# Patient Record
Sex: MALE | Race: WHITE | NOT HISPANIC OR LATINO | URBAN - METROPOLITAN AREA
[De-identification: names, ages, dates, MRNs, and addresses within clinical notes are randomized per-mention and may not be internally consistent; named-entity substitution may affect disease eponyms.]

---

## 2017-12-14 ENCOUNTER — ALLSCRIPTS OFFICE VISIT (OUTPATIENT)
Dept: OTHER | Facility: OTHER | Age: 77
End: 2017-12-14

## 2017-12-15 NOTE — PROGRESS NOTES
Assessment  1  Elevated prostate specific antigen (PSA) (790 93) (R97 20)   2  Special screening examination for neoplasm of prostate (V76 44) (Z12 5)    Discussion/Summary  Discussion Summary:   58-year-old male managed by Dr Sorin Zacarias  history of elevated PSAReviewed with the patient his PSA trend since 2013  His PSA has been stable and due to this and his age we can discontinue screening at this time  His digital rectal examination was not concerning today  He will follow up on an as-needed basis  Chief Complaint  Chief Complaint Free Text Note Form: pt here for elevated PSA; Prostate cancer screen      History of Present Illness  HPI: Sheng Kennedy is a 58-year-old male here for follow-up evaluation of a history of an elevated PSA  he presents today with no lower urinary tract complaints, a good stream quality, and the sensation he empties his bladder to completion  His PSA is stable at 4 2  Last year his PSA was 3 94 dating back to 2013 his PSA was 4 2  Review of Systems  Complete-Male Urology:  Constitutional: No fever or chills, feels well, no tiredness, no recent weight gain or weight loss  Respiratory: No complaints of shortness of breath, no wheezing, no cough, no SOB on exertion, no orthopnea or PND  Cardiovascular: No complaints of slow heart rate, no fast heart rate, no chest pain, no palpitations, no leg claudication, no lower extremity  Gastrointestinal: No complaints of abdominal pain, no constipation, no nausea or vomiting, no diarrhea or bloody stools  Genitourinary: Empty sensation-- and-- stream quality good, but-- no dysuria,-- no urinary hesitancy,-- no hematuria,-- no incontinence,-- no nocturia-- and-- no feelings of urinary urgency  Musculoskeletal: No complaints of arthralgia, no myalgias, no joint swelling or stiffness, no limb pain or swelling  Integumentary: No complaints of skin rash or skin lesions, no itching, no skin wound, no dry skin    Hematologic/Lymphatic: No complaints of swollen glands, no swollen glands in the neck, does not bleed easily, no easy bruising  Neurological: No compliants of headache, no confusion, no convulsions, no numbness or tingling, no dizziness or fainting, no limb weakness, no difficulty walking  ROS Reviewed:   ROS reviewed  Active Problems  1  Elevated prostate specific antigen (PSA) (790 93) (R97 20)   2  Hypertension (401 9) (I10)   3  Special screening examination for neoplasm of prostate (V76 44) (Z12 5)    Past Medical History  1  History of Anxiety disorder (300 00) (F41 9)   2  History of angina pectoris (V12 59) (Z86 79)   3  History of backache (V13 59) (Z87 39)  Active Problems And Past Medical History Reviewed: The active problems and past medical history were reviewed and updated today  Surgical History  1  History of Cath Placement Of Stent 2  Surgical History Reviewed: The surgical history was reviewed and updated today  Family History  Mother    1  Family history of Heart Disease (V17 49)  Father    2  Family history of Hypertension (V17 49)  Brother    3  Family history of Heart Disease (V17 49)   4  Family history of Reported Family History Of Kidney Disease  Family History    5  Family history of Kidney Cancer (V16 51)   6  Family history of Lung Cancer (V16 1)  Family History Reviewed: The family history was reviewed and updated today  Social History   · Being A Social Drinker   · Former smoker (U61 16) (Y18 255)  Social History Reviewed: The social history was reviewed and updated today  The social history was reviewed and is unchanged  Current Meds   1  Aspirin 325 MG Oral Tablet Recorded   2  Bystolic 10 MG Oral Tablet; Therapy: 52Dzy4652 to (Evaluate:11Dau9767) Recorded   3  Crestor 10 MG Oral Tablet; Therapy: 72Bai8254 to (Last Steva Ear)  Requested for: 67Ioa7633 Ordered   4  Fenofibrate 145 MG Oral Tablet; Therapy: 44YVI7417 to (Evaluate:47Pyk7903) Recorded   5   LORazepam 1 MG Oral Tablet; Therapy: 51FQD9891 to (Last Rx:56Vxn7851)  Requested for: 32Uhy4573 Ordered   6  Omeprazole 20 MG Oral Capsule Delayed Release; Therapy: 87KVR5674 to (Last Rx:29Pyp1395)  Requested for: 93VFF1467 Ordered  Medication List Reviewed: The medication list was reviewed and updated today  Allergies  1  Penicillins  2  No Known Environmental Allergies   3  No Known Food Allergies    Vitals  Vital Signs    Recorded: 37Zrl0601 11:24AM   Heart Rate 63   Systolic 000   Diastolic 66   Height 5 ft 10 in   Weight 154 lb    BMI Calculated 22 1   BSA Calculated 1 87     Physical Exam   Constitutional  General appearance: No acute distress, well appearing and well nourished  Eyes  Conjunctiva and lids: No erythema, swelling or discharge  Ears, Nose, Mouth, and Throat  Hearing: Normal    Pulmonary  Respiratory effort: No increased work of breathing or signs of respiratory distress  Abdomen  Abdomen: Non-tender, no masses  Genitourinary Smooth, nontender, 40 g prostate with no nodules    Musculoskeletal  Gait and station: Normal    Psychiatric  Mood and affect: Normal        Signatures   Electronically signed by : Sebastien Solomon, ; Dec 14 2017 11:39AM EST                       (Author)    Electronically signed by : SADE Maldonado ; Dec 14 2017  2:22PM EST

## 2018-01-22 VITALS
BODY MASS INDEX: 22.05 KG/M2 | SYSTOLIC BLOOD PRESSURE: 142 MMHG | WEIGHT: 154 LBS | DIASTOLIC BLOOD PRESSURE: 66 MMHG | HEART RATE: 63 BPM | HEIGHT: 70 IN

## 2023-12-31 ENCOUNTER — HOSPITAL ENCOUNTER (INPATIENT)
Facility: HOSPITAL | Age: 83
LOS: 3 days | Discharge: HOME | DRG: 282 | End: 2024-01-03
Attending: EMERGENCY MEDICINE | Admitting: HOSPITALIST
Payer: COMMERCIAL

## 2023-12-31 DIAGNOSIS — I25.119 CORONARY ARTERY DISEASE INVOLVING NATIVE CORONARY ARTERY OF NATIVE HEART WITH ANGINA PECTORIS (HCC): ICD-10-CM

## 2023-12-31 DIAGNOSIS — I10 PRIMARY HYPERTENSION: ICD-10-CM

## 2023-12-31 DIAGNOSIS — R79.89 ELEVATED TROPONIN: Primary | ICD-10-CM

## 2023-12-31 DIAGNOSIS — D53.9 MACROCYTIC ANEMIA: ICD-10-CM

## 2023-12-31 PROBLEM — R07.9 CHEST PAIN: Status: ACTIVE | Noted: 2023-12-31

## 2023-12-31 PROBLEM — N18.30 STAGE 3 CHRONIC KIDNEY DISEASE (HCC): Status: ACTIVE | Noted: 2023-12-31

## 2023-12-31 PROBLEM — D72.819 LEUKOPENIA: Status: ACTIVE | Noted: 2023-12-31

## 2023-12-31 LAB
2HR DELTA HS TROPONIN: 875 NG/L
4HR DELTA HS TROPONIN: 2592 NG/L
ABO GROUP BLD: NORMAL
ABO GROUP BLD: NORMAL
ALBUMIN SERPL BCP-MCNC: 4.2 G/DL (ref 3.5–5)
ALP SERPL-CCNC: 32 U/L (ref 34–104)
ALT SERPL W P-5'-P-CCNC: 28 U/L (ref 7–52)
ANION GAP SERPL CALCULATED.3IONS-SCNC: 7 MMOL/L
APTT PPP: 26 SECONDS (ref 23–37)
AST SERPL W P-5'-P-CCNC: 30 U/L (ref 13–39)
ATRIAL RATE: 63 BPM
ATRIAL RATE: 75 BPM
BASOPHILS # BLD AUTO: 0.03 THOUSANDS/ÂΜL (ref 0–0.1)
BASOPHILS NFR BLD AUTO: 1 % (ref 0–1)
BILIRUB SERPL-MCNC: 0.5 MG/DL (ref 0.2–1)
BLD GP AB SCN SERPL QL: NEGATIVE
BNP SERPL-MCNC: 514 PG/ML (ref 0–100)
BUN SERPL-MCNC: 29 MG/DL (ref 5–25)
CALCIUM SERPL-MCNC: 9.5 MG/DL (ref 8.4–10.2)
CARDIAC TROPONIN I PNL SERPL HS: 1023 NG/L
CARDIAC TROPONIN I PNL SERPL HS: 1898 NG/L
CARDIAC TROPONIN I PNL SERPL HS: 3615 NG/L
CHLORIDE SERPL-SCNC: 106 MMOL/L (ref 96–108)
CO2 SERPL-SCNC: 28 MMOL/L (ref 21–32)
CREAT SERPL-MCNC: 1.24 MG/DL (ref 0.6–1.3)
EOSINOPHIL # BLD AUTO: 0.13 THOUSAND/ÂΜL (ref 0–0.61)
EOSINOPHIL NFR BLD AUTO: 4 % (ref 0–6)
ERYTHROCYTE [DISTWIDTH] IN BLOOD BY AUTOMATED COUNT: 13.4 % (ref 11.6–15.1)
FOLATE SERPL-MCNC: 12.8 NG/ML
GFR SERPL CREATININE-BSD FRML MDRD: 52 ML/MIN/1.73SQ M
GLUCOSE SERPL-MCNC: 153 MG/DL (ref 65–140)
HCT VFR BLD AUTO: 19.6 % (ref 36.5–49.3)
HGB BLD-MCNC: 6.9 G/DL (ref 12–17)
IMM GRANULOCYTES # BLD AUTO: 0.01 THOUSAND/UL (ref 0–0.2)
IMM GRANULOCYTES NFR BLD AUTO: 0 % (ref 0–2)
INR PPP: 1.01 (ref 0.84–1.19)
LYMPHOCYTES # BLD AUTO: 1.32 THOUSANDS/ÂΜL (ref 0.6–4.47)
LYMPHOCYTES NFR BLD AUTO: 38 % (ref 14–44)
MCH RBC QN AUTO: 37.9 PG (ref 26.8–34.3)
MCHC RBC AUTO-ENTMCNC: 35.2 G/DL (ref 31.4–37.4)
MCV RBC AUTO: 108 FL (ref 82–98)
MONOCYTES # BLD AUTO: 0.42 THOUSAND/ÂΜL (ref 0.17–1.22)
MONOCYTES NFR BLD AUTO: 12 % (ref 4–12)
NEUTROPHILS # BLD AUTO: 1.57 THOUSANDS/ÂΜL (ref 1.85–7.62)
NEUTS SEG NFR BLD AUTO: 45 % (ref 43–75)
NRBC BLD AUTO-RTO: 0 /100 WBCS
P AXIS: 72 DEGREES
P AXIS: 75 DEGREES
PLATELET # BLD AUTO: 270 THOUSANDS/UL (ref 149–390)
PMV BLD AUTO: 10.8 FL (ref 8.9–12.7)
POTASSIUM SERPL-SCNC: 4 MMOL/L (ref 3.5–5.3)
PR INTERVAL: 144 MS
PR INTERVAL: 148 MS
PROT SERPL-MCNC: 6.7 G/DL (ref 6.4–8.4)
PROTHROMBIN TIME: 13.9 SECONDS (ref 11.6–14.5)
QRS AXIS: 57 DEGREES
QRS AXIS: 64 DEGREES
QRSD INTERVAL: 90 MS
QRSD INTERVAL: 94 MS
QT INTERVAL: 400 MS
QT INTERVAL: 422 MS
QTC INTERVAL: 431 MS
QTC INTERVAL: 446 MS
RBC # BLD AUTO: 1.82 MILLION/UL (ref 3.88–5.62)
RH BLD: NEGATIVE
RH BLD: NEGATIVE
SODIUM SERPL-SCNC: 141 MMOL/L (ref 135–147)
SPECIMEN EXPIRATION DATE: NORMAL
T WAVE AXIS: -57 DEGREES
T WAVE AXIS: -72 DEGREES
VENTRICULAR RATE: 63 BPM
VENTRICULAR RATE: 75 BPM
WBC # BLD AUTO: 3.48 THOUSAND/UL (ref 4.31–10.16)

## 2023-12-31 PROCEDURE — 86901 BLOOD TYPING SEROLOGIC RH(D): CPT

## 2023-12-31 PROCEDURE — 85730 THROMBOPLASTIN TIME PARTIAL: CPT

## 2023-12-31 PROCEDURE — 84484 ASSAY OF TROPONIN QUANT: CPT

## 2023-12-31 PROCEDURE — 83880 ASSAY OF NATRIURETIC PEPTIDE: CPT

## 2023-12-31 PROCEDURE — 36415 COLL VENOUS BLD VENIPUNCTURE: CPT

## 2023-12-31 PROCEDURE — 80053 COMPREHEN METABOLIC PANEL: CPT

## 2023-12-31 PROCEDURE — 86900 BLOOD TYPING SEROLOGIC ABO: CPT

## 2023-12-31 PROCEDURE — 93005 ELECTROCARDIOGRAM TRACING: CPT

## 2023-12-31 PROCEDURE — 36430 TRANSFUSION BLD/BLD COMPNT: CPT

## 2023-12-31 PROCEDURE — C9113 INJ PANTOPRAZOLE SODIUM, VIA: HCPCS | Performed by: EMERGENCY MEDICINE

## 2023-12-31 PROCEDURE — 99223 1ST HOSP IP/OBS HIGH 75: CPT | Performed by: HOSPITALIST

## 2023-12-31 PROCEDURE — 99285 EMERGENCY DEPT VISIT HI MDM: CPT

## 2023-12-31 PROCEDURE — 82746 ASSAY OF FOLIC ACID SERUM: CPT | Performed by: PHYSICIAN ASSISTANT

## 2023-12-31 PROCEDURE — 86920 COMPATIBILITY TEST SPIN: CPT

## 2023-12-31 PROCEDURE — 86850 RBC ANTIBODY SCREEN: CPT

## 2023-12-31 PROCEDURE — 99291 CRITICAL CARE FIRST HOUR: CPT | Performed by: EMERGENCY MEDICINE

## 2023-12-31 PROCEDURE — 85025 COMPLETE CBC W/AUTO DIFF WBC: CPT

## 2023-12-31 PROCEDURE — P9016 RBC LEUKOCYTES REDUCED: HCPCS

## 2023-12-31 PROCEDURE — 85610 PROTHROMBIN TIME: CPT

## 2023-12-31 PROCEDURE — 93010 ELECTROCARDIOGRAM REPORT: CPT | Performed by: INTERNAL MEDICINE

## 2023-12-31 PROCEDURE — 96374 THER/PROPH/DIAG INJ IV PUSH: CPT

## 2023-12-31 RX ORDER — HEPARIN SODIUM 1000 [USP'U]/ML
1950 INJECTION, SOLUTION INTRAVENOUS; SUBCUTANEOUS EVERY 6 HOURS PRN
Status: DISCONTINUED | OUTPATIENT
Start: 2023-12-31 | End: 2023-12-31

## 2023-12-31 RX ORDER — NEBIVOLOL 10 MG/1
20 TABLET ORAL DAILY
Status: DISCONTINUED | OUTPATIENT
Start: 2024-01-01 | End: 2024-01-03 | Stop reason: HOSPADM

## 2023-12-31 RX ORDER — HYDROCHLOROTHIAZIDE 25 MG/1
25 TABLET ORAL DAILY
Status: DISCONTINUED | OUTPATIENT
Start: 2024-01-01 | End: 2024-01-03 | Stop reason: HOSPADM

## 2023-12-31 RX ORDER — LORAZEPAM 1 MG/1
1 TABLET ORAL 3 TIMES DAILY PRN
Status: DISCONTINUED | OUTPATIENT
Start: 2023-12-31 | End: 2024-01-03 | Stop reason: HOSPADM

## 2023-12-31 RX ORDER — ATORVASTATIN CALCIUM 20 MG/1
20 TABLET, FILM COATED ORAL
Status: DISCONTINUED | OUTPATIENT
Start: 2023-12-31 | End: 2024-01-03 | Stop reason: HOSPADM

## 2023-12-31 RX ORDER — HEPARIN SODIUM 1000 [USP'U]/ML
3900 INJECTION, SOLUTION INTRAVENOUS; SUBCUTANEOUS ONCE
Status: DISCONTINUED | OUTPATIENT
Start: 2023-12-31 | End: 2023-12-31

## 2023-12-31 RX ORDER — HEPARIN SODIUM 1000 [USP'U]/ML
3900 INJECTION, SOLUTION INTRAVENOUS; SUBCUTANEOUS EVERY 6 HOURS PRN
Status: DISCONTINUED | OUTPATIENT
Start: 2023-12-31 | End: 2023-12-31

## 2023-12-31 RX ORDER — ONDANSETRON 2 MG/ML
4 INJECTION INTRAMUSCULAR; INTRAVENOUS EVERY 6 HOURS PRN
Status: DISCONTINUED | OUTPATIENT
Start: 2023-12-31 | End: 2024-01-03 | Stop reason: HOSPADM

## 2023-12-31 RX ORDER — PANTOPRAZOLE SODIUM 40 MG/10ML
40 INJECTION, POWDER, LYOPHILIZED, FOR SOLUTION INTRAVENOUS ONCE
Status: COMPLETED | OUTPATIENT
Start: 2023-12-31 | End: 2023-12-31

## 2023-12-31 RX ORDER — ACETAMINOPHEN 325 MG/1
650 TABLET ORAL EVERY 6 HOURS PRN
Status: DISCONTINUED | OUTPATIENT
Start: 2023-12-31 | End: 2024-01-03 | Stop reason: HOSPADM

## 2023-12-31 RX ORDER — HEPARIN SODIUM 10000 [USP'U]/100ML
3-20 INJECTION, SOLUTION INTRAVENOUS
Status: DISCONTINUED | OUTPATIENT
Start: 2023-12-31 | End: 2023-12-31

## 2023-12-31 RX ORDER — HEPARIN SODIUM 5000 [USP'U]/ML
5000 INJECTION, SOLUTION INTRAVENOUS; SUBCUTANEOUS EVERY 8 HOURS SCHEDULED
Status: DISCONTINUED | OUTPATIENT
Start: 2023-12-31 | End: 2024-01-01

## 2023-12-31 RX ADMIN — ATORVASTATIN CALCIUM 20 MG: 20 TABLET, FILM COATED ORAL at 18:23

## 2023-12-31 RX ADMIN — TICAGRELOR 180 MG: 90 TABLET ORAL at 13:11

## 2023-12-31 RX ADMIN — CYANOCOBALAMIN TAB 500 MCG 1000 MCG: 500 TAB at 18:22

## 2023-12-31 RX ADMIN — LORAZEPAM 1 MG: 1 TABLET ORAL at 23:16

## 2023-12-31 RX ADMIN — PANTOPRAZOLE SODIUM 40 MG: 40 INJECTION, POWDER, FOR SOLUTION INTRAVENOUS at 14:18

## 2023-12-31 RX ADMIN — HEPARIN SODIUM 5000 UNITS: 5000 INJECTION INTRAVENOUS; SUBCUTANEOUS at 18:23

## 2023-12-31 NOTE — ASSESSMENT & PLAN NOTE
Hgb of 6.9 noted on admission with MCV of 108  Patient already being treated with PO B12 outpatient   SPEP was negative, but further clarification of haptoglobin was recommended   Low-normal B12 was noted   Iron levels appropriate  Guaiac negative in ED  T. Bili normal, doubt hemolysis   Check Folate   Check Haptoglobin   Transfusing 2 U in ED   Repeat hemoglobin   Consider hematology evaluation   Continue B12 supplementation

## 2023-12-31 NOTE — ED ATTENDING ATTESTATION
12/31/2023  I, Paula Golden MD, saw and evaluated the patient. I have discussed the patient with the resident/non-physician practitioner and agree with the resident's/non-physician practitioner's findings, Plan of Care, and MDM as documented in the resident's/non-physician practitioner's note, except where noted. All available labs and Radiology studies were reviewed.  I was present for key portions of any procedure(s) performed by the resident/non-physician practitioner and I was immediately available to provide assistance.       At this point I agree with the current assessment done in the Emergency Department.  I have conducted an independent evaluation of this patient a history and physical is as follows:    Patient is an 83-year-old male presents to the emergency department via EMS with abnormal EKG.  EMS contacted the department for medical command which I provided.  They shared that patient has been having jaw pain since yesterday and chest discomfort since this morning.  EKG revealing ST elevation in aVR and marked ST depression in multiple leads especially laterally.    Patient relays that he has had intermittent bilateral jaw discomfort over the last week patient-for fleeting and mild episodes without identified provocation.  Yesterday this was present for the majority of the day at low level intensity without known provocation.  This morning upon waking jaw pain was much more intense.  He additionally noticed discomfort in the upper portion of the chest with radiation down both arms-right more than left.  No associated dyspnea, lightheadedness, nausea, vomiting or abdominal discomfort.  No leg swelling or cramping.  History of hypertension and hyperlipidemia-reportedly well-controlled with medications.  History of CAD with 2 stents having been placed by Dr. Allen approximately 20 years ago.  Patient follows with him regularly.  (Chart not initially linked to [facility] records).   Patient relays that a number of days ago he had a general sensation of feeling unwell.  He currently feels fatigued.  No recent fevers or cough.    On exam he appears mildly uncomfortable.  He notes that pain intensity had been a 7 out of 10 though has now lessened to a 2 out of 10.  He did receive aspirin 324 mg prior to arrival.  He is not diaphoretic.  Heart sounds regular and lungs clear to auscultation bilaterally.  Abdomen soft and nontender.  Lower extremities nontender and nonedematous with strong PT pulses.    EKG reveals deep ST depression inferiorly and laterally & aVR elevation.  This is similar to prehospital EKG.  Concerning for ACS/ischemia-potentially evolving MI.  Dr. Loyola reaching out to Dr. Garcia.    ED Course      ED Course as of 12/31/23 1632   Sun Dec 31, 2023   1255 I spoke with Dr. Garcia-interventional cardiology.  Patient is currently experiencing jaw discomfort and bilateral shoulder pain although not chest pain.  As EKG is concerning for ischemia we are treating him aggressively medically.  Will load with heparin and follow with infusion, administer Brilinta and monitor closely with every 15 minute EKGs.    1346 Upon identification of the low hemoglobin spoke with patient and family at bedside.  Patient was newly diagnosed with anemia last month.  Wife showed me blood work that he had recently.  Hemoglobin was 10.9 on July 28.  It was 10.2 on 11/18.  This was followed up with iron, transferrin, TIBC, ESR and SPEP testing.  Results not consistent with iron deficiency anemia or myeloma.  B12 supplement was initiated and plan for repeat hemoglobin (based on level of 10.2) in a few months.  Patient denies awareness of having had any bleeding.  Stools have not been dark or loose.  He is Hemoccult negative here at the bedside.  Heparin has been discontinued patient was consented for 2 units of packed red blood cells.  Interventional cardiologist Dr. Garcia updated on findings.  He will be  admitted for his symptomatic anemia with resultant cardiac ischemia.  At this time he is in no acute distress and has no jaw discomfort, chest discomfort, shoulder or arm pain.  He denies having had any dyspnea.  Supplemental O2 was placed at 2 L.   1357 3rd EKG performed.  Unchanged.  Dr. Loyola will be discussing w/ Crit Care.  Anticipate SD admission.   1411 EKG#4 unchanged.  Will cancel Q15 min EKGs         Pt. Remained feeling well. Dr. Garcia updated on pt. Condition.  Continue w/ medical management.PPI initiated.  Dr. Loyola discussed w/ Crit Care & subsequently SLIM for admission.     HEART score:    History 2=Highly suspicious   ECG 2=Significant ST-depression   Age 2= > 65 years   Risk Factors 2= > 3 risk factors, or history of atherosclerotic disease   Troponin 2=Greater than or equal to 36 ng/L   Total 10        Critical Care Time  CriticalCare Time    Date/Time: 12/31/2023 4:44 PM    Performed by: Paula Golden MD  Authorized by: Paula Golden MD    Critical care provider statement:     Critical care time (minutes):  45    Critical care was necessary to treat or prevent imminent or life-threatening deterioration of the following conditions:  Circulatory failure    Critical care was time spent personally by me on the following activities:  Obtaining history from patient or surrogate, examination of patient, review of old charts, ordering and performing treatments and interventions, ordering and review of laboratory studies, ordering and review of radiographic studies, re-evaluation of patient's condition, evaluation of patient's response to treatment, development of treatment plan with patient or surrogate and discussions with consultants

## 2023-12-31 NOTE — ASSESSMENT & PLAN NOTE
Patient has history of 2 stents about 20 years ago   Continue ASA, Bystolic, statin   Plan as above

## 2023-12-31 NOTE — ASSESSMENT & PLAN NOTE
Lab Results   Component Value Date    EGFR 52 12/31/2023    CREATININE 1.24 12/31/2023     Creatinine appears to be at baseline   Monitor   Avoid hypotension/nephrotoxins

## 2023-12-31 NOTE — ASSESSMENT & PLAN NOTE
POA, patient with upper chest pain with radiation to bilateral arms and jaw. Was found to have troponin of 1023 on admission increasing to 1898 on 2 hours check   EKG with TWI/depression   Case was discussed with interventionalist - likely angina/trop elevation due to anemia of 6.9 noted   Admit patient to med/surg under inpatient status   Telemetry   Check 4 hour troponin   Consider cardiology consultation   Continue home cardiac regimen - ASA, Bystolic, Statin   Treatment of anemia as below

## 2023-12-31 NOTE — QUICK NOTE
Received call regarding Mr. Gael Woodruff, an 83 year old M and former smoker with a PMH significant for CAD s/p PCI, Carotid Stenosis, HTN, and HLD.  He presented to [facility] with ED with stuttering CP over the last week that persisted this morning with radiation into his back, RUE, and jaw.  EKG on arrival with Diffuse STD and REYNA in aVR consistent with global ischemia.  Spoke with ED Team regarding initial med mgmt with close observation and possible LHC today. However, on follow up discussion, patient found to be anemic with Hgb of 6.9. Called back to hold on the heparin gtt pending further bleed assessment.  Baseline Hgb was 13.4 in 01/2023 with decline to 10.9 07/2023 and recommendation for outpatient follow up.  At this time, recommend transfusion to goal of 8.0 g/dL for now.  Will assess patient further for DAPT/UHF recommendations as well.  No plans for urgent LHC at this time but will follow with TTE and ACS med mgmt as able.

## 2023-12-31 NOTE — H&P
[facility]  H&P  Name: Gael Ferraro 83 y.o. male I MRN: [id number]  Unit/Bed#: ED-20 I Date of Admission: 12/31/2023   Date of Service: 12/31/2023 I Hospital Day: 0      Assessment/Plan   * Chest pain  Assessment & Plan  POA, patient with upper chest pain with radiation to bilateral arms and jaw. Was found to have troponin of 1023 on admission increasing to 1898 on 2 hours check   EKG with TWI/depression   Case was discussed with interventionalist - likely angina/trop elevation due to anemia of 6.9 noted   Admit patient to med/surg under inpatient status   Telemetry   Check 4 hour troponin   Consider cardiology consultation   Continue home cardiac regimen - ASA, Bystolic, Statin   Treatment of anemia as below     Macrocytic anemia  Assessment & Plan  Hgb of 6.9 noted on admission with MCV of 108  Patient already being treated with PO B12 outpatient   SPEP was negative, but further clarification of haptoglobin was recommended   Low-normal B12 was noted   Iron levels appropriate  Guaiac negative in ED  T. Bili normal, doubt hemolysis   Check Folate   Check Haptoglobin   Transfusing 2 U in ED   Repeat hemoglobin   Consider hematology evaluation   Continue B12 supplementation     Coronary artery disease involving native coronary artery of native heart with angina pectoris (HCC)  Assessment & Plan  Patient has history of 2 stents about 20 years ago   Continue ASA, Bystolic, statin   Plan as above     Stage 3 chronic kidney disease (HCC)  Assessment & Plan  Lab Results   Component Value Date    EGFR 52 12/31/2023    CREATININE 1.24 12/31/2023     Creatinine appears to be at baseline   Monitor   Avoid hypotension/nephrotoxins     Leukopenia  Assessment & Plan  Noted at 3.48, seems to be slightly decreasing over the last few years.   Monitor for now   Consider hematology evaluation     Primary hypertension  Assessment & Plan  BP acceptable   Continue HCTZ         VTE Pharmacologic Prophylaxis:  VTE Score: 4 Moderate Risk (Score 3-4) - Pharmacological DVT Prophylaxis Ordered: heparin.  Code Status: Full Code   Discussion with family: Updated  (multiple family) at bedside.    Anticipated Length of Stay: Patient will be admitted on an inpatient basis with an anticipated length of stay of greater than 2 midnights secondary to as per above assessment and plan .    Total Time Spent on Date of Encounter in care of patient: 75 mins. This time was spent on one or more of the following: performing physical exam; counseling and coordination of care; obtaining or reviewing history; documenting in the medical record; reviewing/ordering tests, medications or procedures; communicating with other healthcare professionals and discussing with patient's family/caregivers.    Chief Complaint: Chest Pain     History of Present Illness:  Gael Ferraro is a 83 y.o. male with a PMH of CAD status post stenting 20 year ago, HTN, CKD 3 who presents with chest pain. Patient states that he first experienced chest pain a few days ago with minimal exertion. Reported upper chest pain with bilateral arm radiation and jaw pain. He denied SOB, dizziness, nausea, or vomiting. He took aspirin and laid down and his chest pain resolved. He states that he woke up today and immediately started with chest pain similar to a few days ago that was more intense in nature. EMS was called. Presently he reports he is feeling better.     Patient was noted to be anemic at recent well visit with his PCP and work up was ordered. He was started on Vitamin B12 supplementation orally. Patient does report fatigue, but denies dizziness, SCOTT/SOB, syncope, or blood in his urine or stools. He reports having screening colonoscopies as directed years ago, but has not had one recently due to no indication. He has not required blood transfusion prior to today. Denies recent viral illness. Reports that he eats regular and follows a healthy diet. Denies  family history of blood disorders.     Review of Systems:  Review of Systems   Constitutional:  Positive for fatigue. Negative for appetite change, chills, diaphoresis and fever.   HENT:  Negative for congestion, rhinorrhea and sore throat.    Eyes:  Negative for visual disturbance.   Respiratory:  Negative for cough, chest tightness, shortness of breath and wheezing.    Cardiovascular:  Positive for chest pain. Negative for palpitations and leg swelling.   Gastrointestinal:  Negative for abdominal pain, anal bleeding, blood in stool, constipation, diarrhea, nausea and vomiting.   Genitourinary:  Negative for dysuria and hematuria.   Musculoskeletal:  Negative for arthralgias and myalgias.   Neurological:  Negative for dizziness, syncope, weakness, light-headedness, numbness and headaches.   All other systems reviewed and are negative.      Past Medical and Surgical History:   No past medical history on file.    No past surgical history on file.    Meds/Allergies:  Prior to Admission medications    Not on File     I have reviewed home medications with a medical source (PCP, Pharmacy, other).    Allergies: Not on File    Social History:  Marital Status: /Civil Union   Occupation: Noncontributory   Patient Pre-hospital Living Situation: Home  Patient Pre-hospital Level of Mobility: walks  Patient Pre-hospital Diet Restrictions: None  Substance Use History:   Social History     Substance and Sexual Activity   Alcohol Use Not on file     Social History     Tobacco Use   Smoking Status Not on file   Smokeless Tobacco Not on file     Social History     Substance and Sexual Activity   Drug Use Not on file       Family History:  No family history on file.    Physical Exam:     Vitals:   Blood Pressure: 133/63 (12/31/23 1543)  Pulse: 66 (12/31/23 1543)  Temperature: 98.7 °F (37.1 °C) (12/31/23 1543)  Temp Source: Oral (12/31/23 1543)  Respirations: 16 (12/31/23 1543)  Weight - Scale: 69.2 kg (152 lb 8.9 oz) (12/31/23  1227)  SpO2: 96 % (12/31/23 1543)    Physical Exam  Constitutional:       General: He is not in acute distress.     Appearance: Normal appearance. He is normal weight. He is not ill-appearing or diaphoretic.      Interventions: Nasal cannula in place.   HENT:      Head: Normocephalic and atraumatic.      Mouth/Throat:      Mouth: Mucous membranes are moist.   Eyes:      General: No scleral icterus.     Pupils: Pupils are equal, round, and reactive to light.   Cardiovascular:      Rate and Rhythm: Normal rate and regular rhythm.      Pulses: Normal pulses.      Heart sounds: Normal heart sounds, S1 normal and S2 normal. No murmur heard.     No systolic murmur is present.      No diastolic murmur is present.      No gallop. No S3 or S4 sounds.   Pulmonary:      Effort: Pulmonary effort is normal. No accessory muscle usage or respiratory distress.      Breath sounds: Normal breath sounds. No stridor. No decreased breath sounds, wheezing, rhonchi or rales.   Chest:      Chest wall: No tenderness.   Abdominal:      General: Bowel sounds are normal. There is no distension.      Palpations: Abdomen is soft.      Tenderness: There is no abdominal tenderness. There is no guarding.   Musculoskeletal:      Right lower leg: No edema.      Left lower leg: No edema.   Skin:     General: Skin is warm and dry.      Coloration: Skin is not jaundiced.   Neurological:      General: No focal deficit present.      Mental Status: He is alert. Mental status is at baseline.      Motor: No tremor or seizure activity.   Psychiatric:         Behavior: Behavior is cooperative.          Additional Data:     Lab Results:  Results from last 7 days   Lab Units 12/31/23  1249   WBC Thousand/uL 3.48*   HEMOGLOBIN g/dL 6.9*   HEMATOCRIT % 19.6*   PLATELETS Thousands/uL 270   NEUTROS PCT % 45   LYMPHS PCT % 38   MONOS PCT % 12   EOS PCT % 4     Results from last 7 days   Lab Units 12/31/23  1249   SODIUM mmol/L 141   POTASSIUM mmol/L 4.0   CHLORIDE  mmol/L 106   CO2 mmol/L 28   BUN mg/dL 29*   CREATININE mg/dL 1.24   ANION GAP mmol/L 7   CALCIUM mg/dL 9.5   ALBUMIN g/dL 4.2   TOTAL BILIRUBIN mg/dL 0.50   ALK PHOS U/L 32*   ALT U/L 28   AST U/L 30   GLUCOSE RANDOM mg/dL 153*     Results from last 7 days   Lab Units 12/31/23  1315   INR  1.01                   Lines/Drains:  Invasive Devices       Peripheral Intravenous Line  Duration             Peripheral IV 12/31/23 Distal;Left;Upper;Ventral (anterior) Arm <1 day    Peripheral IV 12/31/23 Distal;Right;Upper;Ventral (anterior) Arm <1 day                        Imaging: No pertinent imaging reviewed.  No orders to display       EKG and Other Studies Reviewed on Admission:   EKG: NSR. HR 67 BPM.    ** Please Note: This note has been constructed using a voice recognition system. **

## 2023-12-31 NOTE — ED PROVIDER NOTES
History  Chief Complaint   Patient presents with    Chest Pain     Pt presents to the ED with jaw pain a couple times this week. Midsternal chest pain with radiation to the right arm onset 0900 this morning. Non smoker, family hx of heart attacks, more on mothers side. Pt reports hx of 2 stents. Placed 20 yrs ago. Arrived via EMS, received 324mg asa prehospital. 18g left AC.      Patient is an 83-year-old male with history of dyslipidemia, hypertension, prior CAD status post BRYAN x 20 years ago, former smoking history, strong family history of MI presenting to emergency department for evaluation of jaw and bilateral upper extremity pain.  Patient reports that he has had nonspecific jaw pain for least 1 week.  He notes intermittent worsening of his jaw pain without specific pattern for pain. Patient states He additionally has had some intermittent chest pain over the last week.  Patient states that her last couple days he has had an increase in his jaw pain that has been bothersome for him. Beginning today patient woke up with jaw pain radiating into bilateral upper extremities. Ultimately patient became concerned and called EMS who found patient to have ST depressions on field EKG and brought patient to ED for further evaluation. Upon arrival to the ED patient in NAD. He endorses mild bilateral jaw pain however doesn't endorse any chest pain, back pain, or shortness of breath.        None       No past medical history on file.    No past surgical history on file.    No family history on file.  I have reviewed and agree with the history as documented.    No existing history information found.  No existing history information found.        Review of Systems   Constitutional:  Negative for chills and fever.   HENT:  Negative for ear pain and sore throat.         Jaw pain   Eyes:  Negative for pain and visual disturbance.   Respiratory:  Negative for cough and shortness of breath.    Cardiovascular:  Negative for chest  pain and palpitations.   Gastrointestinal:  Negative for abdominal pain and vomiting.   Genitourinary:  Negative for dysuria and hematuria.   Musculoskeletal:  Negative for arthralgias and back pain.   Skin:  Negative for color change and rash.   Neurological:  Negative for seizures and syncope.   All other systems reviewed and are negative.      Physical Exam  ED Triage Vitals   Temperature Pulse Respirations Blood Pressure SpO2   12/31/23 1228 12/31/23 1223 12/31/23 1223 12/31/23 1223 12/31/23 1223   98 °F (36.7 °C) 98 20 151/68 99 %      Temp Source Heart Rate Source Patient Position - Orthostatic VS BP Location FiO2 (%)   12/31/23 1228 -- -- -- --   Oral          Pain Score       --                    Orthostatic Vital Signs  Vitals:    12/31/23 1400 12/31/23 1415 12/31/23 1521 12/31/23 1543   BP: 125/60 125/62 124/60 133/63   Pulse: 64 63 66 66       Physical Exam  Vitals and nursing note reviewed.   Constitutional:       General: He is not in acute distress.     Appearance: Normal appearance. He is not ill-appearing, toxic-appearing or diaphoretic.   HENT:      Head: Normocephalic and atraumatic.   Eyes:      General: No scleral icterus.        Right eye: No discharge.         Left eye: No discharge.      Extraocular Movements: Extraocular movements intact.      Conjunctiva/sclera: Conjunctivae normal.   Cardiovascular:      Rate and Rhythm: Normal rate.      Pulses: Normal pulses.      Heart sounds: Normal heart sounds. No murmur heard.     No friction rub. No gallop.      Comments: Equal bilateral radial pulses  Pulmonary:      Effort: Pulmonary effort is normal. No respiratory distress.      Breath sounds: Normal breath sounds. No stridor. No wheezing, rhonchi or rales.   Abdominal:      General: Abdomen is flat. Bowel sounds are normal. There is no distension.      Palpations: Abdomen is soft.      Tenderness: There is no abdominal tenderness. There is no guarding or rebound.   Musculoskeletal:          General: No swelling. Normal range of motion.      Cervical back: Normal range of motion. No rigidity.      Right lower leg: No edema.      Left lower leg: No edema.   Skin:     General: Skin is warm and dry.      Capillary Refill: Capillary refill takes less than 2 seconds.      Coloration: Skin is not jaundiced.      Findings: No bruising or lesion.   Neurological:      General: No focal deficit present.      Mental Status: He is alert and oriented to person, place, and time. Mental status is at baseline.   Psychiatric:         Mood and Affect: Mood normal.         Behavior: Behavior normal.         Thought Content: Thought content normal.         Judgment: Judgment normal.         ED Medications  Medications   cyanocobalamin (VITAMIN B-12) tablet 1,000 mcg (has no administration in time range)   ticagrelor (BRILINTA) tablet 180 mg (180 mg Oral Given 12/31/23 1311)   pantoprazole (PROTONIX) injection 40 mg (40 mg Intravenous Given 12/31/23 1418)       Diagnostic Studies  Results Reviewed       Procedure Component Value Units Date/Time    HS Troponin I 2hr [[id number]]  (Abnormal) Collected: 12/31/23 1448    Lab Status: Final result Specimen: Blood from Line, Venous Updated: 12/31/23 1521     hs TnI 2hr 1,898 ng/L      Delta 2hr hsTnI 875 ng/L     HS Troponin I 4hr [[id number]]     Lab Status: No result Specimen: Blood     B-Type Natriuretic Peptide(BNP) [[id number]]  (Abnormal) Collected: 12/31/23 1249    Lab Status: Final result Specimen: Blood from Arm, Right Updated: 12/31/23 1346      pg/mL     APTT six (6) hours after Heparin bolus/drip initiation or dosing change [[id number]]  (Normal) Collected: 12/31/23 1315    Lab Status: Final result Specimen: Blood from Arm, Right Updated: 12/31/23 1336     PTT 26 seconds     Protime-INR [[id number]]  (Normal) Collected: 12/31/23 1315    Lab Status: Final result Specimen: Blood from Arm, Right Updated: 12/31/23 1336     Protime 13.9 seconds      INR 1.01    HS  Troponin 0hr (reflex protocol) [[id number]]  (Abnormal) Collected: 12/31/23 1249    Lab Status: Final result Specimen: Blood from Arm, Right Updated: 12/31/23 1317     hs TnI 0hr 1,023 ng/L     Comprehensive metabolic panel [[id number]]  (Abnormal) Collected: 12/31/23 1249    Lab Status: Final result Specimen: Blood from Arm, Right Updated: 12/31/23 1314     Sodium 141 mmol/L      Potassium 4.0 mmol/L      Chloride 106 mmol/L      CO2 28 mmol/L      ANION GAP 7 mmol/L      BUN 29 mg/dL      Creatinine 1.24 mg/dL      Glucose 153 mg/dL      Calcium 9.5 mg/dL      AST 30 U/L      ALT 28 U/L      Alkaline Phosphatase 32 U/L      Total Protein 6.7 g/dL      Albumin 4.2 g/dL      Total Bilirubin 0.50 mg/dL      eGFR 52 ml/min/1.73sq m     Narrative:      National Kid[employer] Foundation guidelines for Chronic Kidney Disease (CKD):     Stage 1 with normal or high GFR (GFR > 90 mL/min/1.73 square meters)    Stage 2 Mild CKD (GFR = 60-89 mL/min/1.73 square meters)    Stage 3A Moderate CKD (GFR = 45-59 mL/min/1.73 square meters)    Stage 3B Moderate CKD (GFR = 30-44 mL/min/1.73 square meters)    Stage 4 Severe CKD (GFR = 15-29 mL/min/1.73 square meters)    Stage 5 End Stage CKD (GFR <15 mL/min/1.73 square meters)  Note: GFR calculation is accurate only with a steady state creatinine    CBC and differential [[id number]]  (Abnormal) Collected: 12/31/23 1249    Lab Status: Final result Specimen: Blood from Arm, Right Updated: 12/31/23 1313     WBC 3.48 Thousand/uL      RBC 1.82 Million/uL      Hemoglobin 6.9 g/dL      Hematocrit 19.6 %       fL      MCH 37.9 pg      MCHC 35.2 g/dL      RDW 13.4 %      MPV 10.8 fL      Platelets 270 Thousands/uL      nRBC 0 /100 WBCs      Neutrophils Relative 45 %      Immat GRANS % 0 %      Lymphocytes Relative 38 %      Monocytes Relative 12 %      Eosinophils Relative 4 %      Basophils Relative 1 %      Neutrophils Absolute 1.57 Thousands/µL      Immature Grans Absolute 0.01  Thousand/uL      Lymphocytes Absolute 1.32 Thousands/µL      Monocytes Absolute 0.42 Thousand/µL      Eosinophils Absolute 0.13 Thousand/µL      Basophils Absolute 0.03 Thousands/µL     Narrative:      This is an appended report.  These results have been appended to a previously verified report.                   No orders to display         Procedures  ECG 12 Lead Documentation Only    Date/Time: 12/31/2023 4:02 PM    Performed by: Steve Loyoal DO  Authorized by: Steve Loyola DO    Indications / Diagnosis:  Jaw pain  ECG reviewed by me, the ED Provider: yes    Patient location:  ED  Previous ECG:     Previous ECG:  Compared to current    Similarity:  No change  Interpretation:     Interpretation: normal    Rate:     ECG rate:  75    ECG rate assessment: normal    Rhythm:     Rhythm: sinus rhythm    Ectopy:     Ectopy: none    QRS:     QRS axis:  Normal  Conduction:     Conduction: normal    ST segments:     ST segments:  Abnormal    Elevation:  AVR    Depression:  V4, V5, V6, V3, II, III and aVF  T waves:     T waves: normal          ED Course  ED Course as of 12/31/23 1609   Sun Dec 31, 2023   1328 hs TnI 0hr(!): 1,023   1328 Hemoglobin(!!): 6.9                               JOCELIN Risk Score      Flowsheet Row Most Recent Value   Age >= 65 1 Filed at: 12/31/2023 1547   Known CAD (stenosis >= 50%) 1 Filed at: 12/31/2023 1547   Recent (<=24 hrs) Service Angina 1 Filed at: 12/31/2023 1547   ST Deviation >= 0.5 mm 1 Filed at: 12/31/2023 1547   3+ CAD Risk Factors (FHx, HTN, HLP, DM, Smoker) 0 Filed at: 12/31/2023 1547   Aspirin Use Past 7 Days 1 Filed at: 12/31/2023 1547   Elevated Cardiac Markers 1 Filed at: 12/31/2023 1547   JOCELIN Risk Score (Calculated) 6 Filed at: 12/31/2023 1547                Medical Decision Making  Patient is an 83-year-old male presenting to the emergency department for evaluation of jaw pain.  Upon arrival patient was hemodynamically stable with evidence of ST  depression in the lateral and inferior leads with isolated elevation in aVR.  At that time patient was not endorsing any anginal equivalent including dizziness, shortness of breath, nausea.  Secondary to diffuse ST depressions I reached out to interventional cardiology who felt it was appropriate to manage this patient medically while we gather more data.  Patient laboratory evaluation was remarkable for marked anemia.  At this point unsure why he is anemic.  He has received a workup through his primary care provider and was placed on iron.  Low suspicion for GI loss, patient was guaiac negative.  Denies hematemesis.  Ultimately believe that patient likely has global ischemia secondary to anemia.  Consent was obtained, patient will receive 2 units to transfuse to a goal of 8.  Patient to be admitted to internal medicine for further evaluation.    Amount and/or Complexity of Data Reviewed  Labs: ordered. Decision-making details documented in ED Course.    Risk  Prescription drug management.  Decision regarding hospitalization.          Disposition  Final diagnoses:   Elevated troponin     Time reflects when diagnosis was documented in both MDM as applicable and the Disposition within this note       Time User Action Codes Description Comment    12/31/2023  3:09 PM Steve Loyola Add [R79.89] Elevated troponin           ED Disposition       ED Disposition   Admit    Condition   Stable    Date/Time   Sun Dec 31, 2023 7226    Comment   Case was discussed with edyta and the patient's admission status was agreed to be Admission Status: inpatient status to the service of Dr. Devries .               Follow-up Information    None         Patient's Medications    No medications on file     No discharge procedures on file.    PDMP Review       None             ED Provider  Attending physically available and evaluated Gael Ferraro. I managed the patient along with the ED Attending.    Electronically Signed by            Steve Loyola DO  12/31/23 1606

## 2023-12-31 NOTE — ASSESSMENT & PLAN NOTE
Noted at 3.48, seems to be slightly decreasing over the last few years.   Monitor for now   Consider hematology evaluation

## 2024-01-01 ENCOUNTER — APPOINTMENT (INPATIENT)
Dept: NON INVASIVE DIAGNOSTICS | Facility: HOSPITAL | Age: 84
DRG: 282 | End: 2024-01-01
Payer: COMMERCIAL

## 2024-01-01 LAB
ABO GROUP BLD BPU: NORMAL
ABO GROUP BLD BPU: NORMAL
ALBUMIN SERPL BCP-MCNC: 3.8 G/DL (ref 3.5–5)
ALP SERPL-CCNC: 34 U/L (ref 34–104)
ALT SERPL W P-5'-P-CCNC: 28 U/L (ref 7–52)
ANION GAP SERPL CALCULATED.3IONS-SCNC: 7 MMOL/L
AORTIC ROOT: 3.1 CM
APICAL FOUR CHAMBER EJECTION FRACTION: 61 %
APTT PPP: 105 SECONDS (ref 23–37)
APTT PPP: 28 SECONDS (ref 23–37)
APTT PPP: 82 SECONDS (ref 23–37)
AST SERPL W P-5'-P-CCNC: 56 U/L (ref 13–39)
ATRIAL RATE: 65 BPM
ATRIAL RATE: 67 BPM
ATRIAL RATE: 81 BPM
BILIRUB SERPL-MCNC: 1.45 MG/DL (ref 0.2–1)
BPU ID: NORMAL
BPU ID: NORMAL
BUN SERPL-MCNC: 27 MG/DL (ref 5–25)
CALCIUM SERPL-MCNC: 9.2 MG/DL (ref 8.4–10.2)
CHLORIDE SERPL-SCNC: 109 MMOL/L (ref 96–108)
CO2 SERPL-SCNC: 26 MMOL/L (ref 21–32)
CREAT SERPL-MCNC: 1.13 MG/DL (ref 0.6–1.3)
CROSSMATCH: NORMAL
CROSSMATCH: NORMAL
E WAVE DECELERATION TIME: 171 MS
E/A RATIO: 1.52
ERYTHROCYTE [DISTWIDTH] IN BLOOD BY AUTOMATED COUNT: 16.8 % (ref 11.6–15.1)
FRACTIONAL SHORTENING: 36 (ref 28–44)
GFR SERPL CREATININE-BSD FRML MDRD: 58 ML/MIN/1.73SQ M
GLUCOSE SERPL-MCNC: 101 MG/DL (ref 65–140)
HCT VFR BLD AUTO: 25.6 % (ref 36.5–49.3)
HCT VFR BLD AUTO: 25.7 % (ref 36.5–49.3)
HCT VFR BLD AUTO: 27.4 % (ref 36.5–49.3)
HGB BLD-MCNC: 8.9 G/DL (ref 12–17)
HGB BLD-MCNC: 8.9 G/DL (ref 12–17)
HGB BLD-MCNC: 9.5 G/DL (ref 12–17)
INR PPP: 1.04 (ref 0.84–1.19)
INTERVENTRICULAR SEPTUM IN DIASTOLE (PARASTERNAL SHORT AXIS VIEW): 1.3 CM
INTERVENTRICULAR SEPTUM: 1.3 CM (ref 0.6–1.1)
LAAS-AP2: 26.3 CM2
LAAS-AP4: 23.8 CM2
LEFT ATRIUM SIZE: 4.2 CM
LEFT ATRIUM VOLUME (MOD BIPLANE): 85 ML
LEFT ATRIUM VOLUME INDEX (MOD BIPLANE): 45.7 ML/M2
LEFT INTERNAL DIMENSION IN SYSTOLE: 3 CM (ref 2.1–4)
LEFT VENTRICULAR INTERNAL DIMENSION IN DIASTOLE: 4.7 CM (ref 3.5–6)
LEFT VENTRICULAR POSTERIOR WALL IN END DIASTOLE: 1.1 CM
LEFT VENTRICULAR STROKE VOLUME: 67 ML
LVSV (TEICH): 67 ML
MCH RBC QN AUTO: 34.6 PG (ref 26.8–34.3)
MCHC RBC AUTO-ENTMCNC: 34.8 G/DL (ref 31.4–37.4)
MCV RBC AUTO: 100 FL (ref 82–98)
MV E'TISSUE VEL-SEP: 7 CM/S
MV PEAK A VEL: 0.58 M/S
MV PEAK E VEL: 88 CM/S
MV STENOSIS PRESSURE HALF TIME: 50 MS
MV VALVE AREA P 1/2 METHOD: 4.4
P AXIS: 64 DEGREES
P AXIS: 69 DEGREES
P AXIS: 70 DEGREES
PLATELET # BLD AUTO: 245 THOUSANDS/UL (ref 149–390)
PMV BLD AUTO: 10.8 FL (ref 8.9–12.7)
POTASSIUM SERPL-SCNC: 3.6 MMOL/L (ref 3.5–5.3)
PR INTERVAL: 148 MS
PR INTERVAL: 152 MS
PR INTERVAL: 154 MS
PROT SERPL-MCNC: 5.9 G/DL (ref 6.4–8.4)
PROTHROMBIN TIME: 14.2 SECONDS (ref 11.6–14.5)
QRS AXIS: 53 DEGREES
QRS AXIS: 59 DEGREES
QRS AXIS: 62 DEGREES
QRSD INTERVAL: 86 MS
QRSD INTERVAL: 92 MS
QRSD INTERVAL: 92 MS
QT INTERVAL: 386 MS
QT INTERVAL: 420 MS
QT INTERVAL: 424 MS
QTC INTERVAL: 440 MS
QTC INTERVAL: 443 MS
QTC INTERVAL: 448 MS
RA PRESSURE ESTIMATED: 8 MMHG
RBC # BLD AUTO: 2.57 MILLION/UL (ref 3.88–5.62)
RIGHT ATRIUM AREA SYSTOLE A4C: 16.9 CM2
RIGHT VENTRICLE ID DIMENSION: 3.4 CM
RV PSP: 58 MMHG
SL CV LEFT ATRIUM LENGTH A2C: 5.8 CM
SL CV LV EF: 60
SL CV PED ECHO LEFT VENTRICLE DIASTOLIC VOLUME (MOD BIPLANE) 2D: 102 ML
SL CV PED ECHO LEFT VENTRICLE SYSTOLIC VOLUME (MOD BIPLANE) 2D: 35 ML
SODIUM SERPL-SCNC: 142 MMOL/L (ref 135–147)
T WAVE AXIS: -82 DEGREES
T WAVE AXIS: -83 DEGREES
T WAVE AXIS: 253 DEGREES
TR MAX PG: 50 MMHG
TR PEAK VELOCITY: 3.5 M/S
TRICUSPID ANNULAR PLANE SYSTOLIC EXCURSION: 2.2 CM
TRICUSPID VALVE PEAK REGURGITATION VELOCITY: 3.54 M/S
UNIT DISPENSE STATUS: NORMAL
UNIT DISPENSE STATUS: NORMAL
UNIT PRODUCT CODE: NORMAL
UNIT PRODUCT CODE: NORMAL
UNIT PRODUCT VOLUME: 350 ML
UNIT PRODUCT VOLUME: 350 ML
UNIT RH: NORMAL
UNIT RH: NORMAL
VENTRICULAR RATE: 65 BPM
VENTRICULAR RATE: 67 BPM
VENTRICULAR RATE: 81 BPM
WBC # BLD AUTO: 6.77 THOUSAND/UL (ref 4.31–10.16)

## 2024-01-01 PROCEDURE — 85014 HEMATOCRIT: CPT | Performed by: NURSE PRACTITIONER

## 2024-01-01 PROCEDURE — 85730 THROMBOPLASTIN TIME PARTIAL: CPT | Performed by: NURSE PRACTITIONER

## 2024-01-01 PROCEDURE — 93306 TTE W/DOPPLER COMPLETE: CPT | Performed by: INTERNAL MEDICINE

## 2024-01-01 PROCEDURE — 99232 SBSQ HOSP IP/OBS MODERATE 35: CPT | Performed by: FAMILY MEDICINE

## 2024-01-01 PROCEDURE — 85018 HEMOGLOBIN: CPT | Performed by: NURSE PRACTITIONER

## 2024-01-01 PROCEDURE — 85730 THROMBOPLASTIN TIME PARTIAL: CPT | Performed by: FAMILY MEDICINE

## 2024-01-01 PROCEDURE — 83010 ASSAY OF HAPTOGLOBIN QUANT: CPT | Performed by: NURSE PRACTITIONER

## 2024-01-01 PROCEDURE — 99222 1ST HOSP IP/OBS MODERATE 55: CPT | Performed by: INTERNAL MEDICINE

## 2024-01-01 PROCEDURE — 85610 PROTHROMBIN TIME: CPT | Performed by: NURSE PRACTITIONER

## 2024-01-01 PROCEDURE — 93306 TTE W/DOPPLER COMPLETE: CPT

## 2024-01-01 PROCEDURE — 80053 COMPREHEN METABOLIC PANEL: CPT | Performed by: NURSE PRACTITIONER

## 2024-01-01 PROCEDURE — 85027 COMPLETE CBC AUTOMATED: CPT | Performed by: NURSE PRACTITIONER

## 2024-01-01 RX ORDER — SODIUM CHLORIDE 9 MG/ML
100 INJECTION, SOLUTION INTRAVENOUS CONTINUOUS
Status: DISCONTINUED | OUTPATIENT
Start: 2024-01-02 | End: 2024-01-02

## 2024-01-01 RX ORDER — HEPARIN SODIUM 1000 [USP'U]/ML
1950 INJECTION, SOLUTION INTRAVENOUS; SUBCUTANEOUS EVERY 6 HOURS PRN
Status: DISCONTINUED | OUTPATIENT
Start: 2024-01-01 | End: 2024-01-02

## 2024-01-01 RX ORDER — ASPIRIN 81 MG/1
324 TABLET, CHEWABLE ORAL ONCE
Status: COMPLETED | OUTPATIENT
Start: 2024-01-02 | End: 2024-01-02

## 2024-01-01 RX ORDER — ASPIRIN 81 MG/1
324 TABLET, CHEWABLE ORAL ONCE
Status: DISCONTINUED | OUTPATIENT
Start: 2024-01-01 | End: 2024-01-01

## 2024-01-01 RX ORDER — SODIUM CHLORIDE 9 MG/ML
100 INJECTION, SOLUTION INTRAVENOUS CONTINUOUS
Status: DISCONTINUED | OUTPATIENT
Start: 2024-01-01 | End: 2024-01-01

## 2024-01-01 RX ORDER — HEPARIN SODIUM 10000 [USP'U]/100ML
3-20 INJECTION, SOLUTION INTRAVENOUS
Status: DISCONTINUED | OUTPATIENT
Start: 2024-01-01 | End: 2024-01-02

## 2024-01-01 RX ORDER — HEPARIN SODIUM 1000 [USP'U]/ML
3900 INJECTION, SOLUTION INTRAVENOUS; SUBCUTANEOUS EVERY 6 HOURS PRN
Status: DISCONTINUED | OUTPATIENT
Start: 2024-01-01 | End: 2024-01-02

## 2024-01-01 RX ORDER — HEPARIN SODIUM 1000 [USP'U]/ML
3900 INJECTION, SOLUTION INTRAVENOUS; SUBCUTANEOUS ONCE
Status: COMPLETED | OUTPATIENT
Start: 2024-01-01 | End: 2024-01-01

## 2024-01-01 RX ADMIN — ASPIRIN 81 MG: 81 TABLET, COATED ORAL at 08:22

## 2024-01-01 RX ADMIN — LORAZEPAM 1 MG: 1 TABLET ORAL at 23:41

## 2024-01-01 RX ADMIN — HYDROCHLOROTHIAZIDE 25 MG: 25 TABLET ORAL at 08:22

## 2024-01-01 RX ADMIN — TICAGRELOR 90 MG: 90 TABLET ORAL at 17:22

## 2024-01-01 RX ADMIN — LORAZEPAM 1 MG: 1 TABLET ORAL at 16:08

## 2024-01-01 RX ADMIN — LORAZEPAM 1 MG: 1 TABLET ORAL at 08:25

## 2024-01-01 RX ADMIN — HEPARIN SODIUM 5000 UNITS: 5000 INJECTION INTRAVENOUS; SUBCUTANEOUS at 01:21

## 2024-01-01 RX ADMIN — HEPARIN SODIUM 12 UNITS/KG/HR: 10000 INJECTION, SOLUTION INTRAVENOUS at 03:33

## 2024-01-01 RX ADMIN — NEBIVOLOL 20 MG: 10 TABLET ORAL at 08:22

## 2024-01-01 RX ADMIN — HEPARIN SODIUM 3900 UNITS: 1000 INJECTION INTRAVENOUS; SUBCUTANEOUS at 03:36

## 2024-01-01 RX ADMIN — TICAGRELOR 90 MG: 90 TABLET ORAL at 05:58

## 2024-01-01 RX ADMIN — CYANOCOBALAMIN TAB 500 MCG 1000 MCG: 500 TAB at 08:22

## 2024-01-01 RX ADMIN — ATORVASTATIN CALCIUM 20 MG: 20 TABLET, FILM COATED ORAL at 17:21

## 2024-01-01 NOTE — ASSESSMENT & PLAN NOTE
Lab Results   Component Value Date    EGFR 58 01/01/2024    EGFR 52 12/31/2023    CREATININE 1.13 01/01/2024    CREATININE 1.24 12/31/2023     Creatinine appears to be at baseline   Monitor   Avoid hypotension/nephrotoxins

## 2024-01-01 NOTE — QUICK NOTE
Expected improvement in hgb s/p 2 unit PRBC.  Remains HD stable without evidence of active bleeding.  Has not had any further CP.  Per discussion with Dr. Garcia, continue with heparin acs, brilenta.  Formal cardiology consult placed.

## 2024-01-01 NOTE — PROGRESS NOTES
[facility]  Progress Note  Name: Gael Ferraro I  MRN: [id number]  Unit/Bed#: S -01 I Date of Admission: 12/31/2023   Date of Service: 1/1/2024 I Hospital Day: 1    Assessment/Plan   * Chest pain  Assessment & Plan  POA, patient with upper chest pain with radiation to bilateral arms and jaw. Was found to have troponin of 1023 on admission increasing to 1898 on 2 hours check   EKG with TWI/depression   Cardiology following  Continue telemetry  Planning for left heart catheterization 1/2/2024.  N.p.o. midnight  Aspirin, Bystolic and statin      Leukopenia  Assessment & Plan  Noted at 3.48, seems to be slightly decreasing over the last few years.   Monitor for now   Consider hematology evaluation     Primary hypertension  Assessment & Plan  BP acceptable   Continue HCTZ    Stage 3 chronic kidney disease (HCC)  Assessment & Plan  Lab Results   Component Value Date    EGFR 58 01/01/2024    EGFR 52 12/31/2023    CREATININE 1.13 01/01/2024    CREATININE 1.24 12/31/2023     Creatinine appears to be at baseline   Monitor   Avoid hypotension/nephrotoxins     Coronary artery disease involving native coronary artery of native heart with angina pectoris (HCC)  Assessment & Plan  Patient has history of 2 stents about 20 years ago   Continue ASA, Bystolic, statin   Plan as above     Macrocytic anemia  Assessment & Plan  Hgb of 6.9 noted on admission with MCV of 108 improved following 2 units PRBC currently 8.9  Patient already being treated with PO B12 outpatient   SPEP was negative, but further clarification of haptoglobin was recommended   Low-normal B12 was noted   Iron levels appropriate  Guaiac negative in ED  Follow-up labs           VTE Pharmacologic Prophylaxis: VTE Score: 4 Moderate Risk (Score 3-4) - Pharmacological DVT Prophylaxis Ordered: heparin.    Mobility:   JH-HLM Achieved: 2: Bed activities/Dependent transfer  HLM Goal NOT achieved. Continue with multidisciplinary rounding and  encourage appropriate mobility to improve upon HLM goals.    Patient Centered Rounds: I performed bedside rounds with nursing staff today.   Discussions with Specialists or Other Care Team Provider: Cardiology    Education and Discussions with Family / Patient: Updated  (son) at bedside.    Total Time Spent on Date of Encounter in care of patient: 30 mins. This time was spent on one or more of the following: performing physical exam; counseling and coordination of care; obtaining or reviewing history; documenting in the medical record; reviewing/ordering tests, medications or procedures; communicating with other healthcare professionals and discussing with patient's family/caregivers.    Current Length of Stay: 1 day(s)  Current Patient Status: Inpatient   Certification Statement: The patient will continue to require additional inpatient hospital stay due to scheduled for cardiac catheterization  Discharge Plan: Anticipate discharge in 48-72 hrs to discharge location to be determined pending rehab evaluations.    Code Status: Level 1 - Full Code    Subjective:   Patient seen and examined at bedside.  States chest pain has resolved.  Does continue to endorse fatigue.  Denies any blood in the stool or shortness of breath    Objective:     Vitals:   Temp (24hrs), Av.4 °F (36.9 °C), Min:98 °F (36.7 °C), Max:99.2 °F (37.3 °C)    Temp:  [98 °F (36.7 °C)-99.2 °F (37.3 °C)] 99.2 °F (37.3 °C)  HR:  [63-75] 72  Resp:  [15-20] 18  BP: (108-134)/(43-66) 128/51  SpO2:  [92 %-99 %] 97 %  Body mass index is 21.81 kg/m².     Input and Output Summary (last 24 hours):     Intake/Output Summary (Last 24 hours) at 2024 1232  Last data filed at 2023 2112  Gross per 24 hour   Intake 825 ml   Output --   Net 825 ml       Physical Exam:   Physical Exam  Vitals and nursing note reviewed.   Constitutional:       Appearance: Normal appearance. He is well-developed.   HENT:      Head: Normocephalic and atraumatic.    Eyes:      Extraocular Movements: Extraocular movements intact.      Pupils: Pupils are equal, round, and reactive to light.   Cardiovascular:      Rate and Rhythm: Normal rate and regular rhythm.   Pulmonary:      Effort: Pulmonary effort is normal.      Breath sounds: Normal breath sounds.   Abdominal:      General: Bowel sounds are normal.      Palpations: Abdomen is soft.   Musculoskeletal:      Cervical back: Normal range of motion.   Skin:     General: Skin is warm and dry.   Neurological:      General: No focal deficit present.      Mental Status: He is alert and oriented to person, place, and time.   Psychiatric:         Mood and Affect: Mood normal.         Speech: Speech normal.         Behavior: Behavior normal.          Additional Data:     Labs:  Results from last 7 days   Lab Units 01/01/24  0252 01/01/24  0043 12/31/23  1249   WBC Thousand/uL 6.77  --  3.48*   HEMOGLOBIN g/dL 8.9*   < > 6.9*   HEMATOCRIT % 25.6*   < > 19.6*   PLATELETS Thousands/uL 245  --  270   NEUTROS PCT %  --   --  45   LYMPHS PCT %  --   --  38   MONOS PCT %  --   --  12   EOS PCT %  --   --  4    < > = values in this interval not displayed.     Results from last 7 days   Lab Units 01/01/24  0252   SODIUM mmol/L 142   POTASSIUM mmol/L 3.6   CHLORIDE mmol/L 109*   CO2 mmol/L 26   BUN mg/dL 27*   CREATININE mg/dL 1.13   ANION GAP mmol/L 7   CALCIUM mg/dL 9.2   ALBUMIN g/dL 3.8   TOTAL BILIRUBIN mg/dL 1.45*   ALK PHOS U/L 34   ALT U/L 28   AST U/L 56*   GLUCOSE RANDOM mg/dL 101     Results from last 7 days   Lab Units 01/01/24  0252   INR  1.04                   Lines/Drains:  Invasive Devices       Peripheral Intravenous Line  Duration             Peripheral IV 12/31/23 Distal;Left;Upper;Ventral (anterior) Arm 1 day    Peripheral IV 12/31/23 Distal;Right;Upper;Ventral (anterior) Arm 1 day                      Telemetry:  Telemetry Orders (From admission, onward)               24 Hour Telemetry Monitoring  Continuous x 24 Hours  (Telem)        Expiring   Question:  Reason for 24 Hour Telemetry  Answer:  PCI/EP study (including pacer and ICD implementation), Cardiac surgery, MI, abnormal cardiac cath, and chest pain- rule out MI                     Telemetry Reviewed: Normal Sinus Rhythm  Indication for Continued Telemetry Use: Acute MI/Unstable Angina/Rule out ACS             Imaging: No pertinent imaging reviewed.    Recent Cultures (last 7 days):         Last 24 Hours Medication List:   Current Facility-Administered Medications   Medication Dose Route Frequency Provider Last Rate    acetaminophen  650 mg Oral Q6H PRN Jose Carlos Schaefer PA-C      aspirin  81 mg Oral Daily PERI Reyes-SANG      atorvastatin  20 mg Oral Daily With Dinner Jose Carlos Schaefer PA-C      cyanocobalamin  1,000 mcg Oral Daily Jose Carlos Schaefer PA-C      heparin (porcine)  3-20 Units/kg/hr (Order-Specific) Intravenous Titrated BRITTANY Reilly 12 Units/kg/hr (01/01/24 0333)    heparin (porcine)  1,950 Units Intravenous Q6H PRN BRITTANY Reilly      heparin (porcine)  3,900 Units Intravenous Q6H PRN BRITTANY Reilly      hydrochlorothiazide  25 mg Oral Daily Jose Carlos Schaefer PA-C      LORazepam  1 mg Oral TID PRN Jose Carlos Schaefer PA-C      nebivolol  20 mg Oral Daily PERI Reyse-SANG      ondansetron  4 mg Intravenous Q6H PRN PERI Reyes-SANG      ticagrelor  90 mg Oral Q12H CRESENCIO BRITTANY Reilly          Today, Patient Was Seen By: Kamran Roy MD    **Please Note: This note may have been constructed using a voice recognition system.**

## 2024-01-01 NOTE — UTILIZATION REVIEW
Initial Clinical Review    Admission: Date/Time/Statement:   Admission Orders (From admission, onward)       Ordered        12/31/23 1510  INPATIENT ADMISSION  Once                          Orders Placed This Encounter   Procedures    INPATIENT ADMISSION     Standing Status:   Standing     Number of Occurrences:   1     Order Specific Question:   Level of Care     Answer:   Med Surg [16]     Order Specific Question:   Estimated length of stay     Answer:   More than 2 Midnights     Order Specific Question:   Certification     Answer:   I certify that inpatient services are medically necessary for this patient for a duration of greater than two midnights. See H&P and MD Progress Notes for additional information about the patient's course of treatment.     ED Arrival Information       Expected   -    Arrival   12/31/2023 12:22    Acuity   Immediate              Means of arrival   Ambulance    Escorted by   [facility]    Service   Hospitalist    Admission type   Emergency              Arrival complaint   -             Chief Complaint   Patient presents with    Chest Pain     Pt presents to the ED with jaw pain a couple times this week. Midsternal chest pain with radiation to the right arm onset 0900 this morning. Non smoker, family hx of heart attacks, more on mothers side. Pt reports hx of 2 stents. Placed 20 yrs ago. Arrived via EMS, received 324mg asa prehospital. 18g left AC.        Initial Presentation: 83 y.o. male with a PMH of CAD status post stenting 20 year ago, HTN, CKD 3 who presents with chest pain. Patient states that he first experienced chest pain a few days ago with minimal exertion. Reported upper chest pain with bilateral arm radiation and jaw pain. He denied SOB, dizziness, nausea, or vomiting. He took aspirin and laid down and his chest pain resolved. He states that he woke up today and immediately started with chest pain similar to a few days ago that was more intense in nature. EMS was called.  Plan: Inpatient admission for evaluation and treatment of chest pain, macrocytic anemia, CAD, stage 3 CKD, leukopenia, HTN: telemetry, trend troponin, continue home ASA, Bystolic, statin, check folate and haptoglobin, transfuse 2 units PRBC, repeat Hgb, continue vitamin B12 and HCTZ.     Date: 1/1   Day 2:     Cardiology consult: s/p aspirin/Brilinta load + heparin gtt in ED, telemetry, Echo, plan for HC tomorrow, NPO at midnight, H&H in am, continue ASA, atorvastatin, nebivolol, fenofibrate.    Internal medicine: EKG with TWI/depression, continue telemetry, plan for left heart cath on 1/2, NPO at midnight, continue ASA, Bystolic and statin, HCTZ.     Date: 1/2    Day 3: Has surpassed a 2nd midnight with active treatments and services, which include left heart cath.      ED Triage Vitals   Temperature Pulse Respirations Blood Pressure SpO2   12/31/23 1228 12/31/23 1223 12/31/23 1223 12/31/23 1223 12/31/23 1223   98 °F (36.7 °C) 98 20 151/68 99 %      Temp Source Heart Rate Source Patient Position - Orthostatic VS BP Location FiO2 (%)   12/31/23 1228 12/31/23 1645 12/31/23 1818 12/31/23 1818 --   Oral Monitor Lying Left arm       Pain Score       12/31/23 1729       No Pain          Wt Readings from Last 1 Encounters:   01/01/24 68.9 kg (152 lb)     Additional Vital Signs:     Date/Time Temp Pulse Resp BP MAP (mmHg) SpO2 Calculated FIO2 (%) - Nasal Cannula Nasal Cannula O2 Flow Rate (L/min) O2 Device   01/01/24 0945 -- 72 -- 128/51 -- -- -- -- --   01/01/24 07:07:10 99.2 °F (37.3 °C) 72 -- 128/51 77 97 % -- -- --   01/01/24 07:05:29 99.2 °F (37.3 °C) 75 18 120/43 Abnormal  69 94 % -- -- None (Room air)   01/01/24 02:46:20 98.2 °F (36.8 °C) 72 -- 117/50 72 92 % -- -- --   12/31/23 21:13:12 98.1 °F (36.7 °C) 75 -- 121/53 76 97 % -- -- --   12/31/23 2112 98.1 °F (36.7 °C) 73 18 121/53 -- -- -- -- --   12/31/23 2100 -- -- -- -- -- -- -- -- Nasal cannula   12/31/23 2000 -- -- -- -- -- -- 28 2 L/min Nasal cannula    12/31/23 1818 98.1 °F (36.7 °C) 65 18 125/53 77 99 % 28 2 L/min Nasal cannula   12/31/23 1802 98 °F (36.7 °C) 65 18 128/57 -- -- -- -- --   12/31/23 17:39:33 98.2 °F (36.8 °C) 64 -- 108/66 80 99 % -- -- --   12/31/23 1700 98.4 °F (36.9 °C) 64 20 134/61 88 97 % 28 2 L/min --   12/31/23 1645 -- 65 16 125/62 89 95 % -- -- --   12/31/23 15:43:18 98.7 °F (37.1 °C) 66 16 133/63 -- 96 % 28 2 L/min Nasal cannula   12/31/23 15:21:11 98.5 °F (36.9 °C) 66 15 124/60 -- 95 % 28 2 L/min Nasal cannula   12/31/23 1415 -- 63 16 125/62 -- 96 % -- -- Nasal cannula   12/31/23 1400 -- 64 18 125/60 87 96 % 28 2 L/min Nasal cannula   12/31/23 1228 98 °F (36.7 °C) -- -- -- -- -- -- -- --   12/31/23 12:27:56 -- 72 18 165/70 -- 98 % -- -- --     Pertinent Labs/Diagnostic Test Results:     1/1 Echo:  Interpretation Summary       Left Ventricle: Left ventricular cavity size is normal. Wall thickness is normal. The left ventricular ejection fraction is 60%. Systolic function is normal. Wall motion is normal. Diastolic function is normal.    Left Atrium: The atrium is dilated (42-48 mL/m2).    Mitral Valve: There is mild regurgitation.    Tricuspid Valve: There is mild regurgitation. The right ventricular systolic pressure is moderately to severely elevated. The estimated right ventricular systolic pressure is 58.00 mmHg.    Pulmonic Valve: There is mild regurgitation.    12/31 EKG:  Normal sinus rhythm  Left ventricular hypertrophy with repolarization abnormality  Abnormal ECG  No previous ECGs available      Results from last 7 days   Lab Units 01/01/24  0252 01/01/24  0043 12/31/23  1249   WBC Thousand/uL 6.77  --  3.48*   HEMOGLOBIN g/dL 8.9* 8.9* 6.9*   HEMATOCRIT % 25.6* 25.7* 19.6*   PLATELETS Thousands/uL 245  --  270   NEUTROS ABS Thousands/µL  --   --  1.57*         Results from last 7 days   Lab Units 01/01/24  0252 12/31/23  1249   SODIUM mmol/L 142 141   POTASSIUM mmol/L 3.6 4.0   CHLORIDE mmol/L 109* 106   CO2 mmol/L 26 28    ANION GAP mmol/L 7 7   BUN mg/dL 27* 29*   CREATININE mg/dL 1.13 1.24   EGFR ml/min/1.73sq m 58 52   CALCIUM mg/dL 9.2 9.5     Results from last 7 days   Lab Units 01/01/24  0252 12/31/23  1249   AST U/L 56* 30   ALT U/L 28 28   ALK PHOS U/L 34 32*   TOTAL PROTEIN g/dL 5.9* 6.7   ALBUMIN g/dL 3.8 4.2   TOTAL BILIRUBIN mg/dL 1.45* 0.50         Results from last 7 days   Lab Units 01/01/24  0252 12/31/23  1249   GLUCOSE RANDOM mg/dL 101 153*           Results from last 7 days   Lab Units 12/31/23  1711 12/31/23  1448 12/31/23  1249   HS TNI 0HR ng/L  --   --  1,023*   HS TNI 2HR ng/L  --  1,898*  --    HSTNI D2 ng/L  --  875*  --    HS TNI 4HR ng/L 3,615*  --   --    HSTNI D4 ng/L 2,592*  --   --          Results from last 7 days   Lab Units 01/01/24  0933 01/01/24  0252 12/31/23  1315   PROTIME seconds  --  14.2 13.9   INR   --  1.04 1.01   PTT seconds 105* 28 26           Results from last 7 days   Lab Units 12/31/23  1249   BNP pg/mL 514*             Results from last 7 days   Lab Units 01/01/24  0547   UNIT PRODUCT CODE  M6703G90  [id number]   UNIT NUMBER  [id number]  [id number]   UNITABO  A  A   UNITRH  NEG  NEG   CROSSMATCH  Compatible  Compatible   UNIT DISPENSE STATUS  Presumed Trans  Presumed Trans   UNIT PRODUCT VOL mL 350  350         ED Treatment:   Medication Administration from 12/31/2023 1211 to 12/31/2023 1729         Date/Time Order Dose Route Action     12/31/2023 1311 EST ticagrelor (BRILINTA) tablet 180 mg 180 mg Oral Given     12/31/2023 1418 EST pantoprazole (PROTONIX) injection 40 mg 40 mg Intravenous Given          No past medical history on file.  Present on Admission:   Chest pain   Macrocytic anemia   Coronary artery disease involving native coronary artery of native heart with angina pectoris (HCC)   Stage 3 chronic kidney disease (HCC)   Primary hypertension   Leukopenia      Admitting Diagnosis: Chest pain [R07.9]  Elevated troponin [R79.89]  Age/Sex: 83 y.o.  male  Admission Orders:  Scheduled Medications:  [START ON 1/2/2024] aspirin, 324 mg, Oral, Once  atorvastatin, 20 mg, Oral, Daily With Dinner  cyanocobalamin, 1,000 mcg, Oral, Daily  hydrochlorothiazide, 25 mg, Oral, Daily  nebivolol, 20 mg, Oral, Daily  ticagrelor, 90 mg, Oral, Q12H CRESENCIO      Continuous IV Infusions:  heparin (porcine), 3-20 Units/kg/hr (Order-Specific), Intravenous, Titrated  [START ON 1/2/2024] sodium chloride, 100 mL/hr, Intravenous, Continuous      PRN Meds:  acetaminophen, 650 mg, Oral, Q6H PRN  heparin (porcine), 1,950 Units, Intravenous, Q6H PRN  heparin (porcine), 3,900 Units, Intravenous, Q6H PRN  LORazepam, 1 mg, Oral, TID PRN  ondansetron, 4 mg, Intravenous, Q6H PRN        IP CONSULT TO CARDIOLOGY    Network Utilization Review Department  ATTENTION: Please call with any questions or concerns to [phone] and carefully listen to the prompts so that you are directed to the right person. All voicemails are confidential.   For Discharge needs, contact Care Management DC Support Team at [phone] opt. [phone]  Send all requests for admission clinical reviews, approved or denied determinations and any other requests to dedicated fax number below belonging to the campus where the patient is receiving treatment. List of dedicated fax numbers for the Facilities:  FACILITY NAME UR FAX NUMBER   ADMISSION DENIALS (Administrative/Medical Necessity) [phone]   DISCHARGE SUPPORT TEAM (NETWORK) [phone]   PARENT CHILD HEALTH (Maternity/NICU/Pediatrics) [phone]   [facility] [phone]   [facility] [phone]   [facility] [phone]   [facility] [phone]   [facility] [phone]   [facility] [phone]   [facility] [phone]   [facility] [phone]   [facility]  [facility] [phone]   [facility] [phone]   [facility] [phone]

## 2024-01-01 NOTE — CONSULTS
Consultation - Cardiology Team 1  Gael Ferraro 83 y.o. male MRN: [id number]  Unit/Bed#: S -01 Encounter: [id number]  01/01/24  11:23 AM    Assessment/ Plan:  1. Elevated troponin  - presenting with complaints of chest pain with radiation to arms and jaw on exertion, relieved with rest - concern for NSTEMI type I  - possibly secondary to #2 +/- worsening CAD  - s/p aspirin/Brilinta load + heparin gtt in ED  - HS troponin 1023 > 1898 > 3615  - presenting ECG - NSR, LVH, HR 75, diffuse ST depressions, ST elevations in aVR  - telemetry review - NSR, HR 70s, no significant events noted  - echo complete with read pending - no prior studies to compare  - last ischemic evaluation with nuclear stress test (2021) - unable to see report  - case discussed with interventionalist/attending - plan for [facility] tomorrow for ischemic evaluation if hemoglobin remains stable; NPO at MN  - continue aspirin 81 mg daily, Brilinta 90 mg twice daily, atorvastatin 20 mg daily, nebivolol 20 mg daily  - continue to monitor on telemetry    2. Anemia  - hemoglobin on arrival 6.9 - baseline around 13  - repeat at 8.9 s/p 2 units PRBC  - no evidence of active bleeding  - a.m H/H    3. CAD s/p PCI  - [facility]  - plan for [facility] as above  - continue aspirin, atorvastatin, nebivolol    4. Hypertension  - last documented /51; stable  - maintained on nebivolol, chlorthalidone 25 mg daily outpatient  - currently on home nebivolol + HCTZ 25 mg daily    5.  Hyperlipidemia  - no recent lipid panel - check now  - continue home fenofibrate 145 mg daily, rosuvastatin 10 mg daily    6. Carotid artery stenosis  - continue aspirin, statin therapy    History of Present Illness   Physician Requesting Consult: Kamran Roy, *    Reason for Consult / Principal Problem: Elevated troponin    HPI: Gael Ferraro is a 83 y.o. male with past medical history of carotid artery stenosis, hyperlipidemia,  hypertension, and CAD s/p PCI who presents to the ED yesterday with complaints of chest pain upon minimal exertion.  Upon arrival to the ED, patient was noted to have a low hemoglobin level at 6.9.  He was transfused with 2 units of PRBCs with a repeat hemoglobin of 8.9.  Troponin elevation was noted with an initial value of 1023.  Patient was loaded with aspirin and Brilinta and started on a heparin GTT per ACS protocol. Presenting ECG demonstrated a normal sinus rhythm with diffuse ST depressions and ST elevations in aVR. Case was discussed with interventionalist last evening who agreed that EKG was consistent with global ischemia.  There was no plan for urgent LHC last evening. Given elevated troponin, symptoms, and concerning ECG, cardiology was consulted for further recommendations/management.    Upon examination, patient reports symptoms of chest pain with radiation to his arms and jaw that began yesterday morning.  Reports getting up and out of bed when the symptoms started.  He is experienced these symptoms at least 4 times in the last week or so.  With each episode, his symptoms have been relieved with rest.  He denies any other associated symptoms of lightheadedness, dizziness, nausea, vomiting, diaphoresis, palpitations, or episodes of syncope.  In regard to his low hemoglobin, patient denies any active source of bleeding.  Patient lives with wife and is independent at baseline.  He does use a cane to ambulate.  He denies any alcohol, drug, or tobacco abuse.  Currently, the patient is asymptomatic.  Patient follows with Dr. Curry outpatient.     Inpatient consult to Cardiology  Consult performed by: BRITTANY Morris  Consult ordered by: BRITTANY Reilly    EKG: SR, LVH, HR 75, diffuse ST depressions, ST elevations in aVR    Review of Systems   Constitutional:  Negative for chills, diaphoresis, fatigue and fever.   HENT: Negative.     Eyes: Negative.    Respiratory:  Negative for chest tightness  "and shortness of breath.    Cardiovascular:  Positive for chest pain. Negative for palpitations and leg swelling.   Gastrointestinal: Negative.    Endocrine: Negative.    Genitourinary: Negative.    Musculoskeletal: Negative.    Skin: Negative.    Neurological:  Negative for dizziness, syncope, weakness and light-headedness.   Hematological: Negative.    Psychiatric/Behavioral: Negative.       Historical Information   No past medical history on file.  No past surgical history on file.  Social History     Substance and Sexual Activity   Alcohol Use Not on file     Social History     Substance and Sexual Activity   Drug Use Not on file     Social History     Tobacco Use   Smoking Status Not on file   Smokeless Tobacco Not on file     Family History: No family history on file.    Meds/Allergies   all current active meds have been reviewed  Not on File    Objective   Vitals: Blood pressure 128/51, pulse 72, temperature 99.2 °F (37.3 °C), resp. rate 18, height 5' 10\" (1.778 m), weight 68.9 kg (152 lb), SpO2 97%., Body mass index is 21.81 kg/m².,   Orthostatic Blood Pressures      Flowsheet Row Most Recent Value   Blood Pressure 128/51 filed at 2024 0945   Patient Position - Orthostatic VS Lying filed at 2024 0707          Systolic (24hrs), Av , Min:108 , Max:165     Diastolic (24hrs), Av, Min:43, Max:70      Intake/Output Summary (Last 24 hours) at 2024 1123  Last data filed at 2023 2112  Gross per 24 hour   Intake 825 ml   Output --   Net 825 ml     Invasive Devices       Peripheral Intravenous Line  Duration             Peripheral IV 23 Distal;Left;Upper;Ventral (anterior) Arm <1 day    Peripheral IV 23 Distal;Right;Upper;Ventral (anterior) Arm <1 day                    Physical Exam  Constitutional:       General: He is not in acute distress.     Appearance: He is normal weight.   HENT:      Head: Normocephalic.      Nose: Nose normal.      Mouth/Throat:      Mouth: Mucous " membranes are moist.      Pharynx: Oropharynx is clear.   Eyes:      Pupils: Pupils are equal, round, and reactive to light.   Cardiovascular:      Rate and Rhythm: Normal rate and regular rhythm.      Pulses: Normal pulses.      Heart sounds: No murmur heard.  Pulmonary:      Effort: Pulmonary effort is normal. No respiratory distress.      Breath sounds: No wheezing or rales.      Comments: On RA  Abdominal:      General: Bowel sounds are normal. There is no distension.      Palpations: Abdomen is soft.   Musculoskeletal:         General: Normal range of motion.      Cervical back: Normal range of motion.      Right lower leg: No edema.      Left lower leg: No edema.   Skin:     General: Skin is warm and dry.      Capillary Refill: Capillary refill takes less than 2 seconds.   Neurological:      General: No focal deficit present.      Mental Status: He is alert and oriented to person, place, and time.   Psychiatric:         Mood and Affect: Mood normal.         Behavior: Behavior normal.     Lab Results:   Troponins:     CBC with diff:   Results from last 7 days   Lab Units 01/01/24  0252 01/01/24  0043 12/31/23  1249   WBC Thousand/uL 6.77  --  3.48*   HEMOGLOBIN g/dL 8.9* 8.9* 6.9*   HEMATOCRIT % 25.6* 25.7* 19.6*   MCV fL 100*  --  108*   PLATELETS Thousands/uL 245  --  270   RBC Million/uL 2.57*  --  1.82*   MCH pg 34.6*  --  37.9*   MCHC g/dL 34.8  --  35.2   RDW % 16.8*  --  13.4   MPV fL 10.8  --  10.8   NRBC AUTO /100 WBCs  --   --  0     CMP:   Results from last 7 days   Lab Units 01/01/24  0252 12/31/23  1249   POTASSIUM mmol/L 3.6 4.0   CHLORIDE mmol/L 109* 106   CO2 mmol/L 26 28   BUN mg/dL 27* 29*   CREATININE mg/dL 1.13 1.24   CALCIUM mg/dL 9.2 9.5   AST U/L 56* 30   ALT U/L 28 28   ALK PHOS U/L 34 32*   EGFR ml/min/1.73sq m 58 52

## 2024-01-01 NOTE — ASSESSMENT & PLAN NOTE
Hgb of 6.9 noted on admission with MCV of 108 improved following 2 units PRBC currently 8.9  Patient already being treated with PO B12 outpatient   SPEP was negative, but further clarification of haptoglobin was recommended   Low-normal B12 was noted   Iron levels appropriate  Guaiac negative in ED  Follow-up labs

## 2024-01-01 NOTE — PLAN OF CARE
Problem: PAIN - ADULT  Goal: Verbalizes/displays adequate comfort level or baseline comfort level  Description: Interventions:  - Encourage patient to monitor pain and request assistance  - Assess pain using appropriate pain scale  - Administer analgesics based on type and severity of pain and evaluate response  - Implement non-pharmacological measures as appropriate and evaluate response  - Consider cultural and social influences on pain and pain management  - Notify physician/advanced practitioner if interventions unsuccessful or patient reports new pain  Outcome: Progressing     Problem: INFECTION - ADULT  Goal: Absence or prevention of progression during hospitalization  Description: INTERVENTIONS:  - Assess and monitor for signs and symptoms of infection  - Monitor lab/diagnostic results  - Monitor all insertion sites, i.e. indwelling lines, tubes, and drains  - Monitor endotracheal if appropriate and nasal secretions for changes in amount and color  - [place] appropriate cooling/warming therapies per order  - Administer medications as ordered  - Instruct and encourage patient and family to use good hand hygiene technique  - Identify and instruct in appropriate isolation precautions for identified infection/condition  Outcome: Progressing  Goal: Absence of fever/infection during neutropenic period  Description: INTERVENTIONS:  - Monitor WBC    Outcome: Progressing     Problem: SAFETY ADULT  Goal: Patient will remain free of falls  Description: INTERVENTIONS:  - Educate patient/family on patient safety including physical limitations  - Instruct patient to call for assistance with activity   - Consult OT/PT to assist with strengthening/mobility   - Keep Call bell within reach  - Keep bed low and locked with side rails adjusted as appropriate  - Keep care items and personal belongings within reach  - Initiate and maintain comfort rounds  - Make Fall Risk Sign visible to staff  - Offer Toileting every  Hours,  in advance of need  - Initiate/Maintain alarm  - Obtain necessary fall risk management equipment:   - Apply yellow socks and bracelet for high fall risk patients  - Consider moving patient to room near nurses station  Outcome: Progressing  Goal: Maintain or return to baseline ADL function  Description: INTERVENTIONS:  -  Assess patient's ability to carry out ADLs; assess patient's baseline for ADL function and identify physical deficits which impact ability to perform ADLs (bathing, care of mouth/teeth, toileting, grooming, dressing, etc.)  - Assess/evaluate cause of self-care deficits   - Assess range of motion  - Assess patient's mobility; develop plan if impaired  - Assess patient's need for assistive devices and provide as appropriate  - Encourage maximum independence but intervene and supervise when necessary  - Involve family in performance of ADLs  - Assess for home care needs following discharge   - Consider OT consult to assist with ADL evaluation and planning for discharge  - Provide patient education as appropriate  Outcome: Progressing  Goal: Maintains/Returns to pre admission functional level  Description: INTERVENTIONS:  - Perform AM-PAC 6 Click Basic Mobility/ Daily Activity assessment daily.  - Set and communicate daily mobility goal to care team and patient/family/caregiver.   - Collaborate with rehabilitation services on mobility goals if consulted  - Perform Range of Motion  times a day.  - Reposition patient every  hours.  - Dangle patient  times a day  - Stand patient  times a day  - Ambulate patient  times a day  - Out of bed to chair  times a day   - Out of bed for meals  times a day  - Out of bed for toileting  - Record patient progress and toleration of activity level   Outcome: Progressing     Problem: DISCHARGE PLANNING  Goal: Discharge to home or other facility with appropriate resources  Description: INTERVENTIONS:  - Identify barriers to discharge w/patient and caregiver  - Arrange for  needed discharge resources and transportation as appropriate  - Identify discharge learning needs (meds, wound care, etc.)  - Arrange for interpretive services to assist at discharge as needed  - Refer to Case Management Department for coordinating discharge planning if the patient needs post-hospital services based on physician/advanced practitioner order or complex needs related to functional status, cognitive ability, or social support system  Outcome: Progressing     Problem: Knowledge Deficit  Goal: Patient/family/caregiver demonstrates understanding of disease process, treatment plan, medications, and discharge instructions  Description: Complete learning assessment and assess knowledge base.  Interventions:  - Provide teaching at level of understanding  - Provide teaching via preferred learning methods  Outcome: Progressing

## 2024-01-01 NOTE — ASSESSMENT & PLAN NOTE
POA, patient with upper chest pain with radiation to bilateral arms and jaw. Was found to have troponin of 1023 on admission increasing to 1898 on 2 hours check   EKG with TWI/depression   Cardiology following  Continue telemetry  Planning for left heart catheterization 1/2/2024.  N.p.o. midnight  Aspirin, Bystolic and statin

## 2024-01-02 LAB
ANION GAP SERPL CALCULATED.3IONS-SCNC: 11 MMOL/L
APTT PPP: 66 SECONDS (ref 23–37)
BUN SERPL-MCNC: 23 MG/DL (ref 5–25)
CALCIUM SERPL-MCNC: 9.4 MG/DL (ref 8.4–10.2)
CHLORIDE SERPL-SCNC: 107 MMOL/L (ref 96–108)
CHOLEST SERPL-MCNC: 131 MG/DL
CO2 SERPL-SCNC: 23 MMOL/L (ref 21–32)
CREAT SERPL-MCNC: 1.18 MG/DL (ref 0.6–1.3)
ERYTHROCYTE [DISTWIDTH] IN BLOOD BY AUTOMATED COUNT: 16 % (ref 11.6–15.1)
GFR SERPL CREATININE-BSD FRML MDRD: 55 ML/MIN/1.73SQ M
GLUCOSE SERPL-MCNC: 108 MG/DL (ref 65–140)
HAPTOGLOB SERPL-MCNC: 100 MG/DL (ref 38–329)
HCT VFR BLD AUTO: 25.1 % (ref 36.5–49.3)
HDLC SERPL-MCNC: 42 MG/DL
HGB BLD-MCNC: 8.8 G/DL (ref 12–17)
LDLC SERPL CALC-MCNC: 56 MG/DL (ref 0–100)
MCH RBC QN AUTO: 35.1 PG (ref 26.8–34.3)
MCHC RBC AUTO-ENTMCNC: 35.1 G/DL (ref 31.4–37.4)
MCV RBC AUTO: 100 FL (ref 82–98)
PLATELET # BLD AUTO: 236 THOUSANDS/UL (ref 149–390)
PMV BLD AUTO: 10.5 FL (ref 8.9–12.7)
POTASSIUM SERPL-SCNC: 3.5 MMOL/L (ref 3.5–5.3)
RBC # BLD AUTO: 2.51 MILLION/UL (ref 3.88–5.62)
SODIUM SERPL-SCNC: 141 MMOL/L (ref 135–147)
TRIGL SERPL-MCNC: 166 MG/DL
WBC # BLD AUTO: 8.04 THOUSAND/UL (ref 4.31–10.16)

## 2024-01-02 PROCEDURE — C1887 CATHETER, GUIDING: HCPCS | Performed by: INTERNAL MEDICINE

## 2024-01-02 PROCEDURE — 99232 SBSQ HOSP IP/OBS MODERATE 35: CPT | Performed by: INTERNAL MEDICINE

## 2024-01-02 PROCEDURE — B2111ZZ FLUOROSCOPY OF MULTIPLE CORONARY ARTERIES USING LOW OSMOLAR CONTRAST: ICD-10-PCS | Performed by: INTERNAL MEDICINE

## 2024-01-02 PROCEDURE — C1769 GUIDE WIRE: HCPCS | Performed by: INTERNAL MEDICINE

## 2024-01-02 PROCEDURE — C1894 INTRO/SHEATH, NON-LASER: HCPCS | Performed by: INTERNAL MEDICINE

## 2024-01-02 PROCEDURE — 4A023N8 MEASUREMENT OF CARDIAC SAMPLING AND PRESSURE, BILATERAL, PERCUTANEOUS APPROACH: ICD-10-PCS | Performed by: INTERNAL MEDICINE

## 2024-01-02 PROCEDURE — 99153 MOD SED SAME PHYS/QHP EA: CPT | Performed by: INTERNAL MEDICINE

## 2024-01-02 PROCEDURE — 93460 R&L HRT ART/VENTRICLE ANGIO: CPT | Performed by: INTERNAL MEDICINE

## 2024-01-02 PROCEDURE — 85027 COMPLETE CBC AUTOMATED: CPT | Performed by: FAMILY MEDICINE

## 2024-01-02 PROCEDURE — 80048 BASIC METABOLIC PNL TOTAL CA: CPT | Performed by: FAMILY MEDICINE

## 2024-01-02 PROCEDURE — 99152 MOD SED SAME PHYS/QHP 5/>YRS: CPT | Performed by: INTERNAL MEDICINE

## 2024-01-02 PROCEDURE — 99239 HOSP IP/OBS DSCHRG MGMT >30: CPT | Performed by: INTERNAL MEDICINE

## 2024-01-02 PROCEDURE — C1751 CATH, INF, PER/CENT/MIDLINE: HCPCS | Performed by: INTERNAL MEDICINE

## 2024-01-02 PROCEDURE — 80061 LIPID PANEL: CPT | Performed by: NURSE PRACTITIONER

## 2024-01-02 PROCEDURE — 85730 THROMBOPLASTIN TIME PARTIAL: CPT | Performed by: HOSPITALIST

## 2024-01-02 PROCEDURE — 30233N1 TRANSFUSION OF NONAUTOLOGOUS RED BLOOD CELLS INTO PERIPHERAL VEIN, PERCUTANEOUS APPROACH: ICD-10-PCS | Performed by: INTERNAL MEDICINE

## 2024-01-02 RX ORDER — HYDROXYZINE HYDROCHLORIDE 25 MG/1
25 TABLET, FILM COATED ORAL ONCE
Status: COMPLETED | OUTPATIENT
Start: 2024-01-02 | End: 2024-01-02

## 2024-01-02 RX ORDER — NITROGLYCERIN 20 MG/100ML
INJECTION INTRAVENOUS CODE/TRAUMA/SEDATION MEDICATION
Status: DISCONTINUED | OUTPATIENT
Start: 2024-01-02 | End: 2024-01-02 | Stop reason: HOSPADM

## 2024-01-02 RX ORDER — NEBIVOLOL 20 MG/1
20 TABLET ORAL DAILY
Qty: 30 TABLET | Refills: 0 | Status: ON HOLD | OUTPATIENT
Start: 2024-01-03

## 2024-01-02 RX ORDER — FENTANYL CITRATE 50 UG/ML
INJECTION, SOLUTION INTRAMUSCULAR; INTRAVENOUS CODE/TRAUMA/SEDATION MEDICATION
Status: DISCONTINUED | OUTPATIENT
Start: 2024-01-02 | End: 2024-01-02 | Stop reason: HOSPADM

## 2024-01-02 RX ORDER — POTASSIUM CHLORIDE 750 MG/1
10 CAPSULE, EXTENDED RELEASE ORAL DAILY
Qty: 30 CAPSULE | Refills: 0 | Status: ON HOLD | OUTPATIENT
Start: 2024-01-02

## 2024-01-02 RX ORDER — ACETAMINOPHEN 325 MG/1
650 TABLET ORAL EVERY 6 HOURS PRN
Status: ON HOLD
Start: 2024-01-02

## 2024-01-02 RX ORDER — LIDOCAINE HYDROCHLORIDE 10 MG/ML
INJECTION, SOLUTION EPIDURAL; INFILTRATION; INTRACAUDAL; PERINEURAL CODE/TRAUMA/SEDATION MEDICATION
Status: DISCONTINUED | OUTPATIENT
Start: 2024-01-02 | End: 2024-01-02 | Stop reason: HOSPADM

## 2024-01-02 RX ORDER — HYDROCHLOROTHIAZIDE 25 MG/1
25 TABLET ORAL DAILY
Qty: 30 TABLET | Refills: 0 | Status: ON HOLD | OUTPATIENT
Start: 2024-01-03

## 2024-01-02 RX ORDER — SODIUM CHLORIDE 9 MG/ML
100 INJECTION, SOLUTION INTRAVENOUS CONTINUOUS
Status: DISCONTINUED | OUTPATIENT
Start: 2024-01-02 | End: 2024-01-02

## 2024-01-02 RX ORDER — ATORVASTATIN CALCIUM 20 MG/1
20 TABLET, FILM COATED ORAL
Qty: 30 TABLET | Refills: 0 | Status: SHIPPED | OUTPATIENT
Start: 2024-01-02 | End: 2024-01-15 | Stop reason: ALTCHOICE

## 2024-01-02 RX ORDER — MIDAZOLAM HYDROCHLORIDE 2 MG/2ML
INJECTION, SOLUTION INTRAMUSCULAR; INTRAVENOUS CODE/TRAUMA/SEDATION MEDICATION
Status: DISCONTINUED | OUTPATIENT
Start: 2024-01-02 | End: 2024-01-02 | Stop reason: HOSPADM

## 2024-01-02 RX ORDER — VERAPAMIL HYDROCHLORIDE 2.5 MG/ML
INJECTION, SOLUTION INTRAVENOUS CODE/TRAUMA/SEDATION MEDICATION
Status: DISCONTINUED | OUTPATIENT
Start: 2024-01-02 | End: 2024-01-02 | Stop reason: HOSPADM

## 2024-01-02 RX ORDER — HEPARIN SODIUM 1000 [USP'U]/ML
INJECTION, SOLUTION INTRAVENOUS; SUBCUTANEOUS CODE/TRAUMA/SEDATION MEDICATION
Status: DISCONTINUED | OUTPATIENT
Start: 2024-01-02 | End: 2024-01-02 | Stop reason: HOSPADM

## 2024-01-02 RX ADMIN — TICAGRELOR 90 MG: 90 TABLET ORAL at 20:25

## 2024-01-02 RX ADMIN — ATORVASTATIN CALCIUM 20 MG: 20 TABLET, FILM COATED ORAL at 16:30

## 2024-01-02 RX ADMIN — SODIUM CHLORIDE 100 ML/HR: 0.9 INJECTION, SOLUTION INTRAVENOUS at 05:24

## 2024-01-02 RX ADMIN — SODIUM CHLORIDE 100 ML/HR: 0.9 INJECTION, SOLUTION INTRAVENOUS at 12:35

## 2024-01-02 RX ADMIN — ASPIRIN 81 MG CHEWABLE TABLET 324 MG: 81 TABLET CHEWABLE at 05:25

## 2024-01-02 RX ADMIN — HYDROXYZINE HYDROCHLORIDE 25 MG: 25 TABLET, FILM COATED ORAL at 04:03

## 2024-01-02 RX ADMIN — LORAZEPAM 1 MG: 1 TABLET ORAL at 18:10

## 2024-01-02 RX ADMIN — ACETAMINOPHEN 650 MG: 325 TABLET, FILM COATED ORAL at 22:33

## 2024-01-02 RX ADMIN — TICAGRELOR 90 MG: 90 TABLET ORAL at 05:25

## 2024-01-02 NOTE — ASSESSMENT & PLAN NOTE
Lab Results   Component Value Date    EGFR 55 01/02/2024    EGFR 58 01/01/2024    EGFR 52 12/31/2023    CREATININE 1.18 01/02/2024    CREATININE 1.13 01/01/2024    CREATININE 1.24 12/31/2023     Creatinine appears to be at baseline   Monitor   Avoid hypotension/nephrotoxins

## 2024-01-02 NOTE — ASSESSMENT & PLAN NOTE
Hgb of 6.9 noted on admission with MCV of 108 improved following 2 units PRBC currently 8.9  Patient already being treated with PO B12 outpatient   SPEP was negative, but further clarification of haptoglobin was recommended   Low-normal B12 was noted   Iron levels appropriate  Guaiac negative in ED  Monitor CBC.

## 2024-01-02 NOTE — ASSESSMENT & PLAN NOTE
Patient had T wave inversions and depressions as well as elevated troponin.  Type II MI.  Patient had a left heart catheter which showed coronary artery disease with distal disease and occlusion.  However was recommended to be medically managed with DAPT.  Suspicion for the trigger of elevated troponin is secondary to anemia and already coronary artery disease.  Demand ischemia.  Monitor the patient on telemetry.  Follow-up cardiology recommendations.  Continue DAPT. Cont. Statin.      Never smoker

## 2024-01-02 NOTE — PROGRESS NOTES
[facility]  Progress Note  Name: Gael Ferraro I  MRN: [id number]  Unit/Bed#: S -01 I Date of Admission: 12/31/2023   Date of Service: 1/2/2024 I Hospital Day: 2    Assessment/Plan   * Chest pain  Assessment & Plan  Patient had T wave inversions and depressions as well as elevated troponin.  Type II MI.  Patient had a left heart catheter which showed coronary artery disease with distal disease and occlusion.  However was recommended to be medically managed with DAPT.  Suspicion for the trigger of elevated troponin is secondary to anemia and already coronary artery disease.  Demand ischemia.  Monitor the patient on telemetry.  Follow-up cardiology recommendations.  Continue DAPT. Cont. Statin.       Coronary artery disease involving native coronary artery of native heart with angina pectoris (HCC)  Assessment & Plan  Patient has history of 2 stents about 20 years ago   Continue ASA, Bystolic, statin     LHC: with severe chronic MVD -- Ostial to Prox LM with a calcified 50% lesion / LAD is a small vessel and moderately diffusely diseased / LCX has a proximal bifurcation stent into the OM1 with mild ISR and a distal stent with no ISR as well as moderate progression of disease between the distal aspects of the LCX stents / RCA has a proximal  with Kugel collaterals to the RPAV and collaterals from the left the RPDA and RPAV    LVEDP is normal without gradient on LV-AO pullback    RHC: with no evidence of PHTN    Macrocytic anemia  Assessment & Plan  Hgb of 6.9 noted on admission with MCV of 108 improved following 2 units PRBC currently 8.9  Patient already being treated with PO B12 outpatient   SPEP was negative, but further clarification of haptoglobin was recommended   Low-normal B12 was noted   Iron levels appropriate  Guaiac negative in ED  Monitor CBC.     Primary hypertension  Assessment & Plan  BP acceptable   Continue HCTZ  K+ running low, can benefit from Kcl supplement.      Stage 3 chronic kidney disease (HCC)  Assessment & Plan  Lab Results   Component Value Date    EGFR 55 01/02/2024    EGFR 58 01/01/2024    EGFR 52 12/31/2023    CREATININE 1.18 01/02/2024    CREATININE 1.13 01/01/2024    CREATININE 1.24 12/31/2023     Creatinine appears to be at baseline   Monitor   Avoid hypotension/nephrotoxins     Leukopenia  Assessment & Plan  Noted at 3.48, seems to be slightly decreasing over the last few years.   Monitor for now, leukopenia can be a sign of microcytic anemia/vitamin B12 deficiency also.               Pharmacologic VTE Prophylaxis: No 2/2 Anemia.   Mechanical VTE Prophylaxis in Place: No   Patient Centered Rounds: I have performed bedside rounds with the Nursing staff today.   Current Length of Stay: 2 day(s)  Current Patient Status: Inpatient   Code Status: Level 1 - Full Code  Time Spent for Care:  35 minutes.  More than 50% of total time spent on counseling and coordination of care as described above.  Discussions with Specialists or Other Care Team Provider: Yes CArdiology  Education and Discussions with Family / Patient: Tried to contact multiple family members however voice message left for one of them and couple was not able to leave a voice message as well.  Discharge Plan: 24 to 48 hrs  Case Discussed with  regarding updating plan of care and disposition planning.   Certification Statement: The patient will continue to require additional inpatient hospital stay due to CAD, Elevated troponin    R SL IP PT AM-PAC BASIC MOBILITY RAW SCORE     Subjective:   I have seen and Examined the patient at the bedside. No CP or Sob. No fevers or chills, No nausea or vomiting. Overnight events reviewed with the RN. No Other complains.  Patient complains generalized fatigue and weakness and not able to ambulate well without being tired.    Review of System:   Denies any CP or SOB  Denies any Cough or Cold  Denies any Fevers or chills.   Denies any focal tingling  "numbness or weakness in any extremities.   Denies any abdominal pain, Nausea or vomiting.     Objective:   Temp (24hrs), Av.7 °F (37.1 °C), Min:98.6 °F (37 °C), Max:98.7 °F (37.1 °C)    Temp:  [98.6 °F (37 °C)-98.7 °F (37.1 °C)] 98.7 °F (37.1 °C)  HR:  [61-76] 76  BP: (110-163)/(37-74) 163/74  SpO2:  [94 %-98 %] 98 %  Body mass index is 21.81 kg/m².     Input and Output Summary (last 24 hours):   No intake or output data in the 24 hours ending 24 1608  I/O          0701   0700  0701   07 0701   0700    Blood 825      Total Intake(mL/kg) 825 (11.9)      Net +825             Unmeasured Urine Occurrence 1 x              Physical Exam:   General : Alert, Awake and oriented x 2-3, NAD.   Neck : Supple.   Eyes:  SHAUN, EOMI.   CVS : S1, S2, RRR.   R/S : Clear to auscultate anteriorly.   Abd: Soft, NT, ND. Bs+ve  Extremity: Trace pedal edema noted.   Skin: No acute Rash noted.   CNS: No acute FND.     Additional Data:     Labs, Culture & Imaging Data Reviewed:    Results from last 7 days   Lab Units 24  0333   WBC Thousand/uL 8.04   HEMOGLOBIN g/dL 8.8*   HEMATOCRIT % 25.1*   PLATELETS Thousands/uL 236     Results from last 7 days   Lab Units 24  0333 24  0252   POTASSIUM mmol/L 3.5 3.6   CHLORIDE mmol/L 107 109*   CO2 mmol/L 23 26   BUN mg/dL 23 27*   CREATININE mg/dL 1.18 1.13   CALCIUM mg/dL 9.4 9.2   ALK PHOS U/L  --  34   ALT U/L  --  28   AST U/L  --  56*     Results from last 7 days   Lab Units 24  0252   INR  1.04     No results found for: \"HGBA1C\"   No orders to display       Cultures:   Blood Culture: No results found for: \"BLOODCX\"  Urine Culture: No results found for: \"URINECX\"  Sputum Culture: No components found for: \"SPUTUMCX\"  Wound Culture: No results found for: \"WOUNDCULT\"    Last 24 Hours Medication List:   Current Facility-Administered Medications   Medication Dose Route Frequency Provider Last Rate    acetaminophen  650 mg Oral Q6H PRN " BRITTANY Munoz      [START ON 1/3/2024] aspirin  81 mg Oral Daily BRITTANY Munoz      atorvastatin  20 mg Oral Daily With Dinner BRITTANY Munoz      cyanocobalamin  1,000 mcg Oral Daily BRITTANY Munoz      hydrochlorothiazide  25 mg Oral Daily BRITTANY Munoz      LORazepam  1 mg Oral TID PRN BRITTANY Munoz      nebivolol  20 mg Oral Daily BRITTANY Munoz      ondansetron  4 mg Intravenous Q6H PRN BRITTANY Munoz      ticagrelor  90 mg Oral Q12H [facility] BRITTANY Munoz           Patient is at moderate risk for morbidity and mortality due to above mentioned illness and comorbidities.

## 2024-01-02 NOTE — CASE MANAGEMENT
Case Management Progress Note    Patient name Gael Ferraro  Location S /S -01 MRN [id number]  : 1940 Date 2024       LOS (days): 2  Geometric Mean LOS (GMLOS) (days):   Days to GMLOS:        OBJECTIVE:        Current admission status: Inpatient  Preferred Pharmacy:   [facility]/pharm[facility] #[id number] - [place], [place] - [address] [id number] [facility] [address]  Phone: [phone] Fax: [phone]    Primary Care Provider: No primary care provider on file.    Primary Insurance: [employer]  REP  Secondary Insurance:     PROGRESS NOTE:    TT received from MD requesting price check for Brilinta. Call made to [facility] and spoke with pharmacist who relayed cost comes to $21 for the Brilinta. Same relayed to MD via TT.

## 2024-01-02 NOTE — DISCHARGE SUMMARY
Discharge Summary - [facility] Internal Medicine    Patient Information: Gael Ferraro 83 y.o. male MRN: [id number]  Unit/Bed#: S -01 Encounter: [id number]    Discharging Physician / Practitioner: Alize Sylvester MD  PCP: No primary care provider on file.  Admission Date: 12/31/2023  Discharge Date: 01/02/24    Reason for Admission: Chest Pain (Pt presents to the ED with jaw pain a couple times this week. Midsternal chest pain with radiation to the right arm onset 0900 this morning. Non smoker, family hx of heart attacks, more on mothers side. Pt reports hx of 2 stents. Placed 20 yrs ago. Arrived via EMS, received 324mg asa prehospital. 18g left AC. )      Discharge Diagnoses:     Primary Discharge Diagnosis:   Type II MI. - Elevated Troponin.     Principal Problem:    Chest pain  Active Problems:    Macrocytic anemia    Coronary artery disease involving native coronary artery of native heart with angina pectoris (HCC)    Stage 3 chronic kidney disease (HCC)    Primary hypertension    Leukopenia  Resolved Problems:    * No resolved hospital problems. *      Consultations During Hospital Stay:  IP CONSULT TO CARDIOLOGY    Procedures Performed:   Left Heart Cath: with severe chronic MVD -- Ostial to Prox LM with a calcified 50% lesion / LAD is a small vessel and moderately diffusely diseased / LCX has a proximal bifurcation stent into the OM1 with mild ISR and a distal stent with no ISR as well as moderate progression of disease between the distal aspects of the LCX stents / RCA has a proximal  with Kugel collaterals to the RPAV and collaterals from the left the RPDA and RPAV    LVEDP is normal without gradient on LV-AO pullback    RHC: with no evidence of PHTN    Significant Findings:   Refer to hospital course and above listed diagnosis related plan for details    Imaging while in hospital:  Incidental Findings:     Test Results Pending at Discharge (will require follow up):   As per After Visit  "Summary     Outpatient Tests Requested:  Complications:  Refer to hospital course and above listed diagnosis related plan, if any    Hospital Course:     Gael Ferraro is a 83 y.o. male patient with PMHx of history of coronary artery disease and acute on chronic anemia who originally presented to the hospital on 12/31/2023 due to chief complaints of jaw and left arm pain with EKG changes and elevated troponin.  Patient had elevated troponin and suspected to be demand ischemia secondary to low hemoglobin and coronary artery disease.  Patient underwent a cardiac catheterization and was noted to have coronary artery disease but no needing any stents.  Recommended medical management.  Patient initially and only was on aspirin and statin.  Has been now recommended to be on aspirin, Brilinta and statin.  He has acute on chronic anemia and etiology seems to be possibly vitamin B12 deficiency.  Patient has microcytic anemia.  He has been started on vitamin B12 supplements.  FOBT in the ED was negative.  Patient needs to follow-up with PCP regarding monitoring his hemoglobin.  And patient did receive 2 units of PRBC transfusion in the hospital on admission.  Patient will need to follow-up with PCP/hematology as an outpatient.      Patient was cleared by general cardiology to follow-up with them as an outpatient with aspirin, Brilinta and statin.    Please see above list of diagnoses and related plan for additional information.       Condition at Discharge: fair     Discharge Day Visit / Exam:     Subjective:  I have seen and examined the patient at bedside. Overnight events reviewed with the RN.     Vitals: Blood Pressure: 163/74 (01/02/24 1500)  Pulse: 76 (01/02/24 1500)  Temperature: 98.7 °F (37.1 °C) (01/02/24 0819)  Temp Source: Oral (01/01/24 1549)  Respirations: 17 (01/01/24 1549)  Height: 5' 10\" (177.8 cm) (01/02/24 0023)  Weight - Scale: 68.9 kg (152 lb) (01/02/24 0023)  SpO2: 98 % (01/02/24 1245)  Exam: " "  Physical Exam  General Exam: Alert and Oriented x 3, NAD  Eyes: SHAUN  Neck: Supple.   CVS: S1, S2 Clinch, RRR.   R/S: Clear to auscultate anteriorly.   Abd: Soft, NT, ND, BS+ve  Extremities: No edema noted.   Skin: No acute Rash noted.   CNS: No acute FND. Moves all 4 extremities.   Psych: Co-operative, Not agitated.     Discharge instructions/Information to patient and family:(Discharge Medications and Follow up):   See after visit summary for information provided to patient and family.      Provisions for Follow-Up Care:  See after visit summary for information related to follow-up care and any pertinent home health orders.      Disposition: Home    Planned Readmission:  No     Discharge Statement:  I spent 35 minutes discharging the patient. This time was spent on the day of discharge. I had direct contact with the patient on the day of discharge. Greater than 50% of the total time was spent examining patient, answering all patient questions, arranging and discussing plan of care with patient as well as directly providing post-discharge instructions.  Additional time then spent on discharge activities.    Discharge Medications:  See after visit summary for reconciled discharge medications provided to patient and family.      ** Please Note: \"This note has been constructed using a voice recognition system.Therefore there may be syntax, spelling, and/or grammatical errors. Please call if you have any questions. \"**          "

## 2024-01-02 NOTE — ASSESSMENT & PLAN NOTE
Patient has history of 2 stents about 20 years ago   Continue ASA, Bystolic, statin     LHC: with severe chronic MVD -- Ostial to Prox LM with a calcified 50% lesion / LAD is a small vessel and moderately diffusely diseased / LCX has a proximal bifurcation stent into the OM1 with mild ISR and a distal stent with no ISR as well as moderate progression of disease between the distal aspects of the LCX stents / RCA has a proximal  with Kugel collaterals to the RPAV and collaterals from the left the RPDA and RPAV    LVEDP is normal without gradient on LV-AO pullback    RHC: with no evidence of PHTN

## 2024-01-02 NOTE — ASSESSMENT & PLAN NOTE
Noted at 3.48, seems to be slightly decreasing over the last few years.   Monitor for now, leukopenia can be a sign of microcytic anemia/vitamin B12 deficiency also.

## 2024-01-02 NOTE — PROGRESS NOTES
General Cardiology   Progress Note -  Team One   Gael Ferraro 83 y.o. male MRN: [id number]  Unit/Bed#: S -01 Encounter: [id number]    Assessment    1. Type 2 MI in setting of acute anemia with underlying MVCAD  Presented with c/o chest pain with radiation to arms and jaw on exertion, relieved with rest  HS troponin 1023 > 1898 > 3615  S/p aspirin/Brilinta load + heparin gtt in ED  ECG - NSR, LVH, HR 75, diffuse ST depressions, ST elevations in aVR  TTE: LVEF 60% with normal wall motion, normal diastolic function, mild MR, and mild TR  LHC/RHC 1/2/24: Severe, chronic MVCAD-ostial LM disease, small LAD with moderate diffuse disease, LCx has 2 patent stents with moderate progression of disease distal to each stent,  of RCA with collaterals.  LVEDP normal.  No evidence of pulmonary hypertension on right heart cath. No intervention performed  Recommendation from interventional cardiologist-address anemia, continue GDMT for CAD with dual antiplatelet therapy as able, and cardiac rehab.  Potential PCI if symptoms are refractory to medical management.     2. Acute anemia  Hemoglobin 6.9 on admission with baseline around 13  Up to 8.9 s/p 2 units PRBCs and remains stable today at 8.8  No complaints of active bleeding     3. CAD s/p remote PCI  [facility]  Continued on aspirin, atorvastatin, nebivolol  Follows with Dr. Allen      4. Hypertension- stable, avg /50  On nebivolol and chlorthalidone 25 mg daily outpatient  Currently on home nebivolol + HCTZ 25 mg daily     5.  Hyperlipidemia- no recent lipid panel. On rosuvastatin 10 mg daily at home- substituted with atorvastatin 20 mg daily     6. Carotid artery stenosis- on ASA and statin    Plan/discussion  Asymptomatic since arriving in the ED. Even prior to admission, symptoms had only been with exertion.  Reviewed results of catheterization and echocardiogram with patient  Continue single antiplatelet therapy  "with ASA 81 mg daily for now and follow H&H closely  Continue beta blocker and statin  Check updated lipid panel  Continue to monitor on telemetry x 24 hours  Encouraged patient to ambulate in the halls once his TR band is off to assess for further symptoms  Work up of presenting anemia per primary team    Subjective  Review of Systems   Constitutional: Negative for chills, malaise/fatigue and weight gain.   Cardiovascular:  Negative for chest pain, dyspnea on exertion, leg swelling, orthopnea, palpitations and syncope.   Respiratory:  Negative for cough, shortness of breath, sleep disturbances due to breathing and sputum production.    Endocrine: Negative.    Hematologic/Lymphatic: Negative.    Gastrointestinal:  Negative for bloating and nausea.   Genitourinary: Negative.    Neurological:  Negative for dizziness, light-headedness and weakness.   Psychiatric/Behavioral:  Negative for altered mental status.    All other systems reviewed and are negative.      Objective:   Vitals: Blood pressure (!) 119/42, pulse 65, temperature 98.7 °F (37.1 °C), resp. rate 17, height 5' 10\" (1.778 m), weight 68.9 kg (152 lb), SpO2 94%., Body mass index is 21.81 kg/m².,     Systolic (24hrs), Av , Min:112 , Max:143     Diastolic (24hrs), Av, Min:37, Max:61      No intake or output data in the 24 hours ending 24 1103  Wt Readings from Last 3 Encounters:   24 68.9 kg (152 lb)   17 69.9 kg (154 lb)   10/13/16 71.2 kg (157 lb)     Telemetry Review: NSR    Physical Exam  Vitals reviewed.   Constitutional:       General: He is not in acute distress.  Neck:      Vascular: No hepatojugular reflux or JVD.   Cardiovascular:      Rate and Rhythm: Normal rate and regular rhythm.      Pulses: Normal pulses.      Heart sounds: Normal heart sounds. No murmur heard.     No friction rub. No gallop.   Pulmonary:      Effort: Pulmonary effort is normal. No respiratory distress.      Breath sounds: No rales.      Comments: " Room air  Abdominal:      General: Bowel sounds are normal. There is no distension.      Palpations: Abdomen is soft.      Tenderness: There is no abdominal tenderness.   Musculoskeletal:         General: No tenderness. Normal range of motion.      Cervical back: Neck supple.      Right lower leg: No edema.      Left lower leg: No edema.   Skin:     General: Skin is warm and dry.      Capillary Refill: Capillary refill takes less than 2 seconds.      Findings: No erythema.      Comments: Right radial cath site- TR band in place. 2+ radial pulse. No active bleeding or hematoma.   Neurological:      Mental Status: He is alert and oriented to person, place, and time.   Psychiatric:         Mood and Affect: Mood normal.         LABORATORY RESULTS      CBC with diff:   Results from last 7 days   Lab Units 01/02/24  0333 01/01/24  1748 01/01/24  0252 01/01/24  0043 12/31/23  1249   WBC Thousand/uL 8.04  --  6.77  --  3.48*   HEMOGLOBIN g/dL 8.8* 9.5* 8.9* 8.9* 6.9*   HEMATOCRIT % 25.1* 27.4* 25.6* 25.7* 19.6*   MCV fL 100*  --  100*  --  108*   PLATELETS Thousands/uL 236  --  245  --  270   RBC Million/uL 2.51*  --  2.57*  --  1.82*   MCH pg 35.1*  --  34.6*  --  37.9*   MCHC g/dL 35.1  --  34.8  --  35.2   RDW % 16.0*  --  16.8*  --  13.4   MPV fL 10.5  --  10.8  --  10.8   NRBC AUTO /100 WBCs  --   --   --   --  0     CMP:  Results from last 7 days   Lab Units 01/02/24  0333 01/01/24  0252 12/31/23  1249   POTASSIUM mmol/L 3.5 3.6 4.0   CHLORIDE mmol/L 107 109* 106   CO2 mmol/L 23 26 28   BUN mg/dL 23 27* 29*   CREATININE mg/dL 1.18 1.13 1.24   CALCIUM mg/dL 9.4 9.2 9.5   AST U/L  --  56* 30   ALT U/L  --  28 28   ALK PHOS U/L  --  34 32*   EGFR ml/min/1.73sq m 55 58 52     BMP:  Results from last 7 days   Lab Units 01/02/24  0333 01/01/24  0252 12/31/23  1249   POTASSIUM mmol/L 3.5 3.6 4.0   CHLORIDE mmol/L 107 109* 106   CO2 mmol/L 23 26 28   BUN mg/dL 23 27* 29*   CREATININE mg/dL 1.18 1.13 1.24   CALCIUM mg/dL 9.4  9.2 9.5     Lab Results   Component Value Date    CREATININE 1.18 01/02/2024    CREATININE 1.13 01/01/2024    CREATININE 1.24 12/31/2023         Results from last 7 days   Lab Units 01/01/24  0252 12/31/23  1315   INR  1.04 1.01     Meds/Allergies   all current active meds have been reviewed and current meds:   Current Facility-Administered Medications   Medication Dose Route Frequency    [Transfer Hold] acetaminophen (TYLENOL) tablet 650 mg  650 mg Oral Q6H PRN    [Transfer Hold] atorvastatin (LIPITOR) tablet 20 mg  20 mg Oral Daily With Dinner    [Transfer Hold] cyanocobalamin (VITAMIN B-12) tablet 1,000 mcg  1,000 mcg Oral Daily    heparin (porcine) 25,000 units in 0.45% NaCl 250 mL infusion (premix)  3-20 Units/kg/hr (Order-Specific) Intravenous Titrated    [Transfer Hold] heparin (porcine) injection 1,950 Units  1,950 Units Intravenous Q6H PRN    [Transfer Hold] heparin (porcine) injection 3,900 Units  3,900 Units Intravenous Q6H PRN    [Transfer Hold] hydrochlorothiazide (HYDRODIURIL) tablet 25 mg  25 mg Oral Daily    [Transfer Hold] LORazepam (ATIVAN) tablet 1 mg  1 mg Oral TID PRN    [Transfer Hold] nebivolol (BYSTOLIC) tablet 20 mg  20 mg Oral Daily    [Transfer Hold] ondansetron (ZOFRAN) injection 4 mg  4 mg Intravenous Q6H PRN    sodium chloride 0.9 % infusion  100 mL/hr Intravenous Continuous    [Transfer Hold] ticagrelor (BRILINTA) tablet 90 mg  90 mg Oral Q12H CRESENCIO     No medications prior to admission.     heparin (porcine), 3-20 Units/kg/hr (Order-Specific), Last Rate: 10 Units/kg/hr (01/02/24 0408)  sodium chloride, 100 mL/hr, Last Rate: Stopped (01/02/24 0929)    Counseling / Coordination of Care  Total floor / unit time spent today 20 minutes.  Greater than 50% of total time was spent with the patient and / or family counseling and / or coordination of care.      ** Please Note: Dragon 360 Dictation voice to text software may have been used in the creation of this document. **

## 2024-01-03 VITALS
BODY MASS INDEX: 21.76 KG/M2 | OXYGEN SATURATION: 93 % | HEART RATE: 77 BPM | SYSTOLIC BLOOD PRESSURE: 127 MMHG | WEIGHT: 152 LBS | HEIGHT: 70 IN | RESPIRATION RATE: 18 BRPM | DIASTOLIC BLOOD PRESSURE: 51 MMHG | TEMPERATURE: 99.1 F

## 2024-01-03 LAB
ANION GAP SERPL CALCULATED.3IONS-SCNC: 7 MMOL/L
BUN SERPL-MCNC: 22 MG/DL (ref 5–25)
CALCIUM SERPL-MCNC: 8.9 MG/DL (ref 8.4–10.2)
CHLORIDE SERPL-SCNC: 107 MMOL/L (ref 96–108)
CO2 SERPL-SCNC: 26 MMOL/L (ref 21–32)
CREAT SERPL-MCNC: 1.16 MG/DL (ref 0.6–1.3)
GFR SERPL CREATININE-BSD FRML MDRD: 57 ML/MIN/1.73SQ M
GLUCOSE SERPL-MCNC: 106 MG/DL (ref 65–140)
MAGNESIUM SERPL-MCNC: 2.1 MG/DL (ref 1.9–2.7)
PHOSPHATE SERPL-MCNC: 3.1 MG/DL (ref 2.3–4.1)
POTASSIUM SERPL-SCNC: 3.9 MMOL/L (ref 3.5–5.3)
SODIUM SERPL-SCNC: 140 MMOL/L (ref 135–147)

## 2024-01-03 PROCEDURE — 80048 BASIC METABOLIC PNL TOTAL CA: CPT | Performed by: NURSE PRACTITIONER

## 2024-01-03 PROCEDURE — 83735 ASSAY OF MAGNESIUM: CPT | Performed by: INTERNAL MEDICINE

## 2024-01-03 PROCEDURE — 84100 ASSAY OF PHOSPHORUS: CPT | Performed by: INTERNAL MEDICINE

## 2024-01-03 PROCEDURE — 99232 SBSQ HOSP IP/OBS MODERATE 35: CPT | Performed by: INTERNAL MEDICINE

## 2024-01-03 RX ADMIN — LORAZEPAM 1 MG: 1 TABLET ORAL at 09:10

## 2024-01-03 RX ADMIN — NEBIVOLOL 20 MG: 10 TABLET ORAL at 09:10

## 2024-01-03 RX ADMIN — ASPIRIN 81 MG: 81 TABLET, COATED ORAL at 09:10

## 2024-01-03 RX ADMIN — LORAZEPAM 1 MG: 1 TABLET ORAL at 00:07

## 2024-01-03 RX ADMIN — TICAGRELOR 90 MG: 90 TABLET ORAL at 09:09

## 2024-01-03 RX ADMIN — CYANOCOBALAMIN TAB 500 MCG 1000 MCG: 500 TAB at 09:09

## 2024-01-03 RX ADMIN — HYDROCHLOROTHIAZIDE 25 MG: 25 TABLET ORAL at 09:10

## 2024-01-03 NOTE — PLAN OF CARE
Problem: PAIN - ADULT  Goal: Verbalizes/displays adequate comfort level or baseline comfort level  Description: Interventions:  - Encourage patient to monitor pain and request assistance  - Assess pain using appropriate pain scale  - Administer analgesics based on type and severity of pain and evaluate response  - Implement non-pharmacological measures as appropriate and evaluate response  - Consider cultural and social influences on pain and pain management  - Notify physician/advanced practitioner if interventions unsuccessful or patient reports new pain  Outcome: Progressing     Problem: INFECTION - ADULT  Goal: Absence or prevention of progression during hospitalization  Description: INTERVENTIONS:  - Assess and monitor for signs and symptoms of infection  - Monitor lab/diagnostic results  - Monitor all insertion sites, i.e. indwelling lines, tubes, and drains  - Monitor endotracheal if appropriate and nasal secretions for changes in amount and color  - [place] appropriate cooling/warming therapies per order  - Administer medications as ordered  - Instruct and encourage patient and family to use good hand hygiene technique  - Identify and instruct in appropriate isolation precautions for identified infection/condition  Outcome: Progressing  Goal: Absence of fever/infection during neutropenic period  Description: INTERVENTIONS:  - Monitor WBC    Outcome: Progressing     Problem: SAFETY ADULT  Goal: Patient will remain free of falls  Description: INTERVENTIONS:  - Educate patient/family on patient safety including physical limitations  - Instruct patient to call for assistance with activity   - Consult OT/PT to assist with strengthening/mobility   - Keep Call bell within reach  - Keep bed low and locked with side rails adjusted as appropriate  - Keep care items and personal belongings within reach  - Initiate and maintain comfort rounds  - Apply yellow socks and bracelet for high fall risk patients  - Consider  moving patient to room near nurses station  Outcome: Progressing  Goal: Maintain or return to baseline ADL function  Description: INTERVENTIONS:  -  Assess patient's ability to carry out ADLs; assess patient's baseline for ADL function and identify physical deficits which impact ability to perform ADLs (bathing, care of mouth/teeth, toileting, grooming, dressing, etc.)  - Assess/evaluate cause of self-care deficits   - Assess range of motion  - Assess patient's mobility; develop plan if impaired  - Assess patient's need for assistive devices and provide as appropriate  - Encourage maximum independence but intervene and supervise when necessary  - Involve family in performance of ADLs  - Assess for home care needs following discharge   - Consider OT consult to assist with ADL evaluation and planning for discharge  - Provide patient education as appropriate  Outcome: Progressing  Goal: Maintains/Returns to pre admission functional level  Description: INTERVENTIONS:  - Perform AM-PAC 6 Click Basic Mobility/ Daily Activity assessment daily.  - Set and communicate daily mobility goal to care team and patient/family/caregiver.   - Collaborate with rehabilitation services on mobility goals if consulted  - Perform Range of Motion   - Out of bed for toileting  - Record patient progress and toleration of activity level   Outcome: Progressing     Problem: DISCHARGE PLANNING  Goal: Discharge to home or other facility with appropriate resources  Description: INTERVENTIONS:  - Identify barriers to discharge w/patient and caregiver  - Arrange for needed discharge resources and transportation as appropriate  - Identify discharge learning needs (meds, wound care, etc.)  - Arrange for interpretive services to assist at discharge as needed  - Refer to Case Management Department for coordinating discharge planning if the patient needs post-hospital services based on physician/advanced practitioner order or complex needs related to  functional status, cognitive ability, or social support system  Outcome: Progressing     Problem: Knowledge Deficit  Goal: Patient/family/caregiver demonstrates understanding of disease process, treatment plan, medications, and discharge instructions  Description: Complete learning assessment and assess knowledge base.  Interventions:  - Provide teaching at level of understanding  - Provide teaching via preferred learning methods  Outcome: Progressing

## 2024-01-03 NOTE — PROGRESS NOTES
General Cardiology   Progress Note -  [facility]   Gael Ferraro 83 y.o. male MRN: [id number]  Unit/Bed#: S -01 Encounter: [id number]    Assessment     1. Type 2 MI in setting of acute anemia with underlying MVCAD  Presented with c/o chest pain with radiation to arms and jaw on exertion, relieved with rest  HS troponin 1023 > 1898 > 3615  S/p aspirin/Brilinta load + heparin gtt in ED  ECG - NSR, LVH, HR 75, diffuse ST depressions, ST elevations in aVR  TTE: LVEF 60% with normal wall motion, normal diastolic function, mild MR, and mild TR  LHC/RHC 1/2/24: Severe, chronic MVCAD-ostial LM disease, small LAD with moderate diffuse disease, LCx has 2 patent stents with moderate progression of disease distal to each stent,  of RCA with collaterals.  LVEDP normal.  No evidence of pulmonary hypertension on right heart cath. No intervention performed  Recommendation from interventional cardiologist-address anemia, continue GDMT for CAD with dual antiplatelet therapy as able, and cardiac rehab.  Potential PCI if symptoms are refractory to medical management.     2. Macrocytic anemia  Hemoglobin 6.9 on admission with baseline around 13  Up to 8.9 s/p 2 units PRBCs and remained stable yesterday at 8.8  No complaints of active bleeding     3. CAD s/p remote PCI  Remote history per [facility] documentation - 20 years ago  Continued on aspirin, atorvastatin, nebivolol  Follows with Dr. Allen      4. Hypertension- stable, avg /52  On nebivolol and chlorthalidone 25 mg daily outpatient  Currently on home nebivolol + HCTZ 25 mg daily     5.  Hyperlipidemia- , , HDL 42, LDL 56. On rosuvastatin 10 mg daily at home- substituted with atorvastatin 20 mg daily     6. Carotid artery stenosis- on ASA and statin     Plan/discussion  Asymptomatic walking around his room today, anxious to go home.  Continue dual antiplatelet therapy with ASA 81 mg daily and ticagrelor 90 mg BID  Continue beta blocker and statin  Close  "outpatient follow up of his anemia with PCP  Cardiac rehab referral placed for d/c  Transitioning cardiac care to [facility]. Has follow up on 24.    Subjective  Review of Systems   Constitutional: Negative for chills, malaise/fatigue and weight gain.   Cardiovascular:  Negative for chest pain, dyspnea on exertion, leg swelling, orthopnea, palpitations and syncope.   Respiratory:  Negative for cough, shortness of breath, sleep disturbances due to breathing and sputum production.    Endocrine: Negative.    Hematologic/Lymphatic: Negative.    Gastrointestinal:  Negative for bloating and nausea.   Genitourinary: Negative.    Neurological:  Negative for dizziness, light-headedness and weakness.   Psychiatric/Behavioral:  Negative for altered mental status.    All other systems reviewed and are negative.      Objective:   Vitals: Blood pressure 127/51, pulse 77, temperature 99.1 °F (37.3 °C), resp. rate 18, height 5' 10\" (1.778 m), weight 68.9 kg (152 lb), SpO2 93%., Body mass index is 21.81 kg/m².,     Systolic (24hrs), Av , Min:99 , Max:163     Diastolic (24hrs), Av, Min:38, Max:74      No intake or output data in the 24 hours ending 24 1154  Wt Readings from Last 3 Encounters:   24 68.9 kg (152 lb)   17 69.9 kg (154 lb)   10/13/16 71.2 kg (157 lb)     Telemetry Review: n/a    Physical Exam  Constitutional:       General: He is not in acute distress.  Neck:      Vascular: No hepatojugular reflux or JVD.   Cardiovascular:      Rate and Rhythm: Normal rate and regular rhythm.      Heart sounds: Normal heart sounds. No murmur heard.     No friction rub. No gallop.   Pulmonary:      Effort: Pulmonary effort is normal. No respiratory distress.      Breath sounds: No rales.   Abdominal:      General: Bowel sounds are normal. There is no distension.      Palpations: Abdomen is soft.      Tenderness: There is no abdominal tenderness.   Musculoskeletal:         General: No tenderness. Normal range " of motion.      Cervical back: Neck supple.   Skin:     General: Skin is warm and dry.      Capillary Refill: Capillary refill takes less than 2 seconds.      Findings: No erythema.      Comments: Right radial cath site healing well. No active bleeding. Dressing dry and intact.   Neurological:      Mental Status: He is alert and oriented to person, place, and time.   Psychiatric:         Mood and Affect: Mood normal.         LABORATORY RESULTS      CBC with diff:   Results from last 7 days   Lab Units 01/02/24  0333 01/01/24  1748 01/01/24 0252 01/01/24  0043 12/31/23  1249   WBC Thousand/uL 8.04  --  6.77  --  3.48*   HEMOGLOBIN g/dL 8.8* 9.5* 8.9* 8.9* 6.9*   HEMATOCRIT % 25.1* 27.4* 25.6* 25.7* 19.6*   MCV fL 100*  --  100*  --  108*   PLATELETS Thousands/uL 236  --  245  --  270   RBC Million/uL 2.51*  --  2.57*  --  1.82*   MCH pg 35.1*  --  34.6*  --  37.9*   MCHC g/dL 35.1  --  34.8  --  35.2   RDW % 16.0*  --  16.8*  --  13.4   MPV fL 10.5  --  10.8  --  10.8   NRBC AUTO /100 WBCs  --   --   --   --  0     CMP:  Results from last 7 days   Lab Units 01/03/24  0657 01/02/24 0333 01/01/24  0252 12/31/23  1249   POTASSIUM mmol/L 3.9 3.5 3.6 4.0   CHLORIDE mmol/L 107 107 109* 106   CO2 mmol/L 26 23 26 28   BUN mg/dL 22 23 27* 29*   CREATININE mg/dL 1.16 1.18 1.13 1.24   CALCIUM mg/dL 8.9 9.4 9.2 9.5   AST U/L  --   --  56* 30   ALT U/L  --   --  28 28   ALK PHOS U/L  --   --  34 32*   EGFR ml/min/1.73sq m 57 55 58 52     BMP:  Results from last 7 days   Lab Units 01/03/24  0657 01/02/24  0333 01/01/24  0252 12/31/23  1249   POTASSIUM mmol/L 3.9 3.5 3.6 4.0   CHLORIDE mmol/L 107 107 109* 106   CO2 mmol/L 26 23 26 28   BUN mg/dL 22 23 27* 29*   CREATININE mg/dL 1.16 1.18 1.13 1.24   CALCIUM mg/dL 8.9 9.4 9.2 9.5     Lab Results   Component Value Date    CREATININE 1.16 01/03/2024    CREATININE 1.18 01/02/2024    CREATININE 1.13 01/01/2024      Results from last 7 days   Lab Units 01/03/24  0657   MAGNESIUM mg/dL  "2.1     Results from last 7 days   Lab Units 01/01/24  0252 12/31/23  1315   INR  1.04 1.01       Lipid Profile:   No results found for: \"CHOL\"  Lab Results   Component Value Date    HDL 42 01/02/2024     Lab Results   Component Value Date    LDLCALC 56 01/02/2024     Lab Results   Component Value Date    TRIG 166 (H) 01/02/2024       Meds/Allergies   all current active meds have been reviewed and current meds:   Current Facility-Administered Medications   Medication Dose Route Frequency    acetaminophen (TYLENOL) tablet 650 mg  650 mg Oral Q6H PRN    aspirin (ECOTRIN LOW STRENGTH) EC tablet 81 mg  81 mg Oral Daily    atorvastatin (LIPITOR) tablet 20 mg  20 mg Oral Daily With Dinner    cyanocobalamin (VITAMIN B-12) tablet 1,000 mcg  1,000 mcg Oral Daily    hydrochlorothiazide (HYDRODIURIL) tablet 25 mg  25 mg Oral Daily    LORazepam (ATIVAN) tablet 1 mg  1 mg Oral TID PRN    nebivolol (BYSTOLIC) tablet 20 mg  20 mg Oral Daily    ondansetron (ZOFRAN) injection 4 mg  4 mg Intravenous Q6H PRN    ticagrelor (BRILINTA) tablet 90 mg  90 mg Oral Q12H CRESENCIO     No medications prior to admission.     Counseling / Coordination of Care  Total floor / unit time spent today 20 minutes.  Greater than 50% of total time was spent with the patient and / or family counseling and / or coordination of care.      ** Please Note: Dragon 360 Dictation voice to text software may have been used in the creation of this document. **    "

## 2024-01-03 NOTE — PLAN OF CARE
Problem: PAIN - ADULT  Goal: Verbalizes/displays adequate comfort level or baseline comfort level  Description: Interventions:  - Encourage patient to monitor pain and request assistance  - Assess pain using appropriate pain scale  - Administer analgesics based on type and severity of pain and evaluate response  - Implement non-pharmacological measures as appropriate and evaluate response  - Consider cultural and social influences on pain and pain management  - Notify physician/advanced practitioner if interventions unsuccessful or patient reports new pain  Outcome: Completed     Problem: INFECTION - ADULT  Goal: Absence or prevention of progression during hospitalization  Description: INTERVENTIONS:  - Assess and monitor for signs and symptoms of infection  - Monitor lab/diagnostic results  - Monitor all insertion sites, i.e. indwelling lines, tubes, and drains  - Monitor endotracheal if appropriate and nasal secretions for changes in amount and color  - [place] appropriate cooling/warming therapies per order  - Administer medications as ordered  - Instruct and encourage patient and family to use good hand hygiene technique  - Identify and instruct in appropriate isolation precautions for identified infection/condition  Outcome: Completed  Goal: Absence of fever/infection during neutropenic period  Description: INTERVENTIONS:  - Monitor WBC    Outcome: Completed     Problem: SAFETY ADULT  Goal: Patient will remain free of falls  Description: INTERVENTIONS:  - Educate patient/family on patient safety including physical limitations  - Instruct patient to call for assistance with activity   - Consult OT/PT to assist with strengthening/mobility   - Keep Call bell within reach  - Keep bed low and locked with side rails adjusted as appropriate  - Keep care items and personal belongings within reach  - Initiate and maintain comfort rounds  - Make Fall Risk Sign visible to staff  - Offer Toileting every  Hours, in  advance of need  - Initiate/Maintain alarm  - Obtain necessary fall risk management equipment:   - Apply yellow socks and bracelet for high fall risk patients  - Consider moving patient to room near nurses station  Outcome: Completed  Goal: Maintain or return to baseline ADL function  Description: INTERVENTIONS:  -  Assess patient's ability to carry out ADLs; assess patient's baseline for ADL function and identify physical deficits which impact ability to perform ADLs (bathing, care of mouth/teeth, toileting, grooming, dressing, etc.)  - Assess/evaluate cause of self-care deficits   - Assess range of motion  - Assess patient's mobility; develop plan if impaired  - Assess patient's need for assistive devices and provide as appropriate  - Encourage maximum independence but intervene and supervise when necessary  - Involve family in performance of ADLs  - Assess for home care needs following discharge   - Consider OT consult to assist with ADL evaluation and planning for discharge  - Provide patient education as appropriate  Outcome: Completed  Goal: Maintains/Returns to pre admission functional level  Description: INTERVENTIONS:  - Perform AM-PAC 6 Click Basic Mobility/ Daily Activity assessment daily.  - Set and communicate daily mobility goal to care team and patient/family/caregiver.   - Collaborate with rehabilitation services on mobility goals if consulted  - Perform Range of Motion  times a day.  - Reposition patient every  hours.  - Dangle patient  times a day  - Stand patient  times a day  - Ambulate patient  times a day  - Out of bed to chair  times a day   - Out of bed for meals times a day  - Out of bed for toileting  - Record patient progress and toleration of activity level   Outcome: Completed     Problem: DISCHARGE PLANNING  Goal: Discharge to home or other facility with appropriate resources  Description: INTERVENTIONS:  - Identify barriers to discharge w/patient and caregiver  - Arrange for needed  discharge resources and transportation as appropriate  - Identify discharge learning needs (meds, wound care, etc.)  - Arrange for interpretive services to assist at discharge as needed  - Refer to Case Management Department for coordinating discharge planning if the patient needs post-hospital services based on physician/advanced practitioner order or complex needs related to functional status, cognitive ability, or social support system  Outcome: Completed     Problem: Knowledge Deficit  Goal: Patient/family/caregiver demonstrates understanding of disease process, treatment plan, medications, and discharge instructions  Description: Complete learning assessment and assess knowledge base.  Interventions:  - Provide teaching at level of understanding  - Provide teaching via preferred learning methods  Outcome: Completed

## 2024-01-05 NOTE — UTILIZATION REVIEW
NOTIFICATION OF ADMISSION DISCHARGE   This is a Notification of Discharge from [facility]. Please be advised that this patient has been discharge from our facility. Below you will find the admission and discharge date and time including the patient’s disposition.   UTILIZATION REVIEW CONTACT:  Alli Drake  Utilization   Network Utilization Review Department  Phone: [phone] x carefully listen to the prompts. All voicemails are confidential.  Email: [email or web address]     ADMISSION INFORMATION  PRESENTATION DATE: 12/31/2023 12:24 PM  OBERVATION ADMISSION DATE:   INPATIENT ADMISSION DATE: 12/31/23  3:10 PM   DISCHARGE DATE: 1/3/2024  1:30 PM   DISPOSITION:Home/Self Care    Network Utilization Review Department  ATTENTION: Please call with any questions or concerns to [phone] and carefully listen to the prompts so that you are directed to the right person. All voicemails are confidential.   For Discharge needs, contact Care Management DC Support Team at [phone] opt. 2  Send all requests for admission clinical reviews, approved or denied determinations and any other requests to dedicated fax number below belonging to the campus where the patient is receiving treatment. List of dedicated fax numbers for the Facilities:  FACILITY NAME UR FAX NUMBER   ADMISSION DENIALS (Administrative/Medical Necessity) [phone]   DISCHARGE SUPPORT TEAM ([facility]) [phone]   PARENT CHILD HEALTH (Maternity/NICU/Pediatrics) [phone]   [facility] [phone]   [facility] [phone]   [facility] [phone]   [facility] [phone]   [facility] [phone]   [facility] [phone]   [facility] [phone]   [facility] [phone]    [facility] [phone]   [facility] [phone]   [facility] [phone]

## 2024-01-15 ENCOUNTER — OFFICE VISIT (OUTPATIENT)
Dept: CARDIOLOGY CLINIC | Facility: CLINIC | Age: 84
End: 2024-01-15
Payer: COMMERCIAL

## 2024-01-15 ENCOUNTER — LAB (OUTPATIENT)
Dept: LAB | Facility: CLINIC | Age: 84
End: 2024-01-15
Payer: COMMERCIAL

## 2024-01-15 VITALS
WEIGHT: 140.2 LBS | DIASTOLIC BLOOD PRESSURE: 60 MMHG | OXYGEN SATURATION: 95 % | HEART RATE: 70 BPM | HEIGHT: 70 IN | BODY MASS INDEX: 20.07 KG/M2 | SYSTOLIC BLOOD PRESSURE: 128 MMHG

## 2024-01-15 DIAGNOSIS — D53.9 MACROCYTIC ANEMIA: ICD-10-CM

## 2024-01-15 DIAGNOSIS — I21.A1 TYPE 2 MI (MYOCARDIAL INFARCTION) (HCC): ICD-10-CM

## 2024-01-15 DIAGNOSIS — R79.89 ELEVATED TROPONIN: ICD-10-CM

## 2024-01-15 DIAGNOSIS — N18.30 STAGE 3 CHRONIC KIDNEY DISEASE, UNSPECIFIED WHETHER STAGE 3A OR 3B CKD (HCC): ICD-10-CM

## 2024-01-15 DIAGNOSIS — E78.2 MIXED HYPERLIPIDEMIA: ICD-10-CM

## 2024-01-15 DIAGNOSIS — I25.119 CORONARY ARTERY DISEASE INVOLVING NATIVE CORONARY ARTERY OF NATIVE HEART WITH ANGINA PECTORIS (HCC): ICD-10-CM

## 2024-01-15 DIAGNOSIS — Z09 HOSPITAL DISCHARGE FOLLOW-UP: Primary | ICD-10-CM

## 2024-01-15 DIAGNOSIS — I10 PRIMARY HYPERTENSION: ICD-10-CM

## 2024-01-15 LAB
ANION GAP SERPL CALCULATED.3IONS-SCNC: 9 MMOL/L
BUN SERPL-MCNC: 25 MG/DL (ref 5–25)
CALCIUM SERPL-MCNC: 9.7 MG/DL (ref 8.4–10.2)
CHLORIDE SERPL-SCNC: 105 MMOL/L (ref 96–108)
CO2 SERPL-SCNC: 25 MMOL/L (ref 21–32)
CREAT SERPL-MCNC: 1.06 MG/DL (ref 0.6–1.3)
ERYTHROCYTE [DISTWIDTH] IN BLOOD BY AUTOMATED COUNT: 13.7 % (ref 11.6–15.1)
GFR SERPL CREATININE-BSD FRML MDRD: 63 ML/MIN/1.73SQ M
GLUCOSE SERPL-MCNC: 108 MG/DL (ref 65–140)
HCT VFR BLD AUTO: 23 % (ref 36.5–49.3)
HGB BLD-MCNC: 7.9 G/DL (ref 12–17)
MCH RBC QN AUTO: 34.2 PG (ref 26.8–34.3)
MCHC RBC AUTO-ENTMCNC: 34.3 G/DL (ref 31.4–37.4)
MCV RBC AUTO: 100 FL (ref 82–98)
PLATELET # BLD AUTO: 460 THOUSANDS/UL (ref 149–390)
PMV BLD AUTO: 10.6 FL (ref 8.9–12.7)
POTASSIUM SERPL-SCNC: 4.2 MMOL/L (ref 3.5–5.3)
RBC # BLD AUTO: 2.31 MILLION/UL (ref 3.88–5.62)
SODIUM SERPL-SCNC: 139 MMOL/L (ref 135–147)
WBC # BLD AUTO: 5.15 THOUSAND/UL (ref 4.31–10.16)

## 2024-01-15 PROCEDURE — 80048 BASIC METABOLIC PNL TOTAL CA: CPT

## 2024-01-15 PROCEDURE — 36415 COLL VENOUS BLD VENIPUNCTURE: CPT

## 2024-01-15 PROCEDURE — 85027 COMPLETE CBC AUTOMATED: CPT

## 2024-01-15 PROCEDURE — 99214 OFFICE O/P EST MOD 30 MIN: CPT | Performed by: NURSE PRACTITIONER

## 2024-01-15 RX ORDER — ROSUVASTATIN CALCIUM 10 MG/1
10 TABLET, COATED ORAL DAILY
Start: 2024-01-15

## 2024-01-15 RX ORDER — LORAZEPAM 1 MG/1
1 TABLET ORAL 3 TIMES DAILY PRN
COMMUNITY

## 2024-01-15 NOTE — PATIENT INSTRUCTIONS
Mediterranean Diet   AMBULATORY CARE:   A Mediterranean diet  is a meal plan that includes foods that are commonly eaten in countries that border the [place]. This meal plan may provide several health benefits. These include losing or maintaining weight, and decreasing blood pressure, blood sugar, and cholesterol levels. It may also help protect against certain health conditions such as heart disease, cancer, type 2 diabetes, and Alzheimer disease. Work with a dietitian to develop a meal plan that is right for you.  Foods to include in the Mediterranean diet:   Include fruits and vegetables in each meal.  Eat a variety of fresh fruits and vegetables.    Choose whole grains every day.  These foods include whole-grain breads, pastas, and cereals. It also includes brown rice, quinoa, and millet.    Use unsaturated fats instead of saturated fats.  Cook with olive or canola oil. Limit saturated fats, such as butter, margarine, and shortening. Saturated fat is an unhealthy fat that can increase your cholesterol levels.    Choose plant foods, poultry, and fish as your main sources of protein.      Eat plant-based foods that provide protein,  such as lentils, beans, chickpeas, nuts, and seeds. Choose mostly plant-based foods in place of meat on most days of the week.    Eat protein foods high in omega-3 fats.  Fish high in omega-3 fats include salmon, trout, and tuna. Include these types of fish 1 or 2 times each week. Limit fish high in mercury, such as shark, swordfish, tilefish, and sonali mackerel. Omega-3 fats are also found in walnuts and flaxseed.         Choose poultry (chicken or turkey)  without skin instead of red meat. Red meat is high in saturated fat. Limit eggs and high-fat meats, such as cross, sausage, and hot dogs.    Choose low-fat dairy foods  such as nonfat or 1% milk, or low-fat almond, cashew, or soy milk. Other examples include low-fat cheese, yogurt, and cottage cheese.    Limit sweets.   Limit your intake of high-sugar foods, such as soda, desserts, and candy.    Talk to your healthcare provider about alcohol.  Studies have shown that moderate intake of wine may reduce the risk of heart disease. A moderate amount of wine is 1 serving for women and men 65 years and older each day. Two servings is recommended for men 21 to 64 years of age each day. A serving of wine is 5 ounces.    Other things you need to know if you follow the Mediterranean diet:   Include foods high in iron and vitamin C.  Plant-based foods that are high in iron include spinach, beans, tofu, and artichoke. Eat a serving of vitamin C with any iron-rich food to help your body absorb more iron. Examples include oranges, strawberries, cantaloupe, broccoli, and yellow peppers.         Get regular physical activity.  The Mediterranean diet will have the most benefit if you get regular physical activity. Get 30 minutes of physical activity at least 5 days a week. Choose physical activities that increase your heart rate. Examples include walking, hiking, swimming, and riding a bike. Ask your healthcare provider about the best exercise plan for you.       © Copyright [employer] 2023 Information is for End User's use only and may not be sold, redistributed or otherwise used for commercial purposes.  The above information is an  only. It is not intended as medical advice for individual conditions or treatments. Talk to your doctor, nurse or pharmacist before following any medical regimen to see if it is safe and effective for you.

## 2024-01-15 NOTE — PROGRESS NOTES
Cardiology  Hopsital Follow Up   Office Visit Note  Gael Ferraro   83 y.o.   male   MRN: [id number]  [facility] [place]  [address]  [address]  [place] [address] [phone]    PCP: No primary care provider on file.  Cardiologist: will be Dr Velasco  Prior: Dr. Allen            Summary of recommendations  Heart healthy diet. Educational information provided  Nonfasting CBC, BMP  Cardiac rehab has been prescribed and recommended  Follow up will be scheduled with Dr Velasco      Assessment/plan  CAD: type 2 MI in the setting of marked anemia with underlying MV CAD; hx of remote PCI 20 yrs ago adm 12/31-1/3/24  [facility] 1/2/24:Severe, chronic MVCAD-ostial LM disease, small LAD with moderate diffuse disease, LCx has 2 patent stents with moderate progression of disease distal to each stent,  of RCA with collaterals.  LVEDP normal.     interventional cardiology recommendations:  cont ongoing anemia workup with goal Hgb > 9-10 (given the severity of his MVD)  Cont CAD GDMT optimization on statin/bb and if able to tolerate, lifelong DAPT vs P2Y1 Monotherapy  Would only consider High-Risk LM PCI if symptoms were refractory to above  On aspirin 81 mg daily, ticagrelor 90 mg q12h, a high intensity statin, beta-blocker  Echo: Preserved LVEF, no wall motion abnormalities  Cardiac rehab has been prescribed and recommended  Adherence to dual antiplatelet therapy reinforced  Macrocytic anemia with b 12 def.    Baseline hemoglobin around 13.  Presented with hemoglobin of 6.9 status post 2 units PRBC, maintaining a hemoglobin around 8.8  REC: F/U PCP/ hematology.   Hypertension, essential.  /60 -on nebivolol 20 mg daily, HCTZ 25 mg daily(prior to admission was on chlorthalidone 25 mg daily)  Hyperlipidemia, on atorvastatin 20 mg daily.  Prior to admission was on rosuvastatin 10 mg daily.  He was on atorvastatin in the hospital as substitute, given formulary restriction    1/2/2024: LDL 56, non-HDL 89   Latest Reference Range & Units 01/02/24 03:33   Cholesterol See Comment mg/dL 131   Triglycerides See Comment mg/dL 166 (H)   HDL >=40 mg/dL 42   LDL Calculated 0 - 100 mg/dL 56   Former tobacco use, in remission  Carotid stenosis, in aspirin, statin  CKD3.  Baseline creatinine up to 1.2  Cardiac testing  TTE  1/1/24 EF 60%.   No RWMA.  Diastolic function normal.  LAE.  Mild MR.  Mild TR.  RVSP moderately to severely elevated.  Estimated RVSP 58 mmHg.  Mild pulmonic valve regurgitation.  [facility] 1/2/24 severe chronic MVD -- Ostial to Prox LM with a calcified 50% lesion / LAD is a small vessel and moderately diffusely diseased / LCX has a proximal bifurcation stent into the OM1 with mild ISR and a distal stent with no ISR as well as moderate progression of disease between the distal aspects of the LCX stents / RCA has a proximal  with Kugel collaterals to the RPAV and collaterals from the left the RPDA and RPAV    LVEDP is normal without gradient on LV-AO pullback    RHC: with no evidence of PHTN            HPI  Gael Ferraro is an 84 yo male with known CAD, S/P PCI, HTN, HL, CKD, carotid stenosis who is a former tobacco use, who remains in remission    He lives at home with his wife.  He is independent.  He uses a cane to ambulate.  He follows with Dr. Allen.       Adm 12/31-1/3/24  CC: Stuttering chest pain x 1 week with radiation to his back right upper arm, and jaw  ECG: Diffuse ST depression, ST elevation in aVR-reviewed by interventional cardiology, consistent with global ischemia  On arrival found to be profoundly anemic with a hemoglobin of 6.9- (was 13.4 1/2023 ,  10.9  7/2023)  FOB negative per ED  Ongoing discussion between emergency medicine and interventional cardiology--> no plan for urgent [facility]  Recommended evaluation of anemia  HS troponin initial 1023- peak  3615  Transfused PRBC x 2;Hemoglobin improved to 9  Echo:  He did undergo left heart catheterization.  No  culprit lesion noted.  Chronic, multivessel CAD discovered  Interventional cardiology recommend keeping hemoglobin greater than 9, continuing aspirin, ticagrelor, aspirin, Brilinta ongoing outpatient evaluation  Also underwent right heart catheterization: no evidence of pulmonary hypertension which was suggested by echo  Recommend medical management, only pursuing high risk intervention if symptoms are refractory  Cardiac rehab recommended    1/15/24  Hospital follow-up.  He is accompanied by his wife, and his son  Review of systems: He denies chest pain.  He feels symptoms are greatly improved but is not quite back to himself yet.  He walks slowly, utilizing a cane  Reviewed his hospital course and his cardiac catheterization report  He would like to go back to rosuvastatin 10 mg daily which she was on prior to his hospital course and was tolerating without problem.  We will stop atorvastatin.  I refilled his medications, upon request  He is amenable to nonfasting labs today.  Will check a CBC, BMP  I discussed the benefit of cardiac rehab.  He and his family believe that would be beneficial.  Again the patient is agreeable.  I also discussed that we will be beneficial given that he walks with a cane.  He will return to see his cardiologist in short interval  He is contemplating switching from his prior cardiologist to  [facility]  .      Assessment  Diagnoses and all orders for this visit:    Hospital discharge follow-up    Coronary artery disease involving native coronary artery of native heart with angina pectoris (HCC)  -     Ambulatory referral to Cardiology  -     Basic metabolic panel; Future    Type 2 MI (myocardial infarction) (HCC)    Macrocytic anemia  -     CBC; Future    Primary hypertension    Stage 3 chronic kidney disease, unspecified whether stage 3a or 3b CKD (HCC)    Other orders  -     LORazepam (ATIVAN) 1 mg tablet; Take 1 mg by mouth 3 (three) times a day as needed for anxiety          History  reviewed. No pertinent past medical history.    Review of Systems   Constitutional: Negative for chills.   Cardiovascular:  Negative for chest pain, claudication, cyanosis, dyspnea on exertion, irregular heartbeat, leg swelling, near-syncope, orthopnea, palpitations, paroxysmal nocturnal dyspnea and syncope.   Respiratory:  Negative for cough and shortness of breath.    Gastrointestinal:  Negative for heartburn and nausea.   Neurological:  Negative for dizziness, focal weakness, headaches, light-headedness and weakness.   All other systems reviewed and are negative.      Allergies   Allergen Reactions    Penicillins Hives     .    Current Outpatient Medications:     acetaminophen (TYLENOL) 325 mg tablet, Take 2 tablets (650 mg total) by mouth every 6 (six) hours as needed for mild pain, headaches or fever, Disp: , Rfl:     aspirin (ECOTRIN LOW STRENGTH) 81 mg EC tablet, Take 1 tablet (81 mg total) by mouth daily Do not start before January 3, 2024., Disp: 30 tablet, Rfl: 0    atorvastatin (LIPITOR) 20 mg tablet, Take 1 tablet (20 mg total) by mouth daily with dinner, Disp: 30 tablet, Rfl: 0    cyanocobalamin (VITAMIN B-12) 1000 MCG tablet, Take 1 tablet (1,000 mcg total) by mouth daily, Disp: 30 tablet, Rfl: 0    hydrochlorothiazide (HYDRODIURIL) 25 mg tablet, Take 1 tablet (25 mg total) by mouth daily, Disp: 30 tablet, Rfl: 0    LORazepam (ATIVAN) 1 mg tablet, Take 1 mg by mouth 3 (three) times a day as needed for anxiety, Disp: , Rfl:     nebivolol (BYSTOLIC) 20 MG tablet, Take 1 tablet (20 mg total) by mouth daily, Disp: 30 tablet, Rfl: 0    potassium chloride (MICRO-K) 10 MEQ CR capsule, Take 1 capsule (10 mEq total) by mouth daily, Disp: 30 capsule, Rfl: 0    ticagrelor (BRILINTA) 90 MG, Take 1 tablet (90 mg total) by mouth every 12 (twelve) hours, Disp: 60 tablet, Rfl: 0        Social History     Socioeconomic History    Marital status: /Civil Union     Spouse name: Not on file    Number of children:  "Not on file    Years of education: Not on file    Highest education level: Not on file   Occupational History    Not on file   Tobacco Use    Smoking status: Not on file    Smokeless tobacco: Not on file   Substance and Sexual Activity    Alcohol use: Not on file    Drug use: Not on file    Sexual activity: Not on file   Other Topics Concern    Not on file   Social History Narrative    Not on file     Social Determinants of Health     Financial Resource Strain: Not on file   Food Insecurity: Not on file   Transportation Needs: Not on file   Physical Activity: Not on file   Stress: Not on file   Social Connections: Not on file   Intimate Partner Violence: Not on file   Housing Stability: Not on file       History reviewed. No pertinent family history.    Physical Exam  Vitals and nursing note reviewed.   Constitutional:       General: He is not in acute distress.  HENT:      Head: Normocephalic and atraumatic.   Eyes:      Extraocular Movements: Extraocular movements intact.      Conjunctiva/sclera: Conjunctivae normal.   Cardiovascular:      Rate and Rhythm: Normal rate and regular rhythm.      Pulses: Intact distal pulses.      Heart sounds: Normal heart sounds.   Pulmonary:      Effort: Pulmonary effort is normal.      Breath sounds: Normal breath sounds.   Abdominal:      General: Bowel sounds are normal.      Palpations: Abdomen is soft.   Musculoskeletal:         General: Normal range of motion.      Cervical back: Normal range of motion and neck supple.   Skin:     General: Skin is warm and dry.   Neurological:      Mental Status: He is alert and oriented to person, place, and time.   Psychiatric:         Mood and Affect: Mood normal.       Vitals: Height 5' 10\" (1.778 m), weight 63.6 kg (140 lb 3.2 oz).   Wt Readings from Last 3 Encounters:   01/15/24 63.6 kg (140 lb 3.2 oz)   01/02/24 68.9 kg (152 lb)   12/14/17 69.9 kg (154 lb)         Labs & Results:  Lab Results   Component Value Date    WBC 8.04 " "01/02/2024    HGB 8.8 (L) 01/02/2024    HCT 25.1 (L) 01/02/2024     (H) 01/02/2024     01/02/2024     BNP   Date Value Ref Range Status   12/31/2023 514 (H) 0 - 100 pg/mL Final     No components found for: \"CHEM\"  No results found for: \"CKTOTAL\", \"TROPONINI\", \"TROPONINT\", \"CKMBINDEX\"  No results found for this or any previous visit.    No results found for this or any previous visit.      This note was completed in part utilizing [employer] direct voice recognition software.   Grammatical errors, random word insertion, spelling mistakes, and incomplete sentences may be an occasional consequence of the system secondary to software limitations, ambient noise and hardware issues. At the time of dictation, efforts were made to edit, clarify and /or correct errors.  Please read the chart carefully and recognize, using context, where substitutions have occurred.  If you have any questions or concerns about the context, text or information contained within the body of this dictation, please contact myself, the provider, for further clarification      "

## 2024-01-15 NOTE — LETTER
January 15, 2024     Alize Sylvester MD  [address] [place] [id number]    Patient: Gael Ferraro   YOB: 1940   Date of Visit: 1/15/2024       Dear Dr. Sylvester:    Thank you for referring Gael Ferraro to me for evaluation. Below are my notes for this consultation.    If you have questions, please do not hesitate to call me. I look forward to following your patient along with you.         Sincerely,        BRITTANY Mcwilliams        CC: MD Nyasia Mccloud CRNP  1/15/2024  4:43 PM  Sign when Signing Visit  Cardiology  Hopsital Follow Up   Office Visit Note  Gael Ferraro   83 y.o.   male   MRN: [id number]  [facility] [place]  [address]  [address]  [facility] [id number] [phone]    PCP: No primary care provider on file.  Cardiologist: will be Dr Velasco  Prior: Dr. Allen            Summary of recommendations  Heart healthy diet. Educational information provided  Nonfasting CBC, BMP  Cardiac rehab has been prescribed and recommended  Follow up will be scheduled with Dr Velasco      Assessment/plan  CAD: type 2 MI in the setting of marked anemia with underlying MV CAD; hx of remote PCI 20 yrs ago adm 12/31-1/3/24  [facility] 1/2/24:Severe, chronic MVCAD-ostial LM disease, small LAD with moderate diffuse disease, LCx has 2 patent stents with moderate progression of disease distal to each stent,  of RCA with collaterals.  LVEDP normal.     interventional cardiology recommendations:  cont ongoing anemia workup with goal Hgb > 9-10 (given the severity of his MVD)  Cont CAD GDMT optimization on statin/bb and if able to tolerate, lifelong DAPT vs P2Y1 Monotherapy  Would only consider High-Risk LM PCI if symptoms were refractory to above  On aspirin 81 mg daily, ticagrelor 90 mg q12h, a high intensity statin, beta-blocker  Echo: Preserved LVEF, no wall motion abnormalities  Cardiac rehab has been prescribed and recommended  Adherence  to dual antiplatelet therapy reinforced  Macrocytic anemia with b 12 def.    Baseline hemoglobin around 13.  Presented with hemoglobin of 6.9 status post 2 units PRBC, maintaining a hemoglobin around 8.8  REC: F/U PCP/ hematology.   Hypertension, essential.  /60 -on nebivolol 20 mg daily, HCTZ 25 mg daily(prior to admission was on chlorthalidone 25 mg daily)  Hyperlipidemia, on atorvastatin 20 mg daily.  Prior to admission was on rosuvastatin 10 mg daily.  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  Baseline creatinine up to 1.2  Cardiac testing  TTE  1/1/24 EF 60%.   No RWMA.  Diastolic function normal.  LAE.  Mild MR.  Mild TR.  RVSP moderately to severely elevated.  Estimated RVSP 58 mmHg.  Mild pulmonic valve regurgitation.  [facility] 1/2/24 severe chronic MVD -- Ostial to Prox LM with a calcified 50% lesion / LAD is a small vessel and moderately diffusely diseased / LCX has a proximal bifurcation stent into the OM1 with mild ISR and a distal stent with no ISR as well as moderate progression of disease between the distal aspects of the LCX stents / RCA has a proximal  with Kugel collaterals to the RPAV and collaterals from the left the RPDA and RPAV  •  LVEDP is normal without gradient on LV-AO pullback  •  RHC: with no evidence of PHTN            HPI  Gael Ferraro is an 84 yo male with known CAD, S/P PCI, HTN, HL, CKD, carotid stenosis who is a former tobacco use, who remains in remission    He lives at home with his wife.  He is independent.  He uses a cane to ambulate.  He follows with Dr. Allen.       Adm 12/31-1/3/24  CC: Stuttering chest pain x 1 week with radiation to  his back right upper arm, and jaw  ECG: Diffuse ST depression, ST elevation in aVR-reviewed by interventional cardiology, consistent with global ischemia  On arrival found to be profoundly anemic with a hemoglobin of 6.9- (was 13.4 1/2023 ,  10.9  7/2023)  FOB negative per ED  Ongoing discussion between emergency medicine and interventional cardiology--> no plan for urgent C  Recommended evaluation of anemia  HS troponin initial 1023- peak  3615  Transfused PRBC x 2;Hemoglobin improved to 9  Echo:  He did undergo left heart catheterization.  No culprit lesion noted.  Chronic, multivessel CAD discovered  Interventional cardiology recommend keeping hemoglobin greater than 9, continuing aspirin, ticagrelor, aspirin, Brilinta ongoing outpatient evaluation  Also underwent right heart catheterization: no evidence of pulmonary hypertension which was suggested by echo  Recommend medical management, only pursuing high risk intervention if symptoms are refractory  Cardiac rehab recommended    1/15/24  Hospital follow-up.  He is accompanied by his wife, and his son  Review of systems: He denies chest pain.  He feels symptoms are greatly improved but is not quite back to himself yet.  He walks slowly, utilizing a cane  Reviewed his hospital course and his cardiac catheterization report  He would like to go back to rosuvastatin 10 mg daily which she was on prior to his hospital course and was tolerating without problem.  We will stop atorvastatin.  I refilled his medications, upon request  He is amenable to nonfasting labs today.  Will check a CBC, BMP  I discussed the benefit of cardiac rehab.  He and his family believe that would be beneficial.  Again the patient is agreeable.  I also discussed that we will be beneficial given that he walks with a cane.  He will return to see his cardiologist in short interval  He is contemplating switching from his prior cardiologist to  [facility]  .      Assessment  Diagnoses and all orders for  this visit:    Hospital discharge follow-up    Coronary artery disease involving native coronary artery of native heart with angina pectoris (HCC)  -     Ambulatory referral to Cardiology  -     Basic metabolic panel; Future    Type 2 MI (myocardial infarction) (HCC)    Macrocytic anemia  -     CBC; Future    Primary hypertension    Stage 3 chronic kidney disease, unspecified whether stage 3a or 3b CKD (HCC)    Other orders  -     LORazepam (ATIVAN) 1 mg tablet; Take 1 mg by mouth 3 (three) times a day as needed for anxiety          History reviewed. No pertinent past medical history.    Review of Systems   Constitutional: Negative for chills.   Cardiovascular:  Negative for chest pain, claudication, cyanosis, dyspnea on exertion, irregular heartbeat, leg swelling, near-syncope, orthopnea, palpitations, paroxysmal nocturnal dyspnea and syncope.   Respiratory:  Negative for cough and shortness of breath.    Gastrointestinal:  Negative for heartburn and nausea.   Neurological:  Negative for dizziness, focal weakness, headaches, light-headedness and weakness.   All other systems reviewed and are negative.      Allergies   Allergen Reactions   • Penicillins Hives     .    Current Outpatient Medications:   •  acetaminophen (TYLENOL) 325 mg tablet, Take 2 tablets (650 mg total) by mouth every 6 (six) hours as needed for mild pain, headaches or fever, Disp: , Rfl:   •  aspirin (ECOTRIN LOW STRENGTH) 81 mg EC tablet, Take 1 tablet (81 mg total) by mouth daily Do not start before January 3, 2024., Disp: 30 tablet, Rfl: 0  •  atorvastatin (LIPITOR) 20 mg tablet, Take 1 tablet (20 mg total) by mouth daily with dinner, Disp: 30 tablet, Rfl: 0  •  cyanocobalamin (VITAMIN B-12) 1000 MCG tablet, Take 1 tablet (1,000 mcg total) by mouth daily, Disp: 30 tablet, Rfl: 0  •  hydrochlorothiazide (HYDRODIURIL) 25 mg tablet, Take 1 tablet (25 mg total) by mouth daily, Disp: 30 tablet, Rfl: 0  •  LORazepam (ATIVAN) 1 mg tablet, Take 1 mg  by mouth 3 (three) times a day as needed for anxiety, Disp: , Rfl:   •  nebivolol (BYSTOLIC) 20 MG tablet, Take 1 tablet (20 mg total) by mouth daily, Disp: 30 tablet, Rfl: 0  •  potassium chloride (MICRO-K) 10 MEQ CR capsule, Take 1 capsule (10 mEq total) by mouth daily, Disp: 30 capsule, Rfl: 0  •  ticagrelor (BRILINTA) 90 MG, Take 1 tablet (90 mg total) by mouth every 12 (twelve) hours, Disp: 60 tablet, Rfl: 0        Social History     Socioeconomic History   • Marital status: /Civil Union     Spouse name: Not on file   • Number of children: Not on file   • Years of education: Not on file   • Highest education level: Not on file   Occupational History   • Not on file   Tobacco Use   • Smoking status: Not on file   • Smokeless tobacco: Not on file   Substance and Sexual Activity   • Alcohol use: Not on file   • Drug use: Not on file   • Sexual activity: Not on file   Other Topics Concern   • Not on file   Social History Narrative   • Not on file     Social Determinants of Health     Financial Resource Strain: Not on file   Food Insecurity: Not on file   Transportation Needs: Not on file   Physical Activity: Not on file   Stress: Not on file   Social Connections: Not on file   Intimate Partner Violence: Not on file   Housing Stability: Not on file       History reviewed. No pertinent family history.    Physical Exam  Vitals and nursing note reviewed.   Constitutional:       General: He is not in acute distress.  HENT:      Head: Normocephalic and atraumatic.   Eyes:      Extraocular Movements: Extraocular movements intact.      Conjunctiva/sclera: Conjunctivae normal.   Cardiovascular:      Rate and Rhythm: Normal rate and regular rhythm.      Pulses: Intact distal pulses.      Heart sounds: Normal heart sounds.   Pulmonary:      Effort: Pulmonary effort is normal.      Breath sounds: Normal breath sounds.   Abdominal:      General: Bowel sounds are normal.      Palpations: Abdomen is soft.  "  Musculoskeletal:         General: Normal range of motion.      Cervical back: Normal range of motion and neck supple.   Skin:     General: Skin is warm and dry.   Neurological:      Mental Status: He is alert and oriented to person, place, and time.   Psychiatric:         Mood and Affect: Mood normal.       Vitals: Height 5' 10\" (1.778 m), weight 63.6 kg (140 lb 3.2 oz).   Wt Readings from Last 3 Encounters:   01/15/24 63.6 kg (140 lb 3.2 oz)   01/02/24 68.9 kg (152 lb)   12/14/17 69.9 kg (154 lb)         Labs & Results:  Lab Results   Component Value Date    WBC 8.04 01/02/2024    HGB 8.8 (L) 01/02/2024    HCT 25.1 (L) 01/02/2024     (H) 01/02/2024     01/02/2024     BNP   Date Value Ref Range Status   12/31/2023 514 (H) 0 - 100 pg/mL Final     No components found for: \"CHEM\"  No results found for: \"CKTOTAL\", \"TROPONINI\", \"TROPONINT\", \"CKMBINDEX\"  No results found for this or any previous visit.    No results found for this or any previous visit.      This note was completed in part utilizing [employer] direct voice recognition software.   Grammatical errors, random word insertion, spelling mistakes, and incomplete sentences may be an occasional consequence of the system secondary to software limitations, ambient noise and hardware issues. At the time of dictation, efforts were made to edit, clarify and /or correct errors.  Please read the chart carefully and recognize, using context, where substitutions have occurred.  If you have any questions or concerns about the context, text or information contained within the body of this dictation, please contact myself, the provider, for further clarification      "

## 2024-01-21 ENCOUNTER — HOSPITAL ENCOUNTER (INPATIENT)
Facility: HOSPITAL | Age: 84
LOS: 3 days | Discharge: HOME | DRG: 811 | End: 2024-01-24
Attending: STUDENT IN AN ORGANIZED HEALTH CARE EDUCATION/TRAINING PROGRAM | Admitting: INTERNAL MEDICINE
Payer: COMMERCIAL

## 2024-01-21 ENCOUNTER — APPOINTMENT (EMERGENCY)
Dept: RADIOLOGY | Facility: HOSPITAL | Age: 84
DRG: 811 | End: 2024-01-21
Payer: COMMERCIAL

## 2024-01-21 DIAGNOSIS — I10 PRIMARY HYPERTENSION: ICD-10-CM

## 2024-01-21 DIAGNOSIS — D53.9 MACROCYTIC ANEMIA: ICD-10-CM

## 2024-01-21 DIAGNOSIS — D64.9 SEVERE ANEMIA: Primary | ICD-10-CM

## 2024-01-21 DIAGNOSIS — I24.9 ACS (ACUTE CORONARY SYNDROME) (HCC): ICD-10-CM

## 2024-01-21 DIAGNOSIS — R07.9 CHEST PAIN: ICD-10-CM

## 2024-01-21 DIAGNOSIS — I25.119 CORONARY ARTERY DISEASE INVOLVING NATIVE CORONARY ARTERY OF NATIVE HEART WITH ANGINA PECTORIS (HCC): Chronic | ICD-10-CM

## 2024-01-21 DIAGNOSIS — R13.19 ESOPHAGEAL DYSPHAGIA: ICD-10-CM

## 2024-01-21 LAB
2HR DELTA HS TROPONIN: 1744 NG/L
4HR DELTA HS TROPONIN: 5721 NG/L
ABO GROUP BLD: NORMAL
ALBUMIN SERPL BCP-MCNC: 4.1 G/DL (ref 3.5–5)
ALP SERPL-CCNC: 80 U/L (ref 34–104)
ALT SERPL W P-5'-P-CCNC: 61 U/L (ref 7–52)
ANION GAP SERPL CALCULATED.3IONS-SCNC: 9 MMOL/L
APTT PPP: 23 SECONDS (ref 23–37)
AST SERPL W P-5'-P-CCNC: 31 U/L (ref 13–39)
BASE EXCESS BLDA CALC-SCNC: 1 MMOL/L (ref -2–3)
BASOPHILS # BLD AUTO: 0.01 THOUSANDS/ÂΜL (ref 0–0.1)
BASOPHILS NFR BLD AUTO: 0 % (ref 0–1)
BILIRUB SERPL-MCNC: 0.54 MG/DL (ref 0.2–1)
BLD GP AB SCN SERPL QL: NEGATIVE
BUN SERPL-MCNC: 25 MG/DL (ref 5–25)
CA-I BLD-SCNC: 1.12 MMOL/L (ref 1.12–1.32)
CALCIUM SERPL-MCNC: 9.4 MG/DL (ref 8.4–10.2)
CARDIAC TROPONIN I PNL SERPL HS: 2161 NG/L
CARDIAC TROPONIN I PNL SERPL HS: 417 NG/L
CARDIAC TROPONIN I PNL SERPL HS: 6138 NG/L
CHLORIDE SERPL-SCNC: 102 MMOL/L (ref 96–108)
CHOLEST SERPL-MCNC: 141 MG/DL
CO2 SERPL-SCNC: 26 MMOL/L (ref 21–32)
CREAT SERPL-MCNC: 1.08 MG/DL (ref 0.6–1.3)
DAT POLY-SP REAG RBC QL: NEGATIVE
EOSINOPHIL # BLD AUTO: 0.13 THOUSAND/ÂΜL (ref 0–0.61)
EOSINOPHIL NFR BLD AUTO: 2 % (ref 0–6)
ERYTHROCYTE [DISTWIDTH] IN BLOOD BY AUTOMATED COUNT: 13.4 % (ref 11.6–15.1)
EST. AVERAGE GLUCOSE BLD GHB EST-MCNC: 169 MG/DL
FERRITIN SERPL-MCNC: 1145 NG/ML (ref 24–336)
GFR SERPL CREATININE-BSD FRML MDRD: 62 ML/MIN/1.73SQ M
GLUCOSE SERPL-MCNC: 136 MG/DL (ref 65–140)
GLUCOSE SERPL-MCNC: 139 MG/DL (ref 65–140)
GLUCOSE SERPL-MCNC: 142 MG/DL (ref 65–140)
HBA1C MFR BLD: 7.5 %
HCO3 BLDA-SCNC: 23.6 MMOL/L (ref 24–30)
HCT VFR BLD AUTO: 19.2 % (ref 36.5–49.3)
HCT VFR BLD CALC: 19 % (ref 36.5–49.3)
HDLC SERPL-MCNC: 34 MG/DL
HGB BLD-MCNC: 6.4 G/DL (ref 12–17)
HGB BLD-MCNC: 8.8 G/DL (ref 12–17)
HGB BLDA-MCNC: 6.5 G/DL (ref 12–17)
IMM GRANULOCYTES # BLD AUTO: 0.02 THOUSAND/UL (ref 0–0.2)
IMM GRANULOCYTES NFR BLD AUTO: 0 % (ref 0–2)
INR PPP: 0.97 (ref 0.84–1.19)
IRON SERPL-MCNC: 284 UG/DL (ref 50–212)
LDH SERPL-CCNC: 211 U/L (ref 140–271)
LDLC SERPL CALC-MCNC: 63 MG/DL (ref 0–100)
LYMPHOCYTES # BLD AUTO: 1.05 THOUSANDS/ÂΜL (ref 0.6–4.47)
LYMPHOCYTES NFR BLD AUTO: 18 % (ref 14–44)
MAGNESIUM SERPL-MCNC: 1.9 MG/DL (ref 1.9–2.7)
MCH RBC QN AUTO: 33.3 PG (ref 26.8–34.3)
MCHC RBC AUTO-ENTMCNC: 33.3 G/DL (ref 31.4–37.4)
MCV RBC AUTO: 100 FL (ref 82–98)
MONOCYTES # BLD AUTO: 0.42 THOUSAND/ÂΜL (ref 0.17–1.22)
MONOCYTES NFR BLD AUTO: 7 % (ref 4–12)
NEUTROPHILS # BLD AUTO: 4.25 THOUSANDS/ÂΜL (ref 1.85–7.62)
NEUTS SEG NFR BLD AUTO: 73 % (ref 43–75)
NONHDLC SERPL-MCNC: 107 MG/DL
NRBC BLD AUTO-RTO: 0 /100 WBCS
PCO2 BLD: 25 MMOL/L (ref 21–32)
PCO2 BLD: 28.8 MM HG (ref 42–50)
PH BLD: 7.52 [PH] (ref 7.3–7.4)
PLATELET # BLD AUTO: 331 THOUSANDS/UL (ref 149–390)
PMV BLD AUTO: 10.8 FL (ref 8.9–12.7)
PO2 BLD: 21 MM HG (ref 35–45)
POTASSIUM BLD-SCNC: 3.9 MMOL/L (ref 3.5–5.3)
POTASSIUM SERPL-SCNC: 3.8 MMOL/L (ref 3.5–5.3)
PROT SERPL-MCNC: 6.8 G/DL (ref 6.4–8.4)
PROTHROMBIN TIME: 13.5 SECONDS (ref 11.6–14.5)
RBC # BLD AUTO: 1.92 MILLION/UL (ref 3.88–5.62)
RETICS # AUTO: ABNORMAL 10*3/UL (ref 14356–105094)
RETICS # CALC: 0.5 % (ref 0.37–1.87)
RH BLD: NEGATIVE
SAO2 % BLD FROM PO2: 44 % (ref 60–85)
SODIUM BLD-SCNC: 138 MMOL/L (ref 136–145)
SODIUM SERPL-SCNC: 137 MMOL/L (ref 135–147)
SPECIMEN EXPIRATION DATE: NORMAL
SPECIMEN SOURCE: ABNORMAL
TIBC SERPL-MCNC: <339 UG/DL (ref 250–450)
TRIGL SERPL-MCNC: 219 MG/DL
TSH SERPL DL<=0.05 MIU/L-ACNC: 1.3 UIU/ML (ref 0.45–4.5)
UIBC SERPL-MCNC: <55 UG/DL (ref 155–355)
VIT B12 SERPL-MCNC: 422 PG/ML (ref 180–914)
WBC # BLD AUTO: 5.88 THOUSAND/UL (ref 4.31–10.16)

## 2024-01-21 PROCEDURE — 84132 ASSAY OF SERUM POTASSIUM: CPT

## 2024-01-21 PROCEDURE — 83540 ASSAY OF IRON: CPT | Performed by: PHYSICIAN ASSISTANT

## 2024-01-21 PROCEDURE — 93005 ELECTROCARDIOGRAM TRACING: CPT

## 2024-01-21 PROCEDURE — 30233N1 TRANSFUSION OF NONAUTOLOGOUS RED BLOOD CELLS INTO PERIPHERAL VEIN, PERCUTANEOUS APPROACH: ICD-10-PCS | Performed by: STUDENT IN AN ORGANIZED HEALTH CARE EDUCATION/TRAINING PROGRAM

## 2024-01-21 PROCEDURE — 82728 ASSAY OF FERRITIN: CPT | Performed by: PHYSICIAN ASSISTANT

## 2024-01-21 PROCEDURE — 85045 AUTOMATED RETICULOCYTE COUNT: CPT | Performed by: PHYSICIAN ASSISTANT

## 2024-01-21 PROCEDURE — 71045 X-RAY EXAM CHEST 1 VIEW: CPT

## 2024-01-21 PROCEDURE — 86901 BLOOD TYPING SEROLOGIC RH(D): CPT

## 2024-01-21 PROCEDURE — 99222 1ST HOSP IP/OBS MODERATE 55: CPT | Performed by: INTERNAL MEDICINE

## 2024-01-21 PROCEDURE — NC001 PR NO CHARGE: Performed by: RADIOLOGY

## 2024-01-21 PROCEDURE — 83036 HEMOGLOBIN GLYCOSYLATED A1C: CPT | Performed by: NURSE PRACTITIONER

## 2024-01-21 PROCEDURE — 80053 COMPREHEN METABOLIC PANEL: CPT

## 2024-01-21 PROCEDURE — 96360 HYDRATION IV INFUSION INIT: CPT

## 2024-01-21 PROCEDURE — P9016 RBC LEUKOCYTES REDUCED: HCPCS

## 2024-01-21 PROCEDURE — 99223 1ST HOSP IP/OBS HIGH 75: CPT | Performed by: INTERNAL MEDICINE

## 2024-01-21 PROCEDURE — 82330 ASSAY OF CALCIUM: CPT

## 2024-01-21 PROCEDURE — 1124F ACP DISCUSS-NO DSCNMKR DOCD: CPT | Performed by: STUDENT IN AN ORGANIZED HEALTH CARE EDUCATION/TRAINING PROGRAM

## 2024-01-21 PROCEDURE — 84443 ASSAY THYROID STIM HORMONE: CPT | Performed by: PHYSICIAN ASSISTANT

## 2024-01-21 PROCEDURE — 85025 COMPLETE CBC W/AUTO DIFF WBC: CPT

## 2024-01-21 PROCEDURE — 85018 HEMOGLOBIN: CPT | Performed by: INTERNAL MEDICINE

## 2024-01-21 PROCEDURE — 83615 LACTATE (LD) (LDH) ENZYME: CPT | Performed by: PHYSICIAN ASSISTANT

## 2024-01-21 PROCEDURE — NC001 PR NO CHARGE: Performed by: INTERNAL MEDICINE

## 2024-01-21 PROCEDURE — 82803 BLOOD GASES ANY COMBINATION: CPT

## 2024-01-21 PROCEDURE — 99291 CRITICAL CARE FIRST HOUR: CPT

## 2024-01-21 PROCEDURE — 83550 IRON BINDING TEST: CPT | Performed by: PHYSICIAN ASSISTANT

## 2024-01-21 PROCEDURE — 82948 REAGENT STRIP/BLOOD GLUCOSE: CPT

## 2024-01-21 PROCEDURE — 86880 COOMBS TEST DIRECT: CPT | Performed by: PHYSICIAN ASSISTANT

## 2024-01-21 PROCEDURE — 84484 ASSAY OF TROPONIN QUANT: CPT

## 2024-01-21 PROCEDURE — 85014 HEMATOCRIT: CPT

## 2024-01-21 PROCEDURE — 36430 TRANSFUSION BLD/BLD COMPNT: CPT

## 2024-01-21 PROCEDURE — 83735 ASSAY OF MAGNESIUM: CPT

## 2024-01-21 PROCEDURE — 80061 LIPID PANEL: CPT

## 2024-01-21 PROCEDURE — 82607 VITAMIN B-12: CPT | Performed by: PHYSICIAN ASSISTANT

## 2024-01-21 PROCEDURE — 85730 THROMBOPLASTIN TIME PARTIAL: CPT

## 2024-01-21 PROCEDURE — 82947 ASSAY GLUCOSE BLOOD QUANT: CPT

## 2024-01-21 PROCEDURE — 83010 ASSAY OF HAPTOGLOBIN QUANT: CPT | Performed by: PHYSICIAN ASSISTANT

## 2024-01-21 PROCEDURE — 86923 COMPATIBILITY TEST ELECTRIC: CPT

## 2024-01-21 PROCEDURE — 85610 PROTHROMBIN TIME: CPT

## 2024-01-21 PROCEDURE — 84295 ASSAY OF SERUM SODIUM: CPT

## 2024-01-21 PROCEDURE — 86850 RBC ANTIBODY SCREEN: CPT

## 2024-01-21 PROCEDURE — 99291 CRITICAL CARE FIRST HOUR: CPT | Performed by: STUDENT IN AN ORGANIZED HEALTH CARE EDUCATION/TRAINING PROGRAM

## 2024-01-21 PROCEDURE — 86900 BLOOD TYPING SEROLOGIC ABO: CPT

## 2024-01-21 PROCEDURE — 36415 COLL VENOUS BLD VENIPUNCTURE: CPT

## 2024-01-21 RX ORDER — SODIUM CHLORIDE 9 MG/ML
3 INJECTION INTRAVENOUS
Status: DISCONTINUED | OUTPATIENT
Start: 2024-01-21 | End: 2024-01-24 | Stop reason: HOSPADM

## 2024-01-21 RX ORDER — LORAZEPAM 1 MG/1
1 TABLET ORAL 2 TIMES DAILY PRN
Status: DISCONTINUED | OUTPATIENT
Start: 2024-01-21 | End: 2024-01-24 | Stop reason: HOSPADM

## 2024-01-21 RX ORDER — PRAVASTATIN SODIUM 80 MG/1
80 TABLET ORAL
Status: DISCONTINUED | OUTPATIENT
Start: 2024-01-21 | End: 2024-01-24 | Stop reason: HOSPADM

## 2024-01-21 RX ORDER — AMLODIPINE BESYLATE 2.5 MG/1
2.5 TABLET ORAL DAILY
Status: DISCONTINUED | OUTPATIENT
Start: 2024-01-21 | End: 2024-01-24 | Stop reason: HOSPADM

## 2024-01-21 RX ORDER — ASPIRIN 81 MG/1
TABLET, CHEWABLE ORAL CODE/TRAUMA/SEDATION MEDICATION
Status: DISCONTINUED | OUTPATIENT
Start: 2024-01-21 | End: 2024-01-21

## 2024-01-21 RX ORDER — NITROGLYCERIN 0.4 MG/1
0.4 TABLET SUBLINGUAL
Status: DISCONTINUED | OUTPATIENT
Start: 2024-01-21 | End: 2024-01-24 | Stop reason: HOSPADM

## 2024-01-21 RX ORDER — SODIUM CHLORIDE 9 MG/ML
75 INJECTION, SOLUTION INTRAVENOUS CONTINUOUS
Status: DISCONTINUED | OUTPATIENT
Start: 2024-01-21 | End: 2024-01-21

## 2024-01-21 RX ORDER — RANOLAZINE 500 MG/1
500 TABLET, EXTENDED RELEASE ORAL EVERY 12 HOURS SCHEDULED
Status: DISCONTINUED | OUTPATIENT
Start: 2024-01-21 | End: 2024-01-24 | Stop reason: HOSPADM

## 2024-01-21 RX ORDER — POTASSIUM CHLORIDE 750 MG/1
10 TABLET, EXTENDED RELEASE ORAL DAILY
Status: DISCONTINUED | OUTPATIENT
Start: 2024-01-21 | End: 2024-01-24 | Stop reason: HOSPADM

## 2024-01-21 RX ORDER — NEBIVOLOL 10 MG/1
20 TABLET ORAL DAILY
Status: DISCONTINUED | OUTPATIENT
Start: 2024-01-21 | End: 2024-01-24 | Stop reason: HOSPADM

## 2024-01-21 RX ORDER — HYDROCHLOROTHIAZIDE 25 MG/1
25 TABLET ORAL DAILY
Status: DISCONTINUED | OUTPATIENT
Start: 2024-01-21 | End: 2024-01-24 | Stop reason: HOSPADM

## 2024-01-21 RX ORDER — MELATONIN
2000 DAILY
Status: DISCONTINUED | OUTPATIENT
Start: 2024-01-21 | End: 2024-01-24 | Stop reason: HOSPADM

## 2024-01-21 RX ORDER — ACETAMINOPHEN 325 MG/1
650 TABLET ORAL EVERY 6 HOURS PRN
Status: DISCONTINUED | OUTPATIENT
Start: 2024-01-21 | End: 2024-01-24 | Stop reason: HOSPADM

## 2024-01-21 RX ORDER — ASPIRIN 81 MG/1
162 TABLET, CHEWABLE ORAL ONCE
Status: DISCONTINUED | OUTPATIENT
Start: 2024-01-21 | End: 2024-01-24 | Stop reason: HOSPADM

## 2024-01-21 RX ORDER — ATORVASTATIN CALCIUM 20 MG/1
1 TABLET, FILM COATED ORAL DAILY
COMMUNITY
Start: 2024-01-02 | End: 2024-01-24

## 2024-01-21 RX ADMIN — TICAGRELOR 90 MG: 90 TABLET ORAL at 21:08

## 2024-01-21 RX ADMIN — NEBIVOLOL 20 MG: 10 TABLET ORAL at 09:20

## 2024-01-21 RX ADMIN — RANOLAZINE 500 MG: 500 TABLET, FILM COATED, EXTENDED RELEASE ORAL at 21:12

## 2024-01-21 RX ADMIN — TICAGRELOR 90 MG: 90 TABLET ORAL at 09:19

## 2024-01-21 RX ADMIN — Medication 2000 UNITS: at 09:19

## 2024-01-21 RX ADMIN — CYANOCOBALAMIN TAB 500 MCG 1000 MCG: 500 TAB at 09:19

## 2024-01-21 RX ADMIN — SODIUM CHLORIDE 75 ML/HR: 0.9 INJECTION, SOLUTION INTRAVENOUS at 02:47

## 2024-01-21 RX ADMIN — PRAVASTATIN SODIUM 80 MG: 80 TABLET ORAL at 18:20

## 2024-01-21 RX ADMIN — RANOLAZINE 500 MG: 500 TABLET, FILM COATED, EXTENDED RELEASE ORAL at 13:06

## 2024-01-21 RX ADMIN — POTASSIUM CHLORIDE 10 MEQ: 750 TABLET, EXTENDED RELEASE ORAL at 09:19

## 2024-01-21 RX ADMIN — AMLODIPINE BESYLATE 2.5 MG: 2.5 TABLET ORAL at 13:07

## 2024-01-21 RX ADMIN — ASPIRIN 81 MG 162 MG: 81 TABLET ORAL at 01:58

## 2024-01-21 RX ADMIN — LORAZEPAM 1 MG: 1 TABLET ORAL at 21:12

## 2024-01-21 RX ADMIN — HYDROCHLOROTHIAZIDE 25 MG: 25 TABLET ORAL at 09:19

## 2024-01-21 RX ADMIN — LORAZEPAM 1 MG: 1 TABLET ORAL at 15:43

## 2024-01-21 NOTE — H&P
Pending sale to [facility]  H&P  Name: Gael Ferraro 83 y.o. male I MRN: [id number]  Unit/Bed#: ED-27 I Date of Admission: 1/21/2024   Date of Service: 1/21/2024 I Hospital Day: 0      Assessment/Plan   * Macrocytic anemia  Assessment & Plan  Presented with CP.  Known multivessel CAD.  Hgb 6.4 on arrival.    Receiving 2 units PRBC  Following with [facility]  B12 292. On PO vitamin B12  SPEP no monoclonal protein  Plans for bone marrow biopsy as outpatient 2/15.  Was told to stop DAPT 2/1.  Transfuse to keep hgb >7    Chest pain with elevated troponin  Assessment & Plan  Presented with CP with radiation to arm and jaw.  Pain improved with oxygen,  mg total.  Likely etiology: acute anemia with underlying multivessel CAD  Initial Troponin: 417  Trend x3 or to peak.  Initial EKG: diffuse ST depression, T wave inversion  Monitor on telemetry.  Pain Control: nitro PRN  Recent lipid panel   LDL 56, non HDL 89  Consult cardiology    Coronary artery disease involving native coronary artery of native heart with angina pectoris (HCC)  Assessment & Plan  Hx remote PCI  Admitted 12/31-1/3  TTE: LVEF 60%, normal wall motion, normal diastolic function, mild MR and mild TR  [facility] 1/2/24  Severe, chronic multivessel CAD.  Ostial LM disease, small LAD with moderate diffuse disease, LCx has 2 patent stents with moderate progression of disease to each stent,  of RCA with collaterals. LVEDP normal.  Continue ASA, statin, bystolic    Primary hypertension  Assessment & Plan  Stable on home regimen  HCTZ 25 mg qd  Bystolic 20 mg qd    Stage 3 chronic kidney disease (HCC)  Assessment & Plan  Creatinine stable and at baseline  Monitor BMP  Avoid hypotension, nephrotoxins           VTE Pharmacologic Prophylaxis: VTE Score: 4 Moderate Risk (Score 3-4) - Pharmacological DVT Prophylaxis Contraindicated. Sequential Compression Devices Ordered.  Code Status: Prior full  Discussion with family: Updated  (wife) at  bedside.    Anticipated Length of Stay: Patient will be admitted on an inpatient basis with an anticipated length of stay of greater than 2 midnights secondary to anemia requiring tranfusion, cp with elevated troponin .    Total Time Spent on Date of Encounter in care of patient:  mins. This time was spent on one or more of the following: performing physical exam; counseling and coordination of care; obtaining or reviewing history; documenting in the medical record; reviewing/ordering tests, medications or procedures; communicating with other healthcare professionals and discussing with patient's family/caregivers.    Chief Complaint: chest pain    History of Present Illness:  Gael Ferraro is a 83 y.o. male with a PMH of multivessel CAD, PCI, HTN, CKD who presents with chest pain that occurred while patient was watching television.  Pain was located in the upper chest with radiation to bilateral jaw and right arm.  Pain improved with aspirin and application of oxygen with EMS.  Denies any nausea, diaphoresis, shortness of breath.  Patient is pain free currently.  EKG with diffuse ST depressions and T wave inversion. Case was reviewed by ED with cards/interventional cards, no STEMI alert.  No heparin drip per cards.  Patient with known anemia.  Hemoglobin 6.4 on arrival.  No evidence of bleeding.      Undergoing outpatient workup with [facility] hematology.  Has bone marrow biopsy set for 2/15 as patient must be on DAPT for at least 30 days prior to stopping.  Reports he was told to stop DAPT 2/1 for biopsy.      Admitted 12/31-1/3 with similar presentation.  Type 2 MI in setting of anemia with underlying multivessel disease.  Cardiac cath showed multi vessel disease.  Managed medically with aspirin, brilenta, plavix, beta blocker.  Received PRBCs.     Going forward, patient may need more frequent lab draws and PRBC with the infusion center until bone marrow biopsy can be completed.      Review of Systems:  Review of  Systems   Constitutional:  Positive for fatigue.   Cardiovascular:  Positive for chest pain (now resolved).   All other systems reviewed and are negative.      Past Medical and Surgical History:   No past medical history on file.    Past Surgical History:   Procedure Laterality Date    CARDIAC CATHETERIZATION Left 1/2/2024    Procedure: Cardiac Left Heart Cath;  Surgeon: Ana Garcia DO;  Location: [facility];  Service: Cardiology    CARDIAC CATHETERIZATION N/A 1/2/2024    Procedure: Cardiac Coronary Angiogram;  Surgeon: Ana Garcia DO;  Location: [facility];  Service: Cardiology    CARDIAC CATHETERIZATION N/A 1/2/2024    Procedure: Cardiac RHC;  Surgeon: Ana Garcia DO;  Location: [facility]AC [facility];  Service: Cardiology       Meds/Allergies:  Prior to Admission medications    Medication Sig Start Date End Date Taking? Authorizing Provider   atorvastatin (LIPITOR) 20 mg tablet Take 1 tablet by mouth daily 1/2/24  Yes Historical Provider, MD   acetaminophen (TYLENOL) 325 mg tablet Take 2 tablets (650 mg total) by mouth every 6 (six) hours as needed for mild pain, headaches or fever 1/2/24   Alize Sylvester MD   aspirin (ECOTRIN LOW STRENGTH) 81 mg EC tablet Take 1 tablet (81 mg total) by mouth daily 1/15/24   BRITTANY Mcwilliams   Cholecalciferol 50 MCG (2000 UT) CAPS Take 1 capsule by mouth in the morning    Historical Provider, MD   cyanocobalamin (VITAMIN B-12) 1000 MCG tablet Take 1 tablet (1,000 mcg total) by mouth daily 1/3/24   Alize Slyvester MD   hydrochlorothiazide (HYDRODIURIL) 25 mg tablet Take 1 tablet (25 mg total) by mouth daily 1/3/24   Alize Sylvester MD   LORazepam (ATIVAN) 1 mg tablet Take 1 mg by mouth 3 (three) times a day as needed for anxiety    Historical Provider, MD   nebivolol (BYSTOLIC) 20 MG tablet Take 1 tablet (20 mg total) by mouth daily 1/3/24   Alize Sylvester MD   potassium chloride (MICRO-K) 10 MEQ CR capsule Take 1  "capsule (10 mEq total) by mouth daily 1/2/24   Alize Sylvester MD   rosuvastatin (CRESTOR) 10 MG tablet Take 1 tablet (10 mg total) by mouth daily 1/15/24   BRITTANY Mcwilliams   ticagrelor (BRILINTA) 90 MG Take 1 tablet (90 mg total) by mouth every 12 (twelve) hours 1/15/24 1/9/25  BRITTANY Mcwilliams     I have reviewed home medications with a medical source (PCP, Pharmacy, other).    Allergies:   Allergies   Allergen Reactions    Hydrocodone-Acetaminophen GI Intolerance    Niacin GI Intolerance    Penicillins Hives       Social History:  Marital Status: /Civil Union   Occupation:   Patient Pre-hospital Living Situation: Home  Patient Pre-hospital Level of Mobility: walks  Patient Pre-hospital Diet Restrictions:   Substance Use History:   Social History     Substance and Sexual Activity   Alcohol Use Not on file     Social History     Tobacco Use   Smoking Status Not on file   Smokeless Tobacco Not on file     Social History     Substance and Sexual Activity   Drug Use Not on file       Family History:  No family history on file.    Physical Exam:     Vitals:   Blood Pressure: 124/58 (01/21/24 0358)  Pulse: 74 (01/21/24 0358)  Temperature: 97.6 °F (36.4 °C) (01/21/24 0358)  Temp Source: Oral (01/21/24 0358)  Respirations: 16 (01/21/24 0358)  Height: 5' 10\" (177.8 cm) (01/21/24 0200)  Weight - Scale: 68 kg (149 lb 14.6 oz) (01/21/24 0155)  SpO2: 96 % (01/21/24 0358)    Physical Exam  Constitutional:       General: He is not in acute distress.     Appearance: Normal appearance. He is not toxic-appearing.   HENT:      Head: Normocephalic and atraumatic.      Right Ear: External ear normal.      Left Ear: External ear normal.      Nose: Nose normal.      Mouth/Throat:      Mouth: Mucous membranes are dry.      Pharynx: Oropharynx is clear.   Eyes:      General: No scleral icterus.     Extraocular Movements: Extraocular movements intact.      Conjunctiva/sclera: Conjunctivae normal.   Cardiovascular:      " Rate and Rhythm: Normal rate and regular rhythm.      Pulses: Normal pulses.      Heart sounds: Normal heart sounds.   Pulmonary:      Effort: Pulmonary effort is normal.      Breath sounds: Normal breath sounds.   Abdominal:      General: Bowel sounds are normal.      Palpations: Abdomen is soft.   Musculoskeletal:         General: Normal range of motion.      Cervical back: Normal range of motion.      Right lower leg: No edema.      Left lower leg: No edema.   Skin:     General: Skin is warm and dry.      Capillary Refill: Capillary refill takes less than 2 seconds.   Neurological:      General: No focal deficit present.      Mental Status: He is alert and oriented to person, place, and time.   Psychiatric:         Mood and Affect: Mood normal.         Behavior: Behavior normal.          Additional Data:     Lab Results:  Results from last 7 days   Lab Units 01/21/24  0200 01/21/24  0150   WBC Thousand/uL  --  5.88   HEMOGLOBIN g/dL  --  6.4*   I STAT HEMOGLOBIN g/dl 6.5*  --    HEMATOCRIT %  --  19.2*   HEMATOCRIT, ISTAT % 19*  --    PLATELETS Thousands/uL  --  331   NEUTROS PCT %  --  73   LYMPHS PCT %  --  18   MONOS PCT %  --  7   EOS PCT %  --  2     Results from last 7 days   Lab Units 01/21/24  0200 01/21/24  0150   SODIUM mmol/L  --  137   POTASSIUM mmol/L  --  3.8   CHLORIDE mmol/L  --  102   CO2 mmol/L  --  26   CO2, I-STAT mmol/L 25  --    BUN mg/dL  --  25   CREATININE mg/dL  --  1.08   ANION GAP mmol/L  --  9   CALCIUM mg/dL  --  9.4   ALBUMIN g/dL  --  4.1   TOTAL BILIRUBIN mg/dL  --  0.54   ALK PHOS U/L  --  80   ALT U/L  --  61*   AST U/L  --  31   GLUCOSE RANDOM mg/dL  --  139     Results from last 7 days   Lab Units 01/21/24  0150   INR  0.97     Results from last 7 days   Lab Units 01/21/24  0154   POC GLUCOSE mg/dl 136               Lines/Drains:  Invasive Devices       Peripheral Intravenous Line  Duration             Peripheral IV 01/21/24 Left Antecubital <1 day    Peripheral IV 01/21/24  Left;Ventral (anterior) Forearm <1 day                        Imaging: Reviewed radiology reports from this admission including: chest xray  XR chest 1 view portable   ED Interpretation by Sid Gagnon DO (01/21 0254)   No acute cardiopulmonary disease          EKG and Other Studies Reviewed on Admission:   EKG: NSR. HR 84.  Diffuse st depressions.    ** Please Note: This note has been constructed using a voice recognition system. **

## 2024-01-21 NOTE — ASSESSMENT & PLAN NOTE
Presented with CP with radiation to arm and jaw.  Pain improved with oxygen,  mg total.  Likely etiology: acute anemia with underlying multivessel CAD  Initial Troponin: 417  Trend x3 or to peak.  Initial EKG: diffuse ST depression, T wave inversion  Monitor on telemetry.  Pain Control: nitro PRN  Recent lipid panel   LDL 56, non HDL 89  Consult cardiology

## 2024-01-21 NOTE — ASSESSMENT & PLAN NOTE
Presented with CP.  Known multivessel CAD.  Hgb 6.4 on arrival.    Receiving 2 units PRBC  Following with [facility]  B12 292. On PO vitamin B12  SPEP no monoclonal protein  Plans for bone marrow biopsy as outpatient 2/15.  Was told to stop DAPT 2/1.  Transfuse to keep hgb >7

## 2024-01-21 NOTE — PROGRESS NOTES
IC follow up.  Noted recurrent drop in hemoglobin and subsequent unstable angina with ST depression. Hgb dropped to 6.4 (second time in less than a month-last was 6.9 early January 2024).  Pt needs to have anemia sorted out before any consideration of revascularization.  This seems to be driving failure of med mgmt decision of CAD.  I would have heme/onc see patient and recommend definitive mgmt. He should not wait until his planned outpt biopsy 2/15 if that is what is needed as he has demonstrated that cannot wait that long.  If his anemia is not treatable then patient will need to consider more palliative management such as weekly CBC, transfusion etc. But clearly not candidate for any revascularization until there is clarity into sorting out anemia diagnosis and management.

## 2024-01-21 NOTE — PROGRESS NOTES
Received  TT and ultimate call via PAC in this patient recently hospitalized with anemia LMCA, and  RCA earlier in month. Review hospitalization notes due to high 6.9 he was medically managed. Returns this am with same ECG and presentation.  Would suggest ultimate transfer to B due to LMCA disease as would need cardiac surgery eval for CABG candidacy.  In meantime, IV Nitro, aspirin and if trip elevation UFH

## 2024-01-21 NOTE — ED ATTENDING ATTESTATION
1/21/2024  I, Chris Carreon DO, saw and evaluated the patient. I have discussed the patient with the resident/non-physician practitioner and agree with the resident's/non-physician practitioner's findings, Plan of Care, and MDM as documented in the resident's/non-physician practitioner's note, except where noted. All available labs and Radiology studies were reviewed.  I was present for key portions of any procedure(s) performed by the resident/non-physician practitioner and I was immediately available to provide assistance.       At this point I agree with the current assessment done in the Emergency Department.  I have conducted an independent evaluation of this patient a history and physical is as follows:    83-year-old male presents to the ED for evaluation of chest pain.  Symptoms started 9:30 PM.  Radiates to arm and jaw.  EMS was called and patient was brought to the ED for evaluation.  Patient had taken 2 baby aspirin's prior to EMS arrival.  EMS provided supplemental oxygen which resolved symptoms.  Patient states that he does get similar symptoms with severe anemia, has required transfusions in the past.  Prehospital EKG as well as initial EKG on arrival reveals ST segment elevation in aVR with diffuse ST segment depressions and T wave inversions concerning for left main coronary artery disease, interpreted by me as STEMI equivalent.  EKG was sent to interventional cardiology and resident discussed with them via PAC.  After cardiology review of EKGs, no STEMI no activation of the Cath Lab.  Patient was given remaining 2 baby aspirin.  Serial EKGs were obtained and morphology found to be stable.  On review of the patient's medical record, EKG does appear to be similar to priors.  Labs show no leukocytosis, severe anemia with hemoglobin of 6.4, no acute electrolyte abnormalities, normal renal function, no acute LFT abnormalities, INR 0.97, initial troponin is elevated at 417.  Informed consent was obtained  for PRBC transfusion.  Resident discussed with general cardiology given concerning EKG morphology.  Plan will be for hospitalization.  Admitting team consulted.  Accepted onto their service for further care and management.  Stable at the time of disposition    ED Course         Critical Care Time  CriticalCare Time    Date/Time: 1/21/2024 3:34 AM    Performed by: Chris Carreon DO  Authorized by: Chris Carreon DO    Critical care provider statement:     Critical care time (minutes):  36    Critical care time was exclusive of:  Separately billable procedures and treating other patients and teaching time    Critical care was necessary to treat or prevent imminent or life-threatening deterioration of the following conditions:  Cardiac failure (possible STEMI, significant anemia requiring PRBC transfusion)    Critical care was time spent personally by me on the following activities:  Obtaining history from patient or surrogate, ordering and performing treatments and interventions, development of treatment plan with patient or surrogate, ordering and review of laboratory studies, discussions with consultants, ordering and review of radiographic studies, re-evaluation of patient's condition, evaluation of patient's response to treatment, review of old charts and examination of patient

## 2024-01-21 NOTE — ASSESSMENT & PLAN NOTE
Hx remote PCI  Admitted 12/31-1/3  TTE: LVEF 60%, normal wall motion, normal diastolic function, mild MR and mild TR  [facility] 1/2/24  Severe, chronic multivessel CAD.  Ostial LM disease, small LAD with moderate diffuse disease, LCx has 2 patent stents with moderate progression of disease to each stent,  of RCA with collaterals. LVEDP normal.  Continue ASA, statin, bystolic

## 2024-01-21 NOTE — CONSULTS
Consultation - Cardiology Team One  Gael Ferraro 83 y.o. male MRN: [id number]  Unit/Bed#: ED-27 Encounter: [id number]    Inpatient consult to Cardiology  Consult performed by: BRITTANY Munoz  Consult ordered by: Chris Carreon DO        Physician Requesting Consult: Cheyenne Prince MD  Reason for Consult / Principal Problem: elevated troponin    Assessment     1. Chest pain with elevated troponin- type 2 MI in setting of acute anemia with underlying MVCAD  Presented with c/o chest pain with radiation to arms and jaw with minimal exertion, relieved ASA and application of supplemental O2 by EMS  HS troponin 417-->2161-->6138  Already on ASA and Brilinta; not started on IV heparin secondary to anemia  ECG - NSR with diffuse ST depressions, ST elevation in aVR  TTE 1/1: LVEF 60% with normal wall motion, normal diastolic function, mild MR, and mild TR  LHC/RHC 1/2/24: Severe, chronic MVCAD-50% ostial LM disease, small LAD with moderate diffuse disease, LCx has 2 patent stents with moderate progression of disease distal to each stent,  of RCA with collaterals.  LVEDP normal.  No evidence of pulmonary hypertension on right heart cath. No intervention performed  Previous recommendation from interventional cardiologist-address anemia, continue GDMT for CAD with dual antiplatelet therapy as able, and cardiac rehab.  Potential PCI if symptoms are refractory to medical management.    Currently chest pain free and has been since arriving in the ED     2. Macrocytic anemia  Hemoglobin 6.4 on admission, s/p 2 units PRBCs  Recently saw hematology at [facility]- note reviewed- will need to hold ASA and Brilinta for 1 week prior to bone marrow biopsy. When their office spoke to cardiologist, they had recommended postponing holding of antiplatelets for 30 days after his recent MI so bone marrow biopsy now scheduled for 2/15.  Heme/onc consulted inpatient     3. MVCAD s/p remote PCI  Remote PCI history per [facility] documentation - 20  years ago  Continued on aspirin, brilinta, atorvastatin, nebivolol  See recent cath results as noted above     4. Hypertension- stable, avg /63  On nebivolol and HCTZ      5.  Hyperlipidemia- , , HDL 42, LDL 56. On rosuvastatin 10 mg daily at home- substituted with pravastatin 80 mg daily     6. Carotid artery stenosis- on ASA and statin     Plan/discussion  Troponin elevation represents type II MI in the setting of significant anemia with known underlying multivessel coronary artery disease including left main disease.  He is asymptomatic in the ED after application of O2 and additional aspirin given by EMS.  He has also been given 2 units of packed red blood cells to treat his anemia.  He is hemodynamically stable with improved ECG changes since he arrived in the ED.  Continue aspirin and Brilinta for now until plan is established with hematology.  Continue statin and nebivolol.    Start Ranexa 500 mg BID and amlodipine 2.5 mg daily  Hematology consulted- per outpatient notes he will need a bone marrow biopsy and aspirin and Brilinta need to be held for 1 week prior to this.  Cardiology had previously recommended that they wait 30 days after his recent MI to hold any antiplatelets. Will discuss holding these medications sooner with attending to help expedite anemia workup.    History of Present Illness   HPI: Gael Ferraro is a 83 y.o. year old male with MVCAD with remote PCI, HTN, HLD, macrocytic anemia, carotid artery stenosis who previously followed with cardiologist Dr. Allen but, following a recent admission, has switched his care to [facility] and saw the CRNP on 1/15/24. He was doing well at that time and was referred to cardiac rehab.     He presented to the ED on 1/21/24 with c/o chest pain radiating to his jaw and right arm similar to recent admission at the end of December. Again anemic with a hemoglobin of 6.4. ECG showed diffuse ST depressions and on call interventional  cardiologist contacted. ST changes improved on subsequent ECGs. Hs troponin up to 6138 thus far. He received 2 units PRBCs. Cardiology consulted for further management of his elevated troponin and chest pain.    Resting in bed at time of my exam. Feels anxious but denies any chest pain or SOB. States he has been symptom free since they put oxygen on him in the ambulance. He notes a few other episodes of chest pain at home over the past few weeks but all were self-limited and brief. This episode began when he got up from watching TV to brush his teeth. The symptoms did not go away so he took additional aspirin and called 911.    EKG reviewed personally: serial ECGs reviewed-NSR with diffuse ST depressions with ST elevation in aVR on initial ECG.  These changes have improved on subsequent ECGs.    Telemetry reviewed personally: NSR    Review of Systems   Constitutional: Positive for decreased appetite and malaise/fatigue. Negative for chills, diaphoresis and fever.   Cardiovascular:  Positive for chest pain. Negative for dyspnea on exertion, leg swelling, orthopnea, palpitations and syncope.   Respiratory:  Negative for cough, shortness of breath, sleep disturbances due to breathing and sputum production.    Gastrointestinal:  Negative for bloating, nausea and vomiting.   Neurological:  Negative for dizziness, light-headedness and weakness.   Psychiatric/Behavioral:  Negative for altered mental status.    All other systems reviewed and are negative.    Historical Information   No past medical history on file.  Past Surgical History:   Procedure Laterality Date    CARDIAC CATHETERIZATION Left 1/2/2024    Procedure: Cardiac Left Heart Cath;  Surgeon: Ana Garcia DO;  [facility];  Service: Cardiology    CARDIAC CATHETERIZATION N/A 1/2/2024    Procedure: Cardiac Coronary Angiogram;  Surgeon: Ana Garcia DO;  [facility];  Service: Cardiology    CARDIAC CATHETERIZATION N/A  "2024    Procedure: Cardiac RHC;  Surgeon: Ana Garcia DO;  [facility];  Service: Cardiology     Social History     Substance and Sexual Activity   Alcohol Use Not on file     Social History     Substance and Sexual Activity   Drug Use Not on file     Social History     Tobacco Use   Smoking Status Not on file   Smokeless Tobacco Not on file     Family History: No family history on file.    Meds/Allergies   all current active meds have been reviewed and current meds:   Current Facility-Administered Medications   Medication Dose Route Frequency    acetaminophen (TYLENOL) tablet 650 mg  650 mg Oral Q6H PRN    [START ON 2024] aspirin (ECOTRIN LOW STRENGTH) EC tablet 81 mg  81 mg Oral Daily    aspirin chewable tablet 162 mg  162 mg Oral Once    cholecalciferol (VITAMIN D3) tablet 2,000 Units  2,000 Units Oral Daily    cyanocobalamin (VITAMIN B-12) tablet 1,000 mcg  1,000 mcg Oral Daily    hydrochlorothiazide (HYDRODIURIL) tablet 25 mg  25 mg Oral Daily    nebivolol (BYSTOLIC) tablet 20 mg  20 mg Oral Daily    nitroglycerin (NITROSTAT) SL tablet 0.4 mg  0.4 mg Sublingual Q5 Min PRN    potassium chloride (K-DUR,KLOR-CON) CR tablet 10 mEq  10 mEq Oral Daily    pravastatin (PRAVACHOL) tablet 80 mg  80 mg Oral Daily With Dinner    sodium chloride (PF) 0.9 % injection 3 mL  3 mL Intravenous Q1H PRN    ticagrelor (BRILINTA) tablet 90 mg  90 mg Oral Q12H CRESENCIO          Allergies   Allergen Reactions    Hydrocodone-Acetaminophen GI Intolerance    Niacin GI Intolerance    Penicillins Hives       Objective   Vitals: Blood pressure 143/67, pulse 83, temperature 98.3 °F (36.8 °C), temperature source Oral, resp. rate 14, height 5' 10\" (1.778 m), weight 68 kg (149 lb 14.6 oz), SpO2 93%., Body mass index is 21.51 kg/m².,     Systolic (24hrs), Av , Min:119 , Max:148     Diastolic (24hrs), Av, Min:55, Max:70        Intake/Output Summary (Last 24 hours) at 2024 0752  Last data filed at " 1/21/2024 0710  Gross per 24 hour   Intake 1730 ml   Output --   Net 1730 ml     Wt Readings from Last 3 Encounters:   01/21/24 68 kg (149 lb 14.6 oz)   01/15/24 63.6 kg (140 lb 3.2 oz)   01/02/24 68.9 kg (152 lb)     Invasive Devices       Peripheral Intravenous Line  Duration             Peripheral IV 01/21/24 Left Antecubital <1 day    Peripheral IV 01/21/24 Left;Ventral (anterior) Forearm <1 day                    Physical Exam  Vitals reviewed.   Constitutional:       General: He is not in acute distress.     Comments: Somewhat frail-appearing elderly gentleman resting comfortably in bed on room air   Neck:      Vascular: No hepatojugular reflux or JVD.   Cardiovascular:      Rate and Rhythm: Normal rate and regular rhythm.      Heart sounds: Normal heart sounds. No murmur heard.     No friction rub. No gallop.   Pulmonary:      Effort: Pulmonary effort is normal. No respiratory distress.      Breath sounds: No rales.      Comments: Room air  Abdominal:      General: Bowel sounds are normal. There is no distension.      Palpations: Abdomen is soft.      Tenderness: There is no abdominal tenderness.   Musculoskeletal:         General: No tenderness. Normal range of motion.      Cervical back: Neck supple.      Right lower leg: No edema.      Left lower leg: No edema.   Skin:     General: Skin is warm and dry.      Findings: No erythema.   Neurological:      Mental Status: He is alert and oriented to person, place, and time.   Psychiatric:         Mood and Affect: Mood normal.         LABORATORY RESULTS:      CBC with diff:   Results from last 7 days   Lab Units 01/21/24  0200 01/21/24  0150 01/15/24  1655   WBC Thousand/uL  --  5.88 5.15   HEMOGLOBIN g/dL  --  6.4* 7.9*   I STAT HEMOGLOBIN g/dl 6.5*  --   --    HEMATOCRIT %  --  19.2* 23.0*   HEMATOCRIT, ISTAT % 19*  --   --    MCV fL  --  100* 100*   PLATELETS Thousands/uL  --  331 460*   RBC Million/uL  --  1.92* 2.31*   MCH pg  --  33.3 34.2   MCHC g/dL  --   "33.3 34.3   RDW %  --  13.4 13.7   MPV fL  --  10.8 10.6   NRBC AUTO /100 WBCs  --  0  --        CMP:  Results from last 7 days   Lab Units 01/21/24  0200 01/21/24  0150 01/15/24  1655   POTASSIUM mmol/L  --  3.8 4.2   CHLORIDE mmol/L  --  102 105   CO2 mmol/L  --  26 25   CO2, I-STAT mmol/L 25  --   --    BUN mg/dL  --  25 25   CREATININE mg/dL  --  1.08 1.06   GLUCOSE, ISTAT mg/dl 142*  --   --    CALCIUM mg/dL  --  9.4 9.7   AST U/L  --  31  --    ALT U/L  --  61*  --    ALK PHOS U/L  --  80  --    EGFR ml/min/1.73sq m  --  62 63       BMP:  Results from last 7 days   Lab Units 01/21/24  0200 01/21/24  0150 01/15/24  1655   POTASSIUM mmol/L  --  3.8 4.2   CHLORIDE mmol/L  --  102 105   CO2 mmol/L  --  26 25   CO2, I-STAT mmol/L 25  --   --    BUN mg/dL  --  25 25   CREATININE mg/dL  --  1.08 1.06   GLUCOSE, ISTAT mg/dl 142*  --   --    CALCIUM mg/dL  --  9.4 9.7       Lab Results   Component Value Date    CREATININE 1.08 01/21/2024    CREATININE 1.06 01/15/2024    CREATININE 1.16 01/03/2024       No results found for: \"NTBNP\"       Results from last 7 days   Lab Units 01/21/24  0150   MAGNESIUM mg/dL 1.9                     Results from last 7 days   Lab Units 01/21/24  0150   INR  0.97     Lipid Profile:   No results found for: \"CHOL\"  Lab Results   Component Value Date    HDL 34 (L) 01/21/2024    HDL 42 01/02/2024     Lab Results   Component Value Date    LDLCALC 63 01/21/2024    LDLCALC 56 01/02/2024     Lab Results   Component Value Date    TRIG 219 (H) 01/21/2024    TRIG 166 (H) 01/02/2024       Cardiac testing:   No results found for this or any previous visit.    No results found for this or any previous visit.    No valid procedures specified.  No results found for this or any previous visit.      Imaging: I have personally reviewed pertinent reports.   and I have personally reviewed pertinent films in PACS  Cardiac catheterization    Result Date: 1/2/2024  Narrative:   [facility]: with severe chronic MVD -- " Ostial to Prox LM with a calcified 50% lesion / LAD is a small vessel and moderately diffusely diseased / LCX has a proximal bifurcation stent into the OM1 with mild ISR and a distal stent with no ISR as well as moderate progression of disease between the distal aspects of the LCX stents / RCA has a proximal  with Kugel collaterals to the RPAV and collaterals from the left the RPDA and RPAV   LVEDP is normal without gradient on LV-AO pullback   RHC: with no evidence of PHTN     Echo complete w/ contrast if indicated    Result Date: 1/1/2024  Narrative:   Left Ventricle: Left ventricular cavity size is normal. Wall thickness is normal. The left ventricular ejection fraction is 60%. Systolic function is normal. Wall motion is normal. Diastolic function is normal.   Left Atrium: The atrium is dilated (42-48 mL/m2).   Mitral Valve: There is mild regurgitation.   Tricuspid Valve: There is mild regurgitation. The right ventricular systolic pressure is moderately to severely elevated. The estimated right ventricular systolic pressure is 58.00 mmHg.   Pulmonic Valve: There is mild regurgitation.     Thank you for allowing us to participate in this patient's care. This pt will follow up with          once discharged.     Counseling / Coordination of Care  Total floor / unit time spent today 45 minutes.  Greater than 50% of total time was spent with the patient and / or family counseling and / or coordination of care.  A description of the counseling / coordination of care: Review of history, current assessment, development of a plan.    Code Status: Level 1 - Full Code    ** Please Note: Dragon 360 Dictation voice to text software may have been used in the creation of this document. **

## 2024-01-21 NOTE — CONSULTS
e-Consult (IPC)  - Interventional Radiology  Gael Ferraro 83 y.o. male MRN: [id number]  Unit/Bed#: S -01 Encounter: [id number]    Interventional Radiology has been consulted to evaluate Gael Ferraro    We were consulted by Dr. Phan concerning this patient with macrocytic anemia.    Inpatient Consult to IR  Consult performed by: Red Wade MD  Consult ordered by: Radha Duran PA-C        01/21/24    Assessment/Recommendation:   Macrocytic anemia in need of a bone marrow biopsy.  IR to perform as IP.    11-20 minutes, >50% of the total time devoted to medical consultative verbal/EMR discussion between providers. Written report will be generated in the EMR.     Thank you for allowing Interventional Radiology to participate in the care of Gael Ferraro. Please don't hesitate to call or TigerText us with any questions.     Red Wade MD

## 2024-01-21 NOTE — CONSULTS
Medical Oncology/Hematology Consult Note  Gael Ferraro, male, 83 y.o., 1940,  ED-27/ED-27, [id number]     Assessment and Plan  1. Macrocytic anemia    - work-up not yet complete; needs retic, TSH, haptoglobin, Meche, and LDH. Orders placed    - will repeat B12; has been on oral for about 2 months; if not improving, start 1000 mcg SQ   - will repeat iron studies due to continued drops in hemoglobin, on ASA and Brilinta; MCV can be elevated in acute blood loss anemia due to increase in retic with this process; though iron panel will be also effected from recent blood transfusions.    - patient notes weight loss and intermittent difficulty swallowing for  approximately 3 months; worsening GERD/indigestion symptoms reported as well.  Previous records of a swallow study at [facility] in 2021 shows a hiatal hernia and Schatzki ring. Could have intermittent blood loss in the setting of hiatal hernia. Consult to GI.    - bone marrow biopsy is planned at [facility] as an outpatient but not until 2/15/24 due to the need to hold Brilinta and aspirin; to my knowledge IR here does not always require holding this for bone marrow biopsy. Will consult IR to see if he can have completed here due to need for cardiac intervention.     Hematology will continue to follow. Patient to be seen again while inpatient.     Please tigertext on call with any concerns.     Reason for consultation: anemia     History of present illness:   83-year-old male presented to the emergency department via EMS secondary to have sudden onset severe chest pain radiating to right arm while watching television.  He has been having episodes of this every few weeks.    Patient was most recently admitted here 12/31 - 1/3/2024 due to type II MI in the setting of underlying MV CAD and worsening anemia.    Patient is following with [facility] hematology group regarding his workup for macrocytic anemia. Flowsheets of CBC-Ds attached below.      11/28/2023  Iron studies: Iron was 254, saturation 58%, TIBC 437.  Ferritin is not documented.    Vitamin B12 292; placed on oral B12.    SPEP negative. Sed rate 1.     12/31/23   Folate 12.8    Review of Systems:   Review of Systems   Constitutional:  Positive for appetite change and fatigue.   HENT:  Positive for trouble swallowing.    Respiratory:  Positive for shortness of breath (on exertion). Negative for cough.    Cardiovascular:  Negative for chest pain and leg swelling.   Gastrointestinal:  Negative for abdominal pain, constipation, diarrhea, nausea and vomiting.   Genitourinary:  Negative for difficulty urinating, dysuria and hematuria.   Musculoskeletal:  Negative for arthralgias and myalgias.   Skin: Negative.    Neurological:  Positive for weakness. Negative for dizziness, light-headedness and headaches.   Hematological: Negative.    Psychiatric/Behavioral: Negative.       No past medical history on file.    Past Surgical History:   Procedure Laterality Date    CARDIAC CATHETERIZATION Left 1/2/2024    Procedure: Cardiac Left Heart Cath;  Surgeon: Ana Garcia DO;  [facility];  Service: Cardiology    CARDIAC CATHETERIZATION N/A 1/2/2024    Procedure: Cardiac Coronary Angiogram;  Surgeon: Ana Garcia DO;  [facility];  Service: Cardiology    CARDIAC CATHETERIZATION N/A 1/2/2024    Procedure: Cardiac RHC;  Surgeon: Ana Garcia DO;  [facility];  Service: Cardiology       No family history on file.    Social History     Socioeconomic History    Marital status: /Civil Union     Spouse name: Not on file    Number of children: Not on file    Years of education: Not on file    Highest education level: Not on file   Occupational History    Not on file   Tobacco Use    Smoking status: Not on file    Smokeless tobacco: Not on file   Substance and Sexual Activity    Alcohol use: Not on file    Drug use: Not on file    Sexual  activity: Not on file   Other Topics Concern    Not on file   Social History Narrative    Not on file     Social Determinants of Health     Financial Resource Strain: Not on file   Food Insecurity: Not on file   Transportation Needs: Not on file   Physical Activity: Not on file   Stress: Not on file   Social Connections: Not on file   Intimate Partner Violence: Not on file   Housing Stability: Not on file         Current Facility-Administered Medications:     acetaminophen (TYLENOL) tablet 650 mg, 650 mg, Oral, Q6H PRN, BRITTANY Reilly    [START ON 1/22/2024] aspirin (ECOTRIN LOW STRENGTH) EC tablet 81 mg, 81 mg, Oral, Daily, BRITTANY Reilly    aspirin chewable tablet 162 mg, 162 mg, Oral, Once, Sid Gagnon DO    cholecalciferol (VITAMIN D3) tablet 2,000 Units, 2,000 Units, Oral, Daily, BRITTANY Reilly, 2,000 Units at 01/21/24 0919    cyanocobalamin (VITAMIN B-12) tablet 1,000 mcg, 1,000 mcg, Oral, Daily, BRITTANY Reilly, 1,000 mcg at 01/21/24 0919    hydrochlorothiazide (HYDRODIURIL) tablet 25 mg, 25 mg, Oral, Daily, BRITTANY Reilly, 25 mg at 01/21/24 0919    nebivolol (BYSTOLIC) tablet 20 mg, 20 mg, Oral, Daily, BRITTANY Reilly, 20 mg at 01/21/24 0920    nitroglycerin (NITROSTAT) SL tablet 0.4 mg, 0.4 mg, Sublingual, Q5 Min PRN, BRITTANY Reilly    potassium chloride (K-DUR,KLOR-CON) CR tablet 10 mEq, 10 mEq, Oral, Daily, BRITTANY Reilly, 10 mEq at 01/21/24 0919    pravastatin (PRAVACHOL) tablet 80 mg, 80 mg, Oral, Daily With Dinner, BRITTANY Reilly    Insert peripheral IV, , , Once **AND** sodium chloride (PF) 0.9 % injection 3 mL, 3 mL, Intravenous, Q1H PRN, Sid Gagnon DO    ticagrelor (BRILINTA) tablet 90 mg, 90 mg, Oral, Q12H CRESENCIO, BRITTANY Reilly, 90 mg at 01/21/24 0919    Current Outpatient Medications:     acetaminophen (TYLENOL) 325 mg tablet, Take 2 tablets (650 mg total) by mouth every 6 (six) hours as needed for mild pain, headaches or fever, Disp: , Rfl:     aspirin  "(ECOTRIN LOW STRENGTH) 81 mg EC tablet, Take 1 tablet (81 mg total) by mouth daily, Disp: , Rfl:     atorvastatin (LIPITOR) 20 mg tablet, Take 1 tablet by mouth daily (Patient not taking: Reported on 1/21/2024), Disp: , Rfl:     Cholecalciferol 50 MCG (2000 UT) CAPS, Take 1 capsule by mouth in the morning, Disp: , Rfl:     cyanocobalamin (VITAMIN B-12) 1000 MCG tablet, Take 1 tablet (1,000 mcg total) by mouth daily, Disp: 30 tablet, Rfl: 0    hydrochlorothiazide (HYDRODIURIL) 25 mg tablet, Take 1 tablet (25 mg total) by mouth daily, Disp: 30 tablet, Rfl: 0    LORazepam (ATIVAN) 1 mg tablet, Take 1 mg by mouth 3 (three) times a day as needed for anxiety, Disp: , Rfl:     nebivolol (BYSTOLIC) 20 MG tablet, Take 1 tablet (20 mg total) by mouth daily, Disp: 30 tablet, Rfl: 0    potassium chloride (MICRO-K) 10 MEQ CR capsule, Take 1 capsule (10 mEq total) by mouth daily, Disp: 30 capsule, Rfl: 0    rosuvastatin (CRESTOR) 10 MG tablet, Take 1 tablet (10 mg total) by mouth daily, Disp: , Rfl:     ticagrelor (BRILINTA) 90 MG, Take 1 tablet (90 mg total) by mouth every 12 (twelve) hours, Disp: 180 tablet, Rfl: 3    (Not in a hospital admission)      Allergies   Allergen Reactions    Hydrocodone-Acetaminophen GI Intolerance    Niacin GI Intolerance    Penicillins Hives     Physical Exam:  /73   Pulse 70   Temp 98.6 °F (37 °C) (Oral)   Resp 18   Ht 5' 10\" (1.778 m)   Wt 68 kg (149 lb 14.6 oz)   SpO2 96%   BMI 21.51 kg/m²     Physical Exam  Constitutional:       General: He is not in acute distress.     Appearance: He is well-developed. He is not ill-appearing.   HENT:      Head: Normocephalic and atraumatic.   Eyes:      General: No scleral icterus.     Conjunctiva/sclera: Conjunctivae normal.   Cardiovascular:      Rate and Rhythm: Normal rate and regular rhythm.      Heart sounds: Normal heart sounds. No murmur heard.  Pulmonary:      Effort: Pulmonary effort is normal. No respiratory distress.      Breath " sounds: Normal breath sounds.   Abdominal:      General: Bowel sounds are normal.      Palpations: Abdomen is soft.      Tenderness: There is no abdominal tenderness. There is no guarding or rebound.   Musculoskeletal:         General: No tenderness. Normal range of motion.      Cervical back: Normal range of motion and neck supple.      Right lower leg: No edema.      Left lower leg: No edema.   Lymphadenopathy:      Cervical: No cervical adenopathy.   Skin:     General: Skin is warm and dry.   Neurological:      Mental Status: He is alert and oriented to person, place, and time.      Cranial Nerves: No cranial nerve deficit.   Psychiatric:         Mood and Affect: Mood normal.         Behavior: Behavior normal.         Labs:  =        Labs and pertinent reports reviewed.      This note has been generated by voice recognition software system.  Therefore, there may be spelling, grammar, and or syntax errors. Please contact if questions arise.    I have spent a total time of 60 minutes on 01/21/24 in caring for this patient including Diagnostic results, Instructions for management, Patient and family education, Impressions, Documenting in the medical record, Reviewing / ordering tests, medicine, procedures  , and Obtaining or reviewing history  .     Patient was seen with daughter Gem and grandson Sam.

## 2024-01-21 NOTE — ED PROVIDER NOTES
History  Chief Complaint   Patient presents with    Chest Pain     Arrives via EMS from home for report of chest tightness starting while watching TV between [id number] 1/20/23. Hx cardiac cath (last 3wks prior and found to have no blockages). Patient reports ongoing issue with anemia - states chest pain radiating to jaw and arm usually accompany a drop of Hgb. STEMI activated upon arrival, but canceled after consult with Cardiology.     STEMI Alert     83-year-old male with significant cardiac history including MI, with recent repeat catheterization, presents via EMS after having sudden onset severe chest pain which radiated into his right arm while watching TV.  Patient states this pain occurs every few weeks, but this is worse than it usually been.  He also notes a history of severe anemia, for which she is being worked up by hematology with a bone marrow biopsy in the near future.  He states he sometimes gets the symptoms when his hemoglobin drops.  He also endorsed increasing fatigue.  Denies any other symptoms at this time.  Patient took 2 baby aspirin at home, and EMS applied nonrebreather oxygen, for which patient endorses resolved his symptoms completely upon arrival to the ED.        Prior to Admission Medications   Prescriptions Last Dose Informant Patient Reported? Taking?   Cholecalciferol 50 MCG (2000 UT) CAPS   Yes No   Sig: Take 1 capsule by mouth in the morning   LORazepam (ATIVAN) 1 mg tablet  Self Yes No   Sig: Take 1 mg by mouth 3 (three) times a day as needed for anxiety   acetaminophen (TYLENOL) 325 mg tablet  Self No No   Sig: Take 2 tablets (650 mg total) by mouth every 6 (six) hours as needed for mild pain, headaches or fever   aspirin (ECOTRIN LOW STRENGTH) 81 mg EC tablet   No No   Sig: Take 1 tablet (81 mg total) by mouth daily   atorvastatin (LIPITOR) 20 mg tablet   Yes Yes   Sig: Take 1 tablet by mouth daily   cyanocobalamin (VITAMIN B-12) 1000 MCG tablet  Self No No   Sig: Take 1 tablet  (1,000 mcg total) by mouth daily   hydrochlorothiazide (HYDRODIURIL) 25 mg tablet  Self No No   Sig: Take 1 tablet (25 mg total) by mouth daily   nebivolol (BYSTOLIC) 20 MG tablet  Self No No   Sig: Take 1 tablet (20 mg total) by mouth daily   potassium chloride (MICRO-K) 10 MEQ CR capsule  Self No No   Sig: Take 1 capsule (10 mEq total) by mouth daily   rosuvastatin (CRESTOR) 10 MG tablet   No No   Sig: Take 1 tablet (10 mg total) by mouth daily   ticagrelor (BRILINTA) 90 MG   No No   Sig: Take 1 tablet (90 mg total) by mouth every 12 (twelve) hours      Facility-Administered Medications: None       No past medical history on file.    Past Surgical History:   Procedure Laterality Date    CARDIAC CATHETERIZATION Left 1/2/2024    Procedure: Cardiac Left Heart Cath;  Surgeon: Ana Garcia DO;  Location: [facility];  Service: Cardiology    CARDIAC CATHETERIZATION N/A 1/2/2024    Procedure: Cardiac Coronary Angiogram;  Surgeon: Ana Garcia DO;  [facility]: [facility];  Service: Cardiology    CARDIAC CATHETERIZATION N/A 1/2/2024    Procedure: Cardiac RHC;  Surgeon: Ana Garcia DO;  [facility]: [facility];  Service: Cardiology       No family history on file.  I have reviewed and agree with the history as documented.    E-Cigarette/Vaping     E-Cigarette/Vaping Substances           Review of Systems   Constitutional:  Positive for fatigue. Negative for chills and fever.   HENT:  Negative for hearing loss and rhinorrhea.    Eyes:  Negative for visual disturbance.   Respiratory:  Negative for cough and shortness of breath.    Cardiovascular:  Positive for chest pain (Radiating to jaw and right arm).   Gastrointestinal:  Negative for abdominal pain, constipation, diarrhea, nausea and vomiting.   Genitourinary:  Negative for dysuria and hematuria.   Musculoskeletal:  Negative for back pain.   Skin:  Negative for color change and rash.   Neurological:  Positive for light-headedness.  Negative for weakness and numbness.   Psychiatric/Behavioral:  Negative for agitation.    All other systems reviewed and are negative.      Physical Exam  ED Triage Vitals   Temperature Pulse Respirations Blood Pressure SpO2   01/21/24 0155 01/21/24 0155 01/21/24 0155 01/21/24 0155 01/21/24 0155   97.9 °F (36.6 °C) 82 20 135/69 99 %      Temp Source Heart Rate Source Patient Position - Orthostatic VS BP Location FiO2 (%)   01/21/24 0155 01/21/24 0155 01/21/24 0155 01/21/24 0200 --   Oral Monitor Lying Right arm       Pain Score       01/21/24 0155       No Pain             Orthostatic Vital Signs  Vitals:    01/21/24 0215 01/21/24 0230 01/21/24 0245 01/21/24 0300   BP: 145/64 126/57 122/61 122/55   Pulse: 77 75 75 72   Patient Position - Orthostatic VS: Sitting Sitting Sitting        Physical Exam  Vitals and nursing note reviewed.   Constitutional:       General: He is not in acute distress.     Appearance: He is well-developed. He is ill-appearing.   HENT:      Head: Normocephalic and atraumatic.      Right Ear: External ear normal.      Left Ear: External ear normal.      Nose: Nose normal.      Mouth/Throat:      Mouth: Mucous membranes are moist.   Eyes:      Extraocular Movements: Extraocular movements intact.   Cardiovascular:      Rate and Rhythm: Normal rate and regular rhythm.      Pulses: Normal pulses.           Radial pulses are 2+ on the right side and 2+ on the left side.      Heart sounds: Normal heart sounds. No murmur heard.  Pulmonary:      Effort: Pulmonary effort is normal. No respiratory distress.      Breath sounds: Normal breath sounds.   Chest:      Chest wall: No mass or tenderness.   Abdominal:      Palpations: Abdomen is soft.      Tenderness: There is no abdominal tenderness.   Musculoskeletal:         General: Normal range of motion.      Cervical back: Neck supple.   Skin:     General: Skin is warm and dry.      Coloration: Skin is pale.   Neurological:      General: No focal deficit  present.      Mental Status: He is alert.   Psychiatric:         Mood and Affect: Mood normal.         ED Medications  Medications   aspirin chewable tablet 162 mg (162 mg Oral Not Given 1/21/24 0200)   sodium chloride (PF) 0.9 % injection 3 mL (has no administration in time range)   sodium chloride 0.9 % infusion (75 mL/hr Intravenous New Bag 1/21/24 0247)       Diagnostic Studies  Results Reviewed       Procedure Component Value Units Date/Time    CBC and differential [[id number]]  (Abnormal) Collected: 01/21/24 0150    Lab Status: Final result Specimen: Blood from Arm, Left Updated: 01/21/24 0304     WBC 5.88 Thousand/uL      RBC 1.92 Million/uL      Hemoglobin 6.4 g/dL      Hematocrit 19.2 %       fL      MCH 33.3 pg      MCHC 33.3 g/dL      RDW 13.4 %      MPV 10.8 fL      Platelets 331 Thousands/uL      nRBC 0 /100 WBCs      Neutrophils Relative 73 %      Immat GRANS % 0 %      Lymphocytes Relative 18 %      Monocytes Relative 7 %      Eosinophils Relative 2 %      Basophils Relative 0 %      Neutrophils Absolute 4.25 Thousands/µL      Immature Grans Absolute 0.02 Thousand/uL      Lymphocytes Absolute 1.05 Thousands/µL      Monocytes Absolute 0.42 Thousand/µL      Eosinophils Absolute 0.13 Thousand/µL      Basophils Absolute 0.01 Thousands/µL     Narrative:      This is an appended report.  These results have been appended to a previously verified report.    HS Troponin 0hr (reflex protocol) [[id number]]  (Abnormal) Collected: 01/21/24 0150    Lab Status: Final result Specimen: Blood from Arm, Left Updated: 01/21/24 0301     hs TnI 0hr 417 ng/L     HS Troponin I 2hr [[id number]]     Lab Status: No result Specimen: Blood     Lipid panel [[id number]]  (Abnormal) Collected: 01/21/24 0150    Lab Status: Final result Specimen: Blood from Arm, Left Updated: 01/21/24 0252     Cholesterol 141 mg/dL      Triglycerides 219 mg/dL      HDL, Direct 34 mg/dL      LDL Calculated 63 mg/dL      Non-HDL-Chol (CHOL-HDL) 107  mg/dl     Magnesium [[id number]]  (Normal) Collected: 01/21/24 0150    Lab Status: Final result Specimen: Blood from Arm, Left Updated: 01/21/24 0252     Magnesium 1.9 mg/dL     Comprehensive metabolic panel [[id number]]  (Abnormal) Collected: 01/21/24 0150    Lab Status: Final result Specimen: Blood from Arm, Left Updated: 01/21/24 0252     Sodium 137 mmol/L      Potassium 3.8 mmol/L      Chloride 102 mmol/L      CO2 26 mmol/L      ANION GAP 9 mmol/L      BUN 25 mg/dL      Creatinine 1.08 mg/dL      Glucose 139 mg/dL      Calcium 9.4 mg/dL      AST 31 U/L      ALT 61 U/L      Alkaline Phosphatase 80 U/L      Total Protein 6.8 g/dL      Albumin 4.1 g/dL      Total Bilirubin 0.54 mg/dL      eGFR 62 ml/min/1.73sq m     Narrative:      National Kid[employer] guidelines for Chronic Kidney Disease (CKD):     Stage 1 with normal or high GFR (GFR > 90 mL/min/1.73 square meters)    Stage 2 Mild CKD (GFR = 60-89 mL/min/1.73 square meters)    Stage 3A Moderate CKD (GFR = 45-59 mL/min/1.73 square meters)    Stage 3B Moderate CKD (GFR = 30-44 mL/min/1.73 square meters)    Stage 4 Severe CKD (GFR = 15-29 mL/min/1.73 square meters)    Stage 5 End Stage CKD (GFR <15 mL/min/1.73 square meters)  Note: GFR calculation is accurate only with a steady state creatinine    Protime-INR [[id number]]  (Normal) Collected: 01/21/24 0150    Lab Status: Final result Specimen: Blood from Arm, Left Updated: 01/21/24 0248     Protime 13.5 seconds      INR 0.97    APTT [[id number]]  (Normal) Collected: 01/21/24 0150    Lab Status: Final result Specimen: Blood from Arm, Left Updated: 01/21/24 0248     PTT 23 seconds     POCT Blood Gas (CG8+) [[id number]]  (Abnormal) Collected: 01/21/24 0200    Lab Status: Final result Specimen: Venous Updated: 01/21/24 0203     ph, Royal ISTAT 7.522     pCO2, Royal i-STAT 28.8 mm HG      pO2, Royal i-STAT 21.0 mm HG      BE, i-STAT 1 mmol/L      HCO3, Royal i-STAT 23.6 mmol/L      CO2, i-STAT 25 mmol/L      O2 Sat,  i-STAT 44 %      SODIUM, I-STAT 138 mmol/l      Potassium, i-STAT 3.9 mmol/L      Calcium, Ionized i-STAT 1.12 mmol/L      Hct, i-STAT 19 %      Hgb, i-STAT 6.5 g/dl      Glucose, i-STAT 142 mg/dl      Specimen Type VENOUS    Fingerstick Glucose (POCT) [[id number]]  (Normal) Collected: 01/21/24 0154    Lab Status: Final result Updated: 01/21/24 0154     POC Glucose 136 mg/dl                    XR chest 1 view portable   ED Interpretation by Sid Gagnon DO (01/21 0254)   No acute cardiopulmonary disease            Procedures  ECG 12 Lead Documentation Only    Date/Time: 1/21/2024 1:51 AM    Performed by: Sid Gagnon DO  Authorized by: Sid Gagnon DO    Indications / Diagnosis:  CP  Patient location:  ED  Previous ECG:     Previous ECG:  Compared to current    Similarity:  No change  Interpretation:     Interpretation: abnormal    Rate:     ECG rate:  84    ECG rate assessment: normal    Rhythm:     Rhythm: sinus rhythm    Ectopy:     Ectopy: none    QRS:     QRS axis:  Normal    QRS intervals:  Normal  Conduction:     Conduction: normal    ST segments:     ST segments:  Abnormal    Elevation:  AVR    Depression:  II, III, aVF, V3, V4 and V5  T waves:     T waves: normal    ECG 12 Lead Documentation Only    Date/Time: 1/21/2024 2:31 AM    Performed by: Sid Gagnon DO  Authorized by: Sid Gagnon DO    Indications / Diagnosis:  CP  Patient location:  ED  Previous ECG:     Previous ECG:  Compared to current    Similarity:  No change  Rate:     ECG rate:  75    ECG rate assessment: normal    Rhythm:     Rhythm: sinus rhythm    ST segments:     ST segments:  Abnormal    Depression:  II, III, aVF and V5  Comments:      No change from previous EKG performed at 1:51 AM        ED Course  ED Course as of 01/21/24 0332   Sun Jan 21, 2024   0150 Pre-hospital STEMI alert was called   0232 Discussed the patient with interventional cardiology on the phone, Dr. Khalil,  he stated there were no ST elevations and to treat this as we do with other ACS.  Patient was given 162 aspirin to finish off the full dose since he took 2 81 mg tablets at home.   0234 Due to patient's history of severe anemia with possibly needing blood transfusions, heparin will not be started at this time.  Once lab work returns, will discuss patient with cardiologist for further management.   0252 Comprehensive metabolic panel(!)  Wnl, no signs of endorgan damage   0252 Hemoglobin, Istat(!!): 6.5  Will consent for blood and transfuse if CBC coincides   0302 Hemoglobin(!!): 6.4  Patient consented for blood transfusion, will order now.   0302 hs TnI 0hr(!): 417                             SBIRT 22yo+      Flowsheet Row Most Recent Value   Initial Alcohol Screen: US AUDIT-C     1. How often do you have a drink containing alcohol? 0 Filed at: 01/21/2024 0205   2. How many drinks containing alcohol do you have on a typical day you are drinking?  0 Filed at: 01/21/2024 0205   3a. Male UNDER 65: How often do you have five or more drinks on one occasion? 0 Filed at: 01/21/2024 0205   3b. FEMALE Any Age, or MALE 65+: How often do you have 4 or more drinks on one occassion? 0 Filed at: 01/21/2024 0205   Audit-C Score 0 Filed at: 01/21/2024 0205   RO[place]: How many times in the past year have you...    Used an illegal drug or used a prescription medication for non-medical reasons? Never Filed at: 01/21/2024 0205                  Medical Decision Making  83-year-old male presented via EMS due to sudden onset crushing chest pain that radiated into his jaw and right arm.  Patient has extensive cardiac history with recent catheterization to assess previous stents.  Patient has also been dealing with severe anemia over the last few months and is being worked up by hematology for presenting for severe anemia.  On arrival to the ED, patient endorsed complete resolution of symptoms after oxygen was applied by EMS.  He took 2 baby  aspirin's at home, he was given the other 2 to complete the full dose here in the ED.  STEMI alert was called prehospital, and after discussion with interventional cardiology they recommended treating this as regular ACS.  Serial EKGs showed mild improvement in depressions and elevation in aVR.  Patient's EKG did look similar to December 2023.  Due to patient's severe anemia, found to be 6.4 hemoglobin here, patient will not be given heparin per regular cardiologist's recommendation.  Patient was started on 2 units packed red blood cells here in the ER.  Due to patient's need for cardiac monitoring and infusions with further specialist evaluation as to reason for anemia, patient will be admitted [facility] with telemetry.  Patient admitted in stable condition.    Amount and/or Complexity of Data Reviewed  Labs: ordered. Decision-making details documented in ED Course.  Radiology: ordered and independent interpretation performed.    Risk  OTC drugs.  Prescription drug management.  Decision regarding hospitalization.          Disposition  Final diagnoses:   Chest pain   Severe anemia   ACS (acute coronary syndrome) (HCC)     Time reflects when diagnosis was documented in both MDM as applicable and the Disposition within this note       Time User Action Codes Description Comment    1/21/2024  2:18 AM Sid Gagnon Add [R07.9] Chest pain     1/21/2024  3:28 AM Sid Gagnon Add [D64.9] Severe anemia     1/21/2024  3:28 AM Sid Gagnon Add [I24.9] ACS (acute coronary syndrome) (HCC)     1/21/2024  3:28 AM Sid Gagnon Modify [R07.9] Chest pain     1/21/2024  3:28 AM Sid Gagnon Modify [D64.9] Severe anemia           ED Disposition       ED Disposition   Admit    Condition   Stable    Date/Time   Sun Jan 21, 2024 9799    Comment   Case was discussed with LETICIA and the patient's admission status was agreed to be Admission Status: inpatient status to the service of Dr. Moncada .                Follow-up Information    None         Patient's Medications   Discharge Prescriptions    No medications on file     No discharge procedures on file.    PDMP Review       None             ED Provider  Attending physically available and evaluated Gael Ronan. I managed the patient along with the ED Attending.    Electronically Signed by           Sid Gagnon DO  01/21/24 0355

## 2024-01-21 NOTE — ED NOTES
Pt provided breakfast. Pt sitting up in bed and eating without issue. Daughter at bedside to assist pt if necessary.      Krystyna Georges RN  01/21/24 4183

## 2024-01-21 NOTE — PLAN OF CARE
Problem: Potential for Falls  Goal: Patient will remain free of falls  Description: INTERVENTIONS:  - Educate patient/family on patient safety including physical limitations  - Instruct patient to call for assistance with activity   - Consult OT/PT to assist with strengthening/mobility   - Keep Call bell within reach  - Keep bed low and locked with side rails adjusted as appropriate  - Keep care items and personal belongings within reach  - Initiate and maintain comfort rounds  - Make Fall Risk Sign visible to staff  - Offer Toileting every 2 Hours, in advance of need  - Apply yellow socks and bracelet for high fall risk patients  - Consider moving patient to room near nurses station  Outcome: Progressing     Problem: INFECTION - ADULT  Goal: Absence or prevention of progression during hospitalization  Description: INTERVENTIONS:  - Assess and monitor for signs and symptoms of infection  - Monitor lab/diagnostic results  - Monitor all insertion sites, i.e. indwelling lines, tubes, and drains  - Monitor endotracheal if appropriate and nasal secretions for changes in amount and color  - [place] appropriate cooling/warming therapies per order  - Administer medications as ordered  - Instruct and encourage patient and family to use good hand hygiene technique  - Identify and instruct in appropriate isolation precautions for identified infection/condition  Outcome: Progressing     Problem: DISCHARGE PLANNING  Goal: Discharge to home or other facility with appropriate resources  Description: INTERVENTIONS:  - Identify barriers to discharge w/patient and caregiver  - Arrange for needed discharge resources and transportation as appropriate  - Identify discharge learning needs (meds, wound care, etc.)  - Arrange for interpretive services to assist at discharge as needed  - Refer to Case Management Department for coordinating discharge planning if the patient needs post-hospital services based on physician/advanced  practitioner order or complex needs related to functional status, cognitive ability, or social support system  Outcome: Progressing     Problem: Knowledge Deficit  Goal: Patient/family/caregiver demonstrates understanding of disease process, treatment plan, medications, and discharge instructions  Description: Complete learning assessment and assess knowledge base.  Interventions:  - Provide teaching at level of understanding  - Provide teaching via preferred learning methods  Outcome: Progressing

## 2024-01-21 NOTE — ED NOTES
Patient's family provided with a menu for lunch selections for the patient     Jayla Heath RN  01/21/24 4375

## 2024-01-22 LAB
ABO GROUP BLD BPU: NORMAL
ABO GROUP BLD BPU: NORMAL
ALBUMIN SERPL BCP-MCNC: 3.8 G/DL (ref 3.5–5)
ALP SERPL-CCNC: 73 U/L (ref 34–104)
ALT SERPL W P-5'-P-CCNC: 47 U/L (ref 7–52)
ANION GAP SERPL CALCULATED.3IONS-SCNC: 5 MMOL/L
AST SERPL W P-5'-P-CCNC: 41 U/L (ref 13–39)
ATRIAL RATE: 72 BPM
ATRIAL RATE: 72 BPM
ATRIAL RATE: 73 BPM
ATRIAL RATE: 75 BPM
ATRIAL RATE: 84 BPM
BASOPHILS # BLD AUTO: 0.02 THOUSANDS/ÂΜL (ref 0–0.1)
BASOPHILS NFR BLD AUTO: 0 % (ref 0–1)
BILIRUB SERPL-MCNC: 1.11 MG/DL (ref 0.2–1)
BPU ID: NORMAL
BPU ID: NORMAL
BUN SERPL-MCNC: 22 MG/DL (ref 5–25)
CALCIUM SERPL-MCNC: 9.3 MG/DL (ref 8.4–10.2)
CHLORIDE SERPL-SCNC: 103 MMOL/L (ref 96–108)
CO2 SERPL-SCNC: 28 MMOL/L (ref 21–32)
CREAT SERPL-MCNC: 1.1 MG/DL (ref 0.6–1.3)
CROSSMATCH: NORMAL
CROSSMATCH: NORMAL
EOSINOPHIL # BLD AUTO: 0.14 THOUSAND/ÂΜL (ref 0–0.61)
EOSINOPHIL NFR BLD AUTO: 3 % (ref 0–6)
ERYTHROCYTE [DISTWIDTH] IN BLOOD BY AUTOMATED COUNT: 14.9 % (ref 11.6–15.1)
GFR SERPL CREATININE-BSD FRML MDRD: 60 ML/MIN/1.73SQ M
GLUCOSE SERPL-MCNC: 110 MG/DL (ref 65–140)
HAPTOGLOB SERPL-MCNC: 220 MG/DL (ref 38–329)
HCT VFR BLD AUTO: 24.3 % (ref 36.5–49.3)
HGB BLD-MCNC: 8.5 G/DL (ref 12–17)
IMM GRANULOCYTES # BLD AUTO: 0.02 THOUSAND/UL (ref 0–0.2)
IMM GRANULOCYTES NFR BLD AUTO: 0 % (ref 0–2)
LYMPHOCYTES # BLD AUTO: 1.1 THOUSANDS/ÂΜL (ref 0.6–4.47)
LYMPHOCYTES NFR BLD AUTO: 20 % (ref 14–44)
MCH RBC QN AUTO: 32.7 PG (ref 26.8–34.3)
MCHC RBC AUTO-ENTMCNC: 35 G/DL (ref 31.4–37.4)
MCV RBC AUTO: 94 FL (ref 82–98)
MONOCYTES # BLD AUTO: 0.72 THOUSAND/ÂΜL (ref 0.17–1.22)
MONOCYTES NFR BLD AUTO: 13 % (ref 4–12)
NEUTROPHILS # BLD AUTO: 3.5 THOUSANDS/ÂΜL (ref 1.85–7.62)
NEUTS SEG NFR BLD AUTO: 64 % (ref 43–75)
NRBC BLD AUTO-RTO: 0 /100 WBCS
P AXIS: 68 DEGREES
P AXIS: 69 DEGREES
P AXIS: 70 DEGREES
P AXIS: 74 DEGREES
P AXIS: 74 DEGREES
PLATELET # BLD AUTO: 242 THOUSANDS/UL (ref 149–390)
PMV BLD AUTO: 10.7 FL (ref 8.9–12.7)
POTASSIUM SERPL-SCNC: 4.1 MMOL/L (ref 3.5–5.3)
PR INTERVAL: 144 MS
PR INTERVAL: 144 MS
PR INTERVAL: 148 MS
PR INTERVAL: 150 MS
PR INTERVAL: 156 MS
PROT SERPL-MCNC: 6.2 G/DL (ref 6.4–8.4)
QRS AXIS: 49 DEGREES
QRS AXIS: 52 DEGREES
QRS AXIS: 52 DEGREES
QRS AXIS: 57 DEGREES
QRS AXIS: 60 DEGREES
QRSD INTERVAL: 88 MS
QRSD INTERVAL: 88 MS
QRSD INTERVAL: 90 MS
QRSD INTERVAL: 90 MS
QRSD INTERVAL: 94 MS
QT INTERVAL: 378 MS
QT INTERVAL: 394 MS
QT INTERVAL: 400 MS
QT INTERVAL: 408 MS
QT INTERVAL: 410 MS
QTC INTERVAL: 431 MS
QTC INTERVAL: 446 MS
QTC INTERVAL: 446 MS
QTC INTERVAL: 448 MS
QTC INTERVAL: 449 MS
RBC # BLD AUTO: 2.6 MILLION/UL (ref 3.88–5.62)
SODIUM SERPL-SCNC: 136 MMOL/L (ref 135–147)
T WAVE AXIS: -26 DEGREES
T WAVE AXIS: -5 DEGREES
T WAVE AXIS: -51 DEGREES
T WAVE AXIS: -72 DEGREES
T WAVE AXIS: -74 DEGREES
UNIT DISPENSE STATUS: NORMAL
UNIT DISPENSE STATUS: NORMAL
UNIT PRODUCT CODE: NORMAL
UNIT PRODUCT CODE: NORMAL
UNIT PRODUCT VOLUME: 350 ML
UNIT PRODUCT VOLUME: 350 ML
UNIT RH: NORMAL
UNIT RH: NORMAL
VENTRICULAR RATE: 72 BPM
VENTRICULAR RATE: 72 BPM
VENTRICULAR RATE: 73 BPM
VENTRICULAR RATE: 75 BPM
VENTRICULAR RATE: 84 BPM
WBC # BLD AUTO: 5.5 THOUSAND/UL (ref 4.31–10.16)

## 2024-01-22 PROCEDURE — NC001 PR NO CHARGE: Performed by: RADIOLOGY

## 2024-01-22 PROCEDURE — 93010 ELECTROCARDIOGRAM REPORT: CPT | Performed by: INTERNAL MEDICINE

## 2024-01-22 PROCEDURE — 99232 SBSQ HOSP IP/OBS MODERATE 35: CPT | Performed by: INTERNAL MEDICINE

## 2024-01-22 PROCEDURE — 85025 COMPLETE CBC W/AUTO DIFF WBC: CPT | Performed by: INTERNAL MEDICINE

## 2024-01-22 PROCEDURE — 99222 1ST HOSP IP/OBS MODERATE 55: CPT | Performed by: STUDENT IN AN ORGANIZED HEALTH CARE EDUCATION/TRAINING PROGRAM

## 2024-01-22 PROCEDURE — NC001 PR NO CHARGE: Performed by: INTERNAL MEDICINE

## 2024-01-22 PROCEDURE — 80053 COMPREHEN METABOLIC PANEL: CPT | Performed by: INTERNAL MEDICINE

## 2024-01-22 RX ORDER — ENOXAPARIN SODIUM 100 MG/ML
30 INJECTION SUBCUTANEOUS
Status: DISCONTINUED | OUTPATIENT
Start: 2024-01-22 | End: 2024-01-22

## 2024-01-22 RX ORDER — ENOXAPARIN SODIUM 100 MG/ML
30 INJECTION SUBCUTANEOUS
Status: COMPLETED | OUTPATIENT
Start: 2024-01-23 | End: 2024-01-23

## 2024-01-22 RX ADMIN — Medication 2000 UNITS: at 09:20

## 2024-01-22 RX ADMIN — CYANOCOBALAMIN TAB 500 MCG 1000 MCG: 500 TAB at 09:20

## 2024-01-22 RX ADMIN — ENOXAPARIN SODIUM 30 MG: 40 INJECTION SUBCUTANEOUS at 16:19

## 2024-01-22 RX ADMIN — LORAZEPAM 1 MG: 1 TABLET ORAL at 09:26

## 2024-01-22 RX ADMIN — AMLODIPINE BESYLATE 2.5 MG: 2.5 TABLET ORAL at 09:20

## 2024-01-22 RX ADMIN — POTASSIUM CHLORIDE 10 MEQ: 750 TABLET, EXTENDED RELEASE ORAL at 09:20

## 2024-01-22 RX ADMIN — LORAZEPAM 1 MG: 1 TABLET ORAL at 21:09

## 2024-01-22 RX ADMIN — NEBIVOLOL 20 MG: 10 TABLET ORAL at 09:21

## 2024-01-22 RX ADMIN — TICAGRELOR 90 MG: 90 TABLET ORAL at 09:20

## 2024-01-22 RX ADMIN — RANOLAZINE 500 MG: 500 TABLET, FILM COATED, EXTENDED RELEASE ORAL at 09:20

## 2024-01-22 RX ADMIN — HYDROCHLOROTHIAZIDE 25 MG: 25 TABLET ORAL at 09:20

## 2024-01-22 RX ADMIN — TICAGRELOR 90 MG: 90 TABLET ORAL at 21:08

## 2024-01-22 RX ADMIN — RANOLAZINE 500 MG: 500 TABLET, FILM COATED, EXTENDED RELEASE ORAL at 21:08

## 2024-01-22 RX ADMIN — PRAVASTATIN SODIUM 80 MG: 80 TABLET ORAL at 16:19

## 2024-01-22 RX ADMIN — ASPIRIN 81 MG: 81 TABLET, COATED ORAL at 09:20

## 2024-01-22 NOTE — ASSESSMENT & PLAN NOTE
Presented with CP.  Known multivessel CAD.  Hgb 6.4 on arrival.    Receiving 2 units PRBC  Following with [facility] hemonc  Most recent H&H is more than 8   Will await CBC today   [place] texted hematology to ensure they will see patient today - family and patient have many questions with regard to cause of anemia and what the bone marrow may how which I will deferred to hematology

## 2024-01-22 NOTE — PROGRESS NOTES
Spoke with IR. They informed me they will be unable to do the   Bone marrow biopsy On patient.  They will reschedule him for this  Wednesday.  Dr Prince made aware.

## 2024-01-22 NOTE — ASSESSMENT & PLAN NOTE
Hx remote PCI  Admitted 12/31-1/3  TTE: LVEF 60%, normal wall motion, normal diastolic function, mild MR and mild TR  [facility] 1/2/24  Severe, chronic multivessel CAD.  Ostial LM disease, small LAD with moderate diffuse disease, LCx has 2 patent stents with moderate progression of disease to each stent,  of RCA with collaterals. LVEDP normal.  Continue ASA, statin, bystolic and brilinta   Discussed with cardiology okay to ambulate around [place], however not to exertion.

## 2024-01-22 NOTE — PROGRESS NOTES
The patient was seen and examined, his wife and his brother-in-law in the room, he has recurrent mild macrocytic anemia, workup was normal SPEP, folic acid, vitamin B12 was mildly low however no improvement with vitamin B12 supplements    Normal LDH    He looks pale, no evidence of hepatosplenomegaly or lymphadenopathy, no lower extremity edema    He was admitted again with angina secondary to hemoglobin of 6.9 he received 1 unit of packed RBC with improvement    1.  Anemia with intermittent neutropenia, the patient needs a bone marrow biopsy to rule out bone marrow disorder such as MDS, lymphoproliferative disorder or infiltrative disease in the bone marrow  2.  I agree on transfusion to keep hemoglobin above 7.5    IR scheduled for bone marrow biopsy    The family expressed understanding most likely he needs erythropoietin or Reblozyl as an outpatient

## 2024-01-22 NOTE — ASSESSMENT & PLAN NOTE
Presented with CP with radiation to arm and jaw.  Pain improved with oxygen,  mg total.  Likely etiology: acute anemia with underlying multivessel CAD  Trop peaked at 6 K, but no repeat as per cardiology   No intervention as per cardiology   Thankfully no chest pain since admission

## 2024-01-22 NOTE — UTILIZATION REVIEW
Initial Clinical Review    Admission: Date/Time/Statement:   Admission Orders (From admission, onward)       Ordered        01/21/24 0329  INPATIENT ADMISSION  Once                          Orders Placed This Encounter   Procedures    INPATIENT ADMISSION     Standing Status:   Standing     Number of Occurrences:   1     Order Specific Question:   Level of Care     Answer:   Med Surg [16]     Order Specific Question:   Estimated length of stay     Answer:   More than 2 Midnights     Order Specific Question:   Certification     Answer:   I certify that inpatient services are medically necessary for this patient for a duration of greater than two midnights. See H&P and MD Progress Notes for additional information about the patient's course of treatment.     ED Arrival Information       Expected   -    Arrival   1/21/2024 01:47    Acuity   Immediate              Means of arrival   Ambulance    Escorted by   [facility]    Service   Hospitalist    Admission type   Emergency              Arrival complaint   EMS             Chief Complaint   Patient presents with    Chest Pain     Arrives via EMS from home for report of chest tightness starting while watching TV between [id number] 1/20/23. Hx cardiac cath (last 3wks prior and found to have no blockages). Patient reports ongoing issue with anemia - states chest pain radiating to jaw and arm usually accompany a drop of Hgb. STEMI activated upon arrival, but canceled after consult with Cardiology.     STEMI Alert       Initial Presentation: 83 y.o. male with a PMH of multivessel CAD, PCI, HTN, CKD who presents with chest pain that occurred while patient was watching television. Pain was located in the upper chest with radiation to bilateral jaw and right arm. Pain improved with aspirin and application of oxygen with EMS. Denies any nausea, diaphoresis, shortness of breath. Patient is pain free currently. Patient with known anemia. Hemoglobin 6.4 on arrival. Undergoing  outpatient workup with [facility] hematology.  Has bone marrow biopsy set for 2/15 as patient must be on DAPT for at least 30 days prior to stopping. Reports he was told to stop DAPT 2/1 for biopsy.  Plan: Inpatient admission for evaluation and treatment of macrocytic anemia, chest pain with elevated troponin, CAD, HTN, stage 3 CKD: 2 units PRBC, transfuse fir Hgb < 7, trend troponin to peak, telemetry, Cardiology consult, continue ASA, statin, bystolic, HCTZ, monitor BMP.     Cardiology consult: Type 2 MI- Already on ASA and Brilinta; not started on IV heparin secondary to anemia. Continued on aspirin, brilinta, atorvastatin, nebivolol, HCTZ, statin.     Oncology consult: work-up not yet complete; needs retic, TSH, haptoglobin, Meche, and LDH, will repeat B12; has been on oral for about 2 months; if not improving, start 1000 mcg SQ, will repeat iron studies due to continued drops in hemoglobin. Bone marrow biopsy is planned at [facility] as an outpatient but not until 2/15/24 due to the need to hold Brilinta and aspirin; to my knowledge IR here does not always require holding this for bone marrow biopsy. Will consult IR to see if he can have completed here due to need for cardiac intervention.     Date: 1/22   Day 2:     Internal medicine: continue home HCTZ, Bystolic, ASA, statin, brilinta, monitor BMP, enal functions, repeat CBC today, plan for bone marrow biopsy with IR on Wed.     Date: 1/23    Day 3: Has surpassed a 2nd midnight with active treatments and services, which include bone marrow biopsy.      ED Triage Vitals   Temperature Pulse Respirations Blood Pressure SpO2   01/21/24 0155 01/21/24 0155 01/21/24 0155 01/21/24 0155 01/21/24 0155   97.9 °F (36.6 °C) 82 20 135/69 99 %      Temp Source Heart Rate Source Patient Position - Orthostatic VS BP Location FiO2 (%)   01/21/24 0155 01/21/24 0155 01/21/24 0155 01/21/24 0200 --   Oral Monitor Lying Right arm       Pain Score       01/21/24 0155       No Pain          Wt  Readings from Last 1 Encounters:   01/21/24 68 kg (149 lb 14.6 oz)     Additional Vital Signs:     Date/Time Temp Pulse Resp BP MAP (mmHg) SpO2 O2 Device   01/22/24 07:10:15 97.6 °F (36.4 °C) 66 16 124/56 79 96 % None (Room air)   01/22/24 07:09:53 97.6 °F (36.4 °C) -- -- 124/56 79 -- --   01/21/24 22:01:20 98 °F (36.7 °C) 69 18 124/54 77 95 % None (Room air)   01/21/24 2020 -- -- -- -- -- -- None (Room air)   01/21/24 15:18:49 98.8 °F (37.1 °C) 80 19 125/55 78 96 % None (Room air)   01/21/24 12:58:09 97.9 °F (36.6 °C) 68 -- 139/58 85 96 % --   01/21/24 1115 -- 70 -- 140/73 99 -- --   01/21/24 1100 -- 71 -- 144/79 102 -- --   01/21/24 1051 98.6 °F (37 °C) 68 18 145/61 94 96 % None (Room air)   01/21/24 1045 -- 69 -- 148/66 95 -- --   01/21/24 1030 -- 72 -- 129/65 92 -- --   01/21/24 1015 -- 73 -- 132/63 90 -- --   01/21/24 1000 -- 72 -- 134/58 89 -- --   01/21/24 0945 -- 71 -- 149/71 102 -- --   01/21/24 0930 -- 73 16 161/66 95 96 % None (Room air)   01/21/24 0915 -- 82 -- 144/67 96 -- --   01/21/24 0900 -- 70 -- 131/61 88 -- --   01/21/24 0845 -- 77 -- 140/79 103 -- --   01/21/24 0830 -- 70 -- 132/61 87 -- --   01/21/24 0815 -- 73 -- 130/62 89 -- --   01/21/24 0800 -- 70 16 129/62 89 94 % None (Room air)   01/21/24 0745 -- 80 -- 148/66 95 -- --   01/21/24 0730 -- 73 -- 134/62 89 -- --   01/21/24 0715 -- 81 -- 149/72 103 -- --   01/21/24 0700 -- 71 -- 146/65 93 -- --   01/21/24 0645 -- 83 14 143/67 97 93 % None (Room air)   01/21/24 0632 98.3 °F (36.8 °C) 72 16 148/70 -- 96 % None (Room air)   01/21/24 0630 -- 72 16 148/70 101 95 % --   01/21/24 0615 -- 72 -- 126/60 87 -- --   01/21/24 0600 -- 73 -- 121/68 88 -- --   01/21/24 0547 98.1 °F (36.7 °C) 69 16 119/61 -- 94 % None (Room air)   01/21/24 0545 -- 70 -- 119/61 83 -- --   01/21/24 0530 -- 73 -- 122/57 84 -- --   01/21/24 0528 98.5 °F (36.9 °C) 72 16 122/65 -- -- --   01/21/24 0515 -- 70 -- 122/65 87 -- --   01/21/24 0504 97.8 °F (36.6 °C) 71 16 123/70 -- 96 %  None (Room air)   01/21/24 0500 -- 70 -- 123/70 91 -- --   01/21/24 0445 -- 88 18 123/58 83 94 % None (Room air)   01/21/24 0430 -- 76 16 142/65 93 95 % None (Room air)   01/21/24 0400 -- 73 20 123/61 85 95 % None (Room air)   01/21/24 0358 97.6 °F (36.4 °C) 74 16 124/58 -- 96 % None (Room air)   01/21/24 0341 98.2 °F (36.8 °C) 74 16 122/60 -- -- --   01/21/24 0330 -- 70 16 122/60 86 95 % None (Room air)   01/21/24 0300 -- 72 20 122/55 79 95 % None (Room air)   01/21/24 0245 -- 75 20 122/61 84 95 % None (Room air)   01/21/24 0230 -- 75 20 126/57 82 96 % None (Room air)   01/21/24 0215 -- 77 20 145/64 97 96 % None (Room air)   01/21/24 0213 -- -- -- -- -- -- None (Room air)   01/21/24 0210 -- 81 -- -- -- 96 % --   01/21/24 0205 -- 84 -- -- -- 97 % --   01/21/24 0200 -- 91 20 134/63 90 98 % None (Room air)   01/21/24 01:55:16 97.9 °F (36.6 °C) 82 20 135/69 -- 100 % --     Pertinent Labs/Diagnostic Test Results:   XR chest 1 view portable   ED Interpretation by Sid Gagnon DO (01/21 3154)   No acute cardiopulmonary disease      Final Result by Radha Holman MD (01/21 9736)      No acute cardiopulmonary disease.               Workstation performed: [id number]         IR biopsy bone marrow    (Results Pending)     1/21 EKG:  Normal sinus rhythm  Marked ST abnormality, possible inferior subendocardial injury  Marked ST abnormality, possible anterolateral subendocardial injury  Abnormal ECG  When compared with ECG of 31-DEC-2023 16:05,  ST less depressed in Anterior leads  T wave inversion no longer evident in Anterior leads      Results from last 7 days   Lab Units 01/22/24  1216 01/21/24  1114 01/21/24  0200 01/21/24  0150 01/15/24  1655   WBC Thousand/uL 5.50  --   --  5.88 5.15   HEMOGLOBIN g/dL 8.5* 8.8*  --  6.4* 7.9*   I STAT HEMOGLOBIN g/dl  --   --  6.5*  --   --    HEMATOCRIT % 24.3*  --   --  19.2* 23.0*   HEMATOCRIT, ISTAT %  --   --  19*  --   --    PLATELETS Thousands/uL 242  --   --  331  460*   NEUTROS ABS Thousands/µL 3.50  --   --  4.25  --      Results from last 7 days   Lab Units 01/21/24  1114   RETIC CT ABS  13,600*   RETIC CT PCT % 0.50     Results from last 7 days   Lab Units 01/22/24  1216 01/21/24  0200 01/21/24  0150 01/15/24  1655   SODIUM mmol/L 136  --  137 139   POTASSIUM mmol/L 4.1  --  3.8 4.2   CHLORIDE mmol/L 103  --  102 105   CO2 mmol/L 28  --  26 25   CO2, I-STAT mmol/L  --  25  --   --    ANION GAP mmol/L 5  --  9 9   BUN mg/dL 22  --  25 25   CREATININE mg/dL 1.10  --  1.08 1.06   EGFR ml/min/1.73sq m 60  --  62 63   CALCIUM mg/dL 9.3  --  9.4 9.7   CALCIUM, IONIZED, ISTAT mmol/L  --  1.12  --   --    MAGNESIUM mg/dL  --   --  1.9  --      Results from last 7 days   Lab Units 01/22/24  1216 01/21/24  0150   AST U/L 41* 31   ALT U/L 47 61*   ALK PHOS U/L 73 80   TOTAL PROTEIN g/dL 6.2* 6.8   ALBUMIN g/dL 3.8 4.1   TOTAL BILIRUBIN mg/dL 1.11* 0.54     Results from last 7 days   Lab Units 01/21/24  0154   POC GLUCOSE mg/dl 136     Results from last 7 days   Lab Units 01/22/24  1216 01/21/24  0150 01/15/24  1655   GLUCOSE RANDOM mg/dL 110 139 108         Results from last 7 days   Lab Units 01/21/24  0150   HEMOGLOBIN A1C % 7.5*   EAG mg/dl 169       Results from last 7 days   Lab Units 01/21/24  0200   PH, RAMAN I-STAT  7.522*   PCO2, RAMAN ISTAT mm HG 28.8*   PO2, RAMAN ISTAT mm HG 21.0*   HCO3, RAMAN ISTAT mmol/L 23.6*   I STAT BASE EXC mmol/L 1   I STAT O2 SAT % 44*         Results from last 7 days   Lab Units 01/21/24  0643 01/21/24  0351 01/21/24  0150   HS TNI 0HR ng/L  --   --  417*   HS TNI 2HR ng/L  --  2,161*  --    HSTNI D2 ng/L  --  1,744*  --    HS TNI 4HR ng/L 6,138*  --   --    HSTNI D4 ng/L 5,721*  --   --          Results from last 7 days   Lab Units 01/21/24  0150   PROTIME seconds 13.5   INR  0.97   PTT seconds 23     Results from last 7 days   Lab Units 01/21/24  0150   TSH 3RD GENERATON uIU/mL 1.299           Results from last 7 days   Lab Units 01/21/24  0150    FERRITIN ng/mL 1,145*   IRON ug/dL 284*   TIBC ug/dL <339         Results from last 7 days   Lab Units 01/22/24  0547   UNIT PRODUCT CODE  [id number]  [id number]   UNIT NUMBER  [id number]  [id number]   UNITABO  A  A   UNITRH  NEG  NEG   CROSSMATCH  Compatible  Compatible   UNIT DISPENSE STATUS  Presumed Trans  Presumed Trans   UNIT PRODUCT VOL ml 350  350         ED Treatment:   Medication Administration from 01/21/2024 0147 to 01/21/2024 1242         Date/Time Order Dose Route Action     01/21/2024 0158 EST aspirin chewable tablet 162 mg Oral Given     01/21/2024 0247 EST sodium chloride 0.9 % infusion 75 mL/hr Intravenous New Bag     01/21/2024 0919 EST cholecalciferol (VITAMIN D3) tablet 2,000 Units 2,000 Units Oral Given     01/21/2024 0919 EST hydrochlorothiazide (HYDRODIURIL) tablet 25 mg 25 mg Oral Given     01/21/2024 0919 EST cyanocobalamin (VITAMIN B-12) tablet 1,000 mcg 1,000 mcg Oral Given     01/21/2024 0920 EST nebivolol (BYSTOLIC) tablet 20 mg 20 mg Oral Given     01/21/2024 0919 EST potassium chloride (K-DUR,KLOR-CON) CR tablet 10 mEq 10 mEq Oral Given     01/21/2024 0919 EST ticagrelor (BRILINTA) tablet 90 mg 90 mg Oral Given          No past medical history on file.  Present on Admission:   Chest pain with elevated troponin   Coronary artery disease involving native coronary artery of native heart with angina pectoris (HCC)   Macrocytic anemia   Primary hypertension   Stage 3 chronic kidney disease (HCC)      Admitting Diagnosis: Chest pain [R07.9]  ACS (acute coronary syndrome) ([facility]) [I24.9]  Severe anemia [D64.9]  Age/Sex: 83 y.o. male  Admission Orders:  Scheduled Medications:  amLODIPine, 2.5 mg, Oral, Daily  aspirin, 81 mg, Oral, Daily  aspirin, 162 mg, Oral, Once  cholecalciferol, 2,000 Units, Oral, Daily  cyanocobalamin, 1,000 mcg, Oral, Daily  enoxaparin, 30 mg, Subcutaneous, Q24H CRESENCIO  hydrochlorothiazide, 25 mg, Oral, Daily  nebivolol, 20 mg, Oral, Daily  potassium chloride,  10 mEq, Oral, Daily  pravastatin, 80 mg, Oral, Daily With Dinner  ranolazine, 500 mg, Oral, Q12H CRESENCIO  ticagrelor, 90 mg, Oral, Q12H CRESENCIO      Continuous IV Infusions:     PRN Meds:  acetaminophen, 650 mg, Oral, Q6H PRN  LORazepam, 1 mg, Oral, BID PRN  nitroglycerin, 0.4 mg, Sublingual, Q5 Min PRN  sodium chloride (PF), 3 mL, Intravenous, Q1H PRN        IP CONSULT TO CARDIOLOGY  IP CONSULT TO HEMATOLOGY  IP CONSULT TO GASTROENTEROLOGY  INPATIENT CONSULT TO IR    Network Utilization Review Department  ATTENTION: Please call with any questions or concerns to [phone] and carefully listen to the prompts so that you are directed to the right person. All voicemails are confidential.   For Discharge needs, contact Care Management DC Support Team at [phone] opt. [phone]  Send all requests for admission clinical reviews, approved or denied determinations and any other requests to dedicated fax number below belonging to the campus where the patient is receiving treatment. List of dedicated fax numbers for the Facilities:  FACILITY NAME UR FAX NUMBER   ADMISSION DENIALS (Administrative/Medical Necessity) [phone]   DISCHARGE SUPPORT TEAM (NETWORK) [phone]   PARENT CHILD HEALTH (Maternity/NICU/Pediatrics) [phone]   [facility] [phone]   [facility] [phone]   [facility] [phone]   [facility] [phone]   [facility] [phone]   [facility] [phone]   [facility] [phone]   [facility] [phone]   [facility] [phone]   [facility] [phone]   [facility] [phone]

## 2024-01-22 NOTE — PLAN OF CARE
Problem: Potential for Falls  Goal: Patient will remain free of falls  Description: INTERVENTIONS:  - Educate patient/family on patient safety including physical limitations  - Instruct patient to call for assistance with activity   - Consult OT/PT to assist with strengthening/mobility   - Keep Call bell within reach  - Keep bed low and locked with side rails adjusted as appropriate  - Keep care items and personal belongings within reach  - Initiate and maintain comfort rounds  - Make Fall Risk Sign visible to staff  - Offer Toileting every  Hours, in advance of need  - Initiate/Maintain alarm  - Obtain necessary fall risk management equipment:  - Apply yellow socks and bracelet for high fall risk patients  - Consider moving patient to room near nurses station  Outcome: Progressing     Problem: INFECTION - ADULT  Goal: Absence or prevention of progression during hospitalization  Description: INTERVENTIONS:  - Assess and monitor for signs and symptoms of infection  - Monitor lab/diagnostic results  - Monitor all insertion sites, i.e. indwelling lines, tubes, and drains  - Monitor endotracheal if appropriate and nasal secretions for changes in amount and color  - [place] appropriate cooling/warming therapies per order  - Administer medications as ordered  - Instruct and encourage patient and family to use good hand hygiene technique  - Identify and instruct in appropriate isolation precautions for identified infection/condition  Outcome: Progressing     Problem: DISCHARGE PLANNING  Goal: Discharge to home or other facility with appropriate resources  Description: INTERVENTIONS:  - Identify barriers to discharge w/patient and caregiver  - Arrange for needed discharge resources and transportation as appropriate  - Identify discharge learning needs (meds, wound care, etc.)  - Arrange for interpretive services to assist at discharge as needed  - Refer to Case Management Department for coordinating discharge planning  if the patient needs post-hospital services based on physician/advanced practitioner order or complex needs related to functional status, cognitive ability, or social support system  Outcome: Progressing     Problem: Knowledge Deficit  Goal: Patient/family/caregiver demonstrates understanding of disease process, treatment plan, medications, and discharge instructions  Description: Complete learning assessment and assess knowledge base.  Interventions:  - Provide teaching at level of understanding  - Provide teaching via preferred learning methods  Outcome: Progressing

## 2024-01-22 NOTE — PLAN OF CARE
Problem: Potential for Falls  Goal: Patient will remain free of falls  Description: INTERVENTIONS:  - Educate patient/family on patient safety including physical limitations  - Instruct patient to call for assistance with activity   - Consult OT/PT to assist with strengthening/mobility   - Keep Call bell within reach  - Keep bed low and locked with side rails adjusted as appropriate  - Keep care items and personal belongings within reach  - Initiate and maintain comfort rounds  - Make Fall Risk Sign visible to staff  - Apply yellow socks and bracelet for high fall risk patients  - Consider moving patient to room near nurses station  Outcome: Progressing     Problem: INFECTION - ADULT  Goal: Absence or prevention of progression during hospitalization  Description: INTERVENTIONS:  - Assess and monitor for signs and symptoms of infection  - Monitor lab/diagnostic results  - Monitor all insertion sites, i.e. indwelling lines, tubes, and drains  - Monitor endotracheal if appropriate and nasal secretions for changes in amount and color  - [place] appropriate cooling/warming therapies per order  - Administer medications as ordered  - Instruct and encourage patient and family to use good hand hygiene technique  - Identify and instruct in appropriate isolation precautions for identified infection/condition  Outcome: Progressing     Problem: DISCHARGE PLANNING  Goal: Discharge to home or other facility with appropriate resources  Description: INTERVENTIONS:  - Identify barriers to discharge w/patient and caregiver  - Arrange for needed discharge resources and transportation as appropriate  - Identify discharge learning needs (meds, wound care, etc.)  - Arrange for interpretive services to assist at discharge as needed  - Refer to Case Management Department for coordinating discharge planning if the patient needs post-hospital services based on physician/advanced practitioner order or complex needs related to functional  status, cognitive ability, or social support system  Outcome: Progressing     Problem: Knowledge Deficit  Goal: Patient/family/caregiver demonstrates understanding of disease process, treatment plan, medications, and discharge instructions  Description: Complete learning assessment and assess knowledge base.  Interventions:  - Provide teaching at level of understanding  - Provide teaching via preferred learning methods  Outcome: Progressing     Problem: CARDIOVASCULAR - ADULT  Goal: Maintains optimal cardiac output and hemodynamic stability  Description: INTERVENTIONS:  - Monitor I/O, vital signs and rhythm  - Monitor for S/S and trends of decreased cardiac output  - Administer and titrate ordered vasoactive medications to optimize hemodynamic stability  - Assess quality of pulses, skin color and temperature  - Assess for signs of decreased coronary artery perfusion  - Instruct patient to report change in severity of symptoms  Outcome: Progressing  Goal: Absence of cardiac dysrhythmias or at baseline rhythm  Description: INTERVENTIONS:  - Continuous cardiac monitoring, vital signs, obtain 12 lead EKG if ordered  - Administer antiarrhythmic and heart rate control medications as ordered  - Monitor electrolytes and administer replacement therapy as ordered  Outcome: Progressing

## 2024-01-22 NOTE — ASSESSMENT & PLAN NOTE
Creatinine stable and at baseline  Monitor BMP  Avoid hypotension, nephrotoxins  Await renal function from today    no

## 2024-01-22 NOTE — PROGRESS NOTES
[facility]  Progress Note  Name: Gael Ferraro I  MRN: [id number]  Unit/Bed#: S -01 I Date of Admission: 1/21/2024   Date of Service: 1/22/2024 I Hospital Day: 1    Assessment/Plan   Primary hypertension  Assessment & Plan  Stable on home regimen  HCTZ 25 mg qd  Bystolic 20 mg qd  /56    Stage 3 chronic kidney disease (HCC)  Assessment & Plan  Creatinine stable and at baseline  Monitor BMP  Avoid hypotension, nephrotoxins  Await renal function from today     Coronary artery disease involving native coronary artery of native heart with angina pectoris (HCC)  Assessment & Plan  Hx remote PCI  Admitted 12/31-1/3  TTE: LVEF 60%, normal wall motion, normal diastolic function, mild MR and mild TR  [facility] 1/2/24  Severe, chronic multivessel CAD.  Ostial LM disease, small LAD with moderate diffuse disease, LCx has 2 patent stents with moderate progression of disease to each stent,  of RCA with collaterals. LVEDP normal.  Continue ASA, statin, bystolic and brilinta   Discussed with cardiology okay to ambulate around garces, however not to exertion.     Chest pain with elevated troponin  Assessment & Plan  Presented with CP with radiation to arm and jaw.  Pain improved with oxygen,  mg total.  Likely etiology: acute anemia with underlying multivessel CAD  Trop peaked at 6 K, but no repeat as per cardiology   No intervention as per cardiology   Thankfully no chest pain since admission    * Macrocytic anemia  Assessment & Plan  Presented with CP.  Known multivessel CAD.  Hgb 6.4 on arrival.    Receiving 2 units PRBC  Following with [facility] hemonc  Most recent H&H is more than 8   Will await CBC today   Marion texted hematology to ensure they will see patient today - family and patient have many questions with regard to cause of anemia and what the bone marrow may how which I will deferred to hematology               VTE Pharmacologic Prophylaxis: VTE Score: 4 will discuss with  "hematology if chem prop indicated .      Patient Centered Rounds: I performed bedside rounds with nursing staff today.   Discussions with Specialists or Other Care Team Provider: discussed with cardiology regarding exercise and also tiger texted hematology to ensure they see patient today and also to ask regarding DVT prop.     Education and Discussions with Family / Patient: Updated  (wife, daughter, and daughter in law) at bedside.    Total Time Spent on Date of Encounter in care of patient: 30 mins. This time was spent on one or more of the following: performing physical exam; counseling and coordination of care; obtaining or reviewing history; documenting in the medical record; reviewing/ordering tests, medications or procedures; communicating with other healthcare professionals and discussing with patient's family/caregivers.    Current Length of Stay: 1 day(s)  Current Patient Status: Inpatient   Certification Statement: The patient will continue to require additional inpatient hospital stay due to bone marrow biopsy tomorrow.   Discharge Plan: Anticipate discharge in 48-72 hrs to home.    Code Status: Level 1 - Full Code    Subjective:   Patient seen and examined   Feeling \"the same\"  \"I have no pain\"    Objective:     Vitals:   Temp (24hrs), Av °F (36.7 °C), Min:97.6 °F (36.4 °C), Max:98.8 °F (37.1 °C)    Temp:  [97.6 °F (36.4 °C)-98.8 °F (37.1 °C)] 97.6 °F (36.4 °C)  HR:  [66-80] 66  Resp:  [16-19] 16  BP: (124-139)/(54-58) 124/56  SpO2:  [95 %-96 %] 96 %  Body mass index is 21.51 kg/m².     Input and Output Summary (last 24 hours):   No intake or output data in the 24 hours ending 24 1207    Physical Exam:   Physical Exam  Constitutional:       General: He is not in acute distress.     Appearance: He is ill-appearing. He is not toxic-appearing or diaphoretic.   HENT:      Head: Normocephalic.      Mouth/Throat:      Mouth: Mucous membranes are moist.   Eyes:      General: No scleral " icterus.        Right eye: No discharge.         Left eye: No discharge.      Pupils: Pupils are equal, round, and reactive to light.   Cardiovascular:      Rate and Rhythm: Normal rate.      Heart sounds: No murmur heard.     No friction rub. No gallop.   Pulmonary:      Effort: Pulmonary effort is normal. No respiratory distress.      Breath sounds: No stridor. No wheezing, rhonchi or rales.   Chest:      Chest wall: No tenderness.   Abdominal:      General: There is no distension.      Palpations: There is no mass.      Tenderness: There is no abdominal tenderness. There is no right CVA tenderness, left CVA tenderness, guarding or rebound.      Hernia: No hernia is present.   Musculoskeletal:         General: No swelling, tenderness, deformity or signs of injury.      Right lower leg: No edema.      Left lower leg: No edema.   Skin:     Capillary Refill: Capillary refill takes less than 2 seconds.      Coloration: Skin is pale. Skin is not jaundiced.      Findings: No bruising or lesion.   Neurological:      General: No focal deficit present.      Mental Status: He is alert.      Cranial Nerves: No cranial nerve deficit.      Sensory: No sensory deficit.      Motor: No weakness.      Coordination: Coordination normal.      Gait: Gait normal.      Deep Tendon Reflexes: Reflexes normal.   Psychiatric:         Mood and Affect: Mood normal.          Additional Data:     Labs:  Results from last 7 days   Lab Units 01/21/24  1114 01/21/24  0200 01/21/24  0150   WBC Thousand/uL  --   --  5.88   HEMOGLOBIN g/dL 8.8*  --  6.4*   I STAT HEMOGLOBIN g/dl  --  6.5*  --    HEMATOCRIT %  --   --  19.2*   HEMATOCRIT, ISTAT %  --  19*  --    PLATELETS Thousands/uL  --   --  331   NEUTROS PCT %  --   --  73   LYMPHS PCT %  --   --  18   MONOS PCT %  --   --  7   EOS PCT %  --   --  2     Results from last 7 days   Lab Units 01/21/24  0200 01/21/24  0150   SODIUM mmol/L  --  137   POTASSIUM mmol/L  --  3.8   CHLORIDE mmol/L  --   102   CO2 mmol/L  --  26   CO2, I-STAT mmol/L 25  --    BUN mg/dL  --  25   CREATININE mg/dL  --  1.08   ANION GAP mmol/L  --  9   CALCIUM mg/dL  --  9.4   ALBUMIN g/dL  --  4.1   TOTAL BILIRUBIN mg/dL  --  0.54   ALK PHOS U/L  --  80   ALT U/L  --  61*   AST U/L  --  31   GLUCOSE RANDOM mg/dL  --  139     Results from last 7 days   Lab Units 24  0150   INR  0.97     Results from last 7 days   Lab Units 24  0154   POC GLUCOSE mg/dl 136     Results from last 7 days   Lab Units 24  0150   HEMOGLOBIN A1C % 7.5*           Lines/Drains:  Invasive Devices       Peripheral Intravenous Line  Duration             Peripheral IV 24 Left Antecubital 1 day    Peripheral IV 24 Left;Ventral (anterior) Forearm 1 day                      Telemetry:  Telemetry Orders (From admission, onward)               24 Hour Telemetry Monitoring  Continuous x 24 Hours (Telem)           Question:  Reason for 24 Hour Telemetry  Answer:  PCI/EP study (including pacer and ICD implementation), Cardiac surgery, MI, abnormal cardiac cath, and chest pain- rule out MI                     Telemetry Reviewed: Normal Sinus Rhythm  Indication for Continued Telemetry Use: Acute MI/Unstable Angina/Rule out ACS             Imaging: No pertinent imaging reviewed.    Recent Cultures (last 7 days):         Last 24 Hours Medication List:   Current Facility-Administered Medications   Medication Dose Route Frequency Provider Last Rate    acetaminophen  650 mg Oral Q6H PRN BRITTANY Reilly      amLODIPine  2.5 mg Oral Daily BRITTANY Munoz      aspirin  81 mg Oral Daily BRITTANY Reilly      aspirin  162 mg Oral Once Sid Gagnon DO      cholecalciferol  2,000 Units Oral Daily BRITTANY Reilly      cyanocobalamin  1,000 mcg Oral Daily BRITTANY Reilly      hydrochlorothiazide  25 mg Oral Daily BRITTANY Reilly      LORazepam  1 mg Oral BID PRN Cheyenne Prince MD      nebivolol  20 mg Oral Daily BRITTANY Reilly       nitroglycerin  0.4 mg Sublingual Q5 Min PRN BRITTANY Reilly      potassium chloride  10 mEq Oral Daily BRITTANY Reilly      pravastatin  80 mg Oral Daily With Dinner BRITTANY Reilly      ranolazine  500 mg Oral Q12H CRESENCIO BRITTANY Munoz      sodium chloride (PF)  3 mL Intravenous Q1H PRN Sid Gagnon, DO      ticagrelor  90 mg Oral Q12H CRESENCIO BRITTANY Reilly          Today, Patient Was Seen By: Cheyenne Prince MD    **Please Note: This note may have been constructed using a voice recognition system.**

## 2024-01-22 NOTE — PROGRESS NOTES
"Progress Note - Cardiology Team 1  Gael Ferraro 83 y.o. male MRN: [id number]  Unit/Bed#: S -01 Encounter: [id number]        Principal Problem:    Macrocytic anemia  Active Problems:    Chest pain with elevated troponin    Coronary artery disease involving native coronary artery of native heart with angina pectoris (HCC)    Stage 3 chronic kidney disease (HCC)    Primary hypertension      Assessment/Plan    Chest pain with elevated troponin  Presented with chest pain/right arm pain and jaw pain  Felt to be a type II MI in the setting of multivessel CAD and severe anemia  0-hour troponin 417  2-hour troponin 2161  4-hour troponin 6138  No further CP after transfusion    Multivessel CAD  [facility] 1/2/2024.  Recommended optimization of goal-directed medical therapy as tolerates.  Lifelong DAPT versus P2Y12 monotherapy.  Would only consider high risk left main PCI if symptoms refractory to maximized medical therapy. Also advised Hgb >9-10 and noted ongoing anemia w/u.  Currently on DAPT -aspirin 81 mg daily/Brilinta 90 mg daily, BB-Bystolic 20 mg daily, statin -pravastatin 80 mg daily, Ranexa 500 mg twice daily-new, amlodipine 2.5 mg daily (new)  See #1    IC on call note yesterday-anemia workup prior to consideration for revascularization but not currently candidate.     Macrocytic anemia  Presenting H&H-6.4/19.2  Hgb 8.8 yesterday post transfusion  Bone marrow biopsy Wed 1/24  Early Jan 2024- Hgb 6.9- had 2 UPRBC's    Hypertension-normotensive to slightly hypertensive  On amlodipine 2.5 mg daily/HCTZ 25 mg daily/ BB-Bystolic 20 mg daily    Hyperlipidemia-pravastatin 80 mg daily    CKD stage III-creatinine 1.0.  Stable      Subjective/Objective   Chief Complaint/subjective  No events overnight  No further cp,arm or jaw pain  Family at bedside . A lot of questions        Vitals: /56 (BP Location: Right arm)   Pulse 66   Temp 97.6 °F (36.4 °C) (Oral)   Resp 16   Ht 5' 10\" (1.778 m)   Wt 68 kg (149 lb 14.6 " "oz)   SpO2 96%   BMI 21.51 kg/m²     Vitals:    01/21/24 0155   Weight: 68 kg (149 lb 14.6 oz)     Orthostatic Blood Pressures      Flowsheet Row Most Recent Value   Blood Pressure 124/56 filed at 01/22/2024 0710   Patient Position - Orthostatic VS Lying filed at 01/22/2024 0710            No intake or output data in the 24 hours ending 01/22/24 0943    Invasive Devices       Peripheral Intravenous Line  Duration             Peripheral IV 01/21/24 Left Antecubital 1 day    Peripheral IV 01/21/24 Left;Ventral (anterior) Forearm 1 day                    Current Facility-Administered Medications   Medication Dose Route Frequency    acetaminophen (TYLENOL) tablet 650 mg  650 mg Oral Q6H PRN    amLODIPine (NORVASC) tablet 2.5 mg  2.5 mg Oral Daily    aspirin (ECOTRIN LOW STRENGTH) EC tablet 81 mg  81 mg Oral Daily    aspirin chewable tablet 162 mg  162 mg Oral Once    cholecalciferol (VITAMIN D3) tablet 2,000 Units  2,000 Units Oral Daily    cyanocobalamin (VITAMIN B-12) tablet 1,000 mcg  1,000 mcg Oral Daily    hydrochlorothiazide (HYDRODIURIL) tablet 25 mg  25 mg Oral Daily    LORazepam (ATIVAN) tablet 1 mg  1 mg Oral BID PRN    nebivolol (BYSTOLIC) tablet 20 mg  20 mg Oral Daily    nitroglycerin (NITROSTAT) SL tablet 0.4 mg  0.4 mg Sublingual Q5 Min PRN    potassium chloride (K-DUR,KLOR-CON) CR tablet 10 mEq  10 mEq Oral Daily    pravastatin (PRAVACHOL) tablet 80 mg  80 mg Oral Daily With Dinner    ranolazine (RANEXA) 12 hr tablet 500 mg  500 mg Oral Q12H CRESENCIO    sodium chloride (PF) 0.9 % injection 3 mL  3 mL Intravenous Q1H PRN    ticagrelor (BRILINTA) tablet 90 mg  90 mg Oral Q12H CRESENCIO         Physical Exam: /56 (BP Location: Right arm)   Pulse 66   Temp 97.6 °F (36.4 °C) (Oral)   Resp 16   Ht 5' 10\" (1.778 m)   Wt 68 kg (149 lb 14.6 oz)   SpO2 96%   BMI 21.51 kg/m²     General Appearance:    Alert, cooperative, no distress, appears stated age   Head:    Normocephalic, no scleral icterus   Eyes:    " PERRL   Nose:   Nares normal, septum midline, no drainage    Throat:   Lips, mucosa, and tongue normal   Neck:   Supple, symmetrical, trachea midline,       no carotid    bruit or JVD   Back:     Symmetric, no CVA tenderness   Lungs:     Clear to auscultation bilaterally, respirations unlabored   Chest Wall:    No tenderness or deformity    Heart:    Regular rate and rhythm, S1 and S2 normal, no murmur, rub   or gallop   Abdomen:     Soft, non-tender, bowel sounds active all four quadrants,     no masses, no organomegaly   Extremities:   Extremities normal, atraumatic, no cyanosis or edema   Pulses:   2+ and symmetric all extremities   Skin:   Skin color, texture, turgor normal, no rashes or lesions   Neurologic:   Alert and oriented to person place and time, no focal deficits                 Lab Results:   Recent Results (from the past 72 hour(s))   CBC and differential    Collection Time: 01/21/24  1:50 AM   Result Value Ref Range    WBC 5.88 4.31 - 10.16 Thousand/uL    RBC 1.92 (L) 3.88 - 5.62 Million/uL    Hemoglobin 6.4 (LL) 12.0 - 17.0 g/dL    Hematocrit 19.2 (L) 36.5 - 49.3 %     (H) 82 - 98 fL    MCH 33.3 26.8 - 34.3 pg    MCHC 33.3 31.4 - 37.4 g/dL    RDW 13.4 11.6 - 15.1 %    MPV 10.8 8.9 - 12.7 fL    Platelets 331 149 - 390 Thousands/uL    nRBC 0 /100 WBCs    Neutrophils Relative 73 43 - 75 %    Immat GRANS % 0 0 - 2 %    Lymphocytes Relative 18 14 - 44 %    Monocytes Relative 7 4 - 12 %    Eosinophils Relative 2 0 - 6 %    Basophils Relative 0 0 - 1 %    Neutrophils Absolute 4.25 1.85 - 7.62 Thousands/µL    Immature Grans Absolute 0.02 0.00 - 0.20 Thousand/uL    Lymphocytes Absolute 1.05 0.60 - 4.47 Thousands/µL    Monocytes Absolute 0.42 0.17 - 1.22 Thousand/µL    Eosinophils Absolute 0.13 0.00 - 0.61 Thousand/µL    Basophils Absolute 0.01 0.00 - 0.10 Thousands/µL   Lipid panel    Collection Time: 01/21/24  1:50 AM   Result Value Ref Range    Cholesterol 141 See Comment mg/dL    Triglycerides 219  "(H) See Comment mg/dL    HDL, Direct 34 (L) >=40 mg/dL    LDL Calculated 63 0 - 100 mg/dL    Non-HDL-Chol (CHOL-HDL) 107 mg/dl   Magnesium    Collection Time: 01/21/24  1:50 AM   Result Value Ref Range    Magnesium 1.9 1.9 - 2.7 mg/dL   Protime-INR    Collection Time: 01/21/24  1:50 AM   Result Value Ref Range    Protime 13.5 11.6 - 14.5 seconds    INR 0.97 0.84 - 1.19   APTT    Collection Time: 01/21/24  1:50 AM   Result Value Ref Range    PTT 23 23 - 37 seconds   HS Troponin 0hr (reflex protocol)    Collection Time: 01/21/24  1:50 AM   Result Value Ref Range    hs TnI 0hr 417 (H) \"Refer to ACS Flowchart\"- see link ng/L   Comprehensive metabolic panel    Collection Time: 01/21/24  1:50 AM   Result Value Ref Range    Sodium 137 135 - 147 mmol/L    Potassium 3.8 3.5 - 5.3 mmol/L    Chloride 102 96 - 108 mmol/L    CO2 26 21 - 32 mmol/L    ANION GAP 9 mmol/L    BUN 25 5 - 25 mg/dL    Creatinine 1.08 0.60 - 1.30 mg/dL    Glucose 139 65 - 140 mg/dL    Calcium 9.4 8.4 - 10.2 mg/dL    AST 31 13 - 39 U/L    ALT 61 (H) 7 - 52 U/L    Alkaline Phosphatase 80 34 - 104 U/L    Total Protein 6.8 6.4 - 8.4 g/dL    Albumin 4.1 3.5 - 5.0 g/dL    Total Bilirubin 0.54 0.20 - 1.00 mg/dL    eGFR 62 ml/min/1.73sq m   Type and screen    Collection Time: 01/21/24  1:50 AM   Result Value Ref Range    ABO Grouping A     Rh Factor Negative     Antibody Screen Negative     Specimen Expiration Date 20240124    Hemoglobin A1C    Collection Time: 01/21/24  1:50 AM   Result Value Ref Range    Hemoglobin A1C 7.5 (H) Normal 4.0-5.6%; PreDiabetic 5.7-6.4%; Diabetic >=6.5%; Glycemic control for adults with diabetes <7.0% %     mg/dl   Vitamin B12    Collection Time: 01/21/24  1:50 AM   Result Value Ref Range    Vitamin B-12 422 180 - 914 pg/mL   LD,Blood    Collection Time: 01/21/24  1:50 AM   Result Value Ref Range     140 - 271 U/L   TSH, 3rd generation with Free T4 reflex    Collection Time: 01/21/24  1:50 AM   Result Value Ref Range    " TSH 3RD GENERATON 1.299 0.450 - 4.500 uIU/mL   TIBC Panel (incl. Iron, TIBC, % Iron Saturation)    Collection Time: 01/21/24  1:50 AM   Result Value Ref Range    Iron Saturation      TIBC <339 250 - 450 ug/dL    Iron 284 (H) 50 - 212 ug/dL    UIBC <55 (L) 155 - 355 ug/dL   Ferritin    Collection Time: 01/21/24  1:50 AM   Result Value Ref Range    Ferritin 1,145 (H) 24 - 336 ng/mL   ECG 12 lead    Collection Time: 01/21/24  1:51 AM   Result Value Ref Range    Ventricular Rate 84 BPM    Atrial Rate 84 BPM    GA Interval 144 ms    QRSD Interval 88 ms    QT Interval 378 ms    QTC Interval 446 ms    P Axis 70 degrees    QRS Axis 52 degrees    T Wave Axis -72 degrees   Fingerstick Glucose (POCT)    Collection Time: 01/21/24  1:54 AM   Result Value Ref Range    POC Glucose 136 65 - 140 mg/dl   POCT Blood Gas (CG8+)    Collection Time: 01/21/24  2:00 AM   Result Value Ref Range    ph, Royal ISTAT 7.522 (HH) 7.300 - 7.400    pCO2, Royal i-STAT 28.8 (LL) 42.0 - 50.0 mm HG    pO2, Royal i-STAT 21.0 (L) 35.0 - 45.0 mm HG    BE, i-STAT 1 -2 - 3 mmol/L    HCO3, Royal i-STAT 23.6 (L) 24.0 - 30.0 mmol/L    CO2, i-STAT 25 21 - 32 mmol/L    O2 Sat, i-STAT 44 (L) 60 - 85 %    SODIUM, I-STAT 138 136 - 145 mmol/l    Potassium, i-STAT 3.9 3.5 - 5.3 mmol/L    Calcium, Ionized i-STAT 1.12 1.12 - 1.32 mmol/L    Hct, i-STAT 19 (L) 36.5 - 49.3 %    Hgb, i-STAT 6.5 (LL) 12.0 - 17.0 g/dl    Glucose, i-STAT 142 (H) 65 - 140 mg/dl    Specimen Type VENOUS    ECG 12 lead    Collection Time: 01/21/24  2:21 AM   Result Value Ref Range    Ventricular Rate 75 BPM    Atrial Rate 75 BPM    GA Interval 144 ms    QRSD Interval 90 ms    QT Interval 400 ms    QTC Interval 446 ms    P Axis 69 degrees    QRS Axis 49 degrees    T Wave Axis -51 degrees   ECG 12 lead    Collection Time: 01/21/24  3:03 AM   Result Value Ref Range    Ventricular Rate 73 BPM    Atrial Rate 73 BPM    GA Interval 148 ms    QRSD Interval 90 ms    QT Interval 408 ms    QTC Interval 449 ms    P  "Axis 74 degrees    QRS Axis 60 degrees    T Wave Axis -74 degrees   HS Troponin I 2hr    Collection Time: 01/21/24  3:51 AM   Result Value Ref Range    hs TnI 2hr 2,161 (H) \"Refer to ACS Flowchart\"- see link ng/L    Delta 2hr hsTnI 1,744 (H) <20 ng/L   ECG 12 lead    Collection Time: 01/21/24  5:03 AM   Result Value Ref Range    Ventricular Rate 72 BPM    Atrial Rate 72 BPM    MS Interval 150 ms    QRSD Interval 94 ms    QT Interval 410 ms    QTC Interval 448 ms    P Mobile 74 degrees    QRS Axis 57 degrees    T Wave Axis -26 degrees   HS Troponin I 4hr    Collection Time: 01/21/24  6:43 AM   Result Value Ref Range    hs TnI 4hr 6,138 (H) \"Refer to ACS Flowchart\"- see link ng/L    Delta 4hr hsTnI 5,721 (H) <20 ng/L   ECG 12 lead    Collection Time: 01/21/24  6:44 AM   Result Value Ref Range    Ventricular Rate 72 BPM    Atrial Rate 72 BPM    MS Interval 156 ms    QRSD Interval 88 ms    QT Interval 394 ms    QTC Interval 431 ms    P Axis 68 degrees    QRS Axis 52 degrees    T Wave Axis -5 degrees   Hemoglobin    Collection Time: 01/21/24 11:14 AM   Result Value Ref Range    Hemoglobin 8.8 (L) 12.0 - 17.0 g/dL   Retic Count    Collection Time: 01/21/24 11:14 AM   Result Value Ref Range    Retic Ct Abs 13,600 (L) 14,356 - 105,094    Retic Ct Pct 0.50 0.37 - 1.87 %   Direct antiglobulin test    Collection Time: 01/21/24  3:44 PM   Result Value Ref Range    DIRECT FORREST Negative    Prepare Leukoreduced RBC: 2 Units    Collection Time: 01/22/24  5:47 AM   Result Value Ref Range    Unit Product Code Y7935S98     Unit Number [id number]     Unit ABO A     Unit RH NEG     Crossmatch Compatible     Unit Dispense Status Presumed Trans     Unit Product Volume 350 ml    Unit Product Code L3402C71     Unit Number [id number]     Unit ABO A     Unit RH NEG     Crossmatch Compatible     Unit Dispense Status Presumed Trans     Unit Product Volume 350 ml     Imaging: I have personally reviewed pertinent reports.    Tele- " nsr    Counseling / Coordination of Care  Total time spent today 30 minutes. Greater than 50% of total time was spent with the patient and / or family counseling and / or coordination of care.

## 2024-01-22 NOTE — QUICK NOTE
Otoe back from Dr. Vuong, okay to start lovenox 30 sq since bone marrow bx is not until Wednesday. Need to hold prior to bx as per IR.

## 2024-01-23 LAB
ALBUMIN SERPL BCP-MCNC: 3.7 G/DL (ref 3.5–5)
ALP SERPL-CCNC: 78 U/L (ref 34–104)
ALT SERPL W P-5'-P-CCNC: 42 U/L (ref 7–52)
ANION GAP SERPL CALCULATED.3IONS-SCNC: 8 MMOL/L
AST SERPL W P-5'-P-CCNC: 31 U/L (ref 13–39)
BASOPHILS # BLD AUTO: 0.03 THOUSANDS/ÂΜL (ref 0–0.1)
BASOPHILS NFR BLD AUTO: 1 % (ref 0–1)
BILIRUB SERPL-MCNC: 0.99 MG/DL (ref 0.2–1)
BUN SERPL-MCNC: 22 MG/DL (ref 5–25)
CALCIUM SERPL-MCNC: 9.2 MG/DL (ref 8.4–10.2)
CHLORIDE SERPL-SCNC: 104 MMOL/L (ref 96–108)
CO2 SERPL-SCNC: 26 MMOL/L (ref 21–32)
CREAT SERPL-MCNC: 1.08 MG/DL (ref 0.6–1.3)
EOSINOPHIL # BLD AUTO: 0.31 THOUSAND/ÂΜL (ref 0–0.61)
EOSINOPHIL NFR BLD AUTO: 7 % (ref 0–6)
ERYTHROCYTE [DISTWIDTH] IN BLOOD BY AUTOMATED COUNT: 14.2 % (ref 11.6–15.1)
GFR SERPL CREATININE-BSD FRML MDRD: 62 ML/MIN/1.73SQ M
GLUCOSE SERPL-MCNC: 103 MG/DL (ref 65–140)
HCT VFR BLD AUTO: 23.6 % (ref 36.5–49.3)
HGB BLD-MCNC: 8.2 G/DL (ref 12–17)
IMM GRANULOCYTES # BLD AUTO: 0.01 THOUSAND/UL (ref 0–0.2)
IMM GRANULOCYTES NFR BLD AUTO: 0 % (ref 0–2)
LYMPHOCYTES # BLD AUTO: 1.29 THOUSANDS/ÂΜL (ref 0.6–4.47)
LYMPHOCYTES NFR BLD AUTO: 28 % (ref 14–44)
MCH RBC QN AUTO: 32.2 PG (ref 26.8–34.3)
MCHC RBC AUTO-ENTMCNC: 34.7 G/DL (ref 31.4–37.4)
MCV RBC AUTO: 93 FL (ref 82–98)
MONOCYTES # BLD AUTO: 0.61 THOUSAND/ÂΜL (ref 0.17–1.22)
MONOCYTES NFR BLD AUTO: 13 % (ref 4–12)
NEUTROPHILS # BLD AUTO: 2.38 THOUSANDS/ÂΜL (ref 1.85–7.62)
NEUTS SEG NFR BLD AUTO: 51 % (ref 43–75)
NRBC BLD AUTO-RTO: 0 /100 WBCS
PLATELET # BLD AUTO: 222 THOUSANDS/UL (ref 149–390)
PMV BLD AUTO: 10.6 FL (ref 8.9–12.7)
POTASSIUM SERPL-SCNC: 3.5 MMOL/L (ref 3.5–5.3)
PROT SERPL-MCNC: 6.1 G/DL (ref 6.4–8.4)
RBC # BLD AUTO: 2.55 MILLION/UL (ref 3.88–5.62)
SODIUM SERPL-SCNC: 138 MMOL/L (ref 135–147)
WBC # BLD AUTO: 4.63 THOUSAND/UL (ref 4.31–10.16)

## 2024-01-23 PROCEDURE — 80053 COMPREHEN METABOLIC PANEL: CPT | Performed by: INTERNAL MEDICINE

## 2024-01-23 PROCEDURE — 99232 SBSQ HOSP IP/OBS MODERATE 35: CPT | Performed by: INTERNAL MEDICINE

## 2024-01-23 PROCEDURE — 85025 COMPLETE CBC W/AUTO DIFF WBC: CPT | Performed by: INTERNAL MEDICINE

## 2024-01-23 RX ADMIN — ENOXAPARIN SODIUM 30 MG: 30 INJECTION SUBCUTANEOUS at 08:53

## 2024-01-23 RX ADMIN — LORAZEPAM 1 MG: 1 TABLET ORAL at 21:30

## 2024-01-23 RX ADMIN — LORAZEPAM 1 MG: 1 TABLET ORAL at 09:01

## 2024-01-23 RX ADMIN — HYDROCHLOROTHIAZIDE 25 MG: 25 TABLET ORAL at 08:52

## 2024-01-23 RX ADMIN — AMLODIPINE BESYLATE 2.5 MG: 2.5 TABLET ORAL at 08:52

## 2024-01-23 RX ADMIN — RANOLAZINE 500 MG: 500 TABLET, FILM COATED, EXTENDED RELEASE ORAL at 21:25

## 2024-01-23 RX ADMIN — PRAVASTATIN SODIUM 80 MG: 80 TABLET ORAL at 16:12

## 2024-01-23 RX ADMIN — RANOLAZINE 500 MG: 500 TABLET, FILM COATED, EXTENDED RELEASE ORAL at 08:52

## 2024-01-23 RX ADMIN — ASPIRIN 81 MG: 81 TABLET, COATED ORAL at 08:52

## 2024-01-23 RX ADMIN — POTASSIUM CHLORIDE 10 MEQ: 750 TABLET, EXTENDED RELEASE ORAL at 08:52

## 2024-01-23 RX ADMIN — TICAGRELOR 90 MG: 90 TABLET ORAL at 21:25

## 2024-01-23 RX ADMIN — NEBIVOLOL 20 MG: 10 TABLET ORAL at 08:52

## 2024-01-23 RX ADMIN — Medication 2000 UNITS: at 08:52

## 2024-01-23 RX ADMIN — CYANOCOBALAMIN TAB 500 MCG 1000 MCG: 500 TAB at 08:52

## 2024-01-23 RX ADMIN — TICAGRELOR 90 MG: 90 TABLET ORAL at 08:52

## 2024-01-23 NOTE — PROGRESS NOTES
Progress Note - Gael Ferraro 83 y.o. male MRN: [id number]    Unit/Bed#: S -01 Encounter: [id number]    Assessment and Plan:     Chest pain   Extensive cardiac history including Multivessel CAD s/p PCI, HTN, CKD, HLD, Macrocytic anemia, Esophageal dysphagia, Hiatal hernia, Schatzki's ring, GERD and MARK   Reports currently experiencing no symtpoms. Chest pain completely resolved after pRBCs transfusion.   Likely Type II MI 2/2 MVD-CAD and anemia, do not suspect a GI source of chest pain. However, he has not had any scoping recently.   Cardiology following - Requires clearance from cardiology for invasive GI procedures if indicated       Concern for possible blood loss anemia in the setting of DAPT   Home regimen includes ASA and Brilinta for MVCAD  HB on presentation was 6.4, improved to 8.8 after x2 pRBCs   Denies black or tarry stools. Denies noticing streaks of blood when wiping. Also, denies hematuria and changes in urine color.   Cardiology following - Continued on DAPT  Hematology following - Plan for Bone marrow Bx via IR on Wednesday      Dysphagia   History of Dyphagia since 2021   Fluoroscopic study from 2021 - Small to moderate hiatal hernia and findings consistent with Schatzki's ring. Tertiary contractions. Small to moderate volume of gastroesophageal reflux elicited. Mild delay in passage from the mid thoracic esophagus in distal esophagus of a barium tablet  Dysphagia is infrequent with solid food only. Says it waxes and wanes. Says when food gets stuck he coughs and comes out, and usually does not have a repeat episode during the same meal. Denies odynophagia, abdominal pain, N/V, reflux, and sour taste in the mouth.   Continues to deny dysphagia while inpatient  Follows a full regular diet at home. Can continue with regular diet - recommended he take small bites and chew food well  Dry oral mucosa noted - recommended to keep hydrated   Plan for Barium esophagram tomorrow   Possible EGD with  "possible dilation, pending his clinical course - will need clearance from cardiology       -----------------------------------------------------------------------------------------------------------    Subjective:  Gael reports feeling well. Wife at bedside. Denies reoccurrence of dysphagia during this hospital stay and choking. Reports he ate breakfast without issues. Also denies chest pain, N/V, and last BM was last night.     Objective:     Vitals: Blood pressure (!) 122/48, pulse 66, temperature 98.3 °F (36.8 °C), resp. rate 16, height 5' 10\" (1.778 m), weight 68 kg (149 lb 14.6 oz), SpO2 96%.,Body mass index is 21.51 kg/m².      Intake/Output Summary (Last 24 hours) at 1/23/2024 0919  Last data filed at 1/22/2024 1510  Gross per 24 hour   Intake 480 ml   Output 350 ml   Net 130 ml       Physical Exam:     General Appearance:   Alert, cooperative, no distress   HEENT:   Dry oral mucosa. Normocephalic, atraumatic, anicteric.     Neck:  Supple, symmetrical, trachea midline   Lungs:   Clear to auscultation bilaterally; no rales, rhonchi or wheezing; respirations unlabored    Heart::   Regular rate and rhythm; no murmur, rub, or gallop.   Abdomen:   Soft, non-tender, non-distended; normal bowel sounds; no masses, no organomegaly    Extremities:  No cyanosis, clubbing or edema    Pulses:  2+ and symmetric all extremities    Skin:  No jaundice, rashes, or lesions    Lymph nodes:  No palpable cervical lymphadenopathy        Invasive Devices       Peripheral Intravenous Line  Duration             Peripheral IV 01/21/24 Left Antecubital 2 days    Peripheral IV 01/21/24 Left;Ventral (anterior) Forearm 2 days                    Lab Results:  Results from last 7 days   Lab Units 01/23/24  0631   WBC Thousand/uL 4.63   HEMOGLOBIN g/dL 8.2*   HEMATOCRIT % 23.6*   PLATELETS Thousands/uL 222   NEUTROS PCT % 51   LYMPHS PCT % 28   MONOS PCT % 13*   EOS PCT % 7*     Results from last 7 days   Lab Units 01/23/24  0631 " "01/22/24  1216 01/21/24  0200   POTASSIUM mmol/L 3.5   < >  --    CHLORIDE mmol/L 104   < >  --    CO2 mmol/L 26   < >  --    CO2, I-STAT mmol/L  --   --  25   BUN mg/dL 22   < >  --    CREATININE mg/dL 1.08   < >  --    CALCIUM mg/dL 9.2   < >  --    ALK PHOS U/L 78   < >  --    ALT U/L 42   < >  --    AST U/L 31   < >  --    GLUCOSE, ISTAT mg/dl  --   --  142*    < > = values in this interval not displayed.     Invalid input(s): \"BILI\"  Results from last 7 days   Lab Units 01/21/24  0150   INR  0.97           Imaging Studies: I have personally reviewed pertinent imaging studies.    XR chest 1 view portable    Result Date: 1/21/2024  Impression: No acute cardiopulmonary disease. Workstation performed: [id number]           Vishal Loaiza  PGY-3 IM          "

## 2024-01-23 NOTE — ASSESSMENT & PLAN NOTE
Presented with CP with radiation to arm and jaw.  Pain improved with oxygen,  mg total and transfusion of packed RBC..  Likely etiology: acute anemia with underlying multivessel CAD  Trop peaked at 6 K, but no repeat as per cardiology   Recent PCI and left heart cath.    No intervention as per  cardiology   No further chest pain since transfusion.  Keep hemoglobin > 8 in view of underlying coronary artery disease.

## 2024-01-23 NOTE — PROGRESS NOTES
[facility]  Progress Note  Name: Gael Ferraro I  MRN: [id number]  Unit/Bed#: S -01 I Date of Admission: 1/21/2024   Date of Service: 1/23/2024 I Hospital Day: 2    Assessment/Plan   * Macrocytic anemia  Assessment & Plan  Presented with CP.  Known multivessel CAD.  Hgb 6.4 on arrival.    Receiving 2 units PRBC-for hemoglobin at 8.2.  Following with [facility] hemonc  Most recent H&H is more than 8   Seen by oncology and plan for bone marrow biopsy in AM.  Hold a.m. dose of Lovenox.  Patient denies melena or hematochezia but has longstanding history of intermittent solid-food dysphagia for which he has had an esophagram in 2021 which showed Schatzki's ring and mild esophageal dymotility with delay in passage of barium tablet. Has noted poor PO intake with unintentional weight loss.  Seen by GI and plan for barium esophagogram and subsequently EGD.  Continue to follow H&H closely.    Primary hypertension  Assessment & Plan  Stable on home regimen  HCTZ 25 mg qd  Bystolic 20 mg qd  /48    Stage 3 chronic kidney disease (HCC)  Assessment & Plan  Creatinine stable and at baseline  Monitor BMP  Avoid hypotension, nephrotoxins  BMP stable.    Coronary artery disease involving native coronary artery of native heart with angina pectoris (HCC)  Assessment & Plan  Hx remote PCI  Admitted 12/31-1/3  TTE: LVEF 60%, normal wall motion, normal diastolic function, mild MR and mild TR  [facility] 1/2/24  Severe, chronic multivessel CAD.  Ostial LM disease, small LAD with moderate diffuse disease, LCx has 2 patent stents with moderate progression of disease to each stent,  of RCA with collaterals. LVEDP normal.  Continue ASA, statin, bystolic and brilinta   Discussed with cardiology okay to ambulate around garces, however not to exertion.     Chest pain with elevated troponin  Assessment & Plan  Presented with CP with radiation to arm and jaw.  Pain improved with oxygen,  mg total and transfusion  of packed RBC..  Likely etiology: acute anemia with underlying multivessel CAD  Trop peaked at 6 K, but no repeat as per cardiology   Recent PCI and left heart cath.    No intervention as per  cardiology   No further chest pain since transfusion.  Keep hemoglobin > 8 in view of underlying coronary artery disease.               VTE Pharmacologic Prophylaxis: VTE Score: 4 High Risk (Score >/= 5) - Pharmacological DVT Prophylaxis Ordered: enoxaparin (Lovenox). Sequential Compression Devices Ordered.    Mobility:   Basic Mobility Inpatient Raw Score: 24  JH-HLM Goal: 8: Walk 250 feet or more  JH-HLM Achieved: 1: Laying in bed  HLM Goal achieved. Continue to encourage appropriate mobility.    Patient Centered Rounds: I performed bedside rounds with nursing staff today.   Discussions with Specialists or Other Care Team Provider: Discussed with nursing.    Education and Discussions with Family / Patient: Updated  (wife) at bedside.    Total Time Spent on Date of Encounter in care of patient: 20 mins. This time was spent on one or more of the following: performing physical exam; counseling and coordination of care; obtaining or reviewing history; documenting in the medical record; reviewing/ordering tests, medications or procedures; communicating with other healthcare professionals and discussing with patient's family/caregivers.    Current Length of Stay: 2 day(s)  Current Patient Status: Inpatient   Certification Statement: The patient will continue to require additional inpatient hospital stay due to close monitoring of hemoglobin and anemia workup.  Discharge Plan: Anticipate discharge in 24-48 hrs to home with home services.    Code Status: Level 1 - Full Code    Subjective:   Seen and examined at bedside.  Denies any chest pain or shortness of breath at rest or ambulating to the bathroom and back.  No acute events overnight.    Objective:     Vitals:   Temp (24hrs), Av.4 °F (36.9 °C), Min:98.3 °F  (36.8 °C), Max:98.5 °F (36.9 °C)    Temp:  [98.3 °F (36.8 °C)-98.5 °F (36.9 °C)] 98.3 °F (36.8 °C)  HR:  [66-77] 66  Resp:  [16-20] 16  BP: (122-141)/(48-56) 122/48  SpO2:  [94 %-96 %] 96 %  Body mass index is 21.51 kg/m².     Input and Output Summary (last 24 hours):     Intake/Output Summary (Last 24 hours) at 1/23/2024 1342  Last data filed at 1/22/2024 1510  Gross per 24 hour   Intake 480 ml   Output --   Net 480 ml       Physical Exam:   Physical Exam  HENT:      Head: Normocephalic and atraumatic.      Mouth/Throat:      Mouth: Mucous membranes are moist.   Eyes:      Pupils: Pupils are equal, round, and reactive to light.   Cardiovascular:      Rate and Rhythm: Normal rate and regular rhythm.   Pulmonary:      Effort: Pulmonary effort is normal.      Breath sounds: Normal breath sounds.   Abdominal:      General: Abdomen is flat. Bowel sounds are normal.      Palpations: Abdomen is soft.   Musculoskeletal:      Right lower leg: No edema.      Left lower leg: No edema.   Skin:     Coloration: Skin is pale.   Neurological:      General: No focal deficit present.      Mental Status: He is alert and oriented to person, place, and time.          Additional Data:     Labs:  Results from last 7 days   Lab Units 01/23/24  0631   WBC Thousand/uL 4.63   HEMOGLOBIN g/dL 8.2*   HEMATOCRIT % 23.6*   PLATELETS Thousands/uL 222   NEUTROS PCT % 51   LYMPHS PCT % 28   MONOS PCT % 13*   EOS PCT % 7*     Results from last 7 days   Lab Units 01/23/24  0631   SODIUM mmol/L 138   POTASSIUM mmol/L 3.5   CHLORIDE mmol/L 104   CO2 mmol/L 26   BUN mg/dL 22   CREATININE mg/dL 1.08   ANION GAP mmol/L 8   CALCIUM mg/dL 9.2   ALBUMIN g/dL 3.7   TOTAL BILIRUBIN mg/dL 0.99   ALK PHOS U/L 78   ALT U/L 42   AST U/L 31   GLUCOSE RANDOM mg/dL 103     Results from last 7 days   Lab Units 01/21/24  0150   INR  0.97     Results from last 7 days   Lab Units 01/21/24  0154   POC GLUCOSE mg/dl 136     Results from last 7 days   Lab Units  01/21/24  0150   HEMOGLOBIN A1C % 7.5*           Lines/Drains:  Invasive Devices       Peripheral Intravenous Line  Duration             Peripheral IV 01/21/24 Left Antecubital 2 days    Peripheral IV 01/21/24 Left;Ventral (anterior) Forearm 2 days                      Telemetry:  Telemetry Orders (From admission, onward)               24 Hour Telemetry Monitoring  Continuous x 24 Hours (Telem)        Expiring   Question:  Reason for 24 Hour Telemetry  Answer:  PCI/EP study (including pacer and ICD implementation), Cardiac surgery, MI, abnormal cardiac cath, and chest pain- rule out MI                     Telemetry Reviewed: Normal Sinus Rhythm  Indication for Continued Telemetry Use: Acute MI/Unstable Angina/Rule out ACS             Imaging: No pertinent imaging reviewed.    Recent Cultures (last 7 days):         Last 24 Hours Medication List:   Current Facility-Administered Medications   Medication Dose Route Frequency Provider Last Rate    acetaminophen  650 mg Oral Q6H PRN BRITTANY Reilly      amLODIPine  2.5 mg Oral Daily BRITTANY Munoz      aspirin  81 mg Oral Daily Jayla Vergara, BRITTANY      aspirin  162 mg Oral Once Sid Gagnon,       cholecalciferol  2,000 Units Oral Daily Jayla Vergara, BRITTANY      cyanocobalamin  1,000 mcg Oral Daily Jayla Vergara, BRITTANY      hydrochlorothiazide  25 mg Oral Daily Jayla Vergara, BRITTANY      LORazepam  1 mg Oral BID PRN Cheyenne Prince MD      nebivolol  20 mg Oral Daily Jayla Jai, BRITTANY      nitroglycerin  0.4 mg Sublingual Q5 Min PRN BRITTANY Reilly      potassium chloride  10 mEq Oral Daily BRITTANY Reilly      pravastatin  80 mg Oral Daily With Dinner BRITTANY Reilly      ranolazine  500 mg Oral Q12H CRESENCIO BRITTANY Munoz      sodium chloride (PF)  3 mL Intravenous Q1H PRN Sid Gagnon, DO      ticagrelor  90 mg Oral Q12H CRESENCIO BRITTANY Reilly          Today, Patient Was Seen By: Estella Casey MD    **Please Note: This note may have been constructed  using a voice recognition system.**

## 2024-01-23 NOTE — PLAN OF CARE
Problem: Potential for Falls  Goal: Patient will remain free of falls  Description: INTERVENTIONS:  - Educate patient/family on patient safety including physical limitations  - Instruct patient to call for assistance with activity   - Consult OT/PT to assist with strengthening/mobility   - Keep Call bell within reach  - Keep bed low and locked with side rails adjusted as appropriate  - Keep care items and personal belongings within reach  - Initiate and maintain comfort rounds  - Make Fall Risk Sign visible to staff  - Consider moving patient to room near nurses station  Outcome: Progressing     Problem: INFECTION - ADULT  Goal: Absence or prevention of progression during hospitalization  Description: INTERVENTIONS:  - Assess and monitor for signs and symptoms of infection  - Monitor lab/diagnostic results  - Monitor all insertion sites, i.e. indwelling lines, tubes, and drains  - Monitor endotracheal if appropriate and nasal secretions for changes in amount and color  - [place] appropriate cooling/warming therapies per order  - Administer medications as ordered  - Instruct and encourage patient and family to use good hand hygiene technique  - Identify and instruct in appropriate isolation precautions for identified infection/condition  Outcome: Progressing     Problem: DISCHARGE PLANNING  Goal: Discharge to home or other facility with appropriate resources  Description: INTERVENTIONS:  - Identify barriers to discharge w/patient and caregiver  - Arrange for needed discharge resources and transportation as appropriate  - Identify discharge learning needs (meds, wound care, etc.)  - Arrange for interpretive services to assist at discharge as needed  - Refer to Case Management Department for coordinating discharge planning if the patient needs post-hospital services based on physician/advanced practitioner order or complex needs related to functional status, cognitive ability, or social support system  Outcome:  Progressing     Problem: Knowledge Deficit  Goal: Patient/family/caregiver demonstrates understanding of disease process, treatment plan, medications, and discharge instructions  Description: Complete learning assessment and assess knowledge base.  Interventions:  - Provide teaching at level of understanding  - Provide teaching via preferred learning methods  Outcome: Progressing     Problem: CARDIOVASCULAR - ADULT  Goal: Maintains optimal cardiac output and hemodynamic stability  Description: INTERVENTIONS:  - Monitor I/O, vital signs and rhythm  - Monitor for S/S and trends of decreased cardiac output  - Administer and titrate ordered vasoactive medications to optimize hemodynamic stability  - Assess quality of pulses, skin color and temperature  - Assess for signs of decreased coronary artery perfusion  - Instruct patient to report change in severity of symptoms  Outcome: Progressing  Goal: Absence of cardiac dysrhythmias or at baseline rhythm  Description: INTERVENTIONS:  - Continuous cardiac monitoring, vital signs, obtain 12 lead EKG if ordered  - Administer antiarrhythmic and heart rate control medications as ordered  - Monitor electrolytes and administer replacement therapy as ordered  Outcome: Progressing

## 2024-01-23 NOTE — ASSESSMENT & PLAN NOTE
Hx remote PCI  Admitted 12/31-1/3  TTE: LVEF 60%, normal wall motion, normal diastolic function, mild MR and mild TR  [facility] 1/2/24  Severe, chronic multivessel CAD.  Ostial LM disease, small LAD with moderate diffuse disease, LCx has 2 patent stents with moderate progression of disease to each stent,  of RCA with collaterals. LVEDP normal.  Continue ASA, statin, bystolic and brilinta   Discussed with cardiology okay to ambulate around garces, however not to exertion.

## 2024-01-23 NOTE — ASSESSMENT & PLAN NOTE
Presented with CP.  Known multivessel CAD.  Hgb 6.4 on arrival.    Receiving 2 units PRBC-for hemoglobin at 8.2.  Following with [facility] hemonc  Most recent H&H is more than 8   Seen by oncology and plan for bone marrow biopsy in AM.  Hold a.m. dose of Lovenox.  Patient denies melena or hematochezia but has longstanding history of intermittent solid-food dysphagia for which he has had an esophagram in 2021 which showed Schatzki's ring and mild esophageal dymotility with delay in passage of barium tablet. Has noted poor PO intake with unintentional weight loss.  Seen by GI and plan for barium esophagogram and subsequently EGD.  Continue to follow H&H closely.

## 2024-01-23 NOTE — PROGRESS NOTES
"Progress Note - Cardiology Team 1  Gael Ferraro 83 y.o. male MRN: [id number]  Unit/Bed#: S -01 Encounter: [id number]        Principal Problem:    Macrocytic anemia  Active Problems:    Chest pain with elevated troponin    Coronary artery disease involving native coronary artery of native heart with angina pectoris (HCC)    Stage 3 chronic kidney disease (HCC)    Primary hypertension      Assessment/Plan     Chest pain with elevated troponin  Presented with chest pain/right arm pain and jaw pain  Felt to be a type II MI in the setting of multivessel CAD and severe anemia  0-hour troponin 417  2-hour troponin 2161  4-hour troponin 6138  No further CP after transfusion.      Severe multivessel CAD  S/P C 1/2/2024 ( type II NSTEMI)   Recommendations by interventional cardiology noted.  Anemia needs workup prior to consideration for revascularization.  Not a candidate for revascularization at this time until anemia diagnosis and managed.     Currently on DAPT -aspirin 81 mg daily/Brilinta 90 mg daily, BB-Bystolic 20 mg daily, statin -pravastatin 80 mg daily, Ranexa 500 mg twice daily(new), amlodipine 2.5 mg daily (new)  See #1     Macrocytic anemia  Presenting H&H-6.4/19.2  Hgb 8.2  post transfusion  Bone marrow biopsy Wed 1/24  Early Jan 2024- Hgb 6.9- had 2 UPRBC's     Hypertension-normotensive   On amlodipine 2.5 mg daily/HCTZ 25 mg daily/ BB-Bystolic 20 mg daily     Hyperlipidemia-pravastatin 80 mg daily  Cholesterol 141/triglyceride 219/HDL 34/LDL 63    CKD stage III-creatinine 1.0.  Stable    Subjective/Objective   Chief Complaint/subjective  No events overnight  No further chest pain        Vitals: BP (!) 122/48   Pulse 66   Temp 98.3 °F (36.8 °C)   Resp 16   Ht 5' 10\" (1.778 m)   Wt 68 kg (149 lb 14.6 oz)   SpO2 96%   BMI 21.51 kg/m²     Vitals:    01/21/24 0155   Weight: 68 kg (149 lb 14.6 oz)     Orthostatic Blood Pressures      Flowsheet Row Most Recent Value   Blood Pressure 122/48 filed at " "01/23/2024 0706   Patient Position - Orthostatic VS Lying filed at 01/22/2024 1510              Intake/Output Summary (Last 24 hours) at 1/23/2024 0928  Last data filed at 1/22/2024 1510  Gross per 24 hour   Intake 480 ml   Output 350 ml   Net 130 ml       Invasive Devices       Peripheral Intravenous Line  Duration             Peripheral IV 01/21/24 Left Antecubital 2 days    Peripheral IV 01/21/24 Left;Ventral (anterior) Forearm 2 days                    Current Facility-Administered Medications   Medication Dose Route Frequency    acetaminophen (TYLENOL) tablet 650 mg  650 mg Oral Q6H PRN    amLODIPine (NORVASC) tablet 2.5 mg  2.5 mg Oral Daily    aspirin (ECOTRIN LOW STRENGTH) EC tablet 81 mg  81 mg Oral Daily    aspirin chewable tablet 162 mg  162 mg Oral Once    cholecalciferol (VITAMIN D3) tablet 2,000 Units  2,000 Units Oral Daily    cyanocobalamin (VITAMIN B-12) tablet 1,000 mcg  1,000 mcg Oral Daily    hydrochlorothiazide (HYDRODIURIL) tablet 25 mg  25 mg Oral Daily    LORazepam (ATIVAN) tablet 1 mg  1 mg Oral BID PRN    nebivolol (BYSTOLIC) tablet 20 mg  20 mg Oral Daily    nitroglycerin (NITROSTAT) SL tablet 0.4 mg  0.4 mg Sublingual Q5 Min PRN    potassium chloride (K-DUR,KLOR-CON) CR tablet 10 mEq  10 mEq Oral Daily    pravastatin (PRAVACHOL) tablet 80 mg  80 mg Oral Daily With Dinner    ranolazine (RANEXA) 12 hr tablet 500 mg  500 mg Oral Q12H CRESENCIO    sodium chloride (PF) 0.9 % injection 3 mL  3 mL Intravenous Q1H PRN    ticagrelor (BRILINTA) tablet 90 mg  90 mg Oral Q12H CRESENCIO         Physical Exam: BP (!) 122/48   Pulse 66   Temp 98.3 °F (36.8 °C)   Resp 16   Ht 5' 10\" (1.778 m)   Wt 68 kg (149 lb 14.6 oz)   SpO2 96%   BMI 21.51 kg/m²     General Appearance:    Alert, cooperative, no distress, appears stated age   Head:    Normocephalic, no scleral icterus   Eyes:    PERRL   Nose:   Nares normal, septum midline, no drainage    Throat:   Lips, mucosa, and tongue normal   Neck:   Supple, " symmetrical, trachea midline,       no carotid    bruit or JVD   Back:     Symmetric, no CVA tenderness   Lungs:     Clear to auscultation bilaterally, respirations unlabored   Chest Wall:    No tenderness or deformity    Heart:    Regular rate and rhythm, S1 and S2 normal, no murmur, rub   or gallop   Abdomen:     Soft, non-tender, bowel sounds active all four quadrants,     no masses, no organomegaly   Extremities:   Extremities normal, atraumatic, no cyanosis or edema   Pulses:   2+ and symmetric all extremities   Skin:   Skin color, texture, turgor normal, no rashes or lesions   Neurologic:   Alert and oriented to person place and time, no focal deficits                 Lab Results:   Recent Results (from the past 72 hour(s))   CBC and differential    Collection Time: 01/21/24  1:50 AM   Result Value Ref Range    WBC 5.88 4.31 - 10.16 Thousand/uL    RBC 1.92 (L) 3.88 - 5.62 Million/uL    Hemoglobin 6.4 (LL) 12.0 - 17.0 g/dL    Hematocrit 19.2 (L) 36.5 - 49.3 %     (H) 82 - 98 fL    MCH 33.3 26.8 - 34.3 pg    MCHC 33.3 31.4 - 37.4 g/dL    RDW 13.4 11.6 - 15.1 %    MPV 10.8 8.9 - 12.7 fL    Platelets 331 149 - 390 Thousands/uL    nRBC 0 /100 WBCs    Neutrophils Relative 73 43 - 75 %    Immat GRANS % 0 0 - 2 %    Lymphocytes Relative 18 14 - 44 %    Monocytes Relative 7 4 - 12 %    Eosinophils Relative 2 0 - 6 %    Basophils Relative 0 0 - 1 %    Neutrophils Absolute 4.25 1.85 - 7.62 Thousands/µL    Immature Grans Absolute 0.02 0.00 - 0.20 Thousand/uL    Lymphocytes Absolute 1.05 0.60 - 4.47 Thousands/µL    Monocytes Absolute 0.42 0.17 - 1.22 Thousand/µL    Eosinophils Absolute 0.13 0.00 - 0.61 Thousand/µL    Basophils Absolute 0.01 0.00 - 0.10 Thousands/µL   Lipid panel    Collection Time: 01/21/24  1:50 AM   Result Value Ref Range    Cholesterol 141 See Comment mg/dL    Triglycerides 219 (H) See Comment mg/dL    HDL, Direct 34 (L) >=40 mg/dL    LDL Calculated 63 0 - 100 mg/dL    Non-HDL-Chol (CHOL-HDL) 107  "mg/dl   Magnesium    Collection Time: 01/21/24  1:50 AM   Result Value Ref Range    Magnesium 1.9 1.9 - 2.7 mg/dL   Protime-INR    Collection Time: 01/21/24  1:50 AM   Result Value Ref Range    Protime 13.5 11.6 - 14.5 seconds    INR 0.97 0.84 - 1.19   APTT    Collection Time: 01/21/24  1:50 AM   Result Value Ref Range    PTT 23 23 - 37 seconds   HS Troponin 0hr (reflex protocol)    Collection Time: 01/21/24  1:50 AM   Result Value Ref Range    hs TnI 0hr 417 (H) \"Refer to ACS Flowchart\"- see link ng/L   Comprehensive metabolic panel    Collection Time: 01/21/24  1:50 AM   Result Value Ref Range    Sodium 137 135 - 147 mmol/L    Potassium 3.8 3.5 - 5.3 mmol/L    Chloride 102 96 - 108 mmol/L    CO2 26 21 - 32 mmol/L    ANION GAP 9 mmol/L    BUN 25 5 - 25 mg/dL    Creatinine 1.08 0.60 - 1.30 mg/dL    Glucose 139 65 - 140 mg/dL    Calcium 9.4 8.4 - 10.2 mg/dL    AST 31 13 - 39 U/L    ALT 61 (H) 7 - 52 U/L    Alkaline Phosphatase 80 34 - 104 U/L    Total Protein 6.8 6.4 - 8.4 g/dL    Albumin 4.1 3.5 - 5.0 g/dL    Total Bilirubin 0.54 0.20 - 1.00 mg/dL    eGFR 62 ml/min/1.73sq m   Type and screen    Collection Time: 01/21/24  1:50 AM   Result Value Ref Range    ABO Grouping A     Rh Factor Negative     Antibody Screen Negative     Specimen Expiration Date 20240124    Hemoglobin A1C    Collection Time: 01/21/24  1:50 AM   Result Value Ref Range    Hemoglobin A1C 7.5 (H) Normal 4.0-5.6%; PreDiabetic 5.7-6.4%; Diabetic >=6.5%; Glycemic control for adults with diabetes <7.0% %     mg/dl   Vitamin B12    Collection Time: 01/21/24  1:50 AM   Result Value Ref Range    Vitamin B-12 422 180 - 914 pg/mL   LD,Blood    Collection Time: 01/21/24  1:50 AM   Result Value Ref Range     140 - 271 U/L   TSH, 3rd generation with Free T4 reflex    Collection Time: 01/21/24  1:50 AM   Result Value Ref Range    TSH 3RD GENERATON 1.299 0.450 - 4.500 uIU/mL   TIBC Panel (incl. Iron, TIBC, % Iron Saturation)    Collection Time: " 01/21/24  1:50 AM   Result Value Ref Range    Iron Saturation      TIBC <339 250 - 450 ug/dL    Iron 284 (H) 50 - 212 ug/dL    UIBC <55 (L) 155 - 355 ug/dL   Ferritin    Collection Time: 01/21/24  1:50 AM   Result Value Ref Range    Ferritin 1,145 (H) 24 - 336 ng/mL   ECG 12 lead    Collection Time: 01/21/24  1:51 AM   Result Value Ref Range    Ventricular Rate 84 BPM    Atrial Rate 84 BPM    MA Interval 144 ms    QRSD Interval 88 ms    QT Interval 378 ms    QTC Interval 446 ms    P Axis 70 degrees    QRS Axis 52 degrees    T Wave Axis -72 degrees   Fingerstick Glucose (POCT)    Collection Time: 01/21/24  1:54 AM   Result Value Ref Range    POC Glucose 136 65 - 140 mg/dl   POCT Blood Gas (CG8+)    Collection Time: 01/21/24  2:00 AM   Result Value Ref Range    ph, Royal ISTAT 7.522 (HH) 7.300 - 7.400    pCO2, Royal i-STAT 28.8 (LL) 42.0 - 50.0 mm HG    pO2, Royal i-STAT 21.0 (L) 35.0 - 45.0 mm HG    BE, i-STAT 1 -2 - 3 mmol/L    HCO3, Royal i-STAT 23.6 (L) 24.0 - 30.0 mmol/L    CO2, i-STAT 25 21 - 32 mmol/L    O2 Sat, i-STAT 44 (L) 60 - 85 %    SODIUM, I-STAT 138 136 - 145 mmol/l    Potassium, i-STAT 3.9 3.5 - 5.3 mmol/L    Calcium, Ionized i-STAT 1.12 1.12 - 1.32 mmol/L    Hct, i-STAT 19 (L) 36.5 - 49.3 %    Hgb, i-STAT 6.5 (LL) 12.0 - 17.0 g/dl    Glucose, i-STAT 142 (H) 65 - 140 mg/dl    Specimen Type VENOUS    ECG 12 lead    Collection Time: 01/21/24  2:21 AM   Result Value Ref Range    Ventricular Rate 75 BPM    Atrial Rate 75 BPM    MA Interval 144 ms    QRSD Interval 90 ms    QT Interval 400 ms    QTC Interval 446 ms    P Axis 69 degrees    QRS Axis 49 degrees    T Wave Axis -51 degrees   ECG 12 lead    Collection Time: 01/21/24  3:03 AM   Result Value Ref Range    Ventricular Rate 73 BPM    Atrial Rate 73 BPM    MA Interval 148 ms    QRSD Interval 90 ms    QT Interval 408 ms    QTC Interval 449 ms    P [place] 74 degrees    QRS Axis 60 degrees    T Wave Axis -74 degrees   HS Troponin I 2hr    Collection Time: 01/21/24   "3:51 AM   Result Value Ref Range    hs TnI 2hr 2,161 (H) \"Refer to ACS Flowchart\"- see link ng/L    Delta 2hr hsTnI 1,744 (H) <20 ng/L   ECG 12 lead    Collection Time: 01/21/24  5:03 AM   Result Value Ref Range    Ventricular Rate 72 BPM    Atrial Rate 72 BPM    MA Interval 150 ms    QRSD Interval 94 ms    QT Interval 410 ms    QTC Interval 448 ms    P Mobile 74 degrees    QRS Axis 57 degrees    T Wave Axis -26 degrees   HS Troponin I 4hr    Collection Time: 01/21/24  6:43 AM   Result Value Ref Range    hs TnI 4hr 6,138 (H) \"Refer to ACS Flowchart\"- see link ng/L    Delta 4hr hsTnI 5,721 (H) <20 ng/L   ECG 12 lead    Collection Time: 01/21/24  6:44 AM   Result Value Ref Range    Ventricular Rate 72 BPM    Atrial Rate 72 BPM    MA Interval 156 ms    QRSD Interval 88 ms    QT Interval 394 ms    QTC Interval 431 ms    P Axis 68 degrees    QRS Axis 52 degrees    T Wave Axis -5 degrees   Hemoglobin    Collection Time: 01/21/24 11:14 AM   Result Value Ref Range    Hemoglobin 8.8 (L) 12.0 - 17.0 g/dL   Retic Count    Collection Time: 01/21/24 11:14 AM   Result Value Ref Range    Retic Ct Abs 13,600 (L) 14,356 - 105,094    Retic Ct Pct 0.50 0.37 - 1.87 %   Haptoglobin    Collection Time: 01/21/24  3:44 PM   Result Value Ref Range    Haptoglobin 220 38 - 329 mg/dL   Direct antiglobulin test    Collection Time: 01/21/24  3:44 PM   Result Value Ref Range    DIRECT FORREST Negative    Prepare Leukoreduced RBC: 2 Units    Collection Time: 01/22/24  5:47 AM   Result Value Ref Range    Unit Product Code P0648Q77     Unit Number [id number]     Unit ABO A     Unit RH NEG     Crossmatch Compatible     Unit Dispense Status Presumed Trans     Unit Product Volume 350 ml    Unit Product Code R9246V67     Unit Number [id number]     Unit ABO A     Unit RH NEG     Crossmatch Compatible     Unit Dispense Status Presumed Trans     Unit Product Volume 350 ml   CBC and differential    Collection Time: 01/22/24 12:16 PM   Result Value " Ref Range    WBC 5.50 4.31 - 10.16 Thousand/uL    RBC 2.60 (L) 3.88 - 5.62 Million/uL    Hemoglobin 8.5 (L) 12.0 - 17.0 g/dL    Hematocrit 24.3 (L) 36.5 - 49.3 %    MCV 94 82 - 98 fL    MCH 32.7 26.8 - 34.3 pg    MCHC 35.0 31.4 - 37.4 g/dL    RDW 14.9 11.6 - 15.1 %    MPV 10.7 8.9 - 12.7 fL    Platelets 242 149 - 390 Thousands/uL    nRBC 0 /100 WBCs    Neutrophils Relative 64 43 - 75 %    Immat GRANS % 0 0 - 2 %    Lymphocytes Relative 20 14 - 44 %    Monocytes Relative 13 (H) 4 - 12 %    Eosinophils Relative 3 0 - 6 %    Basophils Relative 0 0 - 1 %    Neutrophils Absolute 3.50 1.85 - 7.62 Thousands/µL    Immature Grans Absolute 0.02 0.00 - 0.20 Thousand/uL    Lymphocytes Absolute 1.10 0.60 - 4.47 Thousands/µL    Monocytes Absolute 0.72 0.17 - 1.22 Thousand/µL    Eosinophils Absolute 0.14 0.00 - 0.61 Thousand/µL    Basophils Absolute 0.02 0.00 - 0.10 Thousands/µL   Comprehensive metabolic panel    Collection Time: 01/22/24 12:16 PM   Result Value Ref Range    Sodium 136 135 - 147 mmol/L    Potassium 4.1 3.5 - 5.3 mmol/L    Chloride 103 96 - 108 mmol/L    CO2 28 21 - 32 mmol/L    ANION GAP 5 mmol/L    BUN 22 5 - 25 mg/dL    Creatinine 1.10 0.60 - 1.30 mg/dL    Glucose 110 65 - 140 mg/dL    Calcium 9.3 8.4 - 10.2 mg/dL    AST 41 (H) 13 - 39 U/L    ALT 47 7 - 52 U/L    Alkaline Phosphatase 73 34 - 104 U/L    Total Protein 6.2 (L) 6.4 - 8.4 g/dL    Albumin 3.8 3.5 - 5.0 g/dL    Total Bilirubin 1.11 (H) 0.20 - 1.00 mg/dL    eGFR 60 ml/min/1.73sq m   CBC and differential    Collection Time: 01/23/24  6:31 AM   Result Value Ref Range    WBC 4.63 4.31 - 10.16 Thousand/uL    RBC 2.55 (L) 3.88 - 5.62 Million/uL    Hemoglobin 8.2 (L) 12.0 - 17.0 g/dL    Hematocrit 23.6 (L) 36.5 - 49.3 %    MCV 93 82 - 98 fL    MCH 32.2 26.8 - 34.3 pg    MCHC 34.7 31.4 - 37.4 g/dL    RDW 14.2 11.6 - 15.1 %    MPV 10.6 8.9 - 12.7 fL    Platelets 222 149 - 390 Thousands/uL    nRBC 0 /100 WBCs    Neutrophils Relative 51 43 - 75 %    Immat GRANS %  0 0 - 2 %    Lymphocytes Relative 28 14 - 44 %    Monocytes Relative 13 (H) 4 - 12 %    Eosinophils Relative 7 (H) 0 - 6 %    Basophils Relative 1 0 - 1 %    Neutrophils Absolute 2.38 1.85 - 7.62 Thousands/µL    Immature Grans Absolute 0.01 0.00 - 0.20 Thousand/uL    Lymphocytes Absolute 1.29 0.60 - 4.47 Thousands/µL    Monocytes Absolute 0.61 0.17 - 1.22 Thousand/µL    Eosinophils Absolute 0.31 0.00 - 0.61 Thousand/µL    Basophils Absolute 0.03 0.00 - 0.10 Thousands/µL   Comprehensive metabolic panel    Collection Time: 01/23/24  6:31 AM   Result Value Ref Range    Sodium 138 135 - 147 mmol/L    Potassium 3.5 3.5 - 5.3 mmol/L    Chloride 104 96 - 108 mmol/L    CO2 26 21 - 32 mmol/L    ANION GAP 8 mmol/L    BUN 22 5 - 25 mg/dL    Creatinine 1.08 0.60 - 1.30 mg/dL    Glucose 103 65 - 140 mg/dL    Calcium 9.2 8.4 - 10.2 mg/dL    AST 31 13 - 39 U/L    ALT 42 7 - 52 U/L    Alkaline Phosphatase 78 34 - 104 U/L    Total Protein 6.1 (L) 6.4 - 8.4 g/dL    Albumin 3.7 3.5 - 5.0 g/dL    Total Bilirubin 0.99 0.20 - 1.00 mg/dL    eGFR 62 ml/min/1.73sq m     Imaging: I have personally reviewed pertinent reports.        Counseling / Coordination of Care  Total time spent today 30 minutes. Greater than 50% of total time was spent with the patient and / or family counseling and / or coordination of care.

## 2024-01-24 ENCOUNTER — TELEPHONE (OUTPATIENT)
Dept: GASTROENTEROLOGY | Facility: AMBULARY SURGERY CENTER | Age: 84
End: 2024-01-24

## 2024-01-24 ENCOUNTER — APPOINTMENT (INPATIENT)
Dept: RADIOLOGY | Facility: HOSPITAL | Age: 84
DRG: 811 | End: 2024-01-24
Payer: COMMERCIAL

## 2024-01-24 ENCOUNTER — APPOINTMENT (INPATIENT)
Dept: CT IMAGING | Facility: HOSPITAL | Age: 84
DRG: 811 | End: 2024-01-24
Attending: RADIOLOGY
Payer: COMMERCIAL

## 2024-01-24 VITALS
RESPIRATION RATE: 25 BRPM | TEMPERATURE: 98.6 F | OXYGEN SATURATION: 100 % | WEIGHT: 149.91 LBS | HEIGHT: 70 IN | SYSTOLIC BLOOD PRESSURE: 126 MMHG | DIASTOLIC BLOOD PRESSURE: 58 MMHG | BODY MASS INDEX: 21.46 KG/M2 | HEART RATE: 68 BPM

## 2024-01-24 DIAGNOSIS — D53.9 MACROCYTIC ANEMIA: ICD-10-CM

## 2024-01-24 DIAGNOSIS — D70.8 OTHER NEUTROPENIA (HCC): Primary | ICD-10-CM

## 2024-01-24 PROBLEM — R13.19 OTHER DYSPHAGIA: Status: ACTIVE | Noted: 2024-01-24

## 2024-01-24 PROCEDURE — 88185 FLOWCYTOMETRY/TC ADD-ON: CPT | Performed by: INTERNAL MEDICINE

## 2024-01-24 PROCEDURE — 88264 CHROMOSOME ANALYSIS 20-25: CPT | Performed by: INTERNAL MEDICINE

## 2024-01-24 PROCEDURE — 88184 FLOWCYTOMETRY/ TC 1 MARKER: CPT | Performed by: INTERNAL MEDICINE

## 2024-01-24 PROCEDURE — 85097 BONE MARROW INTERPRETATION: CPT | Performed by: STUDENT IN AN ORGANIZED HEALTH CARE EDUCATION/TRAINING PROGRAM

## 2024-01-24 PROCEDURE — 81455 SO/HL 51/>GSAP DNA/DNA&RNA: CPT | Performed by: INTERNAL MEDICINE

## 2024-01-24 PROCEDURE — 88341 IMHCHEM/IMCYTCHM EA ADD ANTB: CPT | Performed by: STUDENT IN AN ORGANIZED HEALTH CARE EDUCATION/TRAINING PROGRAM

## 2024-01-24 PROCEDURE — 88374 M/PHMTRC ALYS ISHQUANT/SEMIQ: CPT

## 2024-01-24 PROCEDURE — 88377 M/PHMTRC ALYS ISHQUANT/SEMIQ: CPT | Performed by: INTERNAL MEDICINE

## 2024-01-24 PROCEDURE — NC001 PR NO CHARGE: Performed by: INTERNAL MEDICINE

## 2024-01-24 PROCEDURE — 88364 INSITU HYBRIDIZATION (FISH): CPT | Performed by: STUDENT IN AN ORGANIZED HEALTH CARE EDUCATION/TRAINING PROGRAM

## 2024-01-24 PROCEDURE — 88365 INSITU HYBRIDIZATION (FISH): CPT | Performed by: STUDENT IN AN ORGANIZED HEALTH CARE EDUCATION/TRAINING PROGRAM

## 2024-01-24 PROCEDURE — 99239 HOSP IP/OBS DSCHRG MGMT >30: CPT | Performed by: PHYSICIAN ASSISTANT

## 2024-01-24 PROCEDURE — 07DR3ZX EXTRACTION OF ILIAC BONE MARROW, PERCUTANEOUS APPROACH, DIAGNOSTIC: ICD-10-PCS | Performed by: RADIOLOGY

## 2024-01-24 PROCEDURE — 38222 DX BONE MARROW BX & ASPIR: CPT

## 2024-01-24 PROCEDURE — 99232 SBSQ HOSP IP/OBS MODERATE 35: CPT | Performed by: INTERNAL MEDICINE

## 2024-01-24 PROCEDURE — 77012 CT SCAN FOR NEEDLE BIOPSY: CPT

## 2024-01-24 PROCEDURE — NC001 PR NO CHARGE: Performed by: RADIOLOGY

## 2024-01-24 PROCEDURE — 88305 TISSUE EXAM BY PATHOLOGIST: CPT | Performed by: STUDENT IN AN ORGANIZED HEALTH CARE EDUCATION/TRAINING PROGRAM

## 2024-01-24 PROCEDURE — 88313 SPECIAL STAINS GROUP 2: CPT | Performed by: STUDENT IN AN ORGANIZED HEALTH CARE EDUCATION/TRAINING PROGRAM

## 2024-01-24 PROCEDURE — 88237 TISSUE CULTURE BONE MARROW: CPT | Performed by: INTERNAL MEDICINE

## 2024-01-24 PROCEDURE — 079T3ZX DRAINAGE OF BONE MARROW, PERCUTANEOUS APPROACH, DIAGNOSTIC: ICD-10-PCS | Performed by: RADIOLOGY

## 2024-01-24 PROCEDURE — 88311 DECALCIFY TISSUE: CPT | Performed by: STUDENT IN AN ORGANIZED HEALTH CARE EDUCATION/TRAINING PROGRAM

## 2024-01-24 PROCEDURE — 99152 MOD SED SAME PHYS/QHP 5/>YRS: CPT | Performed by: RADIOLOGY

## 2024-01-24 PROCEDURE — 88342 IMHCHEM/IMCYTCHM 1ST ANTB: CPT | Performed by: STUDENT IN AN ORGANIZED HEALTH CARE EDUCATION/TRAINING PROGRAM

## 2024-01-24 PROCEDURE — 38222 DX BONE MARROW BX & ASPIR: CPT | Performed by: RADIOLOGY

## 2024-01-24 PROCEDURE — 99152 MOD SED SAME PHYS/QHP 5/>YRS: CPT

## 2024-01-24 PROCEDURE — 77012 CT SCAN FOR NEEDLE BIOPSY: CPT | Performed by: RADIOLOGY

## 2024-01-24 PROCEDURE — C1830 POWER BONE MARROW BX NEEDLE: HCPCS

## 2024-01-24 RX ORDER — MIDAZOLAM HYDROCHLORIDE 2 MG/2ML
INJECTION, SOLUTION INTRAMUSCULAR; INTRAVENOUS AS NEEDED
Status: COMPLETED | OUTPATIENT
Start: 2024-01-24 | End: 2024-01-24

## 2024-01-24 RX ORDER — RANOLAZINE 500 MG/1
500 TABLET, EXTENDED RELEASE ORAL EVERY 12 HOURS SCHEDULED
Qty: 60 TABLET | Refills: 0 | Status: SHIPPED | OUTPATIENT
Start: 2024-01-24

## 2024-01-24 RX ORDER — AMLODIPINE BESYLATE 2.5 MG/1
2.5 TABLET ORAL DAILY
Qty: 30 TABLET | Refills: 0 | Status: SHIPPED | OUTPATIENT
Start: 2024-01-25

## 2024-01-24 RX ORDER — NITROGLYCERIN 0.4 MG/1
0.4 TABLET SUBLINGUAL
Qty: 30 TABLET | Refills: 0 | Status: SHIPPED | OUTPATIENT
Start: 2024-01-24

## 2024-01-24 RX ORDER — LIDOCAINE WITH 8.4% SOD BICARB 0.9%(10ML)
SYRINGE (ML) INJECTION AS NEEDED
Status: COMPLETED | OUTPATIENT
Start: 2024-01-24 | End: 2024-01-24

## 2024-01-24 RX ORDER — FENTANYL CITRATE 50 UG/ML
INJECTION, SOLUTION INTRAMUSCULAR; INTRAVENOUS AS NEEDED
Status: COMPLETED | OUTPATIENT
Start: 2024-01-24 | End: 2024-01-24

## 2024-01-24 RX ADMIN — POTASSIUM CHLORIDE 10 MEQ: 750 TABLET, EXTENDED RELEASE ORAL at 10:00

## 2024-01-24 RX ADMIN — Medication 5 ML: at 13:39

## 2024-01-24 RX ADMIN — AMLODIPINE BESYLATE 2.5 MG: 2.5 TABLET ORAL at 10:00

## 2024-01-24 RX ADMIN — ASPIRIN 81 MG: 81 TABLET, COATED ORAL at 10:00

## 2024-01-24 RX ADMIN — FENTANYL CITRATE 50 MCG: 50 INJECTION INTRAMUSCULAR; INTRAVENOUS at 13:30

## 2024-01-24 RX ADMIN — EPOETIN ALFA 6800 UNITS: 4000 SOLUTION INTRAVENOUS; SUBCUTANEOUS at 16:35

## 2024-01-24 RX ADMIN — TICAGRELOR 90 MG: 90 TABLET ORAL at 10:00

## 2024-01-24 RX ADMIN — RANOLAZINE 500 MG: 500 TABLET, FILM COATED, EXTENDED RELEASE ORAL at 10:00

## 2024-01-24 RX ADMIN — NEBIVOLOL 20 MG: 10 TABLET ORAL at 10:00

## 2024-01-24 RX ADMIN — Medication 2000 UNITS: at 10:00

## 2024-01-24 RX ADMIN — HYDROCHLOROTHIAZIDE 25 MG: 25 TABLET ORAL at 10:00

## 2024-01-24 RX ADMIN — MIDAZOLAM 1 MG: 1 INJECTION INTRAMUSCULAR; INTRAVENOUS at 13:30

## 2024-01-24 RX ADMIN — LORAZEPAM 1 MG: 1 TABLET ORAL at 15:52

## 2024-01-24 RX ADMIN — PRAVASTATIN SODIUM 80 MG: 80 TABLET ORAL at 15:52

## 2024-01-24 RX ADMIN — CYANOCOBALAMIN TAB 500 MCG 1000 MCG: 500 TAB at 10:00

## 2024-01-24 NOTE — PROGRESS NOTES
"The history and physical were reviewed, along with progress notes, and the patient was examined. There are no changes since it was written.    /60   Pulse 65   Temp 98.6 °F (37 °C)   Resp 18   Ht 5' 10\" (1.778 m)   Wt 68 kg (149 lb 14.6 oz)   SpO2 95%   BMI 21.51 kg/m²      "

## 2024-01-24 NOTE — ASSESSMENT & PLAN NOTE
GI consult appreciated  They recommended an esophagram.  Esophagram unable to be scheduled today with bone marrow biopsy.  Patient does not want to stay another day in the hospital, so I spoke with GI and he can have it done as an outpatient.  They will place an order in [facility].

## 2024-01-24 NOTE — BRIEF OP NOTE (RAD/CATH)
IR BIOPSY BONE MARROW Procedure Note    PATIENT NAME: Gael Ferraro  : 1940  MRN: [id number]    Pre-op Diagnosis:   1. Severe anemia    2. Chest pain    3. ACS (acute coronary syndrome) (HCC)    4. Macrocytic anemia      Post-op Diagnosis:   1. Severe anemia    2. Chest pain    3. ACS (acute coronary syndrome) (HCC)    4. Macrocytic anemia        Surgeon:   Chapis Pillai MD    Assistants:   Milad Fonseca MD    Estimated Blood Loss: None    Findings: Right posterior ilium bone marrow biopsy    Specimens: 1cc and 5cc aspirations, sclerotic bone core    Complications:  None    Anesthesia: conscious sedation    Milad Fonseca MD     Date: 2024  Time: 1:51 PM

## 2024-01-24 NOTE — PROGRESS NOTES
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

## 2024-01-24 NOTE — DISCHARGE SUMMARY
[facility]  Discharge- Gael Ferraro 1940, 83 y.o. male MRN: [id number]  Unit/Bed#: S -01 Encounter: [id number]  Primary Care Provider: Mike Lyman MD   Date and time admitted to hospital: 1/21/2024  1:47 AM    * Macrocytic anemia  Assessment & Plan  Presented with CP.  Known multivessel CAD.  Hgb 6.4 on arrival.    Receiving 2 units PRBC-for hemoglobin at 8.2.  Following with [facility]  Most recent H&H is more than 8   Seen by oncology and s/p bone marrow biopsy 1/24. Will need outpatient follow up for results and CBC  Patient denies melena or hematochezia but has longstanding history of intermittent solid-food dysphagia for which he has had an esophagram in 2021 which showed Schatzki's ring and mild esophageal dymotility with delay in passage of barium tablet.     Chest pain with elevated troponin  Assessment & Plan  Presented with CP with radiation to arm and jaw.  Pain improved with oxygen,  mg total and transfusion of packed RBC..  Likely etiology: Type II MI in the setting of acute anemia with underlying multivessel CAD  Trop peaked at 6 K, but no repeat as per cardiology   Recent PCI and left heart cath.    No intervention as per  cardiology   Patient was started on amlodipine 2.5mg daily, NTG, and Ranexa  No further chest pain since transfusion.  Keep hemoglobin > 8 in view of underlying coronary artery disease.    Coronary artery disease involving native coronary artery of native heart with angina pectoris (HCC)  Assessment & Plan  Hx remote PCI  Admitted 12/31-1/3  TTE: LVEF 60%, normal wall motion, normal diastolic function, mild MR and mild TR  [facility] 1/2/24  Severe, chronic multivessel CAD.  Ostial LM disease, small LAD with moderate diffuse disease, LCx has 2 patent stents with moderate progression of disease to each stent,  of RCA with collaterals. LVEDP normal.  Continue ASA, statin, bystolic and brilinta   Outpatient Cardiology follow up.  Not a  candidate for revascularization until anemia workup complete    Other dysphagia  Assessment & Plan  GI consult appreciated  They recommended an esophagram.  Esophagram unable to be scheduled today with bone marrow biopsy.  Patient does not want to stay another day in the hospital, so I spoke with GI and he can have it done as an outpatient.  They will place an order in epic.    Stage 3 chronic kidney disease (HCC)  Assessment & Plan  Creatinine stable and at baseline  Monitor BMP  Avoid hypotension, nephrotoxins  BMP stable.    Primary hypertension  Assessment & Plan  Stable on home regimen  HCTZ 25 mg qd  Bystolic 20 mg qd  /48      Medical Problems       Resolved Problems  Date Reviewed: 1/24/2024   None       Discharging Physician / Practitioner: Fanny Eisenberg PA-C  PCP: Mike Lyman MD  Admission Date:   Admission Orders (From admission, onward)       Ordered        01/21/24 0329  INPATIENT ADMISSION  Once                          Discharge Date: 01/24/24    Consultations During Hospital Stay:  Dr. Trevino  IR  Oncology  Dr. Nayak    Procedures Performed:     IR bone marrow biopsy 1/24    CXR  No acute cardiopulmonary disease       Significant Findings / Test Results:   See above    Incidental Findings:   none     Test Results Pending at Discharge (will require follow up):   Bone marrow biopsy per Oncology     Outpatient Tests Requested:  CBC in 1 week  Esophagram per GI    Complications:  none    Reason for Admission: chest pain    Hospital Course:   Gael Ferraro is a 83 y.o. male patient who originally presented to the hospital on 1/21/2024 due to chest pain.  He was found to have troponin elevation.  Patient was seen in consultation by cardiology and it was felt that he had a type II MI in the setting of anemia and severe coronary artery disease.  Patient's symptoms improved with 2 units of packed red blood cells.  Patient is not a candidate for revascularization until his anemia is worked up.  "Patient was started on amlodipine 2.5mg daily, NTG, and Ranexa.  Oncology was consulted and a bone marrow biopsy was performed 1/24.  He will need to follow-up with oncology as an outpatient for results.  Recommend an outpatient CBC in 1 week.  Will need to keep Hb > 8.0. Patient was also seen in consultation by GI.  No overt signs of bleeding in the setting of his anemia.  Patient did report intermittent solid food dysphagia.  They recommend an esophagram.  The esophagram was scheduled inpatient, but patient did not want to stay another night to have it completed.  GI will place an order for it to be done as an outpatient.  Patient is currently chest pain-free he will be discharged home in stable condition he will need close GI, hematology, and cardiology follow-up.    Please see above list of diagnoses and related plan for additional information.     Condition at Discharge: good    Discharge Day Visit / Exam:   Subjective:  Very anxious to go home. Denies chest pain. Eating without difficulty.   Vitals: Blood Pressure: 126/58 (01/24/24 1345)  Pulse: 68 (01/24/24 1345)  Temperature: 98.6 °F (37 °C) (01/24/24 0758)  Temp Source: Oral (01/22/24 1510)  Respirations: (!) 25 (01/24/24 1345)  Height: 5' 10\" (177.8 cm) (01/21/24 0200)  Weight - Scale: 68 kg (149 lb 14.6 oz) (01/21/24 0155)  SpO2: 100 % (01/24/24 1340)  Exam:   Physical Exam  Vitals and nursing note reviewed.   Constitutional:       General: He is not in acute distress.     Appearance: He is well-developed.   HENT:      Head: Normocephalic and atraumatic.   Eyes:      Conjunctiva/sclera: Conjunctivae normal.   Cardiovascular:      Rate and Rhythm: Normal rate and regular rhythm.      Heart sounds: No murmur heard.  Pulmonary:      Effort: Pulmonary effort is normal. No respiratory distress.      Breath sounds: Normal breath sounds.   Abdominal:      Palpations: Abdomen is soft.      Tenderness: There is no abdominal tenderness.   Musculoskeletal:         " General: No swelling.      Cervical back: Neck supple.   Skin:     General: Skin is warm and dry.   Neurological:      Mental Status: He is alert.   Psychiatric:         Mood and Affect: Mood normal.          Discussion with Family: Updated  (wife and son) at bedside. Reviewed plan of care multiple times through the day with patient and family.     Discharge instructions/Information to patient and family:   See after visit summary for information provided to patient and family.      Provisions for Follow-Up Care:  See after visit summary for information related to follow-up care and any pertinent home health orders.      Mobility at time of Discharge:   Basic Mobility Inpatient Raw Score: 24  JH-HLM Goal: 8: Walk 250 feet or more  JH-HLM Achieved: 8: Walk 250 feet ot more  HLM Goal achieved. Continue to encourage appropriate mobility.     Disposition:   Home    Planned Readmission: none     Discharge Statement:  I spent 60 minutes discharging the patient. This time was spent on the day of discharge. I had direct contact with the patient on the day of discharge. Greater than 50% of the total time was spent examining patient, answering all patient questions, arranging and discussing plan of care with patient as well as directly providing post-discharge instructions.  Additional time then spent on discharge activities.    Discharge Medications:  See after visit summary for reconciled discharge medications provided to patient and/or family.      **Please Note: This note may have been constructed using a voice recognition system**

## 2024-01-24 NOTE — CASE MANAGEMENT
Case Management Discharge Planning Note    Patient name Gael Ferraro  Location S /S -01 MRN [id number]  : 1940 Date 2024       Current Admission Date: 2024  Current Admission Diagnosis:Macrocytic anemia   Patient Active Problem List    Diagnosis Date Noted    Other dysphagia 2024    Chest pain with elevated troponin 2023    Macrocytic anemia 2023    Coronary artery disease involving native coronary artery of native heart with angina pectoris (HCC) 2023    Stage 3 chronic kidney disease (HCC) 2023    Primary hypertension 2023    Leukopenia 2023      LOS (days): 3  Geometric Mean LOS (GMLOS) (days): 3.8  Days to GMLOS:0.3     OBJECTIVE:  Risk of Unplanned Readmission Score: 13.12         Current admission status: Inpatient   Preferred Pharmacy:   [facility]/pharm[facility] #[id number] - [place], [place] - [address] [address]  Phone: [phone] Fax: [phone]    Primary Care Provider: Mike Lyman MD    [facility] Insurance: [facility]  Secondary Insurance:     DISCHARGE DETAILS:                                                                                     IMM reviewed with patient and caregiver, patient and caregiver agrees with discharge determination.  IMM Given (Date):: 24  IMM Given to:: Patient  Family notified:: Patient, pt's wife and son at bedside

## 2024-01-24 NOTE — ASSESSMENT & PLAN NOTE
Hx remote PCI  Admitted 12/31-1/3  TTE: LVEF 60%, normal wall motion, normal diastolic function, mild MR and mild TR  [facility] 1/2/24  Severe, chronic multivessel CAD.  Ostial LM disease, small LAD with moderate diffuse disease, LCx has 2 patent stents with moderate progression of disease to each stent,  of RCA with collaterals. LVEDP normal.  Continue ASA, statin, bystolic and brilinta   Outpatient Cardiology follow up.  Not a candidate for revascularization until anemia workup complete

## 2024-01-24 NOTE — PLAN OF CARE
Problem: Potential for Falls  Goal: Patient will remain free of falls  Description: INTERVENTIONS:  - Educate patient/family on patient safety including physical limitations  - Instruct patient to call for assistance with activity   - Consult OT/PT to assist with strengthening/mobility   - Keep Call bell within reach  - Keep bed low and locked with side rails adjusted as appropriate  - Keep care items and personal belongings within reach  - Initiate and maintain comfort rounds  - Make Fall Risk Sign visible to staff  - Offer Toileting every  Hours, in advance of need  - Initiate/Maintain alarm  - Obtain necessary fall risk management equipment:   - Apply yellow socks and bracelet for high fall risk patients  - Consider moving patient to room near nurses station  Outcome: Completed     Problem: INFECTION - ADULT  Goal: Absence or prevention of progression during hospitalization  Description: INTERVENTIONS:  - Assess and monitor for signs and symptoms of infection  - Monitor lab/diagnostic results  - Monitor all insertion sites, i.e. indwelling lines, tubes, and drains  - Monitor endotracheal if appropriate and nasal secretions for changes in amount and color  - Range appropriate cooling/warming therapies per order  - Administer medications as ordered  - Instruct and encourage patient and family to use good hand hygiene technique  - Identify and instruct in appropriate isolation precautions for identified infection/condition  Outcome: Completed     Problem: DISCHARGE PLANNING  Goal: Discharge to home or other facility with appropriate resources  Description: INTERVENTIONS:  - Identify barriers to discharge w/patient and caregiver  - Arrange for needed discharge resources and transportation as appropriate  - Identify discharge learning needs (meds, wound care, etc.)  - Arrange for interpretive services to assist at discharge as needed  - Refer to Case Management Department for coordinating discharge planning if  the patient needs post-hospital services based on physician/advanced practitioner order or complex needs related to functional status, cognitive ability, or social support system  Outcome: Completed     Problem: Knowledge Deficit  Goal: Patient/family/caregiver demonstrates understanding of disease process, treatment plan, medications, and discharge instructions  Description: Complete learning assessment and assess knowledge base.  Interventions:  - Provide teaching at level of understanding  - Provide teaching via preferred learning methods  Outcome: Completed     Problem: CARDIOVASCULAR - ADULT  Goal: Maintains optimal cardiac output and hemodynamic stability  Description: INTERVENTIONS:  - Monitor I/O, vital signs and rhythm  - Monitor for S/S and trends of decreased cardiac output  - Administer and titrate ordered vasoactive medications to optimize hemodynamic stability  - Assess quality of pulses, skin color and temperature  - Assess for signs of decreased coronary artery perfusion  - Instruct patient to report change in severity of symptoms  Outcome: Completed  Goal: Absence of cardiac dysrhythmias or at baseline rhythm  Description: INTERVENTIONS:  - Continuous cardiac monitoring, vital signs, obtain 12 lead EKG if ordered  - Administer antiarrhythmic and heart rate control medications as ordered  - Monitor electrolytes and administer replacement therapy as ordered  Outcome: Completed

## 2024-01-24 NOTE — SEDATION DOCUMENTATION
Procedure ended. IR bone biopsy completed without incident. Bandaid to site. Report and care assumed by primary RN

## 2024-01-24 NOTE — ASSESSMENT & PLAN NOTE
Presented with CP.  Known multivessel CAD.  Hgb 6.4 on arrival.    Receiving 2 units PRBC-for hemoglobin at 8.2.  Following with [facility]  Most recent H&H is more than 8   Seen by oncology and s/p bone marrow biopsy 1/24. Will need outpatient follow up for results and CBC  Patient denies melena or hematochezia but has longstanding history of intermittent solid-food dysphagia for which he has had an esophagram in 2021 which showed Schatzki's ring and mild esophageal dymotility with delay in passage of barium tablet.

## 2024-01-25 ENCOUNTER — TELEPHONE (OUTPATIENT)
Dept: HEMATOLOGY ONCOLOGY | Facility: CLINIC | Age: 84
End: 2024-01-25

## 2024-01-25 NOTE — TELEPHONE ENCOUNTER
I phoned the patient and introduced myself and indicated that I was calling from [facility] to discuss his follow up appointment. Gael and I discussed that he will require a follow up to review the results of his BMBX, but that I did see he has an existing appointment with Dr. Saavedra at [facility]. I asked if he would like to continue his care with [facility] and he indicated that he would like to transfer his care to [facility] instead. Gael and I reviewed his preference for appointments, and I indicated that I would review his insurance, make an appointment ,and then call him back with the details, so as not to keep him on the phone for a prolonged time.     I verified the patient's insurance and was able to schedule a follow up appointment for him with Dr. Vuong in the [place] office at 1300 on 2/7. I phoned Gael back and we reviewed the above appointment details. He indicated that this appointment would work for him. We then reviewed the office address and location, and I provided him with the [facility] number as the office contact. Additionally, we reviewed that Dr. Vuong would like for him to have labs drawn weekly. Gael indicated that he was aware of this, as Dr. Vuong explained this to him while he was in the hospital. We reviewed that this lab can be drawn at any [facility] lab as the orders are in the system, and we reviewed the locations closest to him. I suggested that he could possibly get the labs drawn next Monday or Tuesday and then the following Monday or Tuesday, so that Dr. Vuong will have 2 sets of labs prior to the follow up appointment. Gael expressed understanding and was appreciative of the call and appointment.

## 2024-01-25 NOTE — TELEPHONE ENCOUNTER
New Patient Intake Form   Patient Details:    Gael Ferraro  1940    Appointment Information   Who is calling to schedule? Sahra Betancourt   If not self, what is the caller's name?   NA   DID YOU CONFIRM INSURANCE WITH PATIENT? E verified, Routed to finance   Referring provider Dr. Vuong   What is the diagnosis? Anemia with intermittent neutropenia      Is there a confirmed tissue diagnosis?   BMBX taken on 1/24     Is there a biopsy ordered or pending?  Please specify dates  If yes, route to /OCC   BMBX taken on 1/24     Is patient aware of diagnosis?   Yes     Have you had any imaging or labs done?  If yes, where?  (If imaging done outside of [facility], please remind patient to bring a disk.) Yes-[facility]     If imaging done at outside facility, did you instruct patient to obtain discs and bring to visit? NA   Have you been seen by another Oncologist/Hematologist?  If so, who and where? Yes-[facility]   Are the records in [facility] or Care Everywhere? Yes   Does the patient have records at another facility/hospital?    If yes, Name of facility, city and state where facility is located. Yes-[facility]     Did you instruct patient to have records faxed to [facility] and provide [facility] number?   NA   Preferred [place]   Is the patient willing to be seen by another provider?  (This is for breast patients only) NA     Did you send new patient paperwork?  Email or mail? NA   Miscellaneous Information: The patient is scheduled for his HFU appointment with Dr. Vuong on 2/7 at 1300 in the [place] office.

## 2024-01-25 NOTE — TELEPHONE ENCOUNTER
We received a referral to establish care with Hematology.  Patient has a HFU scheduled with Dr. Vuong 2/7/24 @ 1pm at [facility].    I closed referral.

## 2024-01-27 LAB — MISCELLANEOUS LAB TEST RESULT: NORMAL

## 2024-01-29 ENCOUNTER — APPOINTMENT (OUTPATIENT)
Dept: LAB | Facility: CLINIC | Age: 84
End: 2024-01-29
Payer: COMMERCIAL

## 2024-01-29 DIAGNOSIS — D70.8 OTHER NEUTROPENIA (HCC): ICD-10-CM

## 2024-01-29 DIAGNOSIS — D53.9 MACROCYTIC ANEMIA: ICD-10-CM

## 2024-01-29 LAB
BASOPHILS # BLD AUTO: 0.03 THOUSANDS/ÂΜL (ref 0–0.1)
BASOPHILS NFR BLD AUTO: 1 % (ref 0–1)
EOSINOPHIL # BLD AUTO: 0.31 THOUSAND/ÂΜL (ref 0–0.61)
EOSINOPHIL NFR BLD AUTO: 5 % (ref 0–6)
ERYTHROCYTE [DISTWIDTH] IN BLOOD BY AUTOMATED COUNT: 13.2 % (ref 11.6–15.1)
HCT VFR BLD AUTO: 23.3 % (ref 36.5–49.3)
HGB BLD-MCNC: 8 G/DL (ref 12–17)
IMM GRANULOCYTES # BLD AUTO: 0.02 THOUSAND/UL (ref 0–0.2)
IMM GRANULOCYTES NFR BLD AUTO: 0 % (ref 0–2)
LYMPHOCYTES # BLD AUTO: 1.64 THOUSANDS/ÂΜL (ref 0.6–4.47)
LYMPHOCYTES NFR BLD AUTO: 28 % (ref 14–44)
MCH RBC QN AUTO: 32.7 PG (ref 26.8–34.3)
MCHC RBC AUTO-ENTMCNC: 34.3 G/DL (ref 31.4–37.4)
MCV RBC AUTO: 95 FL (ref 82–98)
MONOCYTES # BLD AUTO: 0.67 THOUSAND/ÂΜL (ref 0.17–1.22)
MONOCYTES NFR BLD AUTO: 11 % (ref 4–12)
NEUTROPHILS # BLD AUTO: 3.19 THOUSANDS/ÂΜL (ref 1.85–7.62)
NEUTS SEG NFR BLD AUTO: 55 % (ref 43–75)
NRBC BLD AUTO-RTO: 0 /100 WBCS
PLATELET # BLD AUTO: 267 THOUSANDS/UL (ref 149–390)
PMV BLD AUTO: 11 FL (ref 8.9–12.7)
RBC # BLD AUTO: 2.45 MILLION/UL (ref 3.88–5.62)
WBC # BLD AUTO: 5.86 THOUSAND/UL (ref 4.31–10.16)

## 2024-01-29 PROCEDURE — 36415 COLL VENOUS BLD VENIPUNCTURE: CPT

## 2024-01-29 PROCEDURE — 85025 COMPLETE CBC W/AUTO DIFF WBC: CPT

## 2024-01-30 LAB — MISCELLANEOUS LAB TEST RESULT: NORMAL

## 2024-01-31 LAB — MISCELLANEOUS LAB TEST RESULT: NORMAL

## 2024-02-01 ENCOUNTER — TELEPHONE (OUTPATIENT)
Dept: CARDIOLOGY CLINIC | Facility: CLINIC | Age: 84
End: 2024-02-01

## 2024-02-01 ENCOUNTER — TELEPHONE (OUTPATIENT)
Dept: HEMATOLOGY ONCOLOGY | Facility: CLINIC | Age: 84
End: 2024-02-01

## 2024-02-01 DIAGNOSIS — D53.9 MACROCYTIC ANEMIA: Primary | ICD-10-CM

## 2024-02-01 DIAGNOSIS — N18.30 STAGE 3 CHRONIC KIDNEY DISEASE, UNSPECIFIED WHETHER STAGE 3A OR 3B CKD (HCC): ICD-10-CM

## 2024-02-01 DIAGNOSIS — I10 PRIMARY HYPERTENSION: ICD-10-CM

## 2024-02-01 LAB — MISCELLANEOUS LAB TEST RESULT: NORMAL

## 2024-02-01 RX ORDER — POTASSIUM CHLORIDE 750 MG/1
10 CAPSULE, EXTENDED RELEASE ORAL DAILY
Qty: 90 CAPSULE | Refills: 3 | Status: ON HOLD | OUTPATIENT
Start: 2024-02-01 | End: 2024-02-05 | Stop reason: SDUPTHER

## 2024-02-01 NOTE — TELEPHONE ENCOUNTER
Patient Call    Who are you speaking with? Patient    If it is not the patient, are they listed on an active communication consent form? N/A   What is the reason for this call? Patient would like a call and go over the results of his bone marrow biopsy    Does this require a call back? Yes   If a call back is required, please list [facility] call back number [phone]   If a call back is required, advise that a message will be forwarded to their care team and someone will return their call as soon as possible.   Did you relay this information to the patient? Yes

## 2024-02-01 NOTE — TELEPHONE ENCOUNTER
Returned call to patient.  Explained bone marrow biopsy results are still pending  Patient asked about his recent CBCD - hgb = 8.0.  Patient reports he is feeling weak.    Discussed with Dr. Vuong - will order Aranesp 100mcg SQ every 2 weeks    Order placed and will send to scheduling to arrange    Patient agreeable and verbalized understanding

## 2024-02-04 ENCOUNTER — APPOINTMENT (EMERGENCY)
Dept: RADIOLOGY | Facility: HOSPITAL | Age: 84
DRG: 812 | End: 2024-02-04
Payer: COMMERCIAL

## 2024-02-04 ENCOUNTER — HOSPITAL ENCOUNTER (INPATIENT)
Facility: HOSPITAL | Age: 84
LOS: 5 days | Discharge: HOME HEALTH | DRG: 812 | End: 2024-02-10
Attending: EMERGENCY MEDICINE | Admitting: INTERNAL MEDICINE
Payer: COMMERCIAL

## 2024-02-04 DIAGNOSIS — D64.9 ANEMIA, UNSPECIFIED TYPE: ICD-10-CM

## 2024-02-04 DIAGNOSIS — R06.02 SOB (SHORTNESS OF BREATH): ICD-10-CM

## 2024-02-04 DIAGNOSIS — R79.89 ELEVATED TROPONIN: ICD-10-CM

## 2024-02-04 DIAGNOSIS — R09.81 NOSE CONGESTION: ICD-10-CM

## 2024-02-04 DIAGNOSIS — D64.9 SYMPTOMATIC ANEMIA: Primary | ICD-10-CM

## 2024-02-04 DIAGNOSIS — R93.89 ABNORMAL CHEST X-RAY: ICD-10-CM

## 2024-02-04 DIAGNOSIS — R07.9 CHEST PAIN, UNSPECIFIED TYPE: ICD-10-CM

## 2024-02-04 DIAGNOSIS — I10 PRIMARY HYPERTENSION: ICD-10-CM

## 2024-02-04 PROBLEM — R74.8 ELEVATED ALKALINE PHOSPHATASE LEVEL: Status: ACTIVE | Noted: 2024-02-04

## 2024-02-04 PROBLEM — K59.00 CONSTIPATION: Status: ACTIVE | Noted: 2024-02-04

## 2024-02-04 PROBLEM — N18.30 CKD (CHRONIC KIDNEY DISEASE) STAGE 3, GFR 30-59 ML/MIN (HCC): Status: ACTIVE | Noted: 2024-02-04

## 2024-02-04 LAB
2HR DELTA HS TROPONIN: 388 NG/L
4HR DELTA HS TROPONIN: 1896 NG/L
ABO GROUP BLD: NORMAL
ALBUMIN SERPL BCP-MCNC: 4.2 G/DL (ref 3.5–5)
ALP SERPL-CCNC: 119 U/L (ref 34–104)
ALT SERPL W P-5'-P-CCNC: 62 U/L (ref 7–52)
ANION GAP SERPL CALCULATED.3IONS-SCNC: 13 MMOL/L
APTT PPP: 29 SECONDS (ref 23–37)
AST SERPL W P-5'-P-CCNC: 35 U/L (ref 13–39)
BASOPHILS # BLD AUTO: 0.02 THOUSANDS/ÂΜL (ref 0–0.1)
BASOPHILS NFR BLD AUTO: 0 % (ref 0–1)
BILIRUB SERPL-MCNC: 0.8 MG/DL (ref 0.2–1)
BLD GP AB SCN SERPL QL: NEGATIVE
BUN SERPL-MCNC: 26 MG/DL (ref 5–25)
CALCIUM SERPL-MCNC: 9.4 MG/DL (ref 8.4–10.2)
CARDIAC TROPONIN I PNL SERPL HS: 2603 NG/L
CARDIAC TROPONIN I PNL SERPL HS: 2991 NG/L
CARDIAC TROPONIN I PNL SERPL HS: 4499 NG/L
CHLORIDE SERPL-SCNC: 99 MMOL/L (ref 96–108)
CO2 SERPL-SCNC: 23 MMOL/L (ref 21–32)
CREAT SERPL-MCNC: 1.13 MG/DL (ref 0.6–1.3)
EOSINOPHIL # BLD AUTO: 0.22 THOUSAND/ÂΜL (ref 0–0.61)
EOSINOPHIL NFR BLD AUTO: 3 % (ref 0–6)
ERYTHROCYTE [DISTWIDTH] IN BLOOD BY AUTOMATED COUNT: 13.6 % (ref 11.6–15.1)
GFR SERPL CREATININE-BSD FRML MDRD: 58 ML/MIN/1.73SQ M
GLUCOSE SERPL-MCNC: 142 MG/DL (ref 65–140)
HCT VFR BLD AUTO: 18.5 % (ref 36.5–49.3)
HGB BLD-MCNC: 6.4 G/DL (ref 12–17)
IMM GRANULOCYTES # BLD AUTO: 0.05 THOUSAND/UL (ref 0–0.2)
IMM GRANULOCYTES NFR BLD AUTO: 1 % (ref 0–2)
INR PPP: 0.96 (ref 0.84–1.19)
LYMPHOCYTES # BLD AUTO: 1.59 THOUSANDS/ÂΜL (ref 0.6–4.47)
LYMPHOCYTES NFR BLD AUTO: 18 % (ref 14–44)
MCH RBC QN AUTO: 32.3 PG (ref 26.8–34.3)
MCHC RBC AUTO-ENTMCNC: 34.6 G/DL (ref 31.4–37.4)
MCV RBC AUTO: 93 FL (ref 82–98)
MONOCYTES # BLD AUTO: 0.84 THOUSAND/ÂΜL (ref 0.17–1.22)
MONOCYTES NFR BLD AUTO: 10 % (ref 4–12)
NEUTROPHILS # BLD AUTO: 6 THOUSANDS/ÂΜL (ref 1.85–7.62)
NEUTS SEG NFR BLD AUTO: 68 % (ref 43–75)
NRBC BLD AUTO-RTO: 0 /100 WBCS
PLATELET # BLD AUTO: 364 THOUSANDS/UL (ref 149–390)
PMV BLD AUTO: 10.8 FL (ref 8.9–12.7)
POTASSIUM SERPL-SCNC: 3.9 MMOL/L (ref 3.5–5.3)
PROT SERPL-MCNC: 7 G/DL (ref 6.4–8.4)
PROTHROMBIN TIME: 13.3 SECONDS (ref 11.6–14.5)
RBC # BLD AUTO: 1.98 MILLION/UL (ref 3.88–5.62)
RH BLD: NEGATIVE
SODIUM SERPL-SCNC: 135 MMOL/L (ref 135–147)
SPECIMEN EXPIRATION DATE: NORMAL
WBC # BLD AUTO: 8.72 THOUSAND/UL (ref 4.31–10.16)

## 2024-02-04 PROCEDURE — 86901 BLOOD TYPING SEROLOGIC RH(D): CPT

## 2024-02-04 PROCEDURE — 82668 ASSAY OF ERYTHROPOIETIN: CPT

## 2024-02-04 PROCEDURE — P9040 RBC LEUKOREDUCED IRRADIATED: HCPCS

## 2024-02-04 PROCEDURE — 36430 TRANSFUSION BLD/BLD COMPNT: CPT

## 2024-02-04 PROCEDURE — 99285 EMERGENCY DEPT VISIT HI MDM: CPT

## 2024-02-04 PROCEDURE — 93005 ELECTROCARDIOGRAM TRACING: CPT

## 2024-02-04 PROCEDURE — 80053 COMPREHEN METABOLIC PANEL: CPT

## 2024-02-04 PROCEDURE — 86923 COMPATIBILITY TEST ELECTRIC: CPT

## 2024-02-04 PROCEDURE — 85025 COMPLETE CBC W/AUTO DIFF WBC: CPT

## 2024-02-04 PROCEDURE — 30233N1 TRANSFUSION OF NONAUTOLOGOUS RED BLOOD CELLS INTO PERIPHERAL VEIN, PERCUTANEOUS APPROACH: ICD-10-PCS | Performed by: EMERGENCY MEDICINE

## 2024-02-04 PROCEDURE — 86850 RBC ANTIBODY SCREEN: CPT

## 2024-02-04 PROCEDURE — 99223 1ST HOSP IP/OBS HIGH 75: CPT | Performed by: HOSPITALIST

## 2024-02-04 PROCEDURE — 85018 HEMOGLOBIN: CPT

## 2024-02-04 PROCEDURE — 85014 HEMATOCRIT: CPT

## 2024-02-04 PROCEDURE — 86900 BLOOD TYPING SEROLOGIC ABO: CPT

## 2024-02-04 PROCEDURE — 85610 PROTHROMBIN TIME: CPT

## 2024-02-04 PROCEDURE — 85730 THROMBOPLASTIN TIME PARTIAL: CPT

## 2024-02-04 PROCEDURE — 99285 EMERGENCY DEPT VISIT HI MDM: CPT | Performed by: EMERGENCY MEDICINE

## 2024-02-04 PROCEDURE — 71046 X-RAY EXAM CHEST 2 VIEWS: CPT

## 2024-02-04 PROCEDURE — 84484 ASSAY OF TROPONIN QUANT: CPT

## 2024-02-04 PROCEDURE — 36415 COLL VENOUS BLD VENIPUNCTURE: CPT

## 2024-02-04 PROCEDURE — 85060 BLOOD SMEAR INTERPRETATION: CPT | Performed by: STUDENT IN AN ORGANIZED HEALTH CARE EDUCATION/TRAINING PROGRAM

## 2024-02-04 RX ORDER — HYDROCHLOROTHIAZIDE 25 MG/1
25 TABLET ORAL DAILY
Status: DISCONTINUED | OUTPATIENT
Start: 2024-02-05 | End: 2024-02-04

## 2024-02-04 RX ORDER — POLYETHYLENE GLYCOL 3350 17 G/17G
17 POWDER, FOR SOLUTION ORAL DAILY
Status: DISCONTINUED | OUTPATIENT
Start: 2024-02-04 | End: 2024-02-10 | Stop reason: HOSPADM

## 2024-02-04 RX ORDER — ASPIRIN 81 MG/1
243 TABLET, CHEWABLE ORAL ONCE
Status: COMPLETED | OUTPATIENT
Start: 2024-02-04 | End: 2024-02-04

## 2024-02-04 RX ORDER — NEBIVOLOL 10 MG/1
20 TABLET ORAL DAILY
Status: DISCONTINUED | OUTPATIENT
Start: 2024-02-05 | End: 2024-02-04

## 2024-02-04 RX ORDER — LORAZEPAM 1 MG/1
1 TABLET ORAL 3 TIMES DAILY PRN
Status: DISCONTINUED | OUTPATIENT
Start: 2024-02-04 | End: 2024-02-10 | Stop reason: HOSPADM

## 2024-02-04 RX ORDER — AMLODIPINE BESYLATE 2.5 MG/1
2.5 TABLET ORAL DAILY
Status: DISCONTINUED | OUTPATIENT
Start: 2024-02-05 | End: 2024-02-04

## 2024-02-04 RX ORDER — PRAVASTATIN SODIUM 80 MG/1
80 TABLET ORAL
Status: DISCONTINUED | OUTPATIENT
Start: 2024-02-05 | End: 2024-02-10 | Stop reason: HOSPADM

## 2024-02-04 RX ORDER — RANOLAZINE 500 MG/1
500 TABLET, EXTENDED RELEASE ORAL EVERY 12 HOURS SCHEDULED
Status: DISCONTINUED | OUTPATIENT
Start: 2024-02-04 | End: 2024-02-10 | Stop reason: HOSPADM

## 2024-02-04 RX ORDER — SENNOSIDES 8.6 MG
1 TABLET ORAL
Status: DISCONTINUED | OUTPATIENT
Start: 2024-02-04 | End: 2024-02-10 | Stop reason: HOSPADM

## 2024-02-04 RX ORDER — MELATONIN
1000 2 TIMES DAILY
Status: DISCONTINUED | OUTPATIENT
Start: 2024-02-04 | End: 2024-02-10 | Stop reason: HOSPADM

## 2024-02-04 RX ORDER — PRAVASTATIN SODIUM 80 MG/1
80 TABLET ORAL
Status: DISCONTINUED | OUTPATIENT
Start: 2024-02-04 | End: 2024-02-04

## 2024-02-04 RX ORDER — ACETAMINOPHEN 325 MG/1
650 TABLET ORAL EVERY 6 HOURS PRN
Status: DISCONTINUED | OUTPATIENT
Start: 2024-02-04 | End: 2024-02-10 | Stop reason: HOSPADM

## 2024-02-04 RX ORDER — NITROGLYCERIN 0.4 MG/1
0.4 TABLET SUBLINGUAL
Status: DISCONTINUED | OUTPATIENT
Start: 2024-02-04 | End: 2024-02-10 | Stop reason: HOSPADM

## 2024-02-04 RX ORDER — METOPROLOL TARTRATE 1 MG/ML
5 INJECTION, SOLUTION INTRAVENOUS EVERY 6 HOURS PRN
Status: DISCONTINUED | OUTPATIENT
Start: 2024-02-04 | End: 2024-02-06

## 2024-02-04 RX ADMIN — TICAGRELOR 90 MG: 90 TABLET ORAL at 21:45

## 2024-02-04 RX ADMIN — METOPROLOL TARTRATE 5 MG: 1 INJECTION, SOLUTION INTRAVENOUS at 23:39

## 2024-02-04 RX ADMIN — RANOLAZINE 500 MG: 500 TABLET, FILM COATED, EXTENDED RELEASE ORAL at 21:45

## 2024-02-04 RX ADMIN — LORAZEPAM 1 MG: 1 TABLET ORAL at 23:38

## 2024-02-04 RX ADMIN — ASPIRIN 81 MG 243 MG: 81 TABLET ORAL at 16:24

## 2024-02-04 NOTE — ED ATTENDING ATTESTATION
2/4/2024  I, Nilton Garcia MD, saw and evaluated the patient. I have discussed the patient with the resident/non-physician practitioner and agree with the resident's/non-physician practitioner's findings, Plan of Care, and MDM as documented in the resident's/non-physician practitioner's note, except where noted. All available labs and Radiology studies were reviewed.  I was present for key portions of any procedure(s) performed by the resident/non-physician practitioner and I was immediately available to provide assistance.       At this point I agree with the current assessment done in the Emergency Department.  I have conducted an independent evaluation of this patient a history and physical is as follows:    S:  Chief Complaint   Patient presents with    Shortness of Breath     SOB and some chest discomfort with jaw pain and pain down R arm. Reports low hemoglobin in past and does feel weakness.      Gael is an 83 y.o. male who presents with the chief complaint of shortness of breath, jaw pain, and right arm pain.  This started about two days ago.  It woke him from sleep around 1 am this morning as well.  Gael has a pmh significant for CAD (catheterization last month, no stenting), anemia (in the process of being worked up),  back pain and hypertension.      O:  ED Triage Vitals   Temperature Pulse Respirations Blood Pressure SpO2   02/04/24 1605 02/04/24 1605 02/04/24 1605 02/04/24 1609 02/04/24 1605   98.1 °F (36.7 °C) 86 22 118/57 100 %      Temp Source Heart Rate Source Patient Position - Orthostatic VS BP Location FiO2 (%)   02/04/24 1605 02/04/24 1605 02/04/24 1615 02/04/24 1615 --   Oral Monitor Sitting Right arm       Pain Score       02/04/24 1615       No Pain         Physical Exam  Vitals and nursing note reviewed.   Constitutional:       General: He is in acute distress (mild).      Appearance: He is well-developed.   HENT:      Head: Normocephalic and atraumatic.   Eyes:      Pupils:  Pupils are equal, round, and reactive to light.   Neck:      Vascular: No JVD.   Cardiovascular:      Rate and Rhythm: Normal rate and regular rhythm.      Heart sounds: Normal heart sounds. No murmur heard.     No friction rub. No gallop.   Pulmonary:      Effort: Pulmonary effort is normal. No respiratory distress.      Breath sounds: Normal breath sounds. No wheezing or rales.   Chest:      Chest wall: No tenderness.   Musculoskeletal:         General: No tenderness. Normal range of motion.      Cervical back: Normal range of motion.   Skin:     General: Skin is warm and dry.      Coloration: Skin is pale.   Neurological:      General: No focal deficit present.      Mental Status: He is alert and oriented to person, place, and time.   Psychiatric:         Behavior: Behavior normal.         Thought Content: Thought content normal.         Judgment: Judgment normal.       A/P:  Background: 83 y.o. male presents with arm, jaw pain, sob, fatigue    Differential DX includes but is not limited to: recurrent anemia, atypical acs, arrhythmia, doubt pneumonia, doubt pleural effusion    Plan: cardiac workup, type and screen      ED Course     Labs Reviewed   CBC AND DIFFERENTIAL - Abnormal       Result Value Ref Range Status    WBC 8.72  4.31 - 10.16 Thousand/uL Final    RBC 1.98 (*) 3.88 - 5.62 Million/uL Final    Hemoglobin 6.4 (*) 12.0 - 17.0 g/dL Final    Hematocrit 18.5 (*) 36.5 - 49.3 % Final    MCV 93  82 - 98 fL Final    MCH 32.3  26.8 - 34.3 pg Final    MCHC 34.6  31.4 - 37.4 g/dL Final    RDW 13.6  11.6 - 15.1 % Final    MPV 10.8  8.9 - 12.7 fL Final    Platelets 364  149 - 390 Thousands/uL Final    nRBC 0  /100 WBCs Final    Neutrophils Relative 68  43 - 75 % Final    Immat GRANS % 1  0 - 2 % Final    Lymphocytes Relative 18  14 - 44 % Final    Monocytes Relative 10  4 - 12 % Final    Eosinophils Relative 3  0 - 6 % Final    Basophils Relative 0  0 - 1 % Final    Neutrophils Absolute 6.00  1.85 - 7.62  Thousands/µL Final    Immature Grans Absolute 0.05  0.00 - 0.20 Thousand/uL Final    Lymphocytes Absolute 1.59  0.60 - 4.47 Thousands/µL Final    Monocytes Absolute 0.84  0.17 - 1.22 Thousand/µL Final    Eosinophils Absolute 0.22  0.00 - 0.61 Thousand/µL Final    Basophils Absolute 0.02  0.00 - 0.10 Thousands/µL Final   COMPREHENSIVE METABOLIC PANEL - Abnormal    Sodium 135  135 - 147 mmol/L Final    Potassium 3.9  3.5 - 5.3 mmol/L Final    Chloride 99  96 - 108 mmol/L Final    CO2 23  21 - 32 mmol/L Final    ANION GAP 13  mmol/L Final    BUN 26 (*) 5 - 25 mg/dL Final    Creatinine 1.13  0.60 - 1.30 mg/dL Final    Comment: Standardized to IDMS reference method    Glucose 142 (*) 65 - 140 mg/dL Final    Comment: If the patient is fasting, the ADA then defines impaired fasting glucose as > 100 mg/dL and diabetes as > or equal to 123 mg/dL.    Calcium 9.4  8.4 - 10.2 mg/dL Final    AST 35  13 - 39 U/L Final    ALT 62 (*) 7 - 52 U/L Final    Comment: Specimen collection should occur prior to Sulfasalazine administration due to the potential for falsely depressed results.     Alkaline Phosphatase 119 (*) 34 - 104 U/L Final    Total Protein 7.0  6.4 - 8.4 g/dL Final    Albumin 4.2  3.5 - 5.0 g/dL Final    Total Bilirubin 0.80  0.20 - 1.00 mg/dL Final    Comment: Use of this assay is not recommended for patients undergoing treatment with eltrombopag due to the potential for falsely elevated results.  N-acetyl-p-benzoquinone imine (metabolite of Acetaminophen) will generate erroneously low results in samples for patients that have taken an overdose of Acetaminophen.    eGFR 58  ml/min/1.73sq m Final    Narrative:     [employer] guidelines for Chronic Kidney Disease (CKD):     Stage 1 with normal or high GFR (GFR > 90 mL/min/1.73 square meters)    Stage 2 Mild CKD (GFR = 60-89 mL/min/1.73 square meters)    Stage 3A Moderate CKD (GFR = 45-59 mL/min/1.73 square meters)    Stage 3B Moderate CKD (GFR =  "30-44 mL/min/1.73 square meters)    Stage 4 Severe CKD (GFR = 15-29 mL/min/1.73 square meters)    Stage 5 End Stage CKD (GFR <15 mL/min/1.73 square meters)  Note: GFR calculation is accurate only with a steady state creatinine   HS TROPONIN I 0HR - Abnormal    hs TnI 0hr 2,603 (*) \"Refer to ACS Flowchart\"- see link ng/L Final    Comment:                                              Initial (time 0) result  If >=50 ng/L, Myocardial injury suggested ;  Type of myocardial injury and treatment strategy  to be determined.  If 5-49 ng/L, a delta result at 2 hours and or 4 hours will be needed to further evaluate.  If <4 ng/L, and chest pain has been >3 hours since onset, patient may qualify for discharge based on the HEART score in the ED.  If <5 ng/L and <3hours since onset of chest pain, a delta result at 2 hours will be needed to further evaluate.    HS Troponin 99th Percentile URL of a Health Population=12 ng/L with a 95% Confidence Interval of 8-18 ng/L.    Second Troponin (time 2 hours)  If calculated delta >= 20 ng/L,  Myocardial injury suggested ; Type of myocardial injury and treatment strategy to be determined.  If 5-49 ng/L and the calculated delta is 5-19 ng/L, consult medical service for evaluation.  Continue evaluation for ischemia on ecg and other possible etiology and repeat hs troponin at 4 hours.  If delta is <5 ng/L at 2 hours, consider discharge based on risk stratification via the HEART score (if in ED), or JOCELIN risk score in IP/Observation.    HS Troponin 99th Percentile URL of a Health Population=12 ng/L with a 95% Confidence Interval of 8-18 ng/L.   PROTIME-INR - Normal    Protime 13.3  11.6 - 14.5 seconds Final    INR 0.96  0.84 - 1.19 Final   APTT - Normal    PTT 29  23 - 37 seconds Final    Comment: Therapeutic Heparin Range =  60-90 seconds   HS TROPONIN I 2HR   TYPE AND SCREEN    ABO Grouping A   Final    Rh Factor Negative   Final    Antibody Screen Negative   Final    Specimen Expiration " Date 20240207   Final   PREPARE LEUKOREDUCED RBC    Unit Product Code H3907J50       Unit Number [id number]       Unit ABO A       Unit RH NEG       Crossmatch Compatible   Final    Unit Dispense Status Issued       Unit Product Volume 350  mL     Unit Product Code T3301C23   Final    Unit Number [id number]   Final    Unit ABO A   Final    Unit RH NEG   Final    Crossmatch Compatible   Final    Unit Dispense Status Crossmatched   Final    Unit Product Volume 350  mL      XR chest 2 views   ED Interpretation   No acute cardiopulmonary abnormalities visualized        Medications   aspirin chewable tablet 243 mg (243 mg Oral Given 2/4/24 1624)         Critical Care Time  Procedures  Time reflects when diagnosis was documented in both MDM as applicable and the Disposition within this note       Time User Action Codes Description Comment    2/4/2024  5:35 PM Jon Broderick Add [D64.9] Symptomatic anemia     2/4/2024  5:35 PM Jon Broderick Add [R79.89] Elevated troponin           ED Disposition       ED Disposition   Admit    Condition   Stable    Date/Time   Sun Feb 4, 2024  5:36 PM    Comment   Case was discussed with Dr. Joiner and the patient's admission status was agreed to be Admission Status: observation status to the service of Dr. Joiner.               Follow-up Information    None

## 2024-02-04 NOTE — Clinical Note
Case was discussed with Dr. Joiner and the patient's admission status was agreed to be Admission Status: inpatient status to the service of Dr. Joiner.

## 2024-02-04 NOTE — ED PROVIDER NOTES
History  Chief Complaint   Patient presents with    Shortness of Breath     SOB and some chest discomfort with jaw pain and pain down R arm. Reports low hemoglobin in past and does feel weakness.      Gael is a 83 year old male with history of ACS with prior stenting, hypertension, hyperlipidemia, CKD, anemia, presenting for evaluation of shortness of breath and chest tightness/pain that radiates down his right arm and right jaw.  He states that his symptoms began today, he noticed them when he was active and ambulating.  He states he was having more chest pain earlier in the day that radiated down his right arm and right jaw, he denies any chest pain here at rest, does endorse chest tightness and shortness of breath.  He is symptoms are similar to his presentation for recent hospitalization for symptomatic anemia.  He was hospitalized less than 2 weeks ago when he was found to have hemoglobin levels in the 6s requiring transfusion, he had bone marrow biopsy done at that time to further evaluate the cause of this acute anemia, these results are still pending.  Patient denies any fever, cough, congestion.  No history of DVT or PE, he takes baby aspirin daily and Brilinta.  He denies any recent dark or bloody stools.  On chart review, patient had a cardiac echo and catheterization at the beginning of January, EF was 60%, patient has chronically occluded RCA, this was not stented at this time, no new stents were placed.      History provided by:  Patient and medical records   used: No        Prior to Admission Medications   Prescriptions Last Dose Informant Patient Reported? Taking?   Cholecalciferol 50 MCG (2000 UT) CAPS   Yes No   Sig: Take 1 capsule by mouth in the morning   LORazepam (ATIVAN) 1 mg tablet  Self Yes No   Sig: Take 1 mg by mouth 3 (three) times a day as needed for anxiety   acetaminophen (TYLENOL) 325 mg tablet  Self No No   Sig: Take 2 tablets (650 mg total) by mouth every 6  (six) hours as needed for mild pain, headaches or fever   amLODIPine (NORVASC) 2.5 mg tablet   No No   Sig: Take 1 tablet (2.5 mg total) by mouth daily   aspirin (ECOTRIN LOW STRENGTH) 81 mg EC tablet   No No   Sig: Take 1 tablet (81 mg total) by mouth daily   cyanocobalamin (VITAMIN B-12) 1000 MCG tablet  Self No No   Sig: Take 1 tablet (1,000 mcg total) by mouth daily   hydrochlorothiazide (HYDRODIURIL) 25 mg tablet  Self No No   Sig: Take 1 tablet (25 mg total) by mouth daily   nebivolol (BYSTOLIC) 20 MG tablet  Self No No   Sig: Take 1 tablet (20 mg total) by mouth daily   nitroglycerin (NITROSTAT) 0.4 mg SL tablet   No No   Sig: Place 1 tablet (0.4 mg total) under the tongue every 5 (five) minutes as needed for chest pain   potassium chloride (MICRO-K) 10 MEQ CR capsule   No No   Sig: Take 1 capsule (10 mEq total) by mouth daily   ranolazine (RANEXA) 500 mg 12 hr tablet   No No   Sig: Take 1 tablet (500 mg total) by mouth every 12 (twelve) hours   rosuvastatin (CRESTOR) 10 MG tablet   No No   Sig: Take 1 tablet (10 mg total) by mouth daily   ticagrelor (BRILINTA) 90 MG   No No   Sig: Take 1 tablet (90 mg total) by mouth every 12 (twelve) hours      Facility-Administered Medications: None       Past Medical History:   Diagnosis Date    Low hemoglobin        Past Surgical History:   Procedure Laterality Date    CARDIAC CATHETERIZATION Left 1/2/2024    Procedure: Cardiac Left Heart Cath;  Surgeon: Ana Garcia DO;  Location: [facility];  Service: Cardiology    CARDIAC CATHETERIZATION N/A 1/2/2024    Procedure: Cardiac Coronary Angiogram;  Surgeon: Ana Garcia DO;  Location: [facility];  Service: Cardiology    CARDIAC CATHETERIZATION N/A 1/2/2024    Procedure: Cardiac RHC;  Surgeon: Ana Garcia DO;  Location: [facility];  Service: Cardiology    IR BIOPSY BONE MARROW  1/24/2024       History reviewed. No pertinent family history.  I have reviewed and agree with the  history as documented.    E-Cigarette/Vaping    E-Cigarette Use Never User      E-Cigarette/Vaping Substances     Social History     Tobacco Use    Smoking status: Never    Smokeless tobacco: Never   Vaping Use    Vaping status: Never Used   Substance Use Topics    Alcohol use: Not Currently    Drug use: Never        Review of Systems   Constitutional:  Negative for chills and fever.        Generalized weakness   HENT:  Negative for ear pain and sore throat.    Eyes:  Negative for pain and visual disturbance.   Respiratory:  Positive for chest tightness and shortness of breath. Negative for cough.    Cardiovascular:  Positive for chest pain. Negative for palpitations and leg swelling.   Gastrointestinal:  Negative for abdominal pain and vomiting.   Genitourinary:  Negative for dysuria and hematuria.   Musculoskeletal:  Negative for arthralgias and back pain.   Skin:  Negative for color change and rash.   Neurological:  Negative for seizures and syncope.   All other systems reviewed and are negative.      Physical Exam  ED Triage Vitals   Temperature Pulse Respirations Blood Pressure SpO2   02/04/24 1605 02/04/24 1605 02/04/24 1605 02/04/24 1609 02/04/24 1605   98.1 °F (36.7 °C) 86 22 118/57 100 %      Temp Source Heart Rate Source Patient Position - Orthostatic VS BP Location FiO2 (%)   02/04/24 1605 02/04/24 1605 02/04/24 1615 02/04/24 1615 --   Oral Monitor Sitting Right arm       Pain Score       02/04/24 1615       No Pain             Orthostatic Vital Signs  Vitals:    02/04/24 1609 02/04/24 1615 02/04/24 1645 02/04/24 1730   BP: 118/57 118/57 122/60 114/58   Pulse:  74 70 70   Patient Position - Orthostatic VS:  Sitting Sitting Sitting       Physical Exam  Vitals and nursing note reviewed.   Constitutional:       General: He is not in acute distress.  HENT:      Head: Normocephalic and atraumatic.      Right Ear: External ear normal.      Left Ear: External ear normal.      Mouth/Throat:      Mouth: Mucous  membranes are moist.      Pharynx: Oropharynx is clear.   Eyes:      General: No scleral icterus.     Conjunctiva/sclera: Conjunctivae normal.   Cardiovascular:      Rate and Rhythm: Normal rate and regular rhythm.      Pulses: Normal pulses.      Heart sounds: Normal heart sounds.   Pulmonary:      Effort: Pulmonary effort is normal. No respiratory distress.      Breath sounds: Normal breath sounds.   Abdominal:      General: Abdomen is flat. There is no distension.      Palpations: Abdomen is soft.      Tenderness: There is no abdominal tenderness.   Musculoskeletal:         General: No tenderness or signs of injury.      Cervical back: Neck supple. No rigidity.      Right lower leg: No edema.      Left lower leg: No edema.   Skin:     General: Skin is warm.      Coloration: Skin is pale. Skin is not jaundiced.      Findings: No erythema or rash.   Neurological:      General: No focal deficit present.      Mental Status: He is alert and oriented to person, place, and time. Mental status is at baseline.   Psychiatric:         Mood and Affect: Mood normal.         Behavior: Behavior normal.         ED Medications  Medications   aspirin chewable tablet 243 mg (243 mg Oral Given 2/4/24 1624)       Diagnostic Studies  Results Reviewed       Procedure Component Value Units Date/Time    Protime-INR [[id number]]  (Normal) Collected: 02/04/24 1624    Lab Status: Final result Specimen: Blood from Arm, Right Updated: 02/04/24 1644     Protime 13.3 seconds      INR 0.96    APTT [[id number]]  (Normal) Collected: 02/04/24 1624    Lab Status: Final result Specimen: Blood from Arm, Right Updated: 02/04/24 1644     PTT 29 seconds     HS Troponin 0hr (reflex protocol) [[id number]]  (Abnormal) Collected: 02/04/24 1618    Lab Status: Final result Specimen: Blood from Arm, Right Updated: 02/04/24 1644     hs TnI 0hr 2,603 ng/L     HS Troponin I 2hr [[id number]]     Lab Status: No result Specimen: Blood     Comprehensive metabolic panel  [[id number]]  (Abnormal) Collected: 02/04/24 1618    Lab Status: Final result Specimen: Blood from Arm, Right Updated: 02/04/24 1637     Sodium 135 mmol/L      Potassium 3.9 mmol/L      Chloride 99 mmol/L      CO2 23 mmol/L      ANION GAP 13 mmol/L      BUN 26 mg/dL      Creatinine 1.13 mg/dL      Glucose 142 mg/dL      Calcium 9.4 mg/dL      AST 35 U/L      ALT 62 U/L      Alkaline Phosphatase 119 U/L      Total Protein 7.0 g/dL      Albumin 4.2 g/dL      Total Bilirubin 0.80 mg/dL      eGFR 58 ml/min/1.73sq m     Narrative:      [employer] guidelines for Chronic Kidney Disease (CKD):     Stage 1 with normal or high GFR (GFR > 90 mL/min/1.73 square meters)    Stage 2 Mild CKD (GFR = 60-89 mL/min/1.73 square meters)    Stage 3A Moderate CKD (GFR = 45-59 mL/min/1.73 square meters)    Stage 3B Moderate CKD (GFR = 30-44 mL/min/1.73 square meters)    Stage 4 Severe CKD (GFR = 15-29 mL/min/1.73 square meters)    Stage 5 End Stage CKD (GFR <15 mL/min/1.73 square meters)  Note: GFR calculation is accurate only with a steady state creatinine    CBC and differential [[id number]]  (Abnormal) Collected: 02/04/24 1618    Lab Status: Final result Specimen: Blood from Arm, Right Updated: 02/04/24 1624     WBC 8.72 Thousand/uL      RBC 1.98 Million/uL      Hemoglobin 6.4 g/dL      Hematocrit 18.5 %      MCV 93 fL      MCH 32.3 pg      MCHC 34.6 g/dL      RDW 13.6 %      MPV 10.8 fL      Platelets 364 Thousands/uL      nRBC 0 /100 WBCs      Neutrophils Relative 68 %      Immat GRANS % 1 %      Lymphocytes Relative 18 %      Monocytes Relative 10 %      Eosinophils Relative 3 %      Basophils Relative 0 %      Neutrophils Absolute 6.00 Thousands/µL      Immature Grans Absolute 0.05 Thousand/uL      Lymphocytes Absolute 1.59 Thousands/µL      Monocytes Absolute 0.84 Thousand/µL      Eosinophils Absolute 0.22 Thousand/µL      Basophils Absolute 0.02 Thousands/µL                    XR chest 2 views   ED  Interpretation by Jon Broderick DO (02/04 1640)   No acute cardiopulmonary abnormalities visualized            Procedures  ECG 12 Lead Documentation Only    Date/Time: 2/4/2024 4:17 PM    Performed by: Jon Broderick DO  Authorized by: Jon Broderick DO    Indications / Diagnosis:  Chest pain  ECG reviewed by me, the ED Provider: yes    Patient location:  ED  Previous ECG:     Previous ECG:  Compared to current    Comparison ECG info:  Depressions in lateral leads are more prominent depressions also in leads II, more prominent, also appears to have new depressions in lead I    Similarity:  Changes noted  Interpretation:     Interpretation: abnormal    Rate:     ECG rate:  74    ECG rate assessment: normal    Rhythm:     Rhythm: sinus rhythm    Ectopy:     Ectopy: none    QRS:     QRS axis:  Normal    QRS intervals:  Normal  Conduction:     Conduction: normal    ST segments:     ST segments:  Abnormal    Depression:  V5, V6, V4, II, I and III  T waves:     T waves: flattening      Flattening:  III        ED Course             HEART Risk Score      Flowsheet Row Most Recent Value   Heart Score Risk Calculator    History 1 Filed at: 02/04/2024 1744   ECG 2 Filed at: 02/04/2024 1744   Age 2 Filed at: 02/04/2024 1744   Risk Factors 2 Filed at: 02/04/2024 1744   Troponin 2 Filed at: 02/04/2024 1744   HEART Score 9 Filed at: 02/04/2024 1744                        SBIRT 20yo+      Flowsheet Row Most Recent Value   Initial Alcohol Screen: US AUDIT-C     1. How often do you have a drink containing alcohol? 0 Filed at: 02/04/2024 1628   2. How many drinks containing alcohol do you have on a typical day you are drinking?  0 Filed at: 02/04/2024 1628   3a. Male UNDER 65: How often do you have five or more drinks on one occasion? 0 Filed at: 02/04/2024 1628   3b. FEMALE Any Age, or MALE 65+: How often do you have 4 or more drinks on one occassion? 0 Filed at: 02/04/2024 1628   Audit-C Score 0  Filed at: 02/04/2024 4480   ROBSON: How many times in the past year have you...    Used an illegal drug or used a prescription medication for non-medical reasons? Never Filed at: 02/04/2024 7866                  Medical Decision Making  Gael is a 83 year old male with history of ACS with prior stenting, hypertension, hyperlipidemia, CKD, anemia, presenting for evaluation of shortness of breath and chest tightness/pain that radiates down his right arm and right jaw.  Patient was recently hospitalized for similar presentation and was found to have symptomatic anemia with hemoglobins in the sixes, unclear origin of this anemia, bone marrow biopsy results are currently pending and patient is following with heme oncology.    On arrival, vital signs were unremarkable, patient was overall well-appearing, noted pale skin appearance, pale mucous membranes.  Heart and lung sounds were normal, no lower extremity edema or tenderness.  He also states he was short of breath but did not have any significant chest pain on arrival.    Concern for symptomatic anemia, possible demand ischemia as STEMI versus NSTEMI, low suspicion for pneumonia or pneumothorax.    Patient's hemoglobin was 6.4, troponin was elevated at 2603 likely in setting of demand.  ECG did not show slightly more prominent ST depressions that were previously seen in prior EKGs.  Patient made hemodynamically stable.  Patient was agreeable to a blood transfusion, consent was obtained, 2 units of PRBCs were ordered.  I discussed findings with the patient and likely demand ischemia especially given recent catheterization on 1/1 that did not show any new significant stenosis, chronically a 100% occluded RCA which likely explains his chronic ST depressions.  Patient was agreeable to admission.    Problems Addressed:  Elevated troponin: acute illness or injury  Symptomatic anemia: acute illness or injury    Amount and/or Complexity of Data Reviewed  Labs:  ordered.  Radiology: ordered and independent interpretation performed.    Risk  OTC drugs.  Decision regarding hospitalization.          Disposition  Final diagnoses:   Symptomatic anemia   Elevated troponin     Time reflects when diagnosis was documented in both MDM as applicable and the Disposition within this note       Time User Action Codes Description Comment    2/4/2024  5:35 PM Jon Broderick Add [D64.9] Symptomatic anemia     2/4/2024  5:35 PM Jon Broderick Add [R79.89] Elevated troponin           ED Disposition       ED Disposition   Admit    Condition   Stable    Date/Time   Sun Feb 4, 2024 5434    Comment   Case was discussed with Dr. Joiner and the patient's admission status was agreed to be Admission Status: observation status to the service of Dr. Joiner.               Follow-up Information    None         Patient's Medications   Discharge Prescriptions    No medications on file     No discharge procedures on file.    PDMP Review       None             ED Provider  Attending physically available and evaluated Gael Ferraro. I managed the patient along with the ED Attending.    Electronically Signed by           Jon Broderick DO  02/04/24 1742       Jon Broderick DO  02/04/24 174

## 2024-02-04 NOTE — ED NOTES
SLIM at bedside. Will transport patient to assigned room once initial monitoring period for blood products is completed.      Ritu Sneed RN  02/04/24 2463

## 2024-02-05 DIAGNOSIS — I10 PRIMARY HYPERTENSION: ICD-10-CM

## 2024-02-05 PROBLEM — R06.02 SOB (SHORTNESS OF BREATH): Status: ACTIVE | Noted: 2024-02-05

## 2024-02-05 PROBLEM — R79.89 ABNORMAL LFTS: Status: ACTIVE | Noted: 2024-02-04

## 2024-02-05 LAB
ABO GROUP BLD BPU: NORMAL
ABO GROUP BLD BPU: NORMAL
ALBUMIN SERPL BCP-MCNC: 3.8 G/DL (ref 3.5–5)
ALP SERPL-CCNC: 115 U/L (ref 34–104)
ALT SERPL W P-5'-P-CCNC: 54 U/L (ref 7–52)
ANION GAP SERPL CALCULATED.3IONS-SCNC: 9 MMOL/L
APTT PPP: 30 SECONDS (ref 23–37)
AST SERPL W P-5'-P-CCNC: 44 U/L (ref 13–39)
BASOPHILS # BLD AUTO: 0.03 THOUSANDS/ÂΜL (ref 0–0.1)
BASOPHILS NFR BLD AUTO: 0 % (ref 0–1)
BILIRUB SERPL-MCNC: 2.05 MG/DL (ref 0.2–1)
BNP SERPL-MCNC: 1290 PG/ML (ref 0–100)
BPU ID: NORMAL
BPU ID: NORMAL
BUN SERPL-MCNC: 26 MG/DL (ref 5–25)
CALCIUM SERPL-MCNC: 9.2 MG/DL (ref 8.4–10.2)
CARDIAC TROPONIN I PNL SERPL HS: 5152 NG/L (ref 8–18)
CARDIAC TROPONIN I PNL SERPL HS: 6444 NG/L (ref 8–18)
CHLORIDE SERPL-SCNC: 103 MMOL/L (ref 96–108)
CO2 SERPL-SCNC: 25 MMOL/L (ref 21–32)
CREAT SERPL-MCNC: 1.08 MG/DL (ref 0.6–1.3)
CROSSMATCH: NORMAL
CROSSMATCH: NORMAL
EOSINOPHIL # BLD AUTO: 0.2 THOUSAND/ÂΜL (ref 0–0.61)
EOSINOPHIL NFR BLD AUTO: 2 % (ref 0–6)
ERYTHROCYTE [DISTWIDTH] IN BLOOD BY AUTOMATED COUNT: 14.7 % (ref 11.6–15.1)
GFR SERPL CREATININE-BSD FRML MDRD: 62 ML/MIN/1.73SQ M
GLUCOSE SERPL-MCNC: 121 MG/DL (ref 65–140)
HCT VFR BLD AUTO: 26.9 % (ref 36.5–49.3)
HCT VFR BLD AUTO: 28.4 % (ref 36.5–49.3)
HGB BLD-MCNC: 9.4 G/DL (ref 12–17)
HGB BLD-MCNC: 9.8 G/DL (ref 12–17)
IMM GRANULOCYTES # BLD AUTO: 0.04 THOUSAND/UL (ref 0–0.2)
IMM GRANULOCYTES NFR BLD AUTO: 0 % (ref 0–2)
INR PPP: 1.04 (ref 0.84–1.19)
LYMPHOCYTES # BLD AUTO: 1.15 THOUSANDS/ÂΜL (ref 0.6–4.47)
LYMPHOCYTES NFR BLD AUTO: 11 % (ref 14–44)
MAGNESIUM SERPL-MCNC: 2 MG/DL (ref 1.9–2.7)
MCH RBC QN AUTO: 30.5 PG (ref 26.8–34.3)
MCHC RBC AUTO-ENTMCNC: 34.9 G/DL (ref 31.4–37.4)
MCV RBC AUTO: 87 FL (ref 82–98)
MISCELLANEOUS LAB TEST RESULT: NORMAL
MONOCYTES # BLD AUTO: 0.88 THOUSAND/ÂΜL (ref 0.17–1.22)
MONOCYTES NFR BLD AUTO: 9 % (ref 4–12)
NEUTROPHILS # BLD AUTO: 7.82 THOUSANDS/ÂΜL (ref 1.85–7.62)
NEUTS SEG NFR BLD AUTO: 78 % (ref 43–75)
NRBC BLD AUTO-RTO: 0 /100 WBCS
PLATELET # BLD AUTO: 289 THOUSANDS/UL (ref 149–390)
PMV BLD AUTO: 10.5 FL (ref 8.9–12.7)
POTASSIUM SERPL-SCNC: 3.7 MMOL/L (ref 3.5–5.3)
PROCALCITONIN SERPL-MCNC: 0.11 NG/ML
PROT SERPL-MCNC: 6.3 G/DL (ref 6.4–8.4)
PROTHROMBIN TIME: 14.2 SECONDS (ref 11.6–14.5)
RBC # BLD AUTO: 3.08 MILLION/UL (ref 3.88–5.62)
SODIUM SERPL-SCNC: 137 MMOL/L (ref 135–147)
UNIT DISPENSE STATUS: NORMAL
UNIT DISPENSE STATUS: NORMAL
UNIT PRODUCT CODE: NORMAL
UNIT PRODUCT CODE: NORMAL
UNIT PRODUCT VOLUME: 350 ML
UNIT PRODUCT VOLUME: 350 ML
UNIT RH: NORMAL
UNIT RH: NORMAL
WBC # BLD AUTO: 10.12 THOUSAND/UL (ref 4.31–10.16)

## 2024-02-05 PROCEDURE — 99223 1ST HOSP IP/OBS HIGH 75: CPT | Performed by: INTERNAL MEDICINE

## 2024-02-05 PROCEDURE — 85610 PROTHROMBIN TIME: CPT

## 2024-02-05 PROCEDURE — 84484 ASSAY OF TROPONIN QUANT: CPT

## 2024-02-05 PROCEDURE — 99233 SBSQ HOSP IP/OBS HIGH 50: CPT | Performed by: INTERNAL MEDICINE

## 2024-02-05 PROCEDURE — 85730 THROMBOPLASTIN TIME PARTIAL: CPT

## 2024-02-05 PROCEDURE — 85025 COMPLETE CBC W/AUTO DIFF WBC: CPT

## 2024-02-05 PROCEDURE — 84145 PROCALCITONIN (PCT): CPT

## 2024-02-05 PROCEDURE — 84484 ASSAY OF TROPONIN QUANT: CPT | Performed by: STUDENT IN AN ORGANIZED HEALTH CARE EDUCATION/TRAINING PROGRAM

## 2024-02-05 PROCEDURE — 83735 ASSAY OF MAGNESIUM: CPT

## 2024-02-05 PROCEDURE — 83880 ASSAY OF NATRIURETIC PEPTIDE: CPT

## 2024-02-05 PROCEDURE — 80053 COMPREHEN METABOLIC PANEL: CPT

## 2024-02-05 RX ORDER — METOPROLOL TARTRATE 50 MG/1
50 TABLET, FILM COATED ORAL EVERY 8 HOURS
Status: DISCONTINUED | OUTPATIENT
Start: 2024-02-06 | End: 2024-02-06

## 2024-02-05 RX ORDER — POTASSIUM CHLORIDE 750 MG/1
10 CAPSULE, EXTENDED RELEASE ORAL DAILY
Qty: 90 CAPSULE | Refills: 3 | Status: SHIPPED | OUTPATIENT
Start: 2024-02-05

## 2024-02-05 RX ORDER — METOPROLOL TARTRATE 1 MG/ML
5 INJECTION, SOLUTION INTRAVENOUS ONCE
Status: COMPLETED | OUTPATIENT
Start: 2024-02-05 | End: 2024-02-05

## 2024-02-05 RX ADMIN — METOPROLOL TARTRATE 25 MG: 25 TABLET, FILM COATED ORAL at 15:23

## 2024-02-05 RX ADMIN — RANOLAZINE 500 MG: 500 TABLET, FILM COATED, EXTENDED RELEASE ORAL at 09:53

## 2024-02-05 RX ADMIN — POLYETHYLENE GLYCOL 3350 17 G: 17 POWDER, FOR SOLUTION ORAL at 09:52

## 2024-02-05 RX ADMIN — ASPIRIN 81 MG: 81 TABLET, COATED ORAL at 09:52

## 2024-02-05 RX ADMIN — EPOETIN ALFA 20000 UNITS: 10000 SOLUTION INTRAVENOUS; SUBCUTANEOUS at 16:46

## 2024-02-05 RX ADMIN — METOROPROLOL TARTRATE 5 MG: 5 INJECTION, SOLUTION INTRAVENOUS at 04:56

## 2024-02-05 RX ADMIN — PRAVASTATIN SODIUM 80 MG: 80 TABLET ORAL at 09:53

## 2024-02-05 RX ADMIN — SENNOSIDES 8.6 MG: 8.6 TABLET, FILM COATED ORAL at 20:54

## 2024-02-05 RX ADMIN — Medication 1000 UNITS: at 09:53

## 2024-02-05 RX ADMIN — RANOLAZINE 500 MG: 500 TABLET, FILM COATED, EXTENDED RELEASE ORAL at 20:53

## 2024-02-05 RX ADMIN — METOPROLOL TARTRATE 25 MG: 25 TABLET, FILM COATED ORAL at 09:52

## 2024-02-05 RX ADMIN — CYANOCOBALAMIN TAB 500 MCG 1000 MCG: 500 TAB at 09:52

## 2024-02-05 RX ADMIN — METOPROLOL TARTRATE 25 MG: 25 TABLET, FILM COATED ORAL at 20:54

## 2024-02-05 RX ADMIN — Medication 1000 UNITS: at 20:54

## 2024-02-05 RX ADMIN — TICAGRELOR 90 MG: 90 TABLET ORAL at 09:52

## 2024-02-05 RX ADMIN — TICAGRELOR 90 MG: 90 TABLET ORAL at 20:54

## 2024-02-05 RX ADMIN — LORAZEPAM 1 MG: 1 TABLET ORAL at 22:50

## 2024-02-05 RX ADMIN — METOPROLOL TARTRATE 5 MG: 1 INJECTION, SOLUTION INTRAVENOUS at 17:40

## 2024-02-05 RX ADMIN — METOPROLOL TARTRATE 25 MG: 25 TABLET, FILM COATED ORAL at 01:55

## 2024-02-05 RX ADMIN — LORAZEPAM 1 MG: 1 TABLET ORAL at 12:36

## 2024-02-05 NOTE — ASSESSMENT & PLAN NOTE
Chest pain lasting for 30 minutes associated with SOB.  H/o troponin elevation  EKG: Compared to previous EKG, this admission has more pronounced ST depression in Right and inferior lateral leads.   PCI: Prox RCA lesion is 100% stenosed. Not the culprit lesion. Poor target for bypass.The vessel size is small. The lesion is type C. The lesion is severely calcified. The lesion is chronically occluded.   The collateral flow was extensive in Right Posterior Descending Artery AND Third Right Posterolateral Branch.  Lab Results   Component Value Date    HSTNI0 2,603 (H) 02/04/2024    HSTNI2 2,991 (H) 02/04/2024    HSTNID2 388 (H) 02/04/2024    HSTNI4 4,499 (H) 02/04/2024    HSTNID4 1,896 (H) 02/04/2024    Chest x ray pending final read   Plan:  TYPE 2 MI- in the setting of anemia.   Cards - recommended to keep HR <70. Started him on metoprolol 5 mg every 6 hrs.   Holding heparin ACS protocol as pt already have extensive collaterals.  Pt received Aspirin 325 mg in the ED.

## 2024-02-05 NOTE — ASSESSMENT & PLAN NOTE
Chest pain lasting for 30 minutes associated with SOB.  H/o troponin elevation  EKG: Compared to previous EKG, this admission has more pronounced ST depression in Right and inferior lateral leads.   PCI: Prox RCA lesion is 100% stenosed. Not the culprit lesion. Poor target for bypass.The vessel size is small. The lesion is type C. The lesion is severely calcified. The lesion is chronically occluded.   The collateral flow was extensive in Right Posterior Descending Artery AND Third Right Posterolateral Branch.  Lab Results   Component Value Date    HSTNI0 2,603 (H) 02/04/2024    HSTNI2 2,991 (H) 02/04/2024    HSTNID2 388 (H) 02/04/2024    HSTNI4 4,499 (H) 02/04/2024    HSTNID4 1,896 (H) 02/04/2024    Chest x ray :New mild opacity in the right costophrenic sulcus which could be due to atelectasis or could be infectious/inflammatory.  Persistent opacity in the left base, likely atelectasis or scar.    Plan:  TYPE 2 MI- in the setting of anemia.   Cards - recommended to keep HR <70. Started him on metoprolol 50 mg every 6 hrs.   Holding heparin ACS protocol as pt already have extensive collaterals.  Pt received Aspirin 325 mg in the ED.

## 2024-02-05 NOTE — ASSESSMENT & PLAN NOTE
Pt c/o SOB aggravating on ambulation.   Lab Results   Component Value Date     (H) 12/31/2023    LVEF 60 01/01/2024   Echo: The estimated right ventricular systolic pressure is 58.00 mmHg. Left atrium is dilated.   Chest x ray - wet read- increased pulm vascular congestion.  Final read pending.  I/O last 3 completed shifts:  In: 936.7 [Blood:936.7]  Out: -      Plan:  Repeat BNP levels pending.No h/o CHF, never had A. Fib.   Pt appears dry and thirsty.  C/o SOB today and wearing O2 saturating at 95 percent.   His saturations went down to 77 while sitting upright. Possible Pulm HTN in the setting of elevated Rt ventricular pressures or the Beta blockers itself.   Monitor for desaturations and upscale as needed.   Spirometry.

## 2024-02-05 NOTE — ASSESSMENT & PLAN NOTE
Home meds: Amlodipine 2.5 mg hydrochlorothiazide 25 mg daily, nebivolol 20 mg daily.    Plan:  Currently receiving blood transfusion.  Due to underlying myocardial stress, HTN meds are kept on hold.  Resume from tomorrow

## 2024-02-05 NOTE — CONSULTS
Consultation - Cardiology Team 1  Gael Ferraro 83 y.o. male MRN: [id number]  Unit/Bed#: S -01 Encounter: [id number]  02/05/24  11:05 AM    Assessment/ Plan:  1. Elevated troponin  - presenting with chest pain, SOB, right arm/jaw pain  - possibly secondary to #2 +/- worsening CAD  - s/p full dose aspirin in ED  - HS troponin 2603 > 2991 > 4499 > 6444 > 5152  - presenting ECG - NSR, diffuse ST depressions, but more significant in lateral leads  - telemetry review - NSR, HR 70s, no significant events noted   - echo (1/1/2024) - LVEF 60%, no RWMA, dilated LA, mild MR/TR with estimated RVSP 58 mmHg   - LHC (1/2/2024) - severe chronic multivessel CAD; continue GDMT; consider high risk PCI to LM if symptoms were refractory vs CT Sx eval  - not a candidate for revascularization currently in the setting of recurrent anemia  - maintained on aspirin 81 mg daily, nebivolol 20 mg daily, Ranexa 500 mg twice daily, Crestor 10 mg daily, and Brilinta 90 mg twice daily outpatient  - currently on Lopressor 25 milligrams every 6 hours for heart rate goal > 70  - continue to monitor on telemetry    2. Macrocritic anemia  - hemoglobin on arrival 6.4 - baseline around 13  - repeat at 9.4 s/p 2 u PRBCs  - hematology oncology consulted     3. CAD s/p PCI  - remote history per [facility] documentation - 20 years ago  - continue aspirin, Brilinta, pravastatin, Lopressor     4. Hypertension  - last documented /60; stable  - maintained on nebivolol, chlorthalidone 25 mg daily outpatient  - currently on Lopressor     5. Hyperlipidemia  - lipid panel (1/21/2024) -/triglycerides 219/HDL 34/calculated 63  - continue pravastatin 80 mg daily     6. Carotid artery stenosis - continue aspirin, statin therapy    7. CKD stage III - a.m Cr 1.08; stable at baseline    History of Present Illness   Physician Requesting Consult: Zev Lyons*    Reason for Consult / Principal Problem: Chest pain, elevated troponin    HPI: Gael  Ronan is a 83 y.o. male with past medical history of carotid artery stenosis, hyperlipidemia, hypertension, CAD s/p PCI, and ongoing anemia who presents to the ED yesterday with complaints of chest pain with radiation to his right arm and jaw, and shortness of breath.  Upon arrival to the ED, initial troponin was almost 2603 with peak of 6444.  Presenting ECG demonstrated normal sinus rhythm with diffuse ST depressions.  CXR demonstrated a right costophrenic opacity due to atelectasis or infection/inflammatory process.  Hemoglobin on arrival was 6.4 for which he received 2 units PRBCs in the ED.  Given elevated troponin and chest pain, cardiology was consulted to rule out ACS.    Of note, patient was hospitalized from 12/30/23 to 1/3/2024 secondary to the above symptoms with concern for type I NSTEMI.  He was also noted to be anemic with an initial hemoglobin of 6.9 requiring blood transfusions.  He underwent a cardiac catheterization on 1/2/2024 that revealed severe chronic multivessel CAD for which it was recommend that he continue goal-directed medical therapy.  Patient was subsequently hospitalized from 1/21/2024 to 1/24/2024 again in the setting of the same symptoms and anemia.  He underwent a bone marrow biopsy on 1/24 with recommended follow-up with oncology outpatient.  Cardiology was following during this admission and it is documented that patient could not be be considered for revascularization until his anemia is under control.     Upon examination, patient is now asymptomatic post blood transfusions.  He currently denies any chest pain, shortness of breath, arm or jaw pain, lightheadedness, dizziness, palpitations, or diaphoresis.  Family is frustrated with his recurrent hospitalizations and are anxiously awaiting his bone marrow biopsy results.  I did discuss with them that he is not currently a candidate for any revascularization given his ongoing anemia, for which they understand.  Patient does  express that he would not want a catheterization/revascularization for which they will discuss as a family.  Patient has an outpatient cardiology follow-up on 2/8/2024.    Inpatient consult to Cardiology  Consult performed by: BRITTANY Morris  Consult ordered by: Marycruz Purcell MD      EKG: NSR, diffuse ST depressions, but more significant in lateral leads    Review of Systems   Constitutional:  Positive for fatigue.   HENT: Negative.     Eyes: Negative.    Respiratory:  Positive for shortness of breath.    Cardiovascular:  Positive for chest pain. Negative for palpitations and leg swelling.   Gastrointestinal: Negative.    Endocrine: Negative.    Genitourinary: Negative.    Musculoskeletal: Negative.    Skin: Negative.    Allergic/Immunologic: Negative.    Neurological: Negative.    Hematological: Negative.    Psychiatric/Behavioral: Negative.       Historical Information   Past Medical History:   Diagnosis Date    Low hemoglobin      Past Surgical History:   Procedure Laterality Date    CARDIAC CATHETERIZATION Left 1/2/2024    Procedure: Cardiac Left Heart Cath;  Surgeon: Ana Garcia DO;  Location: [facility];  Service: Cardiology    CARDIAC CATHETERIZATION N/A 1/2/2024    Procedure: Cardiac Coronary Angiogram;  Surgeon: Ana Garcia DO;  Location: [facility];  Service: Cardiology    CARDIAC CATHETERIZATION N/A 1/2/2024    Procedure: Cardiac RHC;  Surgeon: Ana Garcia DO;  Location: [facility];  Service: Cardiology    IR BIOPSY BONE MARROW  1/24/2024     Social History     Substance and Sexual Activity   Alcohol Use Not Currently     Social History     Substance and Sexual Activity   Drug Use Never     Social History     Tobacco Use   Smoking Status Never   Smokeless Tobacco Never     Family History: History reviewed. No pertinent family history.    Meds/Allergies   all current active meds have been reviewed  Allergies   Allergen Reactions     "Hydrocodone-Acetaminophen GI Intolerance    Niacin GI Intolerance    Penicillins Hives     Objective   Vitals: Blood pressure 116/60, pulse 80, temperature 98.5 °F (36.9 °C), resp. rate 20, height 5' 10\" (1.778 m), weight 66.1 kg (145 lb 11.6 oz), SpO2 91%., Body mass index is 20.91 kg/m².,   Orthostatic Blood Pressures      Flowsheet Row Most Recent Value   Blood Pressure 116/60 filed at 2024 0746   Patient Position - Orthostatic VS Lying filed at 2024 2336          Systolic (24hrs), Av , Min:114 , Max:141     Diastolic (24hrs), Av, Min:53, Max:69      Intake/Output Summary (Last 24 hours) at 2024 1105  Last data filed at 2024 0928  Gross per 24 hour   Intake 1296.67 ml   Output --   Net 1296.67 ml     Invasive Devices       Peripheral Intravenous Line  Duration             Peripheral IV 24 Distal;Left;Upper;Ventral (anterior) Antecubital <1 day    Peripheral IV 24 Distal;Right;Upper;Ventral (anterior) Antecubital <1 day    Peripheral IV 24 Right;Ventral (anterior) Hand <1 day                    Physical Exam  Constitutional:       General: He is not in acute distress.     Appearance: He is ill-appearing.   HENT:      Head: Normocephalic.      Mouth/Throat:      Pharynx: Oropharynx is clear.   Eyes:      Pupils: Pupils are equal, round, and reactive to light.   Cardiovascular:      Rate and Rhythm: Normal rate and regular rhythm.      Pulses: Normal pulses.      Heart sounds: Normal heart sounds. No murmur heard.  Pulmonary:      Effort: Pulmonary effort is normal.      Breath sounds: Normal breath sounds.      Comments: On RA  Abdominal:      Palpations: Abdomen is soft.   Musculoskeletal:         General: Normal range of motion.      Cervical back: Normal range of motion.      Right lower leg: No edema.      Left lower leg: No edema.   Skin:     General: Skin is warm and dry.      Capillary Refill: Capillary refill takes less than 2 seconds.   Neurological:      " General: No focal deficit present.      Mental Status: He is alert and oriented to person, place, and time.   Psychiatric:         Mood and Affect: Mood normal.         Behavior: Behavior normal.     Lab Results:   Troponins:     CBC with diff:   Results from last 7 days   Lab Units 02/05/24  0452 02/04/24  2345 02/04/24  1618 01/29/24  1137   WBC Thousand/uL 10.12  --  8.72 5.86   HEMOGLOBIN g/dL 9.4* 9.8* 6.4* 8.0*   HEMATOCRIT % 26.9* 28.4* 18.5* 23.3*   MCV fL 87  --  93 95   PLATELETS Thousands/uL 289  --  364 267   RBC Million/uL 3.08*  --  1.98* 2.45*   MCH pg 30.5  --  32.3 32.7   MCHC g/dL 34.9  --  34.6 34.3   RDW % 14.7  --  13.6 13.2   MPV fL 10.5  --  10.8 11.0   NRBC AUTO /100 WBCs 0  --  0 0     CMP:   Results from last 7 days   Lab Units 02/05/24  0452 02/04/24  1618   POTASSIUM mmol/L 3.7 3.9   CHLORIDE mmol/L 103 99   CO2 mmol/L 25 23   BUN mg/dL 26* 26*   CREATININE mg/dL 1.08 1.13   CALCIUM mg/dL 9.2 9.4   AST U/L 44* 35   ALT U/L 54* 62*   ALK PHOS U/L 115* 119*   EGFR ml/min/1.73sq m 62 58

## 2024-02-05 NOTE — QUICK NOTE
Repeat troponin was 4000.  I went to the room and assessed the patient and he did not complain of any chest pain, shortness of breath, palpitations.    Patient received his 2 packed RBCs will be rechecking at 12 AM  Patient is afebrile, blood pressure 140/60, pulse is in the 88.  SpO2 is 93% on room air  Physical exam patient appears euvolemic.  Not in acute cardiorespiratory distress, no lower leg edema  EKG still shows ST depression on V4 V5.  I did reach out to cardiology  Plan they said to continue aspirin, not a candidate for AC.  They advised to keep heart rate less than 70.  So I will start metoprolol 5 mg IV for heart rate greater than 70.  They said to keep the hemoglobin greater than 8.  Will be rechecking at 12 AM and transfuse as needed.

## 2024-02-05 NOTE — PROGRESS NOTES
[facility]  Progress Note  Name: Gael Ferraro I  MRN: [id number]  Unit/Bed#: S -01 I Date of Admission: 2/4/2024   Date of Service: 2/5/2024 I Hospital Day: 0    Assessment/Plan   * Anemia  Assessment & Plan  Lab Results   Component Value Date    HGB 9.4 (L) 02/05/2024    HGB 9.8 (L) 02/04/2024    MCV 87 02/05/2024    MCV 93 02/04/2024     Lab Results   Component Value Date    IRON 284 (H) 01/21/2024    FERRITIN 1,145 (H) 01/21/2024    TIBC <339 01/21/2024    CONCFE  01/21/2024      Comment:      Unable to Calculate.      Lab Results   Component Value Date    RETIC 13,600 (L) 01/21/2024    HAPTOGLOBIN 220 01/21/2024    DIRECTCOOMBS Negative 01/21/2024       Recent admission on 1/25/2022 for for unexplained anemia.  Previous admission with hemoglobin nearly 6,patient received 2 packed RBCs.   FISH testing: Negative  Normal male karyotype  Plasma cell testing-no diagnostic immuno phenotype identified.    Plan:  Received 2 packed RBCs in the ED.  Repeat H&H is 9.4  Continue B12 1000 mcg once daily  F/u erythropoietin levels- pending  Follow-up with bone marrow biopsy results  Heme oncology consult    Chest pain with elevated troponin  Assessment & Plan  Chest pain lasting for 30 minutes associated with SOB.  H/o troponin elevation  EKG: Compared to previous EKG, this admission has more pronounced ST depression in Right and inferior lateral leads.   PCI: Prox RCA lesion is 100% stenosed. Not the culprit lesion. Poor target for bypass.The vessel size is small. The lesion is type C. The lesion is severely calcified. The lesion is chronically occluded.   The collateral flow was extensive in Right Posterior Descending Artery AND Third Right Posterolateral Branch.  Lab Results   Component Value Date    HSTNI0 2,603 (H) 02/04/2024    HSTNI2 2,991 (H) 02/04/2024    HSTNID2 388 (H) 02/04/2024    HSTNI4 4,499 (H) 02/04/2024    HSTNID4 1,896 (H) 02/04/2024    Chest x ray pending final read    Plan:  TYPE 2 MI- in the setting of anemia.   Cards - recommended to keep HR <70. Started him on metoprolol 5 mg every 6 hrs.   Holding heparin ACS protocol as pt already have extensive collaterals.  Pt received Aspirin 325 mg in the ED.             Hypertension  Assessment & Plan  Home meds: Amlodipine 2.5 mg hydrochlorothiazide 25 mg daily, nebivolol 20 mg daily.  Blood Pressure: 116/60    Plan:  Currently receiving blood transfusion.  Due to underlying myocardial stress, HTN meds are kept on hold.  Cards - recommended to keep HR <70. Started him on metoprolol 5 mg every 6 hrs.     SOB (shortness of breath)  Assessment & Plan  Pt c/o SOB aggravating on ambulation.   Lab Results   Component Value Date    BNP 1,290 (H) 02/05/2024     (H) 12/31/2023    LVEF 60 01/01/2024   Echo: The estimated right ventricular systolic pressure is 58.00 mmHg. Left atrium is dilated.   Chest x ray - wet read- increased pulm vascular congestion.  Final read pending.  I/O last 3 completed shifts:  In: 936.7 [Blood:936.7]  Out: -      Plan:  Repeat BNP levels elevated. Possible vol overloaded state with repeat blood transfusion.  No h/o CHF, never had A. Fib.   Pt appears dry and thirsty.  Strict I and O   Daily weights.   C/o SOB today and wearing O2 saturating at 95 percent.   His saturations went down to 77 while sitting upright. Possible Pulm HTN in the setting of elevated Rt ventricular pressures or the Beta blockers itself.   Monitor for desaturations and upscale as needed.   Spirometry.              CKD (chronic kidney disease) stage 3, GFR 30-59 ml/min ([facility])  Assessment & Plan  Lab Results   Component Value Date    EGFR 62 02/05/2024    EGFR 58 02/04/2024    EGFR 62 01/23/2024    CREATININE 1.08 02/05/2024    CREATININE 1.13 02/04/2024    CREATININE 1.08 01/23/2024     Baseline cr: 1.08 to 1.10    Plan:  Avoid hypotension.  Avoid nephrotoxins    Constipation  Assessment & Plan  Yvonne lax daily  Senna daily                VTE Pharmacologic Prophylaxis: VTE Score: 3  ticagrelor    Mobility:   Basic Mobility Inpatient Raw Score: 18  JH-HLM Goal: 6: Walk 10 steps or more  JH-HLM Achieved: 3: Sit at edge of bed  HLM Goal NOT achieved. Continue with multidisciplinary rounding and encourage appropriate mobility to improve upon HLM goals.    Patient Centered Rounds: I performed bedside rounds with nursing staff today.  Discussions with Specialists or Other Care Team Provider: Hematology    Education and Discussions with Family / Patient: Updated  (wife) at bedside.    Current Length of Stay: 0 day(s)  Current Patient Status: Observation   Discharge Plan: Anticipate discharge in 48 hrs to home.    Code Status: Level 1 - Full Code    Subjective:   C/o SOB, requiring 02 2l. Saturating at 95 percent    Objective:     Vitals:   Temp (24hrs), Av.4 °F (36.9 °C), Min:97.7 °F (36.5 °C), Max:98.8 °F (37.1 °C)    Temp:  [97.7 °F (36.5 °C)-98.8 °F (37.1 °C)] 98.5 °F (36.9 °C)  HR:  [70-86] 80  Resp:  [19-22] 20  BP: (114-141)/(53-69) 116/60  SpO2:  [91 %-100 %] 91 %  Body mass index is 20.91 kg/m².     Input and Output Summary (last 24 hours):     Intake/Output Summary (Last 24 hours) at 2024 0945  Last data filed at 2024 0928  Gross per 24 hour   Intake 1296.67 ml   Output --   Net 1296.67 ml       Physical Exam:   Physical Exam  HENT:      Head: Atraumatic.      Mouth/Throat:      Pharynx: Oropharynx is clear.   Eyes:      Conjunctiva/sclera: Conjunctivae normal.   Cardiovascular:      Rate and Rhythm: Tachycardia present.      Pulses: Normal pulses.      Heart sounds: Normal heart sounds.   Pulmonary:      Breath sounds: Rales present. No wheezing.      Comments: Rt sided inspiratory crackles.   Chest:      Chest wall: No tenderness.   Musculoskeletal:         General: Normal range of motion.      Cervical back: Normal range of motion.   Skin:     General: Skin is warm.      Capillary Refill: Capillary refill takes 2  to 3 seconds.   Neurological:      Mental Status: He is oriented to person, place, and time. Mental status is at baseline.   Psychiatric:         Mood and Affect: Mood normal.          Additional Data:     Labs:  Results from last 7 days   Lab Units 02/05/24  0452   WBC Thousand/uL 10.12   HEMOGLOBIN g/dL 9.4*   HEMATOCRIT % 26.9*   PLATELETS Thousands/uL 289   NEUTROS PCT % 78*   LYMPHS PCT % 11*   MONOS PCT % 9   EOS PCT % 2     Results from last 7 days   Lab Units 02/05/24  0452   SODIUM mmol/L 137   POTASSIUM mmol/L 3.7   CHLORIDE mmol/L 103   CO2 mmol/L 25   BUN mg/dL 26*   CREATININE mg/dL 1.08   ANION GAP mmol/L 9   CALCIUM mg/dL 9.2   ALBUMIN g/dL 3.8   TOTAL BILIRUBIN mg/dL 2.05*   ALK PHOS U/L 115*   ALT U/L 54*   AST U/L 44*   GLUCOSE RANDOM mg/dL 121     Results from last 7 days   Lab Units 02/05/24  0452   INR  1.04                   Lines/Drains:  Invasive Devices       Peripheral Intravenous Line  Duration             Peripheral IV 02/04/24 Distal;Left;Upper;Ventral (anterior) Antecubital <1 day    Peripheral IV 02/04/24 Distal;Right;Upper;Ventral (anterior) Antecubital <1 day    Peripheral IV 02/04/24 Right;Ventral (anterior) Hand <1 day                      Telemetry:  Telemetry Orders (From admission, onward)               24 Hour Telemetry Monitoring  Continuous x 24 Hours (Telem)        Question:  Reason for 24 Hour Telemetry  Answer:  PCI/EP study (including pacer and ICD implementation), Cardiac surgery, MI, abnormal cardiac cath, and chest pain- rule out MI                     Telemetry Reviewed: Normal Sinus Rhythm  Indication for Continued Telemetry Use: Acute MI/Unstable Angina/Rule out ACS             Imaging: Reviewed radiology reports from this admission including: chest xray    Recent Cultures (last 7 days):         Last 24 Hours Medication List:   Current Facility-Administered Medications   Medication Dose Route Frequency Provider Last Rate    acetaminophen  650 mg Oral Q6H PRN Shannan  Lucia Leon MD      aspirin  81 mg Oral Daily Shannan Leon MD      cholecalciferol  1,000 Units Oral BID Shannan Leon MD      cyanocobalamin  1,000 mcg Oral Daily Shannan Leon MD      LORazepam  1 mg Oral TID PRN Shannan Leon MD      metoprolol  5 mg Intravenous Q6H PRN Chandana Pro MD      metoprolol tartrate  25 mg Oral Q6H Chandana Pro MD      nitroglycerin  0.4 mg Sublingual Q5 Min PRN Shannan Leon MD      polyethylene glycol  17 g Oral Daily Shannan Leon MD      pravastatin  80 mg Oral Daily With Dinner Shannan Leon MD      ranolazine  500 mg Oral Q12H CRESENCIO Shannan Leon MD      senna  1 tablet Oral HS Shannan Leon MD      ticagrelor  90 mg Oral Q12H CRESENCIO Shannan Leon MD          Today, Patient Was Seen By: Shannan Leon MD    **Please Note: This note may have been constructed using a voice recognition system.**

## 2024-02-05 NOTE — ASSESSMENT & PLAN NOTE
Home meds: Amlodipine 2.5 mg hydrochlorothiazide 25 mg daily, nebivolol 20 mg daily.  Blood Pressure: 116/60    Plan:  Currently receiving blood transfusion.  Due to underlying myocardial stress, HTN meds are kept on hold.  Cards - recommended to keep HR <70. Started him on metoprolol 5 mg every 6 hrs.

## 2024-02-05 NOTE — ASSESSMENT & PLAN NOTE
Lab Results   Component Value Date    HGB 9.4 (L) 02/05/2024    HGB 9.8 (L) 02/04/2024    MCV 87 02/05/2024    MCV 93 02/04/2024     Lab Results   Component Value Date    IRON 284 (H) 01/21/2024    FERRITIN 1,145 (H) 01/21/2024    TIBC <339 01/21/2024    CONCFE  01/21/2024      Comment:      Unable to Calculate.      Lab Results   Component Value Date    RETIC 13,600 (L) 01/21/2024    HAPTOGLOBIN 220 01/21/2024    DIRECTCOOMBS Negative 01/21/2024      Recent admission on 1/25/2022 for for unexplained anemia.  Previous admission with hemoglobin nearly 6,patient received 2 packed RBCs.   FISH testing: Negative  Normal male karyotype  Plasma cell testing-no diagnostic immuno phenotype identified.    Plan:  Received 2 packed RBCs in the ED.  Repeat H&H is 9.4. Goal to keep HB more than 8.   Continue B12 1000 mcg once daily  2/4/24: erythropoietin levels- 2759 abnormal.  Received one dose of erythropoietin on 2/5/24.  Follow-up with bone marrow biopsy results  Follow up with Dr. Au in 2 days of discharge.   Rule out GI source. Pt to follow with outpt GI for colonoscopy.

## 2024-02-05 NOTE — CONSULTS
Oncology Consult Note  Gael Ferraro 83 y.o. male MRN: [id number]  Unit/Bed#: S -01 Encounter: [id number]      Presenting Complaint: Anemia with symptomatic angina pectoralis    History of Presenting Illness: Gael Ferraro is seen for initial consultation 2/4/2024 at the referral of No ref. provider found   83-year-old who this is a second admission to [facility] regarding symptomatic anemia with angina pectoralis and elevated troponin he received packed RBC with improvement of symptoms, workup for macrocytic anemia requiring a bone marrow biopsy on the previous admission on 1/22/2024 the results are pending at the time of the dictation, the patient received Epogen prior to discharge, he had good response however hemoglobin went back to 6.8 requiring admission to the hospital with angina pectoralis    He received 3 units of packed RBC on this recent admission hemoglobin of 6.4, current hemoglobin 9.4 MCV 87 WBC 10.1, platelets 289,000    Erythropoietin level is pending    He feels much better denies any melena hematochezia hematuria night sweats low-grade fever or weight loss  Review of Systems - As stated in the HPI otherwise the fourteen point review of systems was negative.    Past Medical History:   Diagnosis Date    Low hemoglobin        Social History     Socioeconomic History    Marital status: /Civil Union     Spouse name: None    Number of children: None    Years of education: None    Highest education level: None   Occupational History    None   Tobacco Use    Smoking status: Never    Smokeless tobacco: Never   Vaping Use    Vaping status: Never Used   Substance and Sexual Activity    Alcohol use: Not Currently    Drug use: Never    Sexual activity: None   Other Topics Concern    None   Social History Narrative    None     Social Determinants of Health     Financial Resource Strain: Not on file   Food Insecurity: Not on file   Transportation Needs: Not on file   Physical Activity:  Not on file   Stress: Not on file   Social Connections: Not on file   Intimate Partner Violence: Not on file   Housing Stability: Not on file       History reviewed. No pertinent family history.    Allergies   Allergen Reactions    Hydrocodone-Acetaminophen GI Intolerance    Niacin GI Intolerance    Penicillins Hives         Current Facility-Administered Medications:     acetaminophen (TYLENOL) tablet 650 mg, 650 mg, Oral, Q6H PRN, Shannan Leon MD    aspirin (ECOTRIN LOW STRENGTH) EC tablet 81 mg, 81 mg, Oral, Daily, Shannan Leon MD, 81 mg at 02/05/24 0952    cholecalciferol (VITAMIN D3) tablet 1,000 Units, 1,000 Units, Oral, BID, Shannan Leon MD, 1,000 Units at 02/05/24 0953    cyanocobalamin (VITAMIN B-12) tablet 1,000 mcg, 1,000 mcg, Oral, Daily, Shannan Leon MD, 1,000 mcg at 02/05/24 0952    epoetin evgeny (EPOGEN,PROCRIT) injection 20,000 Units, 20,000 Units, Subcutaneous, Once, Margi Vuong MD    LORazepam (ATIVAN) tablet 1 mg, 1 mg, Oral, TID PRN, Shannan Leon MD, 1 mg at 02/05/24 1236    metoprolol (LOPRESSOR) injection 5 mg, 5 mg, Intravenous, Q6H PRN, Chandana Pro MD, 5 mg at 02/04/24 2339    metoprolol tartrate (LOPRESSOR) tablet 25 mg, 25 mg, Oral, Q6H, Chandana Pro MD, 25 mg at 02/05/24 0952    nitroglycerin (NITROSTAT) SL tablet 0.4 mg, 0.4 mg, Sublingual, Q5 Min PRN, Shannan Leon MD    polyethylene glycol (MIRALAX) packet 17 g, 17 g, Oral, Daily, Shannan Leon MD, 17 g at 02/05/24 0952    pravastatin (PRAVACHOL) tablet 80 mg, 80 mg, Oral, Daily With Dinner, Shannanjc Leon MD, 80 mg at 02/05/24 0953    ranolazine (RANEXA) 12 hr tablet 500 mg, 500 mg, Oral, Q12H CRESENCIO, Shannan Leon MD, 500 mg at 02/05/24 0953    senna (SENOKOT) tablet 8.6 mg, 1 tablet, Oral, HS, Shannan Leon MD    ticagrelor (BRILINTA) tablet 90 mg, 90 mg, Oral, Q12H CRESENCIO,  "Shannan Leon MD, 90 mg at 02/05/24 0952      /60   Pulse 65   Temp 99.7 °F (37.6 °C)   Resp 18   Ht 5' 10\" (1.778 m)   Wt 66.1 kg (145 lb 11.6 oz)   SpO2 95%   BMI 20.91 kg/m²       General Appearance:    Alert, oriented        Eyes:     Ears:     Nose:    Throat:    Neck:        Lungs:      Chest Wall:      Heart:         Abdomen:              Extremities:        Skin:    Lymph nodes:     Neurologic:   CNII-XII intact, normal strength, sensation and reflexes     Throughout               Recent Results (from the past 48 hour(s))   ECG 12 lead    Collection Time: 02/04/24  4:09 PM   Result Value Ref Range    Ventricular Rate 74 BPM    Atrial Rate 74 BPM    MT Interval 150 ms    QRSD Interval 90 ms    QT Interval 366 ms    QTC Interval 406 ms    P Axis 68 degrees    QRS Axis 62 degrees    T Wave Axis 265 degrees   CBC and differential    Collection Time: 02/04/24  4:18 PM   Result Value Ref Range    WBC 8.72 4.31 - 10.16 Thousand/uL    RBC 1.98 (L) 3.88 - 5.62 Million/uL    Hemoglobin 6.4 (L) 12.0 - 17.0 g/dL    Hematocrit 18.5 (L) 36.5 - 49.3 %    MCV 93 82 - 98 fL    MCH 32.3 26.8 - 34.3 pg    MCHC 34.6 31.4 - 37.4 g/dL    RDW 13.6 11.6 - 15.1 %    MPV 10.8 8.9 - 12.7 fL    Platelets 364 149 - 390 Thousands/uL    nRBC 0 /100 WBCs    Neutrophils Relative 68 43 - 75 %    Immat GRANS % 1 0 - 2 %    Lymphocytes Relative 18 14 - 44 %    Monocytes Relative 10 4 - 12 %    Eosinophils Relative 3 0 - 6 %    Basophils Relative 0 0 - 1 %    Neutrophils Absolute 6.00 1.85 - 7.62 Thousands/µL    Immature Grans Absolute 0.05 0.00 - 0.20 Thousand/uL    Lymphocytes Absolute 1.59 0.60 - 4.47 Thousands/µL    Monocytes Absolute 0.84 0.17 - 1.22 Thousand/µL    Eosinophils Absolute 0.22 0.00 - 0.61 Thousand/µL    Basophils Absolute 0.02 0.00 - 0.10 Thousands/µL   Comprehensive metabolic panel    Collection Time: 02/04/24  4:18 PM   Result Value Ref Range    Sodium 135 135 - 147 mmol/L    Potassium 3.9 3.5 - " "5.3 mmol/L    Chloride 99 96 - 108 mmol/L    CO2 23 21 - 32 mmol/L    ANION GAP 13 mmol/L    BUN 26 (H) 5 - 25 mg/dL    Creatinine 1.13 0.60 - 1.30 mg/dL    Glucose 142 (H) 65 - 140 mg/dL    Calcium 9.4 8.4 - 10.2 mg/dL    AST 35 13 - 39 U/L    ALT 62 (H) 7 - 52 U/L    Alkaline Phosphatase 119 (H) 34 - 104 U/L    Total Protein 7.0 6.4 - 8.4 g/dL    Albumin 4.2 3.5 - 5.0 g/dL    Total Bilirubin 0.80 0.20 - 1.00 mg/dL    eGFR 58 ml/min/1.73sq m   HS Troponin 0hr (reflex protocol)    Collection Time: 02/04/24  4:18 PM   Result Value Ref Range    hs TnI 0hr 2,603 (H) \"Refer to ACS Flowchart\"- see link ng/L   Type and screen    Collection Time: 02/04/24  4:24 PM   Result Value Ref Range    ABO Grouping A     Rh Factor Negative     Antibody Screen Negative     Specimen Expiration Date [id number]    Protime-INR    Collection Time: 02/04/24  4:24 PM   Result Value Ref Range    Protime 13.3 11.6 - 14.5 seconds    INR 0.96 0.84 - 1.19   APTT    Collection Time: 02/04/24  4:24 PM   Result Value Ref Range    PTT 29 23 - 37 seconds   ECG 12 lead    Collection Time: 02/04/24  5:40 PM   Result Value Ref Range    Ventricular Rate 72 BPM    Atrial Rate 72 BPM    NV Interval 146 ms    QRSD Interval 86 ms    QT Interval 370 ms    QTC Interval 405 ms    P Axis 76 degrees    QRS Axis 62 degrees    T Wave [place] 270 degrees   HS Troponin I 2hr    Collection Time: 02/04/24  5:45 PM   Result Value Ref Range    hs TnI 2hr 2,991 (H) \"Refer to ACS Flowchart\"- see link ng/L    Delta 2hr hsTnI 388 (H) <20 ng/L   HS Troponin I 4hr    Collection Time: 02/04/24  8:18 PM   Result Value Ref Range    hs TnI 4hr 4,499 (H) \"Refer to ACS Flowchart\"- see link ng/L    Delta 4hr hsTnI 1,896 (H) <20 ng/L   Hemoglobin and hematocrit, blood    Collection Time: 02/04/24 11:45 PM   Result Value Ref Range    Hemoglobin 9.8 (L) 12.0 - 17.0 g/dL    Hematocrit 28.4 (L) 36.5 - 49.3 %   Comprehensive metabolic panel    Collection Time: 02/05/24  4:52 AM   Result Value " Ref Range    Sodium 137 135 - 147 mmol/L    Potassium 3.7 3.5 - 5.3 mmol/L    Chloride 103 96 - 108 mmol/L    CO2 25 21 - 32 mmol/L    ANION GAP 9 mmol/L    BUN 26 (H) 5 - 25 mg/dL    Creatinine 1.08 0.60 - 1.30 mg/dL    Glucose 121 65 - 140 mg/dL    Calcium 9.2 8.4 - 10.2 mg/dL    AST 44 (H) 13 - 39 U/L    ALT 54 (H) 7 - 52 U/L    Alkaline Phosphatase 115 (H) 34 - 104 U/L    Total Protein 6.3 (L) 6.4 - 8.4 g/dL    Albumin 3.8 3.5 - 5.0 g/dL    Total Bilirubin 2.05 (H) 0.20 - 1.00 mg/dL    eGFR 62 ml/min/1.73sq m   Magnesium    Collection Time: 02/05/24  4:52 AM   Result Value Ref Range    Magnesium 2.0 1.9 - 2.7 mg/dL   CBC and differential    Collection Time: 02/05/24  4:52 AM   Result Value Ref Range    WBC 10.12 4.31 - 10.16 Thousand/uL    RBC 3.08 (L) 3.88 - 5.62 Million/uL    Hemoglobin 9.4 (L) 12.0 - 17.0 g/dL    Hematocrit 26.9 (L) 36.5 - 49.3 %    MCV 87 82 - 98 fL    MCH 30.5 26.8 - 34.3 pg    MCHC 34.9 31.4 - 37.4 g/dL    RDW 14.7 11.6 - 15.1 %    MPV 10.5 8.9 - 12.7 fL    Platelets 289 149 - 390 Thousands/uL    nRBC 0 /100 WBCs    Neutrophils Relative 78 (H) 43 - 75 %    Immat GRANS % 0 0 - 2 %    Lymphocytes Relative 11 (L) 14 - 44 %    Monocytes Relative 9 4 - 12 %    Eosinophils Relative 2 0 - 6 %    Basophils Relative 0 0 - 1 %    Neutrophils Absolute 7.82 (H) 1.85 - 7.62 Thousands/µL    Immature Grans Absolute 0.04 0.00 - 0.20 Thousand/uL    Lymphocytes Absolute 1.15 0.60 - 4.47 Thousands/µL    Monocytes Absolute 0.88 0.17 - 1.22 Thousand/µL    Eosinophils Absolute 0.20 0.00 - 0.61 Thousand/µL    Basophils Absolute 0.03 0.00 - 0.10 Thousands/µL   Protime-INR    Collection Time: 02/05/24  4:52 AM   Result Value Ref Range    Protime 14.2 11.6 - 14.5 seconds    INR 1.04 0.84 - 1.19   APTT    Collection Time: 02/05/24  4:52 AM   Result Value Ref Range    PTT 30 23 - 37 seconds   High Sensitivity Troponin I Random    Collection Time: 02/05/24  4:52 AM   Result Value Ref Range    HS TnI random 6,444 (H) 8  - 18 ng/L   B-Type Natriuretic Peptide(BNP)    Collection Time: 02/05/24  4:52 AM   Result Value Ref Range    BNP 1,290 (H) 0 - 100 pg/mL   Procalcitonin    Collection Time: 02/05/24  4:52 AM   Result Value Ref Range    Procalcitonin 0.11 <=0.25 ng/ml   Prepare Leukoreduced RBC: 2 Units    Collection Time: 02/05/24  5:47 AM   Result Value Ref Range    Unit Product Code V2692H38     Unit Number [id number]     Unit ABO A     Unit RH NEG     Crossmatch Compatible     Unit Dispense Status Presumed Trans     Unit Product Volume 350 mL    Unit Product Code W0094U20     Unit Number [id number]     Unit ABO A     Unit RH NEG     Crossmatch Compatible     Unit Dispense Status Presumed Trans     Unit Product Volume 350 mL   High Sensitivity Troponin I Random    Collection Time: 02/05/24 10:23 AM   Result Value Ref Range    HS TnI random 5,152 (H) 8 - 18 ng/L         XR chest 2 views    Result Date: 2/5/2024  Narrative: XR CHEST PA & LATERAL DUAL ENERGY SUBTRACTION. INDICATION: Shortness of breath. COMPARISON: CXR 1/21/2024. FINDINGS: Slightly increased opacity in the right costophrenic sulcus. Persistent opacity in the left base. Nodules over both lower lungs are the nipples. No pneumothorax or pleural effusion. Normal heart size. Cardiac stents. Bones are unremarkable for age. Normal upper abdomen.     Impression: New mild opacity in the right costophrenic sulcus which could be due to atelectasis or could be infectious/inflammatory. Persistent opacity in the left base, likely atelectasis or scar. Workstation performed: [id number]     IR biopsy bone marrow    Result Date: 1/25/2024  Narrative: CT-scan guided diagnostic bone marrow aspiration and core biopsy History: Unexplained anemia : Chapis Pillai MD Assistant: Milad Fonseca MD Conscious sedation time: 15 mins Technique: The patient was brought to the CT scanner and placed prone on the table. After axial images were obtained through the pelvis, an  area of the skin was then marked, prepped, and draped in usual sterile fashion. Lidocaine was administered to the skin, and subcutaneous tissues down to the periosteum of the right ileum, and a small skin incision was made. An OnCInflaRx needle was advanced percutaneously through the cortex, and a bone marrow aspiration was performed. The specimen was given to the cytotech to prepare. A core biopsy was then performed. The bone core was placed in formalin. The patient tolerated the procedure well and suffered no complications.     Impression: Impression: Successful percutaneous diagnostic bone marrow aspiration and bone core biopsy of the right ileum. A full pathology report will follow. Workstation performed: [id number]     XR chest 1 view portable    Result Date: 1/21/2024  Narrative: CHEST INDICATION:   MI. Chest pain. COMPARISON:  None. EXAM PERFORMED/VIEWS:  XR CHEST PORTABLE. FINDINGS: Cardiomediastinal silhouette normal. Cardiac stent. No acute disease. Tiny benign calcified granuloma in the peripheral left midlung. Minimal benign linear atelectasis or scar in the left base. Upper abdomen normal. Bones normal for age.     Impression: No acute cardiopulmonary disease. Workstation performed: [id number]     ECOG :1      Assessment and plan:  Symptomatic anemia with angina pectoralis and 83-year-old  male, he was admitted 3 weeks ago, a bone marrow biopsy was done at that time to rule out bone marrow disorder especially with drug-induced cholestatic counts, he has normal iron studies, vitamin B12, haptoglobin    I believe the patient has refractory anemia or anemia secondary to chronic kidney disease even though he has normal creatinine at 1.15 however GFR is reduced given his advanced age    He received today 3 units of packed RBC with current hemoglobin of 9, will give the patient erythropoietin 20,000 units subcu today and he will follow-up in 2 days in our office he has an appointment he can be  discharged    He needs CBC twice a week until we have the bone marrow biopsy results    He and his son expressed understanding    30 minutes

## 2024-02-05 NOTE — ASSESSMENT & PLAN NOTE
Elevated Alk phos with slight elevation in ALT.  Possible in the setting of MI type 2- demand ischemia    Plan:  Repeat upon anemia improvement

## 2024-02-05 NOTE — H&P
[facility]  H&P  Name: Gael Ferraro 83 y.o. male I MRN: [id number]  Unit/Bed#: S -01 I Date of Admission: 2/4/2024   Date of Service: 2/4/2024 I Hospital Day: 0      Assessment/Plan   * Anemia  Assessment & Plan  Lab Results   Component Value Date    HGB 6.4 (L) 02/04/2024    HGB 8.0 (L) 01/29/2024    MCV 93 02/04/2024    MCV 95 01/29/2024     Lab Results   Component Value Date    IRON 284 (H) 01/21/2024    FERRITIN 1,145 (H) 01/21/2024    TIBC <339 01/21/2024    CONCFE  01/21/2024      Comment:      Unable to Calculate.      Lab Results   Component Value Date    RETIC 13,600 (L) 01/21/2024    HAPTOGLOBIN 220 01/21/2024    DIRECTCOOMBS Negative 01/21/2024       Recent admission on 1/25/2022 for for unexplained anemia.  Previous admission with hemoglobin nearly 6,patient received 2 packed RBCs.   FISH testing: Negative  Normal male karyotype  Plasma cell testing-no diagnostic immuno phenotype identified.    Plan:  Received 2 packed RBCs in the ED.  Repeat H&H  Continue B12 1000 mcg once daily  Follow-up with bone marrow biopsy results  Heme oncology consult    Chest pain with elevated troponin  Assessment & Plan  Chest pain lasting for 30 minutes associated with SOB.  H/o troponin elevation  EKG: Compared to previous EKG, this admission has more pronounced ST depression in Right and inferior lateral leads.   PCI: Prox RCA lesion is 100% stenosed. Not the culprit lesion. Poor target for bypass.The vessel size is small. The lesion is type C. The lesion is severely calcified. The lesion is chronically occluded.   The collateral flow was extensive in Right Posterior Descending Artery AND Third Right Posterolateral Branch.  Lab Results   Component Value Date    HSTNI0 2,603 (H) 02/04/2024    HSTNI2 2,991 (H) 02/04/2024    HSTNID2 388 (H) 02/04/2024    HSTNI4 6,138 (H) 01/21/2024    HSTNID4 5,721 (H) 01/21/2024      Plan:  TYPE 2 MI- in the setting of anemia.   Holding heparin ACS  protocol as pt already have extensive collaterals.  Pt received Aspirin 325 mg in the ED.             Hypertension  Assessment & Plan  Home meds: Amlodipine 2.5 mg hydrochlorothiazide 25 mg daily, nebivolol 20 mg daily.    Plan:  Currently receiving blood transfusion.  Due to underlying myocardial stress, HTN meds are kept on hold.  Resume from tomorrow     CKD (chronic kidney disease) stage 3, GFR 30-59 ml/min ([facility])  Assessment & Plan  Lab Results   Component Value Date    EGFR 58 02/04/2024    EGFR 62 01/23/2024    EGFR 60 01/22/2024    CREATININE 1.13 02/04/2024    CREATININE 1.08 01/23/2024    CREATININE 1.10 01/22/2024     Baseline cr: 1.08 to 1.10    Plan:  Avoid hypotension.  Avoid nephrotoxins    Constipation  Assessment & Plan  Yvonne lax daily  Senna daily      Elevated alkaline phosphatase level  Assessment & Plan  Elevated Alk phos with slight elevation in ALT.  Possible in the setting of MI type 2- demand ischemia    Plan:  Repeat upon anemia improvement         VTE Pharmacologic Prophylaxis: VTE Score: 3  Ticagrelor  Code Status: Level 1 - Full Code   Discussion with family: Updated  (wife) at bedside.    Anticipated Length of Stay: Patient will be admitted on an inpatient basis with an anticipated length of stay of greater than 2 midnights secondary to anemia.    Chief Complaint: chest pain and SOB    History of Present Illness:  Gael Ferraro is a 83 y.o. male with a PMH of history of ACS with prior stenting, hypertension, hyperlipidemia, CKD, anemia, presenting for evaluation of shortness of breath and chest tightness/pain that radiates down his right arm and right jaw.  He states that his symptoms began today, he noticed them when he was active and ambulating.  He states he was having more chest pain earlier in the day that radiated down his right arm and right jaw, he denies any chest pain here at rest, does endorse chest tightness and shortness of breath.  He is symptoms are  similar to his presentation for recent hospitalization for symptomatic anemia.  He was hospitalized less than 2 weeks ago when he was found to have hemoglobin levels in the 6s requiring transfusion, he had bone marrow biopsy done at that time to further evaluate the cause of this acute anemia, these results are still pending.  Patient denies any fever, cough, congestion.  No history of DVT or PE, he takes baby aspirin daily and Brilinta.  He denies any recent dark or bloody stools.  On chart review, patient had a cardiac echo and catheterization at the beginning of January, EF was 60%, patient has chronically occluded RCA, this was not stented at this time, no new stents were placed.   In the ED, pt found to be pale, have chest tightness, SOB.   Vitals- GA 86,RR 22,/57,SPO2: 100.  Troponins are 2603, BUN 26, ALK PHOS 119, HB 6.4.  His EKG revealed- ST segment depression in V5, V6, V4, II, I and III.  Pt received 2 units of PRBC. Repeat H and H ordered.   Pt to follow up with Heme/onc tomorrow.     Review of Systems:  Review of Systems   Constitutional: Negative.    HENT: Negative.     Eyes: Negative.    Respiratory:  Positive for shortness of breath. Negative for wheezing.    Cardiovascular:  Positive for chest pain and palpitations.   Gastrointestinal: Negative.    Endocrine: Negative.    Genitourinary: Negative.    Musculoskeletal: Negative.    Neurological: Negative.    Hematological: Negative.    Psychiatric/Behavioral: Negative.         Past Medical and Surgical History:   Past Medical History:   Diagnosis Date    Low hemoglobin        Past Surgical History:   Procedure Laterality Date    CARDIAC CATHETERIZATION Left 1/2/2024    Procedure: Cardiac Left Heart Cath;  Surgeon: Ana Garcia DO;  Location: [facility];  Service: Cardiology    CARDIAC CATHETERIZATION N/A 1/2/2024    Procedure: Cardiac Coronary Angiogram;  Surgeon: Ana Garcia DO;  Location: [facility];  Service:  Cardiology    CARDIAC CATHETERIZATION N/A 1/2/2024    Procedure: Cardiac RHC;  Surgeon: Ana Garcia DO;  Location: [facility];  Service: Cardiology    IR BIOPSY BONE MARROW  1/24/2024       Meds/Allergies:  Prior to Admission medications    Medication Sig Start Date End Date Taking? Authorizing Provider   acetaminophen (TYLENOL) 325 mg tablet Take 2 tablets (650 mg total) by mouth every 6 (six) hours as needed for mild pain, headaches or fever 1/2/24   Alize Sylvester MD   amLODIPine (NORVASC) 2.5 mg tablet Take 1 tablet (2.5 mg total) by mouth daily 1/25/24   Fanny Eisenberg PA-C   aspirin (ECOTRIN LOW STRENGTH) 81 mg EC tablet Take 1 tablet (81 mg total) by mouth daily 1/15/24   BRITTANY Mcwilliams   Cholecalciferol 50 MCG (2000 UT) CAPS Take 1 capsule by mouth in the morning    Historical Provider, MD   cyanocobalamin (VITAMIN B-12) 1000 MCG tablet Take 1 tablet (1,000 mcg total) by mouth daily 1/3/24   Alize Sylvester MD   hydrochlorothiazide (HYDRODIURIL) 25 mg tablet Take 1 tablet (25 mg total) by mouth daily 1/3/24   Alize Sylvester MD   LORazepam (ATIVAN) 1 mg tablet Take 1 mg by mouth 3 (three) times a day as needed for anxiety    Historical Provider, MD   nebivolol (BYSTOLIC) 20 MG tablet Take 1 tablet (20 mg total) by mouth daily 1/3/24   Alize Sylvester MD   nitroglycerin (NITROSTAT) 0.4 mg SL tablet Place 1 tablet (0.4 mg total) under the tongue every 5 (five) minutes as needed for chest pain 1/24/24   Fanny Eisenberg PA-C   potassium chloride (MICRO-K) 10 MEQ CR capsule Take 1 capsule (10 mEq total) by mouth daily 2/1/24   Flavio Velasco MD   ranolazine (RANEXA) 500 mg 12 hr tablet Take 1 tablet (500 mg total) by mouth every 12 (twelve) hours 1/24/24   Fanny Eisenberg PA-C   rosuvastatin (CRESTOR) 10 MG tablet Take 1 tablet (10 mg total) by mouth daily 1/15/24   BRITTANY Mcwilliams   ticagrelor (BRILINTA) 90 MG Take 1 tablet (90 mg total) by mouth every 12 (twelve) hours  "1/15/24 1/9/25  BRITTANY Mcwilliams     I have reviewed home medications with patient personally.    Allergies:   Allergies   Allergen Reactions    Hydrocodone-Acetaminophen GI Intolerance    Niacin GI Intolerance    Penicillins Hives       Social History:  Marital Status: /Civil Union   Occupation:   Patient Pre-hospital Living Situation: Home  Patient Pre-hospital Level of Mobility: walks  Patient Pre-hospital Diet Restrictions:   Substance Use History:   Social History     Substance and Sexual Activity   Alcohol Use Not Currently     Social History     Tobacco Use   Smoking Status Never   Smokeless Tobacco Never     Social History     Substance and Sexual Activity   Drug Use Never       Family History:  History reviewed. No pertinent family history.    Physical Exam:     Vitals:   Blood Pressure: 128/56 (02/04/24 1931)  Pulse: 78 (02/04/24 1931)  Temperature: 98.3 °F (36.8 °C) (02/04/24 1931)  Temp Source: Oral (02/04/24 1915)  Respirations: 20 (02/04/24 1915)  Height: 5' 10\" (177.8 cm) (02/04/24 1615)  Weight - Scale: 66.1 kg (145 lb 11.6 oz) (02/04/24 1613)  SpO2: 92 % (02/04/24 1931)    Physical Exam  Constitutional:       Appearance: Normal appearance.   HENT:      Head: Atraumatic.      Mouth/Throat:      Pharynx: Oropharynx is clear.   Eyes:      Extraocular Movements: Extraocular movements intact.      Conjunctiva/sclera: Conjunctivae normal.   Cardiovascular:      Rate and Rhythm: Tachycardia present.      Pulses: Normal pulses.   Pulmonary:      Effort: Pulmonary effort is normal.   Abdominal:      General: Bowel sounds are normal.   Musculoskeletal:         General: Normal range of motion.      Cervical back: Normal range of motion.   Skin:     General: Skin is warm.      Capillary Refill: Capillary refill takes less than 2 seconds.   Neurological:      Mental Status: He is oriented to person, place, and time. Mental status is at baseline.          Additional Data:     Lab Results:  Results from " last 7 days   Lab Units 02/04/24  1618   WBC Thousand/uL 8.72   HEMOGLOBIN g/dL 6.4*   HEMATOCRIT % 18.5*   PLATELETS Thousands/uL 364   NEUTROS PCT % 68   LYMPHS PCT % 18   MONOS PCT % 10   EOS PCT % 3     Results from last 7 days   Lab Units 02/04/24  1618   SODIUM mmol/L 135   POTASSIUM mmol/L 3.9   CHLORIDE mmol/L 99   CO2 mmol/L 23   BUN mg/dL 26*   CREATININE mg/dL 1.13   ANION GAP mmol/L 13   CALCIUM mg/dL 9.4   ALBUMIN g/dL 4.2   TOTAL BILIRUBIN mg/dL 0.80   ALK PHOS U/L 119*   ALT U/L 62*   AST U/L 35   GLUCOSE RANDOM mg/dL 142*     Results from last 7 days   Lab Units 02/04/24  1624   INR  0.96                   Lines/Drains:  Invasive Devices       Peripheral Intravenous Line  Duration             Peripheral IV 02/04/24 Distal;Left;Upper;Ventral (anterior) Antecubital <1 day    Peripheral IV 02/04/24 Distal;Right;Upper;Ventral (anterior) Antecubital <1 day    Peripheral IV 02/04/24 Right;Ventral (anterior) Hand <1 day                        Imaging: Reviewed radiology reports from this admission including: chest xray  XR chest 2 views   ED Interpretation by Jon Broderick DO (02/04 1640)   No acute cardiopulmonary abnormalities visualized          EKG and Other Studies Reviewed on Admission:   EKG: NSR. HR 86.    ** Please Note: This note has been constructed using a voice recognition system. **

## 2024-02-05 NOTE — ASSESSMENT & PLAN NOTE
Pt c/o SOB aggravating on ambulation.   Lab Results   Component Value Date    BNP 1,290 (H) 02/05/2024     (H) 12/31/2023    LVEF 60 01/01/2024   Echo: The estimated right ventricular systolic pressure is 58.00 mmHg. Left atrium is dilated.   Chest x ray: New mild opacity in the right costophrenic sulcus which could be due to atelectasis or could be infectious/inflammatory.   Persistent opacity in the left base, likely atelectasis or scar.ding.  I/O last 3 completed shifts:  In: 1536.7 [P.O.:600; Blood:936.7]  Out: 225 [Urine:225]     Plan:  Repeat BNP levels elevated. Possible vol overloaded state with repeat blood transfusion.  No h/o CHF, never had A. Fib.   Pt appears dry and thirsty.  Strict I and O   Daily weights.   C/o SOB today and wearing O2 saturating at 95 percent.   His saturations went down to 77 while sitting upright. Possible Pulm HTN in the setting of elevated Rt ventricular pressures or the Beta blockers itself.   Monitor for desaturations and upscale as needed.   Spirometry.  No clear source of infection correlating with chest x ray finding.. WBC WNL.

## 2024-02-05 NOTE — ASSESSMENT & PLAN NOTE
Chest pain lasting for 30 minutes associated with SOB.  H/o troponin elevation  EKG: Compared to previous EKG, this admission has more pronounced ST depression in Right and inferior lateral leads.   PCI: Prox RCA lesion is 100% stenosed. Not the culprit lesion. Poor target for bypass.The vessel size is small. The lesion is type C. The lesion is severely calcified. The lesion is chronically occluded.   The collateral flow was extensive in Right Posterior Descending Artery AND Third Right Posterolateral Branch.  Lab Results   Component Value Date    HSTNI0 2,603 (H) 02/04/2024    HSTNI2 2,991 (H) 02/04/2024    HSTNID2 388 (H) 02/04/2024    HSTNI4 6,138 (H) 01/21/2024    HSTNID4 5,721 (H) 01/21/2024      Plan:  TYPE 2 MI- in the setting of anemia.   Holding heparin ACS protocol as pt already have extensive collaterals.  Pt received Aspirin 325 mg in the ED.

## 2024-02-05 NOTE — ASSESSMENT & PLAN NOTE
Lab Results   Component Value Date    HGB 6.4 (L) 02/04/2024    HGB 8.0 (L) 01/29/2024    MCV 93 02/04/2024    MCV 95 01/29/2024     Lab Results   Component Value Date    IRON 284 (H) 01/21/2024    FERRITIN 1,145 (H) 01/21/2024    TIBC <339 01/21/2024    CONCFE  01/21/2024      Comment:      Unable to Calculate.      Lab Results   Component Value Date    RETIC 13,600 (L) 01/21/2024    HAPTOGLOBIN 220 01/21/2024    DIRECTCOOMBS Negative 01/21/2024       Recent admission on 1/25/2022 for for unexplained anemia.  Previous admission with hemoglobin nearly 6,patient received 2 packed RBCs.   FISH testing: Negative  Normal male karyotype  Plasma cell testing-no diagnostic immuno phenotype identified.    Plan:  Received 2 packed RBCs in the ED.  Repeat H&H  Continue B12 1000 mcg once daily  F/u erythropoietin levels  Follow-up with bone marrow biopsy results  Heme oncology consult

## 2024-02-05 NOTE — ASSESSMENT & PLAN NOTE
Lab Results   Component Value Date    EGFR 60 02/06/2024    EGFR 62 02/05/2024    EGFR 58 02/04/2024    CREATININE 1.11 02/06/2024    CREATININE 1.08 02/05/2024    CREATININE 1.13 02/04/2024     Baseline cr: 1.08 to 1.10    Plan:  Avoid hypotension.  Avoid nephrotoxins

## 2024-02-05 NOTE — ASSESSMENT & PLAN NOTE
Lab Results   Component Value Date    EGFR 62 02/05/2024    EGFR 58 02/04/2024    EGFR 62 01/23/2024    CREATININE 1.08 02/05/2024    CREATININE 1.13 02/04/2024    CREATININE 1.08 01/23/2024     Baseline cr: 1.08 to 1.10    Plan:  Avoid hypotension.  Avoid nephrotoxins

## 2024-02-05 NOTE — ASSESSMENT & PLAN NOTE
Lab Results   Component Value Date    HGB 9.4 (L) 02/05/2024    HGB 9.8 (L) 02/04/2024    MCV 87 02/05/2024    MCV 93 02/04/2024     Lab Results   Component Value Date    IRON 284 (H) 01/21/2024    FERRITIN 1,145 (H) 01/21/2024    TIBC <339 01/21/2024    CONCFE  01/21/2024      Comment:      Unable to Calculate.      Lab Results   Component Value Date    RETIC 13,600 (L) 01/21/2024    HAPTOGLOBIN 220 01/21/2024    DIRECTCOOMBS Negative 01/21/2024       Recent admission on 1/25/2022 for for unexplained anemia.  Previous admission with hemoglobin nearly 6,patient received 2 packed RBCs.   FISH testing: Negative  Normal male karyotype  Plasma cell testing-no diagnostic immuno phenotype identified.    Plan:  Received 2 packed RBCs in the ED.  Repeat H&H is 9.4  Continue B12 1000 mcg once daily  F/u erythropoietin levels- pending  Follow-up with bone marrow biopsy results  Heme oncology consult

## 2024-02-05 NOTE — PLAN OF CARE
Problem: Potential for Falls  Goal: Patient will remain free of falls  Description: INTERVENTIONS:  - Educate patient/family on patient safety including physical limitations  - Instruct patient to call for assistance with activity   - Consult OT/PT to assist with strengthening/mobility   - Keep Call bell within reach  - Keep bed low and locked with side rails adjusted as appropriate  - Keep care items and personal belongings within reach  - Initiate and maintain comfort rounds  - Make Fall Risk Sign visible to staff  - Offer Toileting every 2 Hours, in advance of need  - Initiate/Maintain bed alarm  - Obtain necessary fall risk management equipment: bed alarm  - Apply yellow socks and bracelet for high fall risk patients  - Consider moving patient to room near nurses station  Outcome: Progressing     Problem: CARDIOVASCULAR - ADULT  Goal: Maintains optimal cardiac output and hemodynamic stability  Description: INTERVENTIONS:  - Monitor I/O, vital signs and rhythm  - Monitor for S/S and trends of decreased cardiac output  - Administer and titrate ordered vasoactive medications to optimize hemodynamic stability  - Assess quality of pulses, skin color and temperature  - Assess for signs of decreased coronary artery perfusion  - Instruct patient to report change in severity of symptoms  Outcome: Progressing  Goal: Absence of cardiac dysrhythmias or at baseline rhythm  Description: INTERVENTIONS:  - Continuous cardiac monitoring, vital signs, obtain 12 lead EKG if ordered  - Administer antiarrhythmic and heart rate control medications as ordered  - Monitor electrolytes and administer replacement therapy as ordered  Outcome: Progressing

## 2024-02-05 NOTE — ASSESSMENT & PLAN NOTE
Home meds: Amlodipine 2.5 mg hydrochlorothiazide 25 mg daily, nebivolol 20 mg daily.  Blood Pressure: 102/51    Plan:  Currently receiving blood transfusion.  Due to underlying myocardial stress, HTN meds are kept on hold.  Cards - recommended to keep HR <70.   At discharge we will discontinue his bisystolic home med and start him on metoprolol succinate 50 mg BID.

## 2024-02-06 PROBLEM — R09.81 NOSE CONGESTION: Status: ACTIVE | Noted: 2024-02-06

## 2024-02-06 LAB
ALBUMIN SERPL BCP-MCNC: 3.6 G/DL (ref 3.5–5)
ALP SERPL-CCNC: 111 U/L (ref 34–104)
ALT SERPL W P-5'-P-CCNC: 60 U/L (ref 7–52)
ANION GAP SERPL CALCULATED.3IONS-SCNC: 11 MMOL/L
AST SERPL W P-5'-P-CCNC: 40 U/L (ref 13–39)
BASOPHILS # BLD AUTO: 0.02 THOUSANDS/ÂΜL (ref 0–0.1)
BASOPHILS NFR BLD AUTO: 0 % (ref 0–1)
BILIRUB SERPL-MCNC: 1.52 MG/DL (ref 0.2–1)
BUN SERPL-MCNC: 22 MG/DL (ref 5–25)
CALCIUM SERPL-MCNC: 9.1 MG/DL (ref 8.4–10.2)
CARDIAC TROPONIN I PNL SERPL HS: 2802 NG/L (ref 8–18)
CHLORIDE SERPL-SCNC: 102 MMOL/L (ref 96–108)
CO2 SERPL-SCNC: 24 MMOL/L (ref 21–32)
CREAT SERPL-MCNC: 1.11 MG/DL (ref 0.6–1.3)
EOSINOPHIL # BLD AUTO: 0.09 THOUSAND/ÂΜL (ref 0–0.61)
EOSINOPHIL NFR BLD AUTO: 1 % (ref 0–6)
EPO SERPL-ACNC: 2759 MIU/ML (ref 2.6–18.5)
ERYTHROCYTE [DISTWIDTH] IN BLOOD BY AUTOMATED COUNT: 14.6 % (ref 11.6–15.1)
FLUAV RNA RESP QL NAA+PROBE: NEGATIVE
FLUBV RNA RESP QL NAA+PROBE: NEGATIVE
GFR SERPL CREATININE-BSD FRML MDRD: 60 ML/MIN/1.73SQ M
GLUCOSE SERPL-MCNC: 114 MG/DL (ref 65–140)
GLUCOSE SERPL-MCNC: 115 MG/DL (ref 65–140)
GLUCOSE SERPL-MCNC: 129 MG/DL (ref 65–140)
HCT VFR BLD AUTO: 24.7 % (ref 36.5–49.3)
HGB BLD-MCNC: 8.8 G/DL (ref 12–17)
IMM GRANULOCYTES # BLD AUTO: 0.03 THOUSAND/UL (ref 0–0.2)
IMM GRANULOCYTES NFR BLD AUTO: 0 % (ref 0–2)
LACTATE SERPL-SCNC: 1 MMOL/L (ref 0.5–2)
LYMPHOCYTES # BLD AUTO: 1.25 THOUSANDS/ÂΜL (ref 0.6–4.47)
LYMPHOCYTES NFR BLD AUTO: 17 % (ref 14–44)
MCH RBC QN AUTO: 30.9 PG (ref 26.8–34.3)
MCHC RBC AUTO-ENTMCNC: 35.6 G/DL (ref 31.4–37.4)
MCV RBC AUTO: 87 FL (ref 82–98)
MONOCYTES # BLD AUTO: 0.94 THOUSAND/ÂΜL (ref 0.17–1.22)
MONOCYTES NFR BLD AUTO: 13 % (ref 4–12)
NEUTROPHILS # BLD AUTO: 5.03 THOUSANDS/ÂΜL (ref 1.85–7.62)
NEUTS SEG NFR BLD AUTO: 69 % (ref 43–75)
NRBC BLD AUTO-RTO: 0 /100 WBCS
PLATELET # BLD AUTO: 272 THOUSANDS/UL (ref 149–390)
PMV BLD AUTO: 10.6 FL (ref 8.9–12.7)
POTASSIUM SERPL-SCNC: 3.6 MMOL/L (ref 3.5–5.3)
PROCALCITONIN SERPL-MCNC: 0.18 NG/ML
PROT SERPL-MCNC: 6 G/DL (ref 6.4–8.4)
RBC # BLD AUTO: 2.85 MILLION/UL (ref 3.88–5.62)
RSV RNA RESP QL NAA+PROBE: NEGATIVE
SARS-COV-2 RNA RESP QL NAA+PROBE: NEGATIVE
SODIUM SERPL-SCNC: 137 MMOL/L (ref 135–147)
WBC # BLD AUTO: 7.36 THOUSAND/UL (ref 4.31–10.16)

## 2024-02-06 PROCEDURE — 83605 ASSAY OF LACTIC ACID: CPT

## 2024-02-06 PROCEDURE — 99232 SBSQ HOSP IP/OBS MODERATE 35: CPT | Performed by: INTERNAL MEDICINE

## 2024-02-06 PROCEDURE — 80053 COMPREHEN METABOLIC PANEL: CPT | Performed by: INTERNAL MEDICINE

## 2024-02-06 PROCEDURE — 85025 COMPLETE CBC W/AUTO DIFF WBC: CPT | Performed by: STUDENT IN AN ORGANIZED HEALTH CARE EDUCATION/TRAINING PROGRAM

## 2024-02-06 PROCEDURE — 97163 PT EVAL HIGH COMPLEX 45 MIN: CPT

## 2024-02-06 PROCEDURE — 97116 GAIT TRAINING THERAPY: CPT

## 2024-02-06 PROCEDURE — 82948 REAGENT STRIP/BLOOD GLUCOSE: CPT

## 2024-02-06 PROCEDURE — 84484 ASSAY OF TROPONIN QUANT: CPT

## 2024-02-06 PROCEDURE — 97166 OT EVAL MOD COMPLEX 45 MIN: CPT

## 2024-02-06 PROCEDURE — 0241U HB NFCT DS VIR RESP RNA 4 TRGT: CPT

## 2024-02-06 PROCEDURE — 84145 PROCALCITONIN (PCT): CPT

## 2024-02-06 RX ORDER — METOPROLOL TARTRATE 50 MG/1
50 TABLET, FILM COATED ORAL EVERY 8 HOURS
Status: DISCONTINUED | OUTPATIENT
Start: 2024-02-06 | End: 2024-02-10 | Stop reason: HOSPADM

## 2024-02-06 RX ORDER — ECHINACEA PURPUREA EXTRACT 125 MG
1 TABLET ORAL
Qty: 10 ML | Refills: 0 | Status: SHIPPED | OUTPATIENT
Start: 2024-02-06 | End: 2024-02-20

## 2024-02-06 RX ORDER — METOPROLOL SUCCINATE 100 MG/1
50 TABLET, EXTENDED RELEASE ORAL 2 TIMES DAILY
Qty: 30 TABLET | Refills: 0 | Status: SHIPPED | OUTPATIENT
Start: 2024-02-06 | End: 2024-03-07

## 2024-02-06 RX ORDER — ECHINACEA PURPUREA EXTRACT 125 MG
1 TABLET ORAL
Status: DISCONTINUED | OUTPATIENT
Start: 2024-02-06 | End: 2024-02-10 | Stop reason: HOSPADM

## 2024-02-06 RX ORDER — METOPROLOL TARTRATE 50 MG/1
50 TABLET, FILM COATED ORAL EVERY 8 HOURS
Status: DISCONTINUED | OUTPATIENT
Start: 2024-02-06 | End: 2024-02-06

## 2024-02-06 RX ADMIN — TICAGRELOR 90 MG: 90 TABLET ORAL at 09:14

## 2024-02-06 RX ADMIN — METOPROLOL TARTRATE 50 MG: 50 TABLET, FILM COATED ORAL at 21:49

## 2024-02-06 RX ADMIN — RANOLAZINE 500 MG: 500 TABLET, FILM COATED, EXTENDED RELEASE ORAL at 09:13

## 2024-02-06 RX ADMIN — Medication 1000 UNITS: at 21:48

## 2024-02-06 RX ADMIN — SODIUM CHLORIDE 500 ML: 0.9 INJECTION, SOLUTION INTRAVENOUS at 05:29

## 2024-02-06 RX ADMIN — LORAZEPAM 1 MG: 1 TABLET ORAL at 12:11

## 2024-02-06 RX ADMIN — CYANOCOBALAMIN TAB 500 MCG 1000 MCG: 500 TAB at 09:13

## 2024-02-06 RX ADMIN — LORAZEPAM 1 MG: 1 TABLET ORAL at 23:38

## 2024-02-06 RX ADMIN — SENNOSIDES 8.6 MG: 8.6 TABLET, FILM COATED ORAL at 21:49

## 2024-02-06 RX ADMIN — TICAGRELOR 90 MG: 90 TABLET ORAL at 21:49

## 2024-02-06 RX ADMIN — METOPROLOL TARTRATE 50 MG: 50 TABLET, FILM COATED ORAL at 12:08

## 2024-02-06 RX ADMIN — PRAVASTATIN SODIUM 80 MG: 80 TABLET ORAL at 17:40

## 2024-02-06 RX ADMIN — ASPIRIN 81 MG: 81 TABLET, COATED ORAL at 09:13

## 2024-02-06 RX ADMIN — Medication 1000 UNITS: at 09:13

## 2024-02-06 RX ADMIN — RANOLAZINE 500 MG: 500 TABLET, FILM COATED, EXTENDED RELEASE ORAL at 21:49

## 2024-02-06 RX ADMIN — POLYETHYLENE GLYCOL 3350 17 G: 17 POWDER, FOR SOLUTION ORAL at 09:14

## 2024-02-06 NOTE — PHYSICAL THERAPY NOTE
"       PHYSICAL THERAPY EVALUATION/TREATMENT    NAME:  Gael Ferraro  AGE:   83 y.o.  Mrn:   [id number]  Length Of Stay: 1    ADMIT DX:  SOB (shortness of breath) [R06.02]  Hypoxia [R09.02]  Elevated troponin [R79.89]  Symptomatic anemia [D64.9]    Past Medical History:   Diagnosis Date    Low hemoglobin      Past Surgical History:   Procedure Laterality Date    CARDIAC CATHETERIZATION Left 1/2/2024    Procedure: Cardiac Left Heart Cath;  Surgeon: Ana Garcia DO;  [facility]: [facility];  Service: Cardiology    CARDIAC CATHETERIZATION N/A 1/2/2024    Procedure: Cardiac Coronary Angiogram;  Surgeon: Ana Garcia DO;  [facility];  Service: Cardiology    CARDIAC CATHETERIZATION N/A 1/2/2024    Procedure: Cardiac RHC;  Surgeon: Ana Garcia DO;  [facility];  Service: Cardiology    IR BIOPSY BONE MARROW  1/24/2024       Performed at least 2 patient identifiers during session: Name, Birthday, and ID bracelet       02/06/24 0852   PT Last Visit   PT Visit Date 02/06/24   Note Type   Note type Evaluation   Pain Assessment   Pain Assessment Tool 0-10   Pain Score No Pain   Restrictions/Precautions   Other Precautions Fall Risk;Bed Alarm;Chair Alarm;O2  (1.5 L O2 via NC; Mata)   Home Living   Type of Home House   Home Layout Two level;Able to live on main level with bedroom/bathroom;Performs ADLs on one level;Stairs to enter with rails  (FFSU; 3 steps to enter)   Bathroom Shower/Tub Tub/shower unit   Bathroom Toilet Standard   Bathroom Equipment Grab bars in shower   Bathroom Accessibility Accessible   Home Equipment Cane  (Roller walker)   Prior Function   Level of [place] Independent with ADLs;Independent with functional mobility   Lives With Spouse   Receives Help From Family   IADLs Independent with driving;Family/Friend/Other provides meals;Family/Friend/Other provides medication management   Falls in the last 6 months (S)  5 to 10  (\"5-6\") " "  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  Patient uses the cane outside.  Patient has a roller walker but does not use.   General   Additional Pertinent History Patient is admitted with chest pain and shortness of breath.  Patient with no pain at time of PT evaluation   Family/Caregiver Present Yes  (Patient's spouse)   Cognition   Overall Cognitive Status WFL   Arousal/Participation Cooperative   Orientation Level Oriented X4   Memory Within functional limits   Following Commands Follows multistep commands without difficulty   Subjective   Subjective \"I feel weak from being in bed for 2 days\"   RLE Assessment   RLE Assessment WFL  (Grossly 3+ to 4 -/5)   LLE Assessment   LLE Assessment WFL  (Grossly 3+ to 4 -/5)   Vision-Basic Assessment   Current Vision Wears glasses only for reading   Bed Mobility   Supine to Sit 7  Independent   Additional items Increased time required;Bedrails;HOB elevated   Additional Comments Patient able to don bilateral slip on sneakers prior to initiating transfers   Transfers   Sit to Stand 4  Minimal assistance   Additional items Assist x 1;Verbal cues;Increased time required   Stand to Sit 4  Minimal assistance   Additional items Assist x 1;Verbal cues;Increased time required   Ambulation/Elevation   Gait pattern Short stride;Step through pattern;Decreased foot clearance;Decreased heel strike  (Min/mod generalized multidirectional unsteadiness)   Gait Assistance 4  Minimal assist   Additional items Assist x 1;Verbal cues;Tactile cues   Assistive Device None   Distance 12 feet with change in direction; 20 feet with change in direction; SaO2 on 1.5 L remaining above 90% however patient noted to be minimally short of breath with limited activity/ambulation   Balance   Static Sitting Fair +   Static Standing   (Fair to occasional F-)   Ambulatory   (P+/F-)   Endurance Deficit   Endurance Deficit Yes   Endurance " Deficit Description Limited gait and activity endurance   Activity Tolerance   Activity Tolerance Patient limited by fatigue  (Minimal shortness of breath)   Assessment   Problem List Decreased strength;Decreased endurance;Impaired balance;Decreased mobility;Decreased coordination;Decreased safety awareness   Assessment Patient seen for Physical Therapy evaluation. Patient admitted with Anemia.  Comorbidities affecting patient's physical performance include: Hypertension, CKD 3, shortness of breath, leukopenia.  Personal factors affecting patient at time of initial evaluation include: lives in 2 story house, ambulating with assistive device, stairs to enter home, inability to navigate community distances, inability to navigate level surfaces without external assistance, inability to perform dynamic tasks in community, and positive fall history. Prior to admission, patient was independent with functional mobility with cane, independent with functional mobility without assistive device, independent with ADLS, requiring assist for IADLS, living with spouse in a 2 level home with 3 steps to enter, ambulating household distance, ambulating community distances, and lives in a multilevel house but has 1st floor setup.  Please find objective findings from Physical Therapy assessment regarding body systems outlined above with impairments and limitations including weakness, impaired balance, decreased endurance, impaired coordination, gait deviations, decreased activity tolerance, decreased functional mobility tolerance, decreased safety awareness, fall risk, and SOB upon exertion.  The Barthel Index was used as a functional outcome tool presenting with a score of Barthel Index Score: 55 today indicating marked limitations of functional mobility and ADLS.  Patient's clinical presentation is currently unstable/unpredictable as seen in patient's presentation of vital sign response, increased fall risk, new onset of impairment  of functional mobility, decreased endurance, and new onset of weakness. Pt would benefit from continued Physical Therapy treatment to address deficits as defined above and maximize level of functional mobility. As demonstrated by objective findings, the assigned level of complexity for this evaluation is high.    The patient's -[facility] Basic Mobility Inpatient Short Form Raw Score is 19. A Raw score of greater than 16 suggests the patient may benefit from discharge to home. Please also refer to the recommendation of the Physical Therapist for safe discharge planning.   Goals   Patient Goals To get stronger and go home   STG Expiration Date 02/16/24   Short Term Goal #1 Patient will: Increase bilateral LE strength 1/2 grade to facilitate independent mobility, Perform all transfers independently consistently from various height surfaces in order to improve I w/ engagement w/ real-world environments/situations, Ambulate at least 500+ ft. with least restrictive assistive device independently w/o LOB to facilitate return and engagement w/ previous living environment, Navigate 3 stairs independently with unilateral handrail to either improve independence w/ entering home and/or so patient can fully access living areas in home, Increase ambulatory and static and dynamic standing balance 1 grade to decrease risk for falls, Tolerate at least 15 consecutive minutes of activity to demonstrate improved activity tolerance and endurance, and Tolerate 3 hr OOB to faciliate upright tolerance   Plan   Treatment/Interventions ADL retraining;Functional transfer training;LE strengthening/ROM;Elevations;Therapeutic exercise;Endurance training;Patient/family training;Equipment eval/education;Bed mobility;Gait training;Compensatory technique education;Spoke to case management;OT;Family   PT Frequency 3-5x/wk   Discharge Recommendation   Rehab Resource Intensity Level, PT III (Minimum Resource Intensity)   Equipment Recommended   (Patient  has a cane and a roller walker)   Additional Comments Patient's spouse states that patient was to begin outpatient physical therapy services next week for lower extremity strengthening and to reduce his fall risk.  Since admission, patient and patient's spouse may like to start with home PT for a few weeks and then progress to outpatient PT as able.  This writer will relay this information to case management.   AM-PAC Basic Mobility Inpatient   Turning in Flat Bed Without Bedrails 4   Lying on Back to Sitting on Edge of Flat Bed Without Bedrails 4   Moving Bed to Chair 3   Standing Up From Chair Using Arms 3   Walk in Room 3   Climb 3-5 Stairs With Railing 2   Basic Mobility Inpatient Raw Score 19   Basic Mobility Standardized Score 42.48   Highest Level Of Mobility   JH-HLM Goal 6: Walk 10 steps or more   JH-HLM Achieved 7: Walk 25 feet or more   Barthel Index   Feeding 10   Bathing 0   Grooming Score 0   Dressing Score 5   Bladder Score 10   Bowels Score 10   Toilet Use Score 5   Transfers (Bed/Chair) Score 10   Mobility (Level Surface) Score 0   Stairs Score 5   Barthel Index Score 55   Additional Treatment Session   Start Time 0852   End Time 0908   Treatment Assessment Patient agreeable to trial of ambulation with a roller walker.  Patient transfers sit to stand with CGA and cues for safe hand placement and ambulates with a roller walker with CGA 20 feet with change in direction. Patient transfers stand to sit with CGA and cues for safe hand placement. Patient minimally short of breath at end of ambulation with a roller walker, SaO2 remaining above 90%. A: patient noted with limited tolerance to PT evaluation and treatment secondary to generalized weakness and deconditioning, limited endurance and generalized unsteadiness.  Patient is noted with improved gait balance with use of roller walker.  While admitted, patient will continue to benefit from further gait training with a roller walker with progression to  cane and no assistive device as able.  Patient will also benefit from continued skilled physical therapy services to increase his strength, balance, endurance and safe functional mobility.  When medically stable for discharge, patient is appropriate for Level III Minimum Resource Intensity.   End of Consult   Patient Position at End of Consult Bed/Chair alarm activated;Bedside chair;All needs within reach   Licensure   [place] License Number  Dalila L Joseph, RACHID   Portions of the documentation may have been created using voice recognition software. Occasional wrong word or sound alike substitutions may have occurred due to the inherent limitation of the voice recognition software. Read the chart carefully and recognize, using context, where substitutions have occurred.

## 2024-02-06 NOTE — PROGRESS NOTES
[facility]  Progress Note  Name: Gael Ferraro I  MRN: [id number]  Unit/Bed#: S -01 I Date of Admission: 2/4/2024   Date of Service: 2/6/2024 I Hospital Day: 1    Assessment/Plan   * Anemia  Assessment & Plan  Lab Results   Component Value Date    HGB 8.8 (L) 02/06/2024    HGB 9.4 (L) 02/05/2024    MCV 87 02/06/2024    MCV 87 02/05/2024     Lab Results   Component Value Date    IRON 284 (H) 01/21/2024    FERRITIN 1,145 (H) 01/21/2024    TIBC <339 01/21/2024    CONCFE  01/21/2024      Comment:      Unable to Calculate.      Lab Results   Component Value Date    RETIC 13,600 (L) 01/21/2024    HAPTOGLOBIN 220 01/21/2024    DIRECTCOOMBS Negative 01/21/2024      Recent admission on 1/25/2022 for for unexplained anemia.  Previous admission with hemoglobin nearly 6,patient received 2 packed RBCs.   FISH testing: Negative  Normal male karyotype  Plasma cell testing-no diagnostic immuno phenotype identified.    Plan:  Received 2 packed RBCs in the ED.  Repeat H&H is 9.4. Goal to keep HB more than 8.   Continue B12 1000 mcg once daily  2/4/24: erythropoietin levels- 2759 abnormal.  Received one dose of erythropoietin on 2/5/24.  Follow-up with bone marrow biopsy results  Follow up with Dr. Au in 2 days of discharge.   Rule out GI source. Pt to follow with outpt GI for colonoscopy.     Chest pain with elevated troponin  Assessment & Plan  Chest pain lasting for 30 minutes associated with SOB.  H/o troponin elevation  EKG: Compared to previous EKG, this admission has more pronounced ST depression in Right and inferior lateral leads.   PCI: Prox RCA lesion is 100% stenosed. Not the culprit lesion. Poor target for bypass.The vessel size is small. The lesion is type C. The lesion is severely calcified. The lesion is chronically occluded.   The collateral flow was extensive in Right Posterior Descending Artery AND Third Right Posterolateral Branch.  Lab Results   Component Value Date     HSTNI0 2,603 (H) 02/04/2024    HSTNI2 2,991 (H) 02/04/2024    HSTNID2 388 (H) 02/04/2024    HSTNI4 4,499 (H) 02/04/2024    HSTNID4 1,896 (H) 02/04/2024    Chest x ray :New mild opacity in the right costophrenic sulcus which could be due to atelectasis or could be infectious/inflammatory.  Persistent opacity in the left base, likely atelectasis or scar.    Plan:  TYPE 2 MI- in the setting of anemia.   Cards - recommended to keep HR <70. Started him on metoprolol 50 mg every 6 hrs.   Holding heparin ACS protocol as pt already have extensive collaterals.  Pt received Aspirin 325 mg in the ED.             Hypertension  Assessment & Plan  Home meds: Amlodipine 2.5 mg hydrochlorothiazide 25 mg daily, nebivolol 20 mg daily.  Blood Pressure: 102/51    Plan:  Currently receiving blood transfusion.  Due to underlying myocardial stress, HTN meds are kept on hold.  Cards - recommended to keep HR <70.   At discharge we will discontinue his bisystolic home med and start him on metoprolol succinate 50 mg BID.     SOB (shortness of breath)  Assessment & Plan  Pt c/o SOB aggravating on ambulation.   Lab Results   Component Value Date    BNP 1,290 (H) 02/05/2024     (H) 12/31/2023    LVEF 60 01/01/2024   Echo: The estimated right ventricular systolic pressure is 58.00 mmHg. Left atrium is dilated.   Chest x ray: New mild opacity in the right costophrenic sulcus which could be due to atelectasis or could be infectious/inflammatory.   Persistent opacity in the left base, likely atelectasis or scar.ding.  I/O last 3 completed shifts:  In: 1536.7 [P.O.:600; Blood:936.7]  Out: 225 [Urine:225]     Plan:  Repeat BNP levels elevated. Possible vol overloaded state with repeat blood transfusion.  No h/o CHF, never had A. Fib.   Pt appears dry and thirsty.  Strict I and O   Daily weights.   C/o SOB today and wearing O2 saturating at 95 percent.   His saturations went down to 77 while sitting upright. Possible Pulm HTN in the setting of  elevated Rt ventricular pressures or the Beta blockers itself.   Monitor for desaturations and upscale as needed.   Spirometry.  No clear source of infection correlating with chest x ray finding.. WBC WNL.               CKD (chronic kidney disease) stage 3, GFR 30-59 ml/min ([facility])  Assessment & Plan  Lab Results   Component Value Date    EGFR 60 02/06/2024    EGFR 62 02/05/2024    EGFR 58 02/04/2024    CREATININE 1.11 02/06/2024    CREATININE 1.08 02/05/2024    CREATININE 1.13 02/04/2024     Baseline cr: 1.08 to 1.10    Plan:  Avoid hypotension.  Avoid nephrotoxins    Constipation  Assessment & Plan  Yvonne lax daily  Senna daily      Abnormal LFTs  Assessment & Plan  Elevated Alk phos with slight elevation in ALT.      Plan:  No jaundice on examination.  Possible in the setting of MI type 2- demand ischemia    Nose congestion  Assessment & Plan  Nasal drops PRN             VTE Pharmacologic Prophylaxis: VTE Score: 3 High Risk (Score >/= 5) - Pharmacological DVT Prophylaxis Ordered: Ticagrelor. Sequential Compression Devices Ordered.    Mobility:   Basic Mobility Inpatient Raw Score: 19  JH-HLM Goal: 6: Walk 10 steps or more  JH-HLM Achieved: 7: Walk 25 feet or more  HLM Goal achieved. Continue to encourage appropriate mobility.    Patient Centered Rounds: I performed bedside rounds with nursing staff today.  Discussions with Specialists or Other Care Team Provider: Oncology    Education and Discussions with Family / Patient: Updated  (wife) at bedside.    Current Length of Stay: 1 day(s)  Current Patient Status: Inpatient   Discharge Plan: Anticipate discharge tomorrow to home with home services.    Code Status: Level 1 - Full Code    Subjective:   Pt is clinically stable to be discharged home today. Dr. Au already scheduled an appointment with him tomorrow at noon. Pt would like to stay overnight. I reached out to Dr. Au about rescheduling. I also spoke with my attending and told the pt that he  is okay to stay overnight upon his request. I also advised him that he might loose his already scheduled appointment with Dr. Au. Pt is willing to stay overnight at risk of losing appointment with his oncologist.   Will discuss care plans with oncologist on call tomorrow.     Objective:     Vitals:   Temp (24hrs), Av.2 °F (36.8 °C), Min:97.6 °F (36.4 °C), Max:98.5 °F (36.9 °C)    Temp:  [97.6 °F (36.4 °C)-98.5 °F (36.9 °C)] 98.3 °F (36.8 °C)  HR:  [82-90] 90  Resp:  [20] 20  BP: (102-137)/(43-59) 102/51  SpO2:  [89 %-95 %] 93 %  Body mass index is 20.91 kg/m².     Input and Output Summary (last 24 hours):     Intake/Output Summary (Last 24 hours) at 2024 1800  Last data filed at 2024 1321  Gross per 24 hour   Intake 480 ml   Output 225 ml   Net 255 ml       Physical Exam:   Physical Exam  HENT:      Head: Atraumatic.      Mouth/Throat:      Pharynx: Oropharynx is clear.   Eyes:      Conjunctiva/sclera: Conjunctivae normal.   Cardiovascular:      Rate and Rhythm: Tachycardia present.      Pulses: Normal pulses.      Heart sounds: Normal heart sounds.   Pulmonary:      Breath sounds: Rales present. No wheezing.      Comments: Rt sided inspiratory crackles.   Chest:      Chest wall: No tenderness.   Musculoskeletal:         General: Normal range of motion.      Cervical back: Normal range of motion.   Skin:     General: Skin is warm.      Capillary Refill: Capillary refill takes 2 to 3 seconds.   Neurological:      Mental Status: He is oriented to person, place, and time. Mental status is at baseline.   Psychiatric:         Mood and Affect: Mood normal.          Additional Data:     Labs:  Results from last 7 days   Lab Units 24  0457   WBC Thousand/uL 7.36   HEMOGLOBIN g/dL 8.8*   HEMATOCRIT % 24.7*   PLATELETS Thousands/uL 272   NEUTROS PCT % 69   LYMPHS PCT % 17   MONOS PCT % 13*   EOS PCT % 1     Results from last 7 days   Lab Units 24  0457   SODIUM mmol/L 137   POTASSIUM mmol/L 3.6    CHLORIDE mmol/L 102   CO2 mmol/L 24   BUN mg/dL 22   CREATININE mg/dL 1.11   ANION GAP mmol/L 11   CALCIUM mg/dL 9.1   ALBUMIN g/dL 3.6   TOTAL BILIRUBIN mg/dL 1.52*   ALK PHOS U/L 111*   ALT U/L 60*   AST U/L 40*   GLUCOSE RANDOM mg/dL 115     Results from last 7 days   Lab Units 24  0452   INR  1.04     Results from last 7 days   Lab Units 24  1141 24  0823   POC GLUCOSE mg/dl 129 114         Results from last 7 days   Lab Units 24  0524 24  0457 24  0452   LACTIC ACID mmol/L 1.0  --   --    PROCALCITONIN ng/ml  --  0.18 0.11       Lines/Drains:  Invasive Devices       Peripheral Intravenous Line  Duration             Peripheral IV 24 Distal;Left;Upper;Ventral (anterior) Antecubital 2 days    Peripheral IV 24 Distal;Right;Upper;Ventral (anterior) Antecubital 2 days    Peripheral IV 24 Right;Ventral (anterior) Hand 2 days                      Telemetry:  Telemetry Orders (From admission, onward)               24 Hour Telemetry Monitoring  Continuous x 24 Hours (Telem)           Question:  Reason for 24 Hour Telemetry  Answer:  PCI/EP study (including pacer and ICD implementation), Cardiac surgery, MI, abnormal cardiac cath, and chest pain- rule out MI                     Telemetry Reviewed: Normal Sinus Rhythm  Indication for Continued Telemetry Use: Acute MI/Unstable Angina/Rule out ACS             Imaging: Reviewed radiology reports from this admission including: chest xray    Recent Cultures (last 7 days):         Last 24 Hours Medication List:   Current Facility-Administered Medications   Medication Dose Route Frequency Provider Last Rate    acetaminophen  650 mg Oral Q6H PRN Shannan Leon MD      aspirin  81 mg Oral Daily Shannan Leon MD      cholecalciferol  1,000 Units Oral BID Shannan Leon MD      cyanocobalamin  1,000 mcg Oral Daily Shannan Leon MD      LORazepam  1 mg Oral TID PRN  Shannan Leon MD      metoprolol tartrate  50 mg Oral Q8H Chandana Pro MD      nitroglycerin  0.4 mg Sublingual Q5 Min PRN Shannan eLon MD      polyethylene glycol  17 g Oral Daily Shannan Leon MD      pravastatin  80 mg Oral Daily With Dinner Shannan Leon MD      ranolazine  500 mg Oral Q12H CRESENCIO Shannan Leon MD      senna  1 tablet Oral HS Shannan Leon MD      sodium chloride  1 spray Each Nare Q1H PRN Chandana Pro MD      ticagrelor  90 mg Oral Q12H CRESENCIO Shannan Leon MD          Today, Patient Was Seen By: Shannan Leon MD    **Please Note: This note may have been constructed using a voice recognition system.**

## 2024-02-06 NOTE — CASE MANAGEMENT
Case Management Discharge Planning Note    Patient name Gael Ferraro  Location S /S -01 MRN [id number]  : 1940 Date 2024       Current Admission Date: 2024  Current Admission Diagnosis:Anemia   Patient Active Problem List    Diagnosis Date Noted    SOB (shortness of breath) 2024    Constipation 2024    CKD (chronic kidney disease) stage 3, GFR 30-59 ml/min (HCC) 2024    Abnormal LFTs 2024    Other dysphagia 2024    Chest pain with elevated troponin 2023    Anemia 2023    Coronary artery disease involving native coronary artery of native heart with angina pectoris (HCC) 2023    Stage 3 chronic kidney disease (HCC) 2023    Hypertension 2023    Leukopenia 2023      LOS (days): 1  Geometric Mean LOS (GMLOS) (days):   Days to GMLOS:     OBJECTIVE:  Risk of Unplanned Readmission Score: 14.2         Current admission status: Inpatient   Preferred Pharmacy:   [id number] - [place], [place] - [facility] [address]  Phone: [phone] Fax: [phone]    Primary Care Provider: Mike Lyman MD    Primary Insurance: [facility]  Secondary Insurance:     DISCHARGE DETAILS:    Discharge planning discussed with:: patient and pt's wife Christi at bedside  Freedom of Choice: Yes  Comments - Freedom of Choice: CM f/u w/ pt and wife re: C choice from pt choice list provided. Patient relayed choice for [facility] - agency reserved in aidin. AVS updated.  CM contacted family/caregiver?: Yes  Were Treatment Team discharge recommendations reviewed with patient/caregiver?: Yes  Did patient/caregiver verbalize understanding of patient care needs?: Yes  Were patient/caregiver advised of the risks associated with not following Treatment Team discharge recommendations?: Yes    Contacts  Patient Contacts: Christi (spouse)  Relationship to Patient:: Family  Contact Method: In Person  Reason/Outcome: Continuity  of Care, Emergency Contact, Referral, Discharge Planning    Requested Home Health Care         Is the patient interested in [facility] at discharge?: Yes  Home Health Discipline requested:: Nursing, Occupational Therapy, Physical Therapy  Home Health Agency Name:: [facility]  Home Health Follow-Up Provider:: PCP  Home Health Services Needed:: Gait/ADL Training, Evaluate Functional Status and Safety, Strengthening/Theraputic Exercises to Improve Function  Homebound Criteria Met:: Requires the Assistance of Another Person for Safe Ambulation or to Leave the Home, Uses an Assist Device (i.e. cane, walker, etc)  Supporting Clincal Findings:: Limited Endurance, Fatigues Easliy in Short Distances         Other Referral/Resources/Interventions Provided:  Interventions: HHC  Referral Comments: [facility] reseved in aidin. AVS updated.         Treatment Team Recommendation: Home with Home Health Care  Discharge Destination Plan:: Home with Home Health Care  Transport at Discharge : Family

## 2024-02-06 NOTE — ASSESSMENT & PLAN NOTE
Lab Results   Component Value Date    HGB 8.8 (L) 02/06/2024    HGB 9.4 (L) 02/05/2024    MCV 87 02/06/2024    MCV 87 02/05/2024     Lab Results   Component Value Date    IRON 284 (H) 01/21/2024    FERRITIN 1,145 (H) 01/21/2024    TIBC <339 01/21/2024    CONCFE  01/21/2024      Comment:      Unable to Calculate.      Lab Results   Component Value Date    RETIC 13,600 (L) 01/21/2024    HAPTOGLOBIN 220 01/21/2024    DIRECTCOOMBS Negative 01/21/2024      Recent admission on 1/25/2022 for for unexplained anemia.  Previous admission with hemoglobin nearly 6,patient received 2 packed RBCs.   FISH testing: Negative  Normal male karyotype  Plasma cell testing-no diagnostic immuno phenotype identified.    Plan:  Received 2 packed RBCs in the ED.  Repeat H&H is 9.4. Goal to keep HB more than 8.   Continue B12 1000 mcg once daily  2/4/24: erythropoietin levels- 2759 abnormal.  Received one dose of erythropoietin on 2/5/24.  Follow-up with bone marrow biopsy results  Follow up with Dr. Au in 2 days of discharge.   Rule out GI source. Pt to follow with outpt GI for colonoscopy.

## 2024-02-06 NOTE — DISCHARGE INSTR - AVS FIRST PAGE
Dear Gael Ferraro,     It was our pleasure to care for you here at [facility].  It is our hope that we were always able to exceed the expected standards for your care during your stay.  You were hospitalized due to chest pain and shortness of breath.  You were cared for on the 3 floor by Shannan Leon MD under the service of Alfred Bhandari MD with the [facility] who covers for your primary care physician (PCP), Mike Lyman MD, while you were hospitalized.  If you have any questions or concerns related to this hospitalization, you may contact us at .  For follow up as well as any medication refills, we recommend that you follow up with your primary care physician.  A registered nurse will reach out to you by phone within a few days after your discharge to answer any additional questions that you may have after going home.  However, at this time we provide for you here, the most important instructions / recommendations at discharge:     Notable Medication Adjustments -   Please start taking prednisone 40 mg daily.  We prescribed you for total 7 days.  Please follow-up with Dr. Vuong for steroid tapering/adjustments in the dose.  continue aspirin, ticagrelor  Per Cardiology recommendations changed Bystolic to Metoprolol succinate 50 mg Twice daily.Please stop taking Bystolic and conintue to take metoprolol.   Continue ranolazine, pravastatin 40 daily   Testing Required after Discharge -   Please follow-up with pulmonology for CT chest.   ** Please contact your PCP to request testing orders for any of the testing recommended here **  Important follow up information -   Follow up with PCP in 1 week  follow-up with Dr. Au, oncology in 7 days   Please follow up with Gastroenterology as outpatient. Within 1 week of discharge.   Other Instructions -   Please return to hospital if you have chest pain, shortness of breath,  dark color stools or change in color of urine or any bleeding sites.   Please review this entire after visit summary as additional general instructions including medication list, appointments, activity, diet, any pertinent wound care, and other additional recommendations from your care team that may be provided for you.      Sincerely,     Shannan Leon MD

## 2024-02-06 NOTE — CASE MANAGEMENT
Case Management Progress Note    Patient name Gael Ferraro  Location S /S -01 MRN [id number]  : 1940 Date 2024       LOS (days): 1  Geometric Mean LOS (GMLOS) (days):   Days to GMLOS:        OBJECTIVE:        Current admission status: Inpatient  Preferred Pharmacy:   [facility]/pharmacy #[id number] - [place] - [id number] [facility] [address]  Phone: [phone] Fax: [phone]    Primary Care Provider: Mike Lyman MD    [facility]  Secondary Insurance:     PROGRESS NOTE:    [facility] responses received via [employer]. [facility] patient choice list provided to pt and pt's spouse for their review. CM to f/u with pt and spouse re: HHC choice.

## 2024-02-06 NOTE — PLAN OF CARE
Problem: OCCUPATIONAL THERAPY ADULT  Goal: Performs self-care activities at highest level of function for planned discharge setting.  See evaluation for individualized goals.  Description: Treatment Interventions: ADL retraining, Functional transfer training, Endurance training, Patient/family training, Equipment evaluation/education, Compensatory technique education, Activityengagement  Equipment Recommended: Bedside commode, Shower/Tub chair with back ($)       See flowsheet documentation for full assessment, interventions and recommendations.   Note: Limitation: Decreased ADL status, Decreased Safe judgement during ADL, Decreased endurance, Decreased self-care trans, Decreased high-level ADLs (impaired balance, fxnl mobility, act zehra, fxnl reach, standing zehra, strength, safety awareness, pacing)  Prognosis: Good  Assessment: Pt is a 83 y.o. male seen for OT evaluation s/p admission to [facility] on 2/4/2024 due to SOB + chest pain. Diagnosed with Anemia. Personal and env factors supporting pt at time of IE include (I) PLOF, supportive wife, accessible home environment, and FFSU. Personal and env factors inhibiting engagement in occupations include advanced age. Performance deficits that affect the pt’s occupational performance can be seen above. Due to pt's current functional limitations and medical complications pt is functioning below baseline. Pt would benefit from continued skilled OT treatment in order to maximize safety, independence and overall performance with ADLs, functional mobility, and functional transfers in order to achieve highest level of function.     Rehab Resource Intensity Level, OT: No post-acute rehabilitation needs (no OT needs)

## 2024-02-06 NOTE — NURSING NOTE
"Reached out to [facility] service around 0420 d/t tele alarming of HR >90. Goal for patient is to have HR <70. Increased dose of Metoprolol 50mg PO was to be given at 0500 but vital signs showed BP was low at 105/43 with a MAP of 64. This was a significant drop in BP based on previous readings during this admission.     Resident advised to recheck vitals - RN rechecked and BP was only 107/48 with a MAP of 68. Patient had been sleeping prior to obtaining vital signs, but was also mildly hypoxic and placed on 2L O2. Patient stated that he \"feels as if a cold is coming on\". Notified resident - advised to obtain labs (including lactic, procal, cbc and bmp), a 500ml fluid bolus was ordered as well as a swab for covid/flu/rsv.     Fluid bolus given and vitals rechecked - BP improved to 113/44 MAP 67. Advised by [facility] to continue to hold metoprolol until day team evaluated patient.     Patient resting comfortably. All questions answered with wife at bedside.  "

## 2024-02-06 NOTE — UTILIZATION REVIEW
Date: 02/06    Day 3: Has surpassed a 2nd midnight with active treatments and services, which include: cont to mon labs. Cont tele. Softer BP this am, consider de-escalating BB therapy. Awaiting bone marrow bx. Cont ASA, statin, Brilinta.  See initial review completed by JIMBO Poole on 02/05.

## 2024-02-06 NOTE — PLAN OF CARE
Problem: PHYSICAL THERAPY ADULT  Goal: Performs mobility at highest level of function for planned discharge setting.  See evaluation for individualized goals.  Description: Treatment/Interventions: ADL retraining, Functional transfer training, LE strengthening/ROM, Elevations, Therapeutic exercise, Endurance training, Patient/family training, Equipment eval/education, Bed mobility, Gait training, Compensatory technique education, Spoke to case management, OT, Family  Equipment Recommended:  (Patient has a cane and a roller walker)       See flowsheet documentation for full assessment, interventions and recommendations.  Note:    Problem List: Decreased strength, Decreased endurance, Impaired balance, Decreased mobility, Decreased coordination, Decreased safety awareness  Assessment: Patient seen for Physical Therapy evaluation. Patient admitted with Anemia.  Comorbidities affecting patient's physical performance include: Hypertension, CKD 3, shortness of breath, leukopenia.  Personal factors affecting patient at time of initial evaluation include: lives in 2 story house, ambulating with assistive device, stairs to enter home, inability to navigate community distances, inability to navigate level surfaces without external assistance, inability to perform dynamic tasks in community, and positive fall history. Prior to admission, patient was independent with functional mobility with cane, independent with functional mobility without assistive device, independent with ADLS, requiring assist for IADLS, living with spouse in a 2 level home with 3 steps to enter, ambulating household distance, ambulating community distances, and lives in a multilevel house but has 1st floor setup.  Please find objective findings from Physical Therapy assessment regarding body systems outlined above with impairments and limitations including weakness, impaired balance, decreased endurance, impaired coordination, gait deviations, decreased  activity tolerance, decreased functional mobility tolerance, decreased safety awareness, fall risk, and SOB upon exertion.  The Barthel Index was used as a functional outcome tool presenting with a score of Barthel Index Score: 55 today indicating marked limitations of functional mobility and ADLS.  Patient's clinical presentation is currently unstable/unpredictable as seen in patient's presentation of vital sign response, increased fall risk, new onset of impairment of functional mobility, decreased endurance, and new onset of weakness. Pt would benefit from continued Physical Therapy treatment to address deficits as defined above and maximize level of functional mobility. As demonstrated by objective findings, the assigned level of complexity for this evaluation is high.The patient's [facility] Basic Mobility Inpatient Short Form Raw Score is 19. A Raw score of greater than 16 suggests the patient may benefit from discharge to home. Please also refer to the recommendation of the Physical Therapist for safe discharge planning.        Rehab Resource Intensity Level, PT: III (Minimum Resource Intensity)    See flowsheet documentation for full assessment.

## 2024-02-06 NOTE — CASE MANAGEMENT
Case Management Assessment & Discharge Planning Note    Patient name Gael Ferraro  Location S /S -01 MRN [id number]  : 1940 Date 2024       Current Admission Date: 2024  Current Admission Diagnosis:Anemia   Patient Active Problem List    Diagnosis Date Noted    SOB (shortness of breath) 2024    Constipation 2024    CKD (chronic kidney disease) stage 3, GFR 30-59 ml/min (HCC) 2024    Abnormal LFTs 2024    Other dysphagia 2024    Chest pain with elevated troponin 2023    Anemia 2023    Coronary artery disease involving native coronary artery of native heart with angina pectoris (HCC) 2023    Stage 3 chronic kidney disease (HCC) 2023    Hypertension 2023    Leukopenia 2023      LOS (days): 1  Geometric Mean LOS (GMLOS) (days):   Days to GMLOS:     OBJECTIVE:  PATIENT READMITTED TO HOSPITAL  Risk of Unplanned Readmission Score: 14.17         Current admission status: Inpatient       Preferred Pharmacy:   [facility]/pharmacy #[id number] - [place] [id number]  Phone: [phone] Fax: [phone]    Primary Care Provider: Mike Lyman MD    Primary Insurance: [facility]  Secondary Insurance:     ASSESSMENT:  Active Health Care Proxies    There are no active Health Care Proxies on file.                      Patient Information  Admitted from:: Home  Mental Status: Alert  During Assessment patient was accompanied by: Spouse  Assessment information provided by:: Patient, Spouse  Primary Caregiver: Self  Support Systems: Family members  County of Residence: [place]  What city do you live in?: [place]  Home entry access options. Select all that apply.: Stairs  Number of steps to enter home.: 3  Type of Current Residence: 2 story home  Upon entering residence, is there a bedroom on the main floor (no further steps)?: Yes  Upon entering residence, is there a bathroom on the main  floor (no further steps)?: Yes  Living Arrangements: Lives w/ Spouse/significant other    Activities of Daily Living Prior to Admission  Functional Status: Independent  Completes ADLs independently?: Yes  Ambulates independently?: Yes  Does patient use assisted devices?: Yes  Assisted Devices (DME) used: Straight Cane  Does patient currently own DME?: Yes  What DME does the patient currently own?: Straight Cane, Walker  Does patient have a history of Outpatient Therapy (PT/OT)?: No  Does the patient have a history of Short-Term Rehab?: Yes (pt reported acute rehab about 30-40 years ago)  Does patient have a history of HHC?: No  Does patient currently have HHC?: No         Patient Information Continued  Does patient have prescription coverage?: Yes  Does patient receive dialysis treatments?: No         Means of Transportation  Means of Transport to Appts:: Drives Self      Housing Stability: Unknown (2/6/2024)    Housing Stability Vital Sign     Unable to Pay for Housing in the Last Year: No     Number of Places Lived in the Last Year: Not on file     Unstable Housing in the Last Year: No   Food Insecurity: No Food Insecurity (2/6/2024)    Hunger Vital Sign     Worried About Running Out of Food in the Last Year: Never true     Ran Out of Food in the Last Year: Never true   Transportation Needs: No Transportation Needs (2/6/2024)    PRAPARE - Transportation     Lack of Transportation (Medical): No     Lack of Transportation (Non-Medical): No   Utilities: Not At Risk (2/6/2024)    [facility]ities     Threatened with loss of utilities: No       DISCHARGE DETAILS:    Discharge planning discussed with:: patient and pt's spouse Christi at bedside  Freedom of Choice: Yes  Comments - Freedom of Choice: Patient met with pt and spouse at bedside re: assessement and dcp. Patient lives w/ spouse in two story home, [address] [place] [address]. Patient reported he is independent with ADLS, utilizes cane to ambulate in community, reported hx of acute  rehab several years ago, and drives self to appointments. Confirmed PCP (Nura) and preferred pharmacy ([facility]). PT rcmd HHC vs OPPT as pt was set to start with OPPT PTA. Pt and spouse relayed choice for home w/ HHC and progression to OPPT when pt able to safely leave home. Pt and spouse relayed no preference for HHC - blanket referral sent via [employer], awaiting responses.  CM contacted family/caregiver?: Yes  Were Treatment Team discharge recommendations reviewed with patient/caregiver?: Yes  Did patient/caregiver verbalize understanding of patient care needs?: Yes  Were patient/caregiver advised of the risks associated with not following Treatment Team discharge recommendations?: Yes    Contacts  Patient Contacts: Christi (spouse)  Relationship to Patient:: Family  Contact Method: In Person  Reason/Outcome: Continuity of Care, Emergency Contact, Referral, Discharge Planning    Requested Home Health Care         Is the patient interested in HHC at discharge?: Yes  Home Health Discipline requested:: Nursing, Physical Therapy, Occupational Therapy  Home Health Agency Name:: Other  Home Health Follow-Up Provider:: PCP  Home Health Services Needed:: Evaluate Functional Status and Safety, Gait/ADL Training, Strengthening/Theraputic Exercises to Improve Function  Homebound Criteria Met:: Requires the Assistance of Another Person for Safe Ambulation or to Leave the Home, Uses an Assist Device (i.e. cane, walker, etc)  Supporting Clincal Findings:: Limited Endurance, Fatigues Easliy in Short Distances    DME Referral Provided  Referral made for DME?: No    Other Referral/Resources/Interventions Provided:  Interventions: HHC  Referral Comments: PT rcmd HHC. Patient and spouse relayed no preference for HHC - blanket referral sent via [employer], awaiting responses.         Treatment Team Recommendation: Home with Home Health Care  Discharge Destination Plan:: Home with Home Health Care  Transport at Discharge : Family

## 2024-02-06 NOTE — ASSESSMENT & PLAN NOTE
Elevated Alk phos with slight elevation in ALT.      Plan:  No jaundice on examination.  Possible in the setting of MI type 2- demand ischemia

## 2024-02-06 NOTE — DISCHARGE SUMMARY
[facility]  Discharge- Gael Ferraro 1940, 83 y.o. male MRN: [id number]  Unit/Bed#: S -[facility] Encounter: [id number]  Primary Care Provider: Mike Lyman MD   Date and time admitted to hospital: 2/4/2024  4:02 PM    * Anemia  Assessment & Plan  Lab Results   Component Value Date    HGB 9.1 (L) 02/10/2024    HGB 9.3 (L) 02/09/2024    MCV 88 02/10/2024    MCV 87 02/09/2024     Lab Results   Component Value Date    IRON 284 (H) 01/21/2024    FERRITIN 1,145 (H) 01/21/2024    TIBC <339 01/21/2024    CONCFE  01/21/2024      Comment:      Unable to Calculate.      Lab Results   Component Value Date    RETIC 13,600 (L) 01/21/2024    HAPTOGLOBIN 220 01/21/2024    DIRECTCOOMBS Negative 01/21/2024      Recent admission on 1/25/2022 for for unexplained anemia.  Previous admission with hemoglobin nearly 6,patient received 2 packed RBCs.   FISH testing: Negative  Normal male karyotype  Plasma cell testing-no diagnostic immuno phenotype identified.    Plan:  Received 2 packed RBCs in the ED.   Received 1 pack leukoreduced RBCs today. Repeat HB 8.  Repeat H&H is 9.4. Goal to keep HB more than 8.   Continue B12 1000 mcg once daily  2/4/24: erythropoietin levels- 2759 abnormal.  Received one dose of erythropoietin on 2/5/24.  GI RECS- Rule out GI source.1x melena today. FOBT Negative on 2/7/24.    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  Received morning dose on the day of  discharge.  Julio César texted heme-onc on-call Dr. Linden miller to send him home on prednisone 40 mg daily.  Patient to follow-up with Dr. Vuong for steroid tapering early this week.     Elevated troponin  Assessment & Plan  Elevated troponin levels due to demand ischemia in the setting of anemia  chest pain lasting for 30 minutes associated with SOB.  H/o troponin elevation  EKG: Compared to previous EKG, this admission has more pronounced ST depression in Right and inferior lateral leads.   PCI: Prox RCA lesion is 100% stenosed. Not the culprit lesion. Poor target for bypass.The vessel size is small. The lesion is type C. The lesion is severely calcified. The lesion is chronically occluded.   The collateral flow was extensive in Right Posterior Descending Artery AND Third Right Posterolateral Branch.  Lab Results   Component Value Date    HSTNI0 2,603 (H) 02/04/2024    HSTNI2 2,991 (H) 02/04/2024    HSTNID2 388 (H) 02/04/2024    HSTNI4 4,499 (H) 02/04/2024    HSTNID4 1,896 (H) 02/04/2024    Chest x ray :New mild opacity in the right costophrenic sulcus which could be due to atelectasis or could be infectious/inflammatory.  Persistent opacity in the left base, likely atelectasis or scar.    Plan:  Elevated troponins in the setting of anemia.  Maintain hemoglobin more than 8  Cards - recommended to keep HR <70. Started him on metoprolol 50 mg every 6 hrs.   Holding heparin ACS protocol as pt already have extensive collaterals.  Pt received Aspirin 325 mg in the ED.   Continue aspirin, ticagrelor  Cardiology recs-metoprolol succinate 50 mg twice daily , due to concerns about low BP  Continue ranolazine, pravastatin 80            Hypertension  Assessment & Plan  Home meds: Amlodipine 2.5 mg hydrochlorothiazide 25 mg daily, nebivolol 20 mg daily.  Blood Pressure: 133/59    Plan:  Cards - recommended to keep HR <70.   At discharge we will discontinue his bisystolic home med and start him on metoprolol succinate 50 mg  BID.     SOB (shortness of breath)  Assessment & Plan  Pt c/o SOB aggravating on ambulation.   Lab Results   Component Value Date    BNP 1,290 (H) 02/05/2024     (H) 12/31/2023    LVEF 60 01/01/2024   Echo: The estimated right ventricular systolic pressure is 58.00 mmHg. Left atrium is dilated.   Chest x ray: New mild opacity in the right costophrenic sulcus which could be due to atelectasis or could be infectious/inflammatory.   Persistent opacity in the left base, likely atelectasis or scar.ding.  I/O last 3 completed shifts:  In: 250 [I.V.:250]  Out: -      Plan:  Repeat BNP levels elevated. Possible vol overloaded state with repeat blood transfusion Vs mild pulmonary hypertension as evidenced on echo.  Cardiology recommended a trial of IV Lasix.  But patient was skeptical about starting IV Lasix.  So did not start him on Lasix.  No h/o CHF, never had A. Fib.   Strict I and O   Daily weights.    His saturations went down to 77 while sitting upright. Possible Pulm HTN in the setting of elevated Rt ventricular pressures or the Beta blockers itself.  Spirometry.  No clear source of infection correlating with chest x ray finding.. WBC WNL.               CKD (chronic kidney disease) stage 3, GFR 30-59 ml/min ([facility])  Assessment & Plan  Lab Results   Component Value Date    EGFR 59 02/10/2024    EGFR 63 02/09/2024    EGFR 58 02/08/2024    CREATININE 1.12 02/10/2024    CREATININE 1.06 02/09/2024    CREATININE 1.14 02/08/2024     Baseline cr: 1.08 to 1.10    Plan:  Avoid hypotension.  Avoid nephrotoxins    Constipation  Assessment & Plan  Yvonne lax daily  Senna daily      Abnormal LFTs  Assessment & Plan  Elevated Alk phos with slight elevation in ALT.  Recent Labs     02/09/24  0528 02/10/24  0450   AST 45* 34   ALT 70* 56*   ALKPHOS 129* 108*   TBILI 1.05* 0.63         Plan:  No jaundice on examination.  Possible in the setting of MI type 2- demand ischemia    Nose congestion  Assessment & Plan  Nasal drops  PRN    Abnormal chest x-ray  Assessment & Plan  Chest x ray :New mild opacity in the right costophrenic sulcus which could be due to atelectasis or could be infectious/inflammatory.  Persistent opacity in the left base, likely atelectasis or scar.      Plan:  CT CHEST as out pt.        Medical Problems       Resolved Problems  Date Reviewed: 2/9/2024   None       Discharging Resident: Shannan Leon MD  Discharging Attending: Ashtyn Zhou MD  PCP: Mike Lyman MD  Admission Date:   Admission Orders (From admission, onward)       Ordered        02/05/24 1541  Inpatient Admission  Once            02/04/24 1736  Place in Observation  Once                          Discharge Date: 02/10/24    Consultations During Hospital Stay:  Oncology  cardiology    Procedures Performed:   2PRBC transfusion.    Significant Findings / Test Results:   XR CHEST PA & LATERAL :New mild opacity in the right costophrenic sulcus which could be due to atelectasis or could be infectious/inflammatory.     Persistent opacity in the left base, likely atelectasis or scar.    Incidental Findings:   None     Test Results Pending at Discharge (will require follow up):  None      Outpatient Tests Requested:  None     Complications:  none     Reason for Admission: chest apin and shortness of breath.     Hospital Course:   Gael Ferraro is a 83 y.o. male patient who originally presented to the hospital on 2/4/2024 due to chest pain and SOB.   PMH of history of ACS with prior stenting, hypertension, hyperlipidemia, CKD, anemia, presented to ED for evaluation of shortness of breath and chest tightness/pain that radiates down his right arm and right jaw.  He states that his symptoms began today, he noticed them when he was active and ambulating.   He was hospitalized less than 2 weeks ago when he was found to have hemoglobin levels in the 6s requiring transfusion, he had bone marrow biopsy done at that time to further evaluate  the cause of this acute anemia, these results are still pending.    Patient denies any fever, cough, congestion.  No history of DVT or PE, he takes baby aspirin daily and Brilinta.  He denies any recent dark or bloody stools. On chart review, patient had a cardiac echo and catheterization at the beginning of January, EF was 60%, patient has chronically occluded RCA, this was not stented at this time, no new stents were placed.   In the ED, pt found to be pale, have chest tightness, SOB.   Vitals- MO 86,RR 22,/57,SPO2: 100.  Troponins are 2603, BUN 26, ALK PHOS 119, HB 6.4.  His EKG revealed- ST segment depression in V5, V6, V4, II, I and III.  Pt received 3 units of PRBC.Repeat H and H is 9.1  Consulted cardiology due to up trending troponins. Recs to Continue aspirin, ticagrelor and Up titrate metoprolol to 50q8h and to Continue ranolazine, pravastatin 40.  Consulted oncology- one dose of erythropoietin was given on 2/5/2024.   His erythropoietin levels performed on 2/4/24 are 2759 abnormal.  Pt was observed during this hospitalization on telemetry and no events are recorded.   No chest pain reported. Pt was advised to take complete bed rest.   Patient had 1x melena ,ruled out GI source-upper GI endoscopy revealed localized erythematous mucosa in the cardia, colonoscopy revealed internal hemorrhoids.  No active bleeding noted.  Heme oncology started him on IV Solu-Medrol 20 mg every 12 hourly from 2/7/2024 to 2/10/24.  Pt will need to f/u with Dr. Au with in 7 days of discharge. Received morning dose on the day of discharge.  [place] texted heme-onc on-call Dr. Linden miller to send him home on prednisone 40 mg daily.  Patient to follow-up with Dr. Vuong for steroid tapering early this week.    Pt is stable to be discharged home with [facility].     Please see above list of diagnoses and related plan for additional information.     Condition at Discharge: stable    Discharge Day Visit / Exam:   Subjective:   "pt is doing well. Chest pain resolved.   Vitals: Blood Pressure: 133/59 (02/10/24 1012)  Pulse: 88 (02/09/24 2117)  Temperature: 97.8 °F (36.6 °C) (02/10/24 0738)  Temp Source: Temporal (02/09/24 1820)  Respirations: 18 (02/09/24 2117)  Height: 5' 10\" (177.8 cm) (02/08/24 1724)  Weight - Scale: 66.1 kg (145 lb 11.6 oz) (02/08/24 1724)  SpO2: 94 % (02/09/24 2117)  Exam:   Physical Exam  HENT:      Head: Atraumatic.      Mouth/Throat:      Pharynx: Oropharynx is clear.   Eyes:      Conjunctiva/sclera: Conjunctivae normal.   Cardiovascular:      Rate and Rhythm: Tachycardia present.      Pulses: Normal pulses.      Heart sounds: Normal heart sounds.   Pulmonary:      Breath sounds: Rales present. No wheezing.      Comments: Rt sided inspiratory crackles.   Chest:      Chest wall: No tenderness.   Musculoskeletal:         General: Normal range of motion.      Cervical back: Normal range of motion.   Skin:     General: Skin is warm.      Capillary Refill: Capillary refill takes 2 to 3 seconds.   Neurological:      Mental Status: He is oriented to person, place, and time. Mental status is at baseline.   Psychiatric:         Mood and Affect: Mood normal.          Discussion with Family: Updated  (wife) at bedside.    Discharge instructions/Information to patient and family:   See after visit summary for information provided to patient and family.      Provisions for Follow-Up Care:  See after visit summary for information related to follow-up care and any pertinent home health orders.      Mobility at time of Discharge:   Basic Mobility Inpatient Raw Score: 19  JH-HLM Goal: 6: Walk 10 steps or more  JH-HLM Achieved: 6: Walk 10 steps or more  HLM Goal achieved. Continue to encourage appropriate mobility.     Disposition:   Home [facility]    Planned Readmission: none     Discharge Medications:  See after visit summary for reconciled discharge medications provided to patient and/or family.      **Please Note: This " note may have been constructed using a voice recognition system**

## 2024-02-06 NOTE — OCCUPATIONAL THERAPY NOTE
Occupational Therapy Evaluation     Patient Name: Gael Ferraro  Today's Date: 2/6/2024  Problem List  Principal Problem:    Anemia  Active Problems:    Chest pain with elevated troponin    Hypertension    Constipation    CKD (chronic kidney disease) stage 3, GFR 30-59 ml/min ([facility])    Abnormal LFTs    SOB (shortness of breath)    Past Medical History  Past Medical History:   Diagnosis Date    Low hemoglobin      Past Surgical History  Past Surgical History:   Procedure Laterality Date    CARDIAC CATHETERIZATION Left 1/2/2024    Procedure: Cardiac Left Heart Cath;  Surgeon: Ana Garcia DO;  Location: [facility];  Service: Cardiology    CARDIAC CATHETERIZATION N/A 1/2/2024    Procedure: Cardiac Coronary Angiogram;  Surgeon: Ana Garcia DO;  [facility]: [facility];  Service: Cardiology    CARDIAC CATHETERIZATION N/A 1/2/2024    Procedure: Cardiac RHC;  Surgeon: Ana Garcia DO;  [facility]: [facility];  Service: Cardiology    IR BIOPSY BONE MARROW  1/24/2024 02/06/24 1114   OT Last Visit   OT Visit Date 02/06/24   Note Type   Note type Evaluation   Pain Assessment   Pain Assessment Tool 0-10   Pain Score No Pain   Restrictions/Precautions   Weight Bearing Precautions Per Order No   Other Precautions Fall Risk;Chair Alarm;Bed Alarm   Home Living   Type of Home House   Home Layout Two level;Performs ADLs on one level;Laundry in basement  (FFSU, 3 REYNA)   Bathroom Shower/Tub Tub/shower unit   Bathroom Toilet Standard   Bathroom Equipment Grab bars in shower  (reports that family may have a shower chair and BSC if needed)   Bathroom Accessibility Accessible   Home Equipment Cane;Walker   Additional Comments no use of AD in home, use of SPC in community   Prior Function   Level of [place] Independent with ADLs   Lives With Spouse   Receives Help From Family   IADLs Independent with driving;Family/Friend/Other provides meals;Family/Friend/Other provides medication  "management  (Wife organizes medications)   Falls in the last 6 months 5 to 10  (5-6 falls)   Vocational Retired   Lifestyle   Autonomy PTA pt living with wife in 2SH, pt (I) with ADLs and shares IADLs, (+)falls , (+)drives, no use of AD at baseline   Reciprocal Relationships supportive wife   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 PMH of history of ACS with prior stenting, hypertension, hyperlipidemia, CKD, anemia   Family/Caregiver Present Yes  (wife Christi)   Subjective   Subjective \"Rich/Gael I'll answer to anything\"   ADL   Eating Assistance 6  Modified independent   Grooming Assistance 6  Modified Independent   UB Bathing Assistance 5  Supervision/Setup   LB Bathing Assistance 5  Supervision/Setup   UB Dressing Assistance 6  Modified independent   LB Dressing Assistance 6  Modified independent   LB Dressing Deficit Don/doff R shoe;Don/doff L shoe  (able to simulte pulling pants over hips in standing without LOB)   Toileting Assistance  6  Modified independent   Toileting Deficit Clothing management down;Perineal hygiene;Clothing management up   Additional Comments Did not observe eating, grooming, UB bathing, LB bathing, and UB dressing at time of evaluation, with use of clinical reasoning, pt's performance throughout evaluation indicates that pt may be able to perform these tasks at the levels listed above   Bed Mobility   Supine to Sit 7  Independent   Additional Comments sitting EOB to don B shoes, no LOB   Transfers   Sit to Stand 5  Supervision   Additional items Increased time required   Stand to Sit 5  Supervision   Additional items Increased time required   Toilet transfer 5  Supervision   Additional items Increased time required;Standard toilet  (use of sink for support " upon standing)   Additional Comments use of RW   Functional Mobility   Functional Mobility 6  Modified independent   Additional Comments functional household distance   Additional items Rolling walker   Balance   Static Sitting Good   Dynamic Sitting Fair +   Static Standing Fair   Dynamic Standing Fair -   Ambulatory Fair -   Activity Tolerance   Activity Tolerance Patient tolerated treatment well   Medical Staff Made Aware REYNALDO CHRISTINE Assessment   RUE Assessment WFL   LUE Assessment   LUE Assessment WFL   Hand Function   Gross Motor Coordination Functional   Fine Motor Coordination Functional   Cognition   Overall Cognitive Status WFL   Arousal/Participation Alert;Cooperative   Attention Within functional limits   Orientation Level Oriented X4   Memory Within functional limits   Following Commands Follows multistep commands without difficulty   Comments pleasant and cooperative   Assessment   Limitation Decreased ADL status;Decreased Safe judgement during ADL;Decreased endurance;Decreased self-care trans;Decreased high-level ADLs  (impaired balance, fxnl mobility, act zehra, fxnl reach, standing zehra, strength, safety awareness, pacing)   Prognosis Good   Assessment Pt is a 83 y.o. male seen for OT evaluation s/p admission to [facility] on 2/4/2024 due to SOB + chest pain. Diagnosed with Anemia. Personal and env factors supporting pt at time of IE include (I) PLOF, supportive wife, accessible home environment, and FFSU. Personal and env factors inhibiting engagement in occupations include advanced age. Performance deficits that affect the pt’s occupational performance can be seen above. Due to pt's current functional limitations and medical complications pt is functioning below baseline. Pt would benefit from continued skilled OT treatment in order to maximize safety, independence and overall performance with ADLs, functional mobility, and functional transfers in order to achieve highest level of function.   Goals    Patient Goals to go home today   LTG Time Frame 10-14   Long Term Goal see goals listed below   Plan   Treatment Interventions ADL retraining;Functional transfer training;Endurance training;Patient/family training;Equipment evaluation/education;Compensatory technique education;Activityengagement   Goal Expiration Date 02/16/24   OT Treatment Day 0   OT Frequency 2-3x/wk   Discharge Recommendation   Rehab Resource Intensity Level, OT No post-acute rehabilitation needs  (no OT needs)   Equipment Recommended Bedside commode;Shower/Tub chair with back ($)   Commode Type Standard   AM-PAC Daily Activity Inpatient   Lower Body Dressing 4   Bathing 3   Toileting 4   Upper Body Dressing 4   Grooming 4   Eating 4   Daily Activity Raw Score 23   Daily Activity Standardized Score (Calc for Raw Score >=11) 51.12   AM-PAC Applied Cognition Inpatient   Following a Speech/Presentation 4   Understanding Ordinary Conversation 4   Taking Medications 4   Remembering Where Things Are Placed or Put Away 4   Remembering List of 4-5 Errands 4   Taking Care of Complicated Tasks 3   Applied Cognition Raw Score 23   Applied Cognition Standardized Score 53.08   End of Consult   Patient Position at End of Consult Seated edge of bed;All needs within reach     GOALS:       -Patient will be Mod I with UB bathing using AE and AD as needed in order to increase (I) with ADLs    -Patient will be Mod I with LB bathing with use of AE and AD as needed in order to increase (I) with ADLs    -Patient will perform functional transfers with Mod I to/from all surfaces using DME as needed in order to increase (I) with functional tasks    -Patient will be Mod I with functional mobility to/from bathroom for increased independence with toileting tasks    -Patient will tolerate therapeutic activities for greater than 30 min, in order to increase tolerance for functional activities.     -Patient will independently integrate one pacing strategy into morning  ADL's.        The patient's raw score on the AM-PAC Daily Activity Inpatient Short Form is 23. A raw score of greater than or equal to 19 suggests the patient may benefit from discharge to home. Please refer to the recommendation of the Occupational Therapist for safe discharge planning.    Sandee Ye MS, OTR/L

## 2024-02-06 NOTE — PROGRESS NOTES
Cardiology Progress Note - Team 1  Gael Ferraro 83 y.o. male MRN: [id number]  Unit/Bed#: S -01 Encounter: [id number]      Assessment/Plan:  1. Elevated troponin  - presenting with chest pain, SOB, right arm/jaw pain  - possibly secondary to #2 +/- worsening CAD  - s/p full dose aspirin in ED  - HS troponin 2603 > 2991 > 4499 > 6444 > 5152  - presenting ECG - NSR, diffuse ST depressions, but more significant in lateral leads  - telemetry review - NSR, HR 80s, brief episodes of tachycardia  - echo (1/1/2024) - LVEF 60%, no RWMA, dilated LA, mild MR/TR with estimated RVSP 58 mmHg   - LHC (1/2/2024) - severe chronic multivessel CAD; continue GDMT; consider high risk PCI to LM if symptoms were refractory vs CT Sx eval  - not a candidate for revascularization currently in the setting of recurrent anemia  - maintained on aspirin 81 mg daily, nebivolol 20 mg daily, Ranexa 500 mg twice daily, Crestor 10 mg daily, and Brilinta 90 mg twice daily outpatient  - lopressor increased to 50 mg q 8 hours yesterday - softer BPs noted this morning; consider de-escalating beta-blocker therapy  - continue to monitor on telemetry     2. Macrocritic anemia  - hemoglobin on arrival 6.4 - baseline around 13  - repeat at 9.4 s/p 3 u PRBCs  - a.m Hemoglobin 8.8  - hematology oncology consulted -s/p erythropoietin 20,000 units subcu yesterday, outpatient CBC twice a week, follow-up in office  - still awaiting bone marrow biopsy results  - recommend GI workup     3. CAD s/p PCI  - remote history per [facility] documentation - 20 years ago  - continue aspirin, Brilinta, pravastatin, Lopressor     4. Hypertension  - last documented /44  - maintained on nebivolol, chlorthalidone 25 mg daily outpatient  - currently on Lopressor     5. Hyperlipidemia  - lipid panel (1/21/2024) -/triglycerides 219/HDL 34/calculated 63  - continue pravastatin 80 mg daily     6. Carotid artery stenosis - continue aspirin, statin therapy  7. CKD stage III  "- a.m Cr 1.11; stable at baseline     Subjective:   Patient seen and examined.  No significant events overnight.  Patient feels well this morning.  Denies any further chest pain, shortness of breath, arm pain, or jaw pain.    Objective:   Vitals: Blood pressure (!) 113/44, pulse 83, temperature 97.6 °F (36.4 °C), resp. rate 20, height 5' 10\" (1.778 m), weight 66.1 kg (145 lb 11.6 oz), SpO2 93%., Body mass index is 20.91 kg/m².,   Orthostatic Blood Pressures      Flowsheet Row Most Recent Value   Blood Pressure 113/44 filed at 02/06/2024 0643   Patient Position - Orthostatic VS Lying filed at 02/06/2024 0458            Intake/Output Summary (Last 24 hours) at 2/6/2024 0949  Last data filed at 2/6/2024 0014  Gross per 24 hour   Intake 240 ml   Output 225 ml   Net 15 ml     Physical Exam  Constitutional:       General: He is not in acute distress.  HENT:      Head: Normocephalic.      Nose: Nose normal.      Mouth/Throat:      Mouth: Mucous membranes are moist.      Pharynx: Oropharynx is clear.   Eyes:      Pupils: Pupils are equal, round, and reactive to light.   Cardiovascular:      Rate and Rhythm: Normal rate and regular rhythm.      Pulses: Normal pulses.      Heart sounds: Normal heart sounds.   Pulmonary:      Effort: Pulmonary effort is normal. No respiratory distress.      Breath sounds: No wheezing or rales.      Comments: On RA  Abdominal:      Palpations: Abdomen is soft.   Musculoskeletal:         General: Normal range of motion.      Cervical back: Normal range of motion.      Right lower leg: No edema.      Left lower leg: No edema.   Skin:     General: Skin is warm and dry.      Capillary Refill: Capillary refill takes less than 2 seconds.   Neurological:      General: No focal deficit present.      Mental Status: He is alert and oriented to person, place, and time. Mental status is at baseline.   Psychiatric:         Mood and Affect: Mood normal.         Behavior: Behavior normal. "     Medications:    Current Facility-Administered Medications:     acetaminophen (TYLENOL) tablet 650 mg, 650 mg, Oral, Q6H PRN, Shannan Leon MD    aspirin (ECOTRIN LOW STRENGTH) EC tablet 81 mg, 81 mg, Oral, Daily, Shannan Leon MD, 81 mg at 02/06/24 0913    cholecalciferol (VITAMIN D3) tablet 1,000 Units, 1,000 Units, Oral, BID, Shannan Leon MD, 1,000 Units at 02/06/24 0913    cyanocobalamin (VITAMIN B-12) tablet 1,000 mcg, 1,000 mcg, Oral, Daily, Shannan Leon MD, 1,000 mcg at 02/06/24 0913    LORazepam (ATIVAN) tablet 1 mg, 1 mg, Oral, TID PRN, Shannan Leon MD, 1 mg at 02/05/24 2250    metoprolol (LOPRESSOR) injection 5 mg, 5 mg, Intravenous, Q6H PRN, Chandana Pro MD, 5 mg at 02/05/24 1740    metoprolol tartrate (LOPRESSOR) tablet 50 mg, 50 mg, Oral, Q8H, Chandana rPo MD    nitroglycerin (NITROSTAT) SL tablet 0.4 mg, 0.4 mg, Sublingual, Q5 Min PRN, Shannan Leon MD    polyethylene glycol (MIRALAX) packet 17 g, 17 g, Oral, Daily, Shannan Leon MD, 17 g at 02/06/24 0914    pravastatin (PRAVACHOL) tablet 80 mg, 80 mg, Oral, Daily With Dinner, Shannan Leon MD, 80 mg at 02/05/24 0953    ranolazine (RANEXA) 12 hr tablet 500 mg, 500 mg, Oral, Q12H CRESENCIO, Shannan Leon MD, 500 mg at 02/06/24 0913    senna (SENOKOT) tablet 8.6 mg, 1 tablet, Oral, HS, Shannan Leon MD, 8.6 mg at 02/05/24 2054    sodium chloride (OCEAN) 0.65 % nasal spray 1 spray, 1 spray, Each Nare, Q1H PRN, Chandana Pro MD    ticagrelor (BRILINTA) tablet 90 mg, 90 mg, Oral, Q12H CRESENCIO, Shannan Leon MD, 90 mg at 02/06/24 0914     Labs & Results:      Results from last 7 days   Lab Units 02/06/24  0457 02/05/24  0452 02/04/24  2345 02/04/24  1618   WBC Thousand/uL 7.36 10.12  --  8.72   HEMOGLOBIN g/dL 8.8* 9.4* 9.8* 6.4*   HEMATOCRIT % 24.7* 26.9* 28.4* 18.5*   PLATELETS  Thousands/uL 272 289  --  364         Results from last 7 days   Lab Units 02/06/24  0457 02/05/24  0452 02/04/24  1618   POTASSIUM mmol/L 3.6 3.7 3.9   CHLORIDE mmol/L 102 103 99   CO2 mmol/L 24 25 23   BUN mg/dL 22 26* 26*   CREATININE mg/dL 1.11 1.08 1.13   CALCIUM mg/dL 9.1 9.2 9.4   ALK PHOS U/L 111* 115* 119*   ALT U/L 60* 54* 62*   AST U/L 40* 44* 35     Results from last 7 days   Lab Units 02/05/24  0452 02/04/24  1624   INR  1.04 0.96   PTT seconds 30 29     Results from last 7 days   Lab Units 02/05/24  0452   MAGNESIUM mg/dL 2.0

## 2024-02-07 ENCOUNTER — APPOINTMENT (INPATIENT)
Dept: CT IMAGING | Facility: HOSPITAL | Age: 84
DRG: 812 | End: 2024-02-07
Payer: COMMERCIAL

## 2024-02-07 PROBLEM — R93.89 ABNORMAL CHEST X-RAY: Status: ACTIVE | Noted: 2024-02-07

## 2024-02-07 PROBLEM — R79.89 ELEVATED TROPONIN: Status: ACTIVE | Noted: 2023-12-31

## 2024-02-07 LAB
ANION GAP SERPL CALCULATED.3IONS-SCNC: 8 MMOL/L
BASOPHILS # BLD AUTO: 0.02 THOUSANDS/ÂΜL (ref 0–0.1)
BASOPHILS NFR BLD AUTO: 0 % (ref 0–1)
BUN SERPL-MCNC: 21 MG/DL (ref 5–25)
CALCIUM SERPL-MCNC: 8.8 MG/DL (ref 8.4–10.2)
CHLORIDE SERPL-SCNC: 102 MMOL/L (ref 96–108)
CO2 SERPL-SCNC: 23 MMOL/L (ref 21–32)
CREAT SERPL-MCNC: 1 MG/DL (ref 0.6–1.3)
EOSINOPHIL # BLD AUTO: 0.03 THOUSAND/ÂΜL (ref 0–0.61)
EOSINOPHIL NFR BLD AUTO: 0 % (ref 0–6)
ERYTHROCYTE [DISTWIDTH] IN BLOOD BY AUTOMATED COUNT: 14.4 % (ref 11.6–15.1)
GFR SERPL CREATININE-BSD FRML MDRD: 68 ML/MIN/1.73SQ M
GLUCOSE SERPL-MCNC: 129 MG/DL (ref 65–140)
HCT VFR BLD AUTO: 23.3 % (ref 36.5–49.3)
HCT VFR BLD AUTO: 28.3 % (ref 36.5–49.3)
HEMOCCULT STL QL: NEGATIVE
HEMOCCULT STL QL: NORMAL
HEMOCCULT STL QL: NORMAL
HGB BLD-MCNC: 8 G/DL (ref 12–17)
HGB BLD-MCNC: 9.9 G/DL (ref 12–17)
IMM GRANULOCYTES # BLD AUTO: 0.06 THOUSAND/UL (ref 0–0.2)
IMM GRANULOCYTES NFR BLD AUTO: 1 % (ref 0–2)
LYMPHOCYTES # BLD AUTO: 1.1 THOUSANDS/ÂΜL (ref 0.6–4.47)
LYMPHOCYTES NFR BLD AUTO: 13 % (ref 14–44)
MCH RBC QN AUTO: 30.3 PG (ref 26.8–34.3)
MCHC RBC AUTO-ENTMCNC: 34.3 G/DL (ref 31.4–37.4)
MCV RBC AUTO: 88 FL (ref 82–98)
MONOCYTES # BLD AUTO: 1.13 THOUSAND/ÂΜL (ref 0.17–1.22)
MONOCYTES NFR BLD AUTO: 13 % (ref 4–12)
NEUTROPHILS # BLD AUTO: 6.22 THOUSANDS/ÂΜL (ref 1.85–7.62)
NEUTS SEG NFR BLD AUTO: 73 % (ref 43–75)
NRBC BLD AUTO-RTO: 0 /100 WBCS
PLATELET # BLD AUTO: 273 THOUSANDS/UL (ref 149–390)
PMV BLD AUTO: 10.8 FL (ref 8.9–12.7)
POTASSIUM SERPL-SCNC: 3.5 MMOL/L (ref 3.5–5.3)
RBC # BLD AUTO: 2.64 MILLION/UL (ref 3.88–5.62)
SODIUM SERPL-SCNC: 133 MMOL/L (ref 135–147)
WBC # BLD AUTO: 8.56 THOUSAND/UL (ref 4.31–10.16)

## 2024-02-07 PROCEDURE — 99222 1ST HOSP IP/OBS MODERATE 55: CPT | Performed by: INTERNAL MEDICINE

## 2024-02-07 PROCEDURE — 99232 SBSQ HOSP IP/OBS MODERATE 35: CPT | Performed by: HOSPITALIST

## 2024-02-07 PROCEDURE — 99232 SBSQ HOSP IP/OBS MODERATE 35: CPT | Performed by: INTERNAL MEDICINE

## 2024-02-07 PROCEDURE — 88313 SPECIAL STAINS GROUP 2: CPT | Performed by: STUDENT IN AN ORGANIZED HEALTH CARE EDUCATION/TRAINING PROGRAM

## 2024-02-07 PROCEDURE — P9040 RBC LEUKOREDUCED IRRADIATED: HCPCS

## 2024-02-07 PROCEDURE — 85025 COMPLETE CBC W/AUTO DIFF WBC: CPT

## 2024-02-07 PROCEDURE — 85097 BONE MARROW INTERPRETATION: CPT | Performed by: STUDENT IN AN ORGANIZED HEALTH CARE EDUCATION/TRAINING PROGRAM

## 2024-02-07 PROCEDURE — 82272 OCCULT BLD FECES 1-3 TESTS: CPT

## 2024-02-07 PROCEDURE — 80048 BASIC METABOLIC PNL TOTAL CA: CPT

## 2024-02-07 PROCEDURE — 88364 INSITU HYBRIDIZATION (FISH): CPT | Performed by: STUDENT IN AN ORGANIZED HEALTH CARE EDUCATION/TRAINING PROGRAM

## 2024-02-07 PROCEDURE — 88342 IMHCHEM/IMCYTCHM 1ST ANTB: CPT | Performed by: STUDENT IN AN ORGANIZED HEALTH CARE EDUCATION/TRAINING PROGRAM

## 2024-02-07 PROCEDURE — 85014 HEMATOCRIT: CPT

## 2024-02-07 PROCEDURE — C9113 INJ PANTOPRAZOLE SODIUM, VIA: HCPCS | Performed by: INTERNAL MEDICINE

## 2024-02-07 PROCEDURE — 88341 IMHCHEM/IMCYTCHM EA ADD ANTB: CPT | Performed by: STUDENT IN AN ORGANIZED HEALTH CARE EDUCATION/TRAINING PROGRAM

## 2024-02-07 PROCEDURE — 88311 DECALCIFY TISSUE: CPT | Performed by: STUDENT IN AN ORGANIZED HEALTH CARE EDUCATION/TRAINING PROGRAM

## 2024-02-07 PROCEDURE — 88305 TISSUE EXAM BY PATHOLOGIST: CPT | Performed by: STUDENT IN AN ORGANIZED HEALTH CARE EDUCATION/TRAINING PROGRAM

## 2024-02-07 PROCEDURE — 88365 INSITU HYBRIDIZATION (FISH): CPT | Performed by: STUDENT IN AN ORGANIZED HEALTH CARE EDUCATION/TRAINING PROGRAM

## 2024-02-07 PROCEDURE — 85018 HEMOGLOBIN: CPT

## 2024-02-07 RX ORDER — METHYLPREDNISOLONE SODIUM SUCCINATE 40 MG/ML
20 INJECTION, POWDER, LYOPHILIZED, FOR SOLUTION INTRAMUSCULAR; INTRAVENOUS EVERY 12 HOURS SCHEDULED
Status: DISCONTINUED | OUTPATIENT
Start: 2024-02-07 | End: 2024-02-10 | Stop reason: HOSPADM

## 2024-02-07 RX ORDER — LANOLIN ALCOHOL/MO/W.PET/CERES
3 CREAM (GRAM) TOPICAL
Status: DISCONTINUED | OUTPATIENT
Start: 2024-02-07 | End: 2024-02-10 | Stop reason: HOSPADM

## 2024-02-07 RX ORDER — PANTOPRAZOLE SODIUM 40 MG/10ML
40 INJECTION, POWDER, LYOPHILIZED, FOR SOLUTION INTRAVENOUS EVERY 12 HOURS SCHEDULED
Status: DISCONTINUED | OUTPATIENT
Start: 2024-02-07 | End: 2024-02-10 | Stop reason: HOSPADM

## 2024-02-07 RX ADMIN — PANTOPRAZOLE SODIUM 40 MG: 40 INJECTION, POWDER, FOR SOLUTION INTRAVENOUS at 21:13

## 2024-02-07 RX ADMIN — RANOLAZINE 500 MG: 500 TABLET, FILM COATED, EXTENDED RELEASE ORAL at 21:13

## 2024-02-07 RX ADMIN — SENNOSIDES 8.6 MG: 8.6 TABLET, FILM COATED ORAL at 21:13

## 2024-02-07 RX ADMIN — METOPROLOL TARTRATE 50 MG: 50 TABLET, FILM COATED ORAL at 21:13

## 2024-02-07 RX ADMIN — LORAZEPAM 1 MG: 1 TABLET ORAL at 19:00

## 2024-02-07 RX ADMIN — Medication 3 MG: at 21:45

## 2024-02-07 RX ADMIN — CYANOCOBALAMIN TAB 500 MCG 1000 MCG: 500 TAB at 09:08

## 2024-02-07 RX ADMIN — Medication 3 MG: at 01:24

## 2024-02-07 RX ADMIN — ASPIRIN 81 MG: 81 TABLET, COATED ORAL at 09:08

## 2024-02-07 RX ADMIN — RANOLAZINE 500 MG: 500 TABLET, FILM COATED, EXTENDED RELEASE ORAL at 09:08

## 2024-02-07 RX ADMIN — METHYLPREDNISOLONE SODIUM SUCCINATE 20 MG: 40 INJECTION, POWDER, FOR SOLUTION INTRAMUSCULAR; INTRAVENOUS at 21:41

## 2024-02-07 RX ADMIN — Medication 1000 UNITS: at 09:08

## 2024-02-07 RX ADMIN — PRAVASTATIN SODIUM 80 MG: 80 TABLET ORAL at 16:25

## 2024-02-07 RX ADMIN — METOPROLOL TARTRATE 50 MG: 50 TABLET, FILM COATED ORAL at 05:39

## 2024-02-07 RX ADMIN — Medication 1000 UNITS: at 21:13

## 2024-02-07 RX ADMIN — TICAGRELOR 90 MG: 90 TABLET ORAL at 09:08

## 2024-02-07 RX ADMIN — METOPROLOL TARTRATE 50 MG: 50 TABLET, FILM COATED ORAL at 13:25

## 2024-02-07 NOTE — CONSULTS
Consultation -  [facility]  Gael Michaellefaby 83 y.o. male MRN: [id number]  Unit/Bed#: S -01 Encounter: [id number]        Inpatient consult to gastroenterology  Consult performed by: Carol Stringer PA-C  Consult ordered by: Uriel Harrison MD          Reason for Consult / Principal Problem: anemia, dark stool, dysphagia     ASSESSMENT and PLAN:    Principal Problem:    Anemia  Active Problems:    Chest pain with elevated troponin    Hypertension    Constipation    CKD (chronic kidney disease) stage 3, GFR 30-59 ml/min ([facility])    Abnormal LFTs    SOB (shortness of breath)    Nose congestion    #1. Normocytic anemia with dark stool: patient has been having issues with anemia now for several months with a few recent admissions for symptomatic anemia and chest pain with elevated troponin thought to be from demand. Continues to drop hgb, is on ASA and Brilinta, last dose Brilinta was 2/7 in AM. Hgb 6.4>9.8 after 2 units, now slowly down trending over last 3 days to 8 again. S/p 1 unit PRBCs today. Reports one dark stool today, occult blood is pending. Differential includes AVMs, PUD, lala's ulcers from hiatal hernia, malignancy or non GI source  -I discussed in detail with patient and his brother, 2 sons, and wife at bedside in regards to possible GI work up to exclude any occult GI blood loss. Discussed possible etiologies and procedures in detail. Discussed risks and benefits in detail. I discussed the option of doing EGD tomorrow but if this is negative, colonoscopy would be recommended on Friday. I also discussed doing an EGD and colonoscopy together on Friday for further evaluation.   -occult stool was sent, I did explain however that if this is negative, it would not necessarily change our recommendations as you can pass blood intermittently in the stools.   -they would like to discuss bone marrow bx results with Dr Vuong first before deciding, I also messaged him  -I also spoke with  cardiology, is cleared from their standpoint for procedures and can hold Brilinta if needed which we would recommend if we are proceeding with procedures.  -if patient decides to have EGD tomorrow, will need to be NPO after midnight. If he decides to do EGD/colonoscopy on Friday, will need to be on clears tomorrow AM.     #2. Esophageal dysphagia: reports occasional dysphagia to solids with occasional regurgitation. Likely Schatzki's ring versus dysmotility based on previous barium swallow   -was ordered for outpatient esophagram  -consider inpatient EGD as above    -------------------------------------------------------------------------------------------------------------------    HPI: This is an 83 year old male with history of ACD with prior stenting on Brilinta and ASA, HTN, HLD, CKD, anemia who presented to the hospital a few days ago for chest pain and shortness of breath and noted to be anemic with hgb 6.4. this has been a recurrent issue with recent admission for the same with elevated troponins each time thought to be from demand. He had a bone marrow biopsy on 1/24 as well and has been followed by hematology. He reports today he had a stool which appeared black in the hat. He denies previous black stools or blood in the stool. He has had some weight loss with his recurrent admissions. He denies nausea or vomiting. Denies abdominal pain. Appetite is decreased at times but this is normal for him. He gets occasional indigestion. He does apparently take advil at times but not daily. He reports having a hiatal hernia in the past, unsure if he ever had an EGD but if so, it was over 10 years ago. He also has not had a colonoscopy in about 10 years as well. Reports benign polyps in the past. Reports his stools are typically regular with occasional constipation at times that is improved with metamucil. He does occasionally have dysphagia to solid foods and regurgitates the food but not all the time. Denies  dysphagia to liquids. He has a history of esophagram in 2021 with schatzki's ring and esophageal dysmotility.     REVIEW OF SYSTEMS:    CONSTITUTIONAL: Denies any fever, chills, or rigors. Decreased appetite, weight loss   HEENT: No earache or tinnitus. Denies hearing loss or visual disturbances.  CARDIOVASCULAR: No chest pain or palpitations.   RESPIRATORY: Denies any cough, hemoptysis, shortness of breath or dyspnea on exertion.  GASTROINTESTINAL: As noted in the History of Present Illness.   GENITOURINARY: No problems with urination. Denies any hematuria or dysuria.  NEUROLOGIC: No dizziness or vertigo, denies headaches.   MUSCULOSKELETAL: Denies any muscle or joint pain.   SKIN: Denies skin rashes or itching.   ENDOCRINE: Denies excessive thirst. Denies intolerance to heat or cold.  PSYCHOSOCIAL: Denies depression or anxiety. Denies any recent memory loss.       Historical Information   Past Medical History:   Diagnosis Date    Low hemoglobin      Past Surgical History:   Procedure Laterality Date    CARDIAC CATHETERIZATION Left 1/2/2024    Procedure: Cardiac Left Heart Cath;  Surgeon: Ana Garcia DO;  Location[facility];  Service: Cardiology    CARDIAC CATHETERIZATION N/A 1/2/2024    Procedure: Cardiac Coronary Angiogram;  Surgeon: Ana Garcia DO;  [facility];  Service: Cardiology    CARDIAC CATHETERIZATION N/A 1/2/2024    Procedure: Cardiac RHC;  Surgeon: Ana Garcia DO;  [facility];  Service: Cardiology    IR BIOPSY BONE MARROW  1/24/2024     Social History   Social History     Substance and Sexual Activity   Alcohol Use Not Currently     Social History     Substance and Sexual Activity   Drug Use Never     Social History     Tobacco Use   Smoking Status Never   Smokeless Tobacco Never     History reviewed. No pertinent family history.    Meds/Allergies     Medications Prior to Admission   Medication    acetaminophen (TYLENOL) 325 mg tablet  "   amLODIPine (NORVASC) 2.5 mg tablet    aspirin (ECOTRIN LOW STRENGTH) 81 mg EC tablet    Cholecalciferol 50 MCG (2000 UT) CAPS    cyanocobalamin (VITAMIN B-12) 1000 MCG tablet    hydrochlorothiazide (HYDRODIURIL) 25 mg tablet    LORazepam (ATIVAN) 1 mg tablet    nebivolol (BYSTOLIC) 20 MG tablet    nitroglycerin (NITROSTAT) 0.4 mg SL tablet    ranolazine (RANEXA) 500 mg 12 hr tablet    rosuvastatin (CRESTOR) 10 MG tablet    ticagrelor (BRILINTA) 90 MG     Current Facility-Administered Medications   Medication Dose Route Frequency    acetaminophen (TYLENOL) tablet 650 mg  650 mg Oral Q6H PRN    aspirin (ECOTRIN LOW STRENGTH) EC tablet 81 mg  81 mg Oral Daily    cholecalciferol (VITAMIN D3) tablet 1,000 Units  1,000 Units Oral BID    cyanocobalamin (VITAMIN B-12) tablet 1,000 mcg  1,000 mcg Oral Daily    LORazepam (ATIVAN) tablet 1 mg  1 mg Oral TID PRN    melatonin tablet 3 mg  3 mg Oral HS    metoprolol tartrate (LOPRESSOR) tablet 50 mg  50 mg Oral Q8H    nitroglycerin (NITROSTAT) SL tablet 0.4 mg  0.4 mg Sublingual Q5 Min PRN    polyethylene glycol (MIRALAX) packet 17 g  17 g Oral Daily    pravastatin (PRAVACHOL) tablet 80 mg  80 mg Oral Daily With Dinner    ranolazine (RANEXA) 12 hr tablet 500 mg  500 mg Oral Q12H CRESENCIO    senna (SENOKOT) tablet 8.6 mg  1 tablet Oral HS    sodium chloride (OCEAN) 0.65 % nasal spray 1 spray  1 spray Each Nare Q1H PRN    ticagrelor (BRILINTA) tablet 90 mg  90 mg Oral Q12H CRESENCIO       Allergies   Allergen Reactions    Hydrocodone-Acetaminophen GI Intolerance    Niacin GI Intolerance    Penicillins Hives           Objective     Blood pressure (!) 105/49, pulse 78, temperature 98 °F (36.7 °C), resp. rate 20, height 5' 10\" (1.778 m), weight 66.1 kg (145 lb 11.6 oz), SpO2 91%.      Intake/Output Summary (Last 24 hours) at 2/7/2024 1231  Last data filed at 2/7/2024 0608  Gross per 24 hour   Intake 480 ml   Output --   Net 480 ml         PHYSICAL EXAM:      General Appearance:   Alert, " cooperative, no distress, appears stated age    HEENT:   Normocephalic, atraumatic, anicteric, no oropharyngeal thrush present.     Neck:  Supple, symmetrical, trachea midline, no adenopathy;    thyroid: no enlargement/tenderness/nodules; no carotid  bruit or JVD    Lungs:   Clear to auscultation bilaterally; no rales, rhonchi or wheezing; respirations unlabored    Heart::   S1 and S2 normal; regular rate and rhythm; no murmur, rub, or gallop.   Abdomen:   Soft, non-tender, non-distended; normal bowel sounds; no masses, no organomegaly    Genitalia:   Deferred    Rectal:   Deferred    Extremities:  No cyanosis, clubbing or edema    Pulses:  2+ and symmetric all extremities    Skin:  Skin color, texture, turgor normal, no rashes or lesions    Lymph nodes:  No palpable cervical, axillary or inguinal lymphadenopathy        Lab Results:   Results from last 7 days   Lab Units 02/07/24  0540   WBC Thousand/uL 8.56   HEMOGLOBIN g/dL 8.0*   HEMATOCRIT % 23.3*   PLATELETS Thousands/uL 273   NEUTROS PCT % 73   LYMPHS PCT % 13*   MONOS PCT % 13*   EOS PCT % 0     Results from last 7 days   Lab Units 02/07/24  0540 02/06/24  0457   POTASSIUM mmol/L 3.5 3.6   CHLORIDE mmol/L 102 102   CO2 mmol/L 23 24   BUN mg/dL 21 22   CREATININE mg/dL 1.00 1.11   CALCIUM mg/dL 8.8 9.1   ALK PHOS U/L  --  111*   ALT U/L  --  60*   AST U/L  --  40*     Results from last 7 days   Lab Units 02/05/24  0452   INR  1.04           Imaging Studies: I have personally reviewed pertinent imaging studies.    XR chest 2 views    Result Date: 2/5/2024  Impression: New mild opacity in the right costophrenic sulcus which could be due to atelectasis or could be infectious/inflammatory. Persistent opacity in the left base, likely atelectasis or scar. Workstation performed: [id number]           Patient was seen and examined by Dr. Singh. All [place] medical decisions were made by Dr. Singh. Thank you for allowing us to participate in the care of this present  patient. We will follow-up with you closely.

## 2024-02-07 NOTE — QUICK NOTE
I have seen and examined the patient.  Patient states that he wants to go home.  Upon asking questions, he denies any abdominal pain nausea vomiting, he had a last bowel movement today which according the patient was unusually dark and it has never been like that before.  No dizziness on standing up or walking    Vitals, labs, meds, imaging reviewed    Physical Exam:  General: No pallor or icterus,   Lungs: Bilateral air entry equal, no rales or wheeze  Heart: Normal S1-S2, no murmurs rubs or gallops  Abdomen: Soft, nondistended, nontender  Extremity: No edema, no calf tenderness  Neuro: Awake, alert, oriented x 3    A&P:  Anemia  Chest pain  CAD  CKD  Hypertension  Hypoxia    Has been dealing with anemia since January, he did not show evidence of bleeding at that time, he was seen in consultation by hematology, bone marrow biopsy was performed, all of which is not back yet, some of the tests are pending to see if it is pure red cell aplasia.  This could be secondary to his previous COVID infection because he has been noted to have drop in the counts since then only.  In the meantime his blood counts continue to drop.  After 2 units of blood on admission hemoglobin was up to 9.8 subsequently trending down, was 8.8 yesterday 8.0 today.  He has underlying coronary artery disease which in the setting of anemia leads to chest pain with troponin elevation.  Cardiology recommending evaluation and treatment of underlying anemia.  Hence would aim for hemoglobin goal of greater than 8.  Hence giving 1 unit PRBC today.  Given dark stool, consulted gastroenterology team as well.  Requested hematology to evaluate the patient again.  Patient has some underlying anxiety, becomes more anxious after being in the hospital and keeps on wanting to leave, however wife agreeable with him staying in patient so far is agreeable with staying.  Patient and family confused about back and forth in his plans specially he was going to be  discharged yesterday and then the change in the plans today.  I explained them that some of the changes in the plan today are secondary to the drop in his blood count between yesterday and today and hence I recommend him to stay here at least tonight  Hypoxia: Been noted to be intermittently hypoxic either at nighttime or with walking.  Chest x-ray showed some opacity in the right lower lung.  Would recommend getting CT chest without contrast

## 2024-02-07 NOTE — PROGRESS NOTES
[facility]  Progress Note  Name: Gael Ferraro I  MRN: [id number]  Unit/Bed#: S -01 I Date of Admission: 2/4/2024   Date of Service: 2/7/2024 I Hospital Day: 2    Assessment/Plan   * Anemia  Assessment & Plan  Lab Results   Component Value Date    HGB 8.0 (L) 02/07/2024    HGB 8.8 (L) 02/06/2024    MCV 88 02/07/2024    MCV 87 02/06/2024     Lab Results   Component Value Date    IRON 284 (H) 01/21/2024    FERRITIN 1,145 (H) 01/21/2024    TIBC <339 01/21/2024    CONCFE  01/21/2024      Comment:      Unable to Calculate.      Lab Results   Component Value Date    RETIC 13,600 (L) 01/21/2024    HAPTOGLOBIN 220 01/21/2024    DIRECTCOOMBS Negative 01/21/2024      Recent admission on 1/25/2022 for for unexplained anemia.  Previous admission with hemoglobin nearly 6,patient received 2 packed RBCs.   FISH testing: Negative  Normal male karyotype  Plasma cell testing-no diagnostic immuno phenotype identified.    Plan:  Received 2 packed RBCs in the ED.   Received 1 pack leukoreduced RBCs today. Repeat HB 8.  Repeat H&H is 9.4. Goal to keep HB more than 8.   Continue B12 1000 mcg once daily  2/4/24: erythropoietin levels- 2759 abnormal.  Received one dose of erythropoietin on 2/5/24.  GI RECS- Rule out GI source.1x melena today. FOBT Negative on 2/7/24.  Endoscopy tomorrow.  N.p.o. midnight.  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 believes damage to the erythropoietic precursors  Suspecting Parvovirus, recs to start Solu-Medrol 20 mg IV every 12 hour    Elevated troponin  Assessment & Plan  Elevated troponin levels due to demand ischemia in the setting of anemia  chest pain lasting for 30 minutes associated with SOB.  H/o troponin elevation  EKG: Compared to previous EKG,  this admission has more pronounced ST depression in Right and inferior lateral leads.   PCI: Prox RCA lesion is 100% stenosed. Not the culprit lesion. Poor target for bypass.The vessel size is small. The lesion is type C. The lesion is severely calcified. The lesion is chronically occluded.   The collateral flow was extensive in Right Posterior Descending Artery AND Third Right Posterolateral Branch.  Lab Results   Component Value Date    HSTNI0 2,603 (H) 02/04/2024    HSTNI2 2,991 (H) 02/04/2024    HSTNID2 388 (H) 02/04/2024    HSTNI4 4,499 (H) 02/04/2024    HSTNID4 1,896 (H) 02/04/2024    Chest x ray :New mild opacity in the right costophrenic sulcus which could be due to atelectasis or could be infectious/inflammatory.  Persistent opacity in the left base, likely atelectasis or scar.    Plan:  Elevated troponins in the setting of anemia.  Maintain hemoglobin more than 8  Cards - recommended to keep HR <70. Started him on metoprolol 50 mg every 6 hrs.   Holding heparin ACS protocol as pt already have extensive collaterals.  Pt received Aspirin 325 mg in the ED.   Continue aspirin, ticagrelor  Cardiology recs-metoprolol succinate 50 mg twice daily , due to concerns about low BP  Continue ranolazine, pravastatin 80            Hypertension  Assessment & Plan  Home meds: Amlodipine 2.5 mg hydrochlorothiazide 25 mg daily, nebivolol 20 mg daily.  Blood Pressure: 125/63    Plan:  Currently receiving blood transfusion.  Due to underlying myocardial stress, HTN meds are kept on hold.  Cards - recommended to keep HR <70.   At discharge we will discontinue his bisystolic home med and start him on metoprolol succinate 50 mg BID.     SOB (shortness of breath)  Assessment & Plan  Pt c/o SOB aggravating on ambulation.   Lab Results   Component Value Date    BNP 1,290 (H) 02/05/2024     (H) 12/31/2023    LVEF 60 01/01/2024   Echo: The estimated right ventricular systolic pressure is 58.00 mmHg. Left atrium is dilated.    Chest x ray: New mild opacity in the right costophrenic sulcus which could be due to atelectasis or could be infectious/inflammatory.   Persistent opacity in the left base, likely atelectasis or scar.ding.  I/O last 3 completed shifts:  In: 720 [P.O.:720]  Out: 225 [Urine:225]     Plan:  Repeat BNP levels elevated. Possible vol overloaded state with repeat blood transfusion.  No h/o CHF, never had A. Fib.   Pt appears dry and thirsty.  Strict I and O   Daily weights.   C/o SOB today and wearing O2 saturating at 95 percent.   His saturations went down to 77 while sitting upright. Possible Pulm HTN in the setting of elevated Rt ventricular pressures or the Beta blockers itself.   Monitor for desaturations and upscale as needed.   Spirometry.  No clear source of infection correlating with chest x ray finding.. WBC WNL.               CKD (chronic kidney disease) stage 3, GFR 30-59 ml/min ([facility])  Assessment & Plan  Lab Results   Component Value Date    EGFR 68 02/07/2024    EGFR 60 02/06/2024    EGFR 62 02/05/2024    CREATININE 1.00 02/07/2024    CREATININE 1.11 02/06/2024    CREATININE 1.08 02/05/2024     Baseline cr: 1.08 to 1.10    Plan:  Avoid hypotension.  Avoid nephrotoxins    Constipation  Assessment & Plan  Yvonne lax daily  Senna daily      Abnormal LFTs  Assessment & Plan  Elevated Alk phos with slight elevation in ALT.      Plan:  No jaundice on examination.  Possible in the setting of MI type 2- demand ischemia    Nose congestion  Assessment & Plan  Nasal drops PRN    Abnormal chest x-ray  Assessment & Plan  Chest x ray :New mild opacity in the right costophrenic sulcus which could be due to atelectasis or could be infectious/inflammatory.  Persistent opacity in the left base, likely atelectasis or scar.      Plan:  CT CHEST results pending             VTE Pharmacologic Prophylaxis: VTE Score: 3  Brillinta    Mobility:   Basic Mobility Inpatient Raw Score: 19  JH-HLM Goal: 6: Walk 10 steps or more  JH-HLM  Achieved: 7: Walk 25 feet or more  HLM Goal NOT achieved. Continue with multidisciplinary rounding and encourage appropriate mobility to improve upon HLM goals.    Patient Centered Rounds: I performed bedside rounds with nursing staff today.  Discussions with Specialists or Other Care Team Provider: cardiology, heme onc, Gastro    Education and Discussions with Family / Patient: Updated  (wife) at bedside.    Current Length of Stay: 2 day(s)  Current Patient Status: Inpatient   Discharge Plan: Anticipate discharge in 24-48 hrs to home.    Code Status: Level 1 - Full Code    Subjective:   Pt is anxious about his care plan. C/o he was restless and did not sleep over night.  I had a detailed conversation with patient and his family explaining the course of events.     Objective:     Vitals:   Temp (24hrs), Av.6 °F (37 °C), Min:98 °F (36.7 °C), Max:99 °F (37.2 °C)    Temp:  [98 °F (36.7 °C)-99 °F (37.2 °C)] 99 °F (37.2 °C)  HR:  [] 74  Resp:  [14-20] 18  BP: (105-145)/(49-92) 125/63  SpO2:  [88 %-93 %] 93 %  Body mass index is 20.91 kg/m².     Input and Output Summary (last 24 hours):     Intake/Output Summary (Last 24 hours) at 2024 1820  Last data filed at 2024 1315  Gross per 24 hour   Intake 860 ml   Output --   Net 860 ml       Physical Exam:   Physical Exam  HENT:      Head: Atraumatic.      Mouth/Throat:      Pharynx: Oropharynx is clear.   Eyes:      Conjunctiva/sclera: Conjunctivae normal.   Cardiovascular:      Rate and Rhythm: Tachycardia present.      Pulses: Normal pulses.      Heart sounds: Normal heart sounds.   Pulmonary:      Breath sounds: Rales present. No wheezing.      Comments: Rt sided inspiratory crackles.   Chest:      Chest wall: No tenderness.   Musculoskeletal:         General: Normal range of motion.      Cervical back: Normal range of motion.   Skin:     General: Skin is warm.      Capillary Refill: Capillary refill takes 2 to 3 seconds.   Neurological:       Mental Status: He is oriented to person, place, and time. Mental status is at baseline.   Psychiatric:         Mood and Affect: Mood normal.          Additional Data:     Labs:  Results from last 7 days   Lab Units 02/07/24  0540   WBC Thousand/uL 8.56   HEMOGLOBIN g/dL 8.0*   HEMATOCRIT % 23.3*   PLATELETS Thousands/uL 273   NEUTROS PCT % 73   LYMPHS PCT % 13*   MONOS PCT % 13*   EOS PCT % 0     Results from last 7 days   Lab Units 02/07/24  0540 02/06/24  0457   SODIUM mmol/L 133* 137   POTASSIUM mmol/L 3.5 3.6   CHLORIDE mmol/L 102 102   CO2 mmol/L 23 24   BUN mg/dL 21 22   CREATININE mg/dL 1.00 1.11   ANION GAP mmol/L 8 11   CALCIUM mg/dL 8.8 9.1   ALBUMIN g/dL  --  3.6   TOTAL BILIRUBIN mg/dL  --  1.52*   ALK PHOS U/L  --  111*   ALT U/L  --  60*   AST U/L  --  40*   GLUCOSE RANDOM mg/dL 129 115     Results from last 7 days   Lab Units 02/05/24  0452   INR  1.04     Results from last 7 days   Lab Units 02/06/24  1141 02/06/24  0823   POC GLUCOSE mg/dl 129 114         Results from last 7 days   Lab Units 02/06/24  0524 02/06/24  0457 02/05/24  0452   LACTIC ACID mmol/L 1.0  --   --    PROCALCITONIN ng/ml  --  0.18 0.11       Lines/Drains:  Invasive Devices       Peripheral Intravenous Line  Duration             Peripheral IV 02/04/24 Distal;Left;Upper;Ventral (anterior) Antecubital 3 days    Peripheral IV 02/04/24 Distal;Right;Upper;Ventral (anterior) Antecubital 3 days    Peripheral IV 02/04/24 Right;Ventral (anterior) Hand 3 days                          Imaging: Reviewed radiology reports from this admission including: chest xray    Recent Cultures (last 7 days):         Last 24 Hours Medication List:   Current Facility-Administered Medications   Medication Dose Route Frequency Provider Last Rate    acetaminophen  650 mg Oral Q6H PRN Shannan Leon MD      aspirin  81 mg Oral Daily Shannan Leon MD      cholecalciferol  1,000 Units Oral BID Shannan Leon MD       cyanocobalamin  1,000 mcg Oral Daily Shannan Leon MD      LORazepam  1 mg Oral TID PRN Shannan Leon MD      melatonin  3 mg Oral HS Chandana Pro MD      methylPREDNISolone sodium succinate  20 mg Intravenous Q12H [facility] Margi Vuong MD      metoprolol tartrate  50 mg Oral Q8H Chandana Pro MD      nitroglycerin  0.4 mg Sublingual Q5 Min PRN Shannan Leon MD      pantoprazole  40 mg Intravenous Q12H [facility] Ana Singh,       polyethylene glycol  17 g Oral Daily Shannan Leon MD      pravastatin  80 mg Oral Daily With Dinner Shannan Leon MD      ranolazine  500 mg Oral Q12H CRESENCIO Shannan Leon MD      senna  1 tablet Oral HS Shannan Leon MD      sodium chloride  1 spray Each Nare Q1H PRN Chandana Pro MD      [START ON 2/9/2024] ticagrelor  90 mg Oral Q12H CRESENCIO Shannan Leon MD          Today, Patient Was Seen By: Shannan Leon MD    **Please Note: This note may have been constructed using a voice recognition system.**

## 2024-02-07 NOTE — ASSESSMENT & PLAN NOTE
Lab Results   Component Value Date    EGFR 68 02/07/2024    EGFR 60 02/06/2024    EGFR 62 02/05/2024    CREATININE 1.00 02/07/2024    CREATININE 1.11 02/06/2024    CREATININE 1.08 02/05/2024     Baseline cr: 1.08 to 1.10    Plan:  Avoid hypotension.  Avoid nephrotoxins

## 2024-02-07 NOTE — PLAN OF CARE
Problem: Potential for Falls  Goal: Patient will remain free of falls  Description: INTERVENTIONS:  - Educate patient/family on patient safety including physical limitations  - Instruct patient to call for assistance with activity   - Consult OT/PT to assist with strengthening/mobility   - Keep Call bell within reach  - Keep bed low and locked with side rails adjusted as appropriate  - Keep care items and personal belongings within reach  - Initiate and maintain comfort rounds  - Make Fall Risk Sign visible to staff  - Offer Toileting every 2 Hours, in advance of need  - Initiate/Maintain bed/chair alarm  - Obtain necessary fall risk management equipment  - Apply yellow socks and bracelet for high fall risk patients  - Consider moving patient to room near nurses station  Outcome: Progressing     Problem: CARDIOVASCULAR - ADULT  Goal: Maintains optimal cardiac output and hemodynamic stability  Description: INTERVENTIONS:  - Monitor I/O, vital signs and rhythm  - Monitor for S/S and trends of decreased cardiac output  - Administer and titrate ordered vasoactive medications to optimize hemodynamic stability  - Assess quality of pulses, skin color and temperature  - Assess for signs of decreased coronary artery perfusion  - Instruct patient to report change in severity of symptoms  Outcome: Progressing  Goal: Absence of cardiac dysrhythmias or at baseline rhythm  Description: INTERVENTIONS:  - Continuous cardiac monitoring, vital signs, obtain 12 lead EKG if ordered  - Administer antiarrhythmic and heart rate control medications as ordered  - Monitor electrolytes and administer replacement therapy as ordered  Outcome: Progressing

## 2024-02-07 NOTE — ASSESSMENT & PLAN NOTE
Lab Results   Component Value Date    HGB 8.0 (L) 02/07/2024    HGB 8.8 (L) 02/06/2024    MCV 88 02/07/2024    MCV 87 02/06/2024     Lab Results   Component Value Date    IRON 284 (H) 01/21/2024    FERRITIN 1,145 (H) 01/21/2024    TIBC <339 01/21/2024    CONCFE  01/21/2024      Comment:      Unable to Calculate.      Lab Results   Component Value Date    RETIC 13,600 (L) 01/21/2024    HAPTOGLOBIN 220 01/21/2024    DIRECTCOOMBS Negative 01/21/2024      Recent admission on 1/25/2022 for for unexplained anemia.  Previous admission with hemoglobin nearly 6,patient received 2 packed RBCs.   FISH testing: Negative  Normal male karyotype  Plasma cell testing-no diagnostic immuno phenotype identified.    Plan:  Received 2 packed RBCs in the ED.   Received 1 pack leukoreduced RBCs today. Repeat HB 8.  Repeat H&H is 9.4. Goal to keep HB more than 8.   Continue B12 1000 mcg once daily  2/4/24: erythropoietin levels- 2759 abnormal.  Received one dose of erythropoietin on 2/5/24.  GI RECS- Rule out GI source.1x melena today. FOBT Negative on 2/7/24.  Endoscopy tomorrow.  N.p.o. midnight.  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

## 2024-02-07 NOTE — PROGRESS NOTES
"Oncology Progress Note  Gael Ferraro 83 y.o. male MRN: [id number]  Unit/Bed#: S -01 Encounter: [id number]      /63   Pulse 74   Temp 99 °F (37.2 °C)   Resp 18   Ht 5' 10\" (1.778 m)   Wt 66.1 kg (145 lb 11.6 oz)   SpO2 93%   BMI 20.91 kg/m²     Subjective: I have a black stool yesterday before being discharged home    Objective: Denied any fever chills nausea vomiting hematuria, epigastric pain    The family is around including his wife, 2 sons and grandson  General Appearance:    Alert, oriented        Eyes:     Ears:     Nose:    Throat:    Neck:        Lungs:      Chest Wall:      Heart:         Abdomen:              Extremities: No edema       Skin:   no rash or icterus.    Lymph nodes:      Neurologic:   CNII-XII intact, normal strength, sensation and reflexes     throughout        Recent Results (from the past 48 hour(s))   CBC and differential    Collection Time: 02/06/24  4:57 AM   Result Value Ref Range    WBC 7.36 4.31 - 10.16 Thousand/uL    RBC 2.85 (L) 3.88 - 5.62 Million/uL    Hemoglobin 8.8 (L) 12.0 - 17.0 g/dL    Hematocrit 24.7 (L) 36.5 - 49.3 %    MCV 87 82 - 98 fL    MCH 30.9 26.8 - 34.3 pg    MCHC 35.6 31.4 - 37.4 g/dL    RDW 14.6 11.6 - 15.1 %    MPV 10.6 8.9 - 12.7 fL    Platelets 272 149 - 390 Thousands/uL    nRBC 0 /100 WBCs    Neutrophils Relative 69 43 - 75 %    Immat GRANS % 0 0 - 2 %    Lymphocytes Relative 17 14 - 44 %    Monocytes Relative 13 (H) 4 - 12 %    Eosinophils Relative 1 0 - 6 %    Basophils Relative 0 0 - 1 %    Neutrophils Absolute 5.03 1.85 - 7.62 Thousands/µL    Immature Grans Absolute 0.03 0.00 - 0.20 Thousand/uL    Lymphocytes Absolute 1.25 0.60 - 4.47 Thousands/µL    Monocytes Absolute 0.94 0.17 - 1.22 Thousand/µL    Eosinophils Absolute 0.09 0.00 - 0.61 Thousand/µL    Basophils Absolute 0.02 0.00 - 0.10 Thousands/µL   Comprehensive metabolic panel    Collection Time: 02/06/24  4:57 AM   Result Value Ref Range    Sodium 137 135 - 147 mmol/L    " Potassium 3.6 3.5 - 5.3 mmol/L    Chloride 102 96 - 108 mmol/L    CO2 24 21 - 32 mmol/L    ANION GAP 11 mmol/L    BUN 22 5 - 25 mg/dL    Creatinine 1.11 0.60 - 1.30 mg/dL    Glucose 115 65 - 140 mg/dL    Calcium 9.1 8.4 - 10.2 mg/dL    AST 40 (H) 13 - 39 U/L    ALT 60 (H) 7 - 52 U/L    Alkaline Phosphatase 111 (H) 34 - 104 U/L    Total Protein 6.0 (L) 6.4 - 8.4 g/dL    Albumin 3.6 3.5 - 5.0 g/dL    Total Bilirubin 1.52 (H) 0.20 - 1.00 mg/dL    eGFR 60 ml/min/1.73sq m   Procalcitonin    Collection Time: 02/06/24  4:57 AM   Result Value Ref Range    Procalcitonin 0.18 <=0.25 ng/ml   COVID/FLU/RSV    Collection Time: 02/06/24  5:24 AM    Specimen: Nose; Nares   Result Value Ref Range    SARS-CoV-2 Negative Negative    INFLUENZA A PCR Negative Negative    INFLUENZA B PCR Negative Negative    RSV PCR Negative Negative   High Sensitivity Troponin I Random    Collection Time: 02/06/24  5:24 AM   Result Value Ref Range    HS TnI random 2,802 (H) 8 - 18 ng/L   Lactic acid, plasma (w/reflex if result > 2.0)    Collection Time: 02/06/24  5:24 AM   Result Value Ref Range    LACTIC ACID 1.0 0.5 - 2.0 mmol/L   Fingerstick Glucose (POCT)    Collection Time: 02/06/24  8:23 AM   Result Value Ref Range    POC Glucose 114 65 - 140 mg/dl   Fingerstick Glucose (POCT)    Collection Time: 02/06/24 11:41 AM   Result Value Ref Range    POC Glucose 129 65 - 140 mg/dl   CBC and differential    Collection Time: 02/07/24  5:40 AM   Result Value Ref Range    WBC 8.56 4.31 - 10.16 Thousand/uL    RBC 2.64 (L) 3.88 - 5.62 Million/uL    Hemoglobin 8.0 (L) 12.0 - 17.0 g/dL    Hematocrit 23.3 (L) 36.5 - 49.3 %    MCV 88 82 - 98 fL    MCH 30.3 26.8 - 34.3 pg    MCHC 34.3 31.4 - 37.4 g/dL    RDW 14.4 11.6 - 15.1 %    MPV 10.8 8.9 - 12.7 fL    Platelets 273 149 - 390 Thousands/uL    nRBC 0 /100 WBCs    Neutrophils Relative 73 43 - 75 %    Immat GRANS % 1 0 - 2 %    Lymphocytes Relative 13 (L) 14 - 44 %    Monocytes Relative 13 (H) 4 - 12 %    Eosinophils  Relative 0 0 - 6 %    Basophils Relative 0 0 - 1 %    Neutrophils Absolute 6.22 1.85 - 7.62 Thousands/µL    Immature Grans Absolute 0.06 0.00 - 0.20 Thousand/uL    Lymphocytes Absolute 1.10 0.60 - 4.47 Thousands/µL    Monocytes Absolute 1.13 0.17 - 1.22 Thousand/µL    Eosinophils Absolute 0.03 0.00 - 0.61 Thousand/µL    Basophils Absolute 0.02 0.00 - 0.10 Thousands/µL   Basic metabolic panel    Collection Time: 02/07/24  5:40 AM   Result Value Ref Range    Sodium 133 (L) 135 - 147 mmol/L    Potassium 3.5 3.5 - 5.3 mmol/L    Chloride 102 96 - 108 mmol/L    CO2 23 21 - 32 mmol/L    ANION GAP 8 mmol/L    BUN 21 5 - 25 mg/dL    Creatinine 1.00 0.60 - 1.30 mg/dL    Glucose 129 65 - 140 mg/dL    Calcium 8.8 8.4 - 10.2 mg/dL    eGFR 68 ml/min/1.73sq m   Prepare Leukoreduced RBC: 1 Units, Leukoreduced    Collection Time: 02/07/24  9:45 AM   Result Value Ref Range    Unit Product Code M0644B17     Unit Number [id number]     Unit ABO A     Unit RH NEG     Crossmatch Compatible     Unit Dispense Status Issued     Unit Product Volume 350 mL   Occult blood 1-3, stool    Collection Time: 02/07/24 12:10 PM   Result Value Ref Range    Fecal Occult Blood Diagnostic Negative Negative    Fecal Occult Blood Diagnostic 2 Test not performed Negative    Fecal Occult Blood Diagnostic 3 Test not performed Negative         XR chest 2 views    Result Date: 2/5/2024  Narrative: XR CHEST PA & LATERAL DUAL ENERGY SUBTRACTION. INDICATION: Shortness of breath. COMPARISON: CXR 1/21/2024. FINDINGS: Slightly increased opacity in the right costophrenic sulcus. Persistent opacity in the left base. Nodules over both lower lungs are the nipples. No pneumothorax or pleural effusion. Normal heart size. Cardiac stents. Bones are unremarkable for age. Normal upper abdomen.     Impression: New mild opacity in the right costophrenic sulcus which could be due to atelectasis or could be infectious/inflammatory. Persistent opacity in the left base, likely  atelectasis or scar. Workstation performed: [id number]     IR biopsy bone marrow    Result Date: 1/25/2024  Narrative: CT-scan guided diagnostic bone marrow aspiration and core biopsy History: Unexplained anemia : Chapis Pillai MD Assistant: Milad Fonseca MD Conscious sedation time: 15 mins Technique: The patient was brought to the CT scanner and placed prone on the table. After axial images were obtained through the pelvis, an area of the skin was then marked, prepped, and draped in usual sterile fashion. Lidocaine was administered to the skin, and subcutaneous tissues down to the periosteum of the right ileum, and a small skin incision was made. An OnControl needle was advanced percutaneously through the cortex, and a bone marrow aspiration was performed. The specimen was given to the cytotech to prepare. A core biopsy was then performed. The bone core was placed in formalin. The patient tolerated the procedure well and suffered no complications.     Impression: Impression: Successful percutaneous diagnostic bone marrow aspiration and bone core biopsy of the right ileum. A full pathology report will follow. Workstation performed: [id number]     XR chest 1 view portable    Result Date: 1/21/2024  Narrative: CHEST INDICATION:   MI. Chest pain. COMPARISON:  None. EXAM PERFORMED/VIEWS:  XR CHEST PORTABLE. FINDINGS: Cardiomediastinal silhouette normal. Cardiac stent. No acute disease. Tiny benign calcified granuloma in the peripheral left midlung. Minimal benign linear atelectasis or scar in the left base. Upper abdomen normal. Bones normal for age.     Impression: No acute cardiopulmonary disease. Workstation performed: [id number]         Assessment and plan:  1.  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

## 2024-02-07 NOTE — ASSESSMENT & PLAN NOTE
Pt c/o SOB aggravating on ambulation.   Lab Results   Component Value Date    BNP 1,290 (H) 02/05/2024     (H) 12/31/2023    LVEF 60 01/01/2024   Echo: The estimated right ventricular systolic pressure is 58.00 mmHg. Left atrium is dilated.   Chest x ray: New mild opacity in the right costophrenic sulcus which could be due to atelectasis or could be infectious/inflammatory.   Persistent opacity in the left base, likely atelectasis or scar.ding.  I/O last 3 completed shifts:  In: 720 [P.O.:720]  Out: 225 [Urine:225]     Plan:  Repeat BNP levels elevated. Possible vol overloaded state with repeat blood transfusion.  No h/o CHF, never had A. Fib.   Pt appears dry and thirsty.  Strict I and O   Daily weights.   C/o SOB today and wearing O2 saturating at 95 percent.   His saturations went down to 77 while sitting upright. Possible Pulm HTN in the setting of elevated Rt ventricular pressures or the Beta blockers itself.   Monitor for desaturations and upscale as needed.   Spirometry.  No clear source of infection correlating with chest x ray finding.. WBC WNL.

## 2024-02-07 NOTE — ASSESSMENT & PLAN NOTE
Elevated troponin levels due to demand ischemia in the setting of anemia  chest pain lasting for 30 minutes associated with SOB.  H/o troponin elevation  EKG: Compared to previous EKG, this admission has more pronounced ST depression in Right and inferior lateral leads.   PCI: Prox RCA lesion is 100% stenosed. Not the culprit lesion. Poor target for bypass.The vessel size is small. The lesion is type C. The lesion is severely calcified. The lesion is chronically occluded.   The collateral flow was extensive in Right Posterior Descending Artery AND Third Right Posterolateral Branch.  Lab Results   Component Value Date    HSTNI0 2,603 (H) 02/04/2024    HSTNI2 2,991 (H) 02/04/2024    HSTNID2 388 (H) 02/04/2024    HSTNI4 4,499 (H) 02/04/2024    HSTNID4 1,896 (H) 02/04/2024    Chest x ray :New mild opacity in the right costophrenic sulcus which could be due to atelectasis or could be infectious/inflammatory.  Persistent opacity in the left base, likely atelectasis or scar.    Plan:  Elevated troponins in the setting of anemia.  Maintain hemoglobin more than 8  Cards - recommended to keep HR <70. Started him on metoprolol 50 mg every 6 hrs.   Holding heparin ACS protocol as pt already have extensive collaterals.  Pt received Aspirin 325 mg in the ED.   Continue aspirin, ticagrelor  Cardiology recs-metoprolol succinate 50 mg twice daily , due to concerns about low BP  Continue ranolazine, pravastatin 80

## 2024-02-07 NOTE — ASSESSMENT & PLAN NOTE
Home meds: Amlodipine 2.5 mg hydrochlorothiazide 25 mg daily, nebivolol 20 mg daily.  Blood Pressure: 125/63    Plan:  Currently receiving blood transfusion.  Due to underlying myocardial stress, HTN meds are kept on hold.  Cards - recommended to keep HR <70.   At discharge we will discontinue his bisystolic home med and start him on metoprolol succinate 50 mg BID.

## 2024-02-07 NOTE — ASSESSMENT & PLAN NOTE
Chest x ray :New mild opacity in the right costophrenic sulcus which could be due to atelectasis or could be infectious/inflammatory.  Persistent opacity in the left base, likely atelectasis or scar.      Plan:  CT CHEST results pending

## 2024-02-08 ENCOUNTER — ANESTHESIA EVENT (INPATIENT)
Dept: GASTROENTEROLOGY | Facility: HOSPITAL | Age: 84
DRG: 812 | End: 2024-02-08
Payer: COMMERCIAL

## 2024-02-08 ENCOUNTER — APPOINTMENT (INPATIENT)
Dept: GASTROENTEROLOGY | Facility: HOSPITAL | Age: 84
DRG: 812 | End: 2024-02-08
Payer: COMMERCIAL

## 2024-02-08 ENCOUNTER — HOSPITAL ENCOUNTER (OUTPATIENT)
Dept: INFUSION CENTER | Facility: CLINIC | Age: 84
Discharge: HOME | End: 2024-02-08

## 2024-02-08 ENCOUNTER — ANESTHESIA (INPATIENT)
Dept: GASTROENTEROLOGY | Facility: HOSPITAL | Age: 84
DRG: 812 | End: 2024-02-08
Payer: COMMERCIAL

## 2024-02-08 LAB
ABO GROUP BLD BPU: NORMAL
ANION GAP SERPL CALCULATED.3IONS-SCNC: 10 MMOL/L
ATRIAL RATE: 72 BPM
ATRIAL RATE: 74 BPM
ATRIAL RATE: 78 BPM
BASOPHILS # BLD AUTO: 0.01 THOUSANDS/ÂΜL (ref 0–0.1)
BASOPHILS NFR BLD AUTO: 0 % (ref 0–1)
BPU ID: NORMAL
BUN SERPL-MCNC: 26 MG/DL (ref 5–25)
CALCIUM SERPL-MCNC: 9.3 MG/DL (ref 8.4–10.2)
CHLORIDE SERPL-SCNC: 103 MMOL/L (ref 96–108)
CO2 SERPL-SCNC: 23 MMOL/L (ref 21–32)
CREAT SERPL-MCNC: 1.14 MG/DL (ref 0.6–1.3)
CROSSMATCH: NORMAL
EOSINOPHIL # BLD AUTO: 0 THOUSAND/ÂΜL (ref 0–0.61)
EOSINOPHIL NFR BLD AUTO: 0 % (ref 0–6)
ERYTHROCYTE [DISTWIDTH] IN BLOOD BY AUTOMATED COUNT: 14.3 % (ref 11.6–15.1)
GFR SERPL CREATININE-BSD FRML MDRD: 58 ML/MIN/1.73SQ M
GLUCOSE SERPL-MCNC: 135 MG/DL (ref 65–140)
GLUCOSE SERPL-MCNC: 155 MG/DL (ref 65–140)
GLUCOSE SERPL-MCNC: 155 MG/DL (ref 65–140)
GLUCOSE SERPL-MCNC: 167 MG/DL (ref 65–140)
HCT VFR BLD AUTO: 30.5 % (ref 36.5–49.3)
HGB BLD-MCNC: 10.6 G/DL (ref 12–17)
IMM GRANULOCYTES # BLD AUTO: 0.05 THOUSAND/UL (ref 0–0.2)
IMM GRANULOCYTES NFR BLD AUTO: 1 % (ref 0–2)
LYMPHOCYTES # BLD AUTO: 1.06 THOUSANDS/ÂΜL (ref 0.6–4.47)
LYMPHOCYTES NFR BLD AUTO: 16 % (ref 14–44)
MCH RBC QN AUTO: 30.5 PG (ref 26.8–34.3)
MCHC RBC AUTO-ENTMCNC: 34.8 G/DL (ref 31.4–37.4)
MCV RBC AUTO: 88 FL (ref 82–98)
MONOCYTES # BLD AUTO: 0.23 THOUSAND/ÂΜL (ref 0.17–1.22)
MONOCYTES NFR BLD AUTO: 3 % (ref 4–12)
NEUTROPHILS # BLD AUTO: 5.5 THOUSANDS/ÂΜL (ref 1.85–7.62)
NEUTS SEG NFR BLD AUTO: 80 % (ref 43–75)
NRBC BLD AUTO-RTO: 0 /100 WBCS
P AXIS: 68 DEGREES
P AXIS: 72 DEGREES
P AXIS: 76 DEGREES
PLATELET # BLD AUTO: 303 THOUSANDS/UL (ref 149–390)
PMV BLD AUTO: 10.6 FL (ref 8.9–12.7)
POTASSIUM SERPL-SCNC: 4 MMOL/L (ref 3.5–5.3)
PR INTERVAL: 144 MS
PR INTERVAL: 146 MS
PR INTERVAL: 150 MS
QRS AXIS: 46 DEGREES
QRS AXIS: 62 DEGREES
QRS AXIS: 62 DEGREES
QRSD INTERVAL: 86 MS
QRSD INTERVAL: 90 MS
QRSD INTERVAL: 90 MS
QT INTERVAL: 344 MS
QT INTERVAL: 366 MS
QT INTERVAL: 370 MS
QTC INTERVAL: 392 MS
QTC INTERVAL: 405 MS
QTC INTERVAL: 406 MS
RBC # BLD AUTO: 3.47 MILLION/UL (ref 3.88–5.62)
SODIUM SERPL-SCNC: 136 MMOL/L (ref 135–147)
T WAVE AXIS: 197 DEGREES
T WAVE AXIS: 265 DEGREES
T WAVE AXIS: 270 DEGREES
UNIT DISPENSE STATUS: NORMAL
UNIT PRODUCT CODE: NORMAL
UNIT PRODUCT VOLUME: 350 ML
UNIT RH: NORMAL
VENTRICULAR RATE: 72 BPM
VENTRICULAR RATE: 74 BPM
VENTRICULAR RATE: 78 BPM
WBC # BLD AUTO: 6.85 THOUSAND/UL (ref 4.31–10.16)

## 2024-02-08 PROCEDURE — 93010 ELECTROCARDIOGRAM REPORT: CPT | Performed by: INTERNAL MEDICINE

## 2024-02-08 PROCEDURE — 80048 BASIC METABOLIC PNL TOTAL CA: CPT

## 2024-02-08 PROCEDURE — 99232 SBSQ HOSP IP/OBS MODERATE 35: CPT | Performed by: INTERNAL MEDICINE

## 2024-02-08 PROCEDURE — 82948 REAGENT STRIP/BLOOD GLUCOSE: CPT

## 2024-02-08 PROCEDURE — 97535 SELF CARE MNGMENT TRAINING: CPT

## 2024-02-08 PROCEDURE — C9113 INJ PANTOPRAZOLE SODIUM, VIA: HCPCS | Performed by: INTERNAL MEDICINE

## 2024-02-08 PROCEDURE — 85025 COMPLETE CBC W/AUTO DIFF WBC: CPT

## 2024-02-08 PROCEDURE — 0DJ08ZZ INSPECTION OF UPPER INTESTINAL TRACT, VIA NATURAL OR ARTIFICIAL OPENING ENDOSCOPIC: ICD-10-PCS | Performed by: INTERNAL MEDICINE

## 2024-02-08 PROCEDURE — 43235 EGD DIAGNOSTIC BRUSH WASH: CPT | Performed by: INTERNAL MEDICINE

## 2024-02-08 RX ORDER — SODIUM CHLORIDE, SODIUM LACTATE, POTASSIUM CHLORIDE, CALCIUM CHLORIDE 600; 310; 30; 20 MG/100ML; MG/100ML; MG/100ML; MG/100ML
INJECTION, SOLUTION INTRAVENOUS CONTINUOUS PRN
Status: DISCONTINUED | OUTPATIENT
Start: 2024-02-08 | End: 2024-02-08

## 2024-02-08 RX ORDER — INSULIN LISPRO 100 [IU]/ML
1-5 INJECTION, SOLUTION INTRAVENOUS; SUBCUTANEOUS
Status: DISCONTINUED | OUTPATIENT
Start: 2024-02-08 | End: 2024-02-10 | Stop reason: HOSPADM

## 2024-02-08 RX ORDER — LIDOCAINE HYDROCHLORIDE 10 MG/ML
INJECTION, SOLUTION EPIDURAL; INFILTRATION; INTRACAUDAL; PERINEURAL AS NEEDED
Status: DISCONTINUED | OUTPATIENT
Start: 2024-02-08 | End: 2024-02-08

## 2024-02-08 RX ORDER — POLYETHYLENE GLYCOL 3350 17 G/17G
238 POWDER, FOR SOLUTION ORAL ONCE
Status: COMPLETED | OUTPATIENT
Start: 2024-02-08 | End: 2024-02-08

## 2024-02-08 RX ORDER — PROPOFOL 10 MG/ML
INJECTION, EMULSION INTRAVENOUS AS NEEDED
Status: DISCONTINUED | OUTPATIENT
Start: 2024-02-08 | End: 2024-02-08

## 2024-02-08 RX ORDER — BISACODYL 5 MG/1
10 TABLET, DELAYED RELEASE ORAL ONCE
Status: COMPLETED | OUTPATIENT
Start: 2024-02-08 | End: 2024-02-08

## 2024-02-08 RX ADMIN — RANOLAZINE 500 MG: 500 TABLET, FILM COATED, EXTENDED RELEASE ORAL at 09:58

## 2024-02-08 RX ADMIN — LORAZEPAM 1 MG: 1 TABLET ORAL at 09:58

## 2024-02-08 RX ADMIN — PROPOFOL 100 MG: 10 INJECTION, EMULSION INTRAVENOUS at 18:05

## 2024-02-08 RX ADMIN — RANOLAZINE 500 MG: 500 TABLET, FILM COATED, EXTENDED RELEASE ORAL at 22:31

## 2024-02-08 RX ADMIN — Medication 1000 UNITS: at 22:33

## 2024-02-08 RX ADMIN — METOPROLOL TARTRATE 50 MG: 50 TABLET, FILM COATED ORAL at 14:02

## 2024-02-08 RX ADMIN — Medication 3 MG: at 22:32

## 2024-02-08 RX ADMIN — Medication 1000 UNITS: at 09:58

## 2024-02-08 RX ADMIN — LIDOCAINE HYDROCHLORIDE 8 MG: 10 INJECTION, SOLUTION EPIDURAL; INFILTRATION; INTRACAUDAL; PERINEURAL at 18:05

## 2024-02-08 RX ADMIN — PANTOPRAZOLE SODIUM 40 MG: 40 INJECTION, POWDER, FOR SOLUTION INTRAVENOUS at 22:34

## 2024-02-08 RX ADMIN — SENNOSIDES 8.6 MG: 8.6 TABLET, FILM COATED ORAL at 22:32

## 2024-02-08 RX ADMIN — METOPROLOL TARTRATE 50 MG: 50 TABLET, FILM COATED ORAL at 22:32

## 2024-02-08 RX ADMIN — BISACODYL 10 MG: 5 TABLET, COATED ORAL at 22:33

## 2024-02-08 RX ADMIN — PRAVASTATIN SODIUM 80 MG: 80 TABLET ORAL at 15:55

## 2024-02-08 RX ADMIN — POLYETHYLENE GLYCOL 3350 238 G: 17 POWDER, FOR SOLUTION ORAL at 20:50

## 2024-02-08 RX ADMIN — SODIUM CHLORIDE, SODIUM LACTATE, POTASSIUM CHLORIDE, AND CALCIUM CHLORIDE: .6; .31; .03; .02 INJECTION, SOLUTION INTRAVENOUS at 17:36

## 2024-02-08 RX ADMIN — METHYLPREDNISOLONE SODIUM SUCCINATE 20 MG: 40 INJECTION, POWDER, FOR SOLUTION INTRAMUSCULAR; INTRAVENOUS at 09:30

## 2024-02-08 RX ADMIN — METHYLPREDNISOLONE SODIUM SUCCINATE 20 MG: 40 INJECTION, POWDER, FOR SOLUTION INTRAMUSCULAR; INTRAVENOUS at 22:55

## 2024-02-08 RX ADMIN — PANTOPRAZOLE SODIUM 40 MG: 40 INJECTION, POWDER, FOR SOLUTION INTRAVENOUS at 09:55

## 2024-02-08 RX ADMIN — CYANOCOBALAMIN TAB 500 MCG 1000 MCG: 500 TAB at 09:58

## 2024-02-08 RX ADMIN — ASPIRIN 81 MG: 81 TABLET, COATED ORAL at 09:58

## 2024-02-08 NOTE — PLAN OF CARE
Problem: Potential for Falls  Goal: Patient will remain free of falls  Description: INTERVENTIONS:  - Educate patient/family on patient safety including physical limitations  - Instruct patient to call for assistance with activity   - Consult OT/PT to assist with strengthening/mobility   - Keep Call bell within reach  - Keep bed low and locked with side rails adjusted as appropriate  - Keep care items and personal belongings within reach  - Initiate and maintain comfort rounds  - Make Fall Risk Sign visible to staff  - Offer Toileting every  Hours, in advance of need  - Initiate/Maintain alarm  - Obtain necessary fall risk management equipment:   - Apply yellow socks and bracelet for high fall risk patients  - Consider moving patient to room near nurses station  Outcome: Progressing     Problem: CARDIOVASCULAR - ADULT  Goal: Maintains optimal cardiac output and hemodynamic stability  Description: INTERVENTIONS:  - Monitor I/O, vital signs and rhythm  - Monitor for S/S and trends of decreased cardiac output  - Administer and titrate ordered vasoactive medications to optimize hemodynamic stability  - Assess quality of pulses, skin color and temperature  - Assess for signs of decreased coronary artery perfusion  - Instruct patient to report change in severity of symptoms  Outcome: Progressing  Goal: Absence of cardiac dysrhythmias or at baseline rhythm  Description: INTERVENTIONS:  - Continuous cardiac monitoring, vital signs, obtain 12 lead EKG if ordered  - Administer antiarrhythmic and heart rate control medications as ordered  - Monitor electrolytes and administer replacement therapy as ordered  Outcome: Progressing

## 2024-02-08 NOTE — ASSESSMENT & PLAN NOTE
Lab Results   Component Value Date    HGB 10.6 (L) 02/08/2024    HGB 9.9 (L) 02/07/2024    MCV 88 02/08/2024    MCV 88 02/07/2024     Lab Results   Component Value Date    IRON 284 (H) 01/21/2024    FERRITIN 1,145 (H) 01/21/2024    TIBC <339 01/21/2024    CONCFE  01/21/2024      Comment:      Unable to Calculate.      Lab Results   Component Value Date    RETIC 13,600 (L) 01/21/2024    HAPTOGLOBIN 220 01/21/2024    DIRECTCOOMBS Negative 01/21/2024      Recent admission on 1/25/2022 for for unexplained anemia.  Previous admission with hemoglobin nearly 6,patient received 2 packed RBCs.   FISH testing: Negative  Normal male karyotype  Plasma cell testing-no diagnostic immuno phenotype identified.    Plan:  Received 2 packed RBCs in the ED.   Received 1 pack leukoreduced RBCs today. Repeat HB 8.  Repeat H&H is 9.4. Goal to keep HB more than 8.   Continue B12 1000 mcg once daily  2/4/24: erythropoietin levels- 2759 abnormal.  Received one dose of erythropoietin on 2/5/24.  GI RECS- Rule out GI source.1x melena today. FOBT Negative on 2/7/24.  Endoscopy today.  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

## 2024-02-08 NOTE — ASSESSMENT & PLAN NOTE
Home meds: Amlodipine 2.5 mg hydrochlorothiazide 25 mg daily, nebivolol 20 mg daily.  Blood Pressure: 124/60    Plan:  Currently receiving blood transfusion.  Due to underlying myocardial stress, HTN meds are kept on hold.  Cards - recommended to keep HR <70.   At discharge we will discontinue his bisystolic home med and start him on metoprolol succinate 50 mg BID.

## 2024-02-08 NOTE — PLAN OF CARE
Problem: Potential for Falls  Goal: Patient will remain free of falls  Description: INTERVENTIONS:  - Educate patient/family on patient safety including physical limitations  - Instruct patient to call for assistance with activity   - Consult OT/PT to assist with strengthening/mobility   - Keep Call bell within reach  - Keep bed low and locked with side rails adjusted as appropriate  - Keep care items and personal belongings within reach  - Initiate and maintain comfort rounds  - Make Fall Risk Sign visible to staff  - Offer Toileting every 2 Hours, in advance of need  - Initiate/Maintain bed/chair alarm  - Obtain necessary fall risk management equipment  - Apply yellow socks and bracelet for high fall risk patients  - Consider moving patient to room near nurses station  Outcome: Progressing     Problem: CARDIOVASCULAR - ADULT  Goal: Maintains optimal cardiac output and hemodynamic stability  Description: INTERVENTIONS:  - Monitor I/O, vital signs and rhythm  - Monitor for S/S and trends of decreased cardiac output  - Administer and titrate ordered vasoactive medications to optimize hemodynamic stability  - Assess quality of pulses, skin color and temperature  - Assess for signs of decreased coronary artery perfusion  - Instruct patient to report change in severity of symptoms  Outcome: Progressing  Goal: Absence of cardiac dysrhythmias or at baseline rhythm  Description: INTERVENTIONS:  - Continuous cardiac monitoring, vital signs, obtain 12 lead EKG if ordered  - Administer antiarrhythmic and heart rate control medications as ordered  - Monitor electrolytes and administer replacement therapy as ordered  Outcome: Progressing     Problem: HEMATOLOGIC - ADULT  Goal: Maintains hematologic stability  Description: INTERVENTIONS  - Assess for signs and symptoms of bleeding or hemorrhage  - Monitor labs  - Administer supportive blood products/factors as ordered and appropriate  Outcome: Progressing

## 2024-02-08 NOTE — ANESTHESIA POSTPROCEDURE EVALUATION
Post-Op Assessment Note    CV Status:  Stable  Pain Score: 0    Pain management: adequate       Mental Status:  Sleepy   Hydration Status:  Euvolemic   PONV Controlled:  Controlled   Airway Patency:  Patent     Post Op Vitals Reviewed: Yes    No anethesia notable event occurred.    Staff: CRNA   Comments: vss sv nonobstructed uneventful              BP (!) 92/49 (02/08/24 1814)    Temp (!) 97.4 °F (36.3 °C) (02/08/24 1814)    Pulse 69 (02/08/24 1814)   Resp 18 (02/08/24 1814)    SpO2 91 % (02/08/24 1814)

## 2024-02-08 NOTE — ASSESSMENT & PLAN NOTE
Chest x ray :New mild opacity in the right costophrenic sulcus which could be due to atelectasis or could be infectious/inflammatory.  Persistent opacity in the left base, likely atelectasis or scar.      Plan:  CT CHEST ordered.

## 2024-02-08 NOTE — OCCUPATIONAL THERAPY NOTE
"  Occupational Therapy Evaluation      Gael Ferraro    2/8/2024    Principal Problem:    Anemia  Active Problems:    Elevated troponin    Hypertension    Constipation    CKD (chronic kidney disease) stage 3, GFR 30-59 ml/min ([facility])    Abnormal LFTs    SOB (shortness of breath)    Nose congestion    Abnormal chest x-ray      Past Medical History:   Diagnosis Date    Low hemoglobin        Past Surgical History:   Procedure Laterality Date    CARDIAC CATHETERIZATION Left 1/2/2024    Procedure: Cardiac Left Heart Cath;  Surgeon: Ana Garcia DO;  Location: [facility];  Service: Cardiology    CARDIAC CATHETERIZATION N/A 1/2/2024    Procedure: Cardiac Coronary Angiogram;  Surgeon: Ana Garcia DO;  Location: [facility];  Service: Cardiology    CARDIAC CATHETERIZATION N/A 1/2/2024    Procedure: Cardiac RHC;  Surgeon: Ana Garcia DO;  Location: [facility];  Service: Cardiology    IR BIOPSY BONE MARROW  1/24/2024 02/08/24 1445   OT Last Visit   OT Visit Date 02/08/24   Note Type   Note Type Treatment   Pain Assessment   Pain Assessment Tool 0-10   Pain Score No Pain   ADL   Grooming Assistance 6  Modified Independent   Grooming Comments seated to complete   UB Bathing Comments pt self report of bathing self this AM while seated in bathroom after staff set pt up with all necessary supplies   LB Bathing Comments Pt reports was able to wash feet without difficulty- but took inc'd time only.   LB Dressing Assistance 6  Modified independent   LB Dressing Comments Pt reports utilizing inc'd time to dress self but no staff present and managed on own.   Toileting Comments Pt reports has been taking self to/from restroom without staff present to assist.   Functional Mobility   Functional Mobility 6  Modified independent   Additional Comments Pt reports has been ambulating on the unit as well as to/from restroom with RW and without staff assist.   Subjective   Subjective \"I need " "you to try and speed this test up! I am hungry!\"   Cognition   Overall Cognitive Status WFL   Arousal/Participation Alert;Cooperative   Attention Within functional limits   Orientation Level Oriented X4   Memory Within functional limits   Following Commands Follows all commands and directions without difficulty   Comments Pt verified ID by stating name and .   Additional Activities   Additional Activities Other (Comment)   Additional Activities Comments Education re: appropriate activity level to maintain during hospital course, encouraged continued OOB and particiaption in ADl tasks, reinforced pacing techniques. Also reviewed OT's d/c recommendation as well as instructed pt to alert staff of any changes in functional status. Pt's family present. Both pt and family verbalized understandign to education provided.   Activity Tolerance   Activity Tolerance Other (Comment)  (Pt reports self to have adequate activity tolerance for basic ADLs and takes rest breaks prn.)   Medical Staff Made Aware RN cleared pt for OT tx session.   Assessment   Assessment Pt seen for OT tx session at bedside.  Upon contact with pt, pt reports self to be independent at this time. OT tx session focused on reviewed ALL goals put in place at evaluation and discussing pt's status on each. Pt feels as though all goals have been met at this time- despite overall activity tolerance not being back to baseline. Pt aware of pacing techniques and has been taking rest breaks prn. At this time, no further skilled inpatient OT needs identified and therefore will remove from OT caseload. Please reconsult should pt have a change in status. Thank you.   Plan   Goal Expiration Date 24   OT Treatment Day 1   OT Frequency   (d/c OT)   Discharge Recommendation   Rehab Resource Intensity Level, OT No post-acute rehabilitation needs   Equipment Recommended Bedside commode;Shower/Tub chair with back ($)   Commode Type Standard   AM-PAC Daily Activity " Inpatient   Lower Body Dressing 4   Bathing 4   Toileting 4   Upper Body Dressing 4   Grooming 4   Eating 4   Daily Activity Raw Score 24   Daily Activity Standardized Score (Calc for Raw Score >=11) 57.54   AM-PAC Applied Cognition Inpatient   Following a Speech/Presentation 4   Understanding Ordinary Conversation 4   Taking Medications 4   Remembering Where Things Are Placed or Put Away 4   Remembering List of 4-5 Errands 4   Taking Care of Complicated Tasks 3   Applied Cognition Raw Score 23   Applied Cognition Standardized Score 53.08   End of Consult   Education Provided Yes;Family or social support of family present for education by provider   Patient Position at End of Consult Bed/Chair alarm activated;All needs within reach   Nurse Communication Nurse aware of consult       Vicky Edmond, OT

## 2024-02-08 NOTE — PROGRESS NOTES
[facility]  Progress Note  Name: Gael Ferraro I  MRN: [id number]  Unit/Bed#: S -01 I Date of Admission: 2/4/2024   Date of Service: 2/8/2024 I Hospital Day: 3    Assessment/Plan   * Anemia  Assessment & Plan  Lab Results   Component Value Date    HGB 10.6 (L) 02/08/2024    HGB 9.9 (L) 02/07/2024    MCV 88 02/08/2024    MCV 88 02/07/2024     Lab Results   Component Value Date    IRON 284 (H) 01/21/2024    FERRITIN 1,145 (H) 01/21/2024    TIBC <339 01/21/2024    CONCFE  01/21/2024      Comment:      Unable to Calculate.      Lab Results   Component Value Date    RETIC 13,600 (L) 01/21/2024    HAPTOGLOBIN 220 01/21/2024    DIRECTCOOMBS Negative 01/21/2024      Recent admission on 1/25/2022 for for unexplained anemia.  Previous admission with hemoglobin nearly 6,patient received 2 packed RBCs.   FISH testing: Negative  Normal male karyotype  Plasma cell testing-no diagnostic immuno phenotype identified.    Plan:  Received 2 packed RBCs in the ED.   Received 1 pack leukoreduced RBCs today. Repeat HB 8.  Repeat H&H is 9.4. Goal to keep HB more than 8.   Continue B12 1000 mcg once daily  2/4/24: erythropoietin levels- 2759 abnormal.  Received one dose of erythropoietin on 2/5/24.  GI RECS- Rule out GI source.1x melena today. FOBT Negative on 2/7/24.  Endoscopy today.  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 believes damage to the erythropoietic precursors  Suspecting Parvovirus, recs to start Solu-Medrol 20 mg IV every 12 hour    Elevated troponin  Assessment & Plan  Elevated troponin levels due to demand ischemia in the setting of anemia  chest pain lasting for 30 minutes associated with SOB.  H/o troponin elevation  EKG: Compared to previous EKG, this admission has  more pronounced ST depression in Right and inferior lateral leads.   PCI: Prox RCA lesion is 100% stenosed. Not the culprit lesion. Poor target for bypass.The vessel size is small. The lesion is type C. The lesion is severely calcified. The lesion is chronically occluded.   The collateral flow was extensive in Right Posterior Descending Artery AND Third Right Posterolateral Branch.  Lab Results   Component Value Date    HSTNI0 2,603 (H) 02/04/2024    HSTNI2 2,991 (H) 02/04/2024    HSTNID2 388 (H) 02/04/2024    HSTNI4 4,499 (H) 02/04/2024    HSTNID4 1,896 (H) 02/04/2024    Chest x ray :New mild opacity in the right costophrenic sulcus which could be due to atelectasis or could be infectious/inflammatory.  Persistent opacity in the left base, likely atelectasis or scar.    Plan:  Elevated troponins in the setting of anemia.  Maintain hemoglobin more than 8  Cards - recommended to keep HR <70. Started him on metoprolol 50 mg every 6 hrs.   Holding heparin ACS protocol as pt already have extensive collaterals.  Pt received Aspirin 325 mg in the ED.   Continue aspirin, ticagrelor  Cardiology recs-metoprolol succinate 50 mg twice daily , due to concerns about low BP  Continue ranolazine, pravastatin 80            Hypertension  Assessment & Plan  Home meds: Amlodipine 2.5 mg hydrochlorothiazide 25 mg daily, nebivolol 20 mg daily.  Blood Pressure: 124/60    Plan:  Currently receiving blood transfusion.  Due to underlying myocardial stress, HTN meds are kept on hold.  Cards - recommended to keep HR <70.   At discharge we will discontinue his bisystolic home med and start him on metoprolol succinate 50 mg BID.     SOB (shortness of breath)  Assessment & Plan  Pt c/o SOB aggravating on ambulation.   Lab Results   Component Value Date    BNP 1,290 (H) 02/05/2024     (H) 12/31/2023    LVEF 60 01/01/2024   Echo: The estimated right ventricular systolic pressure is 58.00 mmHg. Left atrium is dilated.   Chest x ray: New mild  opacity in the right costophrenic sulcus which could be due to atelectasis or could be infectious/inflammatory.   Persistent opacity in the left base, likely atelectasis or scar.ding.  I/O last 3 completed shifts:  In: 860 [P.O.:360; Blood:500]  Out: -      Plan:  Repeat BNP levels elevated. Possible vol overloaded state with repeat blood transfusion.  No h/o CHF, never had A. Fib.   Pt appears dry and thirsty.  Strict I and O   Daily weights.   C/o SOB today and wearing O2 saturating at 95 percent.   His saturations went down to 77 while sitting upright. Possible Pulm HTN in the setting of elevated Rt ventricular pressures or the Beta blockers itself.   Monitor for desaturations and upscale as needed.   Spirometry.  No clear source of infection correlating with chest x ray finding.. WBC WNL.               CKD (chronic kidney disease) stage 3, GFR 30-59 ml/min ([facility])  Assessment & Plan  Lab Results   Component Value Date    EGFR 58 02/08/2024    EGFR 68 02/07/2024    EGFR 60 02/06/2024    CREATININE 1.14 02/08/2024    CREATININE 1.00 02/07/2024    CREATININE 1.11 02/06/2024     Baseline cr: 1.08 to 1.10    Plan:  Avoid hypotension.  Avoid nephrotoxins    Constipation  Assessment & Plan  Yvonne lax daily  Senna daily      Abnormal LFTs  Assessment & Plan  Elevated Alk phos with slight elevation in ALT.      Plan:  No jaundice on examination.  Possible in the setting of MI type 2- demand ischemia    Nose congestion  Assessment & Plan  Nasal drops PRN    Abnormal chest x-ray  Assessment & Plan  Chest x ray :New mild opacity in the right costophrenic sulcus which could be due to atelectasis or could be infectious/inflammatory.  Persistent opacity in the left base, likely atelectasis or scar.      Plan:  CT CHEST ordered.               VTE Pharmacologic Prophylaxis: VTE Score: 3  ticagrelor    Mobility:   Basic Mobility Inpatient Raw Score: 19  JH-HLM Goal: 6: Walk 10 steps or more  JH-HLM Achieved: 7: Walk 25 feet or  more  HLM Goal achieved. Continue to encourage appropriate mobility.    Patient Centered Rounds: I performed bedside rounds with nursing staff today.  Discussions with Specialists or Other Care Team Provider: GI    Education and Discussions with Family / Patient: Updated  (wife) at bedside.    Current Length of Stay: 3 day(s)  Current Patient Status: Inpatient   Discharge Plan: Anticipate discharge in 24-48 hrs to home.    Code Status: Level 1 - Full Code    Subjective:   Pt is doing well. No overnight events.    Objective:     Vitals:   Temp (24hrs), Av.6 °F (37 °C), Min:98.2 °F (36.8 °C), Max:99 °F (37.2 °C)    Temp:  [98.2 °F (36.8 °C)-99 °F (37.2 °C)] 98.2 °F (36.8 °C)  HR:  [74-80] 79  Resp:  [16] 16  BP: (108-135)/(47-68) 124/60  SpO2:  [86 %-96 %] 92 %  Body mass index is 20.91 kg/m².     Input and Output Summary (last 24 hours):     Intake/Output Summary (Last 24 hours) at 2024 1327  Last data filed at 2024 0900  Gross per 24 hour   Intake 0 ml   Output --   Net 0 ml       Physical Exam:   Physical Exam  HENT:      Head: Atraumatic.      Mouth/Throat:      Pharynx: Oropharynx is clear.   Eyes:      Conjunctiva/sclera: Conjunctivae normal.   Cardiovascular:      Rate and Rhythm: Tachycardia present.      Pulses: Normal pulses.      Heart sounds: Normal heart sounds.   Pulmonary:      Breath sounds: Rales present. No wheezing.      Comments: Rt sided inspiratory crackles.   Chest:      Chest wall: No tenderness.   Musculoskeletal:         General: Normal range of motion.      Cervical back: Normal range of motion.   Skin:     General: Skin is warm.      Capillary Refill: Capillary refill takes 2 to 3 seconds.   Neurological:      Mental Status: He is oriented to person, place, and time. Mental status is at baseline.   Psychiatric:         Mood and Affect: Mood normal.          Additional Data:     Labs:  Results from last 7 days   Lab Units 24  0528   WBC Thousand/uL 6.85    HEMOGLOBIN g/dL 10.6*   HEMATOCRIT % 30.5*   PLATELETS Thousands/uL 303   NEUTROS PCT % 80*   LYMPHS PCT % 16   MONOS PCT % 3*   EOS PCT % 0     Results from last 7 days   Lab Units 02/08/24  0528 02/07/24  0540 02/06/24  0457   SODIUM mmol/L 136   < > 137   POTASSIUM mmol/L 4.0   < > 3.6   CHLORIDE mmol/L 103   < > 102   CO2 mmol/L 23   < > 24   BUN mg/dL 26*   < > 22   CREATININE mg/dL 1.14   < > 1.11   ANION GAP mmol/L 10   < > 11   CALCIUM mg/dL 9.3   < > 9.1   ALBUMIN g/dL  --   --  3.6   TOTAL BILIRUBIN mg/dL  --   --  1.52*   ALK PHOS U/L  --   --  111*   ALT U/L  --   --  60*   AST U/L  --   --  40*   GLUCOSE RANDOM mg/dL 167*   < > 115    < > = values in this interval not displayed.     Results from last 7 days   Lab Units 02/05/24  0452   INR  1.04     Results from last 7 days   Lab Units 02/08/24  1219 02/06/24  1141 02/06/24  0823   POC GLUCOSE mg/dl 155* 129 114         Results from last 7 days   Lab Units 02/06/24  0524 02/06/24  0457 02/05/24  0452   LACTIC ACID mmol/L 1.0  --   --    PROCALCITONIN ng/ml  --  0.18 0.11       Lines/Drains:  Invasive Devices       Peripheral Intravenous Line  Duration             Peripheral IV 02/04/24 Distal;Left;Upper;Ventral (anterior) Antecubital 3 days    Peripheral IV 02/04/24 Distal;Right;Upper;Ventral (anterior) Antecubital 3 days    Peripheral IV 02/04/24 Right;Ventral (anterior) Hand 3 days                          Imaging: Reviewed radiology reports from this admission including: chest xray    Recent Cultures (last 7 days):         Last 24 Hours Medication List:   Current Facility-Administered Medications   Medication Dose Route Frequency Provider Last Rate    acetaminophen  650 mg Oral Q6H PRN Shannan Leon MD      aspirin  81 mg Oral Daily Shannan eLon MD      cholecalciferol  1,000 Units Oral BID Shannan Leon MD      cyanocobalamin  1,000 mcg Oral Daily Shannan Leon MD      insulin lispro  1-5  Units Subcutaneous TID AC Shannan Leon MD      LORazepam  1 mg Oral TID PRN Shannan Leon MD      melatonin  3 mg Oral HS Chandana Pro MD      methylPREDNISolone sodium succinate  20 mg Intravenous Q12H [facility] Margi Vuong MD      metoprolol tartrate  50 mg Oral Q8H Chandana Pro MD      nitroglycerin  0.4 mg Sublingual Q5 Min PRN Shannan Leon MD      pantoprazole  40 mg Intravenous Q12H [facility] Ana Singh DO      polyethylene glycol  17 g Oral Daily Shannan Leon MD      pravastatin  80 mg Oral Daily With Dinner Shannan Leon MD      ranolazine  500 mg Oral Q12H [facility] Shannan Leon MD      senna  1 tablet Oral HS Shannan Leon MD      sodium chloride  1 spray Each Nare Q1H PRN Chandana Pro MD      [START ON 2/9/2024] ticagrelor  90 mg Oral Q12H [facility] Shannan Leon MD          Today, Patient Was Seen By: Shannan Leon MD    **Please Note: This note may have been constructed using a voice recognition system.**

## 2024-02-08 NOTE — PLAN OF CARE
Problem: OCCUPATIONAL THERAPY ADULT  Goal: Performs self-care activities at highest level of function for planned discharge setting.  See evaluation for individualized goals.  Description: Treatment Interventions: ADL retraining, Functional transfer training, Endurance training, Patient/family training, Equipment evaluation/education, Compensatory technique education, Activityengagement  Equipment Recommended: Bedside commode, Shower/Tub chair with back ($)       See flowsheet documentation for full assessment, interventions and recommendations.   Outcome: Completed  Note: Limitation: Decreased ADL status, Decreased Safe judgement during ADL, Decreased endurance, Decreased self-care trans, Decreased high-level ADLs (impaired balance, fxnl mobility, act zehra, fxnl reach, standing zehra, strength, safety awareness, pacing)  Prognosis: Good  Assessment: Pt seen for OT tx session at bedside.  Upon contact with pt, pt reports self to be independent at this time. OT tx session focused on reviewed ALL goals put in place at evaluation and discussing pt's status on each. Pt feels as though all goals have been met at this time- despite overall activity tolerance not being back to baseline. Pt aware of pacing techniques and has been taking rest breaks prn. At this time, no further skilled inpatient OT needs identified and therefore will remove from OT caseload. Please reconsult should pt have a change in status. Thank you.     Rehab Resource Intensity Level, OT: No post-acute rehabilitation needs

## 2024-02-08 NOTE — ASSESSMENT & PLAN NOTE
Pt c/o SOB aggravating on ambulation.   Lab Results   Component Value Date    BNP 1,290 (H) 02/05/2024     (H) 12/31/2023    LVEF 60 01/01/2024   Echo: The estimated right ventricular systolic pressure is 58.00 mmHg. Left atrium is dilated.   Chest x ray: New mild opacity in the right costophrenic sulcus which could be due to atelectasis or could be infectious/inflammatory.   Persistent opacity in the left base, likely atelectasis or scar.ding.  I/O last 3 completed shifts:  In: 860 [P.O.:360; Blood:500]  Out: -      Plan:  Repeat BNP levels elevated. Possible vol overloaded state with repeat blood transfusion.  No h/o CHF, never had A. Fib.   Pt appears dry and thirsty.  Strict I and O   Daily weights.   C/o SOB today and wearing O2 saturating at 95 percent.   His saturations went down to 77 while sitting upright. Possible Pulm HTN in the setting of elevated Rt ventricular pressures or the Beta blockers itself.   Monitor for desaturations and upscale as needed.   Spirometry.  No clear source of infection correlating with chest x ray finding.. WBC WNL.

## 2024-02-08 NOTE — ASSESSMENT & PLAN NOTE
Lab Results   Component Value Date    EGFR 58 02/08/2024    EGFR 68 02/07/2024    EGFR 60 02/06/2024    CREATININE 1.14 02/08/2024    CREATININE 1.00 02/07/2024    CREATININE 1.11 02/06/2024     Baseline cr: 1.08 to 1.10    Plan:  Avoid hypotension.  Avoid nephrotoxins

## 2024-02-08 NOTE — ANESTHESIA PREPROCEDURE EVALUATION
Procedure:  EGD    CAD s/p Trop leak underwent cardiac cath 01/02/24 = unchanged from previous  - Type II NSTEMI to peak of 5000 on 2/5/24, improved with resuscitation and transfusion.    Hgb drop to 6.4 improved after 3u pRBC  - stable at 10.6 currently    Hx of hiatal hernia    Relevant Problems   CARDIO   (+) Coronary artery disease involving native coronary artery of native heart with angina pectoris (HCC)   (+) Hypertension      GI/HEPATIC   (+) Other dysphagia      /RENAL   (+) CKD (chronic kidney disease) stage 3, GFR 30-59 ml/min (HCC)   (+) Stage 3 chronic kidney disease (HCC)      HEMATOLOGY   (+) Anemia      PULMONARY   (+) SOB (shortness of breath)      Other   (+) Elevated troponin        Physical Exam    Airway    Mallampati score: II  TM Distance: >3 FB  Neck ROM: full     Dental    upper dentures    Cardiovascular  Rhythm: regular, Rate: normal, Cardiovascular exam normal    Pulmonary  Pulmonary exam normal Breath sounds clear to auscultation    Other Findings  Multiple missing teeth not documented here      Anesthesia Plan  ASA Score- 4 Emergent    Anesthesia Type- IV sedation with anesthesia with ASA Monitors.         Additional Monitors:     Airway Plan:     Comment: Discussed risks/benefits, including medication reactions, awareness, aspiration, and serious/life threatening complications.    Plan to maintain native airway with IVGA, monitored with EtCO2.       Plan Factors-Exercise tolerance (METS): >4 METS.    Chart reviewed.    Patient summary reviewed.      Patient instructed to abstain from smoking on day of procedure. Patient did not smoke on day of surgery.            Induction- intravenous.    Postoperative Plan-     Informed Consent- Anesthetic plan and risks discussed with patient.  I personally reviewed this patient with the CRNA. Discussed and agreed on the Anesthesia Plan with the CRNA..

## 2024-02-08 NOTE — PROGRESS NOTES
[facility]    Cardiology Progress Note  Gael Ferraro 83 y.o. male   YOB: 1940 MRN: [id number]  Unit/Bed#: S -[facility] Encounter: [id number]      Subjective:   No significant events overnight.  No complaints of chest pain, shortness of breath, palpitations or dizziness.  He reports having ambulated around the hallway without any symptoms.  His oxygenation has remained in the lower end of normal around 92 to 95% at rest and goes down to 87 to 88% with activity    Assessments  83-year-old gentleman comes into the hospital with complaints of recurrent chest pain and progressively worsening shortness of breath and fatigue at home over the past.   He has been admitted to the hospital under similar circumstance is on 2 other occasions - 12/31/2023 and 1/21/24.  on both occasions he had chest pain and elevated troponin in the setting of anemia.  he underwent cardiac catheterization on 1/2/2024 and was found to have unchanged coronary anatomy without any acute lesions, including a 50% ostial left main lesion.     This admission, he was again found to have a hemoglobin drop to 6.4 and needed blood transfusions.  He  has not had any apparent gross hematuria, blood in stools and there has been concern about bone marrow issues v/s chronic kidney disease as cause of his anemia    Principal Problem:    Anemia  Active Problems:    Elevated troponin    Hypertension    Constipation    CKD (chronic kidney disease) stage 3, GFR 30-59 ml/min ([facility])    Abnormal LFTs    SOB (shortness of breath)    Nose congestion    Abnormal chest x-ray    Assessment:  Non-MI troponin elevation  CAD s/p remote PCI  Left main coronary artery stenosis  50% ostial to mid-LM   of RCA  Collaterals to RPDA form dLAD  Anemia  Ongoing issue since 7/2023 (Hb used to be 13-14 before that)  FOBT reportedly was negative in ED in 1/2024 at admission  Unclear etiology  Borderline AST/ALT elevation  AST/ALT -  "40/  Hypertension  Hyperlipidemia     Plan:  S/p 1 U PRBC on 24 --> Hb now up to 10.6 today  Ongoing GI evaluation regarding EGD, due to a report of black stool earlier  GI initially requesting ticagrelor hold for EGD --> he has not had any recent coronary stents, and as a result temporary ticagrelor hold would be acceptable from cardiac standpoint  Hematologist follow up noted - concerns for bone marrow suppression  He has low normal oxygenation, with brief drops to 88-90% with activity. He also moderate PHTN on last echo with dilated IVC, and BNP elevation at 1290, but has no peripheral edema or significant basal rales   With multiple blood transfusions in the last 2 months (7 units), he would benefit from short course of diuretic therapy or trial of IV Lasix  I tried to discuss this with the patient and his son who was at bedside, but they remain skeptical  Continue aspirin, metoprolol 50 q8h  Continue ranolazine, pravastatin 80  Follow-up CMP.  If AST ALT continues to trend up then would consider discontinuation of ranolazine      Review of Systems   All other systems reviewed and are negative.        Telemetry Review: Not on telemetry.     Objective:   Vitals: Blood pressure 124/60, pulse 79, temperature 98.2 °F (36.8 °C), resp. rate 16, height 5' 10\" (1.778 m), weight 66.1 kg (145 lb 11.6 oz), SpO2 92%., Body mass index is 20.91 kg/m².,   Orthostatic Blood Pressures      Flowsheet Row Most Recent Value   Blood Pressure 124/60 filed at 2024 0814   Patient Position - Orthostatic VS Lying filed at 2024 2142           Systolic (24hrs), Av , Min:108 , Max:135     Diastolic (24hrs), Av, Min:47, Max:68    Wt Readings from Last 5 Encounters:   24 66.1 kg (145 lb 11.6 oz)   24 68 kg (149 lb 14.6 oz)   01/15/24 63.6 kg (140 lb 3.2 oz)   24 68.9 kg (152 lb)   17 69.9 kg (154 lb)     I/O             P.O. " 480 120 0    Blood  500     Total Intake(mL/kg) 480 (7.3) 620 (9.4) 0 (0)    Urine (mL/kg/hr)       Total Output       Net +480 +620 0                       Physical Exam  Vitals and nursing note reviewed.   Constitutional:       General: He is not in acute distress.     Appearance: He is well-developed. He is not ill-appearing or diaphoretic.   HENT:      Head: Normocephalic and atraumatic.      Nose: No congestion.   Eyes:      General: No scleral icterus.     Conjunctiva/sclera: Conjunctivae normal.   Neck:      Vascular: JVD present. No carotid bruit.   Cardiovascular:      Rate and Rhythm: Normal rate and regular rhythm.      Heart sounds: Normal heart sounds. No murmur heard.     No friction rub. No gallop.   Pulmonary:      Effort: Pulmonary effort is normal. No respiratory distress.      Breath sounds: Normal breath sounds. No wheezing or rales.   Chest:      Chest wall: No tenderness.   Abdominal:      General: There is no distension.      Palpations: Abdomen is soft.      Tenderness: There is no abdominal tenderness.   Musculoskeletal:         General: No swelling, tenderness or deformity.      Cervical back: Neck supple. No muscular tenderness.      Right lower leg: No edema.      Left lower leg: No edema.   Skin:     General: Skin is warm.   Neurological:      General: No focal deficit present.      Mental Status: He is alert and oriented to person, place, and time. Mental status is at baseline.   Psychiatric:         Mood and Affect: Mood normal.         Behavior: Behavior normal.         Thought Content: Thought content normal.           Laboratory Results: personally reviewed        CBC with diff:   Results from last 7 days   Lab Units 02/08/24  0528 02/07/24  1953 02/07/24  0540 02/06/24  0457 02/05/24  0452 02/04/24  2345 02/04/24  1618   WBC Thousand/uL 6.85  --  8.56 7.36 10.12  --  8.72   HEMOGLOBIN g/dL 10.6* 9.9* 8.0* 8.8* 9.4* 9.8* 6.4*   HEMATOCRIT % 30.5* 28.3* 23.3* 24.7* 26.9* 28.4* 18.5*  "  MCV fL 88  --  88 87 87  --  93   PLATELETS Thousands/uL 303  --  273 272 289  --  364   RBC Million/uL 3.47*  --  2.64* 2.85* 3.08*  --  1.98*   MCH pg 30.5  --  30.3 30.9 30.5  --  32.3   MCHC g/dL 34.8  --  34.3 35.6 34.9  --  34.6   RDW % 14.3  --  14.4 14.6 14.7  --  13.6   MPV fL 10.6  --  10.8 10.6 10.5  --  10.8   NRBC AUTO /100 WBCs 0  --  0 0 0  --  0         CMP:  Results from last 7 days   Lab Units 02/08/24 0528 02/07/24 0540 02/06/24 0457 02/05/24 0452 02/04/24  1618   POTASSIUM mmol/L 4.0 3.5 3.6 3.7 3.9   CHLORIDE mmol/L 103 102 102 103 99   CO2 mmol/L 23 23 24 25 23   BUN mg/dL 26* 21 22 26* 26*   CREATININE mg/dL 1.14 1.00 1.11 1.08 1.13   CALCIUM mg/dL 9.3 8.8 9.1 9.2 9.4   AST U/L  --   --  40* 44* 35   ALT U/L  --   --  60* 54* 62*   ALK PHOS U/L  --   --  111* 115* 119*   EGFR ml/min/1.73sq m 58 68 60 62 58         BMP:  Results from last 7 days   Lab Units 02/08/24 0528 02/07/24 0540 02/06/24 0457 02/05/24 0452 02/04/24  1618   POTASSIUM mmol/L 4.0 3.5 3.6 3.7 3.9   CHLORIDE mmol/L 103 102 102 103 99   CO2 mmol/L 23 23 24 25 23   BUN mg/dL 26* 21 22 26* 26*   CREATININE mg/dL 1.14 1.00 1.11 1.08 1.13   CALCIUM mg/dL 9.3 8.8 9.1 9.2 9.4       BNP: No results for input(s): \"BNP\" in the last 72 hours.    Magnesium:   Results from last 7 days   Lab Units 02/05/24  0452   MAGNESIUM mg/dL 2.0       Coags:   Results from last 7 days   Lab Units 02/05/24  0452 02/04/24  1624   PTT seconds 30 29   INR  1.04 0.96       TSH:        Hemoglobin A1C       Lipid Profile:       Meds/Allergies   all current active meds have been reviewed and current meds:   Current Facility-Administered Medications   Medication Dose Route Frequency    acetaminophen (TYLENOL) tablet 650 mg  650 mg Oral Q6H PRN    aspirin (ECOTRIN LOW STRENGTH) EC tablet 81 mg  81 mg Oral Daily    cholecalciferol (VITAMIN D3) tablet 1,000 Units  1,000 Units Oral BID    cyanocobalamin (VITAMIN B-12) tablet 1,000 mcg  1,000 mcg Oral " Daily    insulin lispro (HumaLOG) 100 units/mL subcutaneous injection 1-5 Units  1-5 Units Subcutaneous TID AC    LORazepam (ATIVAN) tablet 1 mg  1 mg Oral TID PRN    melatonin tablet 3 mg  3 mg Oral HS    methylPREDNISolone sodium succinate (Solu-MEDROL) injection 20 mg  20 mg Intravenous Q12H [facility]    metoprolol tartrate (LOPRESSOR) tablet 50 mg  50 mg Oral Q8H    nitroglycerin (NITROSTAT) SL tablet 0.4 mg  0.4 mg Sublingual Q5 Min PRN    pantoprazole (PROTONIX) injection 40 mg  40 mg Intravenous Q12H [facility]    polyethylene glycol (MIRALAX) packet 17 g  17 g Oral Daily    pravastatin (PRAVACHOL) tablet 80 mg  80 mg Oral Daily With Dinner    ranolazine (RANEXA) 12 hr tablet 500 mg  500 mg Oral Q12H CRESENCIO    senna (SENOKOT) tablet 8.6 mg  1 tablet Oral HS    sodium chloride (OCEAN) 0.65 % nasal spray 1 spray  1 spray Each Nare Q1H PRN    [START ON 2/9/2024] ticagrelor (BRILINTA) tablet 90 mg  90 mg Oral Q12H [facility]     Medications Prior to Admission   Medication    acetaminophen (TYLENOL) 325 mg tablet    amLODIPine (NORVASC) 2.5 mg tablet    aspirin (ECOTRIN LOW STRENGTH) 81 mg EC tablet    Cholecalciferol 50 MCG (2000 UT) CAPS    cyanocobalamin (VITAMIN B-12) 1000 MCG tablet    hydrochlorothiazide (HYDRODIURIL) 25 mg tablet    LORazepam (ATIVAN) 1 mg tablet    nebivolol (BYSTOLIC) 20 MG tablet    nitroglycerin (NITROSTAT) 0.4 mg SL tablet    ranolazine (RANEXA) 500 mg 12 hr tablet    rosuvastatin (CRESTOR) 10 MG tablet    ticagrelor (BRILINTA) 90 MG            Cardiac testing: reviewed  No results found for this or any previous visit.    No results found for this or any previous visit.    No results found for this or any previous visit.    No results found for this or any previous visit.

## 2024-02-09 ENCOUNTER — ANESTHESIA EVENT (INPATIENT)
Dept: GASTROENTEROLOGY | Facility: HOSPITAL | Age: 84
DRG: 812 | End: 2024-02-09
Payer: COMMERCIAL

## 2024-02-09 ENCOUNTER — ANESTHESIA (INPATIENT)
Dept: GASTROENTEROLOGY | Facility: HOSPITAL | Age: 84
DRG: 812 | End: 2024-02-09
Payer: COMMERCIAL

## 2024-02-09 ENCOUNTER — APPOINTMENT (INPATIENT)
Dept: GASTROENTEROLOGY | Facility: HOSPITAL | Age: 84
DRG: 812 | End: 2024-02-09
Attending: INTERNAL MEDICINE
Payer: COMMERCIAL

## 2024-02-09 LAB
ALBUMIN SERPL BCP-MCNC: 3.4 G/DL (ref 3.5–5)
ALP SERPL-CCNC: 129 U/L (ref 34–104)
ALT SERPL W P-5'-P-CCNC: 70 U/L (ref 7–52)
ANION GAP SERPL CALCULATED.3IONS-SCNC: 10 MMOL/L
AST SERPL W P-5'-P-CCNC: 45 U/L (ref 13–39)
BILIRUB SERPL-MCNC: 1.05 MG/DL (ref 0.2–1)
BUN SERPL-MCNC: 33 MG/DL (ref 5–25)
CALCIUM ALBUM COR SERPL-MCNC: 9.3 MG/DL (ref 8.3–10.1)
CALCIUM SERPL-MCNC: 8.8 MG/DL (ref 8.4–10.2)
CHLORIDE SERPL-SCNC: 99 MMOL/L (ref 96–108)
CO2 SERPL-SCNC: 22 MMOL/L (ref 21–32)
CREAT SERPL-MCNC: 1.06 MG/DL (ref 0.6–1.3)
ERYTHROCYTE [DISTWIDTH] IN BLOOD BY AUTOMATED COUNT: 14 % (ref 11.6–15.1)
GFR SERPL CREATININE-BSD FRML MDRD: 63 ML/MIN/1.73SQ M
GLUCOSE SERPL-MCNC: 138 MG/DL (ref 65–140)
GLUCOSE SERPL-MCNC: 146 MG/DL (ref 65–140)
GLUCOSE SERPL-MCNC: 152 MG/DL (ref 65–140)
GLUCOSE SERPL-MCNC: 157 MG/DL (ref 65–140)
HCT VFR BLD AUTO: 26.5 % (ref 36.5–49.3)
HGB BLD-MCNC: 9.3 G/DL (ref 12–17)
MCH RBC QN AUTO: 30.5 PG (ref 26.8–34.3)
MCHC RBC AUTO-ENTMCNC: 35.1 G/DL (ref 31.4–37.4)
MCV RBC AUTO: 87 FL (ref 82–98)
PLATELET # BLD AUTO: 293 THOUSANDS/UL (ref 149–390)
PMV BLD AUTO: 10.9 FL (ref 8.9–12.7)
POTASSIUM SERPL-SCNC: 4 MMOL/L (ref 3.5–5.3)
PROT SERPL-MCNC: 6 G/DL (ref 6.4–8.4)
RBC # BLD AUTO: 3.05 MILLION/UL (ref 3.88–5.62)
SODIUM SERPL-SCNC: 131 MMOL/L (ref 135–147)
WBC # BLD AUTO: 8.55 THOUSAND/UL (ref 4.31–10.16)

## 2024-02-09 PROCEDURE — 82948 REAGENT STRIP/BLOOD GLUCOSE: CPT

## 2024-02-09 PROCEDURE — 97110 THERAPEUTIC EXERCISES: CPT

## 2024-02-09 PROCEDURE — 45378 DIAGNOSTIC COLONOSCOPY: CPT | Performed by: INTERNAL MEDICINE

## 2024-02-09 PROCEDURE — C9113 INJ PANTOPRAZOLE SODIUM, VIA: HCPCS | Performed by: INTERNAL MEDICINE

## 2024-02-09 PROCEDURE — 99232 SBSQ HOSP IP/OBS MODERATE 35: CPT | Performed by: INTERNAL MEDICINE

## 2024-02-09 PROCEDURE — 97116 GAIT TRAINING THERAPY: CPT

## 2024-02-09 PROCEDURE — 80053 COMPREHEN METABOLIC PANEL: CPT | Performed by: INTERNAL MEDICINE

## 2024-02-09 PROCEDURE — 85027 COMPLETE CBC AUTOMATED: CPT

## 2024-02-09 PROCEDURE — 0DJD8ZZ INSPECTION OF LOWER INTESTINAL TRACT, VIA NATURAL OR ARTIFICIAL OPENING ENDOSCOPIC: ICD-10-PCS | Performed by: INTERNAL MEDICINE

## 2024-02-09 RX ORDER — EPHEDRINE SULFATE 50 MG/ML
INJECTION INTRAVENOUS AS NEEDED
Status: DISCONTINUED | OUTPATIENT
Start: 2024-02-09 | End: 2024-02-09

## 2024-02-09 RX ORDER — SODIUM CHLORIDE 9 MG/ML
INJECTION, SOLUTION INTRAVENOUS CONTINUOUS PRN
Status: DISCONTINUED | OUTPATIENT
Start: 2024-02-09 | End: 2024-02-09

## 2024-02-09 RX ORDER — PROPOFOL 10 MG/ML
INJECTION, EMULSION INTRAVENOUS CONTINUOUS PRN
Status: DISCONTINUED | OUTPATIENT
Start: 2024-02-09 | End: 2024-02-09

## 2024-02-09 RX ORDER — PROPOFOL 10 MG/ML
INJECTION, EMULSION INTRAVENOUS AS NEEDED
Status: DISCONTINUED | OUTPATIENT
Start: 2024-02-09 | End: 2024-02-09

## 2024-02-09 RX ADMIN — METOPROLOL TARTRATE 50 MG: 50 TABLET, FILM COATED ORAL at 13:08

## 2024-02-09 RX ADMIN — PANTOPRAZOLE SODIUM 40 MG: 40 INJECTION, POWDER, FOR SOLUTION INTRAVENOUS at 21:22

## 2024-02-09 RX ADMIN — PROPOFOL 70 MCG/KG/MIN: 10 INJECTION, EMULSION INTRAVENOUS at 17:08

## 2024-02-09 RX ADMIN — METHYLPREDNISOLONE SODIUM SUCCINATE 20 MG: 40 INJECTION, POWDER, FOR SOLUTION INTRAMUSCULAR; INTRAVENOUS at 22:03

## 2024-02-09 RX ADMIN — ASPIRIN 81 MG: 81 TABLET, COATED ORAL at 08:42

## 2024-02-09 RX ADMIN — RANOLAZINE 500 MG: 500 TABLET, FILM COATED, EXTENDED RELEASE ORAL at 21:22

## 2024-02-09 RX ADMIN — LORAZEPAM 1 MG: 1 TABLET ORAL at 08:53

## 2024-02-09 RX ADMIN — METOPROLOL TARTRATE 50 MG: 50 TABLET, FILM COATED ORAL at 05:10

## 2024-02-09 RX ADMIN — SODIUM CHLORIDE: 0.9 INJECTION, SOLUTION INTRAVENOUS at 17:04

## 2024-02-09 RX ADMIN — LORAZEPAM 1 MG: 1 TABLET ORAL at 22:27

## 2024-02-09 RX ADMIN — EPHEDRINE SULFATE 10 MG: 50 INJECTION INTRAVENOUS at 17:17

## 2024-02-09 RX ADMIN — LORAZEPAM 1 MG: 1 TABLET ORAL at 00:45

## 2024-02-09 RX ADMIN — TICAGRELOR 90 MG: 90 TABLET ORAL at 21:22

## 2024-02-09 RX ADMIN — POLYETHYLENE GLYCOL 3350 17 G: 17 POWDER, FOR SOLUTION ORAL at 08:41

## 2024-02-09 RX ADMIN — METHYLPREDNISOLONE SODIUM SUCCINATE 20 MG: 40 INJECTION, POWDER, FOR SOLUTION INTRAMUSCULAR; INTRAVENOUS at 08:41

## 2024-02-09 RX ADMIN — CYANOCOBALAMIN TAB 500 MCG 1000 MCG: 500 TAB at 08:42

## 2024-02-09 RX ADMIN — EPHEDRINE SULFATE 10 MG: 50 INJECTION INTRAVENOUS at 17:32

## 2024-02-09 RX ADMIN — RANOLAZINE 500 MG: 500 TABLET, FILM COATED, EXTENDED RELEASE ORAL at 08:42

## 2024-02-09 RX ADMIN — PANTOPRAZOLE SODIUM 40 MG: 40 INJECTION, POWDER, FOR SOLUTION INTRAVENOUS at 08:42

## 2024-02-09 RX ADMIN — PROPOFOL 70 MG: 10 INJECTION, EMULSION INTRAVENOUS at 17:08

## 2024-02-09 RX ADMIN — PRAVASTATIN SODIUM 80 MG: 80 TABLET ORAL at 18:45

## 2024-02-09 RX ADMIN — Medication 1000 UNITS: at 08:42

## 2024-02-09 RX ADMIN — Medication 1000 UNITS: at 21:22

## 2024-02-09 RX ADMIN — METOPROLOL TARTRATE 50 MG: 50 TABLET, FILM COATED ORAL at 21:22

## 2024-02-09 RX ADMIN — Medication 3 MG: at 21:22

## 2024-02-09 NOTE — ASSESSMENT & PLAN NOTE
Home meds: Amlodipine 2.5 mg hydrochlorothiazide 25 mg daily, nebivolol 20 mg daily.  Blood Pressure: 135/63    Plan:  Currently receiving blood transfusion.  Due to underlying myocardial stress, HTN meds are kept on hold.  Cards - recommended to keep HR <70.   At discharge we will discontinue his bisystolic home med and start him on metoprolol succinate 50 mg BID.

## 2024-02-09 NOTE — PROGRESS NOTES
[facility]  Progress Note  Name: Gael Ferraro I  MRN: [id number]  Unit/Bed#: S -01 I Date of Admission: 2/4/2024   Date of Service: 2/9/2024 I Hospital Day: 4    Assessment/Plan   * Anemia  Assessment & Plan  Lab Results   Component Value Date    HGB 9.3 (L) 02/09/2024    HGB 10.6 (L) 02/08/2024    MCV 87 02/09/2024    MCV 88 02/08/2024     Lab Results   Component Value Date    IRON 284 (H) 01/21/2024    FERRITIN 1,145 (H) 01/21/2024    TIBC <339 01/21/2024    CONCFE  01/21/2024      Comment:      Unable to Calculate.      Lab Results   Component Value Date    RETIC 13,600 (L) 01/21/2024    HAPTOGLOBIN 220 01/21/2024    DIRECTCOOMBS Negative 01/21/2024      Recent admission on 1/25/2022 for for unexplained anemia.  Previous admission with hemoglobin nearly 6,patient received 2 packed RBCs.   FISH testing: Negative  Normal male karyotype  Plasma cell testing-no diagnostic immuno phenotype identified.    Plan:  Received 2 packed RBCs in the ED.   Received 1 pack leukoreduced RBCs today. Repeat HB 8.  Repeat H&H is 9.4. Goal to keep HB more than 8.   Continue B12 1000 mcg once daily  2/4/24: erythropoietin levels- 2759 abnormal.  Received one dose of erythropoietin on 2/5/24.  GI RECS- Rule out GI source.1x melena today. FOBT Negative on 2/7/24.    Upper Gi Endoscopy : type I hiatal hernia) without Shaggy lesions Mild, localized erythematous mucosa in the cardia  Colonoscopy: Today.   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 believes damage to the erythropoietic precursors  Suspecting Parvovirus, recs to start Solu-Medrol 20 mg IV every 12 hour    Elevated troponin  Assessment & Plan  Elevated troponin levels due to demand ischemia in the setting of anemia  chest  pain lasting for 30 minutes associated with SOB.  H/o troponin elevation  EKG: Compared to previous EKG, this admission has more pronounced ST depression in Right and inferior lateral leads.   PCI: Prox RCA lesion is 100% stenosed. Not the culprit lesion. Poor target for bypass.The vessel size is small. The lesion is type C. The lesion is severely calcified. The lesion is chronically occluded.   The collateral flow was extensive in Right Posterior Descending Artery AND Third Right Posterolateral Branch.  Lab Results   Component Value Date    HSTNI0 2,603 (H) 02/04/2024    HSTNI2 2,991 (H) 02/04/2024    HSTNID2 388 (H) 02/04/2024    HSTNI4 4,499 (H) 02/04/2024    HSTNID4 1,896 (H) 02/04/2024    Chest x ray :New mild opacity in the right costophrenic sulcus which could be due to atelectasis or could be infectious/inflammatory.  Persistent opacity in the left base, likely atelectasis or scar.    Plan:  Elevated troponins in the setting of anemia.  Maintain hemoglobin more than 8  Cards - recommended to keep HR <70. Started him on metoprolol 50 mg every 6 hrs.   Holding heparin ACS protocol as pt already have extensive collaterals.  Pt received Aspirin 325 mg in the ED.   Continue aspirin, ticagrelor  Cardiology recs-metoprolol succinate 50 mg twice daily , due to concerns about low BP  Continue ranolazine, pravastatin 80            Hypertension  Assessment & Plan  Home meds: Amlodipine 2.5 mg hydrochlorothiazide 25 mg daily, nebivolol 20 mg daily.  Blood Pressure: 135/63    Plan:  Currently receiving blood transfusion.  Due to underlying myocardial stress, HTN meds are kept on hold.  Cards - recommended to keep HR <70.   At discharge we will discontinue his bisystolic home med and start him on metoprolol succinate 50 mg BID.     SOB (shortness of breath)  Assessment & Plan  Pt c/o SOB aggravating on ambulation.   Lab Results   Component Value Date    BNP 1,290 (H) 02/05/2024     (H) 12/31/2023    LVEF 60  01/01/2024   Echo: The estimated right ventricular systolic pressure is 58.00 mmHg. Left atrium is dilated.   Chest x ray: New mild opacity in the right costophrenic sulcus which could be due to atelectasis or could be infectious/inflammatory.   Persistent opacity in the left base, likely atelectasis or scar.ding.  No intake/output data recorded.     Plan:  Repeat BNP levels elevated. Possible vol overloaded state with repeat blood transfusion.  No h/o CHF, never had A. Fib.   Pt appears dry and thirsty.  Strict I and O   Daily weights.   C/o SOB today and wearing O2 saturating at 95 percent.   His saturations went down to 77 while sitting upright. Possible Pulm HTN in the setting of elevated Rt ventricular pressures or the Beta blockers itself.   Monitor for desaturations and upscale as needed.   Spirometry.  No clear source of infection correlating with chest x ray finding.. WBC WNL.               CKD (chronic kidney disease) stage 3, GFR 30-59 ml/min ([facility])  Assessment & Plan  Lab Results   Component Value Date    EGFR 63 02/09/2024    EGFR 58 02/08/2024    EGFR 68 02/07/2024    CREATININE 1.06 02/09/2024    CREATININE 1.14 02/08/2024    CREATININE 1.00 02/07/2024     Baseline cr: 1.08 to 1.10    Plan:  Avoid hypotension.  Avoid nephrotoxins    Constipation  Assessment & Plan  Yvonne lax daily  Senna daily      Abnormal LFTs  Assessment & Plan  Elevated Alk phos with slight elevation in ALT.  Recent Labs     02/09/24  0528   AST 45*   ALT 70*   ALKPHOS 129*   TBILI 1.05*         Plan:  No jaundice on examination.  Possible in the setting of MI type 2- demand ischemia    Nose congestion  Assessment & Plan  Nasal drops PRN    Abnormal chest x-ray  Assessment & Plan  Chest x ray :New mild opacity in the right costophrenic sulcus which could be due to atelectasis or could be infectious/inflammatory.  Persistent opacity in the left base, likely atelectasis or scar.      Plan:  CT CHEST ordered.               VTE  Pharmacologic Prophylaxis: VTE Score: 3  Brillinta    Mobility:   Basic Mobility Inpatient Raw Score: 19  JH-HLM Goal: 6: Walk 10 steps or more  JH-HLM Achieved: 6: Walk 10 steps or more  HLM Goal achieved. Continue to encourage appropriate mobility.    Patient Centered Rounds: I performed bedside rounds with nursing staff today.  Discussions with Specialists or Other Care Team Provider: GI, cardiology, Heme onc    Education and Discussions with Family / Patient: Updated  (wife) at bedside.    Current Length of Stay: 4 day(s)  Current Patient Status: Inpatient   Discharge Plan: Anticipate discharge tomorrow to home.    Code Status: Level 1 - Full Code    Subjective:   Pt is doing well. No overnight events    Objective:     Vitals:   Temp (24hrs), Av.1 °F (36.7 °C), Min:97.4 °F (36.3 °C), Max:99.1 °F (37.3 °C)    Temp:  [97.4 °F (36.3 °C)-99.1 °F (37.3 °C)] 98.3 °F (36.8 °C)  HR:  [69-83] 70  Resp:  [18-20] 18  BP: ()/(49-76) 135/63  SpO2:  [89 %-96 %] 92 %  Body mass index is 20.91 kg/m².     Input and Output Summary (last 24 hours):   No intake or output data in the 24 hours ending 24 1621    Physical Exam:   Physical Exam  HENT:      Head: Atraumatic.      Mouth/Throat:      Pharynx: Oropharynx is clear.   Eyes:      Conjunctiva/sclera: Conjunctivae normal.   Cardiovascular:      Rate and Rhythm: Tachycardia present.      Pulses: Normal pulses.      Heart sounds: Normal heart sounds.   Pulmonary:      Breath sounds: Rales present. No wheezing.      Comments: Rt sided inspiratory crackles.   Chest:      Chest wall: No tenderness.   Musculoskeletal:         General: Normal range of motion.      Cervical back: Normal range of motion.   Skin:     General: Skin is warm.      Capillary Refill: Capillary refill takes 2 to 3 seconds.   Neurological:      Mental Status: He is oriented to person, place, and time. Mental status is at baseline.   Psychiatric:         Mood and Affect: Mood normal.           Additional Data:     Labs:  Results from last 7 days   Lab Units 02/09/24  0528 02/08/24  0528   WBC Thousand/uL 8.55 6.85   HEMOGLOBIN g/dL 9.3* 10.6*   HEMATOCRIT % 26.5* 30.5*   PLATELETS Thousands/uL 293 303   NEUTROS PCT %  --  80*   LYMPHS PCT %  --  16   MONOS PCT %  --  3*   EOS PCT %  --  0     Results from last 7 days   Lab Units 02/09/24  0528   SODIUM mmol/L 131*   POTASSIUM mmol/L 4.0   CHLORIDE mmol/L 99   CO2 mmol/L 22   BUN mg/dL 33*   CREATININE mg/dL 1.06   ANION GAP mmol/L 10   CALCIUM mg/dL 8.8   ALBUMIN g/dL 3.4*   TOTAL BILIRUBIN mg/dL 1.05*   ALK PHOS U/L 129*   ALT U/L 70*   AST U/L 45*   GLUCOSE RANDOM mg/dL 152*     Results from last 7 days   Lab Units 02/05/24  0452   INR  1.04     Results from last 7 days   Lab Units 02/09/24  1502 02/09/24  1129 02/09/24  0733 02/08/24  2115 02/08/24  1601 02/08/24  1219 02/06/24  1141 02/06/24  0823   POC GLUCOSE mg/dl 157* 138 146* 135 155* 155* 129 114         Results from last 7 days   Lab Units 02/06/24  0524 02/06/24  0457 02/05/24  0452   LACTIC ACID mmol/L 1.0  --   --    PROCALCITONIN ng/ml  --  0.18 0.11       Lines/Drains:  Invasive Devices       Peripheral Intravenous Line  Duration             Peripheral IV 02/09/24 Distal;Dorsal (posterior);Left Forearm <1 day                          Imaging: Reviewed radiology reports from this admission including: chest xray    Recent Cultures (last 7 days):         Last 24 Hours Medication List:   Current Facility-Administered Medications   Medication Dose Route Frequency Provider Last Rate    acetaminophen  650 mg Oral Q6H PRN Shannan Leon MD      aspirin  81 mg Oral Daily Shannan Leon MD      cholecalciferol  1,000 Units Oral BID Shannan Leon MD      cyanocobalamin  1,000 mcg Oral Daily Shannan Leon MD      insulin lispro  1-5 Units Subcutaneous TID AC Shanann Leon MD      LORazepam  1 mg Oral TID PRN Shannan Myers  MD Edward      melatonin  3 mg Oral HS Chandana Pro MD      methylPREDNISolone sodium succinate  20 mg Intravenous Q12H [facility] Margi Vuong MD      metoprolol tartrate  50 mg Oral Q8H Chandana Pro MD      nitroglycerin  0.4 mg Sublingual Q5 Min PRN Shannan Leon MD      pantoprazole  40 mg Intravenous Q12H [facility] Ana Singh DO      polyethylene glycol  17 g Oral Daily Shannan Leon MD      pravastatin  80 mg Oral Daily With Dinner Shannan Leon MD      ranolazine  500 mg Oral Q12H CRESENCIO Shannan Leon MD      senna  1 tablet Oral HS Shannan Leon MD      sodium chloride  1 spray Each Nare Q1H PRN Chandana Pro MD          Today, Patient Was Seen By: Shannan Leon MD    **Please Note: This note may have been constructed using a voice recognition system.**

## 2024-02-09 NOTE — PHYSICAL THERAPY NOTE
PHYSICAL THERAPY NOTE          Patient Name: Gael Ferraro  Today's Date: 24 1220   PT Last Visit   PT Visit Date 24   Note Type   Note Type Treatment   Pain Assessment   Pain Assessment Tool 0-10   Pain Score No Pain   Multiple Pain Sites No   Pain Rating: FLACC (Rest) - Face 0   Pain Rating: FLACC (Rest) - Legs 0   Pain Rating: FLACC (Rest) - Activity 0   Pain Rating: FLACC (Rest) - Cry 0   Pain Rating: FLACC (Rest) - Consolability 0   Score: FLACC (Rest) 0   General   Family/Caregiver Present Yes  (pt spouse)   Cognition   Overall Cognitive Status WFL   Arousal/Participation Alert;Responsive;Cooperative   Attention Within functional limits   Orientation Level Oriented X4   Memory Within functional limits   Following Commands Follows all commands and directions without difficulty   Comments pt identified by name and    Subjective   Subjective pt was agreeable to participate in PT intervention   Bed Mobility   Supine to Sit 7  Independent   Additional items Increased time required   Sit to Supine 7  Independent   Additional items Increased time required   Additional Comments pt was able to sit EOB w/o LOB prior to functional transfers to and from RW   Transfers   Sit to Stand 5  Supervision   Additional items Assist x 1;Armrests;Increased time required;Verbal cues   Stand to Sit 5  Supervision   Additional items Assist x 1;Armrests;Increased time required;Verbal cues   Stand pivot Unable to assess   Additional Comments pt continues to require RW, /s and VC's for hand placement while ascending to RW and descending to seated EOB   Ambulation/Elevation   Gait pattern Decreased foot clearance;Short stride;Excessively slow;Step through pattern   Gait Assistance 5  Supervision   Additional items Assist x 1;Verbal cues  (/s to min ax1 w/ directional change)   Assistive Device Rolling walker   Distance  90'x1 RW   Stair Management Assistance Not tested   Ambulation/Elevation Additional Comments pt limited with ambulation distance and stair trials due to increase urgency to have a bowel movement as pt was given an enema prior to PT intervention   Balance   Static Sitting Good   Dynamic Sitting Fair +   Static Standing Fair   Dynamic Standing Fair   Ambulatory Poor +  (w/ RW)   Endurance Deficit   Endurance Deficit Yes   Endurance Deficit Description limited activity tolerance and ambulation distance   Activity Tolerance   Activity Tolerance Other (Comment)  (increased urgency for bowel movement)   Medical Staff Made Aware Spoke to RN Mell   Exercises   Hip Abduction Sitting;15 reps;AROM;Bilateral   Hip Adduction Sitting;15 reps;AROM;Bilateral  (pillow squeezes)   Knee AROM Long Arc Quad Sitting;10 reps;AROM;Bilateral   Ankle Pumps Sitting;20 reps;AROM;Bilateral   Marching Sitting;10 reps;AROM;Bilateral   Assessment   Problem List Decreased strength;Decreased endurance;Decreased mobility;Impaired balance;Decreased coordination;Decreased safety awareness   Assessment pt began tx xsession lying supine in the bed and was agreeable to participate in PT intervention. pt continues to be independent with all bed mobility prior to initiating functional transfers to and from RW. pt was able to sit EOB and participate in TE activities w/o LOB in order to strengthen LE's and increase static/dynamic sitting balance. pt was able to increase activity tolerance and ambulation distance as pt ambulated 90'x1 RW with /s to min Ax1 for directional changes as pt did have a slight LOB. Additional ambulation was not possible due to urgency to have a bowel movement as pt was given an enema prior to PT intervention. pt would benefit from continued skilled PT intervention in order to demonstarte the ability to complete stair trials safely as pt has several steps to complete at home. Post tx pt in bed with call bell, wife present and all pt  needs met   Goals   Patient Goals to feel better   [facility] Expiration Date 02/16/24   Plan   Treatment/Interventions ADL retraining;LE strengthening/ROM;Functional transfer training;Therapeutic exercise;Elevations;Endurance training;Patient/family training;Equipment eval/education;Bed mobility;Gait training;Spoke to nursing;Compensatory technique education   Progress Slow progress, decreased activity tolerance   PT Frequency 3-5x/wk   Discharge Recommendation   Rehab Resource Intensity Level, PT III (Minimum Resource Intensity)   AM-PAC Basic Mobility Inpatient   Turning in Flat Bed Without Bedrails 4   Lying on Back to Sitting on Edge of Flat Bed Without Bedrails 4   Moving Bed to Chair 3   Standing Up From Chair Using Arms 3   Walk in Room 3   Climb 3-5 Stairs With Railing 2   Basic Mobility Inpatient Raw Score 19   Basic Mobility Standardized Score 42.48   Highest Level Of Mobility   JH-HLM Goal 6: Walk 10 steps or more   JH-HLM Achieved 7: Walk 25 feet or more   Education   Education Provided Mobility training;Assistive device   Patient Demonstrates acceptance/verbal understanding   End of Consult   Patient Position at End of Consult Supine;Bed/Chair alarm activated;All needs within reach   The patient's AM-PAC Basic Mobility Inpatient Short Form Raw Score is 19. A Raw score of greater than 16 suggests the patient may benefit from discharge to home. Please also refer to the recommendation of the Physical Therapist for safe discharge planning.    Brandon White

## 2024-02-09 NOTE — ANESTHESIA POSTPROCEDURE EVALUATION
Post-Op Assessment Note    CV Status:  Stable  Pain Score: 0    Pain management: adequate       Mental Status:  Sleepy   Hydration Status:  Stable   PONV Controlled:  None   Airway Patency:  Patent     Post Op Vitals Reviewed: Yes    No anethesia notable event occurred.    Staff: CRNA               BP   96/49   Temp      Pulse  70   Resp   16   SpO2   94

## 2024-02-09 NOTE — PLAN OF CARE
Problem: Potential for Falls  Goal: Patient will remain free of falls  Description: INTERVENTIONS:  - Educate patient/family on patient safety including physical limitations  - Instruct patient to call for assistance with activity   - Consult OT/PT to assist with strengthening/mobility   - Keep Call bell within reach  - Keep bed low and locked with side rails adjusted as appropriate  - Keep care items and personal belongings within reach  - Initiate and maintain comfort rounds  - Make Fall Risk Sign visible to staff  - Offer Toileting every  Hours, in advance of need  - Initiate/Maintain alarm  - Obtain necessary fall risk management equipment:   - Apply yellow socks and bracelet for high fall risk patients  - Consider moving patient to room near nurses station  Outcome: Progressing     Problem: CARDIOVASCULAR - ADULT  Goal: Maintains optimal cardiac output and hemodynamic stability  Description: INTERVENTIONS:  - Monitor I/O, vital signs and rhythm  - Monitor for S/S and trends of decreased cardiac output  - Administer and titrate ordered vasoactive medications to optimize hemodynamic stability  - Assess quality of pulses, skin color and temperature  - Assess for signs of decreased coronary artery perfusion  - Instruct patient to report change in severity of symptoms  Outcome: Progressing  Goal: Absence of cardiac dysrhythmias or at baseline rhythm  Description: INTERVENTIONS:  - Continuous cardiac monitoring, vital signs, obtain 12 lead EKG if ordered  - Administer antiarrhythmic and heart rate control medications as ordered  - Monitor electrolytes and administer replacement therapy as ordered  Outcome: Progressing     Problem: HEMATOLOGIC - ADULT  Goal: Maintains hematologic stability  Description: INTERVENTIONS  - Assess for signs and symptoms of bleeding or hemorrhage  - Monitor labs  - Administer supportive blood products/factors as ordered and appropriate  Outcome: Progressing

## 2024-02-09 NOTE — ANESTHESIA PREPROCEDURE EVALUATION
Procedure:  COLONOSCOPY    CAD s/p Trop leak underwent cardiac cath 01/02/24 = unchanged from previous  - Type II NSTEMI to peak of 5000 on 2/5/24, improved with resuscitation and transfusion.    Hgb drop to 6.4 improved after 3u pRBC  - stable at 10.6 currently    Hx of hiatal hernia    Relevant Problems   CARDIO   (+) Coronary artery disease involving native coronary artery of native heart with angina pectoris (HCC)   (+) Hypertension      GI/HEPATIC   (+) Other dysphagia      /RENAL   (+) CKD (chronic kidney disease) stage 3, GFR 30-59 ml/min (HCC)   (+) Stage 3 chronic kidney disease (HCC)      HEMATOLOGY   (+) Anemia      PULMONARY   (+) SOB (shortness of breath)        Physical Exam    Airway    Mallampati score: II  TM Distance: >3 FB  Neck ROM: full     Dental    upper dentures    Cardiovascular  Rhythm: regular, Rate: normal, Cardiovascular exam normal    Pulmonary  Pulmonary exam normal Breath sounds clear to auscultation    Other Findings  Multiple missing teeth not documented here      Anesthesia Plan  ASA Score- 4     Anesthesia Type- IV sedation with anesthesia with ASA Monitors.         Additional Monitors:     Airway Plan:     Comment: I have seen the patient and reviewed the history.  Patient to receive IV sedation with full ASA monitors.  Risks discussed with the patient, consent signed.  .       Plan Factors-Exercise tolerance (METS): >4 METS.    Chart reviewed.    Patient summary reviewed.      Patient instructed to abstain from smoking on day of procedure. Patient did not smoke on day of surgery.            Induction- intravenous.    Postoperative Plan-     Informed Consent- Anesthetic plan and risks discussed with patient.  I personally reviewed this patient with the CRNA. Discussed and agreed on the Anesthesia Plan with the CRNA..

## 2024-02-09 NOTE — ASSESSMENT & PLAN NOTE
Lab Results   Component Value Date    EGFR 63 02/09/2024    EGFR 58 02/08/2024    EGFR 68 02/07/2024    CREATININE 1.06 02/09/2024    CREATININE 1.14 02/08/2024    CREATININE 1.00 02/07/2024     Baseline cr: 1.08 to 1.10    Plan:  Avoid hypotension.  Avoid nephrotoxins

## 2024-02-09 NOTE — ASSESSMENT & PLAN NOTE
Elevated Alk phos with slight elevation in ALT.  Recent Labs     02/09/24  0528   AST 45*   ALT 70*   ALKPHOS 129*   TBILI 1.05*         Plan:  No jaundice on examination.  Possible in the setting of MI type 2- demand ischemia

## 2024-02-09 NOTE — PLAN OF CARE
Problem: PHYSICAL THERAPY ADULT  Goal: Performs mobility at highest level of function for planned discharge setting.  See evaluation for individualized goals.  Description: Treatment/Interventions: ADL retraining, Functional transfer training, LE strengthening/ROM, Elevations, Therapeutic exercise, Endurance training, Patient/family training, Equipment eval/education, Bed mobility, Gait training, Compensatory technique education, Spoke to case management, OT, Family  Equipment Recommended:  (Patient has a cane and a roller walker)       See flowsheet documentation for full assessment, interventions and recommendations.  Outcome: Progressing  Note:    Problem List: Decreased strength, Decreased endurance, Decreased mobility, Impaired balance, Decreased coordination, Decreased safety awareness  Assessment: pt began tx xsession lying supine in the bed and was agreeable to participate in PT intervention. pt continues to be independent with all bed mobility prior to initiating functional transfers to and from RW. pt was able to sit EOB and participate in TE activities w/o LOB in order to strengthen LE's and increase static/dynamic sitting balance. pt was able to increase activity tolerance and ambulation distance as pt ambulated 90'x1 RW with /s to min Ax1 for directional changes as pt did have a slight LOB. Additional ambulation was not possible due to urgency to have a bowel movement as pt was given an enema prior to PT intervention. pt would benefit from continued skilled PT intervention in order to demonstarte the ability to complete stair trials safely as pt has several steps to complete at home. Post tx pt in bed with call bell, wife present and all pt needs met        Rehab Resource Intensity Level, PT: III (Minimum Resource Intensity)    See flowsheet documentation for full assessment.

## 2024-02-09 NOTE — ASSESSMENT & PLAN NOTE
Lab Results   Component Value Date    HGB 9.3 (L) 02/09/2024    HGB 10.6 (L) 02/08/2024    MCV 87 02/09/2024    MCV 88 02/08/2024     Lab Results   Component Value Date    IRON 284 (H) 01/21/2024    FERRITIN 1,145 (H) 01/21/2024    TIBC <339 01/21/2024    CONCFE  01/21/2024      Comment:      Unable to Calculate.      Lab Results   Component Value Date    RETIC 13,600 (L) 01/21/2024    HAPTOGLOBIN 220 01/21/2024    DIRECTCOOMBS Negative 01/21/2024      Recent admission on 1/25/2022 for for unexplained anemia.  Previous admission with hemoglobin nearly 6,patient received 2 packed RBCs.   FISH testing: Negative  Normal male karyotype  Plasma cell testing-no diagnostic immuno phenotype identified.    Plan:  Received 2 packed RBCs in the ED.   Received 1 pack leukoreduced RBCs today. Repeat HB 8.  Repeat H&H is 9.4. Goal to keep HB more than 8.   Continue B12 1000 mcg once daily  2/4/24: erythropoietin levels- 2759 abnormal.  Received one dose of erythropoietin on 2/5/24.  GI RECS- Rule out GI source.1x melena today. FOBT Negative on 2/7/24.    Upper Gi Endoscopy : type I hiatal hernia) without Shaggy lesions Mild, localized erythematous mucosa in the cardia  Colonoscopy: Today.   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

## 2024-02-09 NOTE — ASSESSMENT & PLAN NOTE
Pt c/o SOB aggravating on ambulation.   Lab Results   Component Value Date    BNP 1,290 (H) 02/05/2024     (H) 12/31/2023    LVEF 60 01/01/2024   Echo: The estimated right ventricular systolic pressure is 58.00 mmHg. Left atrium is dilated.   Chest x ray: New mild opacity in the right costophrenic sulcus which could be due to atelectasis or could be infectious/inflammatory.   Persistent opacity in the left base, likely atelectasis or scar.ding.  No intake/output data recorded.     Plan:  Repeat BNP levels elevated. Possible vol overloaded state with repeat blood transfusion.  No h/o CHF, never had A. Fib.   Pt appears dry and thirsty.  Strict I and O   Daily weights.   C/o SOB today and wearing O2 saturating at 95 percent.   His saturations went down to 77 while sitting upright. Possible Pulm HTN in the setting of elevated Rt ventricular pressures or the Beta blockers itself.   Monitor for desaturations and upscale as needed.   Spirometry.  No clear source of infection correlating with chest x ray finding.. WBC WNL.

## 2024-02-10 VITALS
DIASTOLIC BLOOD PRESSURE: 59 MMHG | SYSTOLIC BLOOD PRESSURE: 133 MMHG | HEIGHT: 70 IN | TEMPERATURE: 97.8 F | RESPIRATION RATE: 18 BRPM | WEIGHT: 145.72 LBS | HEART RATE: 88 BPM | OXYGEN SATURATION: 94 % | BODY MASS INDEX: 20.86 KG/M2

## 2024-02-10 LAB
ALBUMIN SERPL BCP-MCNC: 3.2 G/DL (ref 3.5–5)
ALP SERPL-CCNC: 108 U/L (ref 34–104)
ALT SERPL W P-5'-P-CCNC: 56 U/L (ref 7–52)
ANION GAP SERPL CALCULATED.3IONS-SCNC: 8 MMOL/L
AST SERPL W P-5'-P-CCNC: 34 U/L (ref 13–39)
BASOPHILS # BLD AUTO: 0 THOUSANDS/ÂΜL (ref 0–0.1)
BASOPHILS NFR BLD AUTO: 0 % (ref 0–1)
BILIRUB SERPL-MCNC: 0.63 MG/DL (ref 0.2–1)
BUN SERPL-MCNC: 31 MG/DL (ref 5–25)
CALCIUM ALBUM COR SERPL-MCNC: 9.5 MG/DL (ref 8.3–10.1)
CALCIUM SERPL-MCNC: 8.9 MG/DL (ref 8.4–10.2)
CHLORIDE SERPL-SCNC: 103 MMOL/L (ref 96–108)
CO2 SERPL-SCNC: 24 MMOL/L (ref 21–32)
CREAT SERPL-MCNC: 1.12 MG/DL (ref 0.6–1.3)
EOSINOPHIL # BLD AUTO: 0 THOUSAND/ÂΜL (ref 0–0.61)
EOSINOPHIL NFR BLD AUTO: 0 % (ref 0–6)
ERYTHROCYTE [DISTWIDTH] IN BLOOD BY AUTOMATED COUNT: 13.9 % (ref 11.6–15.1)
GFR SERPL CREATININE-BSD FRML MDRD: 59 ML/MIN/1.73SQ M
GLUCOSE SERPL-MCNC: 129 MG/DL (ref 65–140)
GLUCOSE SERPL-MCNC: 149 MG/DL (ref 65–140)
GLUCOSE SERPL-MCNC: 188 MG/DL (ref 65–140)
HCT VFR BLD AUTO: 26.4 % (ref 36.5–49.3)
HGB BLD-MCNC: 9.1 G/DL (ref 12–17)
IMM GRANULOCYTES # BLD AUTO: 0.07 THOUSAND/UL (ref 0–0.2)
IMM GRANULOCYTES NFR BLD AUTO: 1 % (ref 0–2)
LYMPHOCYTES # BLD AUTO: 0.37 THOUSANDS/ÂΜL (ref 0.6–4.47)
LYMPHOCYTES NFR BLD AUTO: 6 % (ref 14–44)
MCH RBC QN AUTO: 30.2 PG (ref 26.8–34.3)
MCHC RBC AUTO-ENTMCNC: 34.5 G/DL (ref 31.4–37.4)
MCV RBC AUTO: 88 FL (ref 82–98)
MONOCYTES # BLD AUTO: 0.37 THOUSAND/ÂΜL (ref 0.17–1.22)
MONOCYTES NFR BLD AUTO: 6 % (ref 4–12)
NEUTROPHILS # BLD AUTO: 5.88 THOUSANDS/ÂΜL (ref 1.85–7.62)
NEUTS SEG NFR BLD AUTO: 87 % (ref 43–75)
NRBC BLD AUTO-RTO: 0 /100 WBCS
PLATELET # BLD AUTO: 315 THOUSANDS/UL (ref 149–390)
PMV BLD AUTO: 10.9 FL (ref 8.9–12.7)
POTASSIUM SERPL-SCNC: 3.8 MMOL/L (ref 3.5–5.3)
PROT SERPL-MCNC: 5.5 G/DL (ref 6.4–8.4)
RBC # BLD AUTO: 3.01 MILLION/UL (ref 3.88–5.62)
SODIUM SERPL-SCNC: 135 MMOL/L (ref 135–147)
WBC # BLD AUTO: 6.69 THOUSAND/UL (ref 4.31–10.16)

## 2024-02-10 PROCEDURE — 82948 REAGENT STRIP/BLOOD GLUCOSE: CPT

## 2024-02-10 PROCEDURE — C9113 INJ PANTOPRAZOLE SODIUM, VIA: HCPCS | Performed by: INTERNAL MEDICINE

## 2024-02-10 PROCEDURE — 99239 HOSP IP/OBS DSCHRG MGMT >30: CPT | Performed by: INTERNAL MEDICINE

## 2024-02-10 PROCEDURE — 80053 COMPREHEN METABOLIC PANEL: CPT | Performed by: INTERNAL MEDICINE

## 2024-02-10 PROCEDURE — 85025 COMPLETE CBC W/AUTO DIFF WBC: CPT | Performed by: INTERNAL MEDICINE

## 2024-02-10 RX ORDER — PREDNISONE 20 MG/1
40 TABLET ORAL DAILY
Qty: 14 TABLET | Refills: 0 | Status: SHIPPED | OUTPATIENT
Start: 2024-02-10 | End: 2024-02-17

## 2024-02-10 RX ADMIN — METOPROLOL TARTRATE 50 MG: 50 TABLET, FILM COATED ORAL at 04:52

## 2024-02-10 RX ADMIN — PANTOPRAZOLE SODIUM 40 MG: 40 INJECTION, POWDER, FOR SOLUTION INTRAVENOUS at 09:05

## 2024-02-10 RX ADMIN — CYANOCOBALAMIN TAB 500 MCG 1000 MCG: 500 TAB at 09:05

## 2024-02-10 RX ADMIN — METOPROLOL TARTRATE 50 MG: 50 TABLET, FILM COATED ORAL at 12:40

## 2024-02-10 RX ADMIN — TICAGRELOR 90 MG: 90 TABLET ORAL at 09:05

## 2024-02-10 RX ADMIN — LORAZEPAM 1 MG: 1 TABLET ORAL at 10:04

## 2024-02-10 RX ADMIN — METHYLPREDNISOLONE SODIUM SUCCINATE 20 MG: 40 INJECTION, POWDER, FOR SOLUTION INTRAMUSCULAR; INTRAVENOUS at 09:05

## 2024-02-10 RX ADMIN — ASPIRIN 81 MG: 81 TABLET, COATED ORAL at 09:05

## 2024-02-10 RX ADMIN — RANOLAZINE 500 MG: 500 TABLET, FILM COATED, EXTENDED RELEASE ORAL at 09:05

## 2024-02-10 RX ADMIN — Medication 1000 UNITS: at 09:05

## 2024-02-10 NOTE — ASSESSMENT & PLAN NOTE
Home meds: Amlodipine 2.5 mg hydrochlorothiazide 25 mg daily, nebivolol 20 mg daily.  Blood Pressure: 133/59    Plan:  Cards - recommended to keep HR <70.   At discharge we will discontinue his bisystolic home med and start him on metoprolol succinate 50 mg BID.

## 2024-02-10 NOTE — ASSESSMENT & PLAN NOTE
Elevated Alk phos with slight elevation in ALT.  Recent Labs     02/09/24  0528 02/10/24  0450   AST 45* 34   ALT 70* 56*   ALKPHOS 129* 108*   TBILI 1.05* 0.63         Plan:  No jaundice on examination.  Possible in the setting of MI type 2- demand ischemia

## 2024-02-10 NOTE — ASSESSMENT & PLAN NOTE
Pt c/o SOB aggravating on ambulation.   Lab Results   Component Value Date    BNP 1,290 (H) 02/05/2024     (H) 12/31/2023    LVEF 60 01/01/2024   Echo: The estimated right ventricular systolic pressure is 58.00 mmHg. Left atrium is dilated.   Chest x ray: New mild opacity in the right costophrenic sulcus which could be due to atelectasis or could be infectious/inflammatory.   Persistent opacity in the left base, likely atelectasis or scar.ding.  I/O last 3 completed shifts:  In: 250 [I.V.:250]  Out: -      Plan:  Repeat BNP levels elevated. Possible vol overloaded state with repeat blood transfusion Vs mild pulmonary hypertension as evidenced on echo.  Cardiology recommended a trial of IV Lasix.  But patient was skeptical about starting IV Lasix.  So did not start him on Lasix.  No h/o CHF, never had A. Fib.   Strict I and O   Daily weights.    His saturations went down to 77 while sitting upright. Possible Pulm HTN in the setting of elevated Rt ventricular pressures or the Beta blockers itself.  Spirometry.  No clear source of infection correlating with chest x ray finding.. WBC WNL.

## 2024-02-10 NOTE — ASSESSMENT & PLAN NOTE
Lab Results   Component Value Date    HGB 9.1 (L) 02/10/2024    HGB 9.3 (L) 02/09/2024    MCV 88 02/10/2024    MCV 87 02/09/2024     Lab Results   Component Value Date    IRON 284 (H) 01/21/2024    FERRITIN 1,145 (H) 01/21/2024    TIBC <339 01/21/2024    CONCFE  01/21/2024      Comment:      Unable to Calculate.      Lab Results   Component Value Date    RETIC 13,600 (L) 01/21/2024    HAPTOGLOBIN 220 01/21/2024    DIRECTCOOMBS Negative 01/21/2024      Recent admission on 1/25/2022 for for unexplained anemia.  Previous admission with hemoglobin nearly 6,patient received 2 packed RBCs.   FISH testing: Negative  Normal male karyotype  Plasma cell testing-no diagnostic immuno phenotype identified.    Plan:  Received 2 packed RBCs in the ED.   Received 1 pack leukoreduced RBCs today. Repeat HB 8.  Repeat H&H is 9.4. Goal to keep HB more than 8.   Continue B12 1000 mcg once daily  2/4/24: erythropoietin levels- 2759 abnormal.  Received one dose of erythropoietin on 2/5/24.  GI RECS- Rule out GI source.1x melena today. FOBT Negative on 2/7/24.    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  Received morning dose on the day of discharge.  [place] texted heme-onc on-call Dr. Linden miller to send him home on prednisone 40 mg daily.  Patient to follow-up with Dr. Vuong for steroid tapering early this week.

## 2024-02-10 NOTE — ASSESSMENT & PLAN NOTE
Chest x ray :New mild opacity in the right costophrenic sulcus which could be due to atelectasis or could be infectious/inflammatory.  Persistent opacity in the left base, likely atelectasis or scar.      Plan:  CT CHEST as out pt.

## 2024-02-10 NOTE — ASSESSMENT & PLAN NOTE
Lab Results   Component Value Date    EGFR 59 02/10/2024    EGFR 63 02/09/2024    EGFR 58 02/08/2024    CREATININE 1.12 02/10/2024    CREATININE 1.06 02/09/2024    CREATININE 1.14 02/08/2024     Baseline cr: 1.08 to 1.10    Plan:  Avoid hypotension.  Avoid nephrotoxins

## 2024-02-10 NOTE — PLAN OF CARE
Problem: Potential for Falls  Goal: Patient will remain free of falls  Description: INTERVENTIONS:  - Educate patient/family on patient safety including physical limitations  - Instruct patient to call for assistance with activity   - Consult OT/PT to assist with strengthening/mobility   - Keep Call bell within reach  - Keep bed low and locked with side rails adjusted as appropriate  - Keep care items and personal belongings within reach  - Initiate and maintain comfort rounds  - Make Fall Risk Sign visible to staff  - Offer Toileting every  Hours, in advance of need  - Initiate/Maintain alarm  - Obtain necessary fall risk management equipment:   - Apply yellow socks and bracelet for high fall risk patients  - Consider moving patient to room near nurses station  2/10/2024 1417 by Mell Melgar RN  Outcome: Completed  2/10/2024 1048 by Mell Melgar RN  Outcome: Progressing     Problem: CARDIOVASCULAR - ADULT  Goal: Maintains optimal cardiac output and hemodynamic stability  Description: INTERVENTIONS:  - Monitor I/O, vital signs and rhythm  - Monitor for S/S and trends of decreased cardiac output  - Administer and titrate ordered vasoactive medications to optimize hemodynamic stability  - Assess quality of pulses, skin color and temperature  - Assess for signs of decreased coronary artery perfusion  - Instruct patient to report change in severity of symptoms  2/10/2024 1417 by Mell Melgar RN  Outcome: Completed  2/10/2024 1048 by Mell Melgar RN  Outcome: Progressing  Goal: Absence of cardiac dysrhythmias or at baseline rhythm  Description: INTERVENTIONS:  - Continuous cardiac monitoring, vital signs, obtain 12 lead EKG if ordered  - Administer antiarrhythmic and heart rate control medications as ordered  - Monitor electrolytes and administer replacement therapy as ordered  2/10/2024 1417 by Mell Melgar RN  Outcome: Completed  2/10/2024 1048 by Mell Melgar RN  Outcome: Progressing      Problem: HEMATOLOGIC - ADULT  Goal: Maintains hematologic stability  Description: INTERVENTIONS  - Assess for signs and symptoms of bleeding or hemorrhage  - Monitor labs  - Administer supportive blood products/factors as ordered and appropriate  2/10/2024 1417 by Mell Melgar RN  Outcome: Completed  2/10/2024 1048 by Mell Melgar RN  Outcome: Progressing

## 2024-02-12 ENCOUNTER — APPOINTMENT (OUTPATIENT)
Dept: LAB | Facility: CLINIC | Age: 84
End: 2024-02-12
Payer: COMMERCIAL

## 2024-02-12 ENCOUNTER — TELEPHONE (OUTPATIENT)
Dept: HEMATOLOGY ONCOLOGY | Facility: CLINIC | Age: 84
End: 2024-02-12

## 2024-02-12 DIAGNOSIS — D53.9 MACROCYTIC ANEMIA: ICD-10-CM

## 2024-02-12 DIAGNOSIS — D70.8 OTHER NEUTROPENIA (HCC): ICD-10-CM

## 2024-02-12 LAB
BASOPHILS # BLD AUTO: 0.02 THOUSANDS/ÂΜL (ref 0–0.1)
BASOPHILS NFR BLD AUTO: 0 % (ref 0–1)
EOSINOPHIL # BLD AUTO: 0.25 THOUSAND/ÂΜL (ref 0–0.61)
EOSINOPHIL NFR BLD AUTO: 3 % (ref 0–6)
ERYTHROCYTE [DISTWIDTH] IN BLOOD BY AUTOMATED COUNT: 13.5 % (ref 11.6–15.1)
HCT VFR BLD AUTO: 31.6 % (ref 36.5–49.3)
HGB BLD-MCNC: 10.8 G/DL (ref 12–17)
IMM GRANULOCYTES # BLD AUTO: 0.13 THOUSAND/UL (ref 0–0.2)
IMM GRANULOCYTES NFR BLD AUTO: 2 % (ref 0–2)
LYMPHOCYTES # BLD AUTO: 2 THOUSANDS/ÂΜL (ref 0.6–4.47)
LYMPHOCYTES NFR BLD AUTO: 27 % (ref 14–44)
MCH RBC QN AUTO: 30.4 PG (ref 26.8–34.3)
MCHC RBC AUTO-ENTMCNC: 34.2 G/DL (ref 31.4–37.4)
MCV RBC AUTO: 89 FL (ref 82–98)
MONOCYTES # BLD AUTO: 0.76 THOUSAND/ÂΜL (ref 0.17–1.22)
MONOCYTES NFR BLD AUTO: 10 % (ref 4–12)
NEUTROPHILS # BLD AUTO: 4.25 THOUSANDS/ÂΜL (ref 1.85–7.62)
NEUTS SEG NFR BLD AUTO: 58 % (ref 43–75)
NRBC BLD AUTO-RTO: 0 /100 WBCS
PLATELET # BLD AUTO: 464 THOUSANDS/UL (ref 149–390)
PMV BLD AUTO: 10.6 FL (ref 8.9–12.7)
RBC # BLD AUTO: 3.55 MILLION/UL (ref 3.88–5.62)
WBC # BLD AUTO: 7.41 THOUSAND/UL (ref 4.31–10.16)

## 2024-02-12 PROCEDURE — 36415 COLL VENOUS BLD VENIPUNCTURE: CPT

## 2024-02-12 PROCEDURE — 85025 COMPLETE CBC W/AUTO DIFF WBC: CPT

## 2024-02-12 NOTE — UTILIZATION REVIEW
NOTIFICATION OF ADMISSION DISCHARGE   This is a Notification of Discharge from [facility]. Please be advised that this patient has been discharge from our facility. Below you will find the admission and discharge date and time including the patient’s disposition.   UTILIZATION REVIEW CONTACT:  Alli Drake  Utilization   Network Utilization Review Department  Phone: [phone] x carefully listen to the prompts. All voicemails are confidential.  Email: [email or web address]     ADMISSION INFORMATION  PRESENTATION DATE: 2/4/2024  4:02 PM  OBERVATION ADMISSION DATE:   INPATIENT ADMISSION DATE: 2/5/24  3:41 PM   DISCHARGE DATE: 2/10/2024  2:35 PM   DISPOSITION:Home with Home Health Care    Network Utilization Review Department  ATTENTION: Please call with any questions or concerns to [phone] and carefully listen to the prompts so that you are directed to the right person. All voicemails are confidential.   For Discharge needs, contact Care Management DC Support Team at [phone] opt. 2  Send all requests for admission clinical reviews, approved or denied determinations and any other requests to dedicated fax number below belonging to the campus where the patient is receiving treatment. List of dedicated fax numbers for the Facilities:  FACILITY NAME UR FAX NUMBER   ADMISSION DENIALS (Administrative/Medical Necessity) [phone]   DISCHARGE SUPPORT TEAM ([facility]) [phone]   PARENT CHILD HEALTH (Maternity/NICU/Pediatrics) [phone]   [facility] [phone]   [facility] [phone]   [facility] [phone]   [facility] [phone]   [facility] [phone]   [facility] [phone]   [facility] [phone]   [facility]  [phone]   [facility] [phone]   [facility] [phone]   [facility] [phone]   [facility] [phone]

## 2024-02-12 NOTE — TELEPHONE ENCOUNTER
I tried to call the patient x 2 today to arrange for Aranesp injection  No answer and unable to leave a voicemail    I will follow up on 2/14/24

## 2024-02-13 NOTE — ANESTHESIA PROCEDURE NOTES
Anesthesia Notable Event    Date/Time: 2/13/2024 11:09 AM    Performed by: Daniel Pan MD  Authorized by: Daniel Pan MD

## 2024-02-14 ENCOUNTER — TELEPHONE (OUTPATIENT)
Dept: HEMATOLOGY ONCOLOGY | Facility: CLINIC | Age: 84
End: 2024-02-14

## 2024-02-14 NOTE — TELEPHONE ENCOUNTER
Patients hgb from 2/12 = 10.8  Aranesp can not be initiated unless hgb below 10.0  Dr. Vuong aware.  Will hold off on Aranesp at this time

## 2024-02-15 ENCOUNTER — APPOINTMENT (OUTPATIENT)
Dept: LAB | Facility: CLINIC | Age: 84
End: 2024-02-15
Payer: COMMERCIAL

## 2024-02-15 DIAGNOSIS — N18.30 STAGE 3 CHRONIC KIDNEY DISEASE, UNSPECIFIED WHETHER STAGE 3A OR 3B CKD (HCC): ICD-10-CM

## 2024-02-15 DIAGNOSIS — D64.9 ANEMIA: ICD-10-CM

## 2024-02-15 LAB
BASOPHILS # BLD MANUAL: 0 THOUSAND/UL (ref 0–0.1)
BASOPHILS NFR MAR MANUAL: 0 % (ref 0–1)
EOSINOPHIL # BLD MANUAL: 0 THOUSAND/UL (ref 0–0.4)
EOSINOPHIL NFR BLD MANUAL: 0 % (ref 0–6)
ERYTHROCYTE [DISTWIDTH] IN BLOOD BY AUTOMATED COUNT: 13.5 % (ref 11.6–15.1)
HCT VFR BLD AUTO: 33.9 % (ref 36.5–49.3)
HGB BLD-MCNC: 11.5 G/DL (ref 12–17)
LYMPHOCYTES # BLD AUTO: 1.81 THOUSAND/UL (ref 0.6–4.47)
LYMPHOCYTES # BLD AUTO: 23 % (ref 14–44)
MCH RBC QN AUTO: 30.7 PG (ref 26.8–34.3)
MCHC RBC AUTO-ENTMCNC: 33.9 G/DL (ref 31.4–37.4)
MCV RBC AUTO: 90 FL (ref 82–98)
MONOCYTES # BLD AUTO: 0.16 THOUSAND/UL (ref 0–1.22)
MONOCYTES NFR BLD: 2 % (ref 4–12)
NEUTROPHILS # BLD MANUAL: 5.92 THOUSAND/UL (ref 1.85–7.62)
NEUTS BAND NFR BLD MANUAL: 1 % (ref 0–8)
NEUTS SEG NFR BLD AUTO: 74 % (ref 43–75)
PLATELET # BLD AUTO: 516 THOUSANDS/UL (ref 149–390)
PLATELET BLD QL SMEAR: ABNORMAL
PMV BLD AUTO: 10.4 FL (ref 8.9–12.7)
RBC # BLD AUTO: 3.75 MILLION/UL (ref 3.88–5.62)
RBC MORPH BLD: NORMAL
WBC # BLD AUTO: 7.89 THOUSAND/UL (ref 4.31–10.16)

## 2024-02-15 PROCEDURE — 36415 COLL VENOUS BLD VENIPUNCTURE: CPT

## 2024-02-15 PROCEDURE — 85007 BL SMEAR W/DIFF WBC COUNT: CPT

## 2024-02-15 PROCEDURE — 85027 COMPLETE CBC AUTOMATED: CPT

## 2024-02-19 ENCOUNTER — TELEPHONE (OUTPATIENT)
Dept: HEMATOLOGY ONCOLOGY | Facility: CLINIC | Age: 84
End: 2024-02-19

## 2024-02-19 ENCOUNTER — APPOINTMENT (OUTPATIENT)
Dept: LAB | Facility: CLINIC | Age: 84
End: 2024-02-19
Payer: COMMERCIAL

## 2024-02-19 DIAGNOSIS — D70.8 OTHER NEUTROPENIA (HCC): ICD-10-CM

## 2024-02-19 DIAGNOSIS — N18.30 STAGE 3 CHRONIC KIDNEY DISEASE, UNSPECIFIED WHETHER STAGE 3A OR 3B CKD (HCC): ICD-10-CM

## 2024-02-19 DIAGNOSIS — D53.9 MACROCYTIC ANEMIA: ICD-10-CM

## 2024-02-19 DIAGNOSIS — D53.9 MACROCYTIC ANEMIA: Primary | ICD-10-CM

## 2024-02-19 LAB
BASOPHILS # BLD AUTO: 0.01 THOUSANDS/ÂΜL (ref 0–0.1)
BASOPHILS NFR BLD AUTO: 0 % (ref 0–1)
EOSINOPHIL # BLD AUTO: 0.05 THOUSAND/ÂΜL (ref 0–0.61)
EOSINOPHIL NFR BLD AUTO: 1 % (ref 0–6)
ERYTHROCYTE [DISTWIDTH] IN BLOOD BY AUTOMATED COUNT: 14 % (ref 11.6–15.1)
HCT VFR BLD AUTO: 35.2 % (ref 36.5–49.3)
HGB BLD-MCNC: 11.7 G/DL (ref 12–17)
IMM GRANULOCYTES # BLD AUTO: 0.19 THOUSAND/UL (ref 0–0.2)
IMM GRANULOCYTES NFR BLD AUTO: 2 % (ref 0–2)
LYMPHOCYTES # BLD AUTO: 1.59 THOUSANDS/ÂΜL (ref 0.6–4.47)
LYMPHOCYTES NFR BLD AUTO: 18 % (ref 14–44)
MCH RBC QN AUTO: 30.2 PG (ref 26.8–34.3)
MCHC RBC AUTO-ENTMCNC: 33.2 G/DL (ref 31.4–37.4)
MCV RBC AUTO: 91 FL (ref 82–98)
MONOCYTES # BLD AUTO: 0.56 THOUSAND/ÂΜL (ref 0.17–1.22)
MONOCYTES NFR BLD AUTO: 7 % (ref 4–12)
NEUTROPHILS # BLD AUTO: 6.28 THOUSANDS/ÂΜL (ref 1.85–7.62)
NEUTS SEG NFR BLD AUTO: 72 % (ref 43–75)
NRBC BLD AUTO-RTO: 1 /100 WBCS
PLATELET # BLD AUTO: 486 THOUSANDS/UL (ref 149–390)
PMV BLD AUTO: 9.8 FL (ref 8.9–12.7)
RBC # BLD AUTO: 3.87 MILLION/UL (ref 3.88–5.62)
WBC # BLD AUTO: 8.68 THOUSAND/UL (ref 4.31–10.16)

## 2024-02-19 PROCEDURE — 85025 COMPLETE CBC W/AUTO DIFF WBC: CPT

## 2024-02-19 PROCEDURE — 36415 COLL VENOUS BLD VENIPUNCTURE: CPT

## 2024-02-19 RX ORDER — PREDNISONE 10 MG/1
TABLET ORAL DAILY
Qty: 46 TABLET | Refills: 0 | Status: SHIPPED | OUTPATIENT
Start: 2024-02-19 | End: 2024-02-28

## 2024-02-19 NOTE — TELEPHONE ENCOUNTER
"Received attached  via teams:  \"My name is Gael Alba, I'm a patient of Doctor Paz and I have a question on this medication that I'm taking, which is called Prednisone, and I would like a answer to this question on how often and how much of this Prednisone I'm supposed to take. You can reach me at 6:[phone]. Thank you, I hope.\"    "

## 2024-02-19 NOTE — TELEPHONE ENCOUNTER
Per Dr. Vuong, starting tapering prednisone, 30 mg x7 days, decreasing by 10 mg every 7 days. Pt called and notified, he verbalized understanding through read back. He is aware new prescription will be sent to his pharmacy today.

## 2024-02-20 VITALS
OXYGEN SATURATION: 94 % | DIASTOLIC BLOOD PRESSURE: 59 MMHG | SYSTOLIC BLOOD PRESSURE: 133 MMHG | HEIGHT: 70 IN | WEIGHT: 145.72 LBS | RESPIRATION RATE: 18 BRPM | HEART RATE: 88 BPM | TEMPERATURE: 97.8 F | BODY MASS INDEX: 20.86 KG/M2

## 2024-02-22 ENCOUNTER — HOSPITAL ENCOUNTER (OUTPATIENT)
Dept: INFUSION CENTER | Facility: CLINIC | Age: 84
Discharge: HOME | End: 2024-02-22
Payer: COMMERCIAL

## 2024-02-22 ENCOUNTER — APPOINTMENT (OUTPATIENT)
Dept: LAB | Facility: CLINIC | Age: 84
End: 2024-02-22
Payer: COMMERCIAL

## 2024-02-22 DIAGNOSIS — D64.9 ANEMIA, UNSPECIFIED TYPE: ICD-10-CM

## 2024-02-22 DIAGNOSIS — D70.8 OTHER NEUTROPENIA (HCC): ICD-10-CM

## 2024-02-22 DIAGNOSIS — N18.30 STAGE 3 CHRONIC KIDNEY DISEASE, UNSPECIFIED WHETHER STAGE 3A OR 3B CKD (HCC): ICD-10-CM

## 2024-02-22 DIAGNOSIS — D53.9 MACROCYTIC ANEMIA: Primary | ICD-10-CM

## 2024-02-22 LAB
BASOPHILS # BLD AUTO: 0.01 THOUSANDS/ÂΜL (ref 0–0.1)
BASOPHILS NFR BLD AUTO: 0 % (ref 0–1)
EOSINOPHIL # BLD AUTO: 0.02 THOUSAND/ÂΜL (ref 0–0.61)
EOSINOPHIL NFR BLD AUTO: 0 % (ref 0–6)
ERYTHROCYTE [DISTWIDTH] IN BLOOD BY AUTOMATED COUNT: 15.8 % (ref 11.6–15.1)
HCT VFR BLD AUTO: 33.2 % (ref 36.5–49.3)
HGB BLD-MCNC: 11.2 G/DL (ref 12–17)
IMM GRANULOCYTES # BLD AUTO: 0.07 THOUSAND/UL (ref 0–0.2)
IMM GRANULOCYTES NFR BLD AUTO: 1 % (ref 0–2)
LYMPHOCYTES # BLD AUTO: 0.83 THOUSANDS/ÂΜL (ref 0.6–4.47)
LYMPHOCYTES NFR BLD AUTO: 10 % (ref 14–44)
MCH RBC QN AUTO: 31.2 PG (ref 26.8–34.3)
MCHC RBC AUTO-ENTMCNC: 33.7 G/DL (ref 31.4–37.4)
MCV RBC AUTO: 93 FL (ref 82–98)
MONOCYTES # BLD AUTO: 0.45 THOUSAND/ÂΜL (ref 0.17–1.22)
MONOCYTES NFR BLD AUTO: 5 % (ref 4–12)
NEUTROPHILS # BLD AUTO: 6.98 THOUSANDS/ÂΜL (ref 1.85–7.62)
NEUTS SEG NFR BLD AUTO: 84 % (ref 43–75)
NRBC BLD AUTO-RTO: 0 /100 WBCS
PLATELET # BLD AUTO: 359 THOUSANDS/UL (ref 149–390)
PMV BLD AUTO: 9.7 FL (ref 8.9–12.7)
RBC # BLD AUTO: 3.59 MILLION/UL (ref 3.88–5.62)
WBC # BLD AUTO: 8.36 THOUSAND/UL (ref 4.31–10.16)

## 2024-02-22 PROCEDURE — 85025 COMPLETE CBC W/AUTO DIFF WBC: CPT

## 2024-02-22 PROCEDURE — 36415 COLL VENOUS BLD VENIPUNCTURE: CPT

## 2024-02-22 NOTE — PROGRESS NOTES
Patient is here for aranesp. He offers no complaints at this time. He denies any s/s of bleeding or any more dark stools. Patient's labs from today reviewed and hgb is 11.2 which does not meet parameters for aranesp. Felisha Sandhu Rn was notified of hgb results. She stated patient is to continue his weekly labs and draw them the day [facility]. Patient and wife informed of this and verbalized understanding. Patient made his next appointments at the desk.

## 2024-02-26 ENCOUNTER — APPOINTMENT (OUTPATIENT)
Dept: LAB | Facility: CLINIC | Age: 84
End: 2024-02-26
Payer: COMMERCIAL

## 2024-02-26 ENCOUNTER — OFFICE VISIT (OUTPATIENT)
Dept: HEMATOLOGY ONCOLOGY | Facility: CLINIC | Age: 84
End: 2024-02-26
Payer: COMMERCIAL

## 2024-02-26 VITALS
BODY MASS INDEX: 19.9 KG/M2 | HEIGHT: 70 IN | RESPIRATION RATE: 21 BRPM | SYSTOLIC BLOOD PRESSURE: 114 MMHG | TEMPERATURE: 97.9 F | HEART RATE: 84 BPM | DIASTOLIC BLOOD PRESSURE: 64 MMHG | OXYGEN SATURATION: 98 % | WEIGHT: 139 LBS

## 2024-02-26 DIAGNOSIS — Z11.59 ENCOUNTER FOR SCREENING FOR OTHER VIRAL DISEASES: ICD-10-CM

## 2024-02-26 DIAGNOSIS — D61.01 PURE RED CELL APLASIA (HCC): ICD-10-CM

## 2024-02-26 DIAGNOSIS — D70.8 OTHER NEUTROPENIA (HCC): ICD-10-CM

## 2024-02-26 DIAGNOSIS — D70.2 OTHER DRUG-INDUCED NEUTROPENIA (HCC): ICD-10-CM

## 2024-02-26 DIAGNOSIS — R79.9 ABNORMAL FINDING OF BLOOD CHEMISTRY, UNSPECIFIED: ICD-10-CM

## 2024-02-26 DIAGNOSIS — E44.1 MILD PROTEIN-CALORIE MALNUTRITION (HCC): ICD-10-CM

## 2024-02-26 DIAGNOSIS — D75.81: ICD-10-CM

## 2024-02-26 DIAGNOSIS — D70.2 OTHER DRUG-INDUCED NEUTROPENIA (HCC): Primary | ICD-10-CM

## 2024-02-26 DIAGNOSIS — D53.9 MACROCYTIC ANEMIA: ICD-10-CM

## 2024-02-26 DIAGNOSIS — D64.9 ANEMIA, UNSPECIFIED TYPE: ICD-10-CM

## 2024-02-26 LAB
ANA SER QL IA: NEGATIVE
ANISOCYTOSIS BLD QL SMEAR: PRESENT
BASOPHILS # BLD MANUAL: 0 THOUSAND/UL (ref 0–0.1)
BASOPHILS NFR MAR MANUAL: 0 % (ref 0–1)
CRP SERPL QL: 1.1 MG/L
EOSINOPHIL # BLD MANUAL: 0.09 THOUSAND/UL (ref 0–0.4)
EOSINOPHIL NFR BLD MANUAL: 1 % (ref 0–6)
ERYTHROCYTE [DISTWIDTH] IN BLOOD BY AUTOMATED COUNT: 17.4 % (ref 11.6–15.1)
ERYTHROCYTE [SEDIMENTATION RATE] IN BLOOD: 12 MM/HOUR (ref 0–19)
FERRITIN SERPL-MCNC: 1381 NG/ML (ref 24–336)
HBV CORE AB SER QL: NORMAL
HBV CORE IGM SER QL: NORMAL
HBV SURFACE AG SER QL: NORMAL
HCT VFR BLD AUTO: 35.6 % (ref 36.5–49.3)
HCV AB SER QL: NORMAL
HGB BLD-MCNC: 11.8 G/DL (ref 12–17)
IGA SERPL-MCNC: 140 MG/DL (ref 66–433)
IGG SERPL-MCNC: 740 MG/DL (ref 635–1741)
IGM SERPL-MCNC: 80 MG/DL (ref 45–281)
IRON SERPL-MCNC: 252 UG/DL (ref 50–212)
LYMPHOCYTES # BLD AUTO: 0.36 THOUSAND/UL (ref 0.6–4.47)
LYMPHOCYTES # BLD AUTO: 4 % (ref 14–44)
MCH RBC QN AUTO: 30.8 PG (ref 26.8–34.3)
MCHC RBC AUTO-ENTMCNC: 33.1 G/DL (ref 31.4–37.4)
MCV RBC AUTO: 93 FL (ref 82–98)
MONOCYTES # BLD AUTO: 0.36 THOUSAND/UL (ref 0–1.22)
MONOCYTES NFR BLD: 4 % (ref 4–12)
NEUTROPHILS # BLD MANUAL: 8.08 THOUSAND/UL (ref 1.85–7.62)
NEUTS SEG NFR BLD AUTO: 91 % (ref 43–75)
PLATELET # BLD AUTO: 271 THOUSANDS/UL (ref 149–390)
PLATELET BLD QL SMEAR: ADEQUATE
PMV BLD AUTO: 9.6 FL (ref 8.9–12.7)
POLYCHROMASIA BLD QL SMEAR: PRESENT
RBC # BLD AUTO: 3.83 MILLION/UL (ref 3.88–5.62)
RBC MORPH BLD: PRESENT
RETICS # AUTO: ABNORMAL 10*3/UL (ref 14356–105094)
RETICS # CALC: 3.11 % (ref 0.37–1.87)
TIBC SERPL-MCNC: <307 UG/DL (ref 250–450)
UIBC SERPL-MCNC: <55 UG/DL (ref 155–355)
WBC # BLD AUTO: 8.88 THOUSAND/UL (ref 4.31–10.16)

## 2024-02-26 PROCEDURE — 86146 BETA-2 GLYCOPROTEIN ANTIBODY: CPT

## 2024-02-26 PROCEDURE — 85007 BL SMEAR W/DIFF WBC COUNT: CPT

## 2024-02-26 PROCEDURE — 86225 DNA ANTIBODY NATIVE: CPT

## 2024-02-26 PROCEDURE — 86747 PARVOVIRUS ANTIBODY: CPT

## 2024-02-26 PROCEDURE — 83550 IRON BINDING TEST: CPT

## 2024-02-26 PROCEDURE — 84252 ASSAY OF VITAMIN B-2: CPT

## 2024-02-26 PROCEDURE — 86235 NUCLEAR ANTIGEN ANTIBODY: CPT

## 2024-02-26 PROCEDURE — 86803 HEPATITIS C AB TEST: CPT

## 2024-02-26 PROCEDURE — G2211 COMPLEX E/M VISIT ADD ON: HCPCS | Performed by: PHYSICIAN ASSISTANT

## 2024-02-26 PROCEDURE — 85732 THROMBOPLASTIN TIME PARTIAL: CPT

## 2024-02-26 PROCEDURE — 85670 THROMBIN TIME PLASMA: CPT

## 2024-02-26 PROCEDURE — 83521 IG LIGHT CHAINS FREE EACH: CPT

## 2024-02-26 PROCEDURE — 82784 ASSAY IGA/IGD/IGG/IGM EACH: CPT

## 2024-02-26 PROCEDURE — 82728 ASSAY OF FERRITIN: CPT

## 2024-02-26 PROCEDURE — 85027 COMPLETE CBC AUTOMATED: CPT

## 2024-02-26 PROCEDURE — 85652 RBC SED RATE AUTOMATED: CPT

## 2024-02-26 PROCEDURE — 85705 THROMBOPLASTIN INHIBITION: CPT

## 2024-02-26 PROCEDURE — 83520 IMMUNOASSAY QUANT NOS NONAB: CPT

## 2024-02-26 PROCEDURE — 88184 FLOWCYTOMETRY/ TC 1 MARKER: CPT

## 2024-02-26 PROCEDURE — 88185 FLOWCYTOMETRY/TC ADD-ON: CPT

## 2024-02-26 PROCEDURE — 86200 CCP ANTIBODY: CPT

## 2024-02-26 PROCEDURE — 86140 C-REACTIVE PROTEIN: CPT

## 2024-02-26 PROCEDURE — 86037 ANCA TITER EACH ANTIBODY: CPT

## 2024-02-26 PROCEDURE — 36415 COLL VENOUS BLD VENIPUNCTURE: CPT

## 2024-02-26 PROCEDURE — 85613 RUSSELL VIPER VENOM DILUTED: CPT

## 2024-02-26 PROCEDURE — 86705 HEP B CORE ANTIBODY IGM: CPT

## 2024-02-26 PROCEDURE — 86038 ANTINUCLEAR ANTIBODIES: CPT

## 2024-02-26 PROCEDURE — 99215 OFFICE O/P EST HI 40 MIN: CPT | Performed by: PHYSICIAN ASSISTANT

## 2024-02-26 PROCEDURE — 86430 RHEUMATOID FACTOR TEST QUAL: CPT

## 2024-02-26 PROCEDURE — 83540 ASSAY OF IRON: CPT

## 2024-02-26 PROCEDURE — 85045 AUTOMATED RETICULOCYTE COUNT: CPT | Performed by: PHYSICIAN ASSISTANT

## 2024-02-26 PROCEDURE — 86704 HEP B CORE ANTIBODY TOTAL: CPT

## 2024-02-26 PROCEDURE — 87340 HEPATITIS B SURFACE AG IA: CPT

## 2024-02-26 PROCEDURE — 86147 CARDIOLIPIN ANTIBODY EA IG: CPT

## 2024-02-26 NOTE — PROGRESS NOTES
Hematology/Oncology Outpatient Follow- up Note  Gael Ferraro 83 y.o. 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     Patient had progressive anemia since 7/2023  (1/18/2023 hemoglobin 13.1, - 10 range 7/2023; 11/2023)  12/31/23 hemoglobin 6.9, , white blood cell count 3.48, normal differential, platelets 270  Admission x 3 1/2024 2nd to recurrent anemia and chest pain.      Haptoglobin normal  1/1/24, 1/21/24 1/21/24    LDH normal 211, YESSICA negative  Ferritin 1145; iron 285, TIBC < 339  Retic 0.5%    Mild leukopenia.      Responsive to steroids thus far.    Treated with Solu-Medrol 20 mg IV every 12 hour from 2/7/24 to 2/10/24. Discharged on prednisone 40 mg daily 2/20/24.  2/10/24 hgb 9.1 at time of discharge  2/12 hgb 10.8  2/19/24 hgb 11.7, MCV 91, WBC 8.68, normal differential, platelet count 486  2/19/24 Advised to decrease prednisone to 30 mg x7 days with plans to decrease by 10 mg every 7 day  2/22/24 hgb 11.2  Currently on 20mg prednisone.      2.  Elevated iron    11/28/23 iron 254; TIBC 432; 58% saturation  1/21/24  iron 284, TIBC < 339; ferritin 1145      3.  Hx mildly low B12, responsive to oral supplementation.  B12 292-  PCP Dr. Lyman started him on oral B12.  B12 422 1/21/24    Reviewed with patient and his wife, Christi's diagnosis.  We discussed that the underlying cause that triggered his Red cell aplasia is not known.  Additional labs for further workup requested.    Orders Placed This Encounter   Procedures    Vitamin B2    Parvovirus B19 antibody, IgG and IgM    IgG, IgA, IgM    Leukemia/Lymphoma flow  cytometry    Immunoglobulin free LT chains blood    TAY w/Reflex if Positive    Sjogren's Antibodies    Anti-Neelima 1 Antibody    Anti-scleroderma antibody    DNA (DS) ANTIBODY    C-reactive protein    Cyclic citrul peptide antibody, IgG    Sedimentation rate, automated    Lupus anticoagulant    Cardiolipin antibody    Beta-2 glycoprotein antibodies    Rheumatoid Factor    Chronic Hepatitis Panel    Reticulocytes    Anti-neutrophilic cytoplasmic antibody    T Cell Receptor (TCR) Beta Gene Rearrang, PCR    T Cell Receptor (Tcr) Gamma Gene Rearrang,PCR          HPI:  Gael Nettles  is an 82 y/o gentleman 1st seen inpatient 1/21/24 regarding refractory anemia.      PMH: CAD, HTN, CKD, dysphagia.  He is a never smoker.  No family history of liver disease.      November 18, 2023 hemoglobin 10.2, , white blood cell count 3.7, 48% neutrophils, 39% lymphocytes, 11% monocytes, platelet count 82, iron saturation 58%  Normal total bilirubin, total protein, albumin.  BUN 24, creatinine 1.24  B12 292-  PCP Dr. Lyman started him on oral B12.  ESR 1  SPEP: no monoclonal protein.  Alpha 2 region is decreased and may be suggestive of a decreased haptoglobin level associated with liver or hemolytic disease. Quantitation of serum haptoglobin to rule out deficiency may provide additional information. There is no suggestion of a monoclonal protein.      admission 12/31 - 1/2/24 2nd to chest pain  12/31/23 hemoglobin 6.9, , white blood cell count 3.48, normal differential, platelets 270  12/31/23 Folate 12.8    FOBT in the ED was negative  Patient had elevated troponin and suspected to be demand ischemia secondary to low hemoglobin and coronary artery disease.  Patient underwent a cardiac catheterization and was noted to have coronary artery disease but no needing any stents.   Patient did receive 2 units of PRBC transfusion in the hospital on admission.     January 2, 2024 hemoglobin  8.8 at time of  discharge    1/18/24 consult with Dr. Saavedra, hematologist at [facility].  BMBX ordered.  Weekly CBCD requested.        1/18/2023 hemoglobin 13.1,   CT A/P at [facility] - unremarkable  July 28, 2023 hemoglobin 10.9, , white blood cell count 3.9, 57% neutrophils, 25% lymphocytes, 18% monocytes      Admitted 1/21 - 1/24/24  Presented to the hospital on 1/21/2024 due to chest pain.   January 21, 2024 hemoglobin 6.4,   He was found to have troponin elevation. Patient was seen in consultation by cardiology and it was felt that he had a type II MI in the setting of anemia and severe coronary artery disease. Patient's symptoms improved with 2 units of packed red blood cells. Patient is not a candidate for revascularization until his anemia is worked up  Heme/onc was consulted to see patient.         1/24/24.Bone marrow biopsy was performed (resulted 2/5/24)      BONE MARROW - PERIPHERAL BLOOD, TOUCH PREPARATIONS, ASPIRATE SMEARS, CORE BIOPSY AND CLOT SECTION - RIGHT ILIAC CREST BIOPSY: LIMITED SAMPLE - CELLULAR, MYELOID-PREDOMINANT BONE MARROW WITH TRILINEAGE HEMATOPOIESIS WITH NO INCREASE IN BLASTS OR SIGNIFICANT DYSPLASIA; INCREASED RETICULIN FIBROSIS (MF-2 OF 3); CLONAL T-CELL POPULATION; INVOLVED BY PURE RED CELL APLASIA (PRCA); SEE IMPRESSION AND COMMENT     IMPRESSION:  This is a limited bone marrow biopsy due to inadequate aspirate smears and fragmented and crushed bone marrow biopsy. The findings are of a cellular bone marrow with essentially absent erythroid precursors, with no increase in blasts or significant dysplasia, although a full evaluation of dysplasia is precluded in the absence of adequate aspirate smears. There is increased reticulin fibrosis (MF-2 of 3) and adequate storage iron. Plasma cells and T-cells are relatively increased, the latter of which are CD8-skewed by reported flow cytometry, and are clonal by T-cell gamma and beta gene rearrangements. Additional ancillary testing reportedly  shows a normal karyotype, a negative myelodysplastic syndrome (MDS) FISH panel and detects no pathogenic mutations by NGS testing. There are essentially absent erythroid precursors with an inappropriately low reticulocyte count in the setting of progressive anemia. The overall findings are consistent with pure red cell aplasia (PRCA) with background clonal T-cells, moderately increased fibrosis (MF-2 of 3), and negative for a myeloid neoplasm.      According to Care Everywhere CBC values, the onset of the progressive anemia was 7/28/2023, the same time the patient tested positive for Sars Corona Virus 2 (Covid 19), before which, the patient had normal levels of hemoglobin and hematocrit. Infection is one of the most common causes of PRCA, and makes this the most likely trigger; hematolymphoid neoplasms (T-LGL leukemia being most common) are also known to cause PRCA, and the patient's T-cell clone, whether indicative of an underlying lymphoproliferative disorder, or represents a reactive T-cell clonopathy / pseudoclone, may also be a driving factor. Other common etiologies of PRCA should also be ruled out, however, including systemic imaging to exclude a thymoma, and serology to rule out other common triggering infectious etiologies (parvovirus, HIV, EBV, hepatitis, mumps, CMV, etc.), autoimmune disease (most commonly rheumatoid arthritis, lupus), antibodies from donor red blood cells and drug/toxin-induced (long list of associated drugs), etc.      Correlation with repeat serum reticulocyte count with reticulocyte-hgb and concurrent erythropoietin to further characterize the anemia, as well as ruling out other potential masked processes, such as with PNH testing, urine hemosiderin, SPEP, etc., is recommended, as clinically indicated.    Given the previously elevated ferritin and triglycerides, in the setting of inadequate aspirate smears, repeat values and correlation with other clinical and serologic testing to  rule out hemophagocytic lymphohistiocytosis (HLH) is recommended, as clinically indicated (fibrinogen, NK-cell activity, soluble IL-2 receptor (CD25), etc.).    BMBX results pending at time of discharge  January 29, 2024 hemoglobin 8, MCV 95      Admitted 2/4/24- 2/10/24  Presented to the hospital on 2/4/2024 due to chest pain and SOB.    2/4/24 hemoglobin 6.4, MCV 93, white blood cell count 8.72, 60% neutrophils, 1% immature granulocytes, 18% lymphocytes, 10% monocytes, platelet count 364  EPO level 1960   Patient was also seen in consultation by GI. No overt signs of bleeding in the setting of his anemia. Patient did report intermittent solid food dysphagia. They recommend an esophagram. The esophagram was scheduled inpatient, but patient did not want to stay another night to have it completed. GI will place an order for it to be done as an outpatient.    Hemoccult negative.    2/4/24- CXR- New mild opacity in the right costophrenic sulcus which could be due to atelectasis or could be infectious/inflammatory.   Persistent opacity in the left base, likely atelectasis or scar.       2/5/24  BMBX results available  Transfused with 2 U PRBCs  2/8/24 EGD and colonoscopy- 3 cm sliding hiatal hernia (type I hiatal hernia) without Shaggy lesions present.  Non-obstructing Schatzki ring in the GE junction. Not dilated given patient's recent use of Brilinta.  No evidence of peptic ulcer disease, AVMs seen.  Small hemorrhoids.  No evidence of fresh or old blood seen throughout the colon.      Treated with Solu-Medrol 20 mg IV every 12 hour from 2/7/24 to 2/10/24.   Discharged home on prednisone 40 mg daily.   2/10/24 hgb 9.1 at time of discharge  2/12 hgb 10.8  2/19/24 hgb 11.7, MCV 91, WBC 8.68, normal differential, platelet coutn 486    Advised to decrease prednisone to 30 mg x7 days with plans to decrease by 10 mg every 7 day        Interval History:  Currently on prednisone 20mg po daily.      Feeling much better.     Weight decreased.  150- 140.  Excellent appetite    Legs stiff in AM    Dry eye    No rashes      Review of Systems   Constitutional:  Negative for appetite change, chills, diaphoresis, fatigue, fever and unexpected weight change.   HENT:   Negative for mouth sores, nosebleeds, sore throat, tinnitus and voice change.    Eyes:  Negative for eye problems.   Respiratory:  Negative for chest tightness, cough, shortness of breath and wheezing.    Cardiovascular:  Negative for chest pain, leg swelling and palpitations.   Gastrointestinal:  Negative for abdominal distention, abdominal pain, blood in stool, constipation, diarrhea, nausea, rectal pain and vomiting.   Endocrine: Negative for hot flashes.   Genitourinary: Negative.     Musculoskeletal:  Positive for arthralgias. Negative for gait problem and myalgias.   Skin:  Negative for itching and rash.   Neurological:  Negative for dizziness, gait problem, headaches, light-headedness and numbness.   Hematological:  Negative for adenopathy.   Psychiatric/Behavioral:  Negative for confusion and sleep disturbance. The patient is not nervous/anxious.         Test Results:        Labs:   Lab Results   Component Value Date    HGB 11.2 (L) 02/22/2024    HCT 33.2 (L) 02/22/2024    MCV 93 02/22/2024     02/22/2024    WBC 8.36 02/22/2024    NRBC 0 02/22/2024    BANDSPCT 1 02/15/2024     Lab Results   Component Value Date    K 3.8 02/10/2024     02/10/2024    CO2 24 02/10/2024    BUN 31 (H) 02/10/2024    CREATININE 1.12 02/10/2024    GLUCOSE 142 (H) 01/21/2024    CALCIUM 8.9 02/10/2024    CORRECTEDCA 9.5 02/10/2024    AST 34 02/10/2024    ALT 56 (H) 02/10/2024    ALKPHOS 108 (H) 02/10/2024    EGFR 59 02/10/2024           Imaging: Colonoscopy    Result Date: 2/9/2024  Narrative: Table formatting from the original result was not included. [facility] [address] [place] [address] [phone] DATE OF SERVICE: 2/09/24 PHYSICIAN(S):  Attending: Harvey Lyons MD Fellow: No Staff Documented INDICATION: Anemia, unspecified type POST-OP DIAGNOSIS: See the impression below. HISTORY: Prior colonoscopy: More than 10 years ago. BOWEL PREPARATION: Golytely/Colyte/Trilyte PREPROCEDURE: Informed consent was obtained for the procedure, including sedation. Risks including but not limited to bleeding, infection, perforation, adverse drug reaction and aspiration were explained in detail. Also explained about less than 100% sensitivity with the exam and other alternatives. The patient was placed in the left lateral decubitus position. Procedure: Colonoscopy DETAILS OF PROCEDURE: Patient was taken to the procedure room where a time out was performed to confirm correct patient and correct procedure. The patient underwent monitored anesthesia care, which was administered by an anesthesia professional. The patient's blood pressure, heart rate, level of consciousness, oxygen, respirations and ECG were monitored throughout the procedure. A digital rectal exam was performed. The scope was introduced through the anus and advanced to the terminal ileum. Retroflexion was performed in the rectum. The quality of bowel preparation was evaluated using the Descanso Bowel Preparation Scale with scores of: right colon = 2, transverse colon = 2, left colon = 2. The total BBPS score was 6. Bowel prep was adequate. The patient experienced no blood loss. The procedure was not difficult. The patient tolerated the procedure well. There were no apparent adverse events. ANESTHESIA INFORMATION: ASA: IV Anesthesia Type: IV Sedation with Anesthesia MEDICATIONS: No administrations occurring from 1700 to 1733 on 02/09/24 FINDINGS: Internal small hemorrhoids EVENTS: Procedure Events Event Event Time ENDO CECUM REACHED 2/9/2024  5:26 PM ENDO SCOPE OUT TIME 2/9/2024  5:33 PM SPECIMENS: * No specimens in log * EQUIPMENT: Colonoscope -[id number]     Impression: Small hemorrhoids No evidence of  fresh or old blood seen throughout the colon. RECOMMENDATION:  No further screening colonoscopies necessary  Age greater than 65  No evidence of blood loss noted in the EGD or the colonoscopy. May resume diet. May resume Brilinta.  Harvey Lyons MD     EGD    Addendum Date: 2/8/2024 Addendum:   [facility] [address] [address] [phone] DATE OF SERVICE: 2/08/24 PHYSICIAN(S): Attending: Harvey Lyons MD Fellow: No Staff Documented INDICATION: Symptomatic anemia POST-OP DIAGNOSIS: See the impression below. PREPROCEDURE: Informed consent was obtained for the procedure, including sedation.  Risks of perforation, hemorrhage, adverse drug reaction and aspiration were discussed. The patient was placed in the left lateral decubitus position. Patient was explained about the risks and benefits of the procedure. Risks including but not limited to bleeding, infection, and perforation were explained in detail. Also explained about less than 100% sensitivity with the exam and other alternatives. PROCEDURE: EGD DETAILS OF PROCEDURE: Patient was taken to the procedure room where a time out was performed to confirm correct patient and correct procedure. The patient underwent monitored anesthesia care, which was administered by an anesthesia professional. The patient's blood pressure, heart rate, level of consciousness, respirations and oxygen were monitored throughout the procedure. The scope was advanced to the second part of the duodenum. Retroflexion was performed in the fundus. The patient experienced no blood loss. The procedure was not difficult. The patient tolerated the procedure well. There were no apparent adverse events. ANESTHESIA INFORMATION: ASA: IV Anesthesia Type: Anesthesia type not filed in the log. MEDICATIONS: No administrations occurring from 1759 to 1809 on 02/08/24 FINDINGS: Regular Z-line 40 cm from the incisors 3 cm sliding hiatal hernia (type I  hiatal hernia) without Shaggy lesions present - GE junction 40 cm from the incisors, diaphragmatic impression 43 cm from the incisors, confirmed by retroflexion:  Hill classification: Grade II Mild, localized erythematous mucosa in the cardia. Retroflexed view was notable for sliding hiatal hernia, pylorus was patent.  No evidence of peptic ulcer disease, AVMs seen. The duodenal bulb, 1st part of the duodenum and 2nd part of the duodenum appeared normal. Non-obstructing Schatzki ring in the GE junction. Not dilated given patient's recent use of Brilinta. SPECIMENS: * No specimens in log * IMPRESSION: 3 cm type I hiatal hernia Mild erythematous mucosa in the cardia The duodenal bulb, 1st part of the duodenum and 2nd part of the duodenum appeared normal. Schatzki ring in the GE junction RECOMMENDATION:  There is no recommended follow-up for this procedure. No etiology of anemia identified on EGD. Recommend colonoscopy for complete workup of anemia if patient agreeable.  Addendum: Spoke to the patient postprocedure in the recovery area regarding proceeding with colonoscopy to complete the workup of anemia and also discussed endoscopy findings from today however patient adamantly refusing colonoscopy and wishes to go home.  In the absence of overt bleeding, will resume his diet, will defer discharge planning to primary team.  If hemoglobin remained stable, may resume Brilinta. Harvey Lyons MD     Result Date: 2/8/2024  Narrative: Table formatting from the original result was not included. [facility] [address] [facility] [address] [phone] DATE OF SERVICE: 2/08/24 PHYSICIAN(S): Attending: Harvey Lyons MD Fellow: No Staff Documented INDICATION: Symptomatic anemia POST-OP DIAGNOSIS: See the impression below. PREPROCEDURE: Informed consent was obtained for the procedure, including sedation.  Risks of perforation, hemorrhage, adverse drug reaction and aspiration were  discussed. The patient was placed in the left lateral decubitus position. Patient was explained about the risks and benefits of the procedure. Risks including but not limited to bleeding, infection, and perforation were explained in detail. Also explained about less than 100% sensitivity with the exam and other alternatives. PROCEDURE: EGD DETAILS OF PROCEDURE: Patient was taken to the procedure room where a time out was performed to confirm correct patient and correct procedure. The patient underwent monitored anesthesia care, which was administered by an anesthesia professional. The patient's blood pressure, heart rate, level of consciousness, respirations and oxygen were monitored throughout the procedure. The scope was advanced to the second part of the duodenum. Retroflexion was performed in the fundus. The patient experienced no blood loss. The procedure was not difficult. The patient tolerated the procedure well. There were no apparent adverse events. ANESTHESIA INFORMATION: ASA: IV Anesthesia Type: Anesthesia type not filed in the log. MEDICATIONS: No administrations occurring from 1759 to 1809 on 02/08/24 FINDINGS: Regular Z-line 40 cm from the incisors 3 cm sliding hiatal hernia (type I hiatal hernia) without Shaggy lesions present - GE junction 40 cm from the incisors, diaphragmatic impression 43 cm from the incisors, confirmed by retroflexion:  Hill classification: Grade II Mild, localized erythematous mucosa in the cardia. Retroflexed view was notable for sliding hiatal hernia, pylorus was patent.  No evidence of peptic ulcer disease, AVMs seen. The duodenal bulb, 1st part of the duodenum and 2nd part of the duodenum appeared normal. Non-obstructing Schatzki ring in the GE junction. Not dilated given patient's recent use of Brilinta. SPECIMENS: * No specimens in log *     Impression: 3 cm type I hiatal hernia Mild erythematous mucosa in the cardia The duodenal bulb, 1st part of the duodenum and 2nd  "part of the duodenum appeared normal. Schatzki ring in the GE junction RECOMMENDATION:  There is no recommended follow-up for this procedure. No etiology of anemia identified on EGD. Recommend colonoscopy for complete workup of anemia if patient agreeable.   Harvey Lyons MD     XR chest 2 views    Result Date: 2/5/2024  Narrative: XR CHEST PA & LATERAL DUAL ENERGY SUBTRACTION. INDICATION: Shortness of breath. COMPARISON: CXR 1/21/2024. FINDINGS: Slightly increased opacity in the right costophrenic sulcus. Persistent opacity in the left base. Nodules over both lower lungs are the nipples. No pneumothorax or pleural effusion. Normal heart size. Cardiac stents. Bones are unremarkable for age. Normal upper abdomen.     Impression: New mild opacity in the right costophrenic sulcus which could be due to atelectasis or could be infectious/inflammatory. Persistent opacity in the left base, likely atelectasis or scar. Workstation performed: [id number]             Allergies:   Allergies   Allergen Reactions    Hydrocodone-Acetaminophen GI Intolerance    Niacin GI Intolerance    Penicillins Hives     Current Medications: Reviewed  PMH/FH/SH:  Reviewed      Physical Exam:    There is no height or weight on file to calculate BSA.    Ht Readings from Last 3 Encounters:   02/08/24 5' 10\" (1.778 m)   01/21/24 5' 10\" (1.778 m)   01/15/24 5' 10\" (1.778 m)        Wt Readings from Last 3 Encounters:   02/08/24 66.1 kg (145 lb 11.6 oz)   01/21/24 68 kg (149 lb 14.6 oz)   01/15/24 63.6 kg (140 lb 3.2 oz)        Temp Readings from Last 3 Encounters:   02/10/24 97.8 °F (36.6 °C)   01/24/24 98.6 °F (37 °C)   01/03/24 99.1 °F (37.3 °C)        BP Readings from Last 3 Encounters:   02/10/24 133/59   01/24/24 126/58   01/15/24 128/60             Physical Exam  Vitals reviewed.   Constitutional:       General: He is not in acute distress.     Appearance: He is well-developed. He is not diaphoretic.   HENT:      Head: Normocephalic and " atraumatic.   Eyes:      Conjunctiva/sclera: Conjunctivae normal.   Neck:      Trachea: No tracheal deviation.   Cardiovascular:      Rate and Rhythm: Normal rate and regular rhythm.      Heart sounds: No murmur heard.     No friction rub. No gallop.   Pulmonary:      Effort: Pulmonary effort is normal. No respiratory distress.      Breath sounds: Normal breath sounds. No wheezing or rales.   Chest:      Chest wall: No tenderness.   Abdominal:      General: There is no distension.      Palpations: Abdomen is soft.      Tenderness: There is no abdominal tenderness.   Musculoskeletal:      Cervical back: Normal range of motion and neck supple.      Comments: Walks with a cane   Lymphadenopathy:      Cervical: No cervical adenopathy.   Skin:     General: Skin is warm and dry.      Coloration: Skin is not pale.      Findings: No erythema.   Neurological:      Mental Status: He is alert and oriented to person, place, and time.   Psychiatric:         Behavior: Behavior normal.         Thought Content: Thought content normal.         Judgment: Judgment normal.         ECO      Emergency Contacts:    Extended Emergency Contact Information  Primary Emergency Contact: Christi Ferraro  Home Phone: [phone]  Relation: Spouse  Secondary Emergency Contact: Naveen Ferraro  Mobile Phone: [phone]  Relation: None

## 2024-02-27 LAB
DSDNA AB SER-ACNC: 1 IU/ML (ref 0–9)
ENA JO1 AB SER-ACNC: <0.2 AI (ref 0–0.9)
ENA SCL70 AB SER-ACNC: <0.2 AI (ref 0–0.9)
ENA SS-A AB SER-ACNC: <0.2 AI (ref 0–0.9)
ENA SS-B AB SER-ACNC: <0.2 AI (ref 0–0.9)
KAPPA LC FREE SER-MCNC: 16.1 MG/L (ref 3.3–19.4)
KAPPA LC FREE/LAMBDA FREE SER: 1.19 {RATIO} (ref 0.26–1.65)
LAMBDA LC FREE SERPL-MCNC: 13.5 MG/L (ref 5.7–26.3)
RHEUMATOID FACT SER QL LA: NEGATIVE

## 2024-02-28 DIAGNOSIS — N18.30 STAGE 3 CHRONIC KIDNEY DISEASE, UNSPECIFIED WHETHER STAGE 3A OR 3B CKD (HCC): ICD-10-CM

## 2024-02-28 DIAGNOSIS — D53.9 MACROCYTIC ANEMIA: ICD-10-CM

## 2024-02-28 LAB
APTT SCREEN TO CONFIRM RATIO: 1.24 RATIO (ref 0–1.34)
B19V IGG SER IA-ACNC: 1.3 INDEX (ref 0–0.8)
B19V IGM SER IA-ACNC: 0.2 INDEX (ref 0–0.8)
CONFIRM APTT/NORMAL: 39.6 SEC (ref 0–47.6)
LA PPP-IMP: NORMAL
SCAN RESULT: NORMAL
SCREEN APTT: 26.1 SEC (ref 0–43.5)
SCREEN DRVVT: 33.3 SEC (ref 0–47)
THROMBIN TIME: 19.2 SEC (ref 0–23)

## 2024-02-28 RX ORDER — PREDNISONE 10 MG/1
TABLET ORAL
Qty: 46 TABLET | Refills: 0 | Status: SHIPPED | OUTPATIENT
Start: 2024-02-28

## 2024-02-28 NOTE — Clinical Note
Discussed medication list with the patient including indications, directions, and adverse effects to monitor. Patient verbalized understanding and had no further questions.       Left heart catheterization: A catheter was advanced over a guidewire into the ascending aorta.  After recording ascending aortic pressure, the catheter was advanced across the aortic valve and left ventricular pressure was recorded.   The catheter was   pulled back across the aortic valve and into the ascending aorta and pullback pressures were obtained.

## 2024-02-29 ENCOUNTER — APPOINTMENT (OUTPATIENT)
Dept: LAB | Facility: CLINIC | Age: 84
End: 2024-02-29
Payer: COMMERCIAL

## 2024-02-29 DIAGNOSIS — D70.8 OTHER NEUTROPENIA (HCC): ICD-10-CM

## 2024-02-29 DIAGNOSIS — D53.9 MACROCYTIC ANEMIA: ICD-10-CM

## 2024-02-29 LAB
BASOPHILS # BLD AUTO: 0.01 THOUSANDS/ÂΜL (ref 0–0.1)
BASOPHILS NFR BLD AUTO: 0 % (ref 0–1)
C-ANCA TITR SER IF: NORMAL TITER
EOSINOPHIL # BLD AUTO: 0.07 THOUSAND/ÂΜL (ref 0–0.61)
EOSINOPHIL NFR BLD AUTO: 1 % (ref 0–6)
ERYTHROCYTE [DISTWIDTH] IN BLOOD BY AUTOMATED COUNT: 18.6 % (ref 11.6–15.1)
HCT VFR BLD AUTO: 33.8 % (ref 36.5–49.3)
HGB BLD-MCNC: 11.3 G/DL (ref 12–17)
IMM GRANULOCYTES # BLD AUTO: 0.06 THOUSAND/UL (ref 0–0.2)
IMM GRANULOCYTES NFR BLD AUTO: 1 % (ref 0–2)
LYMPHOCYTES # BLD AUTO: 1 THOUSANDS/ÂΜL (ref 0.6–4.47)
LYMPHOCYTES NFR BLD AUTO: 11 % (ref 14–44)
MCH RBC QN AUTO: 31.9 PG (ref 26.8–34.3)
MCHC RBC AUTO-ENTMCNC: 33.4 G/DL (ref 31.4–37.4)
MCV RBC AUTO: 96 FL (ref 82–98)
MONOCYTES # BLD AUTO: 0.41 THOUSAND/ÂΜL (ref 0.17–1.22)
MONOCYTES NFR BLD AUTO: 5 % (ref 4–12)
MYELOPEROXIDASE AB SER IA-ACNC: <0.2 UNITS (ref 0–0.9)
NEUTROPHILS # BLD AUTO: 7.59 THOUSANDS/ÂΜL (ref 1.85–7.62)
NEUTS SEG NFR BLD AUTO: 82 % (ref 43–75)
NRBC BLD AUTO-RTO: 0 /100 WBCS
P-ANCA ATYPICAL TITR SER IF: NORMAL TITER
P-ANCA TITR SER IF: NORMAL TITER
PLATELET # BLD AUTO: 206 THOUSANDS/UL (ref 149–390)
PMV BLD AUTO: 10 FL (ref 8.9–12.7)
PROTEINASE3 AB SER IA-ACNC: <0.2 UNITS (ref 0–0.9)
RBC # BLD AUTO: 3.54 MILLION/UL (ref 3.88–5.62)
VIT B2 BLD-MCNC: 283 UG/L (ref 137–370)
WBC # BLD AUTO: 9.14 THOUSAND/UL (ref 4.31–10.16)

## 2024-02-29 PROCEDURE — 85025 COMPLETE CBC W/AUTO DIFF WBC: CPT

## 2024-02-29 PROCEDURE — 36415 COLL VENOUS BLD VENIPUNCTURE: CPT

## 2024-02-29 NOTE — PROGRESS NOTES
Gael Ronan  1940  [id number]  [facility] [place]  [address]  [address]  [place] [address] [phone]    1. Coronary artery disease involving native coronary artery of native heart with angina pectoris (HCC)        2. Primary hypertension        3. Mixed hyperglyceridemia        4. Stage 3 chronic kidney disease, unspecified whether stage 3a or 3b CKD (HCC)  Comprehensive metabolic panel        Summary/Discussion:  Coronary artery disease s/p PCI  - remote history per [facility] documentation- 20 years ago   - [facility] (1/2/24): severe chronic multivessel CAD  interventional cardiology recommendations:   continue ongoing anemia workup with goal Hqb >9-10 (given the severity of his MVD)  continue GDMT optimization on statin/bb and if able to tolerate  lifelong DAPT vs P2Y1 monotherapy  would only consider high risk PCI to LM if symptoms were refractory to above  - echo (1/1/24): normal LV, LVEF 60%, NWMA, LA dilated, mild TR, and mild WI  - without symptoms of angina   - continue on aspirin, brilinta, statin, metoprolol succinate 50 mg twice daily, and ranexa 500 mg twice daily. PRN SL nitro prescribed.  follow up CMP ordered   AST: 34 and ALT: 54 (2/10) which has trended down since admission   would consider discontinuation of ranolazine if AST/ALT begin to trend up and continue to trend up  - adherence to DTAP therapy reinforced in which patient has been taking without any missed doses  - cardiac rehab scheduled for 3/5    Hypertension:  - slightly elevated in office today, /70  - did not make any changes to current regimen with recent documentation of systolic pressures ranging from 100-130's    Mixed hyperlipidemia:  - Lipid Profile:    Latest Reference Range & Units 01/21/24 01:50   Cholesterol See Comment mg/dL 141   Triglycerides See Comment mg/dL 219 (H)   HDL >=40 mg/dL 34 (L)   Non-HDL Cholesterol mg/dl 107   LDL Calculated 0 - 100 mg/dL 63   - continue  statin therapy   - encouraged lifestyle modifications and annual lipid follow up     Carotid stenosis:  - continue ASA and statin therapy     Macrocytic anemia:  - hemoglobin (2/29): 11.3  - follows with hematology oncology outpatient    Interval History: Gael Ferraro is a 83 y.o. year old male with history of CAD s/p PCI, HTN, hyperlipidemia, carotid stenosis, and CKD who presents to the office today for a hospital follow up.    He presented to [facility] ED on 2/4 due to chest pain, right arm/jaw pain, and shortness of breath. Patient was found to be anemic with hemoglobin 6.4. Received 2 units of PRBCs in the ED. Patient also underwent EGD and colonoscopy which showed no signs of active bleeding. 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 Hemoglobin remained stable throughout hospital course.     HS troponin levels were elevated 2603 > 2991 > 4499 > 6444 > 5152 noted to be likely in setting of anemia and moderate CAD. EKG sinus rhythm, diffuse ST depressions, but more significant in lateral leads. He was admitted on 2 other occasions prior to most recent admission on 12/23/23 and 1/21/24 on both occasions he had chest pain and elevated troponin in setting of anemia. He underwent cardiac catheterization on 1/2/24 and was found to have unchanged coronary anatomy without any acute lesions, including a 50% ostial left main lesion. He remained without chest pain after improvement of hemoglobin >8.     He was discharge home with home health care on 2/10 with outpatient follow up with cardiology.     Since his hospital admission he has been overall feeling well from a cardiac standpoint. Most of his complaints involve issues revolving around his anemia such as fatigue and muscle weakness in which he uses a cane at baseline. He does have an appointment scheduled with hematology oncology on 3/12. He denies chest pain/pressure/discomfort or  shortness of breath. He denies lower extremity edema, orthopnea, and PND. He denies lightheadedness, near-syncope, and syncope. He denies palpitations.      He will RTO in 3 months with Dr. Velasco or sooner if necessary. He will call with any concerns.     Medical Problems       Problem List       Elevated troponin    Anemia    Coronary artery disease involving native coronary artery of native heart with angina pectoris (HCC) (Chronic)    Stage 3 chronic kidney disease (Formerly [facility])    Lab Results   Component Value Date    EGFR 59 02/10/2024    EGFR 63 02/09/2024    EGFR 58 02/08/2024    CREATININE 1.12 02/10/2024    CREATININE 1.06 02/09/2024    CREATININE 1.14 02/08/2024         Hypertension    Leukopenia    Other dysphagia    Constipation    CKD (chronic kidney disease) stage 3, GFR 30-59 ml/min (Formerly [facility])    Lab Results   Component Value Date    EGFR 59 02/10/2024    EGFR 63 02/09/2024    EGFR 58 02/08/2024    CREATININE 1.12 02/10/2024    CREATININE 1.06 02/09/2024    CREATININE 1.14 02/08/2024         Abnormal LFTs    SOB (shortness of breath)    Nose congestion    Abnormal chest x-ray    Pure red cell aplasia (HCC)    Mild protein-calorie malnutrition (HCC)    Malnutrition Findings:                                 BMI Findings:           Body mass index is 19.74 kg/m².         Bone marrow fibrosis (HCC)        Past Medical History:   Diagnosis Date    Low hemoglobin      Social History     Socioeconomic History    Marital status: /Civil Union     Spouse name: Not on file    Number of children: Not on file    Years of education: Not on file    Highest education level: Not on file   Occupational History    Not on file   Tobacco Use    Smoking status: Never    Smokeless tobacco: Never   Vaping Use    Vaping status: Never Used   Substance and Sexual Activity    Alcohol use: Not Currently    Drug use: Never    Sexual activity: Not on file   Other Topics Concern    Not on file   Social History Narrative    Not on file      Social Determinants of Health     Financial Resource Strain: Not on file   Food Insecurity: No Food Insecurity (2/6/2024)    Hunger Vital Sign     Worried About Running Out of Food in the Last Year: Never true     Ran Out of Food in the Last Year: Never true   Transportation Needs: No Transportation Needs (2/6/2024)    PRAPARE - Transportation     Lack of Transportation (Medical): No     Lack of Transportation (Non-Medical): No   Physical Activity: Not on file   Stress: Not on file   Social Connections: Not on file   Intimate Partner Violence: Not on file   Housing Stability: Unknown (2/6/2024)    Housing Stability Vital Sign     Unable to Pay for Housing in the Last Year: No     Number of Places Lived in the Last Year: Not on file     Unstable Housing in the Last Year: No      History reviewed. No pertinent family history.  Past Surgical History:   Procedure Laterality Date    CARDIAC CATHETERIZATION Left 1/2/2024    Procedure: Cardiac Left Heart Cath;  Surgeon: Ana Garcia DO;  [facility];  Service: Cardiology    CARDIAC CATHETERIZATION N/A 1/2/2024    Procedure: Cardiac Coronary Angiogram;  Surgeon: Ana Garcia DO;  [facility];  Service: Cardiology    CARDIAC CATHETERIZATION N/A 1/2/2024    Procedure: Cardiac RHC;  Surgeon: Ana Garcia DO;  [facility];  Service: Cardiology    IR BIOPSY BONE MARROW  1/24/2024       Current Outpatient Medications:     acetaminophen (TYLENOL) 325 mg tablet, Take 2 tablets (650 mg total) by mouth every 6 (six) hours as needed for mild pain, headaches or fever, Disp: , Rfl:     amLODIPine (NORVASC) 2.5 mg tablet, Take 1 tablet (2.5 mg total) by mouth daily, Disp: 30 tablet, Rfl: 0    aspirin (ECOTRIN LOW STRENGTH) 81 mg EC tablet, Take 1 tablet (81 mg total) by mouth daily, Disp: , Rfl:     Cholecalciferol 50 MCG (2000 UT) CAPS, Take 1 capsule by mouth in the morning, Disp: , Rfl:     cyanocobalamin  (VITAMIN B-12) 1000 MCG tablet, Take 1 tablet (1,000 mcg total) by mouth daily, Disp: 30 tablet, Rfl: 0    hydrochlorothiazide (HYDRODIURIL) 25 mg tablet, Take 1 tablet (25 mg total) by mouth daily, Disp: 30 tablet, Rfl: 0    LORazepam (ATIVAN) 1 mg tablet, Take 1 mg by mouth 3 (three) times a day as needed for anxiety, Disp: , Rfl:     metoprolol succinate (TOPROL-XL) 100 mg 24 hr tablet, Take 0.5 tablets (50 mg total) by mouth 2 (two) times a day, Disp: 30 tablet, Rfl: 0    nitroglycerin (NITROSTAT) 0.4 mg SL tablet, Place 1 tablet (0.4 mg total) under the tongue every 5 (five) minutes as needed for chest pain, Disp: 30 tablet, Rfl: 0    potassium chloride (MICRO-K) 10 MEQ CR capsule, Take 1 capsule (10 mEq total) by mouth daily, Disp: 90 capsule, Rfl: 3    predniSONE 10 mg tablet, TAKE 3 TABLETS (30 MG TOTAL) BY MOUTH DAILY FOR 7 DAYS, THEN 2 TABLETS (20 MG TOTAL) DAILY FOR 7 DAYS, THEN 1 TABLET (10 MG TOTAL) DAILY FOR 7 DAYS, THEN 1/2 TABLET (5 MG TOTAL) DAILY FOR 7 DAYS., Disp: 46 tablet, Rfl: 0    ranolazine (RANEXA) 500 mg 12 hr tablet, Take 1 tablet (500 mg total) by mouth every 12 (twelve) hours, Disp: 60 tablet, Rfl: 0    rosuvastatin (CRESTOR) 10 MG tablet, Take 1 tablet (10 mg total) by mouth daily, Disp: , Rfl:     sodium chloride (OCEAN) 0.65 % nasal spray, 1 spray into each nostril every hour as needed for congestion for up to 14 days, Disp: 10 mL, Rfl: 0    ticagrelor (BRILINTA) 90 MG, Take 1 tablet (90 mg total) by mouth every 12 (twelve) hours, Disp: 180 tablet, Rfl: 3  Allergies   Allergen Reactions    Hydrocodone-Acetaminophen GI Intolerance    Niacin GI Intolerance    Penicillins Hives       Labs:     Chemistry        Component Value Date/Time    K 3.8 02/10/2024 0450    K 4.2 11/18/2023 1059     02/10/2024 0450     11/18/2023 1059    CO2 24 02/10/2024 0450    CO2 25 01/21/2024 0200    CO2 28 11/18/2023 1059    BUN 31 (H) 02/10/2024 0450    BUN 24 11/18/2023 1059    CREATININE 1.12  "02/10/2024 0450    CREATININE 1.24 11/18/2023 1059        Component Value Date/Time    CALCIUM 8.9 02/10/2024 0450    CALCIUM 9.7 11/18/2023 1059    ALKPHOS 108 (H) 02/10/2024 0450    ALKPHOS 33 (L) 11/18/2023 1059    AST 34 02/10/2024 0450    AST 25 11/18/2023 1059    ALT 56 (H) 02/10/2024 0450    ALT 24 11/18/2023 1059            No results found for: \"CHOL\"  Lab Results   Component Value Date    HDL 34 (L) 01/21/2024    HDL 42 01/02/2024     Lab Results   Component Value Date    LDLCALC 63 01/21/2024    LDLCALC 56 01/02/2024     Lab Results   Component Value Date    TRIG 219 (H) 01/21/2024    TRIG 166 (H) 01/02/2024     No results found for: \"CHOLHDL\"    Imaging: Colonoscopy    Result Date: 2/9/2024  Narrative: Table formatting from the original result was not included. [facility] [address] [facility] [address] [phone] DATE OF SERVICE: 2/09/24 PHYSICIAN(S): Attending: Harvey Lyons MD Fellow: No Staff Documented INDICATION: Anemia, unspecified type POST-OP DIAGNOSIS: See the impression below. HISTORY: Prior colonoscopy: More than 10 years ago. BOWEL PREPARATION: Golytely/Colyte/Trilyte PREPROCEDURE: Informed consent was obtained for the procedure, including sedation. Risks including but not limited to bleeding, infection, perforation, adverse drug reaction and aspiration were explained in detail. Also explained about less than 100% sensitivity with the exam and other alternatives. The patient was placed in the left lateral decubitus position. Procedure: Colonoscopy DETAILS OF PROCEDURE: Patient was taken to the procedure room where a time out was performed to confirm correct patient and correct procedure. The patient underwent monitored anesthesia care, which was administered by an anesthesia professional. The patient's blood pressure, heart rate, level of consciousness, oxygen, respirations and ECG were monitored throughout the procedure. A digital rectal exam was " performed. The scope was introduced through the anus and advanced to the terminal ileum. Retroflexion was performed in the rectum. The quality of bowel preparation was evaluated using the [place]oi Bowel Preparation Scale with scores of: right colon = 2, transverse colon = 2, left colon = 2. The total BBPS score was 6. Bowel prep was adequate. The patient experienced no blood loss. The procedure was not difficult. The patient tolerated the procedure well. There were no apparent adverse events. ANESTHESIA INFORMATION: ASA: IV Anesthesia Type: IV Sedation with Anesthesia MEDICATIONS: No administrations occurring from 1700 to 1733 on 02/09/24 FINDINGS: Internal small hemorrhoids EVENTS: Procedure Events Event Event Time ENDO CECUM REACHED 2/9/2024  5:26 PM ENDO SCOPE OUT TIME 2/9/2024  5:33 PM SPECIMENS: * No specimens in log * EQUIPMENT: Colonoscope -[id number]     Impression: Small hemorrhoids No evidence of fresh or old blood seen throughout the colon. RECOMMENDATION:  No further screening colonoscopies necessary  Age greater than 65  No evidence of blood loss noted in the EGD or the colonoscopy. May resume diet. May resume Brilinta.  Harvey Lyons MD     EGD    Addendum Date: 2/8/2024 Addendum:    [facility] [address] [facility] [address] [phone] DATE OF SERVICE: 2/08/24 PHYSICIAN(S): Attending: Harvey Lyons MD Fellow: No Staff Documented INDICATION: Symptomatic anemia POST-OP DIAGNOSIS: See the impression below. PREPROCEDURE: Informed consent was obtained for the procedure, including sedation.  Risks of perforation, hemorrhage, adverse drug reaction and aspiration were discussed. The patient was placed in the left lateral decubitus position. Patient was explained about the risks and benefits of the procedure. Risks including but not limited to bleeding, infection, and perforation were explained in detail. Also explained about less than 100% sensitivity with the exam  and other alternatives. PROCEDURE: EGD DETAILS OF PROCEDURE: Patient was taken to the procedure room where a time out was performed to confirm correct patient and correct procedure. The patient underwent monitored anesthesia care, which was administered by an anesthesia professional. The patient's blood pressure, heart rate, level of consciousness, respirations and oxygen were monitored throughout the procedure. The scope was advanced to the second part of the duodenum. Retroflexion was performed in the fundus. The patient experienced no blood loss. The procedure was not difficult. The patient tolerated the procedure well. There were no apparent adverse events. ANESTHESIA INFORMATION: ASA: IV Anesthesia Type: Anesthesia type not filed in the log. MEDICATIONS: No administrations occurring from 1759 to 1809 on 02/08/24 FINDINGS: Regular Z-line 40 cm from the incisors 3 cm sliding hiatal hernia (type I hiatal hernia) without Shaggy lesions present - GE junction 40 cm from the incisors, diaphragmatic impression 43 cm from the incisors, confirmed by retroflexion:  Hill classification: Grade II Mild, localized erythematous mucosa in the cardia. Retroflexed view was notable for sliding hiatal hernia, pylorus was patent.  No evidence of peptic ulcer disease, AVMs seen. The duodenal bulb, 1st part of the duodenum and 2nd part of the duodenum appeared normal. Non-obstructing Schatzki ring in the GE junction. Not dilated given patient's recent use of Brilinta. SPECIMENS: * No specimens in log * IMPRESSION: 3 cm type I hiatal hernia Mild erythematous mucosa in the cardia The duodenal bulb, 1st part of the duodenum and 2nd part of the duodenum appeared normal. Schatzki ring in the GE junction RECOMMENDATION:  There is no recommended follow-up for this procedure. No etiology of anemia identified on EGD. Recommend colonoscopy for complete workup of anemia if patient agreeable.  Addendum: Spoke to the patient postprocedure in the  recovery area regarding proceeding with colonoscopy to complete the workup of anemia and also discussed endoscopy findings from today however patient adamantly refusing colonoscopy and wishes to go home.  In the absence of overt bleeding, will resume his diet, will defer discharge planning to primary team.  If hemoglobin remained stable, may resume Brilinta. Harvey Lyons MD     Result Date: 2/8/2024  Narrative: Table formatting from the original result was not included. [facility] [address] [id number] [phone] DATE OF SERVICE: 2/08/24 PHYSICIAN(S): Attending: Harvey Lyons MD Fellow: No Staff Documented INDICATION: Symptomatic anemia POST-OP DIAGNOSIS: See the impression below. PREPROCEDURE: Informed consent was obtained for the procedure, including sedation.  Risks of perforation, hemorrhage, adverse drug reaction and aspiration were discussed. The patient was placed in the left lateral decubitus position. Patient was explained about the risks and benefits of the procedure. Risks including but not limited to bleeding, infection, and perforation were explained in detail. Also explained about less than 100% sensitivity with the exam and other alternatives. PROCEDURE: EGD DETAILS OF PROCEDURE: Patient was taken to the procedure room where a time out was performed to confirm correct patient and correct procedure. The patient underwent monitored anesthesia care, which was administered by an anesthesia professional. The patient's blood pressure, heart rate, level of consciousness, respirations and oxygen were monitored throughout the procedure. The scope was advanced to the second part of the duodenum. Retroflexion was performed in the fundus. The patient experienced no blood loss. The procedure was not difficult. The patient tolerated the procedure well. There were no apparent adverse events. ANESTHESIA INFORMATION: ASA: IV Anesthesia Type: Anesthesia type  not filed in the log. MEDICATIONS: No administrations occurring from 1759 to 1809 on 02/08/24 FINDINGS: Regular Z-line 40 cm from the incisors 3 cm sliding hiatal hernia (type I hiatal hernia) without Shaggy lesions present - GE junction 40 cm from the incisors, diaphragmatic impression 43 cm from the incisors, confirmed by retroflexion:  Hill classification: Grade II Mild, localized erythematous mucosa in the cardia. Retroflexed view was notable for sliding hiatal hernia, pylorus was patent.  No evidence of peptic ulcer disease, AVMs seen. The duodenal bulb, 1st part of the duodenum and 2nd part of the duodenum appeared normal. Non-obstructing Schatzki ring in the GE junction. Not dilated given patient's recent use of Brilinta. SPECIMENS: * No specimens in log *     Impression: 3 cm type I hiatal hernia Mild erythematous mucosa in the cardia The duodenal bulb, 1st part of the duodenum and 2nd part of the duodenum appeared normal. Schatzki ring in the GE junction RECOMMENDATION:  There is no recommended follow-up for this procedure. No etiology of anemia identified on EGD. Recommend colonoscopy for complete workup of anemia if patient agreeable.   Harvey Lyons MD     XR chest 2 views    Result Date: 2/5/2024  Narrative: XR CHEST PA & LATERAL DUAL ENERGY SUBTRACTION. INDICATION: Shortness of breath. COMPARISON: CXR 1/21/2024. FINDINGS: Slightly increased opacity in the right costophrenic sulcus. Persistent opacity in the left base. Nodules over both lower lungs are the nipples. No pneumothorax or pleural effusion. Normal heart size. Cardiac stents. Bones are unremarkable for age. Normal upper abdomen.     Impression: New mild opacity in the right costophrenic sulcus which could be due to atelectasis or could be infectious/inflammatory. Persistent opacity in the left base, likely atelectasis or scar. Workstation performed: [id number]       ECG:  n/a    Review of Systems   Constitutional: Positive for  "malaise/fatigue. Negative for fever and weight gain.   HENT: Negative.     Eyes:  Positive for blurred vision (chronic) and vision loss in right eye.   Cardiovascular:  Negative for chest pain, dyspnea on exertion, irregular heartbeat, leg swelling, near-syncope, orthopnea, palpitations, paroxysmal nocturnal dyspnea and syncope.   Respiratory:  Negative for cough and sleep disturbances due to breathing.    Endocrine: Negative.    Hematologic/Lymphatic: Negative for bleeding problem.   Skin: Negative.    Musculoskeletal:  Positive for muscle weakness.   Gastrointestinal: Negative.    Genitourinary: Negative.    Neurological:  Positive for dizziness and weakness. Negative for light-headedness.   Psychiatric/Behavioral: Negative.         Vitals:    03/01/24 1331   BP: 140/70   Pulse: 72   SpO2: 97%     Vitals:    03/01/24 1331   Weight: 62.4 kg (137 lb 9.6 oz)     Height: 5' 10\" (177.8 cm)   Body mass index is 19.74 kg/m².    Physical Exam  Vitals and nursing note reviewed.   Constitutional:       General: He is not in acute distress.  HENT:      Head: Normocephalic.      Nose: Nose normal.      Mouth/Throat:      Mouth: Mucous membranes are moist.      Pharynx: Oropharynx is clear.   Neck:      Vascular: No carotid bruit or JVD.   Cardiovascular:      Rate and Rhythm: Normal rate and regular rhythm.      Pulses: Normal pulses.      Heart sounds: Normal heart sounds. No murmur heard.  Pulmonary:      Effort: Pulmonary effort is normal. No respiratory distress.      Breath sounds: Normal breath sounds. No wheezing, rhonchi or rales.   Musculoskeletal:         General: Normal range of motion.      Cervical back: Normal range of motion.      Right lower leg: No edema.      Left lower leg: No edema.      Comments: Uses cane at baseline   Skin:     General: Skin is warm and dry.      Coloration: Skin is pale.   Neurological:      Mental Status: He is alert and oriented to person, place, and time.   Psychiatric:         Mood " and Affect: Mood normal.         Behavior: Behavior normal.

## 2024-03-01 ENCOUNTER — OFFICE VISIT (OUTPATIENT)
Dept: CARDIOLOGY CLINIC | Facility: CLINIC | Age: 84
End: 2024-03-01
Payer: COMMERCIAL

## 2024-03-01 VITALS
HEART RATE: 72 BPM | DIASTOLIC BLOOD PRESSURE: 70 MMHG | OXYGEN SATURATION: 97 % | SYSTOLIC BLOOD PRESSURE: 140 MMHG | HEIGHT: 70 IN | BODY MASS INDEX: 19.7 KG/M2 | WEIGHT: 137.6 LBS

## 2024-03-01 DIAGNOSIS — N18.30 STAGE 3 CHRONIC KIDNEY DISEASE, UNSPECIFIED WHETHER STAGE 3A OR 3B CKD (HCC): ICD-10-CM

## 2024-03-01 DIAGNOSIS — E78.3 MIXED HYPERGLYCERIDEMIA: ICD-10-CM

## 2024-03-01 DIAGNOSIS — I25.119 CORONARY ARTERY DISEASE INVOLVING NATIVE CORONARY ARTERY OF NATIVE HEART WITH ANGINA PECTORIS (HCC): Primary | Chronic | ICD-10-CM

## 2024-03-01 DIAGNOSIS — I10 PRIMARY HYPERTENSION: ICD-10-CM

## 2024-03-01 LAB
B2 GLYCOPROT1 IGA SERPL IA-ACNC: 1.3
B2 GLYCOPROT1 IGG SERPL IA-ACNC: <0.8
B2 GLYCOPROT1 IGM SERPL IA-ACNC: <2.4
CARDIOLIPIN IGA SER IA-ACNC: 3.8
CARDIOLIPIN IGG SER IA-ACNC: 2.1
CARDIOLIPIN IGM SER IA-ACNC: 4.8
CCP AB SER IA-ACNC: 1.4

## 2024-03-01 PROCEDURE — 99214 OFFICE O/P EST MOD 30 MIN: CPT

## 2024-03-04 ENCOUNTER — APPOINTMENT (OUTPATIENT)
Dept: LAB | Facility: CLINIC | Age: 84
End: 2024-03-04
Payer: COMMERCIAL

## 2024-03-04 DIAGNOSIS — N18.30 STAGE 3 CHRONIC KIDNEY DISEASE, UNSPECIFIED WHETHER STAGE 3A OR 3B CKD (HCC): ICD-10-CM

## 2024-03-04 DIAGNOSIS — D64.9 ANEMIA, UNSPECIFIED TYPE: ICD-10-CM

## 2024-03-04 LAB
ALBUMIN SERPL BCP-MCNC: 3.8 G/DL (ref 3.5–5)
ALP SERPL-CCNC: 58 U/L (ref 34–104)
ALT SERPL W P-5'-P-CCNC: 73 U/L (ref 7–52)
ANION GAP SERPL CALCULATED.3IONS-SCNC: 9 MMOL/L
AST SERPL W P-5'-P-CCNC: 22 U/L (ref 13–39)
BASOPHILS # BLD AUTO: 0.01 THOUSANDS/ÂΜL (ref 0–0.1)
BASOPHILS NFR BLD AUTO: 0 % (ref 0–1)
BILIRUB SERPL-MCNC: 0.86 MG/DL (ref 0.2–1)
BUN SERPL-MCNC: 24 MG/DL (ref 5–25)
CALCIUM SERPL-MCNC: 9.5 MG/DL (ref 8.4–10.2)
CHLORIDE SERPL-SCNC: 99 MMOL/L (ref 96–108)
CO2 SERPL-SCNC: 28 MMOL/L (ref 21–32)
CREAT SERPL-MCNC: 0.99 MG/DL (ref 0.6–1.3)
EOSINOPHIL # BLD AUTO: 0.12 THOUSAND/ÂΜL (ref 0–0.61)
EOSINOPHIL NFR BLD AUTO: 2 % (ref 0–6)
ERYTHROCYTE [DISTWIDTH] IN BLOOD BY AUTOMATED COUNT: 21.8 % (ref 11.6–15.1)
GFR SERPL CREATININE-BSD FRML MDRD: 69 ML/MIN/1.73SQ M
GLUCOSE SERPL-MCNC: 196 MG/DL (ref 65–140)
HCT VFR BLD AUTO: 33.1 % (ref 36.5–49.3)
HGB BLD-MCNC: 10.9 G/DL (ref 12–17)
IMM GRANULOCYTES # BLD AUTO: 0.03 THOUSAND/UL (ref 0–0.2)
IMM GRANULOCYTES NFR BLD AUTO: 1 % (ref 0–2)
LYMPHOCYTES # BLD AUTO: 1.06 THOUSANDS/ÂΜL (ref 0.6–4.47)
LYMPHOCYTES NFR BLD AUTO: 17 % (ref 14–44)
MCH RBC QN AUTO: 32.1 PG (ref 26.8–34.3)
MCHC RBC AUTO-ENTMCNC: 32.9 G/DL (ref 31.4–37.4)
MCV RBC AUTO: 97 FL (ref 82–98)
MONOCYTES # BLD AUTO: 0.37 THOUSAND/ÂΜL (ref 0.17–1.22)
MONOCYTES NFR BLD AUTO: 6 % (ref 4–12)
NEUTROPHILS # BLD AUTO: 4.6 THOUSANDS/ÂΜL (ref 1.85–7.62)
NEUTS SEG NFR BLD AUTO: 74 % (ref 43–75)
NRBC BLD AUTO-RTO: 1 /100 WBCS
PLATELET # BLD AUTO: 197 THOUSANDS/UL (ref 149–390)
PMV BLD AUTO: 9.7 FL (ref 8.9–12.7)
POTASSIUM SERPL-SCNC: 4.1 MMOL/L (ref 3.5–5.3)
PROT SERPL-MCNC: 6 G/DL (ref 6.4–8.4)
RBC # BLD AUTO: 3.4 MILLION/UL (ref 3.88–5.62)
SODIUM SERPL-SCNC: 136 MMOL/L (ref 135–147)
WBC # BLD AUTO: 6.19 THOUSAND/UL (ref 4.31–10.16)

## 2024-03-04 PROCEDURE — 85025 COMPLETE CBC W/AUTO DIFF WBC: CPT

## 2024-03-04 PROCEDURE — 36415 COLL VENOUS BLD VENIPUNCTURE: CPT

## 2024-03-04 PROCEDURE — 80053 COMPREHEN METABOLIC PANEL: CPT

## 2024-03-07 ENCOUNTER — APPOINTMENT (OUTPATIENT)
Dept: LAB | Facility: CLINIC | Age: 84
End: 2024-03-07
Payer: COMMERCIAL

## 2024-03-07 ENCOUNTER — HOSPITAL ENCOUNTER (OUTPATIENT)
Dept: INFUSION CENTER | Facility: CLINIC | Age: 84
Discharge: HOME | End: 2024-03-07

## 2024-03-07 ENCOUNTER — CONSULT (OUTPATIENT)
Dept: PULMONOLOGY | Facility: CLINIC | Age: 84
End: 2024-03-07
Payer: COMMERCIAL

## 2024-03-07 VITALS
OXYGEN SATURATION: 98 % | DIASTOLIC BLOOD PRESSURE: 60 MMHG | SYSTOLIC BLOOD PRESSURE: 116 MMHG | TEMPERATURE: 97.4 F | BODY MASS INDEX: 18.9 KG/M2 | HEIGHT: 70 IN | WEIGHT: 132 LBS | HEART RATE: 89 BPM

## 2024-03-07 DIAGNOSIS — D53.9 MACROCYTIC ANEMIA: ICD-10-CM

## 2024-03-07 DIAGNOSIS — D75.81 MYELOFIBROSIS (HCC): ICD-10-CM

## 2024-03-07 DIAGNOSIS — J98.11 ATELECTASIS: Primary | ICD-10-CM

## 2024-03-07 DIAGNOSIS — D70.8 OTHER NEUTROPENIA (HCC): ICD-10-CM

## 2024-03-07 DIAGNOSIS — R93.89 ABNORMAL CHEST X-RAY: ICD-10-CM

## 2024-03-07 LAB
BASOPHILS # BLD AUTO: 0.01 THOUSANDS/ÂΜL (ref 0–0.1)
BASOPHILS NFR BLD AUTO: 0 % (ref 0–1)
EOSINOPHIL # BLD AUTO: 0.16 THOUSAND/ÂΜL (ref 0–0.61)
EOSINOPHIL NFR BLD AUTO: 3 % (ref 0–6)
ERYTHROCYTE [DISTWIDTH] IN BLOOD BY AUTOMATED COUNT: 23.3 % (ref 11.6–15.1)
HCT VFR BLD AUTO: 31.8 % (ref 36.5–49.3)
HGB BLD-MCNC: 10.3 G/DL (ref 12–17)
IMM GRANULOCYTES # BLD AUTO: 0.02 THOUSAND/UL (ref 0–0.2)
IMM GRANULOCYTES NFR BLD AUTO: 0 % (ref 0–2)
LYMPHOCYTES # BLD AUTO: 1.09 THOUSANDS/ÂΜL (ref 0.6–4.47)
LYMPHOCYTES NFR BLD AUTO: 21 % (ref 14–44)
MCH RBC QN AUTO: 32.1 PG (ref 26.8–34.3)
MCHC RBC AUTO-ENTMCNC: 32.4 G/DL (ref 31.4–37.4)
MCV RBC AUTO: 99 FL (ref 82–98)
MONOCYTES # BLD AUTO: 0.38 THOUSAND/ÂΜL (ref 0.17–1.22)
MONOCYTES NFR BLD AUTO: 7 % (ref 4–12)
NEUTROPHILS # BLD AUTO: 3.61 THOUSANDS/ÂΜL (ref 1.85–7.62)
NEUTS SEG NFR BLD AUTO: 69 % (ref 43–75)
NRBC BLD AUTO-RTO: 0 /100 WBCS
PLATELET # BLD AUTO: 218 THOUSANDS/UL (ref 149–390)
PMV BLD AUTO: 9.8 FL (ref 8.9–12.7)
RBC # BLD AUTO: 3.21 MILLION/UL (ref 3.88–5.62)
WBC # BLD AUTO: 5.27 THOUSAND/UL (ref 4.31–10.16)

## 2024-03-07 PROCEDURE — 36415 COLL VENOUS BLD VENIPUNCTURE: CPT

## 2024-03-07 PROCEDURE — 99204 OFFICE O/P NEW MOD 45 MIN: CPT | Performed by: INTERNAL MEDICINE

## 2024-03-07 PROCEDURE — 85025 COMPLETE CBC W/AUTO DIFF WBC: CPT

## 2024-03-07 NOTE — PROGRESS NOTES
Pulmonary Consultation   Gael Ferraro 83 y.o. male MRN: [id number]  3/7/2024    Referring Physician or Provider:  Shannan Leon MD  [address] [place] [address]       Chief Complaint:  Abnormal chest x-ray    HPI:    83-year-old male with past medical history of myelofibrosis and coronary artery disease presents for abnormality incidentally found on chest x-ray.  Patient denies any chills.  No hemoptysis or sputum production.  No significant exertional dyspnea.    Exercise Tolerance:.  Particularly limited by orthopedic issues.      Past Medical Hx  Past Medical History:   Diagnosis Date    Low hemoglobin    Heart failure with preserved ejection fraction  CAD  Myelofibrosis  Chronic kidney disease      Past Surgical Hx  Past Surgical History:   Procedure Laterality Date    CARDIAC CATHETERIZATION Left 1/2/2024    Procedure: Cardiac Left Heart Cath;  Surgeon: Ana Garcia DO;  [facility];  Service: Cardiology    CARDIAC CATHETERIZATION N/A 1/2/2024    Procedure: Cardiac Coronary Angiogram;  Surgeon: Ana Garcia DO;  [facility];  Service: Cardiology    CARDIAC CATHETERIZATION N/A 1/2/2024    Procedure: Cardiac RHC;  Surgeon: Ana Garcia DO;  [facility];  Service: Cardiology    IR BIOPSY BONE MARROW  1/24/2024         Family Hx  No family history on file.        Occupational History:   No known previous inhalation injuries      Social History:   Social History     Socioeconomic History    Marital status: /Civil Union     Spouse name: Not on file    Number of children: Not on file    Years of education: Not on file    Highest education level: Not on file   Occupational History    Not on file   Tobacco Use    Smoking status: Never    Smokeless tobacco: Never   Vaping Use    Vaping status: Never Used   Substance and Sexual Activity    Alcohol use: Not Currently    Drug use: Never    Sexual activity: Not on file  "  Other Topics Concern    Not on file   Social History Narrative    Not on file     Social Determinants of Health     Financial Resource Strain: Not on file   Food Insecurity: No Food Insecurity (2/6/2024)    Hunger Vital Sign     Worried About Running Out of Food in the Last Year: Never true     Ran Out of Food in the Last Year: Never true   Transportation Needs: No Transportation Needs (2/6/2024)    PRAPARE - Transportation     Lack of Transportation (Medical): No     Lack of Transportation (Non-Medical): No   Physical Activity: Not on file   Stress: Not on file   Social Connections: Not on file   Intimate Partner Violence: Not on file   Housing Stability: Unknown (2/6/2024)    Housing Stability Vital Sign     Unable to Pay for Housing in the Last Year: No     Number of Places Lived in the Last Year: Not on file     Unstable Housing in the Last Year: No            Pertinent Meds  No inhaled medications      ROS:  Constitutional: - Fatigue, - chills, - fever, - weight change.   HEENT: - rhinorrhea, - sneezing, - sore throat.    Respiratory: - cough, - sputum production, - shortness of breath, - wheezing.    Cardiovascular: - chest pain,  -palpitations, - leg swelling.   Gastrointestinal: - abdominal pain, - constipation, - diarrhea, - nausea, - vomiting.   Endocrine: - cold intolerance, - heat intolerance.   Genitourinary: - dysuria.   Musculoskeletal: - arthralgias.   Skin:- rash, - wound.   Allergic/Immunologic: - allergies  Neurological: - dizziness, - numbness      Vitals: Blood pressure 116/60, pulse 89, temperature (!) 97.4 °F (36.3 °C), temperature source Tympanic, height 5' 10\" (1.778 m), weight 59.9 kg (132 lb), SpO2 98%., Body mass index is 18.94 kg/m².    Physical Exam  GEN  NAD  HEENT  ncat, non icteric, MM moist  NECK  supple, no JVD, no LAD  CV  +s1s2, no mrg, RRR  PULM  CTA BL, no wrr  ABD  soft, ntnd, + BS  EXT  no edema, no cyanosis, no clubbing  NEURO  Aox3, no focal weakness    Imaging and other " "studies     I have personally viewed and interpreted the following studies:  Chest x-ray 2/4/2024 shows bilateral lower lobe alveolar opacifications.  New opacification in the lateral right base.      Pulmonary function testing:   No pulmonary function test available for interpretation      Assessment:  Chest X-ray abnormality  Myelofibrosis    Plan:  Check CT chest in the end of March  Differential diagnosis include infectious etiology, atypical pulmonary edema, atelectasis  Patient denies any specific pulmonary symptoms including shortness of breath, cough, sputum production.  Patient will call back the office with any change or development of new pulmonary symptoms.    Return visit in 4 weeks  On next visit we will evaluate symptoms if any, CT chest results    Note: Portions of the record may have been created with voice recognition software. Occasional wrong word or \"sound a like\" substitutions may have occurred due to the inherent limitations of voice recognition software. Read the chart carefully and recognize, using context, where substitutions have occurred.     Vicente Raygoza M.D.  [facility]    "

## 2024-03-09 LAB — MISCELLANEOUS LAB TEST RESULT: NORMAL

## 2024-03-11 ENCOUNTER — APPOINTMENT (OUTPATIENT)
Dept: LAB | Facility: CLINIC | Age: 84
End: 2024-03-11
Payer: COMMERCIAL

## 2024-03-11 DIAGNOSIS — D70.8 OTHER NEUTROPENIA (HCC): ICD-10-CM

## 2024-03-11 DIAGNOSIS — D53.9 MACROCYTIC ANEMIA: ICD-10-CM

## 2024-03-11 LAB
BASOPHILS # BLD AUTO: 0.02 THOUSANDS/ÂΜL (ref 0–0.1)
BASOPHILS NFR BLD AUTO: 0 % (ref 0–1)
EOSINOPHIL # BLD AUTO: 0.11 THOUSAND/ÂΜL (ref 0–0.61)
EOSINOPHIL NFR BLD AUTO: 2 % (ref 0–6)
ERYTHROCYTE [DISTWIDTH] IN BLOOD BY AUTOMATED COUNT: 24 % (ref 11.6–15.1)
HCT VFR BLD AUTO: 31 % (ref 36.5–49.3)
HGB BLD-MCNC: 10.2 G/DL (ref 12–17)
IMM GRANULOCYTES # BLD AUTO: 0.03 THOUSAND/UL (ref 0–0.2)
IMM GRANULOCYTES NFR BLD AUTO: 1 % (ref 0–2)
LYMPHOCYTES # BLD AUTO: 1.36 THOUSANDS/ÂΜL (ref 0.6–4.47)
LYMPHOCYTES NFR BLD AUTO: 29 % (ref 14–44)
MCH RBC QN AUTO: 33.3 PG (ref 26.8–34.3)
MCHC RBC AUTO-ENTMCNC: 32.9 G/DL (ref 31.4–37.4)
MCV RBC AUTO: 101 FL (ref 82–98)
MONOCYTES # BLD AUTO: 0.43 THOUSAND/ÂΜL (ref 0.17–1.22)
MONOCYTES NFR BLD AUTO: 9 % (ref 4–12)
NEUTROPHILS # BLD AUTO: 2.78 THOUSANDS/ÂΜL (ref 1.85–7.62)
NEUTS SEG NFR BLD AUTO: 59 % (ref 43–75)
NRBC BLD AUTO-RTO: 0 /100 WBCS
PLATELET # BLD AUTO: 278 THOUSANDS/UL (ref 149–390)
PMV BLD AUTO: 9.7 FL (ref 8.9–12.7)
RBC # BLD AUTO: 3.06 MILLION/UL (ref 3.88–5.62)
WBC # BLD AUTO: 4.73 THOUSAND/UL (ref 4.31–10.16)

## 2024-03-11 PROCEDURE — 36415 COLL VENOUS BLD VENIPUNCTURE: CPT

## 2024-03-11 PROCEDURE — 85025 COMPLETE CBC W/AUTO DIFF WBC: CPT

## 2024-03-12 ENCOUNTER — OFFICE VISIT (OUTPATIENT)
Dept: HEMATOLOGY ONCOLOGY | Facility: CLINIC | Age: 84
End: 2024-03-12
Payer: COMMERCIAL

## 2024-03-12 VITALS
HEART RATE: 93 BPM | DIASTOLIC BLOOD PRESSURE: 70 MMHG | WEIGHT: 140.5 LBS | HEIGHT: 70 IN | OXYGEN SATURATION: 99 % | SYSTOLIC BLOOD PRESSURE: 144 MMHG | RESPIRATION RATE: 17 BRPM | TEMPERATURE: 97.7 F | BODY MASS INDEX: 20.11 KG/M2

## 2024-03-12 DIAGNOSIS — D75.81 MYELOFIBROSIS (HCC): Primary | ICD-10-CM

## 2024-03-12 PROCEDURE — 99214 OFFICE O/P EST MOD 30 MIN: CPT | Performed by: INTERNAL MEDICINE

## 2024-03-12 RX ORDER — PREDNISONE 5 MG/1
5 TABLET ORAL DAILY
Qty: 90 TABLET | Refills: 1 | Status: SHIPPED | OUTPATIENT
Start: 2024-03-12

## 2024-03-12 NOTE — PROGRESS NOTES
Hematology Outpatient Follow - Up Note  Gael Ferraro 83 y.o. 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      Patient had progressive anemia since 7/2023  (1/18/2023 hemoglobin 13.1, - 10 range 7/2023; 11/2023)  12/31/23 hemoglobin 6.9, , white blood cell count 3.48, normal differential, platelets 270  Admission x 3 1/2024 2nd to recurrent anemia and chest pain.       Haptoglobin normal  1/1/24, 1/21/24 1/21/24    LDH normal 211, YESSICA negative  Ferritin 1145; iron 285, TIBC < 339  Retic 0.5%     Mild leukopenia.  Initiated on prednisone, with significant response of hemoglobin up to 11.8 however we have to reduce the prednisone slowly currently on 5 mg and hemoglobin of 10.2, we will continue on prednisone 5 mg every day    Will do CBC once a week        Labs and imaging studies are reviewed by ordering provider once results are available. If there are findings that need immediate attention, you will be contacted when results available.   Discussing results and the implication on your healthcare is best discussed in person at your follow-up visit.       HPI:    Gael Ferraro ' Oscar'  is an 82 y/o gentleman 1st seen inpatient 1/21/24 regarding refractory anemia.       PMH: CAD, HTN, CKD, dysphagia.  He is a never smoker.  No family history of liver disease.       November 18, 2023 hemoglobin 10.2, , white blood cell count 3.7, 48% neutrophils, 39% lymphocytes, 11% monocytes, platelet count 82, iron saturation 58%  Normal total bilirubin, total protein, albumin.  BUN 24, creatinine 1.24  B12 292-  PCP Dr. Lyman  started him on oral B12.  ESR 1  SPEP: no monoclonal protein.  Alpha 2 region is decreased and may be suggestive of a decreased haptoglobin level associated with liver or hemolytic disease. Quantitation of serum haptoglobin to rule out deficiency may provide additional information. There is no suggestion of a monoclonal protein.        admission 12/31 - 1/2/24 2nd to chest pain  12/31/23 hemoglobin 6.9, , white blood cell count 3.48, normal differential, platelets 270  12/31/23 Folate 12.8     FOBT in the ED was negative  Patient had elevated troponin and suspected to be demand ischemia secondary to low hemoglobin and coronary artery disease.  Patient underwent a cardiac catheterization and was noted to have coronary artery disease but no needing any stents.   Patient did receive 2 units of PRBC transfusion in the hospital on admission.      January 2, 2024 hemoglobin  8.8 at time of discharge     1/18/24 consult with Dr. Saavedra, hematologist at [facility].  BMBX ordered.  Weekly CBCD requested.          1/18/2023 hemoglobin 13.1,   CT A/P at [facility] - unremarkable  July 28, 2023 hemoglobin 10.9, , white blood cell count 3.9, 57% neutrophils, 25% lymphocytes, 18% monocytes        Admitted 1/21 - 1/24/24  Presented to the hospital on 1/21/2024 due to chest pain.   January 21, 2024 hemoglobin 6.4,   He was found to have troponin elevation. Patient was seen in consultation by cardiology and it was felt that he had a type II MI in the setting of anemia and severe coronary artery disease. Patient's symptoms improved with 2 units of packed red blood cells. Patient is not a candidate for revascularization until his anemia is worked up  Heme/onc was consulted to see patient.          1/24/24.Bone marrow biopsy was performed (resulted 2/5/24)        BONE MARROW - PERIPHERAL BLOOD, TOUCH PREPARATIONS, ASPIRATE SMEARS, CORE BIOPSY AND CLOT SECTION - RIGHT ILIAC CREST BIOPSY: LIMITED SAMPLE - CELLULAR,  MYELOID-PREDOMINANT BONE MARROW WITH TRILINEAGE HEMATOPOIESIS WITH NO INCREASE IN BLASTS OR SIGNIFICANT DYSPLASIA; INCREASED RETICULIN FIBROSIS (MF-2 OF 3); CLONAL T-CELL POPULATION; INVOLVED BY PURE RED CELL APLASIA (PRCA); SEE IMPRESSION AND COMMENT    This is a limited bone marrow biopsy due to inadequate aspirate smears and fragmented and crushed bone marrow biopsy. The findings are of a cellular bone marrow with essentially absent erythroid precursors, with no increase in blasts or significant dysplasia, although a full evaluation of dysplasia is precluded in the absence of adequate aspirate smears. There is increased reticulin fibrosis (MF-2 of 3) and adequate storage iron. Plasma cells and T-cells are relatively increased, the latter of which are CD8-skewed by reported flow cytometry, and are clonal by T-cell gamma and beta gene rearrangements. Additional ancillary testing reportedly shows a normal karyotype, a negative myelodysplastic syndrome (MDS) FISH panel and detects no pathogenic mutations by NGS testing. There are essentially absent erythroid precursors with an inappropriately low reticulocyte count in the setting of progressive anemia. The overall findings are consistent with pure red cell aplasia (PRCA) with background clonal T-cells, moderately increased fibrosis (MF-2 of 3), and negative for a myeloid neoplasm.      Interval History:        Previous Treatment:         Test Results:    Imaging: No results found.    Labs:   Lab Results   Component Value Date    WBC 4.73 03/11/2024    HGB 10.2 (L) 03/11/2024    HCT 31.0 (L) 03/11/2024     (H) 03/11/2024     03/11/2024     Lab Results   Component Value Date    K 4.1 03/04/2024    CL 99 03/04/2024    CO2 28 03/04/2024    BUN 24 03/04/2024    CREATININE 0.99 03/04/2024    GLUCOSE 142 (H) 01/21/2024    CALCIUM 9.5 03/04/2024    CORRECTEDCA 9.5 02/10/2024    AST 22 03/04/2024    ALT 73 (H) 03/04/2024    ALKPHOS 58 03/04/2024    EGFR 69  03/04/2024       Lab Results   Component Value Date    IRON 252 (H) 02/26/2024    TIBC <307 02/26/2024    FERRITIN 1,381 (H) 02/26/2024       Lab Results   Component Value Date    EGVUXWBQ28 422 01/21/2024         ROS: Review of Systems   Constitutional: Negative.  Negative for appetite change, chills, diaphoresis, fatigue, fever and unexpected weight change.   HENT:   Negative for hearing loss, lump/mass, mouth sores, nosebleeds, sore throat, trouble swallowing and voice change.    Eyes: Negative.  Negative for eye problems and icterus.   Respiratory: Negative.  Negative for chest tightness, cough, hemoptysis and shortness of breath.    Cardiovascular:  Negative for chest pain and leg swelling.   Gastrointestinal:  Negative for abdominal distention, abdominal pain, blood in stool, constipation, diarrhea and nausea.   Endocrine: Negative.    Genitourinary:  Negative for dysuria, frequency, hematuria and pelvic pain.    Musculoskeletal:  Positive for gait problem. Negative for arthralgias, back pain, flank pain, myalgias and neck stiffness.   Skin:  Negative for itching and rash.   Neurological:  Positive for gait problem, light-headedness and numbness. Negative for dizziness, headaches and speech difficulty.   Hematological:  Negative for adenopathy. Does not bruise/bleed easily.   Psychiatric/Behavioral:  Negative for confusion, decreased concentration, depression and sleep disturbance. The patient is not nervous/anxious.           Current Medications: Reviewed  Allergies: Reviewed  PMH/FH/SH:  Reviewed      Physical Exam:    Body surface area is 1.8 meters squared.    Wt Readings from Last 3 Encounters:   03/12/24 63.7 kg (140 lb 8 oz)   03/07/24 59.9 kg (132 lb)   03/01/24 62.4 kg (137 lb 9.6 oz)        Temp Readings from Last 3 Encounters:   03/12/24 97.7 °F (36.5 °C) (Temporal)   03/07/24 (!) 97.4 °F (36.3 °C) (Tympanic)   02/26/24 97.9 °F (36.6 °C) (Temporal)        BP Readings from Last 3 Encounters:    24 144/70   24 116/60   24 140/70         Pulse Readings from Last 3 Encounters:   24 93   24 89   24 72        Physical Exam  Vitals reviewed.   Constitutional:       General: He is not in acute distress.     Appearance: He is well-developed. He is not diaphoretic.   HENT:      Head: Normocephalic and atraumatic.   Eyes:      Conjunctiva/sclera: Conjunctivae normal.   Neck:      Trachea: No tracheal deviation.   Cardiovascular:      Rate and Rhythm: Normal rate and regular rhythm.      Heart sounds: No murmur heard.     No friction rub. No gallop.   Pulmonary:      Effort: Pulmonary effort is normal. No respiratory distress.      Breath sounds: Normal breath sounds. No wheezing or rales.   Chest:      Chest wall: No tenderness.   Abdominal:      General: There is no distension.      Palpations: Abdomen is soft.      Tenderness: There is no abdominal tenderness.   Musculoskeletal:      Cervical back: Normal range of motion and neck supple.      Right lower leg: No edema.      Left lower leg: No edema.   Lymphadenopathy:      Cervical: No cervical adenopathy.   Skin:     General: Skin is warm and dry.      Coloration: Skin is not pale.      Findings: No erythema.   Neurological:      Mental Status: He is alert and oriented to person, place, and time.   Psychiatric:         Behavior: Behavior normal.         Thought Content: Thought content normal.         Judgment: Judgment normal.         ECO    Goals and Barriers:  Current Goal: Minimize effects of disease.   Barriers: None.      Patient's Capacity to Self Care:  Patient is able to self care.    Code Status: [unfilled]

## 2024-03-19 ENCOUNTER — APPOINTMENT (OUTPATIENT)
Dept: LAB | Facility: CLINIC | Age: 84
End: 2024-03-19
Payer: COMMERCIAL

## 2024-03-19 DIAGNOSIS — N18.30 STAGE 3 CHRONIC KIDNEY DISEASE, UNSPECIFIED WHETHER STAGE 3A OR 3B CKD (HCC): ICD-10-CM

## 2024-03-19 DIAGNOSIS — D70.8 OTHER NEUTROPENIA (HCC): ICD-10-CM

## 2024-03-19 DIAGNOSIS — D64.9 ANEMIA, UNSPECIFIED TYPE: Primary | ICD-10-CM

## 2024-03-19 DIAGNOSIS — D53.9 MACROCYTIC ANEMIA: ICD-10-CM

## 2024-03-19 LAB
BASOPHILS # BLD AUTO: 0.02 THOUSANDS/ÂΜL (ref 0–0.1)
BASOPHILS NFR BLD AUTO: 0 % (ref 0–1)
EOSINOPHIL # BLD AUTO: 0 THOUSAND/ÂΜL (ref 0–0.61)
EOSINOPHIL NFR BLD AUTO: 0 % (ref 0–6)
ERYTHROCYTE [DISTWIDTH] IN BLOOD BY AUTOMATED COUNT: 24.2 % (ref 11.6–15.1)
HCT VFR BLD AUTO: 31.7 % (ref 36.5–49.3)
HGB BLD-MCNC: 10.2 G/DL (ref 12–17)
IMM GRANULOCYTES # BLD AUTO: 0.03 THOUSAND/UL (ref 0–0.2)
IMM GRANULOCYTES NFR BLD AUTO: 1 % (ref 0–2)
LYMPHOCYTES # BLD AUTO: 1.99 THOUSANDS/ÂΜL (ref 0.6–4.47)
LYMPHOCYTES NFR BLD AUTO: 32 % (ref 14–44)
MCH RBC QN AUTO: 33.7 PG (ref 26.8–34.3)
MCHC RBC AUTO-ENTMCNC: 32.2 G/DL (ref 31.4–37.4)
MCV RBC AUTO: 105 FL (ref 82–98)
MONOCYTES # BLD AUTO: 0.73 THOUSAND/ÂΜL (ref 0.17–1.22)
MONOCYTES NFR BLD AUTO: 12 % (ref 4–12)
NEUTROPHILS # BLD AUTO: 3.51 THOUSANDS/ÂΜL (ref 1.85–7.62)
NEUTS SEG NFR BLD AUTO: 55 % (ref 43–75)
NRBC BLD AUTO-RTO: 0 /100 WBCS
PLATELET # BLD AUTO: 366 THOUSANDS/UL (ref 149–390)
PMV BLD AUTO: 9.7 FL (ref 8.9–12.7)
RBC # BLD AUTO: 3.03 MILLION/UL (ref 3.88–5.62)
WBC # BLD AUTO: 6.28 THOUSAND/UL (ref 4.31–10.16)

## 2024-03-19 PROCEDURE — 85025 COMPLETE CBC W/AUTO DIFF WBC: CPT

## 2024-03-19 PROCEDURE — 36415 COLL VENOUS BLD VENIPUNCTURE: CPT

## 2024-03-20 NOTE — PROGRESS NOTES
Patient called concerned that he hasn't needed his injection and does not want his HGB number to drop to 6.9 as it did at diagnosis last year.  Reached out to Felisha Sandhu who discussed with Dr. Vuong, per Dr. Vuong orders will stay the same, he is satisfied with patient HGB at 10.2, patient aware to have his labs done as he has on Tuesdays, and he will be scheduled for 3/28 for aranesp in case he needs a dose based on 3/26 CBC. Patient aware he does not need to come tomorrow as his HGB is over 9.9 at 10.2.

## 2024-03-21 ENCOUNTER — HOSPITAL ENCOUNTER (OUTPATIENT)
Dept: INFUSION CENTER | Facility: CLINIC | Age: 84
Discharge: HOME | End: 2024-03-21

## 2024-03-25 ENCOUNTER — HOSPITAL ENCOUNTER (OUTPATIENT)
Dept: CT IMAGING | Facility: HOSPITAL | Age: 84
Discharge: HOME | End: 2024-03-25
Payer: COMMERCIAL

## 2024-03-25 DIAGNOSIS — J98.11 ATELECTASIS: ICD-10-CM

## 2024-03-25 DIAGNOSIS — R93.89 ABNORMAL CHEST X-RAY: ICD-10-CM

## 2024-03-25 PROCEDURE — 71250 CT THORAX DX C-: CPT

## 2024-03-26 ENCOUNTER — APPOINTMENT (OUTPATIENT)
Dept: LAB | Facility: CLINIC | Age: 84
End: 2024-03-26
Payer: COMMERCIAL

## 2024-03-26 DIAGNOSIS — D64.9 ANEMIA, UNSPECIFIED TYPE: ICD-10-CM

## 2024-03-26 DIAGNOSIS — N18.30 STAGE 3 CHRONIC KIDNEY DISEASE, UNSPECIFIED WHETHER STAGE 3A OR 3B CKD (HCC): ICD-10-CM

## 2024-03-26 DIAGNOSIS — D64.9 ANEMIA, UNSPECIFIED TYPE: Primary | ICD-10-CM

## 2024-03-26 LAB
BASOPHILS # BLD AUTO: 0.03 THOUSANDS/ÂΜL (ref 0–0.1)
BASOPHILS NFR BLD AUTO: 0 % (ref 0–1)
EOSINOPHIL # BLD AUTO: 0 THOUSAND/ÂΜL (ref 0–0.61)
EOSINOPHIL NFR BLD AUTO: 0 % (ref 0–6)
ERYTHROCYTE [DISTWIDTH] IN BLOOD BY AUTOMATED COUNT: 22.8 % (ref 11.6–15.1)
HCT VFR BLD AUTO: 31 % (ref 36.5–49.3)
HGB BLD-MCNC: 9.9 G/DL (ref 12–17)
IMM GRANULOCYTES # BLD AUTO: 0.05 THOUSAND/UL (ref 0–0.2)
IMM GRANULOCYTES NFR BLD AUTO: 1 % (ref 0–2)
LYMPHOCYTES # BLD AUTO: 1 THOUSANDS/ÂΜL (ref 0.6–4.47)
LYMPHOCYTES NFR BLD AUTO: 14 % (ref 14–44)
MCH RBC QN AUTO: 33.9 PG (ref 26.8–34.3)
MCHC RBC AUTO-ENTMCNC: 31.9 G/DL (ref 31.4–37.4)
MCV RBC AUTO: 106 FL (ref 82–98)
MONOCYTES # BLD AUTO: 0.56 THOUSAND/ÂΜL (ref 0.17–1.22)
MONOCYTES NFR BLD AUTO: 8 % (ref 4–12)
NEUTROPHILS # BLD AUTO: 5.58 THOUSANDS/ÂΜL (ref 1.85–7.62)
NEUTS SEG NFR BLD AUTO: 77 % (ref 43–75)
NRBC BLD AUTO-RTO: 0 /100 WBCS
PLATELET # BLD AUTO: 415 THOUSANDS/UL (ref 149–390)
PMV BLD AUTO: 9.6 FL (ref 8.9–12.7)
RBC # BLD AUTO: 2.92 MILLION/UL (ref 3.88–5.62)
WBC # BLD AUTO: 7.22 THOUSAND/UL (ref 4.31–10.16)

## 2024-03-26 PROCEDURE — 85025 COMPLETE CBC W/AUTO DIFF WBC: CPT

## 2024-03-26 PROCEDURE — 36415 COLL VENOUS BLD VENIPUNCTURE: CPT

## 2024-03-28 ENCOUNTER — HOSPITAL ENCOUNTER (OUTPATIENT)
Dept: INFUSION CENTER | Facility: CLINIC | Age: 84
Discharge: HOME | End: 2024-03-28
Payer: COMMERCIAL

## 2024-03-28 VITALS — SYSTOLIC BLOOD PRESSURE: 120 MMHG | DIASTOLIC BLOOD PRESSURE: 60 MMHG

## 2024-03-28 DIAGNOSIS — D64.9 ANEMIA, UNSPECIFIED TYPE: Primary | ICD-10-CM

## 2024-03-28 DIAGNOSIS — N18.30 STAGE 3 CHRONIC KIDNEY DISEASE, UNSPECIFIED WHETHER STAGE 3A OR 3B CKD (HCC): ICD-10-CM

## 2024-03-28 PROCEDURE — 96372 THER/PROPH/DIAG INJ SC/IM: CPT

## 2024-03-28 RX ADMIN — DARBEPOETIN ALFA 100 MCG: 100 INJECTION, SOLUTION INTRAVENOUS; SUBCUTANEOUS at 09:09

## 2024-03-28 NOTE — PROGRESS NOTES
Received for aranesp. No complaints offered. Hgb 9.9, bp 120/60. Injection given per md order and parameters. Pt tolerated well. Pt to have repeat labs next week and will follow up with Dr Vuong. No further appts here at this time.

## 2024-04-02 ENCOUNTER — OFFICE VISIT (OUTPATIENT)
Dept: PULMONOLOGY | Facility: CLINIC | Age: 84
End: 2024-04-02
Payer: COMMERCIAL

## 2024-04-02 VITALS
HEART RATE: 73 BPM | SYSTOLIC BLOOD PRESSURE: 142 MMHG | OXYGEN SATURATION: 97 % | DIASTOLIC BLOOD PRESSURE: 64 MMHG | BODY MASS INDEX: 19.98 KG/M2 | TEMPERATURE: 97.6 F | WEIGHT: 139.6 LBS | HEIGHT: 70 IN

## 2024-04-02 DIAGNOSIS — R93.89 ABNORMAL CT OF THE CHEST: Primary | ICD-10-CM

## 2024-04-02 DIAGNOSIS — D75.81 MYELOFIBROSIS (HCC): ICD-10-CM

## 2024-04-02 PROCEDURE — 99213 OFFICE O/P EST LOW 20 MIN: CPT | Performed by: INTERNAL MEDICINE

## 2024-04-02 NOTE — PROGRESS NOTES
"Pulmonary Follow Up Note  aGel Ferraro 83 y.o. male MRN: [id number]  4/2/2024      HPI:    Patient continues to deny any significant pulmonary symptoms.  No cough or shortness of breath.  No sputum production.  No recent pulmonary illness.  No fevers or chills.  No unintentional weight loss per patient.    Exercise Tolerance: Fair.  Exercise tolerance is limited particularly by orthopedic issues.     Meds:  No inhaled medications    ROS:    Constitutional: - Fatigue, - chills, - fever, - weight change.   HEENT: - rhinorrhea, - sneezing, - sore throat.    Respiratory: - cough, - shortness of breath, - wheezing.    Cardiovascular: - chest pain,  -palpitations, - leg swelling.   Gastrointestinal: - abdominal pain, - constipation, - diarrhea, - nausea, - vomiting.   Endocrine: - cold intolerance, - heat intolerance.   Genitourinary: - dysuria.   Musculoskeletal: + Scattered arthralgias.   Skin:- rash, - wound.   Allergic/Immunologic: - allergies  Neurological: - dizziness, - numbness        Vitals: Height 5' 10\" (1.778 m), weight 63.3 kg (139 lb 9.6 oz)., Body mass index is 20.03 kg/m².    Physical Exam:  GEN  NAD, thin  HEENT  ncat, non icteric, MM moist  NECK  supple, no JVD, no LAD  CV  +s1s2, no mrg, RRR  PULM  CTA BL, no wrr  ABD  soft, ntnd, + BS  EXT  no edema, no cyanosis, no clubbing  NEURO  Aox3, no focal weakness    Imaging and other studies:   I personally viewed and interpreted the following imaging studies:  CT chest 3/25/2024 shows mild centrilobular apical predominant emphysema.  There is increase reticulation not directly adjacent to the pleura suspicious for prior scarring.    Assessment:  CT chest abnormality  Mild emphysema-asymptomatic  Myelofibrosis    Plan:  I doubt that emphysema and basilar scarring seen on CT chest is of clinical relevance from pulmonary perspective.  Patient encouraged to call us with any further symptoms that may arise such as worsening shortness of breath with exercise " "or persistent cough/sputum production.  I do not recommend any specific inhalers at this time  Management of myelofibrosis per primary care physician and hematology  Patient can continue to follow with his primary care physician.    Return visit if requested by primary care physician    Note: Portions of the record may have been created with voice recognition software. Occasional wrong word or \"sound a like\" substitutions may have occurred due to the inherent limitations of voice recognition software. Read the chart carefully and recognize, using context, where substitutions have occurred.     Vicente Raygoza M.D.  [facility]  "

## 2024-04-04 ENCOUNTER — APPOINTMENT (OUTPATIENT)
Dept: LAB | Facility: CLINIC | Age: 84
End: 2024-04-04
Payer: COMMERCIAL

## 2024-04-04 DIAGNOSIS — D64.9 ANEMIA, UNSPECIFIED TYPE: ICD-10-CM

## 2024-04-04 DIAGNOSIS — D70.8 OTHER NEUTROPENIA (HCC): ICD-10-CM

## 2024-04-04 DIAGNOSIS — N18.30 STAGE 3 CHRONIC KIDNEY DISEASE, UNSPECIFIED WHETHER STAGE 3A OR 3B CKD (HCC): ICD-10-CM

## 2024-04-04 DIAGNOSIS — D53.9 MACROCYTIC ANEMIA: ICD-10-CM

## 2024-04-04 LAB
BASOPHILS # BLD AUTO: 0.03 THOUSANDS/ÂΜL (ref 0–0.1)
BASOPHILS NFR BLD AUTO: 1 % (ref 0–1)
EOSINOPHIL # BLD AUTO: 0.1 THOUSAND/ÂΜL (ref 0–0.61)
EOSINOPHIL NFR BLD AUTO: 2 % (ref 0–6)
ERYTHROCYTE [DISTWIDTH] IN BLOOD BY AUTOMATED COUNT: 22.1 % (ref 11.6–15.1)
HCT VFR BLD AUTO: 37.3 % (ref 36.5–49.3)
HGB BLD-MCNC: 11.7 G/DL (ref 12–17)
IMM GRANULOCYTES # BLD AUTO: 0.03 THOUSAND/UL (ref 0–0.2)
IMM GRANULOCYTES NFR BLD AUTO: 1 % (ref 0–2)
LYMPHOCYTES # BLD AUTO: 2.41 THOUSANDS/ÂΜL (ref 0.6–4.47)
LYMPHOCYTES NFR BLD AUTO: 42 % (ref 14–44)
MCH RBC QN AUTO: 34.6 PG (ref 26.8–34.3)
MCHC RBC AUTO-ENTMCNC: 31.4 G/DL (ref 31.4–37.4)
MCV RBC AUTO: 110 FL (ref 82–98)
MONOCYTES # BLD AUTO: 0.58 THOUSAND/ÂΜL (ref 0.17–1.22)
MONOCYTES NFR BLD AUTO: 11 % (ref 4–12)
NEUTROPHILS # BLD AUTO: 2.39 THOUSANDS/ÂΜL (ref 1.85–7.62)
NEUTS SEG NFR BLD AUTO: 43 % (ref 43–75)
NRBC BLD AUTO-RTO: 0 /100 WBCS
PLATELET # BLD AUTO: 405 THOUSANDS/UL (ref 149–390)
PMV BLD AUTO: 9.3 FL (ref 8.9–12.7)
RBC # BLD AUTO: 3.38 MILLION/UL (ref 3.88–5.62)
WBC # BLD AUTO: 5.54 THOUSAND/UL (ref 4.31–10.16)

## 2024-04-04 PROCEDURE — 36415 COLL VENOUS BLD VENIPUNCTURE: CPT

## 2024-04-04 PROCEDURE — 85025 COMPLETE CBC W/AUTO DIFF WBC: CPT

## 2024-04-10 ENCOUNTER — APPOINTMENT (OUTPATIENT)
Dept: LAB | Facility: CLINIC | Age: 84
End: 2024-04-10
Payer: COMMERCIAL

## 2024-04-10 DIAGNOSIS — D70.8 OTHER NEUTROPENIA (HCC): ICD-10-CM

## 2024-04-10 DIAGNOSIS — D53.9 MACROCYTIC ANEMIA: ICD-10-CM

## 2024-04-10 LAB
BASOPHILS # BLD AUTO: 0.03 THOUSANDS/ÂΜL (ref 0–0.1)
BASOPHILS NFR BLD AUTO: 0 % (ref 0–1)
EOSINOPHIL # BLD AUTO: 0 THOUSAND/ÂΜL (ref 0–0.61)
EOSINOPHIL NFR BLD AUTO: 0 % (ref 0–6)
ERYTHROCYTE [DISTWIDTH] IN BLOOD BY AUTOMATED COUNT: 19.9 % (ref 11.6–15.1)
HCT VFR BLD AUTO: 38.5 % (ref 36.5–49.3)
HGB BLD-MCNC: 12.7 G/DL (ref 12–17)
IMM GRANULOCYTES # BLD AUTO: 0.03 THOUSAND/UL (ref 0–0.2)
IMM GRANULOCYTES NFR BLD AUTO: 0 % (ref 0–2)
LYMPHOCYTES # BLD AUTO: 0.95 THOUSANDS/ÂΜL (ref 0.6–4.47)
LYMPHOCYTES NFR BLD AUTO: 14 % (ref 14–44)
MCH RBC QN AUTO: 36 PG (ref 26.8–34.3)
MCHC RBC AUTO-ENTMCNC: 33 G/DL (ref 31.4–37.4)
MCV RBC AUTO: 109 FL (ref 82–98)
MONOCYTES # BLD AUTO: 0.52 THOUSAND/ÂΜL (ref 0.17–1.22)
MONOCYTES NFR BLD AUTO: 8 % (ref 4–12)
NEUTROPHILS # BLD AUTO: 5.37 THOUSANDS/ÂΜL (ref 1.85–7.62)
NEUTS SEG NFR BLD AUTO: 78 % (ref 43–75)
NRBC BLD AUTO-RTO: 0 /100 WBCS
PLATELET # BLD AUTO: 328 THOUSANDS/UL (ref 149–390)
PMV BLD AUTO: 9.7 FL (ref 8.9–12.7)
RBC # BLD AUTO: 3.53 MILLION/UL (ref 3.88–5.62)
WBC # BLD AUTO: 6.9 THOUSAND/UL (ref 4.31–10.16)

## 2024-04-10 PROCEDURE — 36415 COLL VENOUS BLD VENIPUNCTURE: CPT

## 2024-04-10 PROCEDURE — 85025 COMPLETE CBC W/AUTO DIFF WBC: CPT

## 2024-04-11 ENCOUNTER — HOSPITAL ENCOUNTER (OUTPATIENT)
Dept: INFUSION CENTER | Facility: CLINIC | Age: 84
End: 2024-04-11

## 2024-04-17 ENCOUNTER — APPOINTMENT (OUTPATIENT)
Dept: LAB | Facility: CLINIC | Age: 84
End: 2024-04-17
Payer: COMMERCIAL

## 2024-04-17 DIAGNOSIS — D64.9 ANEMIA, UNSPECIFIED TYPE: ICD-10-CM

## 2024-04-17 DIAGNOSIS — N18.30 STAGE 3 CHRONIC KIDNEY DISEASE, UNSPECIFIED WHETHER STAGE 3A OR 3B CKD (HCC): ICD-10-CM

## 2024-04-17 LAB
BASOPHILS # BLD AUTO: 0.02 THOUSANDS/ÂΜL (ref 0–0.1)
BASOPHILS NFR BLD AUTO: 0 % (ref 0–1)
EOSINOPHIL # BLD AUTO: 0 THOUSAND/ÂΜL (ref 0–0.61)
EOSINOPHIL NFR BLD AUTO: 0 % (ref 0–6)
ERYTHROCYTE [DISTWIDTH] IN BLOOD BY AUTOMATED COUNT: 18.7 % (ref 11.6–15.1)
HCT VFR BLD AUTO: 38 % (ref 36.5–49.3)
HGB BLD-MCNC: 12.5 G/DL (ref 12–17)
IMM GRANULOCYTES # BLD AUTO: 0.01 THOUSAND/UL (ref 0–0.2)
IMM GRANULOCYTES NFR BLD AUTO: 0 % (ref 0–2)
LYMPHOCYTES # BLD AUTO: 2.62 THOUSANDS/ÂΜL (ref 0.6–4.47)
LYMPHOCYTES NFR BLD AUTO: 50 % (ref 14–44)
MCH RBC QN AUTO: 36 PG (ref 26.8–34.3)
MCHC RBC AUTO-ENTMCNC: 32.9 G/DL (ref 31.4–37.4)
MCV RBC AUTO: 110 FL (ref 82–98)
MONOCYTES # BLD AUTO: 0.62 THOUSAND/ÂΜL (ref 0.17–1.22)
MONOCYTES NFR BLD AUTO: 12 % (ref 4–12)
NEUTROPHILS # BLD AUTO: 2.03 THOUSANDS/ÂΜL (ref 1.85–7.62)
NEUTS SEG NFR BLD AUTO: 38 % (ref 43–75)
NRBC BLD AUTO-RTO: 0 /100 WBCS
PLATELET # BLD AUTO: 251 THOUSANDS/UL (ref 149–390)
PMV BLD AUTO: 9.6 FL (ref 8.9–12.7)
RBC # BLD AUTO: 3.47 MILLION/UL (ref 3.88–5.62)
WBC # BLD AUTO: 5.3 THOUSAND/UL (ref 4.31–10.16)

## 2024-04-17 PROCEDURE — 85025 COMPLETE CBC W/AUTO DIFF WBC: CPT

## 2024-04-17 PROCEDURE — 36415 COLL VENOUS BLD VENIPUNCTURE: CPT

## 2024-04-22 ENCOUNTER — OFFICE VISIT (OUTPATIENT)
Dept: HEMATOLOGY ONCOLOGY | Facility: CLINIC | Age: 84
End: 2024-04-22
Payer: COMMERCIAL

## 2024-04-22 VITALS
HEART RATE: 79 BPM | OXYGEN SATURATION: 99 % | RESPIRATION RATE: 17 BRPM | DIASTOLIC BLOOD PRESSURE: 68 MMHG | WEIGHT: 143 LBS | BODY MASS INDEX: 20.47 KG/M2 | HEIGHT: 70 IN | SYSTOLIC BLOOD PRESSURE: 142 MMHG | TEMPERATURE: 97.8 F

## 2024-04-22 DIAGNOSIS — D61.01 PURE RED CELL APLASIA (HCC): Primary | ICD-10-CM

## 2024-04-22 PROCEDURE — 99214 OFFICE O/P EST MOD 30 MIN: CPT | Performed by: INTERNAL MEDICINE

## 2024-04-22 NOTE — PROGRESS NOTES
Hematology Outpatient Follow - Up Note  Gael Ferraro 83 y.o. 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      Patient had progressive anemia since 7/2023  (1/18/2023 hemoglobin 13.1, - 10 range 7/2023; 11/2023)  12/31/23 hemoglobin 6.9, , white blood cell count 3.48, normal differential, platelets 270  Admission x 3 1/2024 2nd to recurrent anemia and chest pain.       Haptoglobin normal  1/1/24, 1/21/24 1/21/24    LDH normal 211, YESSICA negative  Ferritin 1145; iron 285, TIBC < 339  Retic 0.5%     Mild leukopenia.  Initiated on prednisone, with significant response of hemoglobin up to 11.8    Currently on 5 mg prednisone every day, current hemoglobin 12.5, reduce prednisone to 5 mg every other day        Labs and imaging studies are reviewed by ordering provider once results are available. If there are findings that need immediate attention, you will be contacted when results available.   Discussing results and the implication on your healthcare is best discussed in person at your follow-up visit.       HPI:  Gael Ferraro ' Oscar'  is an 82 y/o gentleman 1st seen inpatient 1/21/24 regarding refractory anemia.       PMH: CAD, HTN, CKD, dysphagia.  He is a never smoker.  No family history of liver disease.       November 18, 2023 hemoglobin 10.2, , white blood cell count 3.7, 48% neutrophils, 39% lymphocytes, 11% monocytes, platelet count 82, iron saturation 58%  Normal total bilirubin, total protein, albumin.  BUN 24, creatinine 1.24  B12 292-  PCP Dr. Lyman started him on oral B12.  ESR 1  SPEP: no monoclonal  protein.  Alpha 2 region is decreased and may be suggestive of a decreased haptoglobin level associated with liver or hemolytic disease. Quantitation of serum haptoglobin to rule out deficiency may provide additional information. There is no suggestion of a monoclonal protein.        admission 12/31 - 1/2/24 2nd to chest pain  12/31/23 hemoglobin 6.9, , white blood cell count 3.48, normal differential, platelets 270  12/31/23 Folate 12.8     FOBT in the ED was negative  Patient had elevated troponin and suspected to be demand ischemia secondary to low hemoglobin and coronary artery disease.  Patient underwent a cardiac catheterization and was noted to have coronary artery disease but no needing any stents.   Patient did receive 2 units of PRBC transfusion in the hospital on admission.      January 2, 2024 hemoglobin  8.8 at time of discharge  Admitted 1/21 - 1/24/24  Presented to the hospital on 1/21/2024 due to chest pain.   January 21, 2024 hemoglobin 6.4,   He was found to have troponin elevation. Patient was seen in consultation by cardiology and it was felt that he had a type II MI in the setting of anemia and severe coronary artery disease. Patient's symptoms improved with 2 units of packed red blood cells. Patient is not a candidate for revascularization until his anemia is worked up  Heme/onc was consulted to see patient.          1/24/24.Bone marrow biopsy was performed (resulted 2/5/24)        BONE MARROW - PERIPHERAL BLOOD, TOUCH PREPARATIONS, ASPIRATE SMEARS, CORE BIOPSY AND CLOT SECTION - RIGHT ILIAC CREST BIOPSY: LIMITED SAMPLE - CELLULAR, MYELOID-PREDOMINANT BONE MARROW WITH TRILINEAGE HEMATOPOIESIS WITH NO INCREASE IN BLASTS OR SIGNIFICANT DYSPLASIA; INCREASED RETICULIN FIBROSIS (MF-2 OF 3); CLONAL T-CELL POPULATION; INVOLVED BY PURE RED CELL APLASIA (PRCA); SEE IMPRESSION AND COMMENT     This is a limited bone marrow biopsy due to inadequate aspirate smears and fragmented and crushed  bone marrow biopsy. The findings are of a cellular bone marrow with essentially absent erythroid precursors, with no increase in blasts or significant dysplasia, although a full evaluation of dysplasia is precluded in the absence of adequate aspirate smears. There is increased reticulin fibrosis (MF-2 of 3) and adequate storage iron. Plasma cells and T-cells are relatively increased, the latter of which are CD8-skewed by reported flow cytometry, and are clonal by T-cell gamma and beta gene rearrangements. Additional ancillary testing reportedly shows a normal karyotype, a negative myelodysplastic syndrome (MDS) FISH panel and detects no pathogenic mutations by NGS testing. There are essentially absent erythroid precursors with an inappropriately low reticulocyte count in the setting of progressive anemia. The overall findings are consistent with pure red cell aplasia (PRCA) with background clonal T-cells, moderately increased fibrosis (MF-2 of 3), and negative for a myeloid neoplasm.   Interval History:        Previous Treatment:         Test Results:    Imaging: CT chest without contrast    Result Date: 4/1/2024  Narrative: CT CHEST WITHOUT IV CONTRAST INDICATION:   Abnormal findings on diagnostic imaging of other specified body structures. Atelectasis. . COMPARISON:  None. TECHNIQUE: CT examination of the chest was performed without intravenous contrast. Multiplanar 2D reformatted images were created from the source data. This examination, like all CT scans performed in the [facility], was performed utilizing techniques to minimize radiation dose exposure, including the use of iterative reconstruction and automated exposure control. Radiation dose length product (DLP) for this visit: FINDINGS: LUNGS: Mild pulmonary emphysema. Minimal linear atelectasis/scarring at the lung bases.  There is no tracheal or endobronchial lesion. PLEURA:  Unremarkable. HEART/GREAT VESSELS: Heavy atherosclerotic  coronary artery calcification is noted.  Heart is otherwise unremarkable.  No thoracic aortic aneurysm. MEDIASTINUM AND ANALILIA:  Unremarkable. CHEST WALL AND LOWER NECK:  Unremarkable. VISUALIZED STRUCTURES IN THE UPPER ABDOMEN:  Unremarkable. OSSEOUS STRUCTURES:  No acute fracture or destructive osseous lesion.     Impression: Mild pulmonary emphysema. Minimal linear subsegmental atelectasis and/or scarring at the lung bases, of doubtful clinical significance. No suspicious pulmonary nodules, consolidation, or mass. Heavy atherosclerotic coronary artery calcification is noted.   Electronically signed: 04/01/2024 03:39 PM Toñito Hinojosa MD      Labs:   Lab Results   Component Value Date    WBC 5.30 04/17/2024    HGB 12.5 04/17/2024    HCT 38.0 04/17/2024     (H) 04/17/2024     04/17/2024     Lab Results   Component Value Date    K 4.1 03/04/2024    CL 99 03/04/2024    CO2 28 03/04/2024    BUN 24 03/04/2024    CREATININE 0.99 03/04/2024    GLUCOSE 142 (H) 01/21/2024    CALCIUM 9.5 03/04/2024    CORRECTEDCA 9.5 02/10/2024    AST 22 03/04/2024    ALT 73 (H) 03/04/2024    ALKPHOS 58 03/04/2024    EGFR 69 03/04/2024       Lab Results   Component Value Date    IRON 252 (H) 02/26/2024    TIBC <307 02/26/2024    FERRITIN 1,381 (H) 02/26/2024       Lab Results   Component Value Date    LUBWRXML42 422 01/21/2024         ROS: Review of Systems   Constitutional: Negative.  Negative for appetite change, chills, diaphoresis, fatigue, fever and unexpected weight change.   HENT:   Negative for hearing loss, lump/mass, mouth sores, nosebleeds, sore throat, trouble swallowing and voice change.    Eyes: Negative.  Negative for eye problems and icterus.   Respiratory: Negative.  Negative for chest tightness, cough, hemoptysis and shortness of breath.    Cardiovascular:  Negative for chest pain and leg swelling.   Gastrointestinal:  Negative for abdominal distention, abdominal pain, blood in stool, constipation, diarrhea  and nausea.   Endocrine: Negative.    Genitourinary:  Negative for dysuria, frequency, hematuria and pelvic pain.    Musculoskeletal: Negative.  Negative for arthralgias, back pain, flank pain, gait problem, myalgias and neck stiffness.   Skin:  Negative for itching and rash.   Neurological:  Negative for dizziness, gait problem, headaches, light-headedness, numbness and speech difficulty.   Hematological:  Negative for adenopathy. Does not bruise/bleed easily.   Psychiatric/Behavioral:  Negative for confusion, decreased concentration, depression and sleep disturbance. The patient is not nervous/anxious.           Current Medications: Reviewed  Allergies: Reviewed  PMH/FH/SH:  Reviewed      Physical Exam:    Body surface area is 1.81 meters squared.    Wt Readings from Last 3 Encounters:   04/22/24 64.9 kg (143 lb)   04/02/24 63.3 kg (139 lb 9.6 oz)   03/12/24 63.7 kg (140 lb 8 oz)        Temp Readings from Last 3 Encounters:   04/22/24 97.8 °F (36.6 °C) (Temporal)   04/02/24 97.6 °F (36.4 °C) (Tympanic)   03/12/24 97.7 °F (36.5 °C) (Temporal)        BP Readings from Last 3 Encounters:   04/22/24 142/68   04/02/24 142/64   03/28/24 120/60         Pulse Readings from Last 3 Encounters:   04/22/24 79   04/02/24 73   03/12/24 93        Physical Exam  Vitals reviewed.   Constitutional:       General: He is not in acute distress.     Appearance: He is well-developed. He is not diaphoretic.   HENT:      Head: Normocephalic and atraumatic.   Eyes:      Conjunctiva/sclera: Conjunctivae normal.   Neck:      Trachea: No tracheal deviation.   Cardiovascular:      Rate and Rhythm: Normal rate and regular rhythm.      Heart sounds: No murmur heard.     No friction rub. No gallop.   Pulmonary:      Effort: Pulmonary effort is normal. No respiratory distress.      Breath sounds: Normal breath sounds. No wheezing or rales.   Chest:      Chest wall: No tenderness.   Abdominal:      General: There is no distension.      Palpations:  Abdomen is soft.      Tenderness: There is no abdominal tenderness.   Musculoskeletal:      Cervical back: Normal range of motion and neck supple.      Right lower leg: No edema.      Left lower leg: No edema.   Lymphadenopathy:      Cervical: No cervical adenopathy.   Skin:     General: Skin is warm and dry.      Coloration: Skin is not pale.      Findings: No erythema.   Neurological:      Mental Status: He is alert and oriented to person, place, and time.   Psychiatric:         Behavior: Behavior normal.         Thought Content: Thought content normal.         Judgment: Judgment normal.         ECO    Goals and Barriers:  Current Goal: Minimize effects of disease.   Barriers: None.      Patient's Capacity to Self Care:  Patient is able to self care.    Code Status: [unfilled]

## 2024-04-25 ENCOUNTER — APPOINTMENT (OUTPATIENT)
Dept: LAB | Facility: CLINIC | Age: 84
End: 2024-04-25
Payer: COMMERCIAL

## 2024-04-25 DIAGNOSIS — D64.9 ANEMIA, UNSPECIFIED TYPE: ICD-10-CM

## 2024-04-25 DIAGNOSIS — N18.30 STAGE 3 CHRONIC KIDNEY DISEASE, UNSPECIFIED WHETHER STAGE 3A OR 3B CKD (HCC): ICD-10-CM

## 2024-04-25 DIAGNOSIS — D53.9 MACROCYTIC ANEMIA: ICD-10-CM

## 2024-04-25 DIAGNOSIS — D70.8 OTHER NEUTROPENIA (HCC): ICD-10-CM

## 2024-04-25 LAB
BASOPHILS # BLD AUTO: 0.03 THOUSANDS/ÂΜL (ref 0–0.1)
BASOPHILS NFR BLD AUTO: 1 % (ref 0–1)
EOSINOPHIL # BLD AUTO: 0 THOUSAND/ÂΜL (ref 0–0.61)
EOSINOPHIL NFR BLD AUTO: 0 % (ref 0–6)
ERYTHROCYTE [DISTWIDTH] IN BLOOD BY AUTOMATED COUNT: 17.2 % (ref 11.6–15.1)
HCT VFR BLD AUTO: 37.9 % (ref 36.5–49.3)
HGB BLD-MCNC: 13 G/DL (ref 12–17)
IMM GRANULOCYTES # BLD AUTO: 0.01 THOUSAND/UL (ref 0–0.2)
IMM GRANULOCYTES NFR BLD AUTO: 0 % (ref 0–2)
LYMPHOCYTES # BLD AUTO: 2.4 THOUSANDS/ÂΜL (ref 0.6–4.47)
LYMPHOCYTES NFR BLD AUTO: 53 % (ref 14–44)
MCH RBC QN AUTO: 37.7 PG (ref 26.8–34.3)
MCHC RBC AUTO-ENTMCNC: 34.3 G/DL (ref 31.4–37.4)
MCV RBC AUTO: 110 FL (ref 82–98)
MONOCYTES # BLD AUTO: 0.48 THOUSAND/ÂΜL (ref 0.17–1.22)
MONOCYTES NFR BLD AUTO: 11 % (ref 4–12)
NEUTROPHILS # BLD AUTO: 1.6 THOUSANDS/ÂΜL (ref 1.85–7.62)
NEUTS SEG NFR BLD AUTO: 35 % (ref 43–75)
NRBC BLD AUTO-RTO: 0 /100 WBCS
PLATELET # BLD AUTO: 260 THOUSANDS/UL (ref 149–390)
PMV BLD AUTO: 9.9 FL (ref 8.9–12.7)
RBC # BLD AUTO: 3.45 MILLION/UL (ref 3.88–5.62)
WBC # BLD AUTO: 4.52 THOUSAND/UL (ref 4.31–10.16)

## 2024-04-25 PROCEDURE — 36415 COLL VENOUS BLD VENIPUNCTURE: CPT

## 2024-04-25 PROCEDURE — 85025 COMPLETE CBC W/AUTO DIFF WBC: CPT

## 2024-05-01 ENCOUNTER — APPOINTMENT (OUTPATIENT)
Dept: LAB | Facility: CLINIC | Age: 84
End: 2024-05-01
Payer: COMMERCIAL

## 2024-05-01 LAB
BASOPHILS # BLD AUTO: 0.01 THOUSANDS/ÂΜL (ref 0–0.1)
BASOPHILS NFR BLD AUTO: 0 % (ref 0–1)
EOSINOPHIL # BLD AUTO: 0 THOUSAND/ÂΜL (ref 0–0.61)
EOSINOPHIL NFR BLD AUTO: 0 % (ref 0–6)
ERYTHROCYTE [DISTWIDTH] IN BLOOD BY AUTOMATED COUNT: 16.2 % (ref 11.6–15.1)
HCT VFR BLD AUTO: 38.1 % (ref 36.5–49.3)
HGB BLD-MCNC: 12.9 G/DL (ref 12–17)
IMM GRANULOCYTES # BLD AUTO: 0.01 THOUSAND/UL (ref 0–0.2)
IMM GRANULOCYTES NFR BLD AUTO: 0 % (ref 0–2)
LYMPHOCYTES # BLD AUTO: 2.48 THOUSANDS/ÂΜL (ref 0.6–4.47)
LYMPHOCYTES NFR BLD AUTO: 49 % (ref 14–44)
MCH RBC QN AUTO: 36.8 PG (ref 26.8–34.3)
MCHC RBC AUTO-ENTMCNC: 33.9 G/DL (ref 31.4–37.4)
MCV RBC AUTO: 109 FL (ref 82–98)
MONOCYTES # BLD AUTO: 0.5 THOUSAND/ÂΜL (ref 0.17–1.22)
MONOCYTES NFR BLD AUTO: 10 % (ref 4–12)
NEUTROPHILS # BLD AUTO: 2.06 THOUSANDS/ÂΜL (ref 1.85–7.62)
NEUTS SEG NFR BLD AUTO: 41 % (ref 43–75)
NRBC BLD AUTO-RTO: 0 /100 WBCS
PLATELET # BLD AUTO: 288 THOUSANDS/UL (ref 149–390)
PMV BLD AUTO: 9.7 FL (ref 8.9–12.7)
RBC # BLD AUTO: 3.51 MILLION/UL (ref 3.88–5.62)
WBC # BLD AUTO: 5.06 THOUSAND/UL (ref 4.31–10.16)

## 2024-05-01 PROCEDURE — 36415 COLL VENOUS BLD VENIPUNCTURE: CPT

## 2024-05-01 PROCEDURE — 85025 COMPLETE CBC W/AUTO DIFF WBC: CPT

## 2024-05-08 ENCOUNTER — APPOINTMENT (OUTPATIENT)
Dept: LAB | Facility: CLINIC | Age: 84
End: 2024-05-08
Payer: COMMERCIAL

## 2024-05-08 DIAGNOSIS — N18.30 STAGE 3 CHRONIC KIDNEY DISEASE, UNSPECIFIED WHETHER STAGE 3A OR 3B CKD (HCC): ICD-10-CM

## 2024-05-08 DIAGNOSIS — D64.9 ANEMIA, UNSPECIFIED TYPE: ICD-10-CM

## 2024-05-08 LAB
BASOPHILS # BLD AUTO: 0.01 THOUSANDS/ÂΜL (ref 0–0.1)
BASOPHILS NFR BLD AUTO: 0 % (ref 0–1)
EOSINOPHIL # BLD AUTO: 0 THOUSAND/ÂΜL (ref 0–0.61)
EOSINOPHIL NFR BLD AUTO: 0 % (ref 0–6)
ERYTHROCYTE [DISTWIDTH] IN BLOOD BY AUTOMATED COUNT: 15.6 % (ref 11.6–15.1)
HCT VFR BLD AUTO: 34.8 % (ref 36.5–49.3)
HGB BLD-MCNC: 12.1 G/DL (ref 12–17)
IMM GRANULOCYTES # BLD AUTO: 0.02 THOUSAND/UL (ref 0–0.2)
IMM GRANULOCYTES NFR BLD AUTO: 0 % (ref 0–2)
LYMPHOCYTES # BLD AUTO: 1.33 THOUSANDS/ÂΜL (ref 0.6–4.47)
LYMPHOCYTES NFR BLD AUTO: 26 % (ref 14–44)
MCH RBC QN AUTO: 37.6 PG (ref 26.8–34.3)
MCHC RBC AUTO-ENTMCNC: 34.8 G/DL (ref 31.4–37.4)
MCV RBC AUTO: 108 FL (ref 82–98)
MONOCYTES # BLD AUTO: 0.28 THOUSAND/ÂΜL (ref 0.17–1.22)
MONOCYTES NFR BLD AUTO: 5 % (ref 4–12)
NEUTROPHILS # BLD AUTO: 3.5 THOUSANDS/ÂΜL (ref 1.85–7.62)
NEUTS SEG NFR BLD AUTO: 69 % (ref 43–75)
NRBC BLD AUTO-RTO: 0 /100 WBCS
PLATELET # BLD AUTO: 328 THOUSANDS/UL (ref 149–390)
PMV BLD AUTO: 9.8 FL (ref 8.9–12.7)
RBC # BLD AUTO: 3.22 MILLION/UL (ref 3.88–5.62)
WBC # BLD AUTO: 5.14 THOUSAND/UL (ref 4.31–10.16)

## 2024-05-08 PROCEDURE — 85025 COMPLETE CBC W/AUTO DIFF WBC: CPT

## 2024-05-08 PROCEDURE — 36415 COLL VENOUS BLD VENIPUNCTURE: CPT

## 2024-05-15 ENCOUNTER — APPOINTMENT (OUTPATIENT)
Dept: LAB | Facility: CLINIC | Age: 84
End: 2024-05-15
Payer: COMMERCIAL

## 2024-05-15 DIAGNOSIS — D70.8 OTHER NEUTROPENIA (HCC): ICD-10-CM

## 2024-05-15 DIAGNOSIS — D53.9 MACROCYTIC ANEMIA: ICD-10-CM

## 2024-05-15 LAB
BASOPHILS # BLD AUTO: 0.02 THOUSANDS/ÂΜL (ref 0–0.1)
BASOPHILS NFR BLD AUTO: 0 % (ref 0–1)
EOSINOPHIL # BLD AUTO: 0 THOUSAND/ÂΜL (ref 0–0.61)
EOSINOPHIL NFR BLD AUTO: 0 % (ref 0–6)
ERYTHROCYTE [DISTWIDTH] IN BLOOD BY AUTOMATED COUNT: 15 % (ref 11.6–15.1)
HCT VFR BLD AUTO: 34.7 % (ref 36.5–49.3)
HGB BLD-MCNC: 11.8 G/DL (ref 12–17)
IMM GRANULOCYTES # BLD AUTO: 0.01 THOUSAND/UL (ref 0–0.2)
IMM GRANULOCYTES NFR BLD AUTO: 0 % (ref 0–2)
LYMPHOCYTES # BLD AUTO: 3.44 THOUSANDS/ÂΜL (ref 0.6–4.47)
LYMPHOCYTES NFR BLD AUTO: 53 % (ref 14–44)
MCH RBC QN AUTO: 37.2 PG (ref 26.8–34.3)
MCHC RBC AUTO-ENTMCNC: 34 G/DL (ref 31.4–37.4)
MCV RBC AUTO: 110 FL (ref 82–98)
MONOCYTES # BLD AUTO: 0.75 THOUSAND/ÂΜL (ref 0.17–1.22)
MONOCYTES NFR BLD AUTO: 11 % (ref 4–12)
NEUTROPHILS # BLD AUTO: 2.37 THOUSANDS/ÂΜL (ref 1.85–7.62)
NEUTS SEG NFR BLD AUTO: 36 % (ref 43–75)
NRBC BLD AUTO-RTO: 0 /100 WBCS
PLATELET # BLD AUTO: 313 THOUSANDS/UL (ref 149–390)
PMV BLD AUTO: 10.1 FL (ref 8.9–12.7)
RBC # BLD AUTO: 3.17 MILLION/UL (ref 3.88–5.62)
WBC # BLD AUTO: 6.59 THOUSAND/UL (ref 4.31–10.16)

## 2024-05-15 PROCEDURE — 85025 COMPLETE CBC W/AUTO DIFF WBC: CPT

## 2024-05-15 PROCEDURE — 36415 COLL VENOUS BLD VENIPUNCTURE: CPT

## 2024-05-22 ENCOUNTER — APPOINTMENT (OUTPATIENT)
Dept: LAB | Facility: CLINIC | Age: 84
End: 2024-05-22
Payer: COMMERCIAL

## 2024-05-22 DIAGNOSIS — N18.30 STAGE 3 CHRONIC KIDNEY DISEASE, UNSPECIFIED WHETHER STAGE 3A OR 3B CKD (HCC): ICD-10-CM

## 2024-05-22 DIAGNOSIS — D64.9 ANEMIA, UNSPECIFIED TYPE: ICD-10-CM

## 2024-05-22 LAB
BASOPHILS # BLD AUTO: 0.03 THOUSANDS/ÂΜL (ref 0–0.1)
BASOPHILS NFR BLD AUTO: 1 % (ref 0–1)
EOSINOPHIL # BLD AUTO: 0.02 THOUSAND/ÂΜL (ref 0–0.61)
EOSINOPHIL NFR BLD AUTO: 0 % (ref 0–6)
ERYTHROCYTE [DISTWIDTH] IN BLOOD BY AUTOMATED COUNT: 14.4 % (ref 11.6–15.1)
HCT VFR BLD AUTO: 32.5 % (ref 36.5–49.3)
HGB BLD-MCNC: 11.2 G/DL (ref 12–17)
IMM GRANULOCYTES # BLD AUTO: 0.01 THOUSAND/UL (ref 0–0.2)
IMM GRANULOCYTES NFR BLD AUTO: 0 % (ref 0–2)
LYMPHOCYTES # BLD AUTO: 3.41 THOUSANDS/ÂΜL (ref 0.6–4.47)
LYMPHOCYTES NFR BLD AUTO: 55 % (ref 14–44)
MCH RBC QN AUTO: 37 PG (ref 26.8–34.3)
MCHC RBC AUTO-ENTMCNC: 34.5 G/DL (ref 31.4–37.4)
MCV RBC AUTO: 107 FL (ref 82–98)
MONOCYTES # BLD AUTO: 0.57 THOUSAND/ÂΜL (ref 0.17–1.22)
MONOCYTES NFR BLD AUTO: 9 % (ref 4–12)
NEUTROPHILS # BLD AUTO: 2.13 THOUSANDS/ÂΜL (ref 1.85–7.62)
NEUTS SEG NFR BLD AUTO: 35 % (ref 43–75)
NRBC BLD AUTO-RTO: 0 /100 WBCS
PLATELET # BLD AUTO: 307 THOUSANDS/UL (ref 149–390)
PMV BLD AUTO: 10.3 FL (ref 8.9–12.7)
RBC # BLD AUTO: 3.03 MILLION/UL (ref 3.88–5.62)
WBC # BLD AUTO: 6.17 THOUSAND/UL (ref 4.31–10.16)

## 2024-05-22 PROCEDURE — 85025 COMPLETE CBC W/AUTO DIFF WBC: CPT

## 2024-05-22 PROCEDURE — 36415 COLL VENOUS BLD VENIPUNCTURE: CPT

## 2024-05-29 ENCOUNTER — APPOINTMENT (OUTPATIENT)
Dept: LAB | Facility: CLINIC | Age: 84
End: 2024-05-29
Payer: COMMERCIAL

## 2024-05-29 ENCOUNTER — TELEPHONE (OUTPATIENT)
Dept: HEMATOLOGY ONCOLOGY | Facility: CLINIC | Age: 84
End: 2024-05-29

## 2024-05-29 DIAGNOSIS — D53.9 MACROCYTIC ANEMIA: ICD-10-CM

## 2024-05-29 DIAGNOSIS — D64.9 ANEMIA, UNSPECIFIED TYPE: Primary | ICD-10-CM

## 2024-05-29 DIAGNOSIS — D70.8 OTHER NEUTROPENIA (HCC): ICD-10-CM

## 2024-05-29 DIAGNOSIS — N18.30 STAGE 3 CHRONIC KIDNEY DISEASE, UNSPECIFIED WHETHER STAGE 3A OR 3B CKD (HCC): ICD-10-CM

## 2024-05-29 LAB
BASOPHILS # BLD AUTO: 0.03 THOUSANDS/ÂΜL (ref 0–0.1)
BASOPHILS NFR BLD AUTO: 1 % (ref 0–1)
EOSINOPHIL # BLD AUTO: 0.06 THOUSAND/ÂΜL (ref 0–0.61)
EOSINOPHIL NFR BLD AUTO: 1 % (ref 0–6)
ERYTHROCYTE [DISTWIDTH] IN BLOOD BY AUTOMATED COUNT: 13.7 % (ref 11.6–15.1)
HCT VFR BLD AUTO: 27.5 % (ref 36.5–49.3)
HGB BLD-MCNC: 9.7 G/DL (ref 12–17)
IMM GRANULOCYTES # BLD AUTO: 0.01 THOUSAND/UL (ref 0–0.2)
IMM GRANULOCYTES NFR BLD AUTO: 0 % (ref 0–2)
LYMPHOCYTES # BLD AUTO: 3.64 THOUSANDS/ÂΜL (ref 0.6–4.47)
LYMPHOCYTES NFR BLD AUTO: 56 % (ref 14–44)
MCH RBC QN AUTO: 37.3 PG (ref 26.8–34.3)
MCHC RBC AUTO-ENTMCNC: 35.3 G/DL (ref 31.4–37.4)
MCV RBC AUTO: 106 FL (ref 82–98)
MONOCYTES # BLD AUTO: 0.63 THOUSAND/ÂΜL (ref 0.17–1.22)
MONOCYTES NFR BLD AUTO: 10 % (ref 4–12)
NEUTROPHILS # BLD AUTO: 2.05 THOUSANDS/ÂΜL (ref 1.85–7.62)
NEUTS SEG NFR BLD AUTO: 32 % (ref 43–75)
NRBC BLD AUTO-RTO: 0 /100 WBCS
PLATELET # BLD AUTO: 300 THOUSANDS/UL (ref 149–390)
PMV BLD AUTO: 10.2 FL (ref 8.9–12.7)
RBC # BLD AUTO: 2.6 MILLION/UL (ref 3.88–5.62)
WBC # BLD AUTO: 6.42 THOUSAND/UL (ref 4.31–10.16)

## 2024-05-29 PROCEDURE — 85025 COMPLETE CBC W/AUTO DIFF WBC: CPT

## 2024-05-29 PROCEDURE — 36415 COLL VENOUS BLD VENIPUNCTURE: CPT

## 2024-05-29 NOTE — TELEPHONE ENCOUNTER
"Received attached VM Via teams:  \"Your message, OK, I'm calling about a test I had today on blood work, on hematology. I, my doctors, Doctor Candi, my name is Gael Olivas and you can reach me at [phone] and I need to talk to him about a number. I had a blood test today and the number has gone down significant. Talked to 9.7 and I need to talk to him today about an infusion tomorrow. Thank you.\"    "

## 2024-05-30 ENCOUNTER — OFFICE VISIT (OUTPATIENT)
Dept: HEMATOLOGY ONCOLOGY | Facility: CLINIC | Age: 84
End: 2024-05-30
Payer: COMMERCIAL

## 2024-05-30 VITALS
DIASTOLIC BLOOD PRESSURE: 58 MMHG | SYSTOLIC BLOOD PRESSURE: 130 MMHG | WEIGHT: 144 LBS | BODY MASS INDEX: 20.62 KG/M2 | HEIGHT: 70 IN | OXYGEN SATURATION: 96 % | TEMPERATURE: 97.9 F | HEART RATE: 78 BPM

## 2024-05-30 DIAGNOSIS — D75.81 MYELOFIBROSIS (HCC): Primary | ICD-10-CM

## 2024-05-30 DIAGNOSIS — D61.01 PURE RED CELL APLASIA (HCC): ICD-10-CM

## 2024-05-30 PROCEDURE — 99215 OFFICE O/P EST HI 40 MIN: CPT | Performed by: PHYSICIAN ASSISTANT

## 2024-05-30 NOTE — PROGRESS NOTES
Hematology/Oncology Outpatient Follow- up Note  Gael Ferraro 84 y.o. 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      Patient had progressive anemia since 7/2023  (1/18/2023 hemoglobin 13.1, - 10 range 7/2023; 11/2023)  12/31/23 hemoglobin 6.9, , white blood cell count 3.48, normal differential, platelets 270  Admission x 3 1/2024 2nd to recurrent anemia and chest pain.       Haptoglobin normal  1/1/24, 1/21/24 1/21/24    LDH normal 211, YESSICA negative  Ferritin 1145; iron 285, TIBC < 339  Retic 0.5%     Mild leukopenia.     Initiated on prednisone, with significant response of hemoglobin up to 12.9 5/1/24    Aranesp 100mcg 1/24/24; 2/5/24; 3/28/24    2/22/24 hgb 11.2, he was on prednisone 20mg daily at that time    Workup revealed Parvovirus B19 antibody, IgG IgG Antibody POSITIVE.  IgM normal.    TCR gamma gene rearrangement detected.    Negative rheumatologic workup.  Normal hepatitis testing, normal flow cytometry    3/11/24 hgb 10.2,   3/12/24 prednisone 5mg po daily was continued  4/17/24 hgb 12.5 4/17/24 4/22/24 was on 5 mg prednisone every day, prednisone reduced to 5 mg every other day    5/1/24 hemoglobin 12.9  5/15/24 hemoglobin 11.8  5/22/24 hemoglobin 11.2, , white blood cell count 6.17, 35% neutrophils, 55% lymphocytes, platelets 307  5/29/24 hgb 9.7, , 32% segs, 56% lymphocytes      Aranesp 100mcg q 2 weeks to be resumed 5/30/24    He is advised to go back to prednisone 5mg po daily.  Continue weekly CBCD, call if any chest pain, lightheadedness, SOB.    F/U in 1 month.      In the  "future, we might taper prednisone and keep Aranesp      As per article, https://www.ncbi.nlm.nih.gov/books/OWH575114/    \"Other immunosuppressive medications, such as cyclosporine or azathioprine, may be considered in refractory cases. Some patients may also respond to therapies such as intravenous immunoglobulin (IVIG) or rituximab, which target specific components of the immune system.    EPO-stimulating agents may be necessary to treat anemia and improve symptoms.    In individuals with transient PRCA due to parvovirus B-19 infection, humoral immunity is responsible for containing the infection and symptom resolution. Immunity develops within 2 weeks and is lifelong, thus preventing reinfecton. IVIG at 2 g/kg divided over 5 days corrects parvovirus-induced PRCA in 93% of patients with persistent infection. However, up to 42% relapse within 4.3 months.\"            HPI:  Gael Nettles is an 83 y/o gentleman 1st seen inpatient 1/21/24 regarding refractory anemia.       PMH: CAD, HTN, CKD, dysphagia.  He is a never smoker.  No family history of liver disease.       November 18, 2023 hemoglobin 10.2, , white blood cell count 3.7, 48% neutrophils, 39% lymphocytes, 11% monocytes, platelet count 82, iron saturation 58%  Normal total bilirubin, total protein, albumin.  BUN 24, creatinine 1.24  B12 292-  PCP Dr. Lyman started him on oral B12.  ESR 1  SPEP: no monoclonal protein.  Alpha 2 region is decreased and may be suggestive of a decreased haptoglobin level associated with liver or hemolytic disease.  There is no suggestion of a monoclonal protein.        admission 12/31 - 1/2/24 2nd to chest pain  12/31/23 hemoglobin 6.9, , white blood cell count 3.48, normal differential, platelets 270  12/31/23 Folate 12.8     FOBT in the ED was negative  Patient had elevated troponin and suspected to be demand ischemia secondary to low hemoglobin and coronary artery disease.  Patient underwent a cardiac " catheterization and was noted to have coronary artery disease but no needing any stents.   Patient did receive 2 units of PRBC transfusion in the hospital on admission.      January 2, 2024 hemoglobin  8.8 at time of discharge  Admitted 1/21 - 1/24/24  Presented to the hospital on 1/21/2024 due to chest pain.   January 21, 2024 hemoglobin 6.4,   He was found to have troponin elevation. Patient was seen in consultation by cardiology and it was felt that he had a type II MI in the setting of anemia and severe coronary artery disease. Patient's symptoms improved with 2 units of packed red blood cells. Patient is not a candidate for revascularization until his anemia is worked up  Heme/onc was consulted to see patient.          1/24/24.Bone marrow biopsy was performed (resulted 2/5/24)        BONE MARROW - PERIPHERAL BLOOD, TOUCH PREPARATIONS, ASPIRATE SMEARS, CORE BIOPSY AND CLOT SECTION - RIGHT ILIAC CREST BIOPSY: LIMITED SAMPLE - CELLULAR, MYELOID-PREDOMINANT BONE MARROW WITH TRILINEAGE HEMATOPOIESIS WITH NO INCREASE IN BLASTS OR SIGNIFICANT DYSPLASIA; INCREASED RETICULIN FIBROSIS (MF-2 OF 3); CLONAL T-CELL POPULATION; INVOLVED BY PURE RED CELL APLASIA (PRCA);    This is a limited bone marrow biopsy due to inadequate aspirate smears and fragmented and crushed bone marrow biopsy. The findings are of a cellular bone marrow with essentially absent erythroid precursors, with no increase in blasts or significant dysplasia, although a full evaluation of dysplasia is precluded in the absence of adequate aspirate smears. There is increased reticulin fibrosis (MF-2 of 3) and adequate storage iron. Plasma cells and T-cells are relatively increased, the latter of which are CD8-skewed by reported flow cytometry, and are clonal by T-cell gamma and beta gene rearrangements. Additional ancillary testing reportedly shows a normal karyotype, a negative myelodysplastic syndrome (MDS) FISH panel and detects no pathogenic  mutations by NGS testing. There are essentially absent erythroid precursors with an inappropriately low reticulocyte count in the setting of progressive anemia. The overall findings are consistent with pure red cell aplasia (PRCA) with background clonal T-cells, moderately increased fibrosis (MF-2 of 3), and negative for a myeloid neoplasm.       2/26/24 extensive workup  Ferritin 1381     Vitamin B2 -  normal    Parvovirus B19 antibody, IgG and IgM - B19 IgG Antibody POSITIVE    IgG, IgA, IgM -normal    Leukemia/Lymphoma flow cytometry-flow cytometric analysis does not show significant numbers of circulating blasts or abnormal lymphoid or myeloid population.    Immunoglobulin free LT chains blood- normal    TAY w/Reflex if Positive - negative    Sjogren's Antibodies -normal    Anti-Neelima 1 Antibody -normal    Anti-scleroderma antibody -normal    DNA (DS) ANTIBODY -normal    C-reactive protein - -normal    Cyclic citrul peptide antibody, IgG -normal    Sedimentation rate, automated -normal    Lupus anticoagulant- not detected    Cardiolipin antibody- -normal    Beta-2 glycoprotein antibodies - normal    Rheumatoid Factor -normal    Chronic Hepatitis Panel - non reactive    Reticulocytes - 3.11%    Anti-neutrophilic cytoplasmic antibody- -normal    T Cell Receptor (TCR) Beta Gene Rearrang, PCR- negative    T Cell Receptor (Tcr) Gamma Gene Rearrang,PCR- clonal T-cell receptor gamma population was detected       Interval History:  noticed increased fatigue.      Review of Systems   Constitutional:  Positive for fatigue. Negative for appetite change, chills, diaphoresis, fever and unexpected weight change.   HENT:   Negative for mouth sores, nosebleeds, sore throat, tinnitus and voice change.    Eyes:  Negative for eye problems.   Respiratory:  Negative for chest tightness, cough, shortness of breath and wheezing.    Cardiovascular:  Negative for chest pain, leg swelling and palpitations.   Gastrointestinal:  Negative for  "abdominal distention, abdominal pain, blood in stool, constipation, diarrhea, nausea, rectal pain and vomiting.   Endocrine: Negative for hot flashes.   Genitourinary: Negative.     Musculoskeletal:  Negative for gait problem and myalgias.   Skin:  Negative for itching and rash.   Neurological:  Negative for dizziness, gait problem, headaches, light-headedness and numbness.   Hematological:  Negative for adenopathy.   Psychiatric/Behavioral:  Negative for confusion and sleep disturbance. The patient is not nervous/anxious.         Test Results:        Labs:   Lab Results   Component Value Date    HGB 9.7 (L) 05/29/2024    HCT 27.5 (L) 05/29/2024     (H) 05/29/2024     05/29/2024    WBC 6.42 05/29/2024    NRBC 0 05/29/2024    BANDSPCT 1 02/15/2024     Lab Results   Component Value Date    K 4.1 03/04/2024    CL 99 03/04/2024    CO2 28 03/04/2024    BUN 24 03/04/2024    CREATININE 0.99 03/04/2024    GLUCOSE 142 (H) 01/21/2024    CALCIUM 9.5 03/04/2024    CORRECTEDCA 9.5 02/10/2024    AST 22 03/04/2024    ALT 73 (H) 03/04/2024    ALKPHOS 58 03/04/2024    EGFR 69 03/04/2024           Imaging: No results found.          Allergies:   Allergies   Allergen Reactions    Hydrocodone-Acetaminophen GI Intolerance    Niacin GI Intolerance    Penicillins Hives     Current Medications: Reviewed  PMH/FH/SH:  Reviewed      Physical Exam:    There is no height or weight on file to calculate BSA.    Ht Readings from Last 3 Encounters:   04/22/24 5' 10\" (1.778 m)   04/02/24 5' 10\" (1.778 m)   03/12/24 5' 10\" (1.778 m)        Wt Readings from Last 3 Encounters:   04/22/24 64.9 kg (143 lb)   04/02/24 63.3 kg (139 lb 9.6 oz)   03/12/24 63.7 kg (140 lb 8 oz)        Temp Readings from Last 3 Encounters:   04/22/24 97.8 °F (36.6 °C) (Temporal)   04/02/24 97.6 °F (36.4 °C) (Tympanic)   03/12/24 97.7 °F (36.5 °C) (Temporal)        BP Readings from Last 3 Encounters:   04/22/24 142/68   04/02/24 142/64   03/28/24 120/60       "       Physical Exam  Vitals reviewed.   Constitutional:       General: He is not in acute distress.     Appearance: He is well-developed. He is not diaphoretic.   HENT:      Head: Normocephalic and atraumatic.   Eyes:      Conjunctiva/sclera: Conjunctivae normal.   Neck:      Trachea: No tracheal deviation.   Cardiovascular:      Rate and Rhythm: Normal rate and regular rhythm.      Heart sounds: No murmur heard.     No friction rub. No gallop.   Pulmonary:      Effort: Pulmonary effort is normal. No respiratory distress.      Breath sounds: Normal breath sounds. No wheezing or rales.   Chest:      Chest wall: No tenderness.   Abdominal:      General: There is no distension.      Palpations: Abdomen is soft.      Tenderness: There is no abdominal tenderness.   Musculoskeletal:      Cervical back: Normal range of motion and neck supple.   Lymphadenopathy:      Cervical: No cervical adenopathy.   Skin:     General: Skin is warm and dry.      Coloration: Skin is not pale.      Findings: No erythema.   Neurological:      Mental Status: He is alert. Mental status is at baseline.   Psychiatric:         Behavior: Behavior normal.         Thought Content: Thought content normal.         ECOG:       Emergency Contacts:    Extended Emergency Contact Information  Primary Emergency Contact: RonanChristi  Home Phone: [phone]  Relation: Spouse  Secondary Emergency Contact: Naveen Ferraro  Mobile Phone: [phone]  Relation: None

## 2024-05-31 ENCOUNTER — HOSPITAL ENCOUNTER (OUTPATIENT)
Dept: INFUSION CENTER | Facility: CLINIC | Age: 84
End: 2024-05-31
Payer: COMMERCIAL

## 2024-05-31 DIAGNOSIS — D64.9 ANEMIA, UNSPECIFIED TYPE: Primary | ICD-10-CM

## 2024-05-31 DIAGNOSIS — N18.30 STAGE 3 CHRONIC KIDNEY DISEASE, UNSPECIFIED WHETHER STAGE 3A OR 3B CKD (HCC): ICD-10-CM

## 2024-05-31 PROCEDURE — 96372 THER/PROPH/DIAG INJ SC/IM: CPT

## 2024-05-31 RX ADMIN — DARBEPOETIN ALFA 100 MCG: 100 INJECTION, SOLUTION INTRAVENOUS; SUBCUTANEOUS at 09:48

## 2024-05-31 NOTE — PROGRESS NOTES
Patient to infusion for Aranesp.  He offers no complaints. Injection tolerated in Right Arm.  Next appointment 6/14 1130 am, he declined AVS

## 2024-06-05 ENCOUNTER — APPOINTMENT (OUTPATIENT)
Dept: LAB | Facility: CLINIC | Age: 84
End: 2024-06-05
Payer: COMMERCIAL

## 2024-06-05 ENCOUNTER — TELEPHONE (OUTPATIENT)
Dept: HEMATOLOGY ONCOLOGY | Facility: CLINIC | Age: 84
End: 2024-06-05

## 2024-06-05 DIAGNOSIS — D70.8 OTHER NEUTROPENIA (HCC): ICD-10-CM

## 2024-06-05 DIAGNOSIS — D75.81 MYELOFIBROSIS (HCC): ICD-10-CM

## 2024-06-05 DIAGNOSIS — D53.9 MACROCYTIC ANEMIA: ICD-10-CM

## 2024-06-05 LAB
BASOPHILS # BLD AUTO: 0.02 THOUSANDS/ÂΜL (ref 0–0.1)
BASOPHILS NFR BLD AUTO: 1 % (ref 0–1)
EOSINOPHIL # BLD AUTO: 0.07 THOUSAND/ÂΜL (ref 0–0.61)
EOSINOPHIL NFR BLD AUTO: 2 % (ref 0–6)
ERYTHROCYTE [DISTWIDTH] IN BLOOD BY AUTOMATED COUNT: 13.3 % (ref 11.6–15.1)
HCT VFR BLD AUTO: 24.7 % (ref 36.5–49.3)
HGB BLD-MCNC: 8.4 G/DL (ref 12–17)
IMM GRANULOCYTES # BLD AUTO: 0.01 THOUSAND/UL (ref 0–0.2)
IMM GRANULOCYTES NFR BLD AUTO: 0 % (ref 0–2)
LYMPHOCYTES # BLD AUTO: 1.86 THOUSANDS/ÂΜL (ref 0.6–4.47)
LYMPHOCYTES NFR BLD AUTO: 44 % (ref 14–44)
MCH RBC QN AUTO: 36.2 PG (ref 26.8–34.3)
MCHC RBC AUTO-ENTMCNC: 34 G/DL (ref 31.4–37.4)
MCV RBC AUTO: 107 FL (ref 82–98)
MONOCYTES # BLD AUTO: 0.37 THOUSAND/ÂΜL (ref 0.17–1.22)
MONOCYTES NFR BLD AUTO: 9 % (ref 4–12)
NEUTROPHILS # BLD AUTO: 1.81 THOUSANDS/ÂΜL (ref 1.85–7.62)
NEUTS SEG NFR BLD AUTO: 44 % (ref 43–75)
NRBC BLD AUTO-RTO: 0 /100 WBCS
PLATELET # BLD AUTO: 279 THOUSANDS/UL (ref 149–390)
PMV BLD AUTO: 10.5 FL (ref 8.9–12.7)
RBC # BLD AUTO: 2.32 MILLION/UL (ref 3.88–5.62)
WBC # BLD AUTO: 4.14 THOUSAND/UL (ref 4.31–10.16)

## 2024-06-05 PROCEDURE — 85025 COMPLETE CBC W/AUTO DIFF WBC: CPT

## 2024-06-05 PROCEDURE — 36415 COLL VENOUS BLD VENIPUNCTURE: CPT

## 2024-06-05 RX ORDER — PREDNISONE 5 MG/1
20 TABLET ORAL DAILY
Qty: 90 TABLET | Refills: 1 | Status: SHIPPED | OUTPATIENT
Start: 2024-06-05

## 2024-06-05 NOTE — TELEPHONE ENCOUNTER
"Received attached VM via teams:  \"My name is Gael Alba. I am a patient of Doctor Christopher and I had a blood test this morning and my hemoglobin has gone from 9/7 to 8/4 and I'd like to talk to Doctor Shearer or anybody in his office to find out what I should do about tomorrow or right away. Tell him to call me back at 6:10. [phone] Thank you.\"    "

## 2024-06-05 NOTE — TELEPHONE ENCOUNTER
Reviewed attached information from Arabella Freeman PA-C with the patient. He verbalized understanding through read back. Refill pended for signature. Pt will repeat CBC next week.

## 2024-06-05 NOTE — TELEPHONE ENCOUNTER
Hemoglobin of 8.4     Spoke with patient he reports taking 5 mg prednisone daily. Had dose #3 of Aranesp on 5/31. Denies any signs of bleeding. Pt reports fatigue and ocassional lightheadedness.    Arabella, can you please review and advise.

## 2024-06-06 DIAGNOSIS — E78.2 MIXED HYPERLIPIDEMIA: ICD-10-CM

## 2024-06-07 RX ORDER — ROSUVASTATIN CALCIUM 10 MG/1
10 TABLET, COATED ORAL DAILY
Qty: 90 TABLET | Refills: 1 | Status: SHIPPED | OUTPATIENT
Start: 2024-06-07

## 2024-06-12 ENCOUNTER — APPOINTMENT (OUTPATIENT)
Dept: LAB | Facility: CLINIC | Age: 84
End: 2024-06-12
Payer: COMMERCIAL

## 2024-06-12 ENCOUNTER — TELEPHONE (OUTPATIENT)
Dept: HEMATOLOGY ONCOLOGY | Facility: CLINIC | Age: 84
End: 2024-06-12

## 2024-06-12 DIAGNOSIS — D64.9 ANEMIA, UNSPECIFIED TYPE: ICD-10-CM

## 2024-06-12 DIAGNOSIS — N18.30 STAGE 3 CHRONIC KIDNEY DISEASE, UNSPECIFIED WHETHER STAGE 3A OR 3B CKD (HCC): ICD-10-CM

## 2024-06-12 LAB
BASOPHILS # BLD AUTO: 0.02 THOUSANDS/ÂΜL (ref 0–0.1)
BASOPHILS NFR BLD AUTO: 0 % (ref 0–1)
EOSINOPHIL # BLD AUTO: 0.06 THOUSAND/ÂΜL (ref 0–0.61)
EOSINOPHIL NFR BLD AUTO: 1 % (ref 0–6)
ERYTHROCYTE [DISTWIDTH] IN BLOOD BY AUTOMATED COUNT: 13.2 % (ref 11.6–15.1)
HCT VFR BLD AUTO: 23.6 % (ref 36.5–49.3)
HGB BLD-MCNC: 8.1 G/DL (ref 12–17)
IMM GRANULOCYTES # BLD AUTO: 0.02 THOUSAND/UL (ref 0–0.2)
IMM GRANULOCYTES NFR BLD AUTO: 0 % (ref 0–2)
LYMPHOCYTES # BLD AUTO: 2.82 THOUSANDS/ÂΜL (ref 0.6–4.47)
LYMPHOCYTES NFR BLD AUTO: 50 % (ref 14–44)
MCH RBC QN AUTO: 36.3 PG (ref 26.8–34.3)
MCHC RBC AUTO-ENTMCNC: 34.3 G/DL (ref 31.4–37.4)
MCV RBC AUTO: 106 FL (ref 82–98)
MONOCYTES # BLD AUTO: 0.52 THOUSAND/ÂΜL (ref 0.17–1.22)
MONOCYTES NFR BLD AUTO: 9 % (ref 4–12)
NEUTROPHILS # BLD AUTO: 2.29 THOUSANDS/ÂΜL (ref 1.85–7.62)
NEUTS SEG NFR BLD AUTO: 40 % (ref 43–75)
NRBC BLD AUTO-RTO: 0 /100 WBCS
PLATELET # BLD AUTO: 342 THOUSANDS/UL (ref 149–390)
PMV BLD AUTO: 10.6 FL (ref 8.9–12.7)
RBC # BLD AUTO: 2.23 MILLION/UL (ref 3.88–5.62)
WBC # BLD AUTO: 5.73 THOUSAND/UL (ref 4.31–10.16)

## 2024-06-12 PROCEDURE — 85025 COMPLETE CBC W/AUTO DIFF WBC: CPT

## 2024-06-12 PROCEDURE — 36415 COLL VENOUS BLD VENIPUNCTURE: CPT

## 2024-06-12 NOTE — TELEPHONE ENCOUNTER
"Received attached VM via teams:  \"My name is Gael Olivas. I had a blood test today for hematology. It went down. It's down at 8.1 and I'm not feeling very well, kind of dizzy and I like to get an infusion. Or some advice from Doctor Woods or Doctor Duke could reach me at 6:10, [phone]. Thank you.\"    "

## 2024-06-12 NOTE — TELEPHONE ENCOUNTER
Per Dr. Vuong continue prednisone and increase Aranesp to 200 mcg. Pt called and notified. Pt reports orthostatic dizziness only, with some fatigue. Pt advised to proceed to the ED if needed for worsening of symptoms. Pt verbalize understanding.

## 2024-06-12 NOTE — PROGRESS NOTES
He is plateauing with the response, will increase Aranesp dose to 200 mcg subcu every 2 weeks to keep hemoglobin above 9.9    Continue prednisone 20 mg p.o. daily

## 2024-06-14 ENCOUNTER — HOSPITAL ENCOUNTER (EMERGENCY)
Facility: HOSPITAL | Age: 84
Discharge: HOME | End: 2024-06-14
Attending: EMERGENCY MEDICINE
Payer: COMMERCIAL

## 2024-06-14 ENCOUNTER — HOSPITAL ENCOUNTER (OUTPATIENT)
Dept: INFUSION CENTER | Facility: CLINIC | Age: 84
Discharge: HOME | End: 2024-06-14

## 2024-06-14 ENCOUNTER — TELEPHONE (OUTPATIENT)
Dept: INFUSION CENTER | Facility: CLINIC | Age: 84
End: 2024-06-14

## 2024-06-14 ENCOUNTER — APPOINTMENT (EMERGENCY)
Dept: RADIOLOGY | Facility: HOSPITAL | Age: 84
End: 2024-06-14
Payer: COMMERCIAL

## 2024-06-14 VITALS
DIASTOLIC BLOOD PRESSURE: 84 MMHG | TEMPERATURE: 97.8 F | HEART RATE: 72 BPM | OXYGEN SATURATION: 95 % | SYSTOLIC BLOOD PRESSURE: 151 MMHG | RESPIRATION RATE: 18 BRPM

## 2024-06-14 DIAGNOSIS — D64.9 ANEMIA, UNSPECIFIED TYPE: Primary | ICD-10-CM

## 2024-06-14 DIAGNOSIS — D64.9 SYMPTOMATIC ANEMIA: Primary | ICD-10-CM

## 2024-06-14 DIAGNOSIS — N18.30 STAGE 3 CHRONIC KIDNEY DISEASE, UNSPECIFIED WHETHER STAGE 3A OR 3B CKD (HCC): ICD-10-CM

## 2024-06-14 LAB
2HR DELTA HS TROPONIN: -4 NG/L
ABO GROUP BLD: NORMAL
ALBUMIN SERPL BCP-MCNC: 4.1 G/DL (ref 3.5–5)
ALP SERPL-CCNC: 57 U/L (ref 34–104)
ALT SERPL W P-5'-P-CCNC: 31 U/L (ref 7–52)
ANION GAP SERPL CALCULATED.3IONS-SCNC: 7 MMOL/L (ref 4–13)
AST SERPL W P-5'-P-CCNC: 18 U/L (ref 13–39)
BASOPHILS # BLD AUTO: 0.01 THOUSANDS/ÂΜL (ref 0–0.1)
BASOPHILS NFR BLD AUTO: 0 % (ref 0–1)
BILIRUB SERPL-MCNC: 0.66 MG/DL (ref 0.2–1)
BLD GP AB SCN SERPL QL: NEGATIVE
BUN SERPL-MCNC: 26 MG/DL (ref 5–25)
CALCIUM SERPL-MCNC: 9.6 MG/DL (ref 8.4–10.2)
CARDIAC TROPONIN I PNL SERPL HS: 12 NG/L
CARDIAC TROPONIN I PNL SERPL HS: 16 NG/L
CHLORIDE SERPL-SCNC: 96 MMOL/L (ref 96–108)
CO2 SERPL-SCNC: 30 MMOL/L (ref 21–32)
CREAT SERPL-MCNC: 1.12 MG/DL (ref 0.6–1.3)
EOSINOPHIL # BLD AUTO: 0.03 THOUSAND/ÂΜL (ref 0–0.61)
EOSINOPHIL NFR BLD AUTO: 1 % (ref 0–6)
ERYTHROCYTE [DISTWIDTH] IN BLOOD BY AUTOMATED COUNT: 13.1 % (ref 11.6–15.1)
GFR SERPL CREATININE-BSD FRML MDRD: 59 ML/MIN/1.73SQ M
GLUCOSE SERPL-MCNC: 181 MG/DL (ref 65–140)
HCT VFR BLD AUTO: 24 % (ref 36.5–49.3)
HGB BLD-MCNC: 8.5 G/DL (ref 12–17)
IMM GRANULOCYTES # BLD AUTO: 0.03 THOUSAND/UL (ref 0–0.2)
IMM GRANULOCYTES NFR BLD AUTO: 1 % (ref 0–2)
LYMPHOCYTES # BLD AUTO: 1.19 THOUSANDS/ÂΜL (ref 0.6–4.47)
LYMPHOCYTES NFR BLD AUTO: 23 % (ref 14–44)
MCH RBC QN AUTO: 36.8 PG (ref 26.8–34.3)
MCHC RBC AUTO-ENTMCNC: 35.4 G/DL (ref 31.4–37.4)
MCV RBC AUTO: 104 FL (ref 82–98)
MONOCYTES # BLD AUTO: 0.37 THOUSAND/ÂΜL (ref 0.17–1.22)
MONOCYTES NFR BLD AUTO: 7 % (ref 4–12)
NEUTROPHILS # BLD AUTO: 3.53 THOUSANDS/ÂΜL (ref 1.85–7.62)
NEUTS SEG NFR BLD AUTO: 68 % (ref 43–75)
NRBC BLD AUTO-RTO: 0 /100 WBCS
PLATELET # BLD AUTO: 331 THOUSANDS/UL (ref 149–390)
PMV BLD AUTO: 10.3 FL (ref 8.9–12.7)
POTASSIUM SERPL-SCNC: 4.3 MMOL/L (ref 3.5–5.3)
PROT SERPL-MCNC: 6.5 G/DL (ref 6.4–8.4)
RBC # BLD AUTO: 2.31 MILLION/UL (ref 3.88–5.62)
RH BLD: NEGATIVE
SODIUM SERPL-SCNC: 133 MMOL/L (ref 135–147)
SPECIMEN EXPIRATION DATE: NORMAL
WBC # BLD AUTO: 5.16 THOUSAND/UL (ref 4.31–10.16)

## 2024-06-14 PROCEDURE — 36415 COLL VENOUS BLD VENIPUNCTURE: CPT

## 2024-06-14 PROCEDURE — 86901 BLOOD TYPING SEROLOGIC RH(D): CPT | Performed by: EMERGENCY MEDICINE

## 2024-06-14 PROCEDURE — 86850 RBC ANTIBODY SCREEN: CPT | Performed by: EMERGENCY MEDICINE

## 2024-06-14 PROCEDURE — 86923 COMPATIBILITY TEST ELECTRIC: CPT

## 2024-06-14 PROCEDURE — 93005 ELECTROCARDIOGRAM TRACING: CPT

## 2024-06-14 PROCEDURE — 85025 COMPLETE CBC W/AUTO DIFF WBC: CPT | Performed by: EMERGENCY MEDICINE

## 2024-06-14 PROCEDURE — 71045 X-RAY EXAM CHEST 1 VIEW: CPT

## 2024-06-14 PROCEDURE — 86900 BLOOD TYPING SEROLOGIC ABO: CPT | Performed by: EMERGENCY MEDICINE

## 2024-06-14 PROCEDURE — 80053 COMPREHEN METABOLIC PANEL: CPT | Performed by: EMERGENCY MEDICINE

## 2024-06-14 PROCEDURE — 84484 ASSAY OF TROPONIN QUANT: CPT | Performed by: EMERGENCY MEDICINE

## 2024-06-14 PROCEDURE — P9016 RBC LEUKOCYTES REDUCED: HCPCS

## 2024-06-14 PROCEDURE — 99285 EMERGENCY DEPT VISIT HI MDM: CPT | Performed by: EMERGENCY MEDICINE

## 2024-06-14 PROCEDURE — 99285 EMERGENCY DEPT VISIT HI MDM: CPT

## 2024-06-14 PROCEDURE — 36430 TRANSFUSION BLD/BLD COMPNT: CPT

## 2024-06-14 NOTE — ED NOTES
Pt ambulated approximately 50 feet in hallway with cane, steady gait noted. Pt reports feeling weakness due to lying in stretcher. Denies dizziness. Provider aware and bedside      Siomara Mendoza RN  06/14/24 8059

## 2024-06-14 NOTE — ED PROVIDER NOTES
History  Chief Complaint   Patient presents with    Shortness of Breath     Pt presents to the ED from home via EMS with exertional SOB. Reports increased weakness, reports last hg was around 8.        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  Patient associates this as his symptomatic anemia equivalents.  Denies fever, chills, nausea/vomiting/diarrhea, chest pain, change in bowel or bladder, orthopnea, lower extremity edema, calf tenderness or dissymmetry.  Denies hematochezia, melena, hematemesis, abdominal pain.  No recent changes in medications.  Family at bedside.  Patient was to receive Aranesp today at [facility] but was too weak in order to present.  Patient for him and family did require assistance ambulating around the home today given his profound weakness which is at his baseline.  EMS was called but patient did walk out of the home to the vehicle with EMS assistance.    Prior to Admission Medications   Prescriptions Last Dose Informant Patient Reported? Taking?   Cholecalciferol 50 MCG (2000 UT) CAPS  Self Yes No   Sig: Take 1 capsule by mouth in the morning   LORazepam (ATIVAN) 1 mg tablet  Self Yes No   Sig: Take 1 mg by mouth 3 (three) times a day as needed for anxiety   acetaminophen (TYLENOL) 325 mg tablet  Self No No   Sig: Take 2 tablets (650 mg total) by mouth every 6 (six) hours as needed for mild pain, headaches or fever   amLODIPine (NORVASC) 2.5 mg tablet  Self No No   Sig: Take 1 tablet (2.5 mg total) by mouth daily   aspirin (ECOTRIN LOW STRENGTH) 81 mg EC tablet  Self No No   Sig: Take 1 tablet (81 mg total) by mouth daily   cyanocobalamin (VITAMIN B-12) 1000 MCG tablet  Self No No   Sig: Take 1 tablet (1,000 mcg total) by mouth daily    hydrochlorothiazide (HYDRODIURIL) 25 mg tablet  Self No No   Sig: Take 1 tablet (25 mg total) by mouth daily   metoprolol succinate (TOPROL-XL) 100 mg 24 hr tablet  Self No No   Sig: Take 0.5 tablets (50 mg total) by mouth 2 (two) times a day   nitroglycerin (NITROSTAT) 0.4 mg SL tablet  Self No No   Sig: Place 1 tablet (0.4 mg total) under the tongue every 5 (five) minutes as needed for chest pain   potassium chloride (MICRO-K) 10 MEQ CR capsule  Self No No   Sig: Take 1 capsule (10 mEq total) by mouth daily   predniSONE 5 mg tablet   No No   Sig: Take 4 tablets (20 mg total) by mouth daily Will taper based on response   ranolazine (RANEXA) 500 mg 12 hr tablet  Self No No   Sig: Take 1 tablet (500 mg total) by mouth every 12 (twelve) hours   rosuvastatin (CRESTOR) 10 MG tablet   No No   Sig: Take 1 tablet (10 mg total) by mouth daily   sodium chloride (OCEAN) 0.65 % nasal spray  Self No No   Si spray into each nostril every hour as needed for congestion for up to 14 days   ticagrelor (BRILINTA) 90 MG  Self No No   Sig: Take 1 tablet (90 mg total) by mouth every 12 (twelve) hours      Facility-Administered Medications: None       Past Medical History:   Diagnosis Date    Low hemoglobin        Past Surgical History:   Procedure Laterality Date    CARDIAC CATHETERIZATION Left 2024    Procedure: Cardiac Left Heart Cath;  Surgeon: Ana Garcia DO;  Location: [facility];  Service: Cardiology    CARDIAC CATHETERIZATION N/A 2024    Procedure: Cardiac Coronary Angiogram;  Surgeon: Ana Garcia DO;  Location: [facility];  Service: Cardiology    CARDIAC CATHETERIZATION N/A 2024    Procedure: Cardiac RHC;  Surgeon: Ana Garcia DO;  Location: [facility];  Service: Cardiology    IR BIOPSY BONE MARROW  2024       History reviewed. No pertinent family history.  I have reviewed and agree with the history as documented.    E-Cigarette/Vaping    E-Cigarette Use  Never User      E-Cigarette/Vaping Substances     Social History     Tobacco Use    Smoking status: Never    Smokeless tobacco: Never   Vaping Use    Vaping status: Never Used   Substance Use Topics    Alcohol use: Not Currently    Drug use: Never       Review of Systems   Constitutional:  Negative for chills and fever.   HENT:  Negative for ear pain and sore throat.    Eyes:  Negative for pain and visual disturbance.   Respiratory:  Positive for shortness of breath. Negative for cough.    Cardiovascular:  Negative for chest pain and palpitations.   Gastrointestinal:  Negative for abdominal pain and vomiting.   Genitourinary:  Negative for dysuria and hematuria.   Musculoskeletal:  Negative for arthralgias and back pain.   Skin:  Negative for color change and rash.   Neurological:  Positive for weakness. Negative for seizures and syncope.   All other systems reviewed and are negative.      Physical Exam  Physical Exam  Vitals and nursing note reviewed.   Constitutional:       General: He is not in acute distress.     Appearance: He is well-developed.   HENT:      Head: Normocephalic and atraumatic.   Eyes:      Conjunctiva/sclera: Conjunctivae normal.   Cardiovascular:      Rate and Rhythm: Normal rate and regular rhythm.      Heart sounds: No murmur heard.  Pulmonary:      Effort: Pulmonary effort is normal. No respiratory distress.      Breath sounds: Normal breath sounds.   Abdominal:      Palpations: Abdomen is soft.      Tenderness: There is no abdominal tenderness.   Musculoskeletal:         General: No swelling.      Cervical back: Neck supple.   Skin:     General: Skin is warm and dry.      Capillary Refill: Capillary refill takes less than 2 seconds.   Neurological:      Mental Status: He is alert.   Psychiatric:         Mood and Affect: Mood normal.         Vital Signs  ED Triage Vitals   Temperature Pulse Respirations Blood Pressure SpO2   06/14/24 1039 06/14/24 1037 06/14/24 1037 06/14/24 1037 06/14/24  1037   98.2 °F (36.8 °C) 69 20 160/70 100 %      Temp Source Heart Rate Source Patient Position - Orthostatic VS BP Location FiO2 (%)   06/14/24 1039 06/14/24 1037 06/14/24 1037 06/14/24 1037 --   Oral Monitor Lying Right arm       Pain Score       --                  Vitals:    06/14/24 1037   BP: 160/70   Pulse: 69   Patient Position - Orthostatic VS: Lying         Visual Acuity      ED Medications  Medications - No data to display    Diagnostic Studies  Results Reviewed       Procedure Component Value Units Date/Time    HS Troponin I 2hr [[id number]] Collected: 06/14/24 1250    Lab Status: In process Specimen: Blood from Arm, Left Updated: 06/14/24 1255    HS Troponin I 4hr [[id number]]     Lab Status: No result Specimen: Blood     HS Troponin 0hr (reflex protocol) [[id number]]  (Normal) Collected: 06/14/24 1045    Lab Status: Final result Specimen: Blood from Arm, Left Updated: 06/14/24 1131     hs TnI 0hr 16 ng/L     Comprehensive metabolic panel [[id number]]  (Abnormal) Collected: 06/14/24 1045    Lab Status: Final result Specimen: Blood from Arm, Left Updated: 06/14/24 1131     Sodium 133 mmol/L      Potassium 4.3 mmol/L      Chloride 96 mmol/L      CO2 30 mmol/L      ANION GAP 7 mmol/L      BUN 26 mg/dL      Creatinine 1.12 mg/dL      Glucose 181 mg/dL      Calcium 9.6 mg/dL      AST 18 U/L      ALT 31 U/L      Alkaline Phosphatase 57 U/L      Total Protein 6.5 g/dL      Albumin 4.1 g/dL      Total Bilirubin 0.66 mg/dL      eGFR 59 ml/min/1.73sq m     Narrative:      National Kidney Disease Foundation guidelines for Chronic Kidney Disease (CKD):     Stage 1 with normal or high GFR (GFR > 90 mL/min/1.73 square meters)    Stage 2 Mild CKD (GFR = 60-89 mL/min/1.73 square meters)    Stage 3A Moderate CKD (GFR = 45-59 mL/min/1.73 square meters)    Stage 3B Moderate CKD (GFR = 30-44 mL/min/1.73 square meters)    Stage 4 Severe CKD (GFR = 15-29 mL/min/1.73 square meters)    Stage 5 End Stage CKD (GFR <15 mL/min/1.73  square meters)  Note: GFR calculation is accurate only with a steady state creatinine    CBC and differential [[id number]]  (Abnormal) Collected: 06/14/24 1045    Lab Status: Final result Specimen: Blood from Arm, Left Updated: 06/14/24 1109     WBC 5.16 Thousand/uL      RBC 2.31 Million/uL      Hemoglobin 8.5 g/dL      Hematocrit 24.0 %       fL      MCH 36.8 pg      MCHC 35.4 g/dL      RDW 13.1 %      MPV 10.3 fL      Platelets 331 Thousands/uL      nRBC 0 /100 WBCs      Segmented % 68 %      Immature Grans % 1 %      Lymphocytes % 23 %      Monocytes % 7 %      Eosinophils Relative 1 %      Basophils Relative 0 %      Absolute Neutrophils 3.53 Thousands/µL      Absolute Immature Grans 0.03 Thousand/uL      Absolute Lymphocytes 1.19 Thousands/µL      Absolute Monocytes 0.37 Thousand/µL      Eosinophils Absolute 0.03 Thousand/µL      Basophils Absolute 0.01 Thousands/µL                    XR chest 1 view portable   ED Interpretation by Jean-Claude Guevara DO (06/14 1347)   NAD      Final Result by Levi Xiong MD (06/14 1414)      No acute cardiopulmonary disease.            Workstation performed: [id number]                    Procedures  ECG 12 Lead Documentation Only    Date/Time: 6/14/2024 3:18 PM    Performed by: Jean-Claude Guevara DO  Authorized by: Jean-Claude Guevara DO    Indications / Diagnosis:  SCOTT  ECG reviewed by me, the ED Provider: yes    Patient location:  ED  Previous ECG:     Previous ECG:  Compared to current  Comments:      Sinus, 64, nonspecific ST/T wave abnormality. T wave improvements           ED Course  ED Course as of 06/14/24 1524   Fri Jun 14, 2024   1337 Attempting to contact various members of heme/onc team over last 1.5 hrs. Attempted to call via Teams. System not allowing. Unable to contact directly via numbers provided on Teams.    1340 Unable to contact via office number as well. Attempting via .                                                Medical Decision  Making  84-year-old male presenting to the emergency department with dyspnea on exertion stating that this is his anemia signs and symptoms equivalent.  CBC here with continued decreased hemoglobin in the eights in the setting of CAD.  Patient denies any chest pain.  EKG improved over prior.  First troponin 16.  Second troponin to be completed.  Chest x-ray normal.  On exam or labs, no other reason for weakness and dyspnea on exertion outside of his anemia at this time.  Spoke with Dr. Kiser who noted that patient is unable to get his Aranesp as he is requesting in the emergency department as this is only approved via insurance for outpatient.  He also agrees with 1 unit PRBC here for the patient.  I explained to both patient and family that there is only a chance that this PRBC provides him with enough strength to ambulate successfully under his own power.  At the end of the unit, patient is unable to ambulate under his own strength, it is going to be our recommendation to have him admitted to the hospital for further evaluation.  Case to be signed out to oncoming physician pending completion of blood and if pt successfully able to ambulate. Consideration may be for AMA should be continue with global weakness, at discretion of that attending.     Amount and/or Complexity of Data Reviewed  Independent Historian: caregiver and spouse  External Data Reviewed: notes.  Labs: ordered.  Radiology: ordered and independent interpretation performed.  ECG/medicine tests: ordered and independent interpretation performed.    Risk  Decision regarding hospitalization.             Disposition  Final diagnoses:   None     Time reflects when diagnosis was documented in both MDM as applicable and the Disposition within this note       Time User Action Codes Description Comment    6/14/2024  1:56 PM Jean-Claude Guevara Add [D64.9] Symptomatic anemia     6/14/2024  1:56 PM Jean-Claude Guevara Remove [D64.9] Symptomatic anemia           ED  Disposition       ED Disposition   Discharge    Condition   Stable    Date/Time   Fri Jun 14, 2024  1:56 PM    Comment   Gael Ferraro discharge to home/self care.                   Follow-up Information    None         Patient's Medications   Discharge Prescriptions    No medications on file       No discharge procedures on file.    PDMP Review       None            ED Provider  Electronically Signed by             Jean-Claude Guevara DO  06/14/24 1523

## 2024-06-14 NOTE — ED CARE HANDOFF
Emergency Department Sign Out Note        Sign out and transfer of care from Dr. Guevara. See Separate Emergency Department note.     The patient, Gael Ferraro, was evaluated by the previous provider for dyspnea, anemia.    Workup Completed:  Labs    ED Course / Workup Pending (followup):  Pending red cell transfusion and reassessment                                  ED Course as of 06/14/24 1809   Fri Jun 14, 2024   1603 S/O: 1 Unit PRCs and re-evaluation   1804 Packed red cell transfusion finished.  Patient still reports feeling a little bit weak but he is able to get up on his own and ambulate around the department with his cane without difficulty.  He would like to be discharged home.     Procedures  Medical Decision Making  Amount and/or Complexity of Data Reviewed  Labs: ordered.  Radiology: ordered and independent interpretation performed.            Disposition  Final diagnoses:   Symptomatic anemia     Time reflects when diagnosis was documented in both MDM as applicable and the Disposition within this note       Time User Action Codes Description Comment    6/14/2024  1:56 PM Jean-Claude Guevara Add [D64.9] Symptomatic anemia     6/14/2024  1:56 PM Jean-Claude Guevara Remove [D64.9] Symptomatic anemia     6/14/2024  6:07 PM Ricardo Villarreal Add [D64.9] Symptomatic anemia           ED Disposition       ED Disposition   Discharge    Condition   Stable    Date/Time   Fri Jun 14, 2024  1:56 PM    Comment   Gael Ferraro discharge to home/self care.                   Follow-up Information       Follow up With Specialties Details Why Contact Info Additional Information    [facility] Emergency Medicine   [address] [id number] [phone] [facility], [address], [place], [address]          Patient's Medications   Discharge Prescriptions    No medications on file     No discharge  procedures on file.       ED Provider  Electronically Signed by     Ricardo Villarreal MD  06/14/24 5083

## 2024-06-14 NOTE — TELEPHONE ENCOUNTER
Patient's family called due to patient being hospitalized, having to cancel 6/14 Aranespt injection appointment. Rescheduled to 6/18. Could the date in his plan be changed to then? Additionally, they voiced that his prednisone dosage was increased and was wondering if that would effect his treatments. Thank you!

## 2024-06-15 LAB
ABO GROUP BLD BPU: NORMAL
ATRIAL RATE: 64 BPM
BPU ID: NORMAL
CROSSMATCH: NORMAL
P AXIS: 67 DEGREES
PR INTERVAL: 146 MS
QRS AXIS: 41 DEGREES
QRSD INTERVAL: 94 MS
QT INTERVAL: 422 MS
QTC INTERVAL: 435 MS
T WAVE AXIS: 15 DEGREES
UNIT DISPENSE STATUS: NORMAL
UNIT PRODUCT CODE: NORMAL
UNIT PRODUCT VOLUME: 350 ML
UNIT RH: NORMAL
VENTRICULAR RATE: 64 BPM

## 2024-06-15 PROCEDURE — 93010 ELECTROCARDIOGRAM REPORT: CPT | Performed by: INTERNAL MEDICINE

## 2024-06-18 ENCOUNTER — HOSPITAL ENCOUNTER (OUTPATIENT)
Dept: INFUSION CENTER | Facility: CLINIC | Age: 84
Discharge: HOME | End: 2024-06-18
Payer: COMMERCIAL

## 2024-06-18 ENCOUNTER — TELEMEDICINE (OUTPATIENT)
Dept: HEMATOLOGY ONCOLOGY | Facility: CLINIC | Age: 84
End: 2024-06-18
Payer: COMMERCIAL

## 2024-06-18 VITALS — SYSTOLIC BLOOD PRESSURE: 134 MMHG | HEART RATE: 65 BPM | DIASTOLIC BLOOD PRESSURE: 56 MMHG

## 2024-06-18 DIAGNOSIS — N18.30 STAGE 3 CHRONIC KIDNEY DISEASE, UNSPECIFIED WHETHER STAGE 3A OR 3B CKD (HCC): Primary | ICD-10-CM

## 2024-06-18 DIAGNOSIS — D64.9 SYMPTOMATIC ANEMIA: ICD-10-CM

## 2024-06-18 DIAGNOSIS — D60.0 CHRONIC ACQUIRED PURE RED CELL APLASIA (HCC): ICD-10-CM

## 2024-06-18 DIAGNOSIS — D64.9 ANEMIA, UNSPECIFIED TYPE: ICD-10-CM

## 2024-06-18 DIAGNOSIS — D61.01 PURE RED CELL APLASIA (HCC): Primary | ICD-10-CM

## 2024-06-18 DIAGNOSIS — D75.81 MYELOFIBROSIS (HCC): ICD-10-CM

## 2024-06-18 PROCEDURE — 99442 PR PHYS/QHP TELEPHONE EVALUATION 11-20 MIN: CPT | Performed by: PHYSICIAN ASSISTANT

## 2024-06-18 RX ADMIN — DARBEPOETIN ALFA 200 MCG: 200 INJECTION, SOLUTION INTRAVENOUS; SUBCUTANEOUS at 09:46

## 2024-06-18 NOTE — PLAN OF CARE
Problem: Potential for Falls  Goal: Patient will remain free of falls  Description: INTERVENTIONS:  - Educate patient/family on patient safety including physical limitations  - Instruct patient to call for assistance with activity   - Consult OT/PT to assist with strengthening/mobility   - Keep Call bell within reach  - Keep bed low and locked with side rails adjusted as appropriate  - Keep care items and personal belongings within reach  - Initiate and maintain comfort rounds  - Make Fall Risk Sign visible to staff  -  - Apply yellow socks and bracelet for high fall risk patients  - Consider moving patient to room near nurses station  Outcome: Progressing     Problem: Knowledge Deficit  Goal: Patient/family/caregiver demonstrates understanding of disease process, treatment plan, medications, and discharge instructions  Description: Complete learning assessment and assess knowledge base.  Interventions:  - Provide teaching at level of understanding  - Provide teaching via preferred learning methods  Outcome: Progressing

## 2024-06-18 NOTE — PROGRESS NOTES
Patient is here for [facility] and offers no complaints. Labs from 6/14/24 reviewed, hgb 8.5 which meets parameters. Next appointment confirmed 7/2/24 at 1130 at [facility]. Patient declined avs

## 2024-06-18 NOTE — PROGRESS NOTES
Virtual Regular Visit    06/18/24     It was my intent to perform this visit via video technology but the patient was not able to do a video connection so the visit was completed via audio telephone only.     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      Patient had progressive anemia since 7/2023  (1/18/2023 hemoglobin 13.1, - 10 range 7/2023; 11/2023)  12/31/23 hemoglobin 6.9, , white blood cell count 3.48, normal differential, platelets 270  Admission x 3 1/2024 2nd to recurrent anemia and chest pain.       Haptoglobin normal  1/1/24, 1/21/24 1/21/24    LDH normal 211, YESSICA negative  Ferritin 1145; iron 285, TIBC < 339  Retic 0.5%     Mild leukopenia.      Initiated on prednisone, with significant response of hemoglobin up to 12.9 5/1/24     Aranesp 100mcg 1/24/24; 2/5/24; 3/28/24     2/22/24 hgb 11.2, he was on prednisone 20mg daily at that time     Workup revealed Parvovirus B19 antibody, IgG IgG Antibody POSITIVE. IgM normal. TCR gamma gene rearrangement detected.   Negative rheumatologic workup. Normal hepatitis testing, normal flow cytometry     3/11/24 hgb 10.2,   3/12/24 prednisone 5mg po daily was continued  4/17/24 hgb 12.5 4/17/24 4/22/24 was on 5 mg prednisone every day, prednisone reduced to 5 mg every other day     5/1/24 hemoglobin 12.9  5/15/24 hemoglobin 11.8  5/22/24 hemoglobin 11.2, , white blood cell count 6.17, 35% neutrophils, 55% lymphocytes,  "platelets 307  5/29/24 hgb 9.7, , 32% segs, 56% lymphocytes      5/30/24  He was advised to go back to prednisone 5mg po daily  5/31/24 Aranesp 100mcg q 2 weeks resumed      6/5/24 hgb decreased further to 8.4  Prednisone increased to 20mg/ day    6/12/24 hgb decreased further to 8.1.  Patient symptomatic with dizziness.    6/14/24 presented to ED via EMS regarding weakness.  Was due to present to infusion center for Aranesp but felt too weak.      6/14/24 hgb 8.5.  S/P 1 U prbc with some improvement in his weakness.    6/18/24 Aranesp dose increased to 200mcg     Patient will go for repeat CBCD 6/26 or 6/27/24.  Next Aranesp due 7/2/24         In the future, we might taper prednisone and keep Aranesp        As per article, https://www.ncbi.nlm.nih.gov/books/EGC680769/     \"Other immunosuppressive medications, such as cyclosporine or azathioprine, may be considered in refractory cases. Some patients may also respond to therapies such as intravenous immunoglobulin (IVIG) or rituximab, which target specific components of the immune system.     EPO-stimulating agents may be necessary to treat anemia and improve symptoms.     In individuals with transient PRCA due to parvovirus B-19 infection, humoral immunity is responsible for containing the infection and symptom resolution. Immunity develops within 2 weeks and is lifelong, thus preventing reinfecton. IVIG at 2 g/kg divided over 5 days corrects parvovirus-induced PRCA in 93% of patients with persistent infection. However, up to 42% relapse within 4.3 months.\"               HPI:  Gael Nettles is an 83 y/o gentleman 1st seen inpatient 1/21/24 regarding refractory anemia.       PMH: CAD, HTN, CKD, dysphagia.  He is a never smoker.  No family history of liver disease.       November 18, 2023 hemoglobin 10.2, , white blood cell count 3.7, 48% neutrophils, 39% lymphocytes, 11% monocytes, platelet count 82, iron saturation 58%  Normal total " bilirubin, total protein, albumin.  BUN 24, creatinine 1.24  B12 292-  PCP Dr. Lyman started him on oral B12.  ESR 1  SPEP: no monoclonal protein.  Alpha 2 region is decreased and may be suggestive of a decreased haptoglobin level associated with liver or hemolytic disease. There is no suggestion of a monoclonal protein.        admission 12/31 - 1/2/24 2nd to chest pain  12/31/23 hemoglobin 6.9, , white blood cell count 3.48, normal differential, platelets 270  12/31/23 Folate 12.8     FOBT in the ED was negative  Patient had elevated troponin and suspected to be demand ischemia secondary to low hemoglobin and coronary artery disease.  Patient underwent a cardiac catheterization and was noted to have coronary artery disease but no needing any stents.   Patient did receive 2 units of PRBC transfusion in the hospital on admission.      January 2, 2024 hemoglobin  8.8 at time of discharge  Admitted 1/21 - 1/24/24  Presented to the hospital on 1/21/2024 due to chest pain.   January 21, 2024 hemoglobin 6.4,   He was found to have troponin elevation. Patient was seen in consultation by cardiology and it was felt that he had a type II MI in the setting of anemia and severe coronary artery disease. Patient's symptoms improved with 2 units of packed red blood cells. Patient is not a candidate for revascularization until his anemia is worked up  Heme/onc was consulted to see patient.          1/24/24.Bone marrow biopsy was performed (resulted 2/5/24)        BONE MARROW - PERIPHERAL BLOOD, TOUCH PREPARATIONS, ASPIRATE SMEARS, CORE BIOPSY AND CLOT SECTION - RIGHT ILIAC CREST BIOPSY: LIMITED SAMPLE - CELLULAR, MYELOID-PREDOMINANT BONE MARROW WITH TRILINEAGE HEMATOPOIESIS WITH NO INCREASE IN BLASTS OR SIGNIFICANT DYSPLASIA; INCREASED RETICULIN FIBROSIS (MF-2 OF 3); CLONAL T-CELL POPULATION; INVOLVED BY PURE RED CELL APLASIA (PRCA);    This is a limited bone marrow biopsy due to inadequate aspirate smears and  fragmented and crushed bone marrow biopsy. The findings are of a cellular bone marrow with essentially absent erythroid precursors, with no increase in blasts or significant dysplasia, although a full evaluation of dysplasia is precluded in the absence of adequate aspirate smears. There is increased reticulin fibrosis (MF-2 of 3) and adequate storage iron. Plasma cells and T-cells are relatively increased, the latter of which are CD8-skewed by reported flow cytometry, and are clonal by T-cell gamma and beta gene rearrangements. Additional ancillary testing reportedly shows a normal karyotype, a negative myelodysplastic syndrome (MDS) FISH panel and detects no pathogenic mutations by NGS testing. There are essentially absent erythroid precursors with an inappropriately low reticulocyte count in the setting of progressive anemia. The overall findings are consistent with pure red cell aplasia (PRCA) with background clonal T-cells, moderately increased fibrosis (MF-2 of 3), and negative for a myeloid neoplasm.         2/26/24 extensive workup  Ferritin 1381      Vitamin B2 - normal    Parvovirus B19 antibody, IgG and IgM - B19 IgG Antibody POSITIVE    IgG, IgA, IgM -normal    Leukemia/Lymphoma flow cytometry-flow cytometric analysis does not show significant numbers of circulating blasts or abnormal lymphoid or myeloid population.    Immunoglobulin free LT chains blood- normal    TAY w/Reflex if Positive - negative    Sjogren's Antibodies -normal    Anti-Neelima 1 Antibody -normal    Anti-scleroderma antibody -normal    DNA (DS) ANTIBODY -normal    C-reactive protein - -normal    Cyclic citrul peptide antibody, IgG -normal    Sedimentation rate, automated -normal    Lupus anticoagulant- not detected    Cardiolipin antibody- -normal    Beta-2 glycoprotein antibodies - normal    Rheumatoid Factor -normal    Chronic Hepatitis Panel - non reactive    Reticulocytes - 3.11%    Anti-neutrophilic cytoplasmic antibody- -normal    T  Cell Receptor (TCR) Beta Gene Rearrang, PCR- negative    T Cell Receptor (Tcr) Gamma Gene Rearrang,PCR- clonal T-cell receptor gamma population was detected          Interval History    Feeling better post transfusion prbc but still tired.      Review of Systems     Physical Exam     Past Medical History:  No date: Low hemoglobin     Past Surgical History:  1/2/2024: CARDIAC CATHETERIZATION; Left      Comment:  Procedure: Cardiac Left Heart Cath;  Surgeon: Ana Garcia DO;  Location: [facility];  Service:                Cardiology  1/2/2024: CARDIAC CATHETERIZATION; N/A      Comment:  Procedure: Cardiac Coronary Angiogram;  Surgeon:                Ana Garcia DO;  Location: [facility];                 Service: Cardiology  1/2/2024: CARDIAC CATHETERIZATION; N/A      Comment:  Procedure: Cardiac RHC;  Surgeon: Ana Garcia DO;               Location: [facility];  Service: Cardiology  1/24/2024: IR BIOPSY BONE MARROW     Current Outpatient Medications on File Prior to Visit   Medication Sig Dispense Refill    acetaminophen (TYLENOL) 325 mg tablet Take 2 tablets (650 mg total) by mouth every 6 (six) hours as needed for mild pain, headaches or fever      amLODIPine (NORVASC) 2.5 mg tablet Take 1 tablet (2.5 mg total) by mouth daily 30 tablet 0    aspirin (ECOTRIN LOW STRENGTH) 81 mg EC tablet Take 1 tablet (81 mg total) by mouth daily      Cholecalciferol 50 MCG (2000 UT) CAPS Take 1 capsule by mouth in the morning      cyanocobalamin (VITAMIN B-12) 1000 MCG tablet Take 1 tablet (1,000 mcg total) by mouth daily 30 tablet 0    hydrochlorothiazide (HYDRODIURIL) 25 mg tablet Take 1 tablet (25 mg total) by mouth daily 30 tablet 0    LORazepam (ATIVAN) 1 mg tablet Take 1 mg by mouth 3 (three) times a day as needed for anxiety      metoprolol succinate (TOPROL-XL) 100 mg 24 hr tablet Take 0.5 tablets (50 mg total) by mouth 2 (two) times a day 30 tablet 0     nitroglycerin (NITROSTAT) 0.4 mg SL tablet Place 1 tablet (0.4 mg total) under the tongue every 5 (five) minutes as needed for chest pain 30 tablet 0    potassium chloride (MICRO-K) 10 MEQ CR capsule Take 1 capsule (10 mEq total) by mouth daily 90 capsule 3    predniSONE 5 mg tablet Take 4 tablets (20 mg total) by mouth daily Will taper based on response 90 tablet 1    ranolazine (RANEXA) 500 mg 12 hr tablet Take 1 tablet (500 mg total) by mouth every 12 (twelve) hours 60 tablet 0    rosuvastatin (CRESTOR) 10 MG tablet Take 1 tablet (10 mg total) by mouth daily 90 tablet 1    sodium chloride (OCEAN) 0.65 % nasal spray 1 spray into each nostril every hour as needed for congestion for up to 14 days 10 mL 0    ticagrelor (BRILINTA) 90 MG Take 1 tablet (90 mg total) by mouth every 12 (twelve) hours 180 tablet 3     No current facility-administered medications on file prior to visit.          The patient was identified by name and date of birth. Gael Ferraro was informed that this is a telemedicine visit and that the visit is being conducted through Telephone.  My office door was closed. No one else was in the room.  He acknowledged consent and understanding of privacy and security of the video platform. The patient has agreed to participate and understands they can discontinue the visit at any time.    Patient is aware this is a billable service.     I invested 20 minutes thoroughly reviewing the patient's medical history and discussing the care plan directly with the patient.      Verification of patient location:  Patient is located in [place] where I have an active license.

## 2024-06-25 DIAGNOSIS — D75.81 MYELOFIBROSIS (HCC): ICD-10-CM

## 2024-06-26 ENCOUNTER — APPOINTMENT (OUTPATIENT)
Dept: LAB | Facility: CLINIC | Age: 84
End: 2024-06-26
Payer: COMMERCIAL

## 2024-06-26 DIAGNOSIS — D53.9 MACROCYTIC ANEMIA: ICD-10-CM

## 2024-06-26 DIAGNOSIS — D70.8 OTHER NEUTROPENIA (HCC): ICD-10-CM

## 2024-06-26 DIAGNOSIS — N18.30 STAGE 3 CHRONIC KIDNEY DISEASE, UNSPECIFIED WHETHER STAGE 3A OR 3B CKD (HCC): ICD-10-CM

## 2024-06-26 DIAGNOSIS — D64.9 ANEMIA, UNSPECIFIED TYPE: ICD-10-CM

## 2024-06-26 LAB
BASOPHILS # BLD AUTO: 0.01 THOUSANDS/ÂΜL (ref 0–0.1)
BASOPHILS NFR BLD AUTO: 0 % (ref 0–1)
EOSINOPHIL # BLD AUTO: 0.06 THOUSAND/ÂΜL (ref 0–0.61)
EOSINOPHIL NFR BLD AUTO: 1 % (ref 0–6)
ERYTHROCYTE [DISTWIDTH] IN BLOOD BY AUTOMATED COUNT: 17.3 % (ref 11.6–15.1)
HCT VFR BLD AUTO: 26.7 % (ref 36.5–49.3)
HGB BLD-MCNC: 9 G/DL (ref 12–17)
IMM GRANULOCYTES # BLD AUTO: 0.04 THOUSAND/UL (ref 0–0.2)
IMM GRANULOCYTES NFR BLD AUTO: 1 % (ref 0–2)
LYMPHOCYTES # BLD AUTO: 2.85 THOUSANDS/ÂΜL (ref 0.6–4.47)
LYMPHOCYTES NFR BLD AUTO: 47 % (ref 14–44)
MCH RBC QN AUTO: 34.5 PG (ref 26.8–34.3)
MCHC RBC AUTO-ENTMCNC: 33.7 G/DL (ref 31.4–37.4)
MCV RBC AUTO: 102 FL (ref 82–98)
MONOCYTES # BLD AUTO: 0.48 THOUSAND/ÂΜL (ref 0.17–1.22)
MONOCYTES NFR BLD AUTO: 8 % (ref 4–12)
NEUTROPHILS # BLD AUTO: 2.59 THOUSANDS/ÂΜL (ref 1.85–7.62)
NEUTS SEG NFR BLD AUTO: 43 % (ref 43–75)
NRBC BLD AUTO-RTO: 0 /100 WBCS
PLATELET # BLD AUTO: 364 THOUSANDS/UL (ref 149–390)
PMV BLD AUTO: 10.1 FL (ref 8.9–12.7)
RBC # BLD AUTO: 2.61 MILLION/UL (ref 3.88–5.62)
WBC # BLD AUTO: 6.03 THOUSAND/UL (ref 4.31–10.16)

## 2024-06-26 PROCEDURE — 36415 COLL VENOUS BLD VENIPUNCTURE: CPT

## 2024-06-26 PROCEDURE — 85025 COMPLETE CBC W/AUTO DIFF WBC: CPT

## 2024-06-26 RX ORDER — PREDNISONE 5 MG/1
20 TABLET ORAL DAILY
Qty: 90 TABLET | Refills: 1 | Status: SHIPPED | OUTPATIENT
Start: 2024-06-26

## 2024-07-02 ENCOUNTER — HOSPITAL ENCOUNTER (OUTPATIENT)
Dept: INFUSION CENTER | Facility: CLINIC | Age: 84
Discharge: HOME | End: 2024-07-02
Payer: COMMERCIAL

## 2024-07-02 VITALS — SYSTOLIC BLOOD PRESSURE: 131 MMHG | DIASTOLIC BLOOD PRESSURE: 64 MMHG | HEART RATE: 80 BPM

## 2024-07-02 DIAGNOSIS — D64.9 ANEMIA, UNSPECIFIED TYPE: ICD-10-CM

## 2024-07-02 DIAGNOSIS — N18.30 STAGE 3 CHRONIC KIDNEY DISEASE, UNSPECIFIED WHETHER STAGE 3A OR 3B CKD (HCC): Primary | ICD-10-CM

## 2024-07-02 PROCEDURE — 96372 THER/PROPH/DIAG INJ SC/IM: CPT

## 2024-07-02 RX ADMIN — DARBEPOETIN ALFA 200 MCG: 200 INJECTION, SOLUTION INTRAVENOUS; SUBCUTANEOUS at 11:24

## 2024-07-02 NOTE — PROGRESS NOTES
Pt offers no complaints, HGB is 9  on 6/26/24, pt given Aranesp as ordered without incident. Confirmed next apt for 7/16/24 @ 1pm @ [facility].

## 2024-07-03 ENCOUNTER — APPOINTMENT (OUTPATIENT)
Dept: LAB | Facility: CLINIC | Age: 84
End: 2024-07-03
Payer: COMMERCIAL

## 2024-07-03 DIAGNOSIS — D53.9 MACROCYTIC ANEMIA: ICD-10-CM

## 2024-07-03 DIAGNOSIS — D70.8 OTHER NEUTROPENIA (HCC): ICD-10-CM

## 2024-07-03 LAB
BASOPHILS # BLD AUTO: 0.01 THOUSANDS/ÂΜL (ref 0–0.1)
BASOPHILS NFR BLD AUTO: 0 % (ref 0–1)
EOSINOPHIL # BLD AUTO: 0.06 THOUSAND/ÂΜL (ref 0–0.61)
EOSINOPHIL NFR BLD AUTO: 1 % (ref 0–6)
ERYTHROCYTE [DISTWIDTH] IN BLOOD BY AUTOMATED COUNT: 21.1 % (ref 11.6–15.1)
HCT VFR BLD AUTO: 28.2 % (ref 36.5–49.3)
HGB BLD-MCNC: 9.2 G/DL (ref 12–17)
IMM GRANULOCYTES # BLD AUTO: 0.03 THOUSAND/UL (ref 0–0.2)
IMM GRANULOCYTES NFR BLD AUTO: 1 % (ref 0–2)
LYMPHOCYTES # BLD AUTO: 2.19 THOUSANDS/ÂΜL (ref 0.6–4.47)
LYMPHOCYTES NFR BLD AUTO: 42 % (ref 14–44)
MCH RBC QN AUTO: 35.1 PG (ref 26.8–34.3)
MCHC RBC AUTO-ENTMCNC: 32.6 G/DL (ref 31.4–37.4)
MCV RBC AUTO: 108 FL (ref 82–98)
MONOCYTES # BLD AUTO: 0.5 THOUSAND/ÂΜL (ref 0.17–1.22)
MONOCYTES NFR BLD AUTO: 10 % (ref 4–12)
NEUTROPHILS # BLD AUTO: 2.45 THOUSANDS/ÂΜL (ref 1.85–7.62)
NEUTS SEG NFR BLD AUTO: 46 % (ref 43–75)
NRBC BLD AUTO-RTO: 1 /100 WBCS
PLATELET # BLD AUTO: 400 THOUSANDS/UL (ref 149–390)
PMV BLD AUTO: 10.1 FL (ref 8.9–12.7)
RBC # BLD AUTO: 2.62 MILLION/UL (ref 3.88–5.62)
WBC # BLD AUTO: 5.24 THOUSAND/UL (ref 4.31–10.16)

## 2024-07-03 PROCEDURE — 36415 COLL VENOUS BLD VENIPUNCTURE: CPT

## 2024-07-03 PROCEDURE — 85025 COMPLETE CBC W/AUTO DIFF WBC: CPT

## 2024-07-10 ENCOUNTER — APPOINTMENT (OUTPATIENT)
Dept: LAB | Facility: CLINIC | Age: 84
End: 2024-07-10
Payer: COMMERCIAL

## 2024-07-10 DIAGNOSIS — D64.9 ANEMIA, UNSPECIFIED TYPE: ICD-10-CM

## 2024-07-10 DIAGNOSIS — N18.30 STAGE 3 CHRONIC KIDNEY DISEASE, UNSPECIFIED WHETHER STAGE 3A OR 3B CKD (HCC): ICD-10-CM

## 2024-07-10 LAB
BASOPHILS # BLD AUTO: 0.01 THOUSANDS/ÂΜL (ref 0–0.1)
BASOPHILS NFR BLD AUTO: 0 % (ref 0–1)
EOSINOPHIL # BLD AUTO: 0.02 THOUSAND/ÂΜL (ref 0–0.61)
EOSINOPHIL NFR BLD AUTO: 0 % (ref 0–6)
ERYTHROCYTE [DISTWIDTH] IN BLOOD BY AUTOMATED COUNT: 22.9 % (ref 11.6–15.1)
HCT VFR BLD AUTO: 30 % (ref 36.5–49.3)
HGB BLD-MCNC: 10.3 G/DL (ref 12–17)
IMM GRANULOCYTES # BLD AUTO: 0.03 THOUSAND/UL (ref 0–0.2)
IMM GRANULOCYTES NFR BLD AUTO: 1 % (ref 0–2)
LYMPHOCYTES # BLD AUTO: 1.54 THOUSANDS/ÂΜL (ref 0.6–4.47)
LYMPHOCYTES NFR BLD AUTO: 32 % (ref 14–44)
MCH RBC QN AUTO: 37.2 PG (ref 26.8–34.3)
MCHC RBC AUTO-ENTMCNC: 34.3 G/DL (ref 31.4–37.4)
MCV RBC AUTO: 108 FL (ref 82–98)
MONOCYTES # BLD AUTO: 0.34 THOUSAND/ÂΜL (ref 0.17–1.22)
MONOCYTES NFR BLD AUTO: 7 % (ref 4–12)
NEUTROPHILS # BLD AUTO: 2.81 THOUSANDS/ÂΜL (ref 1.85–7.62)
NEUTS SEG NFR BLD AUTO: 60 % (ref 43–75)
NRBC BLD AUTO-RTO: 2 /100 WBCS
PLATELET # BLD AUTO: 401 THOUSANDS/UL (ref 149–390)
PMV BLD AUTO: 9.8 FL (ref 8.9–12.7)
RBC # BLD AUTO: 2.77 MILLION/UL (ref 3.88–5.62)
WBC # BLD AUTO: 4.75 THOUSAND/UL (ref 4.31–10.16)

## 2024-07-10 PROCEDURE — 36415 COLL VENOUS BLD VENIPUNCTURE: CPT

## 2024-07-10 PROCEDURE — 85025 COMPLETE CBC W/AUTO DIFF WBC: CPT

## 2024-07-16 ENCOUNTER — HOSPITAL ENCOUNTER (OUTPATIENT)
Dept: INFUSION CENTER | Facility: CLINIC | Age: 84
End: 2024-07-16

## 2024-07-24 ENCOUNTER — APPOINTMENT (OUTPATIENT)
Dept: LAB | Facility: CLINIC | Age: 84
End: 2024-07-24
Payer: COMMERCIAL

## 2024-07-26 DIAGNOSIS — N18.30 STAGE 3 CHRONIC KIDNEY DISEASE, UNSPECIFIED WHETHER STAGE 3A OR 3B CKD (HCC): ICD-10-CM

## 2024-07-26 DIAGNOSIS — D64.9 ANEMIA, UNSPECIFIED TYPE: Primary | ICD-10-CM

## 2024-07-30 ENCOUNTER — HOSPITAL ENCOUNTER (OUTPATIENT)
Dept: INFUSION CENTER | Facility: CLINIC | Age: 84
Discharge: HOME | End: 2024-07-30

## 2024-07-30 NOTE — PROGRESS NOTES
Progress Note - Cardiology Team 1  Gael Ferraro 83 y.o. male MRN: [id number]  Unit/Bed#: S -01 Encounter: [id number]        Principal Problem:    Macrocytic anemia  Active Problems:    Chest pain with elevated troponin    Coronary artery disease involving native coronary artery of native heart with angina pectoris (HCC)    Stage 3 chronic kidney disease (HCC)    Primary hypertension      Assessment/Plan     Chest pain with elevated troponin  Presented with chest pain/right arm pain and jaw pain  Felt to be a type II MI in the setting of multivessel CAD and severe anemia  0-hour troponin 417  2-hour troponin 2161  4-hour troponin 6138  No further CP after transfusion.      Severe multivessel CAD with lt main  S/P LHC 1/2/2024 ( type II NSTEMI)   Recommendations by interventional cardiology noted.  Anemia needs workup prior to consideration for revascularization.  Not a candidate for revascularization at this time until anemia diagnosis and managed.      Currently on DAPT -aspirin 81 mg daily/Brilinta 90 mg daily, BB-Bystolic 20 mg daily, statin -pravastatin 80 mg daily, Ranexa 500 mg twice daily(new), amlodipine 2.5 mg daily (new). Pt tolerating.   See #1    Pt has been ambulating without recurrent CP.  Again reviewed cardiac medications.   Plans noted to possible discharge after bone marrow biopsy / GI procedure today.   Will set up follow up within next 2 weeks.  Patient was instructed on use of NTG and to notify his cardiologist if NTG needed. For symptoms unrelieved with NTG he should report to ED.   Recommend close monitoring of H/H upon discharge and goal Hgb >9.      Macrocytic anemia  Presenting H&H-6.4/19.2  Hgb 8.2  post transfusion  Bone marrow biopsy today following esophagogram with possible EGD  Early Jan 2024- Hgb 6.9- had 2 UPRBC's     Hypertension-normotensive   On amlodipine 2.5 mg daily/HCTZ 25 mg daily/ BB-Bystolic 20 mg daily     Hyperlipidemia-pravastatin 80 mg daily  Cholesterol  "141/triglyceride 219/HDL 34/LDL 63     CKD stage III-creatinine 1.0.  Stable    Subjective/Objective   Chief Complaint/subjective  No events overnight.   No cp, sob  Waiting for testing today.         Vitals: /60   Pulse 65   Temp 98.6 °F (37 °C)   Resp 18   Ht 5' 10\" (1.778 m)   Wt 68 kg (149 lb 14.6 oz)   SpO2 95%   BMI 21.51 kg/m²     Vitals:    01/21/24 0155   Weight: 68 kg (149 lb 14.6 oz)     Orthostatic Blood Pressures      Flowsheet Row Most Recent Value   Blood Pressure 144/60 filed at 01/24/2024 0758   Patient Position - Orthostatic VS Lying filed at 01/22/2024 1510              Intake/Output Summary (Last 24 hours) at 1/24/2024 1027  Last data filed at 1/23/2024 2301  Gross per 24 hour   Intake 0 ml   Output 0 ml   Net 0 ml       Invasive Devices       Peripheral Intravenous Line  Duration             Peripheral IV 01/21/24 Left Antecubital 3 days    Peripheral IV 01/21/24 Left;Ventral (anterior) Forearm 3 days                    Current Facility-Administered Medications   Medication Dose Route Frequency    acetaminophen (TYLENOL) tablet 650 mg  650 mg Oral Q6H PRN    amLODIPine (NORVASC) tablet 2.5 mg  2.5 mg Oral Daily    aspirin (ECOTRIN LOW STRENGTH) EC tablet 81 mg  81 mg Oral Daily    aspirin chewable tablet 162 mg  162 mg Oral Once    cholecalciferol (VITAMIN D3) tablet 2,000 Units  2,000 Units Oral Daily    cyanocobalamin (VITAMIN B-12) tablet 1,000 mcg  1,000 mcg Oral Daily    hydrochlorothiazide (HYDRODIURIL) tablet 25 mg  25 mg Oral Daily    LORazepam (ATIVAN) tablet 1 mg  1 mg Oral BID PRN    nebivolol (BYSTOLIC) tablet 20 mg  20 mg Oral Daily    nitroglycerin (NITROSTAT) SL tablet 0.4 mg  0.4 mg Sublingual Q5 Min PRN    potassium chloride (K-DUR,KLOR-CON) CR tablet 10 mEq  10 mEq Oral Daily    pravastatin (PRAVACHOL) tablet 80 mg  80 mg Oral Daily With Dinner    ranolazine (RANEXA) 12 hr tablet 500 mg  500 mg Oral Q12H CRESENCIO    sodium chloride (PF) 0.9 % injection 3 mL  3 mL " "Intravenous Q1H PRN    ticagrelor (BRILINTA) tablet 90 mg  90 mg Oral Q12H [facility]         Physical Exam: /60   Pulse 65   Temp 98.6 °F (37 °C)   Resp 18   Ht 5' 10\" (1.778 m)   Wt 68 kg (149 lb 14.6 oz)   SpO2 95%   BMI 21.51 kg/m²     General Appearance:    Alert, cooperative, no distress, appears stated age   Head:    Normocephalic, no scleral icterus   Eyes:    PERRL   Nose:   Nares normal, septum midline, no drainage    Throat:   Lips, mucosa, and tongue normal   Neck:   Supple, symmetrical, trachea midline,              Lungs:     Clear to auscultation bilaterally, respirations unlabored   Chest Wall:    No tenderness or deformity    Heart:    Regular rate and rhythm, S1 and S2 normal, no murmur, rub   or gallop   Abdomen:     Soft, non-tender, bowel sounds active all four quadrants,     no masses, no organomegaly   Extremities:   Extremities normal, atraumatic, no cyanosis or edema   Pulses:   2+ and symmetric all extremities   Skin:   Skin color, texture, turgor normal, no rashes or lesions   Neurologic:   Alert and oriented to person place and time, no focal deficits                 Lab Results:   Recent Results (from the past 72 hour(s))   Hemoglobin    Collection Time: 01/21/24 11:14 AM   Result Value Ref Range    Hemoglobin 8.8 (L) 12.0 - 17.0 g/dL   Retic Count    Collection Time: 01/21/24 11:14 AM   Result Value Ref Range    Retic Ct Abs 13,600 (L) 14,356 - 105,094    Retic Ct Pct 0.50 0.37 - 1.87 %   Haptoglobin    Collection Time: 01/21/24  3:44 PM   Result Value Ref Range    Haptoglobin 220 38 - 329 mg/dL   Direct antiglobulin test    Collection Time: 01/21/24  3:44 PM   Result Value Ref Range    DIRECT FORREST Negative    Prepare Leukoreduced RBC: 2 Units    Collection Time: 01/22/24  5:47 AM   Result Value Ref Range    Unit Product Code V2784X05     Unit Number [id number]     Unit ABO A     Unit RH NEG     Crossmatch Compatible     Unit Dispense Status Presumed Trans     Unit Product " Volume 350 ml    Unit Product Code V7858W59     Unit Number [id number]     Unit ABO A     Unit RH NEG     Crossmatch Compatible     Unit Dispense Status Presumed Trans     Unit Product Volume 350 ml   CBC and differential    Collection Time: 01/22/24 12:16 PM   Result Value Ref Range    WBC 5.50 4.31 - 10.16 Thousand/uL    RBC 2.60 (L) 3.88 - 5.62 Million/uL    Hemoglobin 8.5 (L) 12.0 - 17.0 g/dL    Hematocrit 24.3 (L) 36.5 - 49.3 %    MCV 94 82 - 98 fL    MCH 32.7 26.8 - 34.3 pg    MCHC 35.0 31.4 - 37.4 g/dL    RDW 14.9 11.6 - 15.1 %    MPV 10.7 8.9 - 12.7 fL    Platelets 242 149 - 390 Thousands/uL    nRBC 0 /100 WBCs    Neutrophils Relative 64 43 - 75 %    Immat GRANS % 0 0 - 2 %    Lymphocytes Relative 20 14 - 44 %    Monocytes Relative 13 (H) 4 - 12 %    Eosinophils Relative 3 0 - 6 %    Basophils Relative 0 0 - 1 %    Neutrophils Absolute 3.50 1.85 - 7.62 Thousands/µL    Immature Grans Absolute 0.02 0.00 - 0.20 Thousand/uL    Lymphocytes Absolute 1.10 0.60 - 4.47 Thousands/µL    Monocytes Absolute 0.72 0.17 - 1.22 Thousand/µL    Eosinophils Absolute 0.14 0.00 - 0.61 Thousand/µL    Basophils Absolute 0.02 0.00 - 0.10 Thousands/µL   Comprehensive metabolic panel    Collection Time: 01/22/24 12:16 PM   Result Value Ref Range    Sodium 136 135 - 147 mmol/L    Potassium 4.1 3.5 - 5.3 mmol/L    Chloride 103 96 - 108 mmol/L    CO2 28 21 - 32 mmol/L    ANION GAP 5 mmol/L    BUN 22 5 - 25 mg/dL    Creatinine 1.10 0.60 - 1.30 mg/dL    Glucose 110 65 - 140 mg/dL    Calcium 9.3 8.4 - 10.2 mg/dL    AST 41 (H) 13 - 39 U/L    ALT 47 7 - 52 U/L    Alkaline Phosphatase 73 34 - 104 U/L    Total Protein 6.2 (L) 6.4 - 8.4 g/dL    Albumin 3.8 3.5 - 5.0 g/dL    Total Bilirubin 1.11 (H) 0.20 - 1.00 mg/dL    eGFR 60 ml/min/1.73sq m   CBC and differential    Collection Time: 01/23/24  6:31 AM   Result Value Ref Range    WBC 4.63 4.31 - 10.16 Thousand/uL    RBC 2.55 (L) 3.88 - 5.62 Million/uL    Hemoglobin 8.2 (L) 12.0 - 17.0 g/dL     Hematocrit 23.6 (L) 36.5 - 49.3 %    MCV 93 82 - 98 fL    MCH 32.2 26.8 - 34.3 pg    MCHC 34.7 31.4 - 37.4 g/dL    RDW 14.2 11.6 - 15.1 %    MPV 10.6 8.9 - 12.7 fL    Platelets 222 149 - 390 Thousands/uL    nRBC 0 /100 WBCs    Neutrophils Relative 51 43 - 75 %    Immat GRANS % 0 0 - 2 %    Lymphocytes Relative 28 14 - 44 %    Monocytes Relative 13 (H) 4 - 12 %    Eosinophils Relative 7 (H) 0 - 6 %    Basophils Relative 1 0 - 1 %    Neutrophils Absolute 2.38 1.85 - 7.62 Thousands/µL    Immature Grans Absolute 0.01 0.00 - 0.20 Thousand/uL    Lymphocytes Absolute 1.29 0.60 - 4.47 Thousands/µL    Monocytes Absolute 0.61 0.17 - 1.22 Thousand/µL    Eosinophils Absolute 0.31 0.00 - 0.61 Thousand/µL    Basophils Absolute 0.03 0.00 - 0.10 Thousands/µL   Comprehensive metabolic panel    Collection Time: 01/23/24  6:31 AM   Result Value Ref Range    Sodium 138 135 - 147 mmol/L    Potassium 3.5 3.5 - 5.3 mmol/L    Chloride 104 96 - 108 mmol/L    CO2 26 21 - 32 mmol/L    ANION GAP 8 mmol/L    BUN 22 5 - 25 mg/dL    Creatinine 1.08 0.60 - 1.30 mg/dL    Glucose 103 65 - 140 mg/dL    Calcium 9.2 8.4 - 10.2 mg/dL    AST 31 13 - 39 U/L    ALT 42 7 - 52 U/L    Alkaline Phosphatase 78 34 - 104 U/L    Total Protein 6.1 (L) 6.4 - 8.4 g/dL    Albumin 3.7 3.5 - 5.0 g/dL    Total Bilirubin 0.99 0.20 - 1.00 mg/dL    eGFR 62 ml/min/1.73sq m     Imaging: I have personally reviewed pertinent reports.    Tele- nsr    Counseling / Coordination of Care  Total time spent today 30 minutes. Greater than 50% of total time was spent with the patient and / or family counseling and / or coordination of care.     Statement Selected

## 2024-07-31 ENCOUNTER — APPOINTMENT (OUTPATIENT)
Dept: LAB | Facility: CLINIC | Age: 84
End: 2024-07-31
Payer: COMMERCIAL

## 2024-07-31 DIAGNOSIS — N18.30 STAGE 3 CHRONIC KIDNEY DISEASE, UNSPECIFIED WHETHER STAGE 3A OR 3B CKD (HCC): ICD-10-CM

## 2024-07-31 DIAGNOSIS — D64.9 ANEMIA, UNSPECIFIED TYPE: ICD-10-CM

## 2024-07-31 LAB
BASOPHILS # BLD AUTO: 0.01 THOUSANDS/ÂΜL (ref 0–0.1)
BASOPHILS NFR BLD AUTO: 0 % (ref 0–1)
EOSINOPHIL # BLD AUTO: 0.06 THOUSAND/ÂΜL (ref 0–0.61)
EOSINOPHIL NFR BLD AUTO: 1 % (ref 0–6)
ERYTHROCYTE [DISTWIDTH] IN BLOOD BY AUTOMATED COUNT: 19.6 % (ref 11.6–15.1)
HCT VFR BLD AUTO: 35.8 % (ref 36.5–49.3)
HGB BLD-MCNC: 11.8 G/DL (ref 12–17)
IMM GRANULOCYTES # BLD AUTO: 0.04 THOUSAND/UL (ref 0–0.2)
IMM GRANULOCYTES NFR BLD AUTO: 1 % (ref 0–2)
LYMPHOCYTES # BLD AUTO: 2.11 THOUSANDS/ÂΜL (ref 0.6–4.47)
LYMPHOCYTES NFR BLD AUTO: 37 % (ref 14–44)
MCH RBC QN AUTO: 38.2 PG (ref 26.8–34.3)
MCHC RBC AUTO-ENTMCNC: 33 G/DL (ref 31.4–37.4)
MCV RBC AUTO: 116 FL (ref 82–98)
MONOCYTES # BLD AUTO: 0.43 THOUSAND/ÂΜL (ref 0.17–1.22)
MONOCYTES NFR BLD AUTO: 8 % (ref 4–12)
NEUTROPHILS # BLD AUTO: 3.02 THOUSANDS/ÂΜL (ref 1.85–7.62)
NEUTS SEG NFR BLD AUTO: 53 % (ref 43–75)
NRBC BLD AUTO-RTO: 0 /100 WBCS
PLATELET # BLD AUTO: 340 THOUSANDS/UL (ref 149–390)
PMV BLD AUTO: 9.6 FL (ref 8.9–12.7)
RBC # BLD AUTO: 3.09 MILLION/UL (ref 3.88–5.62)
WBC # BLD AUTO: 5.67 THOUSAND/UL (ref 4.31–10.16)

## 2024-07-31 PROCEDURE — 85025 COMPLETE CBC W/AUTO DIFF WBC: CPT

## 2024-07-31 PROCEDURE — 36415 COLL VENOUS BLD VENIPUNCTURE: CPT

## 2024-08-09 DIAGNOSIS — N18.30 STAGE 3 CHRONIC KIDNEY DISEASE, UNSPECIFIED WHETHER STAGE 3A OR 3B CKD (HCC): ICD-10-CM

## 2024-08-09 DIAGNOSIS — D64.9 ANEMIA, UNSPECIFIED TYPE: Primary | ICD-10-CM

## 2024-08-12 ENCOUNTER — APPOINTMENT (OUTPATIENT)
Dept: LAB | Facility: CLINIC | Age: 84
End: 2024-08-12
Payer: COMMERCIAL

## 2024-08-12 ENCOUNTER — TELEPHONE (OUTPATIENT)
Age: 84
End: 2024-08-12

## 2024-08-12 DIAGNOSIS — D64.9 ANEMIA, UNSPECIFIED TYPE: ICD-10-CM

## 2024-08-12 DIAGNOSIS — N18.30 STAGE 3 CHRONIC KIDNEY DISEASE, UNSPECIFIED WHETHER STAGE 3A OR 3B CKD (HCC): ICD-10-CM

## 2024-08-12 LAB
BASOPHILS # BLD AUTO: 0.01 THOUSANDS/ÂΜL (ref 0–0.1)
BASOPHILS NFR BLD AUTO: 0 % (ref 0–1)
EOSINOPHIL # BLD AUTO: 0.12 THOUSAND/ÂΜL (ref 0–0.61)
EOSINOPHIL NFR BLD AUTO: 2 % (ref 0–6)
ERYTHROCYTE [DISTWIDTH] IN BLOOD BY AUTOMATED COUNT: 16.3 % (ref 11.6–15.1)
HCT VFR BLD AUTO: 37.1 % (ref 36.5–49.3)
HGB BLD-MCNC: 12.6 G/DL (ref 12–17)
IMM GRANULOCYTES # BLD AUTO: 0.05 THOUSAND/UL (ref 0–0.2)
IMM GRANULOCYTES NFR BLD AUTO: 1 % (ref 0–2)
LYMPHOCYTES # BLD AUTO: 2.11 THOUSANDS/ÂΜL (ref 0.6–4.47)
LYMPHOCYTES NFR BLD AUTO: 31 % (ref 14–44)
MCH RBC QN AUTO: 38.8 PG (ref 26.8–34.3)
MCHC RBC AUTO-ENTMCNC: 34 G/DL (ref 31.4–37.4)
MCV RBC AUTO: 114 FL (ref 82–98)
MONOCYTES # BLD AUTO: 0.57 THOUSAND/ÂΜL (ref 0.17–1.22)
MONOCYTES NFR BLD AUTO: 8 % (ref 4–12)
NEUTROPHILS # BLD AUTO: 3.95 THOUSANDS/ÂΜL (ref 1.85–7.62)
NEUTS SEG NFR BLD AUTO: 58 % (ref 43–75)
NRBC BLD AUTO-RTO: 0 /100 WBCS
PLATELET # BLD AUTO: 288 THOUSANDS/UL (ref 149–390)
PMV BLD AUTO: 9.6 FL (ref 8.9–12.7)
RBC # BLD AUTO: 3.25 MILLION/UL (ref 3.88–5.62)
WBC # BLD AUTO: 6.81 THOUSAND/UL (ref 4.31–10.16)

## 2024-08-12 PROCEDURE — 36415 COLL VENOUS BLD VENIPUNCTURE: CPT

## 2024-08-12 PROCEDURE — 85025 COMPLETE CBC W/AUTO DIFF WBC: CPT

## 2024-08-12 NOTE — TELEPHONE ENCOUNTER
Hgb normal.  Recommend reducing to 15mg po daily (105mg/week).  Alternatively, could reduce to 20mg alt with 10mg (100-100mg/week)

## 2024-08-12 NOTE — TELEPHONE ENCOUNTER
Pt called to report that he is continuing to take prednisone and the last couple of times , his hgb has been elevated and wanted to know if he should continue taking the prednisone or decreased dose? Please advise.  FYI aranesp is scheduled for tomorrow. Hgb today resulted in 12.6 with therapy plan parameter to give if hgb is less than 9.9. cancel appt?

## 2024-08-13 ENCOUNTER — HOSPITAL ENCOUNTER (OUTPATIENT)
Dept: INFUSION CENTER | Facility: CLINIC | Age: 84
Discharge: HOME | End: 2024-08-13

## 2024-08-13 DIAGNOSIS — D75.81 MYELOFIBROSIS (HCC): ICD-10-CM

## 2024-08-13 RX ORDER — PREDNISONE 5 MG/1
TABLET ORAL
Qty: 90 TABLET | Refills: 1 | Status: SHIPPED | OUTPATIENT
Start: 2024-08-13 | End: 2024-08-20

## 2024-08-13 NOTE — TELEPHONE ENCOUNTER
Returned call to patient, spoke with wife Christi.  Patient will take alternating dose of 20mg and 10mg daily.  He will start today with 10mg.  Christi verified instructions verbally. He will need a new rx, I will pend that to Arabella Freeman for signature.  Advised to call with any additional questions or concerns.

## 2024-08-20 ENCOUNTER — OFFICE VISIT (OUTPATIENT)
Dept: HEMATOLOGY ONCOLOGY | Facility: CLINIC | Age: 84
End: 2024-08-20
Payer: COMMERCIAL

## 2024-08-20 VITALS
OXYGEN SATURATION: 98 % | BODY MASS INDEX: 20.54 KG/M2 | RESPIRATION RATE: 18 BRPM | HEIGHT: 70 IN | HEART RATE: 76 BPM | WEIGHT: 143.5 LBS | SYSTOLIC BLOOD PRESSURE: 128 MMHG | TEMPERATURE: 98.1 F | DIASTOLIC BLOOD PRESSURE: 54 MMHG

## 2024-08-20 DIAGNOSIS — D75.81 MYELOFIBROSIS (HCC): ICD-10-CM

## 2024-08-20 PROCEDURE — 99214 OFFICE O/P EST MOD 30 MIN: CPT | Performed by: INTERNAL MEDICINE

## 2024-08-20 RX ORDER — PREDNISONE 5 MG/1
TABLET ORAL
Qty: 90 TABLET | Refills: 1 | Status: SHIPPED | OUTPATIENT
Start: 2024-08-20

## 2024-08-20 NOTE — PROGRESS NOTES
Hematology/Oncology Outpatient Follow- up Note  Gael Ferraro 84 y.o. 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   Myelofibrosis grade 2 of 3     On BMBX:  The overall findings are consistent with pure red cell aplasia (PRCA) with background clonal T-cells, moderately increased fibrosis (MF-2 of 3), and negative for a myeloid neoplasm.     Workup revealed Parvovirus B19 antibody, IgG IgG Antibody POSITIVE. IgM normal. TCR gamma gene rearrangement detected. Negative rheumatologic workup. Normal hepatitis testing, normal flow cytometry.     Treatment/plan:     Initiated on prednisone 20 mg QD , with significant response of hemoglobin 11-12, then tapered down to 5 mg every other day QD with Hgb decreased to 9.7, therefore on 5/30/2024 advised to increased prednisone back to 5 mg daily, then on 6/5/24, Hgb 8.4, increased prednisone to 20 mg QD.  Lately, on 8/12/24 prednisone decreased to alternating dose between 20 mg and 10 mg daily.       S/p Epoetin (1/24 &2/5/24) then Aranesp 100 mcg (given 3/28/24, 5/31) increased to Aranesp 200 mcg every 2 weeks with parameters (given 6/18, 7/2/24).    8/12/2024 Hemoglobin is up to 12.6 , continue with Aranesp (parameters) + decrease prednisone to 10 mg QD. Advised on starting OTC prophylactic Pepcid/PPI, once daily. If hemoglobin drops, or side effect of prednisone, may consider Cyclosporin A 3 mg/kg PO BID + Prednisone.        HPI:    Gael Moore' is an 85 y/o gentleman 1st seen inpatient 1/21/24 regarding refractory anemia.       PMH: CAD, HTN, CKD, dysphagia. Never smoker. No family history of liver disease.       November 18, 2023 hemoglobin 10.2, , white blood cell count 3.7, 48% neutrophils, 39% lymphocytes, 11% monocytes, platelet count 82, iron saturation 58%.  Normal total bilirubin, total protein, albumin.  BUN 24, creatinine 1.24, ESR 12. SPEP: no monoclonal protein.  Alpha 2 region is decreased and may be suggestive of a decreased haptoglobin. folate WNL and FOBT in ED (12/2023) negative.     B12 was 292-  PCP Dr. Lyman started him on oral B12.  folate WNL.     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 Troponin elevated. Cardiac cath shows CAD but no stent indicated.     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       Ref 02/26/24    PARVOVIRUS B19 IGG Ab 0.0 - 0.8 index 1.3 (H)   PARVOVIRUS B19 IGM Ab 0.0 - 0.8 index 0.2      Vitamin B2 -  normal    Parvovirus B19 antibody, IgG and IgM - B19 IgG Antibody POSITIVE    IgG, IgA, IgM -normal    Leukemia/Lymphoma flow cytometry-flow cytometric analysis does not show significant numbers of circulating blasts or abnormal lymphoid or myeloid population.    Immunoglobulin free LT chains blood- normal    TAY w/Reflex if Positive - negative    Sjogren's Antibodies -normal    Anti-Neelima 1 Antibody -normal    Anti-scleroderma antibody -normal    DNA (DS) ANTIBODY -normal    C-reactive protein - -normal    Cyclic citrul peptide antibody, IgG -normal    Sedimentation rate, automated -normal    Lupus anticoagulant- not detected    Cardiolipin antibody- -normal    Beta-2 glycoprotein antibodies - normal    Rheumatoid Factor -normal    Chronic Hepatitis Panel - non reactive    Reticulocytes - 3.11%    Anti-neutrophilic cytoplasmic antibody- -normal    T Cell Receptor (TCR) Beta Gene Rearrang, PCR- negative    T Cell Receptor (Tcr) Gamma Gene Rearrang,PCR- clonal T-cell receptor gamma population was detected           Interval History:    Presenting today to follow up with his wife   Denies  "any new symptoms or concerns   Feeling good.   ROS + for mild intermittent dyspepsia and chronic left dry eye for which he uses eye drops and follow w ophthalmology. Dry mouth ; advised to try OTC Biotin.   Recent labs reviewed, normal Hgb , above A&P discussed including decreasing prednisone to 10 mg QD and recheck CBC as scheduled .       Cancer Staging:  Cancer Staging   No matching staging information was found for the patient.      Molecular Testing:     Previous Hematologic/ Oncologic History:    Oncology History    No history exists.       Current Hematologic/ Oncologic Treatment:       Cycle 1         Test Results:    Imaging: No results found.          Labs:   Lab Results   Component Value Date    WBC 6.81 08/12/2024    HGB 12.6 08/12/2024    HCT 37.1 08/12/2024     (H) 08/12/2024     08/12/2024     Lab Results   Component Value Date    K 4.3 06/14/2024    CL 96 06/14/2024    CO2 30 06/14/2024    BUN 26 (H) 06/14/2024    CREATININE 1.12 06/14/2024    GLUCOSE 142 (H) 01/21/2024    CALCIUM 9.6 06/14/2024    CORRECTEDCA 9.5 02/10/2024    AST 18 06/14/2024    ALT 31 06/14/2024    ALKPHOS 57 06/14/2024    EGFR 59 06/14/2024         No results found for: \"SPEP\", \"UPEP\"    No results found for: \"PSA\"    No results found for: \"CEA\"    No results found for: \"\"    No results found for: \"AFP\"    Lab Results   Component Value Date    IRON 252 (H) 02/26/2024    TIBC <307 02/26/2024    FERRITIN 1,381 (H) 02/26/2024       Lab Results   Component Value Date    [id number] 422 01/21/2024         ROS: Review of Systems  - GENERAL: Negative for any nausea, vomiting, fevers, chills, or weight loss.  - HEENT: chronic dry left eye for which uses drops; Negative for any head/Neck trauma, pain, double/blurry vision, sinusitis, rhinitis, nose bleeding.  - CARDIAC: Negative for any chest pain, palpitation, Dyspnea on exertion, peripheral edema.  - PULMONARY: Negative for any SOB, cough, wheezing.   - " GASTROINTESTINAL: mild intermittent dyspepsia. Negative for any abdominal pain, N/V/D/C, blood in stool.   - GENITOURINARY: Negative for any dysuria, hematuria, incontinence.  - NEUROLOGIC: Negative for any muscle weakness, numbness/tingling, memory changes.    - MUSCULOSKELETAL: Negative for any joint pains/swelling, limited ROM.   - INTEGUMENTARY: Negative for any rashes, cuts/ lesions.  - HEMATOLOGIC: Negative for any abnormal bruising, frequent infections or bleeding.      Current Medications: Reviewed  Allergies: Reviewed  PMH/FH/SH:  Reviewed      Physical Exam:    There is no height or weight on file to calculate BSA.    Wt Readings from Last 3 Encounters:   05/30/24 65.3 kg (144 lb)   04/22/24 64.9 kg (143 lb)   04/02/24 63.3 kg (139 lb 9.6 oz)        Temp Readings from Last 3 Encounters:   06/14/24 97.8 °F (36.6 °C) (Oral)   05/30/24 97.9 °F (36.6 °C) (Temporal)   04/22/24 97.8 °F (36.6 °C) (Temporal)        BP Readings from Last 3 Encounters:   07/02/24 131/64   06/18/24 134/56   06/14/24 151/84         Pulse Readings from Last 3 Encounters:   07/02/24 80   06/18/24 65   06/14/24 72     @LASTSAO2(3)@      Physical Exam  - GEN: Appears well, alert and oriented x 3, pleasant and cooperative, in no acute distress  - HEENT: slight & mild left conjunctivitis ; Anicteric, mucous membranes dry, PERRL and EOMI   - NECK: No lymphadenopathy, JVD or carotid bruits   - HEART: RRR, normal S1 and S2, no murmurs, clicks, gallops or rubs   - LUNGS: Clear to auscultation bilaterally; no wheezes, rales, or rhonchi  - ABDOMEN: Normal bowel sounds, soft, no tenderness, no distention, no organomegaly or masses felt on exam.   - EXTREMITIES: Peripheral pulses normal; no clubbing, cyanosis, or edema  - NEURO: No focal findings, CN II-XII are grossly intact.   - Musculoskeletal: 5/5 strength, normal ROM, no swollen or erythematous joints.   - SKIN: Normal without suspicious lesions on exposed skin      Goals and Barriers:   Current Goal: Prolong Survival from Cancer.   Barriers: None.      Patient's Capacity to Self Care:  Patient is able to self care.    Andrzej Santiago DO   Hematology and Medical Oncology - PGY V  [facility]

## 2024-08-23 DIAGNOSIS — N18.30 STAGE 3 CHRONIC KIDNEY DISEASE, UNSPECIFIED WHETHER STAGE 3A OR 3B CKD (HCC): ICD-10-CM

## 2024-08-23 DIAGNOSIS — D64.9 ANEMIA, UNSPECIFIED TYPE: Primary | ICD-10-CM

## 2024-08-26 ENCOUNTER — APPOINTMENT (OUTPATIENT)
Dept: LAB | Facility: CLINIC | Age: 84
End: 2024-08-26
Payer: COMMERCIAL

## 2024-08-26 DIAGNOSIS — N18.30 STAGE 3 CHRONIC KIDNEY DISEASE, UNSPECIFIED WHETHER STAGE 3A OR 3B CKD (HCC): ICD-10-CM

## 2024-08-26 DIAGNOSIS — D64.9 ANEMIA, UNSPECIFIED TYPE: ICD-10-CM

## 2024-08-26 LAB
BASOPHILS # BLD AUTO: 0.04 THOUSANDS/ÂΜL (ref 0–0.1)
BASOPHILS NFR BLD AUTO: 1 % (ref 0–1)
EOSINOPHIL # BLD AUTO: 0.21 THOUSAND/ÂΜL (ref 0–0.61)
EOSINOPHIL NFR BLD AUTO: 2 % (ref 0–6)
ERYTHROCYTE [DISTWIDTH] IN BLOOD BY AUTOMATED COUNT: 14.2 % (ref 11.6–15.1)
HCT VFR BLD AUTO: 35 % (ref 36.5–46.1)
HGB BLD-MCNC: 11.8 G/DL (ref 12–15.4)
IMM GRANULOCYTES # BLD AUTO: 0.05 THOUSAND/UL (ref 0–0.2)
IMM GRANULOCYTES NFR BLD AUTO: 1 % (ref 0–2)
LYMPHOCYTES # BLD AUTO: 1.95 THOUSANDS/ÂΜL (ref 0.6–4.47)
LYMPHOCYTES NFR BLD AUTO: 23 % (ref 14–44)
MCH RBC QN AUTO: 38.1 PG (ref 26.8–34.3)
MCHC RBC AUTO-ENTMCNC: 33.7 G/DL (ref 31.4–37.4)
MCV RBC AUTO: 113 FL (ref 82–98)
MONOCYTES # BLD AUTO: 0.72 THOUSAND/ÂΜL (ref 0.17–1.22)
MONOCYTES NFR BLD AUTO: 8 % (ref 4–12)
NEUTROPHILS # BLD AUTO: 5.62 THOUSANDS/ÂΜL (ref 1.85–7.62)
NEUTS SEG NFR BLD AUTO: 65 % (ref 43–75)
NRBC BLD AUTO-RTO: 0 /100 WBCS
PLATELET # BLD AUTO: 294 THOUSANDS/UL (ref 149–390)
PMV BLD AUTO: 9.5 FL (ref 8.9–12.7)
RBC # BLD AUTO: 3.1 MILLION/UL (ref 3.88–5.12)
WBC # BLD AUTO: 8.59 THOUSAND/UL (ref 4.31–10.16)

## 2024-08-26 PROCEDURE — 36415 COLL VENOUS BLD VENIPUNCTURE: CPT

## 2024-08-26 PROCEDURE — 85025 COMPLETE CBC W/AUTO DIFF WBC: CPT

## 2024-08-27 ENCOUNTER — HOSPITAL ENCOUNTER (OUTPATIENT)
Dept: INFUSION CENTER | Facility: CLINIC | Age: 84
Discharge: HOME | End: 2024-08-27

## 2024-09-06 DIAGNOSIS — D64.9 ANEMIA, UNSPECIFIED TYPE: Primary | ICD-10-CM

## 2024-09-06 DIAGNOSIS — N18.30 STAGE 3 CHRONIC KIDNEY DISEASE, UNSPECIFIED WHETHER STAGE 3A OR 3B CKD (HCC): ICD-10-CM

## 2024-09-09 ENCOUNTER — APPOINTMENT (OUTPATIENT)
Dept: LAB | Facility: CLINIC | Age: 84
End: 2024-09-09
Payer: COMMERCIAL

## 2024-09-09 DIAGNOSIS — N18.30 STAGE 3 CHRONIC KIDNEY DISEASE, UNSPECIFIED WHETHER STAGE 3A OR 3B CKD (HCC): ICD-10-CM

## 2024-09-09 DIAGNOSIS — D64.9 ANEMIA, UNSPECIFIED TYPE: ICD-10-CM

## 2024-09-09 LAB
BASOPHILS # BLD AUTO: 0.04 THOUSANDS/ÂΜL (ref 0–0.1)
BASOPHILS NFR BLD AUTO: 1 % (ref 0–1)
EOSINOPHIL # BLD AUTO: 0.09 THOUSAND/ÂΜL (ref 0–0.61)
EOSINOPHIL NFR BLD AUTO: 2 % (ref 0–6)
ERYTHROCYTE [DISTWIDTH] IN BLOOD BY AUTOMATED COUNT: 14.2 % (ref 11.6–15.1)
HCT VFR BLD AUTO: 37.2 % (ref 36.5–46.1)
HGB BLD-MCNC: 12.5 G/DL (ref 12–15.4)
IMM GRANULOCYTES # BLD AUTO: 0.04 THOUSAND/UL (ref 0–0.2)
IMM GRANULOCYTES NFR BLD AUTO: 1 % (ref 0–2)
LYMPHOCYTES # BLD AUTO: 2.09 THOUSANDS/ÂΜL (ref 0.6–4.47)
LYMPHOCYTES NFR BLD AUTO: 37 % (ref 14–44)
MCH RBC QN AUTO: 37.7 PG (ref 26.8–34.3)
MCHC RBC AUTO-ENTMCNC: 33.6 G/DL (ref 31.4–37.4)
MCV RBC AUTO: 112 FL (ref 82–98)
MONOCYTES # BLD AUTO: 0.55 THOUSAND/ÂΜL (ref 0.17–1.22)
MONOCYTES NFR BLD AUTO: 10 % (ref 4–12)
NEUTROPHILS # BLD AUTO: 2.79 THOUSANDS/ÂΜL (ref 1.85–7.62)
NEUTS SEG NFR BLD AUTO: 49 % (ref 43–75)
NRBC BLD AUTO-RTO: 0 /100 WBCS
PLATELET # BLD AUTO: 394 THOUSANDS/UL (ref 149–390)
PMV BLD AUTO: 9.4 FL (ref 8.9–12.7)
RBC # BLD AUTO: 3.32 MILLION/UL (ref 3.88–5.12)
WBC # BLD AUTO: 5.6 THOUSAND/UL (ref 4.31–10.16)

## 2024-09-09 PROCEDURE — 85025 COMPLETE CBC W/AUTO DIFF WBC: CPT

## 2024-09-09 PROCEDURE — 36415 COLL VENOUS BLD VENIPUNCTURE: CPT

## 2024-09-10 ENCOUNTER — HOSPITAL ENCOUNTER (OUTPATIENT)
Dept: INFUSION CENTER | Facility: CLINIC | Age: 84
End: 2024-09-10

## 2024-09-20 DIAGNOSIS — N18.30 STAGE 3 CHRONIC KIDNEY DISEASE, UNSPECIFIED WHETHER STAGE 3A OR 3B CKD (HCC): ICD-10-CM

## 2024-09-20 DIAGNOSIS — D64.9 ANEMIA, UNSPECIFIED TYPE: Primary | ICD-10-CM

## 2024-09-23 ENCOUNTER — APPOINTMENT (OUTPATIENT)
Dept: LAB | Facility: CLINIC | Age: 84
End: 2024-09-23
Payer: COMMERCIAL

## 2024-09-23 DIAGNOSIS — D64.9 ANEMIA, UNSPECIFIED TYPE: ICD-10-CM

## 2024-09-23 DIAGNOSIS — N18.30 STAGE 3 CHRONIC KIDNEY DISEASE, UNSPECIFIED WHETHER STAGE 3A OR 3B CKD (HCC): ICD-10-CM

## 2024-09-23 LAB
BASOPHILS # BLD AUTO: 0.03 THOUSANDS/ΜL (ref 0–0.1)
BASOPHILS NFR BLD AUTO: 1 % (ref 0–1)
EOSINOPHIL # BLD AUTO: 0.07 THOUSAND/ΜL (ref 0–0.61)
EOSINOPHIL NFR BLD AUTO: 1 % (ref 0–6)
ERYTHROCYTE [DISTWIDTH] IN BLOOD BY AUTOMATED COUNT: 14.1 % (ref 11.6–15.1)
HCT VFR BLD AUTO: 38.7 % (ref 36.5–46.1)
HGB BLD-MCNC: 13 G/DL (ref 12–15.4)
IMM GRANULOCYTES # BLD AUTO: 0.03 THOUSAND/UL (ref 0–0.2)
IMM GRANULOCYTES NFR BLD AUTO: 1 % (ref 0–2)
LYMPHOCYTES # BLD AUTO: 2.71 THOUSANDS/ΜL (ref 0.6–4.47)
LYMPHOCYTES NFR BLD AUTO: 44 % (ref 14–44)
MCH RBC QN AUTO: 37.7 PG (ref 26.8–34.3)
MCHC RBC AUTO-ENTMCNC: 33.6 G/DL (ref 31.4–37.4)
MCV RBC AUTO: 112 FL (ref 82–98)
MONOCYTES # BLD AUTO: 0.61 THOUSAND/ΜL (ref 0.17–1.22)
MONOCYTES NFR BLD AUTO: 10 % (ref 4–12)
NEUTROPHILS # BLD AUTO: 2.75 THOUSANDS/ΜL (ref 1.85–7.62)
NEUTS SEG NFR BLD AUTO: 43 % (ref 43–75)
NRBC BLD AUTO-RTO: 0 /100 WBCS
PLATELET # BLD AUTO: 275 THOUSANDS/UL (ref 149–390)
PMV BLD AUTO: 9.6 FL (ref 8.9–12.7)
RBC # BLD AUTO: 3.45 MILLION/UL (ref 3.88–5.12)
WBC # BLD AUTO: 6.2 THOUSAND/UL (ref 4.31–10.16)

## 2024-09-23 PROCEDURE — 85025 COMPLETE CBC W/AUTO DIFF WBC: CPT

## 2024-09-23 PROCEDURE — 36415 COLL VENOUS BLD VENIPUNCTURE: CPT

## 2024-09-24 ENCOUNTER — HOSPITAL ENCOUNTER (OUTPATIENT)
Dept: INFUSION CENTER | Facility: CLINIC | Age: 84
Discharge: HOME | End: 2024-09-24

## 2024-09-26 ENCOUNTER — OFFICE VISIT (OUTPATIENT)
Dept: HEMATOLOGY ONCOLOGY | Facility: CLINIC | Age: 84
End: 2024-09-26
Payer: COMMERCIAL

## 2024-09-26 VITALS
RESPIRATION RATE: 17 BRPM | DIASTOLIC BLOOD PRESSURE: 62 MMHG | SYSTOLIC BLOOD PRESSURE: 134 MMHG | WEIGHT: 147.5 LBS | BODY MASS INDEX: 21.11 KG/M2 | OXYGEN SATURATION: 98 % | TEMPERATURE: 97.2 F | HEART RATE: 74 BPM | HEIGHT: 70 IN

## 2024-09-26 DIAGNOSIS — D61.01 PURE RED CELL APLASIA (HCC): Primary | ICD-10-CM

## 2024-09-26 DIAGNOSIS — D75.81 MYELOFIBROSIS (HCC): ICD-10-CM

## 2024-09-26 PROBLEM — R09.81 NOSE CONGESTION: Status: RESOLVED | Noted: 2024-02-06 | Resolved: 2024-09-26

## 2024-09-26 PROBLEM — D72.819 LEUKOPENIA: Status: RESOLVED | Noted: 2023-12-31 | Resolved: 2024-09-26

## 2024-09-26 PROBLEM — R79.89 ABNORMAL LFTS: Status: RESOLVED | Noted: 2024-02-04 | Resolved: 2024-09-26

## 2024-09-26 PROBLEM — E44.1 MILD PROTEIN-CALORIE MALNUTRITION (HCC): Status: RESOLVED | Noted: 2024-02-26 | Resolved: 2024-09-26

## 2024-09-26 PROBLEM — R79.89 ELEVATED TROPONIN: Status: RESOLVED | Noted: 2023-12-31 | Resolved: 2024-09-26

## 2024-09-26 PROCEDURE — 99214 OFFICE O/P EST MOD 30 MIN: CPT | Performed by: INTERNAL MEDICINE

## 2024-09-26 NOTE — PROGRESS NOTES
Hematology Outpatient Follow - Up Note  Gael Ferraro 84 y.o. 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   Myelofibrosis grade 2 of 3      On BMBX:  The overall findings are consistent with pure red cell aplasia (PRCA) with background clonal T-cells, moderately increased fibrosis (MF-2 of 3), and negative for a myeloid neoplasm.      Workup revealed Parvovirus B19 antibody, IgG IgG Antibody POSITIVE. IgM normal. TCR gamma gene rearrangement detected. Negative rheumatologic workup. Normal hepatitis testing, normal flow cytometry.      Treatment/plan:      Initiated on prednisone 20 mg QD , with significant response of hemoglobin 11-12,    Improvement with hemoglobin currently he is on prednisone 10 mg a day, reduce the dose to 7.5 a day  He had been on Aranesp 200 mcg every 2 weeks with parameters it was given twice on June and July 2024 current hemoglobin of 13        Labs and imaging studies are reviewed by ordering provider once results are available. If there are findings that need immediate attention, you will be contacted when results available.   Discussing results and the implication on your healthcare is best discussed in person at your follow-up visit.       HPI:  Gael Ferraro ' Oscar' is an 83 y/o gentleman 1st seen inpatient 1/21/24 regarding refractory anemia.       PMH: CAD, HTN, CKD, dysphagia. Never smoker. No family history of liver disease.       November 18, 2023 hemoglobin 10.2, , white blood cell count 3.7, 48% neutrophils, 39% lymphocytes, 11% monocytes, platelet count 82, iron saturation 58%. Normal total bilirubin, total protein, albumin.  BUN 24, creatinine 1.24, ESR 12. SPEP: no monoclonal protein.  Alpha 2 region is decreased and may be suggestive of a decreased haptoglobin. folate WNL and FOBT  in ED (12/2023) negative.      B12 was 292-  PCP Dr. Lyman started him on oral B12.  folate WNL.      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 Troponin elevated. Cardiac cath shows CAD but no stent indicated.      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        Ref 02/26/24    PARVOVIRUS B19 IGG Ab 0.0 - 0.8 index 1.3 (H)   PARVOVIRUS B19 IGM Ab 0.0 - 0.8 index 0.2       Vitamin B2 -  normal    Parvovirus B19 antibody, IgG and IgM - B19 IgG Antibody POSITIVE    IgG, IgA, IgM -normal    Leukemia/Lymphoma flow cytometry-flow cytometric analysis does not show significant numbers of circulating blasts or abnormal lymphoid or myeloid population.    Immunoglobulin free LT chains blood- normal    TAY w/Reflex if Positive - negative    Sjogren's Antibodies -normal    Anti-Neelima 1 Antibody -normal    Anti-scleroderma antibody -normal    DNA (DS) ANTIBODY -normal    C-reactive protein - -normal    Cyclic citrul peptide antibody, IgG -normal    Sedimentation rate, automated -normal    Lupus anticoagulant- not detected    Cardiolipin antibody- -normal    Beta-2 glycoprotein antibodies - normal    Rheumatoid Factor -normal    Chronic Hepatitis Panel - non reactive    Reticulocytes - 3.11%    Anti-neutrophilic cytoplasmic antibody- -normal    T Cell Receptor (TCR) Beta Gene Rearrang, PCR- negative    T Cell Receptor (Tcr) Gamma Gene Rearrang,PCR- clonal T-cell receptor gamma population was detected              Interval History:        Previous Treatment:         Test Results:    Imaging: No results found.    Labs:   Lab Results   Component Value Date    WBC 6.20 09/23/2024    HGB 13.0 09/23/2024    HCT 38.7 09/23/2024     (H) 09/23/2024     09/23/2024     Lab Results    Component Value Date    K 4.3 06/14/2024    CL 96 06/14/2024    CO2 30 06/14/2024    BUN 26 (H) 06/14/2024    CREATININE 1.12 06/14/2024    GLUCOSE 142 (H) 01/21/2024    CALCIUM 9.6 06/14/2024    CORRECTEDCA 9.5 02/10/2024    AST 18 06/14/2024    ALT 31 06/14/2024    ALKPHOS 57 06/14/2024    EGFR 59 06/14/2024       Lab Results   Component Value Date    IRON 252 (H) 02/26/2024    TIBC <307 02/26/2024    FERRITIN 1,381 (H) 02/26/2024       Lab Results   Component Value Date    [id number] 422 01/21/2024         ROS: Review of Systems   Constitutional: Negative.  Negative for appetite change, chills, diaphoresis, fatigue, fever and unexpected weight change.   HENT:   Negative for hearing loss, lump/mass, mouth sores, nosebleeds, sore throat, trouble swallowing and voice change.    Eyes: Negative.  Negative for eye problems and icterus.   Respiratory: Negative.  Negative for chest tightness, cough, hemoptysis and shortness of breath.    Cardiovascular:  Negative for chest pain and leg swelling.   Gastrointestinal:  Negative for abdominal distention, abdominal pain, blood in stool, constipation, diarrhea and nausea.   Endocrine: Negative.    Genitourinary:  Negative for dysuria, frequency, hematuria and pelvic pain.    Musculoskeletal: Negative.  Negative for arthralgias, back pain, flank pain, gait problem, myalgias and neck stiffness.   Skin:  Negative for itching and rash.   Neurological:  Negative for dizziness, gait problem, headaches, light-headedness, numbness and speech difficulty.   Hematological:  Negative for adenopathy. Does not bruise/bleed easily.   Psychiatric/Behavioral:  Negative for confusion, decreased concentration, depression and sleep disturbance. The patient is not nervous/anxious.           Current Medications: Reviewed  Allergies: Reviewed  PMH/FH/SH:  Reviewed      Physical Exam:    Body surface area is 1.82 meters squared.    Wt Readings from Last 3 Encounters:   09/26/24 66.9 kg (147 lb 8  oz)   08/20/24 65.1 kg (143 lb 8 oz)   05/30/24 65.3 kg (144 lb)        Temp Readings from Last 3 Encounters:   09/26/24 (!) 97.2 °F (36.2 °C) (Temporal)   08/20/24 98.1 °F (36.7 °C) (Temporal)   06/14/24 97.8 °F (36.6 °C) (Oral)        BP Readings from Last 3 Encounters:   09/26/24 134/62   08/20/24 128/54   07/02/24 131/64         Pulse Readings from Last 3 Encounters:   09/26/24 74   08/20/24 76   07/02/24 80        Physical Exam  Constitutional:       Appearance: He is well-developed.   HENT:      Head: Normocephalic.   Eyes:      Pupils: Pupils are equal, round, and reactive to light.   Cardiovascular:      Rate and Rhythm: Normal rate and regular rhythm.   Pulmonary:      Effort: Pulmonary effort is normal.   Chest:   Breasts:     Breasts are symmetrical.      Right: No inverted nipple, mass, nipple discharge, skin change or tenderness.      Left: No inverted nipple, mass, nipple discharge, skin change or tenderness.   Abdominal:      General: There is no distension.      Palpations: Abdomen is soft. There is no hepatomegaly or splenomegaly.      Tenderness: There is no abdominal tenderness. There is no guarding or rebound.   Musculoskeletal:         General: No deformity. Normal range of motion.      Cervical back: Normal range of motion.   Neurological:      Mental Status: He is alert.         ECOG PS:1    Goals and Barriers:  Current Goal: Minimize effects of disease.   Barriers: None.      Patient's Capacity to Self Care:  Patient is able to self care.    Code Status: [unfilled]

## 2024-09-30 ENCOUNTER — OFFICE VISIT (OUTPATIENT)
Dept: CARDIOLOGY CLINIC | Facility: CLINIC | Age: 84
End: 2024-09-30
Payer: COMMERCIAL

## 2024-09-30 VITALS
WEIGHT: 149 LBS | SYSTOLIC BLOOD PRESSURE: 138 MMHG | HEART RATE: 76 BPM | DIASTOLIC BLOOD PRESSURE: 60 MMHG | BODY MASS INDEX: 21.69 KG/M2 | OXYGEN SATURATION: 97 %

## 2024-09-30 DIAGNOSIS — N18.30 STAGE 3 CHRONIC KIDNEY DISEASE, UNSPECIFIED WHETHER STAGE 3A OR 3B CKD (HCC): ICD-10-CM

## 2024-09-30 DIAGNOSIS — E78.00 HYPERCHOLESTEROLEMIA: ICD-10-CM

## 2024-09-30 DIAGNOSIS — I10 PRIMARY HYPERTENSION: ICD-10-CM

## 2024-09-30 DIAGNOSIS — I25.119 CORONARY ARTERY DISEASE INVOLVING NATIVE CORONARY ARTERY OF NATIVE HEART WITH ANGINA PECTORIS (HCC): Primary | Chronic | ICD-10-CM

## 2024-09-30 PROCEDURE — 99214 OFFICE O/P EST MOD 30 MIN: CPT | Performed by: INTERNAL MEDICINE

## 2024-09-30 PROCEDURE — G2211 COMPLEX E/M VISIT ADD ON: HCPCS | Performed by: INTERNAL MEDICINE

## 2024-09-30 RX ORDER — HYDROCHLOROTHIAZIDE 12.5 MG/1
12.5 TABLET ORAL DAILY
Qty: 90 TABLET | Refills: 3 | Status: SHIPPED | OUTPATIENT
Start: 2024-09-30

## 2024-09-30 NOTE — PROGRESS NOTES
[facility] Cardiology  Follow up note  Gael Ferraro 84 y.o. male MRN: [id number]        Assessment & Plan  Coronary artery disease involving native coronary artery of native heart with angina pectoris (HCC)  Multivessel CAD with 50% left main, 40 to 50% circumflex, moderate diffuse LAD, 100% RCA discovered in the context of severe symptomatic anemia with a hemoglobin of 6.9, and treated medically  On dual antiplatelet with aspirin and ticagrelor for the last 8 months.  Current hemoglobin normal, absolutely no activity related symptoms at this time.  Hypercholesterolemia  Well-controlled, LDL 63  Stage 3 chronic kidney disease, unspecified whether stage 3a or 3b CKD (HCC)  Lab Results   Component Value Date    EGFR 59 06/14/2024    EGFR 69 03/04/2024    EGFR 59 02/10/2024    CREATININE 1.12 06/14/2024    CREATININE 0.99 03/04/2024    CREATININE 1.12 02/10/2024   Stable  Primary hypertension  Well-controlled    Plan:    Can discontinue ticagrelor  Will cut his HCTZ in half, 12 and half milligrams daily, to help with orthostatic lightheadedness, encouraged him to drink at least 40 to 50 ounces of water daily.  Routine follow-up advised      HPI:   Gael Ferraro is a 84 y.o. year old male hospitalized with symptomatic anemia, hemoglobin 6.9 in 1/24, at the time with diffuse ST segment depressions, and symptoms concerning for ACS.  Eventual cardiac catheterization after transfusion revealed diffuse nonobstructive, mostly moderate disease although he did have a 50% left main and occluded RCA.  He was treated medically, he had recurrence of his anemia a month later was rehospitalized again.  Since that has been on dual antiplatelet therapy, steroids, with completely stabilized hemoglobin over the last few months, last hemoglobin was 13.  He is actually no cardiac symptoms at this time.  He has orthostatic lightheadedness, he is using HCTZ as part of his antihypertensive regimen, he has no edema, he does not drink  much fluid in a day.  Lipids are very well-controlled.      Review of Systems   Constitutional:  Negative for appetite change, diaphoresis, fatigue and fever.   Respiratory:  Negative for chest tightness, shortness of breath and wheezing.    Cardiovascular:  Negative for chest pain, palpitations and leg swelling.   Gastrointestinal:  Negative for abdominal pain and blood in stool.   Musculoskeletal:  Negative for arthralgias and joint swelling.   Skin:  Negative for rash.   Neurological:  Positive for dizziness. Negative for syncope and light-headedness.       Past Medical History:   Diagnosis Date    Low hemoglobin      Social History     Substance and Sexual Activity   Alcohol Use Not Currently     Social History     Substance and Sexual Activity   Drug Use Never     Social History     Tobacco Use   Smoking Status Never   Smokeless Tobacco Never       Allergies:  Allergies   Allergen Reactions    Hydrocodone-Acetaminophen GI Intolerance    Niacin GI Intolerance    Penicillins Hives       Medications:     Current Outpatient Medications:     acetaminophen (TYLENOL) 325 mg tablet, Take 2 tablets (650 mg total) by mouth every 6 (six) hours as needed for mild pain, headaches or fever, Disp: , Rfl:     amLODIPine (NORVASC) 2.5 mg tablet, Take 1 tablet (2.5 mg total) by mouth daily, Disp: 30 tablet, Rfl: 0    aspirin (ECOTRIN LOW STRENGTH) 81 mg EC tablet, Take 1 tablet (81 mg total) by mouth daily, Disp: , Rfl:     Cholecalciferol 50 MCG (2000 UT) CAPS, Take 1 capsule by mouth in the morning, Disp: , Rfl:     cyanocobalamin (VITAMIN B-12) 1000 MCG tablet, Take 1 tablet (1,000 mcg total) by mouth daily, Disp: 30 tablet, Rfl: 0    hydrochlorothiazide (HYDRODIURIL) 25 mg tablet, Take 1 tablet (25 mg total) by mouth daily, Disp: 30 tablet, Rfl: 0    LORazepam (ATIVAN) 1 mg tablet, Take 1 mg by mouth 3 (three) times a day as needed for anxiety, Disp: , Rfl:     metoprolol succinate (TOPROL-XL) 100 mg 24 hr tablet, Take 0.5  tablets (50 mg total) by mouth 2 (two) times a day, Disp: 30 tablet, Rfl: 0    nitroglycerin (NITROSTAT) 0.4 mg SL tablet, Place 1 tablet (0.4 mg total) under the tongue every 5 (five) minutes as needed for chest pain, Disp: 30 tablet, Rfl: 0    potassium chloride (MICRO-K) 10 MEQ CR capsule, Take 1 capsule (10 mEq total) by mouth daily, Disp: 90 capsule, Rfl: 3    predniSONE 5 mg tablet, Take 10mg daily, Disp: 90 tablet, Rfl: 1    ranolazine (RANEXA) 500 mg 12 hr tablet, Take 1 tablet (500 mg total) by mouth every 12 (twelve) hours, Disp: 60 tablet, Rfl: 0    rosuvastatin (CRESTOR) 10 MG tablet, Take 1 tablet (10 mg total) by mouth daily, Disp: 90 tablet, Rfl: 1    ticagrelor (BRILINTA) 90 MG, Take 1 tablet (90 mg total) by mouth every 12 (twelve) hours, Disp: 180 tablet, Rfl: 3    sodium chloride (OCEAN) 0.65 % nasal spray, 1 spray into each nostril every hour as needed for congestion for up to 14 days (Patient not taking: Reported on 9/30/2024), Disp: 10 mL, Rfl: 0      Vitals:    09/30/24 1606   BP: 138/60   Pulse: 76   SpO2: 97%     Weight (last 2 days)       Date/Time Weight    09/30/24 1606 67.6 (149)          Physical Exam  Constitutional:       General: He is not in acute distress.     Appearance: He is not diaphoretic.   HENT:      Head: Normocephalic and atraumatic.   Eyes:      General: No scleral icterus.     Conjunctiva/sclera: Conjunctivae normal.   Neck:      Vascular: No JVD.   Cardiovascular:      Rate and Rhythm: Normal rate and regular rhythm.      Heart sounds: Normal heart sounds. No murmur heard.  Pulmonary:      Effort: Pulmonary effort is normal. No respiratory distress.      Breath sounds: Normal breath sounds. No decreased breath sounds, wheezing, rhonchi or rales.   Musculoskeletal:      Cervical back: Normal range of motion.      Right lower leg: Normal. No edema.      Left lower leg: Normal. No edema.   Skin:     General: Skin is warm and dry.   Neurological:      Mental Status: He is  "alert and oriented to person, place, and time.         Laboratory Studies:    Laboratory studies personally reviewed    Cardiac testing:     EKG reviewed personally:   No results found for this visit on 09/30/24.      Echocardiogram:      Stress tests:      Catheterization:      Holter:         Flavio Velasco MD    Portions of the record may have been created with voice recognition software.  Occasional wrong word or \"sound a like\" substitutions may have occurred due to the inherent limitations of voice recognition software.  Read the chart carefully and recognize, using context, where substitutions have occurred.  "

## 2024-09-30 NOTE — ASSESSMENT & PLAN NOTE
Multivessel CAD with 50% left main, 40 to 50% circumflex, moderate diffuse LAD, 100% RCA discovered in the context of severe symptomatic anemia with a hemoglobin of 6.9, and treated medically  On dual antiplatelet with aspirin and ticagrelor for the last 8 months.  Current hemoglobin normal, absolutely no activity related symptoms at this time.

## 2024-09-30 NOTE — ASSESSMENT & PLAN NOTE
Lab Results   Component Value Date    EGFR 59 06/14/2024    EGFR 69 03/04/2024    EGFR 59 02/10/2024    CREATININE 1.12 06/14/2024    CREATININE 0.99 03/04/2024    CREATININE 1.12 02/10/2024   Stable

## 2024-10-04 DIAGNOSIS — N18.30 STAGE 3 CHRONIC KIDNEY DISEASE, UNSPECIFIED WHETHER STAGE 3A OR 3B CKD (HCC): ICD-10-CM

## 2024-10-04 DIAGNOSIS — D64.9 ANEMIA, UNSPECIFIED TYPE: Primary | ICD-10-CM

## 2024-10-04 DIAGNOSIS — E78.2 MIXED HYPERLIPIDEMIA: ICD-10-CM

## 2024-10-04 RX ORDER — ROSUVASTATIN CALCIUM 10 MG/1
10 TABLET, COATED ORAL DAILY
Qty: 90 TABLET | Refills: 1 | Status: SHIPPED | OUTPATIENT
Start: 2024-10-04

## 2024-10-07 ENCOUNTER — APPOINTMENT (OUTPATIENT)
Dept: LAB | Facility: CLINIC | Age: 84
End: 2024-10-07
Payer: COMMERCIAL

## 2024-10-07 DIAGNOSIS — N18.30 STAGE 3 CHRONIC KIDNEY DISEASE, UNSPECIFIED WHETHER STAGE 3A OR 3B CKD (HCC): ICD-10-CM

## 2024-10-07 DIAGNOSIS — D64.9 ANEMIA, UNSPECIFIED TYPE: ICD-10-CM

## 2024-10-07 LAB
BASOPHILS # BLD AUTO: 0.02 THOUSANDS/ΜL (ref 0–0.1)
BASOPHILS NFR BLD AUTO: 0 % (ref 0–1)
EOSINOPHIL # BLD AUTO: 0.06 THOUSAND/ΜL (ref 0–0.61)
EOSINOPHIL NFR BLD AUTO: 1 % (ref 0–6)
ERYTHROCYTE [DISTWIDTH] IN BLOOD BY AUTOMATED COUNT: 13.9 % (ref 11.6–15.1)
HCT VFR BLD AUTO: 38.9 % (ref 36.5–49.3)
HGB BLD-MCNC: 13 G/DL (ref 12–17)
IMM GRANULOCYTES # BLD AUTO: 0.02 THOUSAND/UL (ref 0–0.2)
IMM GRANULOCYTES NFR BLD AUTO: 0 % (ref 0–2)
LYMPHOCYTES # BLD AUTO: 2.44 THOUSANDS/ΜL (ref 0.6–4.47)
LYMPHOCYTES NFR BLD AUTO: 48 % (ref 14–44)
MCH RBC QN AUTO: 37.8 PG (ref 26.8–34.3)
MCHC RBC AUTO-ENTMCNC: 33.4 G/DL (ref 31.4–37.4)
MCV RBC AUTO: 113 FL (ref 82–98)
MONOCYTES # BLD AUTO: 0.5 THOUSAND/ΜL (ref 0.17–1.22)
MONOCYTES NFR BLD AUTO: 10 % (ref 4–12)
NEUTROPHILS # BLD AUTO: 2.1 THOUSANDS/ΜL (ref 1.85–7.62)
NEUTS SEG NFR BLD AUTO: 41 % (ref 43–75)
NRBC BLD AUTO-RTO: 0 /100 WBCS
PLATELET # BLD AUTO: 279 THOUSANDS/UL (ref 149–390)
PMV BLD AUTO: 9.9 FL (ref 8.9–12.7)
RBC # BLD AUTO: 3.44 MILLION/UL (ref 3.88–5.62)
WBC # BLD AUTO: 5.14 THOUSAND/UL (ref 4.31–10.16)

## 2024-10-07 PROCEDURE — 85025 COMPLETE CBC W/AUTO DIFF WBC: CPT

## 2024-10-07 PROCEDURE — 36415 COLL VENOUS BLD VENIPUNCTURE: CPT

## 2024-10-08 ENCOUNTER — HOSPITAL ENCOUNTER (OUTPATIENT)
Dept: INFUSION CENTER | Facility: CLINIC | Age: 84
Discharge: HOME | End: 2024-10-08

## 2024-10-18 DIAGNOSIS — D64.9 ANEMIA, UNSPECIFIED TYPE: Primary | ICD-10-CM

## 2024-10-18 DIAGNOSIS — N18.30 STAGE 3 CHRONIC KIDNEY DISEASE, UNSPECIFIED WHETHER STAGE 3A OR 3B CKD (HCC): ICD-10-CM

## 2024-10-21 ENCOUNTER — APPOINTMENT (OUTPATIENT)
Dept: LAB | Facility: CLINIC | Age: 84
End: 2024-10-21
Payer: COMMERCIAL

## 2024-10-21 DIAGNOSIS — N18.30 STAGE 3 CHRONIC KIDNEY DISEASE, UNSPECIFIED WHETHER STAGE 3A OR 3B CKD (HCC): ICD-10-CM

## 2024-10-21 DIAGNOSIS — D64.9 ANEMIA, UNSPECIFIED TYPE: ICD-10-CM

## 2024-10-21 LAB
BASOPHILS # BLD AUTO: 0.02 THOUSANDS/ΜL (ref 0–0.1)
BASOPHILS NFR BLD AUTO: 0 % (ref 0–1)
EOSINOPHIL # BLD AUTO: 0.07 THOUSAND/ΜL (ref 0–0.61)
EOSINOPHIL NFR BLD AUTO: 1 % (ref 0–6)
ERYTHROCYTE [DISTWIDTH] IN BLOOD BY AUTOMATED COUNT: 13.7 % (ref 11.6–15.1)
HCT VFR BLD AUTO: 38.8 % (ref 36.5–49.3)
HGB BLD-MCNC: 12.9 G/DL (ref 12–17)
IMM GRANULOCYTES # BLD AUTO: 0.01 THOUSAND/UL (ref 0–0.2)
IMM GRANULOCYTES NFR BLD AUTO: 0 % (ref 0–2)
LYMPHOCYTES # BLD AUTO: 2.55 THOUSANDS/ΜL (ref 0.6–4.47)
LYMPHOCYTES NFR BLD AUTO: 50 % (ref 14–44)
MCH RBC QN AUTO: 37.3 PG (ref 26.8–34.3)
MCHC RBC AUTO-ENTMCNC: 33.2 G/DL (ref 31.4–37.4)
MCV RBC AUTO: 112 FL (ref 82–98)
MONOCYTES # BLD AUTO: 0.56 THOUSAND/ΜL (ref 0.17–1.22)
MONOCYTES NFR BLD AUTO: 11 % (ref 4–12)
NEUTROPHILS # BLD AUTO: 1.95 THOUSANDS/ΜL (ref 1.85–7.62)
NEUTS SEG NFR BLD AUTO: 38 % (ref 43–75)
NRBC BLD AUTO-RTO: 0 /100 WBCS
PLATELET # BLD AUTO: 270 THOUSANDS/UL (ref 149–390)
PMV BLD AUTO: 9.8 FL (ref 8.9–12.7)
RBC # BLD AUTO: 3.46 MILLION/UL (ref 3.88–5.62)
WBC # BLD AUTO: 5.16 THOUSAND/UL (ref 4.31–10.16)

## 2024-10-21 PROCEDURE — 36415 COLL VENOUS BLD VENIPUNCTURE: CPT

## 2024-10-21 PROCEDURE — 85025 COMPLETE CBC W/AUTO DIFF WBC: CPT

## 2024-10-22 ENCOUNTER — HOSPITAL ENCOUNTER (OUTPATIENT)
Dept: INFUSION CENTER | Facility: CLINIC | Age: 84
Discharge: HOME | End: 2024-10-22

## 2024-11-04 ENCOUNTER — APPOINTMENT (OUTPATIENT)
Dept: LAB | Facility: CLINIC | Age: 84
End: 2024-11-04
Payer: COMMERCIAL

## 2024-11-04 ENCOUNTER — TELEPHONE (OUTPATIENT)
Dept: LAB | Facility: HOSPITAL | Age: 84
End: 2024-11-04

## 2024-11-04 DIAGNOSIS — D64.9 ANEMIA, UNSPECIFIED TYPE: ICD-10-CM

## 2024-11-04 DIAGNOSIS — N18.30 STAGE 3 CHRONIC KIDNEY DISEASE, UNSPECIFIED WHETHER STAGE 3A OR 3B CKD (HCC): ICD-10-CM

## 2024-11-04 LAB
BASOPHILS # BLD AUTO: 0.02 THOUSANDS/ΜL (ref 0–0.1)
BASOPHILS NFR BLD AUTO: 0 % (ref 0–1)
EOSINOPHIL # BLD AUTO: 0.03 THOUSAND/ΜL (ref 0–0.61)
EOSINOPHIL NFR BLD AUTO: 1 % (ref 0–6)
ERYTHROCYTE [DISTWIDTH] IN BLOOD BY AUTOMATED COUNT: 13.7 % (ref 11.6–15.1)
HCT VFR BLD AUTO: 41.7 % (ref 36.5–49.3)
HGB BLD-MCNC: 14.2 G/DL (ref 12–17)
IMM GRANULOCYTES # BLD AUTO: 0.01 THOUSAND/UL (ref 0–0.2)
IMM GRANULOCYTES NFR BLD AUTO: 0 % (ref 0–2)
LYMPHOCYTES # BLD AUTO: 1.8 THOUSANDS/ΜL (ref 0.6–4.47)
LYMPHOCYTES NFR BLD AUTO: 31 % (ref 14–44)
MCH RBC QN AUTO: 37.5 PG (ref 26.8–34.3)
MCHC RBC AUTO-ENTMCNC: 34.1 G/DL (ref 31.4–37.4)
MCV RBC AUTO: 110 FL (ref 82–98)
MONOCYTES # BLD AUTO: 0.57 THOUSAND/ΜL (ref 0.17–1.22)
MONOCYTES NFR BLD AUTO: 10 % (ref 4–12)
NEUTROPHILS # BLD AUTO: 3.37 THOUSANDS/ΜL (ref 1.85–7.62)
NEUTS SEG NFR BLD AUTO: 58 % (ref 43–75)
NRBC BLD AUTO-RTO: 0 /100 WBCS
PLATELET # BLD AUTO: 264 THOUSANDS/UL (ref 149–390)
PMV BLD AUTO: 10 FL (ref 8.9–12.7)
RBC # BLD AUTO: 3.79 MILLION/UL (ref 3.88–5.62)
WBC # BLD AUTO: 5.8 THOUSAND/UL (ref 4.31–10.16)

## 2024-11-04 PROCEDURE — 85025 COMPLETE CBC W/AUTO DIFF WBC: CPT

## 2024-11-04 PROCEDURE — 36415 COLL VENOUS BLD VENIPUNCTURE: CPT

## 2024-11-04 NOTE — TELEPHONE ENCOUNTER
11/4 - pt did not want mobile - just wanted to know if he had bloodwork that is due - he goes to the lab

## 2024-11-18 ENCOUNTER — APPOINTMENT (OUTPATIENT)
Dept: LAB | Facility: CLINIC | Age: 84
End: 2024-11-18
Payer: COMMERCIAL

## 2024-11-18 DIAGNOSIS — N18.30 STAGE 3 CHRONIC KIDNEY DISEASE, UNSPECIFIED WHETHER STAGE 3A OR 3B CKD (HCC): ICD-10-CM

## 2024-11-18 DIAGNOSIS — D64.9 ANEMIA, UNSPECIFIED TYPE: ICD-10-CM

## 2024-11-18 LAB
BASOPHILS # BLD AUTO: 0.02 THOUSANDS/ÂΜL (ref 0–0.1)
BASOPHILS NFR BLD AUTO: 0 % (ref 0–1)
EOSINOPHIL # BLD AUTO: 0.07 THOUSAND/ÂΜL (ref 0–0.61)
EOSINOPHIL NFR BLD AUTO: 1 % (ref 0–6)
ERYTHROCYTE [DISTWIDTH] IN BLOOD BY AUTOMATED COUNT: 13.6 % (ref 11.6–15.1)
HCT VFR BLD AUTO: 39.9 % (ref 36.5–49.3)
HGB BLD-MCNC: 13.6 G/DL (ref 12–17)
IMM GRANULOCYTES # BLD AUTO: 0.01 THOUSAND/UL (ref 0–0.2)
IMM GRANULOCYTES NFR BLD AUTO: 0 % (ref 0–2)
LYMPHOCYTES # BLD AUTO: 2.52 THOUSANDS/ÂΜL (ref 0.6–4.47)
LYMPHOCYTES NFR BLD AUTO: 50 % (ref 14–44)
MCH RBC QN AUTO: 37.4 PG (ref 26.8–34.3)
MCHC RBC AUTO-ENTMCNC: 34.1 G/DL (ref 31.4–37.4)
MCV RBC AUTO: 110 FL (ref 82–98)
MONOCYTES # BLD AUTO: 0.54 THOUSAND/ÂΜL (ref 0.17–1.22)
MONOCYTES NFR BLD AUTO: 10 % (ref 4–12)
NEUTROPHILS # BLD AUTO: 2.05 THOUSANDS/ÂΜL (ref 1.85–7.62)
NEUTS SEG NFR BLD AUTO: 39 % (ref 43–75)
NRBC BLD AUTO-RTO: 0 /100 WBCS
PLATELET # BLD AUTO: 246 THOUSANDS/UL (ref 149–390)
PMV BLD AUTO: 10 FL (ref 8.9–12.7)
RBC # BLD AUTO: 3.64 MILLION/UL (ref 3.88–5.62)
WBC # BLD AUTO: 5.21 THOUSAND/UL (ref 4.31–10.16)

## 2024-11-18 PROCEDURE — 85025 COMPLETE CBC W/AUTO DIFF WBC: CPT

## 2024-11-18 PROCEDURE — 36415 COLL VENOUS BLD VENIPUNCTURE: CPT

## 2024-11-20 DIAGNOSIS — D75.81 MYELOFIBROSIS (HCC): ICD-10-CM

## 2024-11-20 RX ORDER — PREDNISONE 5 MG/1
TABLET ORAL
Qty: 90 TABLET | Refills: 0 | Status: SHIPPED | OUTPATIENT
Start: 2024-11-20

## 2024-11-26 DIAGNOSIS — I10 PRIMARY HYPERTENSION: Primary | ICD-10-CM

## 2024-11-26 RX ORDER — POTASSIUM CHLORIDE 750 MG/1
10 TABLET, EXTENDED RELEASE ORAL DAILY
COMMUNITY
End: 2024-11-26 | Stop reason: SDUPTHER

## 2024-11-27 ENCOUNTER — APPOINTMENT (OUTPATIENT)
Dept: LAB | Facility: CLINIC | Age: 84
End: 2024-11-27
Payer: COMMERCIAL

## 2024-11-27 DIAGNOSIS — D64.9 ANEMIA, UNSPECIFIED TYPE: ICD-10-CM

## 2024-11-27 DIAGNOSIS — N18.30 STAGE 3 CHRONIC KIDNEY DISEASE, UNSPECIFIED WHETHER STAGE 3A OR 3B CKD (HCC): ICD-10-CM

## 2024-11-27 LAB
BASOPHILS # BLD AUTO: 0.02 THOUSANDS/ΜL (ref 0–0.1)
BASOPHILS NFR BLD AUTO: 0 % (ref 0–1)
EOSINOPHIL # BLD AUTO: 0.01 THOUSAND/ΜL (ref 0–0.61)
EOSINOPHIL NFR BLD AUTO: 0 % (ref 0–6)
ERYTHROCYTE [DISTWIDTH] IN BLOOD BY AUTOMATED COUNT: 13.9 % (ref 11.6–15.1)
HCT VFR BLD AUTO: 40 % (ref 36.5–49.3)
HGB BLD-MCNC: 13.6 G/DL (ref 12–17)
IMM GRANULOCYTES # BLD AUTO: 0.02 THOUSAND/UL (ref 0–0.2)
IMM GRANULOCYTES NFR BLD AUTO: 0 % (ref 0–2)
LYMPHOCYTES # BLD AUTO: 1.67 THOUSANDS/ΜL (ref 0.6–4.47)
LYMPHOCYTES NFR BLD AUTO: 30 % (ref 14–44)
MCH RBC QN AUTO: 36.8 PG (ref 26.8–34.3)
MCHC RBC AUTO-ENTMCNC: 34 G/DL (ref 31.4–37.4)
MCV RBC AUTO: 108 FL (ref 82–98)
MONOCYTES # BLD AUTO: 0.31 THOUSAND/ΜL (ref 0.17–1.22)
MONOCYTES NFR BLD AUTO: 6 % (ref 4–12)
NEUTROPHILS # BLD AUTO: 3.48 THOUSANDS/ΜL (ref 1.85–7.62)
NEUTS SEG NFR BLD AUTO: 64 % (ref 43–75)
NRBC BLD AUTO-RTO: 0 /100 WBCS
PLATELET # BLD AUTO: 297 THOUSANDS/UL (ref 149–390)
PMV BLD AUTO: 9.9 FL (ref 8.9–12.7)
RBC # BLD AUTO: 3.7 MILLION/UL (ref 3.88–5.62)
WBC # BLD AUTO: 5.51 THOUSAND/UL (ref 4.31–10.16)

## 2024-11-27 PROCEDURE — 36415 COLL VENOUS BLD VENIPUNCTURE: CPT

## 2024-11-27 PROCEDURE — 85025 COMPLETE CBC W/AUTO DIFF WBC: CPT

## 2024-11-29 RX ORDER — POTASSIUM CHLORIDE 750 MG/1
10 TABLET, EXTENDED RELEASE ORAL DAILY
Qty: 90 TABLET | Refills: 3 | Status: SHIPPED | OUTPATIENT
Start: 2024-11-29 | End: 2024-12-02

## 2024-12-02 ENCOUNTER — OFFICE VISIT (OUTPATIENT)
Dept: HEMATOLOGY ONCOLOGY | Facility: CLINIC | Age: 84
End: 2024-12-02
Payer: COMMERCIAL

## 2024-12-02 VITALS
SYSTOLIC BLOOD PRESSURE: 140 MMHG | BODY MASS INDEX: 21.83 KG/M2 | TEMPERATURE: 98.1 F | RESPIRATION RATE: 17 BRPM | HEIGHT: 70 IN | OXYGEN SATURATION: 98 % | DIASTOLIC BLOOD PRESSURE: 62 MMHG | WEIGHT: 152.5 LBS | HEART RATE: 72 BPM

## 2024-12-02 DIAGNOSIS — D61.01 PURE RED CELL APLASIA (HCC): ICD-10-CM

## 2024-12-02 DIAGNOSIS — D75.81 MYELOFIBROSIS (HCC): Primary | ICD-10-CM

## 2024-12-02 PROCEDURE — 99213 OFFICE O/P EST LOW 20 MIN: CPT | Performed by: PHYSICIAN ASSISTANT

## 2024-12-02 NOTE — PROGRESS NOTES
Hematology/Oncology Outpatient Follow- up Note  Gael Ferraro 84 y.o. 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   Myelofibrosis grade 2 of 3      On BMBX 1/2024:  The overall findings are consistent with pure red cell aplasia (PRCA) with background clonal T-cells, moderately increased fibrosis (MF-2 of 3), and negative for a myeloid neoplasm.      Workup revealed Parvovirus B19 antibody, IgG Antibody POSITIVE. IgM normal.   TCR gamma gene rearrangement detected.   Negative rheumatologic workup. Normal hepatitis testing, normal flow cytometry.         Initiated on prednisone 20 mg QD , with significant response of hemoglobin 11-12, and Aranesp 100mcg  4/22/24 prednisone reduced to 5 mg every other day from 5 mg prednisone every day  5/29/24 hgb 9.7, , 32% segs, 56% lymphocytes   5/31/24 Aranesp 100mcg q 2 weeks resumed        6/5/24 hgb decreased further to 8.4  Prednisone increased to 20mg/ day, prednisone tapered very slowly.    6/18/24 Aranesp 200 mcg every 2 weeks,  most recent dose was  July 2, 2024   Since 9/9/24, hgb has been 12.6 or higher    9/26/2024 Prednisone reduced to 7.5 a day from 10 mg a day,  11/27/24 hgb 13.6     Reduce prednisone to 5 mg po daily.    Continue with CBCD q 2 weeks  F/U in 2 months.             HPI:  Gael Moore' is an 83 y/o gentleman 1st seen inpatient 1/21/24 regarding refractory anemia.       PMH: CAD, HTN, CKD, dysphagia. Never smoker. No family history of liver disease.       November 18, 2023 hemoglobin 10.2, , white blood cell count 3.7, 48% neutrophils, 39% lymphocytes, 11% monocytes, platelet count 82, iron saturation 58%. Normal total bilirubin, total protein, albumin.  BUN 24, creatinine 1.24, ESR 12. SPEP: no monoclonal protein.  Alpha 2 region is  decreased and may be suggestive of a decreased haptoglobin. folate WNL and FOBT in ED (12/2023) negative.      B12 was 292-  PCP Dr. Lyman started him on oral B12.  folate WNL.      Recurrent admissions with chest pain (12/2023 & 1/2024), deemed due to anemia, required RBC transfusions. Troponin elevated. Cardiac cath shows CAD but no stent indicated.      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        Ref 02/26/24    PARVOVIRUS B19 IGG Ab 0.0 - 0.8 index 1.3 (H)   PARVOVIRUS B19 IGM Ab 0.0 - 0.8 index 0.2       Vitamin B2 - normal    Parvovirus B19 antibody, IgG and IgM - B19 IgG Antibody POSITIVE    IgG, IgA, IgM -normal    Leukemia/Lymphoma flow cytometry-flow cytometric analysis does not show significant numbers of circulating blasts or abnormal lymphoid or myeloid population.    Immunoglobulin free LT chains blood- normal    TAY w/Reflex if Positive - negative    Sjogren's Antibodies -normal    Anti-Neelima 1 Antibody -normal    Anti-scleroderma antibody -normal    DNA (DS) ANTIBODY -normal    C-reactive protein - -normal    Cyclic citrul peptide antibody, IgG -normal    Sedimentation rate, automated -normal    Lupus anticoagulant- not detected    Cardiolipin antibody- -normal    Beta-2 glycoprotein antibodies - normal    Rheumatoid Factor -normal    Chronic Hepatitis Panel - non reactive    Reticulocytes - 3.11%    Anti-neutrophilic cytoplasmic antibody- -normal    T Cell Receptor (TCR) Beta Gene Rearrang, PCR- negative    T Cell Receptor (Tcr) Gamma Gene Rearrang,PCR- clonal T-cell receptor gamma population was detected                Review of Systems   Constitutional:  Negative for appetite change, chills, diaphoresis, fatigue, fever and unexpected weight change.   HENT:   Negative for mouth sores, nosebleeds, sore throat, tinnitus and  "voice change.    Eyes:  Negative for eye problems.   Respiratory:  Negative for chest tightness, cough, shortness of breath and wheezing.    Cardiovascular:  Negative for chest pain, leg swelling and palpitations.   Gastrointestinal:  Negative for abdominal distention, abdominal pain, blood in stool, constipation, diarrhea, nausea, rectal pain and vomiting.   Endocrine: Negative for hot flashes.   Genitourinary: Negative.     Musculoskeletal:  Negative for gait problem and myalgias.   Skin:  Negative for itching and rash.   Neurological:  Negative for dizziness, gait problem, headaches, light-headedness and numbness.   Hematological:  Negative for adenopathy.   Psychiatric/Behavioral:  Negative for confusion and sleep disturbance. The patient is not nervous/anxious.         Test Results:        Labs:   Lab Results   Component Value Date    HGB 13.6 11/27/2024    HCT 40.0 11/27/2024     (H) 11/27/2024     11/27/2024    WBC 5.51 11/27/2024    NRBC 0 11/27/2024    BANDSPCT 1 02/15/2024     Lab Results   Component Value Date    K 4.3 06/14/2024    CL 96 06/14/2024    CO2 30 06/14/2024    BUN 26 (H) 06/14/2024    CREATININE 1.12 06/14/2024    GLUCOSE 142 (H) 01/21/2024    CALCIUM 9.6 06/14/2024    CORRECTEDCA 9.5 02/10/2024    AST 18 06/14/2024    ALT 31 06/14/2024    ALKPHOS 57 06/14/2024    EGFR 59 06/14/2024           Imaging: No results found.          Allergies:   Allergies   Allergen Reactions    Hydrocodone-Acetaminophen GI Intolerance    Niacin GI Intolerance    Penicillins Hives     Current Medications: Reviewed  PMH/FH/SH:  Reviewed      Physical Exam:    There is no height or weight on file to calculate BSA.    Ht Readings from Last 3 Encounters:   09/26/24 5' 9.5\" (1.765 m)   08/20/24 5' 9.5\" (1.765 m)   05/30/24 5' 10\" (1.778 m)        Wt Readings from Last 3 Encounters:   09/30/24 67.6 kg (149 lb)   09/26/24 66.9 kg (147 lb 8 oz)   08/20/24 65.1 kg (143 lb 8 oz)        Temp Readings from Last " 3 Encounters:   24 (!) 97.2 °F (36.2 °C) (Temporal)   24 98.1 °F (36.7 °C) (Temporal)   24 97.8 °F (36.6 °C) (Oral)        BP Readings from Last 3 Encounters:   24 138/60   24 134/62   24 128/54             Physical Exam  Constitutional:       Appearance: Normal appearance. He is well-developed.   HENT:      Head: Normocephalic and atraumatic.   Cardiovascular:      Rate and Rhythm: Normal rate and regular rhythm.      Heart sounds: No murmur heard.  Pulmonary:      Effort: Pulmonary effort is normal. No respiratory distress.      Breath sounds: Normal breath sounds. No stridor. No wheezing or rhonchi.   Skin:     General: Skin is warm and dry.   Neurological:      General: No focal deficit present.      Mental Status: He is alert.   Psychiatric:         Behavior: Behavior normal.         ECO      Emergency Contacts:    Extended Emergency Contact Information  Primary Emergency Contact: Christi Ferraro  Home Phone: [phone]  Relation: Spouse  Secondary Emergency Contact: Naveen Ferraro  Mobile Phone: [phone]  Relation: None

## 2025-01-03 ENCOUNTER — APPOINTMENT (OUTPATIENT)
Dept: LAB | Facility: CLINIC | Age: 85
End: 2025-01-03
Payer: COMMERCIAL

## 2025-01-03 DIAGNOSIS — N18.30 STAGE 3 CHRONIC KIDNEY DISEASE, UNSPECIFIED WHETHER STAGE 3A OR 3B CKD (HCC): ICD-10-CM

## 2025-01-03 DIAGNOSIS — D64.9 ANEMIA, UNSPECIFIED TYPE: ICD-10-CM

## 2025-01-03 LAB
BASOPHILS # BLD AUTO: 0.02 THOUSANDS/ΜL (ref 0–0.1)
BASOPHILS NFR BLD AUTO: 1 % (ref 0–1)
EOSINOPHIL # BLD AUTO: 0.07 THOUSAND/ΜL (ref 0–0.61)
EOSINOPHIL NFR BLD AUTO: 2 % (ref 0–6)
ERYTHROCYTE [DISTWIDTH] IN BLOOD BY AUTOMATED COUNT: 13.8 % (ref 11.6–15.1)
HCT VFR BLD AUTO: 38.1 % (ref 36.5–49.3)
HGB BLD-MCNC: 13.5 G/DL (ref 12–17)
IMM GRANULOCYTES # BLD AUTO: 0.01 THOUSAND/UL (ref 0–0.2)
IMM GRANULOCYTES NFR BLD AUTO: 0 % (ref 0–2)
LYMPHOCYTES # BLD AUTO: 1.62 THOUSANDS/ΜL (ref 0.6–4.47)
LYMPHOCYTES NFR BLD AUTO: 40 % (ref 14–44)
MCH RBC QN AUTO: 38.4 PG (ref 26.8–34.3)
MCHC RBC AUTO-ENTMCNC: 35.4 G/DL (ref 31.4–37.4)
MCV RBC AUTO: 108 FL (ref 82–98)
MONOCYTES # BLD AUTO: 0.36 THOUSAND/ΜL (ref 0.17–1.22)
MONOCYTES NFR BLD AUTO: 9 % (ref 4–12)
NEUTROPHILS # BLD AUTO: 1.98 THOUSANDS/ΜL (ref 1.85–7.62)
NEUTS SEG NFR BLD AUTO: 48 % (ref 43–75)
NRBC BLD AUTO-RTO: 0 /100 WBCS
PLATELET # BLD AUTO: 288 THOUSANDS/UL (ref 149–390)
PMV BLD AUTO: 9.9 FL (ref 8.9–12.7)
RBC # BLD AUTO: 3.52 MILLION/UL (ref 3.88–5.62)
WBC # BLD AUTO: 4.06 THOUSAND/UL (ref 4.31–10.16)

## 2025-01-03 PROCEDURE — 36415 COLL VENOUS BLD VENIPUNCTURE: CPT

## 2025-01-03 PROCEDURE — 85025 COMPLETE CBC W/AUTO DIFF WBC: CPT

## 2025-01-30 ENCOUNTER — APPOINTMENT (OUTPATIENT)
Dept: LAB | Facility: CLINIC | Age: 85
End: 2025-01-30
Payer: COMMERCIAL

## 2025-01-30 DIAGNOSIS — N18.30 STAGE 3 CHRONIC KIDNEY DISEASE, UNSPECIFIED WHETHER STAGE 3A OR 3B CKD (HCC): ICD-10-CM

## 2025-01-30 DIAGNOSIS — D64.9 ANEMIA, UNSPECIFIED TYPE: ICD-10-CM

## 2025-02-04 ENCOUNTER — OFFICE VISIT (OUTPATIENT)
Dept: HEMATOLOGY ONCOLOGY | Facility: CLINIC | Age: 85
End: 2025-02-04
Payer: COMMERCIAL

## 2025-02-04 VITALS
HEIGHT: 70 IN | SYSTOLIC BLOOD PRESSURE: 132 MMHG | TEMPERATURE: 98.1 F | DIASTOLIC BLOOD PRESSURE: 70 MMHG | WEIGHT: 152 LBS | RESPIRATION RATE: 17 BRPM | BODY MASS INDEX: 21.76 KG/M2 | OXYGEN SATURATION: 97 % | HEART RATE: 72 BPM

## 2025-02-04 DIAGNOSIS — D61.01 PURE RED CELL APLASIA (HCC): Primary | ICD-10-CM

## 2025-02-04 PROCEDURE — 99214 OFFICE O/P EST MOD 30 MIN: CPT | Performed by: INTERNAL MEDICINE

## 2025-02-04 NOTE — ASSESSMENT & PLAN NOTE
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   Myelofibrosis grade 2 of 3      On BMBX 1/2024:  The overall findings are consistent with pure red cell aplasia (PRCA) with background clonal T-cells, moderately increased fibrosis (MF-2 of 3), and negative for a myeloid neoplasm.      Workup revealed Parvovirus B19 antibody, IgG Antibody POSITIVE. IgM normal.   TCR gamma gene rearrangement detected.   Negative rheumatologic workup. Normal hepatitis testing, normal flow cytometry.         Initiated on prednisone 20 mg QD , with significant response of hemoglobin 11-12, and Aranesp 100mcg  4/22/24 prednisone reduced to 5 mg every other day from 5 mg prednisone every day  5/29/24 hgb 9.7, , 32% segs, 56% lymphocytes   5/31/24 Aranesp 100mcg q 2 weeks resumed         6/5/24 hgb decreased further to 8.4  Prednisone increased to 20mg/ day, prednisone tapered very slowly.    6/18/24 Aranesp 200 mcg every 2 weeks,  most recent dose was  July 2, 2024   Since 9/9/24, hgb has been 12.6 or higher    Not on Aranesp anymore, currently on a prednisone 5 mg, reduce to 5 mg every other day continue to do CBC every other week and will follow-up in about 4 to 6 weeks hoping we can discontinue prednisone indefinitely

## 2025-02-04 NOTE — PROGRESS NOTES
Name: Gael Ferraro      : 1940      MRN: [id number]  Encounter Provider: Margi Vuong MD  Encounter Date: 2025   Encounter department: 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   Myelofibrosis grade 2 of 3      On BMBX 2024:  The overall findings are consistent with pure red cell aplasia (PRCA) with background clonal T-cells, moderately increased fibrosis (MF-2 of 3), and negative for a myeloid neoplasm.      Workup revealed Parvovirus B19 antibody, IgG Antibody POSITIVE. IgM normal.   TCR gamma gene rearrangement detected.   Negative rheumatologic workup. Normal hepatitis testing, normal flow cytometry.         Initiated on prednisone 20 mg QD , with significant response of hemoglobin 11-12, and Aranesp 100mcg  24 prednisone reduced to 5 mg every other day from 5 mg prednisone every day  24 hgb 9.7, , 32% segs, 56% lymphocytes   24 Aranesp 100mcg q 2 weeks resumed         24 hgb decreased further to 8.4  Prednisone increased to 20mg/ day, prednisone tapered very slowly.    24 Aranesp 200 mcg every 2 weeks,  most recent dose was  2024   Since 24, hgb has been 12.6 or higher    Not on Aranesp anymore, currently on a prednisone 5 mg, reduce to 5 mg every other day continue to do CBC every other week and will follow-up in about 4 to 6 weeks hoping we can discontinue prednisone indefinitely      History of Present Illness   Chief Complaint   Patient presents with    Follow-up   Gael Ferraro ' Oscar' is an 85 y/o gentleman 1st seen inpatient 24 regarding refractory anemia.       PMH: CAD, HTN, CKD, dysphagia. Never smoker. No family history of liver disease.       2023 hemoglobin 10.2, ,  white blood cell count 3.7, 48% neutrophils, 39% lymphocytes, 11% monocytes, platelet count 82, iron saturation 58%. Normal total bilirubin, total protein, albumin.  BUN 24, creatinine 1.24, ESR 12. SPEP: no monoclonal protein.  Alpha 2 region is decreased and may be suggestive of a decreased haptoglobin. folate WNL and FOBT in ED (12/2023) negative.      B12 was 292-  PCP Dr. Lyman started him on oral B12.  folate WNL.      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 Troponin elevated. Cardiac cath shows CAD but no stent indicated.      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        Ref 02/26/24    PARVOVIRUS B19 IGG Ab 0.0 - 0.8 index 1.3 (H)   PARVOVIRUS B19 IGM Ab 0.0 - 0.8 index 0.2       Vitamin B2 -  normal    Parvovirus B19 antibody, IgG and IgM - B19 IgG Antibody POSITIVE    IgG, IgA, IgM -normal    Leukemia/Lymphoma flow cytometry-flow cytometric analysis does not show significant numbers of circulating blasts or abnormal lymphoid or myeloid population.    Immunoglobulin free LT chains blood- normal    TAY w/Reflex if Positive - negative    Sjogren's Antibodies -normal    Anti-Neelima 1 Antibody -normal    Anti-scleroderma antibody -normal    DNA (DS) ANTIBODY -normal    C-reactive protein - -normal    Cyclic citrul peptide antibody, IgG -normal    Sedimentation rate, automated -normal    Lupus anticoagulant- not detected    Cardiolipin antibody- -normal    Beta-2 glycoprotein antibodies - normal    Rheumatoid Factor -normal    Chronic Hepatitis Panel - non reactive    Reticulocytes - 3.11%    Anti-neutrophilic cytoplasmic antibody- -normal    T Cell Receptor (TCR) Beta Gene Rearrang, PCR- negative    T Cell Receptor (Tcr) Gamma Gene Rearrang,PCR- clonal T-cell  "receptor gamma population was detected       Pertinent Medical History   Intermittent chest pain  Review of Systems   Constitutional:  Negative for chills and fever.   HENT:  Negative for ear pain and sore throat.    Eyes:  Negative for pain and visual disturbance.   Respiratory:  Negative for cough and shortness of breath.    Cardiovascular:  Negative for chest pain and palpitations.   Gastrointestinal:  Negative for abdominal pain and vomiting.   Genitourinary:  Negative for dysuria and hematuria.   Musculoskeletal:  Negative for arthralgias and back pain.   Skin:  Negative for color change and rash.   Neurological:  Negative for seizures and syncope.   Hematological:  Negative for adenopathy.   All other systems reviewed and are negative.          Objective   /70 (BP Location: Left arm, Patient Position: Sitting, Cuff Size: Adult)   Pulse 72   Temp 98.1 °F (36.7 °C) (Temporal)   Resp 17   Ht 5' 10\" (1.778 m)   Wt 68.9 kg (152 lb)   SpO2 97%   BMI 21.81 kg/m²     Physical Exam  Vitals reviewed.   Constitutional:       General: He is not in acute distress.     Appearance: He is well-developed. He is not diaphoretic.   HENT:      Head: Normocephalic and atraumatic.   Eyes:      Conjunctiva/sclera: Conjunctivae normal.   Neck:      Trachea: No tracheal deviation.   Cardiovascular:      Rate and Rhythm: Normal rate and regular rhythm.      Heart sounds: No murmur heard.     No friction rub. No gallop.   Pulmonary:      Effort: Pulmonary effort is normal. No respiratory distress.      Breath sounds: Normal breath sounds. No wheezing or rales.   Chest:      Chest wall: No tenderness.   Abdominal:      General: There is no distension.      Palpations: Abdomen is soft.      Tenderness: There is no abdominal tenderness.   Musculoskeletal:      Cervical back: Normal range of motion and neck supple.      Right lower leg: No edema.      Left lower leg: No edema.   Lymphadenopathy:      Cervical: No cervical " adenopathy.   Skin:     General: Skin is warm and dry.      Coloration: Skin is not pale.      Findings: No erythema.   Neurological:      Mental Status: He is alert.   Psychiatric:         Behavior: Behavior normal.         Thought Content: Thought content normal.         Labs: I have reviewed the following labs:  Results for orders placed or performed in visit on 01/03/25   CBC and differential   Result Value Ref Range    WBC 4.06 (L) 4.31 - 10.16 Thousand/uL    RBC 3.52 (L) 3.88 - 5.62 Million/uL    Hemoglobin 13.5 12.0 - 17.0 g/dL    Hematocrit 38.1 36.5 - 49.3 %     (H) 82 - 98 fL    MCH 38.4 (H) 26.8 - 34.3 pg    MCHC 35.4 31.4 - 37.4 g/dL    RDW 13.8 11.6 - 15.1 %    MPV 9.9 8.9 - 12.7 fL    Platelets 288 149 - 390 Thousands/uL    nRBC 0 /100 WBCs    Segmented % 48 43 - 75 %    Immature Grans % 0 0 - 2 %    Lymphocytes % 40 14 - 44 %    Monocytes % 9 4 - 12 %    Eosinophils Relative 2 0 - 6 %    Basophils Relative 1 0 - 1 %    Absolute Neutrophils 1.98 1.85 - 7.62 Thousands/µL    Absolute Immature Grans 0.01 0.00 - 0.20 Thousand/uL    Absolute Lymphocytes 1.62 0.60 - 4.47 Thousands/µL    Absolute Monocytes 0.36 0.17 - 1.22 Thousand/µL    Eosinophils Absolute 0.07 0.00 - 0.61 Thousand/µL    Basophils Absolute 0.02 0.00 - 0.10 Thousands/µL

## 2025-02-07 ENCOUNTER — OFFICE VISIT (OUTPATIENT)
Dept: CARDIOLOGY CLINIC | Facility: CLINIC | Age: 85
End: 2025-02-07
Payer: COMMERCIAL

## 2025-02-07 VITALS
DIASTOLIC BLOOD PRESSURE: 60 MMHG | HEART RATE: 65 BPM | WEIGHT: 152 LBS | OXYGEN SATURATION: 97 % | BODY MASS INDEX: 21.81 KG/M2 | SYSTOLIC BLOOD PRESSURE: 140 MMHG

## 2025-02-07 DIAGNOSIS — I20.89 STABLE ANGINA PECTORIS (HCC): ICD-10-CM

## 2025-02-07 DIAGNOSIS — E78.00 HYPERCHOLESTEROLEMIA: ICD-10-CM

## 2025-02-07 DIAGNOSIS — I25.119 CORONARY ARTERY DISEASE INVOLVING NATIVE CORONARY ARTERY OF NATIVE HEART WITH ANGINA PECTORIS (HCC): Chronic | ICD-10-CM

## 2025-02-07 DIAGNOSIS — I10 PRIMARY HYPERTENSION: ICD-10-CM

## 2025-02-07 DIAGNOSIS — N18.30 STAGE 3 CHRONIC KIDNEY DISEASE, UNSPECIFIED WHETHER STAGE 3A OR 3B CKD (HCC): ICD-10-CM

## 2025-02-07 PROCEDURE — 99214 OFFICE O/P EST MOD 30 MIN: CPT

## 2025-02-07 RX ORDER — RANOLAZINE 1000 MG/1
1000 TABLET, EXTENDED RELEASE ORAL EVERY 12 HOURS SCHEDULED
Qty: 180 TABLET | Refills: 1 | Status: SHIPPED | OUTPATIENT
Start: 2025-02-07

## 2025-02-07 NOTE — PROGRESS NOTES
Gael Braswellfaby  1940  [id number]  [facility] [place]  [address]  [address]  [place] [address] [phone]    1. Primary hypertension  POCT ECG      2. Coronary artery disease involving native coronary artery of native heart with angina pectoris (HCC)  ranolazine (RANEXA) 1000 MG SR tablet    isosorbide mononitrate (IMDUR) 30 mg 24 hr tablet      3. Stage 3 chronic kidney disease, unspecified whether stage 3a or 3b CKD (HCC)        4. Hypercholesterolemia        5. Stable angina pectoris (HCC)          Summary/Discussion:  Coronary artery disease s/p PCI  - remote history per [facility] documentation- 20 years ago   - [facility] (1/2/24): severe chronic multivessel CAD  interventional cardiology recommendations:   continue ongoing anemia workup with goal Hqb >9-10 (given the severity of his MVD)  continue GDMT optimization on statin/bb and if able to tolerate  lifelong DAPT vs P2Y1 monotherapy  would only consider high risk PCI to LM if symptoms were refractory to above  - echo (1/1/24): normal LV, LVEF 60%, NWMA, LA dilated, mild TR, and mild NC  - reports recent symptoms of angina during our visit today. Current hemoglobin normal  - EKG today demonstrating sinus rhythm with ST & T wave abnormality (similar to prior EKG)  - recommend increasing his ranexa to 1000 mg twice daily and start imdur 30 mg daily-- discussion had with his primary cardiologist who recommended optimal medical management at this time. If symptoms do not improve with medical management then can consider high risk PCI  - continue on aspirin, statin, and beta blocker. PRN SL nitro prescribed  - counseled regarding high risk features for CAD and have emphasized need to monitor for same    Hypertension:  - slightly elevated in office today, /60  - continue present medication regimen + start imdur as noted above  - lifestyle modification   - close blood pressure monitoring     Mixed hyperlipidemia:  -  Lipid Profile:    Latest Reference Range & Units 01/21/24 01:50   Cholesterol See Comment mg/dL 141   Triglycerides See Comment mg/dL 219 (H)   HDL >=40 mg/dL 34 (L)   Non-HDL Cholesterol mg/dl 107   LDL Calculated 0 - 100 mg/dL 63   - continue Crestor 10 mg daily  - encouraged lifestyle modifications and annual lipid follow up     Carotid stenosis:  - continue ASA and statin therapy     Macrocytic anemia:  - hemoglobin (1/30/2025): 13.7  - follows with hematology oncology outpatient    Interval History: Gael Ferraro is a 84 y.o. year old male with history of CAD s/p PCI, HTN, hyperlipidemia, carotid stenosis, and CKD who presents to the office today for a follow up.    Since his last office visit he reports recent symptoms of intermittent exertional chest pain that radiates to his left arm/jaw and resolves upon rest. He reports being relatively active for his age and has been noticing these symptoms over the last several months. He denies any shortness of breath, lower extremity edema, orthopnea, and PND. He denies lightheadedness, dizziness, and syncope. He denies palpitations.      He will RTO in 3 months with any AP or sooner if necessary. He will call with any concerns.     Medical Problems       Problem List       Elevated troponin    Anemia    Coronary artery disease involving native coronary artery of native heart with angina pectoris (HCC) (Chronic)    Stage 3 chronic kidney disease ([facility])    Lab Results   Component Value Date    EGFR 59 06/14/2024    EGFR 69 03/04/2024    EGFR 59 02/10/2024    CREATININE 1.12 06/14/2024    CREATININE 0.99 03/04/2024    CREATININE 1.12 02/10/2024         Hypertension    Leukopenia    Other dysphagia    Constipation    CKD (chronic kidney disease) stage 3, GFR 30-59 ml/min ([facility])    Lab Results   Component Value Date    EGFR 59 06/14/2024    EGFR 69 03/04/2024    EGFR 59 02/10/2024    CREATININE 1.12 06/14/2024    CREATININE 0.99 03/04/2024    CREATININE 1.12 02/10/2024          Abnormal LFTs    SOB (shortness of breath)    Nose congestion    Abnormal chest x-ray    Pure red cell aplasia (HCC)    Mild protein-calorie malnutrition (HCC)    Malnutrition Findings:                                 BMI Findings:           Body mass index is 21.81 kg/m².         Bone marrow fibrosis (HCC)        Past Medical History:   Diagnosis Date    Low hemoglobin      Social History     Socioeconomic History    Marital status: /Civil Union     Spouse name: Not on file    Number of children: Not on file    Years of education: Not on file    Highest education level: Not on file   Occupational History    Not on file   Tobacco Use    Smoking status: Never    Smokeless tobacco: Never   Vaping Use    Vaping status: Never Used   Substance and Sexual Activity    Alcohol use: Not Currently    Drug use: Never    Sexual activity: Not on file   Other Topics Concern    Not on file   Social History Narrative    Not on file     Social Drivers of Health     Financial Resource Strain: Not on file   Food Insecurity: No Food Insecurity (2/6/2024)    Nursing - Inadequate Food Risk Classification     Worried About Running Out of Food in the Last Year: Never true     Ran Out of Food in the Last Year: Never true     Ran Out of Food in the Last Year: Not on file   Transportation Needs: No Transportation Needs (2/6/2024)    PRAPARE - Transportation     Lack of Transportation (Medical): No     Lack of Transportation (Non-Medical): No   Physical Activity: Not on file   Stress: Not on file   Social Connections: Not on file   Intimate Partner Violence: Not on file   Housing Stability: Unknown (2/6/2024)    Housing Stability Vital Sign     Unable to Pay for Housing in the Last Year: No     Number of Times Moved in the Last Year: Not on file     Homeless in the Last Year: No      Family History   Problem Relation Age of Onset    No Known Problems Mother     No Known Problems Father      Past Surgical History:   Procedure  Laterality Date    CARDIAC CATHETERIZATION Left 1/2/2024    Procedure: Cardiac Left Heart Cath;  Surgeon: Ana Garcia DO;  [facility];  Service: Cardiology    CARDIAC CATHETERIZATION N/A 1/2/2024    Procedure: Cardiac Coronary Angiogram;  Surgeon: Ana Garcia DO;  [facility];  Service: Cardiology    CARDIAC CATHETERIZATION N/A 1/2/2024    Procedure: Cardiac RHC;  Surgeon: Ana Garcia DO;  [facility];  Service: Cardiology    IR BIOPSY BONE MARROW  1/24/2024       Current Outpatient Medications:     acetaminophen (TYLENOL) 325 mg tablet, Take 2 tablets (650 mg total) by mouth every 6 (six) hours as needed for mild pain, headaches or fever, Disp: , Rfl:     amLODIPine (NORVASC) 2.5 mg tablet, Take 1 tablet (2.5 mg total) by mouth daily, Disp: 30 tablet, Rfl: 0    aspirin (ECOTRIN LOW STRENGTH) 81 mg EC tablet, Take 1 tablet (81 mg total) by mouth daily, Disp: , Rfl:     Cholecalciferol 50 MCG (2000 UT) CAPS, Take 1 capsule by mouth in the morning, Disp: , Rfl:     cyanocobalamin (VITAMIN B-12) 1000 MCG tablet, Take 1 tablet (1,000 mcg total) by mouth daily, Disp: 30 tablet, Rfl: 0    hydroCHLOROthiazide 12.5 mg tablet, Take 1 tablet (12.5 mg total) by mouth daily, Disp: 90 tablet, Rfl: 3    isosorbide mononitrate (IMDUR) 30 mg 24 hr tablet, Take 1 tablet (30 mg total) by mouth daily, Disp: 30 tablet, Rfl: 1    LORazepam (ATIVAN) 1 mg tablet, Take 1 mg by mouth 3 (three) times a day as needed for anxiety, Disp: , Rfl:     metoprolol succinate (TOPROL-XL) 100 mg 24 hr tablet, Take 0.5 tablets (50 mg total) by mouth 2 (two) times a day, Disp: 30 tablet, Rfl: 0    nitroglycerin (NITROSTAT) 0.4 mg SL tablet, Place 1 tablet (0.4 mg total) under the tongue every 5 (five) minutes as needed for chest pain, Disp: 30 tablet, Rfl: 0    predniSONE 5 mg tablet, Take 10mg daily, Disp: 90 tablet, Rfl: 0    ranolazine (RANEXA) 1000 MG SR tablet, Take 1 tablet  "(1,000 mg total) by mouth every 12 (twelve) hours, Disp: 180 tablet, Rfl: 1    rosuvastatin (CRESTOR) 10 MG tablet, Take 1 tablet (10 mg total) by mouth daily, Disp: 90 tablet, Rfl: 1  Allergies   Allergen Reactions    Hydrocodone-Acetaminophen GI Intolerance    Niacin GI Intolerance    Penicillins Hives       Labs:     Chemistry        Component Value Date/Time    K 4.3 06/14/2024 1045    K 4.2 11/18/2023 1059    CL 96 06/14/2024 1045     11/18/2023 1059    CO2 30 06/14/2024 1045    CO2 25 01/21/2024 0200    CO2 28 11/18/2023 1059    BUN 26 (H) 06/14/2024 1045    BUN 24 11/18/2023 1059    CREATININE 1.12 06/14/2024 1045    CREATININE 1.24 11/18/2023 1059        Component Value Date/Time    CALCIUM 9.6 06/14/2024 1045    CALCIUM 9.7 11/18/2023 1059    ALKPHOS 57 06/14/2024 1045    ALKPHOS 33 (L) 11/18/2023 1059    AST 18 06/14/2024 1045    AST 25 11/18/2023 1059    ALT 31 06/14/2024 1045    ALT 24 11/18/2023 1059            No results found for: \"CHOL\"  Lab Results   Component Value Date    HDL 34 (L) 01/21/2024    HDL 42 01/02/2024     Lab Results   Component Value Date    LDLCALC 63 01/21/2024    LDLCALC 56 01/02/2024     Lab Results   Component Value Date    TRIG 219 (H) 01/21/2024    TRIG 166 (H) 01/02/2024     No results found for: \"CHOLHDL\"    Imaging: Colonoscopy    Result Date: 2/9/2024  Narrative: Table formatting from the original result was not included. [facility] [address] [place] [id number] [phone] DATE OF SERVICE: 2/09/24 PHYSICIAN(S): Attending: Harvey Lyons MD Fellow: No Staff Documented INDICATION: Anemia, unspecified type POST-OP DIAGNOSIS: See the impression below. HISTORY: Prior colonoscopy: More than 10 years ago. BOWEL PREPARATION: Golytely/Colyte/Trilyte PREPROCEDURE: Informed consent was obtained for the procedure, including sedation. Risks including but not limited to bleeding, infection, perforation, adverse drug reaction and " aspiration were explained in detail. Also explained about less than 100% sensitivity with the exam and other alternatives. The patient was placed in the left lateral decubitus position. Procedure: Colonoscopy DETAILS OF PROCEDURE: Patient was taken to the procedure room where a time out was performed to confirm correct patient and correct procedure. The patient underwent monitored anesthesia care, which was administered by an anesthesia professional. The patient's blood pressure, heart rate, level of consciousness, oxygen, respirations and ECG were monitored throughout the procedure. A digital rectal exam was performed. The scope was introduced through the anus and advanced to the terminal ileum. Retroflexion was performed in the rectum. The quality of bowel preparation was evaluated using the [place] Bowel Preparation Scale with scores of: right colon = 2, transverse colon = 2, left colon = 2. The total BBPS score was 6. Bowel prep was adequate. The patient experienced no blood loss. The procedure was not difficult. The patient tolerated the procedure well. There were no apparent adverse events. ANESTHESIA INFORMATION: ASA: IV Anesthesia Type: IV Sedation with Anesthesia MEDICATIONS: No administrations occurring from 1700 to 1733 on 02/09/24 FINDINGS: Internal small hemorrhoids EVENTS: Procedure Events Event Event Time ENDO CECUM REACHED 2/9/2024  5:26 PM ENDO SCOPE OUT TIME 2/9/2024  5:33 PM SPECIMENS: * No specimens in log * EQUIPMENT: Colonoscope -[id number]     Impression: Small hemorrhoids No evidence of fresh or old blood seen throughout the colon. RECOMMENDATION:  No further screening colonoscopies necessary  Age greater than 65  No evidence of blood loss noted in the EGD or the colonoscopy. May resume diet. May resume Brilinta.  Harvey Lyons MD     EGD    Addendum Date: 2/8/2024 Addendum:   [facility] [address] [facility] [id number] [phone] DATE OF SERVICE:  2/08/24 PHYSICIAN(S): Attending: Harvey Lyons MD Fellow: No Staff Documented INDICATION: Symptomatic anemia POST-OP DIAGNOSIS: See the impression below. PREPROCEDURE: Informed consent was obtained for the procedure, including sedation.  Risks of perforation, hemorrhage, adverse drug reaction and aspiration were discussed. The patient was placed in the left lateral decubitus position. Patient was explained about the risks and benefits of the procedure. Risks including but not limited to bleeding, infection, and perforation were explained in detail. Also explained about less than 100% sensitivity with the exam and other alternatives. PROCEDURE: EGD DETAILS OF PROCEDURE: Patient was taken to the procedure room where a time out was performed to confirm correct patient and correct procedure. The patient underwent monitored anesthesia care, which was administered by an anesthesia professional. The patient's blood pressure, heart rate, level of consciousness, respirations and oxygen were monitored throughout the procedure. The scope was advanced to the second part of the duodenum. Retroflexion was performed in the fundus. The patient experienced no blood loss. The procedure was not difficult. The patient tolerated the procedure well. There were no apparent adverse events. ANESTHESIA INFORMATION: ASA: IV Anesthesia Type: Anesthesia type not filed in the log. MEDICATIONS: No administrations occurring from 1759 to 1809 on 02/08/24 FINDINGS: Regular Z-line 40 cm from the incisors 3 cm sliding hiatal hernia (type I hiatal hernia) without Shaggy lesions present - GE junction 40 cm from the incisors, diaphragmatic impression 43 cm from the incisors, confirmed by retroflexion:  Hill classification: Grade II Mild, localized erythematous mucosa in the cardia. Retroflexed view was notable for sliding hiatal hernia, pylorus was patent.  No evidence of peptic ulcer disease, AVMs seen. The duodenal bulb, 1st part of the duodenum  and 2nd part of the duodenum appeared normal. Non-obstructing Schatzki ring in the GE junction. Not dilated given patient's recent use of Brilinta. SPECIMENS: * No specimens in log * IMPRESSION: 3 cm type I hiatal hernia Mild erythematous mucosa in the cardia The duodenal bulb, 1st part of the duodenum and 2nd part of the duodenum appeared normal. Schatzki ring in the GE junction RECOMMENDATION:  There is no recommended follow-up for this procedure. No etiology of anemia identified on EGD. Recommend colonoscopy for complete workup of anemia if patient agreeable.  Addendum: Spoke to the patient postprocedure in the recovery area regarding proceeding with colonoscopy to complete the workup of anemia and also discussed endoscopy findings from today however patient adamantly refusing colonoscopy and wishes to go home.  In the absence of overt bleeding, will resume his diet, will defer discharge planning to primary team.  If hemoglobin remained stable, may resume Brilinta. Harvey Lyons MD     Result Date: 2/8/2024  Narrative: Table formatting from the original result was not included. [facility] [address] [facility] [facility] [address] [phone] DATE OF SERVICE: 2/08/24 PHYSICIAN(S): Attending: Harvey Lyons MD Fellow: No Staff Documented INDICATION: Symptomatic anemia POST-OP DIAGNOSIS: See the impression below. PREPROCEDURE: Informed consent was obtained for the procedure, including sedation.  Risks of perforation, hemorrhage, adverse drug reaction and aspiration were discussed. The patient was placed in the left lateral decubitus position. Patient was explained about the risks and benefits of the procedure. Risks including but not limited to bleeding, infection, and perforation were explained in detail. Also explained about less than 100% sensitivity with the exam and other alternatives. PROCEDURE: EGD DETAILS OF PROCEDURE: Patient was taken to the procedure room where a time  out was performed to confirm correct patient and correct procedure. The patient underwent monitored anesthesia care, which was administered by an anesthesia professional. The patient's blood pressure, heart rate, level of consciousness, respirations and oxygen were monitored throughout the procedure. The scope was advanced to the second part of the duodenum. Retroflexion was performed in the fundus. The patient experienced no blood loss. The procedure was not difficult. The patient tolerated the procedure well. There were no apparent adverse events. ANESTHESIA INFORMATION: ASA: IV Anesthesia Type: Anesthesia type not filed in the log. MEDICATIONS: No administrations occurring from 1759 to 1809 on 02/08/24 FINDINGS: Regular Z-line 40 cm from the incisors 3 cm sliding hiatal hernia (type I hiatal hernia) without Shaggy lesions present - GE junction 40 cm from the incisors, diaphragmatic impression 43 cm from the incisors, confirmed by retroflexion:  Hill classification: Grade II Mild, localized erythematous mucosa in the cardia. Retroflexed view was notable for sliding hiatal hernia, pylorus was patent.  No evidence of peptic ulcer disease, AVMs seen. The duodenal bulb, 1st part of the duodenum and 2nd part of the duodenum appeared normal. Non-obstructing Schatzki ring in the GE junction. Not dilated given patient's recent use of Brilinta. SPECIMENS: * No specimens in log *     Impression: 3 cm type I hiatal hernia Mild erythematous mucosa in the cardia The duodenal bulb, 1st part of the duodenum and 2nd part of the duodenum appeared normal. Schatzki ring in the GE junction RECOMMENDATION:  There is no recommended follow-up for this procedure. No etiology of anemia identified on EGD. Recommend colonoscopy for complete workup of anemia if patient agreeable.   Harvey Lyons MD     XR chest 2 views    Result Date: 2/5/2024  Narrative: XR CHEST PA & LATERAL DUAL ENERGY SUBTRACTION. INDICATION: Shortness of breath.  COMPARISON: CXR 1/21/2024. FINDINGS: Slightly increased opacity in the right costophrenic sulcus. Persistent opacity in the left base. Nodules over both lower lungs are the nipples. No pneumothorax or pleural effusion. Normal heart size. Cardiac stents. Bones are unremarkable for age. Normal upper abdomen.     Impression: New mild opacity in the right costophrenic sulcus which could be due to atelectasis or could be infectious/inflammatory. Persistent opacity in the left base, likely atelectasis or scar. Workstation performed: [id number]       ECG:  sinus rhythm with ST & T wave abnormality      Review of Systems   Constitutional: Negative for fever and weight gain.   HENT: Negative.     Eyes:  Positive for blurred vision (chronic) and vision loss in right eye.   Cardiovascular:  Positive for chest pain. Negative for dyspnea on exertion, irregular heartbeat, leg swelling, near-syncope, orthopnea, palpitations, paroxysmal nocturnal dyspnea and syncope.   Respiratory:  Negative for cough and sleep disturbances due to breathing.    Endocrine: Negative.    Hematologic/Lymphatic: Negative for bleeding problem.   Skin: Negative.    Musculoskeletal:  Negative for muscle weakness.   Gastrointestinal: Negative.    Genitourinary: Negative.    Neurological: Negative.    Psychiatric/Behavioral: Negative.         Vitals:    02/07/25 1531   BP: 140/60   Pulse: 65   SpO2: 97%     Vitals:    02/07/25 1531   Weight: 68.9 kg (152 lb)         Body mass index is 21.81 kg/m².    Physical Exam  Vitals and nursing note reviewed.   Constitutional:       General: He is not in acute distress.  HENT:      Head: Normocephalic.      Nose: Nose normal.      Mouth/Throat:      Mouth: Mucous membranes are moist.      Pharynx: Oropharynx is clear.   Neck:      Vascular: No carotid bruit or JVD.   Cardiovascular:      Rate and Rhythm: Normal rate and regular rhythm.      Pulses: Normal pulses.      Heart sounds: Normal heart sounds. No murmur  heard.  Pulmonary:      Effort: Pulmonary effort is normal. No respiratory distress.      Breath sounds: Normal breath sounds. No wheezing, rhonchi or rales.   Musculoskeletal:         General: Normal range of motion.      Cervical back: Normal range of motion.      Right lower leg: No edema.      Left lower leg: No edema.   Skin:     General: Skin is warm and dry.      Coloration: Skin is pale.   Neurological:      Mental Status: He is alert and oriented to person, place, and time.   Psychiatric:         Mood and Affect: Mood normal.         Behavior: Behavior normal.

## 2025-02-07 NOTE — PATIENT INSTRUCTIONS
For recurrent chest pain you can take your nitroglycerin, 1 tablet every 5 minutes for up to 3 doses. If you were to have any recurrent chest pain please call 911.

## 2025-02-11 ENCOUNTER — RESULTS FOLLOW-UP (OUTPATIENT)
Dept: CARDIOLOGY CLINIC | Facility: CLINIC | Age: 85
End: 2025-02-11

## 2025-02-11 PROCEDURE — 93000 ELECTROCARDIOGRAM COMPLETE: CPT

## 2025-02-11 RX ORDER — ISOSORBIDE MONONITRATE 30 MG/1
30 TABLET, EXTENDED RELEASE ORAL DAILY
Qty: 30 TABLET | Refills: 1 | Status: SHIPPED | OUTPATIENT
Start: 2025-02-11 | End: 2025-02-22

## 2025-02-15 ENCOUNTER — HOSPITAL ENCOUNTER (EMERGENCY)
Facility: HOSPITAL | Age: 85
End: 2025-02-15
Attending: EMERGENCY MEDICINE | Admitting: EMERGENCY MEDICINE
Payer: COMMERCIAL

## 2025-02-15 ENCOUNTER — APPOINTMENT (INPATIENT)
Dept: NON INVASIVE DIAGNOSTICS | Facility: HOSPITAL | Age: 85
DRG: 220 | End: 2025-02-15
Payer: COMMERCIAL

## 2025-02-15 ENCOUNTER — HOSPITAL ENCOUNTER (INPATIENT)
Facility: HOSPITAL | Age: 85
LOS: 7 days | Discharge: HOME | DRG: 220 | End: 2025-02-22
Attending: ANESTHESIOLOGY | Admitting: ANESTHESIOLOGY
Payer: COMMERCIAL

## 2025-02-15 ENCOUNTER — RESULTS FOLLOW-UP (OUTPATIENT)
Dept: EMERGENCY DEPT | Facility: HOSPITAL | Age: 85
End: 2025-02-15

## 2025-02-15 ENCOUNTER — APPOINTMENT (EMERGENCY)
Dept: RADIOLOGY | Facility: HOSPITAL | Age: 85
End: 2025-02-15
Payer: COMMERCIAL

## 2025-02-15 ENCOUNTER — APPOINTMENT (INPATIENT)
Dept: RADIOLOGY | Facility: HOSPITAL | Age: 85
DRG: 220 | End: 2025-02-15
Payer: COMMERCIAL

## 2025-02-15 VITALS
SYSTOLIC BLOOD PRESSURE: 107 MMHG | RESPIRATION RATE: 20 BRPM | TEMPERATURE: 98.1 F | DIASTOLIC BLOOD PRESSURE: 56 MMHG | OXYGEN SATURATION: 90 % | HEART RATE: 86 BPM

## 2025-02-15 DIAGNOSIS — I21.3 STEMI (ST ELEVATION MYOCARDIAL INFARCTION) (HCC): ICD-10-CM

## 2025-02-15 DIAGNOSIS — I21.3 STEMI (ST ELEVATION MYOCARDIAL INFARCTION) (HCC): Primary | ICD-10-CM

## 2025-02-15 DIAGNOSIS — I25.119 CORONARY ARTERY DISEASE INVOLVING NATIVE CORONARY ARTERY OF NATIVE HEART WITH ANGINA PECTORIS (HCC): Primary | ICD-10-CM

## 2025-02-15 DIAGNOSIS — E78.00 HYPERCHOLESTEROLEMIA: ICD-10-CM

## 2025-02-15 DIAGNOSIS — R45.1 AGITATION: ICD-10-CM

## 2025-02-15 LAB
ALBUMIN SERPL BCG-MCNC: 4.1 G/DL (ref 3.5–5)
ALP SERPL-CCNC: 65 U/L (ref 34–104)
ALT SERPL W P-5'-P-CCNC: 33 U/L (ref 7–52)
ANION GAP SERPL CALCULATED.3IONS-SCNC: 8 MMOL/L (ref 4–13)
ANION GAP SERPL CALCULATED.3IONS-SCNC: 9 MMOL/L (ref 4–13)
AORTIC ROOT: 3.2 CM
APTT PPP: 129 SECONDS (ref 23–34)
APTT PPP: 27 SECONDS (ref 23–34)
APTT PPP: 48 SECONDS (ref 23–34)
ASCENDING AORTA: 3.7 CM
AST SERPL W P-5'-P-CCNC: 56 U/L (ref 13–39)
ATRIAL RATE: 78 BPM
ATRIAL RATE: 79 BPM
BASOPHILS # BLD AUTO: 0.03 THOUSANDS/ΜL (ref 0–0.1)
BASOPHILS NFR BLD AUTO: 0 % (ref 0–1)
BILIRUB SERPL-MCNC: 0.87 MG/DL (ref 0.2–1)
BNP SERPL-MCNC: 539 PG/ML (ref 0–100)
BSA FOR ECHO PROCEDURE: 1.86 M2
BUN SERPL-MCNC: 22 MG/DL (ref 5–25)
BUN SERPL-MCNC: 22 MG/DL (ref 5–25)
CALCIUM SERPL-MCNC: 9 MG/DL (ref 8.4–10.2)
CALCIUM SERPL-MCNC: 9.5 MG/DL (ref 8.4–10.2)
CARDIAC TROPONIN I PNL SERPL HS: 1964 NG/L (ref ?–50)
CHLORIDE SERPL-SCNC: 99 MMOL/L (ref 96–108)
CHLORIDE SERPL-SCNC: 99 MMOL/L (ref 96–108)
CHOLEST SERPL-MCNC: 198 MG/DL (ref ?–200)
CO2 SERPL-SCNC: 26 MMOL/L (ref 21–32)
CO2 SERPL-SCNC: 28 MMOL/L (ref 21–32)
CREAT SERPL-MCNC: 1.18 MG/DL (ref 0.6–1.3)
CREAT SERPL-MCNC: 1.21 MG/DL (ref 0.6–1.3)
E WAVE DECELERATION TIME: 134 MS
E/A RATIO: 1.89
EOSINOPHIL # BLD AUTO: 0.16 THOUSAND/ΜL (ref 0–0.61)
EOSINOPHIL NFR BLD AUTO: 2 % (ref 0–6)
ERYTHROCYTE [DISTWIDTH] IN BLOOD BY AUTOMATED COUNT: 14.6 % (ref 11.6–15.1)
FRACTIONAL SHORTENING: 14 (ref 28–44)
GFR SERPL CREATININE-BSD FRML MDRD: 53 ML/MIN/1.73SQ M
GFR SERPL CREATININE-BSD FRML MDRD: 55 ML/MIN/1.73SQ M
GLUCOSE SERPL-MCNC: 124 MG/DL (ref 65–140)
GLUCOSE SERPL-MCNC: 136 MG/DL (ref 65–140)
HCT VFR BLD AUTO: 37.6 % (ref 36.5–49.3)
HDLC SERPL-MCNC: 35 MG/DL
HGB BLD-MCNC: 13.1 G/DL (ref 12–17)
IMM GRANULOCYTES # BLD AUTO: 0.05 THOUSAND/UL (ref 0–0.2)
IMM GRANULOCYTES NFR BLD AUTO: 1 % (ref 0–2)
INR PPP: 0.88 (ref 0.85–1.19)
INTERVENTRICULAR SEPTUM IN DIASTOLE (PARASTERNAL SHORT AXIS VIEW): 1.3 CM
INTERVENTRICULAR SEPTUM: 1.3 CM (ref 0.6–1.1)
LDLC SERPL CALC-MCNC: 101 MG/DL (ref 0–100)
LEFT ATRIUM SIZE: 4.1 CM
LEFT INTERNAL DIMENSION IN SYSTOLE: 3.6 CM (ref 2.1–4)
LEFT VENTRICLE DIASTOLIC VOLUME (MOD BIPLANE): 90 ML
LEFT VENTRICLE DIASTOLIC VOLUME INDEX (MOD BIPLANE): 48.4 ML/M2
LEFT VENTRICLE SYSTOLIC VOLUME (MOD BIPLANE): 51 ML
LEFT VENTRICLE SYSTOLIC VOLUME INDEX (MOD BIPLANE): 27.4 ML/M2
LEFT VENTRICULAR INTERNAL DIMENSION IN DIASTOLE: 4.2 CM (ref 3.5–6)
LEFT VENTRICULAR POSTERIOR WALL IN END DIASTOLE: 1.3 CM
LEFT VENTRICULAR STROKE VOLUME: 27 ML
LV EF BIPLANE MOD: 43 %
LV EF US.2D.A4C+ESTIMATED: 45 %
LVSV (TEICH): 27 ML
LYMPHOCYTES # BLD AUTO: 3.6 THOUSANDS/ΜL (ref 0.6–4.47)
LYMPHOCYTES NFR BLD AUTO: 33 % (ref 14–44)
MAGNESIUM SERPL-MCNC: 1.7 MG/DL (ref 1.9–2.7)
MAGNESIUM SERPL-MCNC: 2 MG/DL (ref 1.9–2.7)
MCH RBC QN AUTO: 37.5 PG (ref 26.8–34.3)
MCHC RBC AUTO-ENTMCNC: 34.8 G/DL (ref 31.4–37.4)
MCV RBC AUTO: 108 FL (ref 82–98)
MONOCYTES # BLD AUTO: 0.9 THOUSAND/ΜL (ref 0.17–1.22)
MONOCYTES NFR BLD AUTO: 8 % (ref 4–12)
MV E'TISSUE VEL-LAT: 8 CM/S
MV E'TISSUE VEL-SEP: 5 CM/S
MV PEAK A VEL: 0.38 M/S
MV PEAK E VEL: 72 CM/S
MV STENOSIS PRESSURE HALF TIME: 39 MS
MV VALVE AREA P 1/2 METHOD: 5.64
NEUTROPHILS # BLD AUTO: 6.03 THOUSANDS/ΜL (ref 1.85–7.62)
NEUTS SEG NFR BLD AUTO: 56 % (ref 43–75)
NONHDLC SERPL-MCNC: 163 MG/DL
NRBC BLD AUTO-RTO: 0 /100 WBCS
P AXIS: 70 DEGREES
P AXIS: 72 DEGREES
PLATELET # BLD AUTO: 259 THOUSANDS/UL (ref 149–390)
PMV BLD AUTO: 11.3 FL (ref 8.9–12.7)
POTASSIUM SERPL-SCNC: 3.8 MMOL/L (ref 3.5–5.3)
POTASSIUM SERPL-SCNC: 4.8 MMOL/L (ref 3.5–5.3)
PR INTERVAL: 154 MS
PR INTERVAL: 156 MS
PROT SERPL-MCNC: 6.5 G/DL (ref 6.4–8.4)
PROTHROMBIN TIME: 12.7 SECONDS (ref 12.3–15)
QRS AXIS: 73 DEGREES
QRS AXIS: 74 DEGREES
QRSD INTERVAL: 90 MS
QRSD INTERVAL: 92 MS
QT INTERVAL: 414 MS
QT INTERVAL: 426 MS
QTC INTERVAL: 474 MS
QTC INTERVAL: 485 MS
RBC # BLD AUTO: 3.49 MILLION/UL (ref 3.88–5.62)
SL CV LV EF: 43
SL CV PED ECHO LEFT VENTRICLE DIASTOLIC VOLUME (MOD BIPLANE) 2D: 80 ML
SL CV PED ECHO LEFT VENTRICLE SYSTOLIC VOLUME (MOD BIPLANE) 2D: 54 ML
SODIUM SERPL-SCNC: 134 MMOL/L (ref 135–147)
SODIUM SERPL-SCNC: 135 MMOL/L (ref 135–147)
T WAVE AXIS: 251 DEGREES
T WAVE AXIS: 269 DEGREES
TRICUSPID ANNULAR PLANE SYSTOLIC EXCURSION: 2.1 CM
TRIGL SERPL-MCNC: 312 MG/DL (ref ?–150)
VENTRICULAR RATE: 78 BPM
VENTRICULAR RATE: 79 BPM
WBC # BLD AUTO: 10.77 THOUSAND/UL (ref 4.31–10.16)

## 2025-02-15 PROCEDURE — 93454 CORONARY ARTERY ANGIO S&I: CPT | Performed by: INTERNAL MEDICINE

## 2025-02-15 PROCEDURE — C1769 GUIDE WIRE: HCPCS | Performed by: INTERNAL MEDICINE

## 2025-02-15 PROCEDURE — 96375 TX/PRO/DX INJ NEW DRUG ADDON: CPT

## 2025-02-15 PROCEDURE — 96376 TX/PRO/DX INJ SAME DRUG ADON: CPT

## 2025-02-15 PROCEDURE — 1123F ACP DISCUSS/DSCN MKR DOCD: CPT | Performed by: INTERNAL MEDICINE

## 2025-02-15 PROCEDURE — 80048 BASIC METABOLIC PNL TOTAL CA: CPT | Performed by: PHYSICIAN ASSISTANT

## 2025-02-15 PROCEDURE — NC001 PR NO CHARGE: Performed by: STUDENT IN AN ORGANIZED HEALTH CARE EDUCATION/TRAINING PROGRAM

## 2025-02-15 PROCEDURE — 83880 ASSAY OF NATRIURETIC PEPTIDE: CPT | Performed by: EMERGENCY MEDICINE

## 2025-02-15 PROCEDURE — 99152 MOD SED SAME PHYS/QHP 5/>YRS: CPT | Performed by: INTERNAL MEDICINE

## 2025-02-15 PROCEDURE — 85025 COMPLETE CBC W/AUTO DIFF WBC: CPT

## 2025-02-15 PROCEDURE — C1894 INTRO/SHEATH, NON-LASER: HCPCS | Performed by: INTERNAL MEDICINE

## 2025-02-15 PROCEDURE — 33967 INSERT I-AORT PERCUT DEVICE: CPT | Performed by: INTERNAL MEDICINE

## 2025-02-15 PROCEDURE — NC001 PR NO CHARGE: Performed by: INTERNAL MEDICINE

## 2025-02-15 PROCEDURE — 85730 THROMBOPLASTIN TIME PARTIAL: CPT

## 2025-02-15 PROCEDURE — 93926 LOWER EXTREMITY STUDY: CPT

## 2025-02-15 PROCEDURE — 99223 1ST HOSP IP/OBS HIGH 75: CPT | Performed by: INTERNAL MEDICINE

## 2025-02-15 PROCEDURE — 96361 HYDRATE IV INFUSION ADD-ON: CPT

## 2025-02-15 PROCEDURE — 99291 CRITICAL CARE FIRST HOUR: CPT | Performed by: EMERGENCY MEDICINE

## 2025-02-15 PROCEDURE — C8929 TTE W OR WO FOL WCON,DOPPLER: HCPCS

## 2025-02-15 PROCEDURE — 93306 TTE W/DOPPLER COMPLETE: CPT | Performed by: INTERNAL MEDICINE

## 2025-02-15 PROCEDURE — 99153 MOD SED SAME PHYS/QHP EA: CPT | Performed by: INTERNAL MEDICINE

## 2025-02-15 PROCEDURE — 71045 X-RAY EXAM CHEST 1 VIEW: CPT

## 2025-02-15 PROCEDURE — 93979 VASCULAR STUDY: CPT

## 2025-02-15 PROCEDURE — C1725 CATH, TRANSLUMIN NON-LASER: HCPCS | Performed by: INTERNAL MEDICINE

## 2025-02-15 PROCEDURE — 80061 LIPID PANEL: CPT

## 2025-02-15 PROCEDURE — 84484 ASSAY OF TROPONIN QUANT: CPT

## 2025-02-15 PROCEDURE — 99223 1ST HOSP IP/OBS HIGH 75: CPT | Performed by: STUDENT IN AN ORGANIZED HEALTH CARE EDUCATION/TRAINING PROGRAM

## 2025-02-15 PROCEDURE — 93005 ELECTROCARDIOGRAM TRACING: CPT

## 2025-02-15 PROCEDURE — 96374 THER/PROPH/DIAG INJ IV PUSH: CPT

## 2025-02-15 PROCEDURE — 85610 PROTHROMBIN TIME: CPT

## 2025-02-15 PROCEDURE — 83735 ASSAY OF MAGNESIUM: CPT

## 2025-02-15 PROCEDURE — 83735 ASSAY OF MAGNESIUM: CPT | Performed by: PHYSICIAN ASSISTANT

## 2025-02-15 PROCEDURE — 36415 COLL VENOUS BLD VENIPUNCTURE: CPT

## 2025-02-15 PROCEDURE — 80053 COMPREHEN METABOLIC PANEL: CPT

## 2025-02-15 PROCEDURE — 85730 THROMBOPLASTIN TIME PARTIAL: CPT | Performed by: ANESTHESIOLOGY

## 2025-02-15 PROCEDURE — 99285 EMERGENCY DEPT VISIT HI MDM: CPT

## 2025-02-15 PROCEDURE — 99291 CRITICAL CARE FIRST HOUR: CPT | Performed by: ANESTHESIOLOGY

## 2025-02-15 PROCEDURE — 93010 ELECTROCARDIOGRAM REPORT: CPT | Performed by: INTERNAL MEDICINE

## 2025-02-15 RX ORDER — HEPARIN SODIUM 1000 [USP'U]/ML
1950 INJECTION, SOLUTION INTRAVENOUS; SUBCUTANEOUS EVERY 6 HOURS PRN
Status: DISCONTINUED | OUTPATIENT
Start: 2025-02-15 | End: 2025-02-17

## 2025-02-15 RX ORDER — MIDAZOLAM HYDROCHLORIDE 2 MG/2ML
INJECTION, SOLUTION INTRAMUSCULAR; INTRAVENOUS CODE/TRAUMA/SEDATION MEDICATION
Status: DISCONTINUED | OUTPATIENT
Start: 2025-02-15 | End: 2025-02-15 | Stop reason: HOSPADM

## 2025-02-15 RX ORDER — ONDANSETRON 2 MG/ML
4 INJECTION INTRAMUSCULAR; INTRAVENOUS EVERY 6 HOURS PRN
Status: DISCONTINUED | OUTPATIENT
Start: 2025-02-15 | End: 2025-02-17

## 2025-02-15 RX ORDER — MAGNESIUM SULFATE HEPTAHYDRATE 40 MG/ML
2 INJECTION, SOLUTION INTRAVENOUS ONCE
Status: COMPLETED | OUTPATIENT
Start: 2025-02-15 | End: 2025-02-15

## 2025-02-15 RX ORDER — NITROGLYCERIN 20 MG/100ML
5-200 INJECTION INTRAVENOUS
Status: DISCONTINUED | OUTPATIENT
Start: 2025-02-15 | End: 2025-02-17

## 2025-02-15 RX ORDER — ACETAMINOPHEN 325 MG/1
650 TABLET ORAL EVERY 6 HOURS PRN
Status: DISCONTINUED | OUTPATIENT
Start: 2025-02-15 | End: 2025-02-22 | Stop reason: HOSPADM

## 2025-02-15 RX ORDER — HEPARIN SODIUM 10000 [USP'U]/100ML
3-20 INJECTION, SOLUTION INTRAVENOUS
Status: DISCONTINUED | OUTPATIENT
Start: 2025-02-15 | End: 2025-02-17

## 2025-02-15 RX ORDER — ASPIRIN 81 MG/1
81 TABLET ORAL DAILY
Status: DISCONTINUED | OUTPATIENT
Start: 2025-02-16 | End: 2025-02-22 | Stop reason: HOSPADM

## 2025-02-15 RX ORDER — POTASSIUM CHLORIDE 1500 MG/1
20 TABLET, EXTENDED RELEASE ORAL ONCE
Status: COMPLETED | OUTPATIENT
Start: 2025-02-15 | End: 2025-02-15

## 2025-02-15 RX ORDER — HEPARIN SODIUM 10000 [USP'U]/100ML
INJECTION, SOLUTION INTRAVENOUS
Status: COMPLETED | OUTPATIENT
Start: 2025-02-15 | End: 2025-02-15

## 2025-02-15 RX ORDER — NITROGLYCERIN 0.4 MG/1
0.4 TABLET SUBLINGUAL ONCE AS NEEDED
Status: CANCELLED | OUTPATIENT
Start: 2025-02-15

## 2025-02-15 RX ORDER — SODIUM CHLORIDE 9 MG/ML
3 INJECTION INTRAVENOUS
Status: DISCONTINUED | OUTPATIENT
Start: 2025-02-15 | End: 2025-02-15 | Stop reason: HOSPADM

## 2025-02-15 RX ORDER — HEPARIN SODIUM 10000 [USP'U]/100ML
3-20 INJECTION, SOLUTION INTRAVENOUS
Status: DISCONTINUED | OUTPATIENT
Start: 2025-02-15 | End: 2025-02-15

## 2025-02-15 RX ORDER — HEPARIN SODIUM 1000 [USP'U]/ML
4000 INJECTION, SOLUTION INTRAVENOUS; SUBCUTANEOUS ONCE
Status: COMPLETED | OUTPATIENT
Start: 2025-02-15 | End: 2025-02-15

## 2025-02-15 RX ORDER — HEPARIN SODIUM 10000 [USP'U]/100ML
3-20 INJECTION, SOLUTION INTRAVENOUS
Status: DISCONTINUED | OUTPATIENT
Start: 2025-02-15 | End: 2025-02-15 | Stop reason: HOSPADM

## 2025-02-15 RX ORDER — LIDOCAINE HYDROCHLORIDE 10 MG/ML
INJECTION, SOLUTION EPIDURAL; INFILTRATION; INTRACAUDAL; PERINEURAL CODE/TRAUMA/SEDATION MEDICATION
Status: DISCONTINUED | OUTPATIENT
Start: 2025-02-15 | End: 2025-02-15 | Stop reason: HOSPADM

## 2025-02-15 RX ORDER — METOPROLOL TARTRATE 25 MG/1
25 TABLET, FILM COATED ORAL EVERY 12 HOURS SCHEDULED
Status: DISCONTINUED | OUTPATIENT
Start: 2025-02-15 | End: 2025-02-16

## 2025-02-15 RX ORDER — HEPARIN SODIUM 1000 [USP'U]/ML
2000 INJECTION, SOLUTION INTRAVENOUS; SUBCUTANEOUS EVERY 6 HOURS PRN
Status: DISCONTINUED | OUTPATIENT
Start: 2025-02-15 | End: 2025-02-15 | Stop reason: HOSPADM

## 2025-02-15 RX ORDER — AMOXICILLIN 250 MG
1 CAPSULE ORAL 2 TIMES DAILY
Status: DISCONTINUED | OUTPATIENT
Start: 2025-02-15 | End: 2025-02-22 | Stop reason: HOSPADM

## 2025-02-15 RX ORDER — HEPARIN SODIUM 1000 [USP'U]/ML
2000 INJECTION, SOLUTION INTRAVENOUS; SUBCUTANEOUS EVERY 6 HOURS PRN
Status: DISCONTINUED | OUTPATIENT
Start: 2025-02-15 | End: 2025-02-15

## 2025-02-15 RX ORDER — FENTANYL CITRATE 50 UG/ML
INJECTION, SOLUTION INTRAMUSCULAR; INTRAVENOUS CODE/TRAUMA/SEDATION MEDICATION
Status: DISCONTINUED | OUTPATIENT
Start: 2025-02-15 | End: 2025-02-15 | Stop reason: HOSPADM

## 2025-02-15 RX ORDER — HEPARIN SODIUM 1000 [USP'U]/ML
4000 INJECTION, SOLUTION INTRAVENOUS; SUBCUTANEOUS EVERY 6 HOURS PRN
Status: DISCONTINUED | OUTPATIENT
Start: 2025-02-15 | End: 2025-02-15 | Stop reason: HOSPADM

## 2025-02-15 RX ORDER — ASPIRIN 81 MG/1
324 TABLET, CHEWABLE ORAL ONCE
Status: COMPLETED | OUTPATIENT
Start: 2025-02-15 | End: 2025-02-15

## 2025-02-15 RX ORDER — HEPARIN SODIUM 1000 [USP'U]/ML
3900 INJECTION, SOLUTION INTRAVENOUS; SUBCUTANEOUS EVERY 6 HOURS PRN
Status: DISCONTINUED | OUTPATIENT
Start: 2025-02-15 | End: 2025-02-17

## 2025-02-15 RX ORDER — FUROSEMIDE 10 MG/ML
INJECTION INTRAMUSCULAR; INTRAVENOUS CODE/TRAUMA/SEDATION MEDICATION
Status: DISCONTINUED | OUTPATIENT
Start: 2025-02-15 | End: 2025-02-15 | Stop reason: HOSPADM

## 2025-02-15 RX ORDER — RANOLAZINE 500 MG/1
1000 TABLET, EXTENDED RELEASE ORAL EVERY 12 HOURS SCHEDULED
Status: DISCONTINUED | OUTPATIENT
Start: 2025-02-15 | End: 2025-02-22 | Stop reason: HOSPADM

## 2025-02-15 RX ORDER — ATORVASTATIN CALCIUM 80 MG/1
80 TABLET, FILM COATED ORAL
Status: DISCONTINUED | OUTPATIENT
Start: 2025-02-15 | End: 2025-02-22 | Stop reason: HOSPADM

## 2025-02-15 RX ORDER — ONDANSETRON 2 MG/ML
4 INJECTION INTRAMUSCULAR; INTRAVENOUS ONCE AS NEEDED
Status: CANCELLED | OUTPATIENT
Start: 2025-02-15

## 2025-02-15 RX ORDER — LORAZEPAM 1 MG/1
1 TABLET ORAL 3 TIMES DAILY PRN
Status: DISCONTINUED | OUTPATIENT
Start: 2025-02-15 | End: 2025-02-17

## 2025-02-15 RX ORDER — HEPARIN SODIUM 1000 [USP'U]/ML
INJECTION, SOLUTION INTRAVENOUS; SUBCUTANEOUS CODE/TRAUMA/SEDATION MEDICATION
Status: DISCONTINUED | OUTPATIENT
Start: 2025-02-15 | End: 2025-02-15 | Stop reason: HOSPADM

## 2025-02-15 RX ORDER — CHLORHEXIDINE GLUCONATE ORAL RINSE 1.2 MG/ML
15 SOLUTION DENTAL EVERY 12 HOURS SCHEDULED
Status: DISCONTINUED | OUTPATIENT
Start: 2025-02-15 | End: 2025-02-22 | Stop reason: HOSPADM

## 2025-02-15 RX ORDER — HEPARIN SODIUM 1000 [USP'U]/ML
4000 INJECTION, SOLUTION INTRAVENOUS; SUBCUTANEOUS EVERY 6 HOURS PRN
Status: DISCONTINUED | OUTPATIENT
Start: 2025-02-15 | End: 2025-02-15

## 2025-02-15 RX ORDER — SODIUM CHLORIDE 9 MG/ML
75 INJECTION, SOLUTION INTRAVENOUS CONTINUOUS
Status: DISCONTINUED | OUTPATIENT
Start: 2025-02-15 | End: 2025-02-15 | Stop reason: HOSPADM

## 2025-02-15 RX ADMIN — RANOLAZINE 1000 MG: 500 TABLET, FILM COATED, EXTENDED RELEASE ORAL at 21:04

## 2025-02-15 RX ADMIN — CHLORHEXIDINE GLUCONATE 0.12% ORAL RINSE 15 ML: 1.2 LIQUID ORAL at 21:14

## 2025-02-15 RX ADMIN — POTASSIUM CHLORIDE 20 MEQ: 1500 TABLET, EXTENDED RELEASE ORAL at 15:57

## 2025-02-15 RX ADMIN — TICAGRELOR 180 MG: 90 TABLET ORAL at 04:19

## 2025-02-15 RX ADMIN — ACETAMINOPHEN 650 MG: 325 TABLET, FILM COATED ORAL at 22:36

## 2025-02-15 RX ADMIN — NITROGLYCERIN 100 MCG/MIN: 20 INJECTION INTRAVENOUS at 18:31

## 2025-02-15 RX ADMIN — ATORVASTATIN CALCIUM 80 MG: 80 TABLET, FILM COATED ORAL at 17:22

## 2025-02-15 RX ADMIN — LORAZEPAM 1 MG: 1 TABLET ORAL at 22:15

## 2025-02-15 RX ADMIN — NITROGLYCERIN 10 MCG/MIN: 20 INJECTION INTRAVENOUS at 07:26

## 2025-02-15 RX ADMIN — PERFLUTREN 0.4 ML/MIN: 6.52 INJECTION, SUSPENSION INTRAVENOUS at 08:28

## 2025-02-15 RX ADMIN — ASPIRIN 81 MG CHEWABLE TABLET 324 MG: 81 TABLET CHEWABLE at 04:18

## 2025-02-15 RX ADMIN — HEPARIN SODIUM 1950 UNITS: 1000 INJECTION INTRAVENOUS; SUBCUTANEOUS at 18:12

## 2025-02-15 RX ADMIN — HEPARIN SODIUM 4000 UNITS: 1000 INJECTION INTRAVENOUS; SUBCUTANEOUS at 04:17

## 2025-02-15 RX ADMIN — LORAZEPAM 1 MG: 1 TABLET ORAL at 17:44

## 2025-02-15 RX ADMIN — LORAZEPAM 1 MG: 1 TABLET ORAL at 13:09

## 2025-02-15 RX ADMIN — MORPHINE SULFATE 2 MG: 2 INJECTION, SOLUTION INTRAMUSCULAR; INTRAVENOUS at 04:39

## 2025-02-15 RX ADMIN — MAGNESIUM SULFATE HEPTAHYDRATE 2 G: 40 INJECTION, SOLUTION INTRAVENOUS at 15:53

## 2025-02-15 RX ADMIN — METOPROLOL TARTRATE 25 MG: 25 TABLET, FILM COATED ORAL at 09:00

## 2025-02-15 RX ADMIN — METOPROLOL TARTRATE 25 MG: 25 TABLET, FILM COATED ORAL at 21:04

## 2025-02-15 RX ADMIN — RANOLAZINE 1000 MG: 500 TABLET, FILM COATED, EXTENDED RELEASE ORAL at 09:00

## 2025-02-15 RX ADMIN — SENNOSIDES AND DOCUSATE SODIUM 1 TABLET: 50; 8.6 TABLET ORAL at 17:22

## 2025-02-15 RX ADMIN — DEXTRAN 70, GLYCERIN, HYPROMELLOSE 1 DROP: 1; 2; 3 SOLUTION/ DROPS OPHTHALMIC at 21:14

## 2025-02-15 RX ADMIN — HEPARIN SODIUM 12 UNITS/KG/HR: 10000 INJECTION, SOLUTION INTRAVENOUS at 06:33

## 2025-02-15 RX ADMIN — NITROGLYCERIN 0.5 INCH: 20 OINTMENT TOPICAL at 04:31

## 2025-02-15 RX ADMIN — SODIUM CHLORIDE 75 ML/HR: 0.9 INJECTION, SOLUTION INTRAVENOUS at 04:18

## 2025-02-15 NOTE — EMTALA/ACUTE CARE TRANSFER
[facility] EMERGENCY DEPARTMENT   [facility]  [place] [address]  Dept: [phone]      EMTALA TRANSFER CONSENT    NAME Gael Ferraro                                         1940                              MRN [id number]    I have been informed of my rights regarding examination, treatment, and transfer   by Dr. Chavo Butler MD    Benefits: Specialized equipment and/or services available at the receiving facility (Include comment)________________________ (Interventional cardiology and management of balloon pump for high risk PCI)    Risks: Potential for delay in receiving treatment, Potential deterioration of medical condition, Increased discomfort during transfer, Possible worsening of condition or death during transfer      Transfer Request   I acknowledge that my medical condition has been evaluated and explained to me by the emergency department physician or other qualified medical person and/or my attending physician who has recommended and offered to me further medical examination and treatment. I understand the Hospital's obligation with respect to the treatment and stabilization of my emergency medical condition. I nevertheless request to be transferred. I release the Hospital, the doctor, and any other persons caring for me from all responsibility or liability for any injury or ill effects that may result from my transfer and agree to accept all responsibility for the consequences of my choice to transfer, rather than receive stabilizing treatment at the Hospital. I understand that because the transfer is my request, my insurance may not provide reimbursement for the services.  The Hospital will assist and direct me and my family in how to make arrangements for transfer, but the hospital is not liable for any fees charged by the transport service.  In spite of this understanding, I refuse to consent to further medical examination and treatment which has  been offered to me, and request transfer to Accepting Facility Name, City & State : [facility]. I authorize the performance of emergency medical procedures and treatments upon me in both transit and upon arrival at the receiving facility.  Additionally, I authorize the release of any and all medical records to the receiving facility and request they be transported with me, if possible.    I authorize the performance of emergency medical procedures and treatments upon me in both transit and upon arrival at the receiving facility.  Additionally, I authorize the release of any and all medical records to the receiving facility and request they be transported with me, if possible.  I understand that the safest mode of transportation during a medical emergency is an ambulance and that the Hospital advocates the use of this mode of transport. Risks of traveling to the receiving facility by car, including absence of medical control, life sustaining equipment, such as oxygen, and medical personnel has been explained to me and I fully understand them.    (LIBRA CORRECT BOX BELOW)  [  ]  I consent to the stated transfer and to be transported by ambulance/helicopter.  [  ]  I consent to the stated transfer, but refuse transportation by ambulance and accept full responsibility for my transportation by car.  I understand the risks of non-ambulance transfers and I exonerate the Hospital and its staff from any deterioration in my condition that results from this refusal.    X___________________________________________    DATE  02/15/25  TIME________  Signature of patient or legally responsible individual signing on patient behalf           RELATIONSHIP TO PATIENT_________________________          Provider Certification    NAME Gael Ferraro                                         1940                              MRN [id number]    A medical screening exam was performed on the above named patient.  Based on the  examination:    Condition Necessitating Transfer The encounter diagnosis was STEMI (ST elevation myocardial infarction) (HCC).    Patient Condition: The patient has been stabilized such that within reasonable medical probability, no material deterioration of the patient condition or the condition of the unborn child(argelia) is likely to result from the transfer    Reason for Transfer: Level of Care needed not available at this facility    Transfer Requirements: Facility [facility]   Space available and qualified personnel available for treatment as acknowledged by Sahra Schafer, [phone]  Agreed to accept transfer and to provide appropriate medical treatment as acknowledged by       Dr. Ana Garcia, DO  Appropriate medical records of the examination and treatment of the patient are provided at the time of transfer   STAFF INITIAL WHEN COMPLETED _______  Transfer will be performed by qualified personnel from    and appropriate transfer equipment as required, including the use of necessary and appropriate life support measures.    Provider Certification: I have examined the patient and explained the following risks and benefits of being transferred/refusing transfer to the patient/family:  General risk, such as traffic hazards, adverse weather conditions, rough terrain or turbulence, possible failure of equipment (including vehicle or aircraft), or consequences of actions of persons outside the control of the transport personnel, Risk of worsening condition, Unanticipated needs of medical equipment and personnel during transport      Based on these reasonable risks and benefits to the patient and/or the unborn child(argelia), and based upon the information available at the time of the patient’s examination, I certify that the medical benefits reasonably to be expected from the provision of appropriate medical treatments at another medical facility outweigh the increasing risks, if any, to the  individual’s medical condition, and in the case of labor to the unborn child, from effecting the transfer.    X____________________________________________ DATE 02/15/25        TIME_______      ORIGINAL - SEND TO MEDICAL RECORDS   COPY - SEND WITH PATIENT DURING TRANSFER

## 2025-02-15 NOTE — ASSESSMENT & PLAN NOTE
Lab Results   Component Value Date    EGFR 55 02/15/2025    EGFR 59 06/14/2024    EGFR 69 03/04/2024    CREATININE 1.18 02/15/2025    CREATININE 1.12 06/14/2024    CREATININE 0.99 03/04/2024   Appears to be at baseline renal function, will need to monitor at this since patient will likely require another cardiac catheterization early next week.

## 2025-02-15 NOTE — ASSESSMENT & PLAN NOTE
Home regimen: Toprol-XL 50 mg BID, HCTZ 12.5 mg daily, Imdur 30 mg daily, amlodipine 2.5 mg daily  Start metoprolol 25 mg BID  Nitro drip as above

## 2025-02-15 NOTE — PROGRESS NOTES
Brief Progress Note - Cardiothoracic Surgery Attending  Gael Ferraro 84 y.o. male MRN: [id number]    Mr. 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 IABP was placed. I personally reviewed his cath from 2024 and today - which shows clear progression of his left main disease. TTE is pending, but on cath it appears his LV function is now reduced (was normal last year). He is currently comfortable in the ICU without chest pain with functional IABP. We are consulted for CABG evaluation. Mr. Ferraro underwent cath in January 2024 for intermittent exertional chest pain, where 50% left main disease was found. He was treated medically as he was very anemic at the time and needed workup (which ultimately found red cell aplasia).     While CABG is technically feasible in Mr. Ferraro, I do not think it is an advisable plan. Given his advanced age, comorbidities, and limited recovery potential given his ambulatory dysfunction (walks with cane) - surgery is very high risk and not a great option. I strongly recommend left main PCI (if technically feasible) as the best option to get him back to baseline with the least amount of risk. I discussed this directly with Dr. Garcia from cardiology. I also discussed all of this with Mr. Ferraro and his extended family. They were very concerned about his condition but understood my treatment recommendations.     Please see note written by Luther Ellis PA-C for full consult.       SIGNATURE: Carter Torres MD  DATE: February 15, 2025  TIME: 9:55 AM

## 2025-02-15 NOTE — H&P
H&P - Critical Care/ICU   Name: Gael Ferraro 84 y.o. male I MRN: [id number]  Unit/Bed#: PPHP-312-01 I Date of Admission: 2/15/2025   Date of Service: 2/15/2025 I Hospital Day: 0       Assessment & Plan  Coronary artery disease involving native coronary artery of native heart with angina pectoris (HCC)  Presented to [facility] with chest pain, sob, jaw pain. STEMI alert called, patient brought to [facility] cath lab for balloon pump and transfer to [facility]. Initial troponin 1964  IABP 1:1  Consult cardiac surgery for MVCAD  Consideration for high risk PCI to LM - need LEADS  Obtain echocardiogram  Continue heparin infusion   Aspirin/atorvastatin/metoprolol  Continue renexa 1000 mg BID  Start nitro drip for goal SBP < 140    Stage 3 chronic kidney disease ([facility])  Lab Results   Component Value Date    EGFR 55 02/15/2025    EGFR 59 06/14/2024    EGFR 69 03/04/2024    CREATININE 1.18 02/15/2025    CREATININE 1.12 06/14/2024    CREATININE 0.99 03/04/2024     Hypertension  Home regimen: Toprol-XL 50 mg BID, HCTZ 12.5 mg daily, Imdur 30 mg daily, amlodipine 2.5 mg daily  Start metoprolol 25 mg BID  Nitro drip as above  CKD (chronic kidney disease) stage 3, GFR 30-59 ml/min (Formerly [facility])  Lab Results   Component Value Date    EGFR 55 02/15/2025    EGFR 59 06/14/2024    EGFR 69 03/04/2024    CREATININE 1.18 02/15/2025    CREATININE 1.12 06/14/2024    CREATININE 0.99 03/04/2024     Baseline Cr 1.0-1.1  Trend renal indices  Monitor UOP  Avoid nephrotoxins   Pure red cell aplasia (HCC)  Follows with heme/onc outpatient  Not currently on therapy  Hgb stable  Hypercholesterolemia  Transition to high intensity atorvastatin   Disposition: Critical care    History of Present Illness   Gael Ferraro is a 84 y.o. with past medical history significant for multivessel CAD, hyperlipidemia, pure red cell aplasia who presented to [facility] emergency department early this morning with complaints of chest and jaw pain.  He  states that around 10 PM he developed the symptoms and took a nitroglycerin with improvement.  He then had recurrent symptoms around 2 AM this morning.  He was an MI alert and underwent cardiac catheterization.  Given his known severe CAD and left main disease decision was made to place intra-aortic balloon pump and transferred to [facility].  Upon arrival, patient is chest pain free and hemodynamically stable.  He denies any nausea or shortness of breath.    History obtained from chart review and the patient.  Review of Systems: Review of Systems   Constitutional:  Negative for chills and fever.   Respiratory:  Negative for shortness of breath.    Cardiovascular:  Positive for chest pain. Negative for palpitations and leg swelling.        Jaw pain   Gastrointestinal:  Positive for constipation and nausea.   Genitourinary:  Negative for difficulty urinating.   Musculoskeletal:  Positive for gait problem (ambulates with cane).       Historical Information   Past Medical History:  No date: Low hemoglobin Past Surgical History:  1/2/2024: CARDIAC CATHETERIZATION; Left      Comment:  Procedure: Cardiac Left Heart Cath;  Surgeon: Ana Garcia DO;  [facility]: [facility];  Service:                Cardiology  1/2/2024: CARDIAC CATHETERIZATION; N/A      Comment:  Procedure: Cardiac Coronary Angiogram;  Surgeon:                Ana Garcia DO;  Location: [facility] CARD[facility];                 Service: Cardiology  1/2/2024: CARDIAC CATHETERIZATION; N/A      Comment:  Procedure: Cardiac RHC;  Surgeon: Ana Garcia DO;               Location: [facility] CARD[facility]AC [facility];  Service: Cardiology  1/24/2024: IR BIOPSY BONE MARROW   Current Outpatient Medications   Medication Instructions    acetaminophen (TYLENOL) 650 mg, Oral, Every 6 hours PRN    amLODIPine (NORVASC) 2.5 mg, Oral, Daily    aspirin (ECOTRIN LOW STRENGTH) 81 mg, Oral, Daily    Cholecalciferol 50 MCG (2000 UT) CAPS 1 capsule,  Daily    cyanocobalamin (VITAMIN B-12) 1,000 mcg, Oral, Daily    hydroCHLOROthiazide 12.5 mg, Oral, Daily    isosorbide mononitrate (IMDUR) 30 mg, Oral, Daily    LORazepam (ATIVAN) 1 mg, 3 times daily PRN    metoprolol succinate (TOPROL-XL) 50 mg, Oral, 2 times daily    nitroglycerin (NITROSTAT) 0.4 mg, Sublingual, Every 5 minutes PRN    predniSONE 5 mg tablet Take 10mg daily    ranolazine (RANEXA) 1,000 mg, Oral, Every 12 hours scheduled    rosuvastatin (CRESTOR) 10 mg, Oral, Daily    Allergies   Allergen Reactions    Hydrocodone-Acetaminophen GI Intolerance    Niacin GI Intolerance    Penicillins Hives      Social History     Tobacco Use    Smoking status: Never    Smokeless tobacco: Never   Vaping Use    Vaping status: Never Used   Substance Use Topics    Alcohol use: Not Currently    Drug use: Never    Family History   Problem Relation Age of Onset    No Known Problems Mother     No Known Problems Father           Objective :                   Vitals I/O      Most Recent Min/Max in 24hrs   Temp   Temp  Min: 98.1 °F (36.7 °C)  Max: 98.1 °F (36.7 °C)   Pulse   Pulse  Min: 80  Max: 86   Resp   Resp  Min: 20  Max: 20   BP   BP  Min: 107/56  Max: 135/69   O2 Sat   SpO2  Min: 89 %  Max: 94 %    No intake or output data in the 24 hours ending 02/15/25 0635    Diet Cardiovascular; Cardiac    Invasive Monitoring           Physical Exam   Physical Exam  Skin:     General: Skin is warm and dry.      Coloration: Skin is pale.      Comments: Decreased capillary refill in left foot   HENT:      Mouth/Throat:      Mouth: Mucous membranes are moist.   Cardiovascular:      Rate and Rhythm: Normal rate and regular rhythm.      Pulses:           Dorsalis pedis pulses are 2+ on the right side and detected w/ Doppler on the left side.      Comments: Right femoral IABP  Abdominal: General: There is distension.     Palpations: Abdomen is soft.      Tenderness: There is no abdominal tenderness.   Constitutional:       General: He is  not in acute distress.  Pulmonary:      Breath sounds: No wheezing, rhonchi or rales.   Neurological:      General: No focal deficit present.      Mental Status: He is alert and oriented to person, place and time.          Diagnostic Studies        Lab Results: I have reviewed the following results:     Medications:  Scheduled PRN   [START ON 2/16/2025] aspirin, 81 mg, Daily  atorvastatin, 80 mg, After Dinner  chlorhexidine, 15 mL, Q12H CRESENCIO  metoprolol tartrate, 25 mg, Q12H CRESENCIO  ranolazine, 1,000 mg, Q12H CRESENCIO      heparin (porcine), 1,950 Units, Q6H PRN  heparin (porcine), 3,900 Units, Q6H PRN       Continuous    heparin (porcine), 3-20 Units/kg/hr (Order-Specific), Last Rate: 12 Units/kg/hr (02/15/25 0633)  nitroGLYcerin, 5-200 mcg/min         Labs:   CBC    Recent Labs     02/15/25  0416   WBC 10.77*   HGB 13.1   HCT 37.6        BMP    Recent Labs     02/15/25  0416   SODIUM 135   K 4.8   CL 99   CO2 28   AGAP 8   BUN 22   CREATININE 1.18   CALCIUM 9.5       Coags    Recent Labs     02/15/25  0416   INR 0.88   PTT 27        Additional Electrolytes  Recent Labs     02/15/25  0416   MG 2.0          Blood Gas    No recent results  No recent results LFTs  Recent Labs     02/15/25  0416   ALT 33   AST 56*   ALKPHOS 65   ALB 4.1   TBILI 0.87       Infectious  No recent results  Glucose  Recent Labs     02/15/25  0416   GLUC 124        Administrative Statements

## 2025-02-15 NOTE — ASSESSMENT & PLAN NOTE
Lab Results   Component Value Date    EGFR 55 02/15/2025    EGFR 59 06/14/2024    EGFR 69 03/04/2024    CREATININE 1.18 02/15/2025    CREATININE 1.12 06/14/2024    CREATININE 0.99 03/04/2024     Baseline Cr 1.0-1.1  Trend renal indices  Monitor UOP  Avoid nephrotoxins

## 2025-02-15 NOTE — ASSESSMENT & PLAN NOTE
Lab Results   Component Value Date    EGFR 55 02/15/2025    EGFR 59 06/14/2024    EGFR 69 03/04/2024    CREATININE 1.18 02/15/2025    CREATININE 1.12 06/14/2024    CREATININE 0.99 03/04/2024

## 2025-02-15 NOTE — ED ATTENDING ATTESTATION
2/15/2025  I, Chavo Butler MD, saw and evaluated the patient. I have discussed the patient with the resident/non-physician practitioner and agree with the resident's/non-physician practitioner's findings, Plan of Care, and MDM as documented in the resident's/non-physician practitioner's note, except where noted. All available labs and Radiology studies were reviewed.  I was present for key portions of any procedure(s) performed by the resident/non-physician practitioner and I was immediately available to provide assistance.       At this point I agree with the current assessment done in the Emergency Department.  I have conducted an independent evaluation of this patient a history and physical is as follows: Patient is a 84 year old male with chest pain with radiation to the right shoulder and jaw with ?sob. No N/V. No travel. Took NTG at home. Was last seen at  Cardiology in [place] on 2/7/25 for primary HTN. [email or web address] website checked on this patient and last Rx filled was on 2/2/25 for ativan for 30 day supply. NCAT. No scleral icterus. Moist mucous membranes. Breath sounds  diminished bilaterally. Heart regular. Nontender chest wall. Abdomen soft and nontender. Good bowel sounds. No edema. No rash noted. DDX including but not limited to: ACS, MI, doubt PE; PTX, pneumonia; doubt dissection; pleurisy, pericarditis, myocarditis, doubt rhabdomyolysis; GI etiology. I reviewed EKG. Will check labs and CXR and STEMI alert called and STEMI medications ordered. Patient to go to cath lab.     ED Course    Patient to be transferred to [facility] [place] [facility] but actually stayed here at [facility] and went to cath lab here. IV morphine ordered for recurring pain.       Critical Care Time  Procedures Critical Care Time Statement: Upon my evaluation, this patient had a high probability of imminent or life-threatening deterioration due to STEMI, which required my direct attention, intervention, and personal management.  I spent  a total of 35 minutes directly providing critical care services, including interpretation of complex medical databases, evaluating for the presence of life-threatening injuries or illnesses, management of organ system failure(s) , and complex medical decision making (to support/prevent further life-threatening deterioration).. This time is exclusive of procedures, teaching, treating other patients, family meetings, and any prior time recorded by providers other than myself.

## 2025-02-15 NOTE — ED PROVIDER NOTES
Time reflects when diagnosis was documented in both MDM as applicable and the Disposition within this note       Time User Action Codes Description Comment    2/15/2025  4:10 AM Pal Cummings Add [I21.3] STEMI (ST elevation myocardial infarction) (HCC)           ED Disposition       ED Disposition   Send to Cath Lab    Condition   --    Date/Time   Sat Feb 15, 2025  4:59 AM    Comment   --             Assessment & Plan       Medical Decision Making  This patient presents with chest pain and an EKG showing STEMI. Patient given aspirin and Brilinta load and loaded with heparin. Pain controlled with morphine. Presentation not consistent with acute thoracic aortic dissection. No evidence of acute ACS complications including cardiogenic shock (2/2 muscle loss or valvular rupture), tachydysrhythmia or electrical conduction disturbance.  Interventional cardiology urgently consulted, recommendations followed. Patient taken to cath lab.    Amount and/or Complexity of Data Reviewed  Labs: ordered.  Radiology: ordered and independent interpretation performed.    Risk  OTC drugs.  Prescription drug management.  Decision regarding hospitalization.        ED Course as of 02/15/25 0619   Sat Feb 15, 2025   0600 EKG Interpretation    Rate: 80 BPM  Rhythm: NSR  Axis: Normal  Intervals: Normal, no blocks, QTc 461 ms  Q waves: Normal  T waves: No T wave inversions or T wave flattening  ST segments: ST depressions in leads II, 3, aVF, V4, V5, V6; ST elevations in leads V1 and aVR    Impression: EKG findings concerning for STEMI, not found in prior    EKG for comparison: 6/14/2024    EKG interpreted by me.          Medications   heparin (porcine) injection 4,000 Units (4,000 Units Intravenous Given 2/15/25 0417)   aspirin chewable tablet 324 mg (324 mg Oral Given 2/15/25 0418)   ticagrelor (BRILINTA) tablet 180 mg (180 mg Oral Given 2/15/25 0419)   nitroglycerin (NITRO-BID) 2 % TD ointment 0.5 inch (0.5 inches Topical Given 2/15/25  "0431)   morphine injection 2 mg (2 mg Intravenous Given 2/15/25 0439)       ED Risk Strat Scores   HEART Risk Score      Flowsheet Row Most Recent Value   Heart Score Risk Calculator    History 2 Filed at: 02/15/2025 0500   ECG 2 Filed at: 02/15/2025 0500   Age 2 Filed at: 02/15/2025 0500   Risk Factors 2 Filed at: 02/15/2025 0500   Troponin 2 Filed at: 02/15/2025 0500   HEART Score 10 Filed at: 02/15/2025 0500          HEART Risk Score      Flowsheet Row Most Recent Value   Heart Score Risk Calculator    History 2 Filed at: 02/15/2025 0500   ECG 2 Filed at: 02/15/2025 0500   Age 2 Filed at: 02/15/2025 0500   Risk Factors 2 Filed at: 02/15/2025 0500   Troponin 2 Filed at: 02/15/2025 0500   HEART Score 10 Filed at: 02/15/2025 0500                                                  History of Present Illness       Chief Complaint   Patient presents with    Chest Pain     Patient arrives via EMS from home with complaints of \"numbing\" chest pain, jaw pain, SOB and intermittent right arm pain. Chest pain started around 10pm last night. Patient took 1 nitro that resolved the pain until 0200; he took a second nitro at 0230, and the pain did not go right away.       Past Medical History:   Diagnosis Date    Low hemoglobin       Past Surgical History:   Procedure Laterality Date    CARDIAC CATHETERIZATION Left 1/2/2024    Procedure: Cardiac Left Heart Cath;  Surgeon: Ana Garcia DO;  Location: [facility];  Service: Cardiology    CARDIAC CATHETERIZATION N/A 1/2/2024    Procedure: Cardiac Coronary Angiogram;  Surgeon: Ana Garcia DO;  Location: [facility];  Service: Cardiology    CARDIAC CATHETERIZATION N/A 1/2/2024    Procedure: Cardiac RHC;  Surgeon: Ana Garcia DO;  Location: [facility];  Service: Cardiology    IR BIOPSY BONE MARROW  1/24/2024      Family History   Problem Relation Age of Onset    No Known Problems Mother     No Known Problems Father       Social History "     Tobacco Use    Smoking status: Never    Smokeless tobacco: Never   Vaping Use    Vaping status: Never Used   Substance Use Topics    Alcohol use: Not Currently    Drug use: Never      E-Cigarette/Vaping    E-Cigarette Use Never User       E-Cigarette/Vaping Substances    Nicotine No     THC No     CBD No     Flavoring No     Other No     Unknown No       I have reviewed and agree with the history as documented.     Pt is a 84 y.o. male who presents to the ED on February 15, 2025. Patient presents with left substernal chest pain, with radiation to the arm and jaw.  Patient states that earlier this needling approximately 10 PM, he developed sudden onset substernal crushing chest pain at rest, took nitroglycerin  soon after with mild resolution of pain, which returned at approximately 2 AM this morning, patient took second nitro at 230 without resolution.  Per patient's wife's urging, came to the hospital via personal vehicle for further evaluation.  Patient has associated shortness of breath, nausea.  Patient otherwise denied diaphoresis, fever, chills, abdominal pain, pain radiating to the back, bilateral or unilateral lower extremity swelling, difficulty breathing.  Patient compliant with medication, has history of prior stent placed on 1/2024 at [facility].  Patient was recently placed on Imdur by outpatient cardiology.  Patient not taking Brilinta, however still takes baby aspirin daily.  Review of systems otherwise negative.  No other concerns at this time.        Chest Pain      Review of Systems   Cardiovascular:  Positive for chest pain.           Objective       ED Triage Vitals   Temperature Pulse Blood Pressure Respirations SpO2 Patient Position - Orthostatic VS   02/15/25 0333 02/15/25 0331 02/15/25 0334 02/15/25 0331 02/15/25 0333 02/15/25 0331   98.1 °F (36.7 °C) 80 135/69 20 94 % Sitting      Temp Source Heart Rate Source BP Location FiO2 (%) Pain Score    02/15/25 0333 02/15/25 0331  02/15/25 0331 -- 02/15/25 0434    Oral Monitor Right arm  5      Vitals      Date and Time Temp Pulse SpO2 Resp BP Pain Score FACES Pain Rating User   02/15/25 0509 -- -- 90 % -- -- -- --    02/15/25 0508 -- -- 89 % -- -- -- --    02/15/25 0459 -- -- 93 % -- -- -- --    02/15/25 0440 -- 86 92 % 4L -- 107/56 -- --    02/15/25 0439 -- -- -- -- -- 8 --    02/15/25 0435 -- 83 92 % -- -- -- --    02/15/25 0434 -- -- -- -- -- 5 --    02/15/25 0430 -- 84 93 % -- 116/59 -- --    02/15/25 0425 -- 83 94 % -- -- -- --    02/15/25 0423 -- 83 94 % -- 117/66 -- --    02/15/25 0420 -- 85 92 % -- -- -- --    02/15/25 0414 -- 81 92 % -- -- -- --    02/15/25 0338 -- 80 93 % -- 135/69 -- --    02/15/25 0334 -- -- -- -- 135/69 -- --    02/15/25 0333 98.1 °F (36.7 °C) -- 94 % -- -- -- --    02/15/25 0331 -- 80 -- 20 -- -- --             Physical Exam  Vitals and nursing note reviewed.   Constitutional:       General: He is in acute distress.      Appearance: He is well-developed. He is ill-appearing. He is not diaphoretic.   HENT:      Head: Normocephalic and atraumatic.   Eyes:      Conjunctiva/sclera: Conjunctivae normal.   Cardiovascular:      Rate and Rhythm: Normal rate and regular rhythm.      Heart sounds: No murmur heard.     No friction rub. No gallop.   Pulmonary:      Effort: Pulmonary effort is normal. No respiratory distress.      Breath sounds: Normal breath sounds. No wheezing, rhonchi or rales.   Abdominal:      General: There is no distension.      Palpations: Abdomen is soft.      Tenderness: There is no abdominal tenderness. There is no guarding or rebound.   Musculoskeletal:         General: No swelling.      Cervical back: Neck supple.   Skin:     General: Skin is warm and dry.      Capillary Refill: Capillary refill takes less than 2 seconds.   Neurological:      Mental Status: He is alert.   Psychiatric:         Mood and Affect: Mood normal.         Results Reviewed       Procedure  Component Value Units Date/Time    HS Troponin I 4hr [[id number]]     Lab Status: No result Specimen: Blood     B-Type Natriuretic Peptide(BNP) [[id number]]  (Abnormal) Collected: 02/15/25 0416    Lab Status: Final result Specimen: Blood from Arm, Left Updated: 02/15/25 0550      pg/mL     CBC [[id number]]     Lab Status: No result Specimen: Blood     APTT [[id number]]     Lab Status: No result Specimen: Blood     Protime-INR [[id number]]     Lab Status: No result Specimen: Blood     HS Troponin 0hr (reflex protocol) [[id number]]  (Abnormal) Collected: 02/15/25 0416    Lab Status: Final result Specimen: Blood from Arm, Left Updated: 02/15/25 0448     hs TnI 0hr 1,964 ng/L     HS Troponin I 2hr [[id number]]     Lab Status: No result Specimen: Blood     Lipid panel [[id number]]  (Abnormal) Collected: 02/15/25 0416    Lab Status: Final result Specimen: Blood from Arm, Left Updated: 02/15/25 0442     Cholesterol 198 mg/dL      Triglycerides 312 mg/dL      HDL, Direct 35 mg/dL      LDL Calculated 101 mg/dL      Non-HDL-Chol (CHOL-HDL) 163 mg/dl     Magnesium [[id number]]  (Normal) Collected: 02/15/25 0416    Lab Status: Final result Specimen: Blood from Arm, Left Updated: 02/15/25 0442     Magnesium 2.0 mg/dL     Comprehensive metabolic panel [[id number]]  (Abnormal) Collected: 02/15/25 0416    Lab Status: Final result Specimen: Blood from Arm, Left Updated: 02/15/25 0442     Sodium 135 mmol/L      Potassium 4.8 mmol/L      Chloride 99 mmol/L      CO2 28 mmol/L      ANION GAP 8 mmol/L      BUN 22 mg/dL      Creatinine 1.18 mg/dL      Glucose 124 mg/dL      Calcium 9.5 mg/dL      AST 56 U/L      ALT 33 U/L      Alkaline Phosphatase 65 U/L      Total Protein 6.5 g/dL      Albumin 4.1 g/dL      Total Bilirubin 0.87 mg/dL      eGFR 55 ml/min/1.73sq m     Narrative:      [employer] guidelines for Chronic Kidney Disease (CKD):     Stage 1 with normal or high GFR (GFR > 90 mL/min/1.73 square meters)     Stage 2 Mild CKD (GFR = 60-89 mL/min/1.73 square meters)    Stage 3A Moderate CKD (GFR = 45-59 mL/min/1.73 square meters)    Stage 3B Moderate CKD (GFR = 30-44 mL/min/1.73 square meters)    Stage 4 Severe CKD (GFR = 15-29 mL/min/1.73 square meters)    Stage 5 End Stage CKD (GFR <15 mL/min/1.73 square meters)  Note: GFR calculation is accurate only with a steady state creatinine    Protime-INR [[id number]]  (Normal) Collected: 02/15/25 0416    Lab Status: Final result Specimen: Blood from Arm, Left Updated: 02/15/25 0439     Protime 12.7 seconds      INR 0.88    Narrative:      INR Therapeutic Range    Indication                                             INR Range      Atrial Fibrillation                                               2.0-3.0  Hypercoagulable State                                    2.0.2.3  Left Ventricular Asist Device                            2.0-3.0  Mechanical Heart Valve                                  -    Aortic(with afib, MI, embolism, HF, LA enlargement,    and/or coagulopathy)                                     2.0-3.0 (2.5-3.5)     Mitral                                                             2.5-3.5  Prosthetic/Bioprosthetic Heart Valve               2.0-3.0  Venous thromboembolism (VTE: VT, PE        2.0-3.0    APTT [[id number]]  (Normal) Collected: 02/15/25 0416    Lab Status: Final result Specimen: Blood from Arm, Left Updated: 02/15/25 0439     PTT 27 seconds     CBC and differential [[id number]]  (Abnormal) Collected: 02/15/25 0416    Lab Status: Final result Specimen: Blood from Arm, Left Updated: 02/15/25 0433     WBC 10.77 Thousand/uL      RBC 3.49 Million/uL      Hemoglobin 13.1 g/dL      Hematocrit 37.6 %       fL      MCH 37.5 pg      MCHC 34.8 g/dL      RDW 14.6 %      MPV 11.3 fL      Platelets 259 Thousands/uL      nRBC 0 /100 WBCs      Segmented % 56 %      Immature Grans % 1 %      Lymphocytes % 33 %      Monocytes % 8 %      Eosinophils Relative 2 %       Basophils Relative 0 %      Absolute Neutrophils 6.03 Thousands/µL      Absolute Immature Grans 0.05 Thousand/uL      Absolute Lymphocytes 3.60 Thousands/µL      Absolute Monocytes 0.90 Thousand/µL      Eosinophils Absolute 0.16 Thousand/µL      Basophils Absolute 0.03 Thousands/µL             XR chest 1 view portable   ED Interpretation by Pal Cummings MD (02/15 0440)   Diffuse pulmonary vascular congestion when compared to prior          Procedures    ED Medication and Procedure Management   Prior to Admission Medications   Prescriptions Last Dose Informant Patient Reported? Taking?   Cholecalciferol 50 MCG (2000 UT) CAPS  Self, Spouse/Significant Other Yes No   Sig: Take 1 capsule by mouth in the morning   LORazepam (ATIVAN) 1 mg tablet  Self, Spouse/Significant Other Yes No   Sig: Take 1 mg by mouth 3 (three) times a day as needed for anxiety   acetaminophen (TYLENOL) 325 mg tablet  Self, Spouse/Significant Other No No   Sig: Take 2 tablets (650 mg total) by mouth every 6 (six) hours as needed for mild pain, headaches or fever   amLODIPine (NORVASC) 2.5 mg tablet  Self, Spouse/Significant Other No No   Sig: Take 1 tablet (2.5 mg total) by mouth daily   aspirin (ECOTRIN LOW STRENGTH) 81 mg EC tablet  Self, Spouse/Significant Other No No   Sig: Take 1 tablet (81 mg total) by mouth daily   cyanocobalamin (VITAMIN B-12) 1000 MCG tablet  Self, Spouse/Significant Other No No   Sig: Take 1 tablet (1,000 mcg total) by mouth daily   hydroCHLOROthiazide 12.5 mg tablet   No No   Sig: Take 1 tablet (12.5 mg total) by mouth daily   isosorbide mononitrate (IMDUR) 30 mg 24 hr tablet   No No   Sig: Take 1 tablet (30 mg total) by mouth daily   metoprolol succinate (TOPROL-XL) 100 mg 24 hr tablet  Self, Spouse/Significant Other No No   Sig: Take 0.5 tablets (50 mg total) by mouth 2 (two) times a day   nitroglycerin (NITROSTAT) 0.4 mg SL tablet  Self, Spouse/Significant Other No No   Sig: Place 1 tablet (0.4 mg total) under  the tongue every 5 (five) minutes as needed for chest pain   predniSONE 5 mg tablet   No No   Sig: Take 10mg daily   ranolazine (RANEXA) 1000 MG SR tablet   No No   Sig: Take 1 tablet (1,000 mg total) by mouth every 12 (twelve) hours   rosuvastatin (CRESTOR) 10 MG tablet   No No   Sig: Take 1 tablet (10 mg total) by mouth daily      Facility-Administered Medications: None     Discharge Medication List as of 2/15/2025  5:46 AM        CONTINUE these medications which have NOT CHANGED    Details   acetaminophen (TYLENOL) 325 mg tablet Take 2 tablets (650 mg total) by mouth every 6 (six) hours as needed for mild pain, headaches or fever, Starting Tue 1/2/2024, No Print      amLODIPine (NORVASC) 2.5 mg tablet Take 1 tablet (2.5 mg total) by mouth daily, Starting Thu 1/25/2024, Normal      aspirin (ECOTRIN LOW STRENGTH) 81 mg EC tablet Take 1 tablet (81 mg total) by mouth daily, Starting Mon 1/15/2024, No Print      Cholecalciferol 50 MCG (2000 UT) CAPS Take 1 capsule by mouth in the morning, Historical Med      cyanocobalamin (VITAMIN B-12) 1000 MCG tablet Take 1 tablet (1,000 mcg total) by mouth daily, Starting Wed 1/3/2024, Normal      hydroCHLOROthiazide 12.5 mg tablet Take 1 tablet (12.5 mg total) by mouth daily, Starting Mon 9/30/2024, Normal      isosorbide mononitrate (IMDUR) 30 mg 24 hr tablet Take 1 tablet (30 mg total) by mouth daily, Starting Tue 2/11/2025, Normal      LORazepam (ATIVAN) 1 mg tablet Take 1 mg by mouth 3 (three) times a day as needed for anxiety, Historical Med      metoprolol succinate (TOPROL-XL) 100 mg 24 hr tablet Take 0.5 tablets (50 mg total) by mouth 2 (two) times a day, Starting Tue 2/6/2024, Until Fri 2/7/2025, Normal      nitroglycerin (NITROSTAT) 0.4 mg SL tablet Place 1 tablet (0.4 mg total) under the tongue every 5 (five) minutes as needed for chest pain, Starting Wed 1/24/2024, Normal      predniSONE 5 mg tablet Take 10mg daily, Normal      ranolazine (RANEXA) 1000 MG SR tablet  Take 1 tablet (1,000 mg total) by mouth every 12 (twelve) hours, Starting Fri 2/7/2025, Normal      rosuvastatin (CRESTOR) 10 MG tablet Take 1 tablet (10 mg total) by mouth daily, Starting Fri 10/4/2024, Normal           No discharge procedures on file.  ED SEPSIS DOCUMENTATION   Time reflects when diagnosis was documented in both MDM as applicable and the Disposition within this note       Time User Action Codes Description Comment    2/15/2025  4:10 AM Pla Cummings Add [I21.3] STEMI (ST elevation myocardial infarction) (HCC)                  Pal Cummings MD  02/15/25 0616       Pal Cummings MD  02/15/25 0619

## 2025-02-15 NOTE — CONSULTS
Consultation - Cardiology Team 2  Gael Ferraro 84 y.o. male MRN: [id number]  Unit/Bed#: PPHP-312-01 Encounter: [id number]      Inpatient consult to Cardiology     Date/Time  2/15/2025 11:10 AM     Performed by  Vani Orr MD   Authorized by  Chelsi Jo PA-C             Consult to Cardiology     Date/Time  2/15/2025 3:05 PM     Performed by  Vani Orr MD   Authorized by  Chelsi Jo PA-C           PCP: Mike Lyman MD   Outpatient Cardiologist: Flavio Velasco MD  History of Present Illness   Physician Requesting Consult: Manohar Gusman MD  Reason for Consult / Principal Problem: STEMI    Assessment and Plan      84-year-old male with known history of obstructive coronary artery disease and known RCA  and ostial to proximal 50% left main disease based on prior cardiac catheterization.  Also has history of other cardiac catheterization with PCI about 20 years ago.  Patient presented with exertional angina going on for about a month with increase in symptom intensity.  As a result of worsening symptoms, he initially presented to [facility].  Was loaded with Brilinta and underwent urgent cardiac catheterization and was noted to have further progression in his left main disease along with known RCA .  An intra-aortic balloon pump was inserted postcardiac catheterization to help maintain appropriate coronary perfusion pressure and he was subsequently transferred to [facility] critical care unit for evaluation by cardiology and cardiac surgery.  He was seen and evaluated by cardiac surgery earlier this morning and noted not to be surgery candidate based on advanced age, deconditioning, gait dysfunction.  Was recommended for high risk PCI with cardiology team.  Cardiology team consulted and following from the standpoint.    Past 24 hours: Events as noted above.  Relevant imaging from past 24 hours: Chest x-ray with appropriate placement of balloon pump, balloon pulm  marker visualized on chest x-ray.  Labs: Hemoglobin at 13.1, initial troponin at 1964, BNP at 539, serum creatinine at 1.18 with GFR at 55  Vitals: Systolics in the ranges of 140-150s, heart rate in the ranges of 70-80s, patient in sinus rhythm on telemetry, on 10 L mid flow nasal cannula  I/Os: No I/os documented at this time  Weights: --    Plan  -Will discuss case with our interventional cardiology team.  Due to this being a high risk PCI which will likely need Impella assistance, plan for procedure early next week.  Will reach out to cath lab to get patient on schedule.  -Echo results reviewed  -Agree with using nitroglycerin drip for chest pain management.  Patient reporting being chest pain free at this time.  On Ranexa for antianginal therapy as well.  -Continue with metoprolol to tartrate 25 mg every 12 hours  -On aspirin, high intensity statin, heparin drip  -Cardiology service will continue to follow    Assessment & Plan  Coronary artery disease involving native coronary artery of native heart with angina pectoris (HCC)  Please refer to our plan above  Hypertension  Systolics in the 140-150 mmHg range, patient is currently on balloon pump support.  Will hold off on initiation of antihypertensive therapy, can use room in blood pressures for uptitrating beta-blockade  CKD (chronic kidney disease) stage 3, GFR 30-59 ml/min ([facility])  Lab Results   Component Value Date    EGFR 55 02/15/2025    EGFR 59 06/14/2024    EGFR 69 03/04/2024    CREATININE 1.18 02/15/2025    CREATININE 1.12 06/14/2024    CREATININE 0.99 03/04/2024   Appears to be at baseline renal function, will need to monitor at this since patient will likely require another cardiac catheterization early next week.  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 Chinchilla. Thank  you for involving us in the care of this patient.    Subjective     HPI: 84-year-old male with known history of obstructive coronary artery disease and known RCA  and 50% left main disease based on prior cardiac catheterization.  Also has history of other cardiac catheterization with PCI about 20 years ago.  Patient presented with exertional angina going on for about a month with increase in symptom intensity.  As a result of worsening symptoms, he initially presented to [facility].  Was loaded with Brilinta and underwent urgent cardiac catheterization and was noted to have further progression in his left main disease along with known RCA .  An intra-aortic balloon pump was inserted postcardiac catheterization to help maintain appropriate coronary perfusion pressure and he was subsequently transferred to [facility] critical care unit for evaluation by cardiology and cardiac surgery.  He was seen and evaluated by cardiac surgery earlier this morning and noted not to be surgery candidate based on advanced age, deconditioning, gait dysfunction.  Was recommended for high risk PCI with cardiology team.  Cardiology team consulted and following from the standpoint.      Review of Systems  CONSTITUTIONAL: Denies any fever, chills, rigors, and weight loss  HEENT: No earache or tinnitus, denies hearing loss or visual disturbances  CARDIOVASCULAR: As noted in HPI  RESPIRATORY: Denies any hemoptysis  GASTROINTESTINAL: Denies any diarrhea or constipation  GENITOURINARY: No problems with urination, denies any hematuria or dysuria  NEUROLOGIC: No dizziness or vertigo, denies headaches   MUSCULOSKELETAL: Denies any muscle or joint pain   SKIN: Denies skin rashes or itching   ENDOCRINE: Denies excessive thirst, denies intolerance to heat or cold      Objective     Physical Exam  Vitals reviewed.   Cardiovascular:      Rate and Rhythm: Normal rate and regular rhythm.      Pulses: Normal pulses.      Heart  sounds: Normal heart sounds.      Comments: No edema in lower extremities  Pulmonary:      Effort: Pulmonary effort is normal.      Breath sounds: Normal breath sounds.   Skin:     General: Skin is warm and dry.      Capillary Refill: Capillary refill takes less than 2 seconds.         Vitals:  Temp:  [97.2 °F (36.2 °C)-98.1 °F (36.7 °C)] 97.2 °F (36.2 °C)  HR:  [75-86] 75  BP: (107-157)/(56-94) 148/77  Resp:  [17-37] 37  SpO2:  [89 %-96 %] 96 %  O2 Device: Mid flow nasal cannula  Nasal Cannula O2 Flow Rate (L/min):  [4 L/min-10 L/min] 10 L/min  Orthostatic Blood Pressures      Flowsheet Row Most Recent Value   Blood Pressure 148/77 filed at 02/15/2025 1100   Patient Position - Orthostatic VS Lying filed at 02/15/2025 0800              Current Facility-Administered Medications:     acetaminophen (TYLENOL) tablet 650 mg, 650 mg, Oral, Q6H PRN, Chelsi Jo PA-C    [START ON 2/16/2025] aspirin (ECOTRIN LOW STRENGTH) EC tablet 81 mg, 81 mg, Oral, Daily, Chelsi Jo PA-C    atorvastatin (LIPITOR) tablet 80 mg, 80 mg, Oral, After Dinner, Chelsi Jo PA-C    chlorhexidine (PERIDEX) 0.12 % oral rinse 15 mL, 15 mL, Mouth/Throat, Q12H CRESENCIO, Chelsi Jo PA-C    heparin (porcine) 25,000 units in 0.45% NaCl 250 mL infusion (premix), 3-20 Units/kg/hr (Order-Specific), Intravenous, Titrated, Chelsi Jo PA-C, Last Rate: 7.8 mL/hr at 02/15/25 0633, 12 Units/kg/hr at 02/15/25 0633    heparin (porcine) injection 1,950 Units, 1,950 Units, Intravenous, Q6H PRN, Chelsi Jo PA-C    heparin (porcine) injection 3,900 Units, 3,900 Units, Intravenous, Q6H PRN, Chelsi Jo PA-C    LORazepam (ATIVAN) tablet 1 mg, 1 mg, Oral, TID PRN, Chelsi Jo PA-C    metoprolol tartrate (LOPRESSOR) tablet 25 mg, 25 mg, Oral, Q12H CRESENCIO, Chelsi Jo PA-C, 25 mg at 02/15/25 0900    nitroGLYcerin (TRIDIL) 50 mg in 250 ml infusion (premix), 5-200 mcg/min, Intravenous, Titrated, Chelsi Jo PA-C, Last  Rate: 12 mL/hr at 02/15/25 1038, 40 mcg/min at 02/15/25 1038    ondansetron (ZOFRAN) injection 4 mg, 4 mg, Intravenous, Q6H PRN, Chelsi Jo PA-C    ranolazine (RANEXA) 12 hr tablet 1,000 mg, 1,000 mg, Oral, Q12H CRESENCIO, Chelsi Jo PA-C, 1,000 mg at 02/15/25 0900    senna-docusate sodium (SENOKOT S) 8.6-50 mg per tablet 1 tablet, 1 tablet, Oral, BID, Chelsi Jo PA-C    Cardiac Imaging/Monitoring     Telemetry:   Personally reviewed by Vani Orr MD: Sinus rhythm    EKG:  Encounter Date: 02/15/25   ECG 12 lead   Result Value    Ventricular Rate 80    Atrial Rate 80    CO Interval 154    QRSD Interval 96    QT Interval 400    QTC Interval 461    P Axis 64    QRS Axis 74    T Wave Axis 204       Vani Orr MD  Cardiovascular Disease Fellow, FY-1  [facility]

## 2025-02-15 NOTE — RESULT ENCOUNTER NOTE
Patient x-ray was reviewed.  Patient is admitted at [facility] for a STEMI/SP angioplasty.

## 2025-02-15 NOTE — CONSULTS
Consultation - Cardiac Surgery   Gael Ferraro 84 y.o. male MRN: [id number]  Unit/Bed#: PPHP-312-01 Encounter: [id number]    Physician Requesting Consult: Manohar Gusman MD    Reason for Consult / Principal Problem: MV CAD    Inpatient consult to Cardiac Surgery  Consult performed by: Luther Ellis PA-C  Consult ordered by: Chelsi Jo PA-C        History of Present Illness: Gael Ferraro is a 84 y.o. male with a cardiovascular past medical history significant for known coronary artery disease.  He remotely had initial cardiac catheterization nearly 20 years ago.  At that time PCI was completed.  He follows with his outpatient cardiologist since this time, Dr. Velasco.  More recently, he had repeat cardiac catheterization nearly 1 year ago.  At that time progression was noted of significant multivessel coronary disease.  However his angina symptoms at that time were brought on by profound anemia which has since been worked up and successfully treated.    He now presents with 1 month history of exertional angina.  Symptoms increased in intensity and duration prompting him to seek medical attention urgently this morning.  Upon presentation at [facility], type II MI was noted.  He was loaded with Brilinta 180 mg and underwent urgent cardiac catheterization.  Significant multivessel disease was again identified.  Intra-aortic balloon pump was inserted and he was transferred to [facility] for cardiac surgery consultation.    During review today he is resting comfortably with continuous heparin infusion.  He denies recurrence of anginal symptoms since admission to the hospital.    His past medical history is otherwise significant for coronary disease, carotid stenosis, hypertension, hyperlipidemia and, and history of anemia.    Past Medical History:  Past Medical History:   Diagnosis Date    Low hemoglobin    CAD  HTN    Past Surgical History:   Past Surgical  History:   Procedure Laterality Date    CARDIAC CATHETERIZATION Left 1/2/2024    Procedure: Cardiac Left Heart Cath;  Surgeon: Ana Garcia DO;  [facility]: [facility] CARD[facility]AC [facility]ATH [facility];  Service: Cardiology    CARDIAC CATHETERIZATION N/A 1/2/2024    Procedure: Cardiac Coronary Angiogram;  Surgeon: Ana Garcia DO;  [facility]: [facility] CARDIAC CATH [facility];  Service: Cardiology    CARDIAC CATHETERIZATION N/A 1/2/2024    Procedure: Cardiac RHC;  Surgeon: Ana Garcia DO;  Location: [facility] CARDIAC CATH LAB;  Service: Cardiology    IR BIOPSY BONE MARROW  1/24/2024         Family History:  Family History   Problem Relation Age of Onset    No Known Problems Mother     No Known Problems Father          Social History:  Social History     Substance and Sexual Activity   Alcohol Use Not Currently     Social History     Substance and Sexual Activity   Drug Use Never     Social History     Tobacco Use   Smoking Status Never   Smokeless Tobacco Never     Marital Status: /Civil Union      Home Medications:   Prior to Admission medications    Medication Sig Start Date End Date Taking? Authorizing Provider   acetaminophen (TYLENOL) 325 mg tablet Take 2 tablets (650 mg total) by mouth every 6 (six) hours as needed for mild pain, headaches or fever 1/2/24   Alize Sylvester MD   amLODIPine (NORVASC) 2.5 mg tablet Take 1 tablet (2.5 mg total) by mouth daily 1/25/24   Fanny Eisenberg PA-C   aspirin (ECOTRIN LOW STRENGTH) 81 mg EC tablet Take 1 tablet (81 mg total) by mouth daily 1/15/24   BRITTANY Mcwilliams   Cholecalciferol 50 MCG (2000 UT) CAPS Take 1 capsule by mouth in the morning    Historical Provider, MD   cyanocobalamin (VITAMIN B-12) 1000 MCG tablet Take 1 tablet (1,000 mcg total) by mouth daily 1/3/24   Alize Sylvester MD   hydroCHLOROthiazide 12.5 mg tablet Take 1 tablet (12.5 mg total) by mouth daily 9/30/24   Flavio Velasco MD   isosorbide mononitrate (IMDUR) 30 mg 24 hr tablet Take 1 tablet (30 mg total) by mouth  daily 2/11/25   BRITTANY Villanueva   LORazepam (ATIVAN) 1 mg tablet Take 1 mg by mouth 3 (three) times a day as needed for anxiety    Historical Provider, MD   metoprolol succinate (TOPROL-XL) 100 mg 24 hr tablet Take 0.5 tablets (50 mg total) by mouth 2 (two) times a day 2/6/24 2/7/25  Shannan Leon MD   nitroglycerin (NITROSTAT) 0.4 mg SL tablet Place 1 tablet (0.4 mg total) under the tongue every 5 (five) minutes as needed for chest pain 1/24/24   Fanny Eisenberg PA-C   predniSONE 5 mg tablet Take 10mg daily 11/20/24   Arabella Freeman PA-C   ranolazine (RANEXA) 1000 MG SR tablet Take 1 tablet (1,000 mg total) by mouth every 12 (twelve) hours 2/7/25   BRITTANY Villanueva   rosuvastatin (CRESTOR) 10 MG tablet Take 1 tablet (10 mg total) by mouth daily 10/4/24   BRITTANY Mcwilliams       Inpatient Medications:  Scheduled Meds:   Current Facility-Administered Medications   Medication Dose Route Frequency Provider Last Rate    acetaminophen  650 mg Oral Q6H PRN Chelsi Jo PA-C      [START ON 2/16/2025] aspirin  81 mg Oral Daily Chelsi Jo PA-C      atorvastatin  80 mg Oral After Dinner Chelsi Jo PA-C      chlorhexidine  15 mL Mouth/Throat Q12H CRESENCIO Chelsi Jo PA-C      heparin (porcine)  3-20 Units/kg/hr (Order-Specific) Intravenous Titrated Chelsi Jo PA-C 12 Units/kg/hr (02/15/25 0633)    heparin (porcine)  1,950 Units Intravenous Q6H PRN Chelsi Jo PA-C      heparin (porcine)  3,900 Units Intravenous Q6H PRN Chelsi Jo PA-C      LORazepam  1 mg Oral TID PRN Chelsi Jo PA-C      metoprolol tartrate  25 mg Oral Q12H CRESENCIO Chelsi Jo PA-C      nitroGLYcerin  5-200 mcg/min Intravenous Titrated Chelsi Jo PA-C 10 mcg/min (02/15/25 0726)    ondansetron  4 mg Intravenous Q6H PRN Chelsi Jo PA-C      ranolazine  1,000 mg Oral Q12H [facility] Chelsi Jo PA-C      senna-docusate sodium  1 tablet Oral BID Chelsi Jo,  "PA-C       Continuous Infusions: heparin (porcine), 3-20 Units/kg/hr (Order-Specific), Last Rate: 12 Units/kg/hr (02/15/25 0633)  nitroGLYcerin, 5-200 mcg/min, Last Rate: 10 mcg/min (02/15/25 0726)      PRN Meds:  acetaminophen, 650 mg, Q6H PRN  heparin (porcine), 1,950 Units, Q6H PRN  heparin (porcine), 3,900 Units, Q6H PRN  LORazepam, 1 mg, TID PRN  ondansetron, 4 mg, Q6H PRN        Allergies:  Allergies   Allergen Reactions    Hydrocodone-Acetaminophen GI Intolerance    Niacin GI Intolerance    Penicillins Hives       Review of Systems:  Review of Systems   Constitutional:  Positive for activity change and fatigue.   HENT: Negative.     Eyes: Negative.    Respiratory:  Positive for chest tightness and shortness of breath.    Cardiovascular:  Positive for chest pain. Negative for leg swelling.   Endocrine: Negative.    Genitourinary: Negative.    Musculoskeletal: Negative.    Skin: Negative.    Neurological: Negative.    Psychiatric/Behavioral: Negative.         Vital Signs:     Vitals:    02/15/25 0700 02/15/25 0800 02/15/25 0827   BP: 151/68 138/77 138/77   BP Location: Right arm Right arm    Pulse: 80 81 81   Resp: (!) 23 (!) 32    Temp: (!) 97.2 °F (36.2 °C)     TempSrc: Oral     SpO2: 90% 93%    Weight:   68.9 kg (152 lb)   Height:   5' 10\" (1.778 m)     Invasive Devices       Peripheral Intravenous Line  Duration             Peripheral IV 02/15/25 Left Antecubital <1 day    Peripheral IV 02/15/25 Right Antecubital <1 day              Line  Duration             Arterial Sheath 6 Fr. Left Femoral <1 day              Drain  Duration             Urethral Catheter 16 Fr. <1 day                    Physical Exam:  Physical Exam  Constitutional:       Appearance: Normal appearance. He is well-developed.   HENT:      Head: Normocephalic and atraumatic.   Eyes:      Conjunctiva/sclera: Conjunctivae normal.   Neck:      Thyroid: No thyromegaly.      Vascular: No carotid bruit or JVD.      Trachea: No tracheal " "deviation.   Cardiovascular:      Rate and Rhythm: Normal rate and regular rhythm.      Pulses:           Carotid pulses are 2+ on the right side and 2+ on the left side.       Dorsalis pedis pulses are 2+ on the right side and 2+ on the left side.        Posterior tibial pulses are 2+ on the right side and 2+ on the left side.      Heart sounds: S1 normal and S2 normal.   Pulmonary:      Effort: No accessory muscle usage or respiratory distress.      Breath sounds: No wheezing or rales.   Chest:      Chest wall: No tenderness.   Abdominal:      General: Bowel sounds are normal.      Palpations: Abdomen is soft.      Tenderness: There is no abdominal tenderness.   Musculoskeletal:         General: Normal range of motion.      Cervical back: Full passive range of motion without pain and normal range of motion.   Skin:     General: Skin is warm and dry.   Neurological:      Mental Status: He is alert and oriented to person, place, and time.      Cranial Nerves: No cranial nerve deficit.      Sensory: No sensory deficit.   Psychiatric:         Speech: Speech normal.         Behavior: Behavior normal.         Lab Results:     Results from last 7 days   Lab Units 02/15/25  0416   WBC Thousand/uL 10.77*   HEMOGLOBIN g/dL 13.1   HEMATOCRIT % 37.6   PLATELETS Thousands/uL 259     Results from last 7 days   Lab Units 02/15/25  0416   POTASSIUM mmol/L 4.8   CHLORIDE mmol/L 99   CO2 mmol/L 28   BUN mg/dL 22   CREATININE mg/dL 1.18   CALCIUM mg/dL 9.5     Results from last 7 days   Lab Units 02/15/25  0416   INR  0.88   PTT seconds 27     Lab Results   Component Value Date    HGBA1C 5.1 08/30/2024     No results found for: \"CKTOTAL\", \"CKMB\", \"CKMBINDEX\", \"TROPONINI\"    Imaging Studies:     Cardiac Catheterization: Multivessel coronary artery disease.  Final report pending.    Echocardiogram: Completed.  Report pending.    Results Review Statement: I personally reviewed the following image studies in PACS and associated " radiology reports: procedure reports. My interpretation of the radiology images/reports is: as above.    Assessment:  Principal Problem:    Coronary artery disease involving native coronary artery of native heart with angina pectoris (HCC)  Active Problems:    Stage 3 chronic kidney disease (HCC)    Hypertension    CKD (chronic kidney disease) stage 3, GFR 30-59 ml/min (HCC)    Pure red cell aplasia (HCC)    Hypercholesterolemia    Severe coronary artery disease; Ongoing CABG workup    Plan:  Risks and benefits of coronary artery bypass grafting were discussed in detail today with the patient.    His percutaneous intervention options were also reviewed with primary interventional cardiologist.  In light of his advanced age, deconditioning, and gait dysfunction, his rehab recovery and surgical risk are high.  We are therefore recommending PCI per primary cardiology team.    Gael Ferraro was comfortable with our recommendations, and their questions were answered to their satisfaction.  We will continue to evaluate the patient daily with further recommendations as work up is completed.  Thank you for allowing us to participate in the care of this patient.     SIGNATURE: Luther Ellis PA-C  DATE: February 15, 2025  TIME: 8:50 AM    * This note was completed in part utilizing [employer] direct voice recognition software.   Grammatical errors, random word insertion, spelling mistakes, and incomplete sentences may be an occasional consequence of the system secondary to software limitations, ambient noise and hardware issues. At the time of dictation, efforts were made to edit, clarify and /or correct errors. Please read the chart carefully and recognize, using context, where substitutions have occurred.  If you have any questions or concerns about the context, text or information contained within the body of this dictation, please contact myself, the provider, for further clarification.

## 2025-02-15 NOTE — ASSESSMENT & PLAN NOTE
Systolics in the 140-150 mmHg range, patient is currently on balloon pump support.  Will hold off on initiation of antihypertensive therapy, can use room in blood pressures for uptitrating beta-blockade

## 2025-02-15 NOTE — H&P
History & Physical - Cardiology   Gael Ferraro 84 y.o. male MRN: [id number]  Unit/Bed#: KAYE Encounter: [id number]     Office Cardiologist:  Flavio Velasco MD / BRITTANY Villanueva     PCP:  Mike Lyman MD    Assessment & Plan   ASSESSMENT & PLAN  1.  REYNA aVR / Diffuse STD c/w Type 2 MI   - Prior admissions with similar EKG findings with an EF of 60% and Hgb < 7   - Today's admssions with normal Hgb but likely new onset HF   - S/P IABP placement and transfer to [facility] [place]   - CXR and TTE upon arrival   - CTS for CABG evaluation         Subjective   HISTORY OF PRESENT ILLNESS  Mr. Gael Ferraro is a 84 y.o. male and former smoker with a PMH significant for but not limited to CAD (LM /RCA ), Cartotid Stenosis, HTN, HLD, and Hx of Anemia.  He presents to the cathlab for emergent LHC/IABP placement .  He was originally seen at [facility] ED this morning with CP that began 2 hours prior to arrival and improved with NTG that he took at home. Mr. Ferraro is known to us from 2024 when he presented with the same EKG findings but also Hgb < 7.  Upon transfer to the cathlab table, he developed increasing SOB. With IABP in place and 40 mg of IV lasix given, his cp and respiratory status improved. He is being transferred to [facility] [place] for close hemodynamic monitoring in the ICU and for CTS evaluation as well.    HISTORICAL INFORMATION  Historical Information   Past Medical History:   Diagnosis Date    Low hemoglobin      Past Surgical History:   Procedure Laterality Date    CARDIAC CATHETERIZATION Left 1/2/2024    Procedure: Cardiac Left Heart Cath;  Surgeon: Ana Garcia DO;  Location: [facility] CARDIAC C[facility] LAB;  Service: Cardiology    CARDIAC CATHETERIZATION N/A 1/2/2024    Procedure: Cardiac Coronary Angiogram;  Surgeon: Ana Garcia DO;  Location: [facility] CARDIAC [facility];  Service: Cardiology    CARDIAC CATHETERIZATION N/A 1/2/2024    Procedure: Cardiac RHC;  Surgeon: Ana Garcia DO;  Location: [facility] CARDIAC [facility];   Service: Cardiology    IR BIOPSY BONE MARROW  1/24/2024     Social History   Social History     Substance and Sexual Activity   Alcohol Use Not Currently     Social History     Substance and Sexual Activity   Drug Use Never     Social History     Tobacco Use   Smoking Status Never   Smokeless Tobacco Never     Family History   Problem Relation Age of Onset    No Known Problems Mother     No Known Problems Father        MEDICATIONS & ALLERGIES  Meds/Allergies   all medications and allergies reviewed  Allergies   Allergen Reactions    Hydrocodone-Acetaminophen GI Intolerance    Niacin GI Intolerance    Penicillins Hives       REVIEW OF SYSTEMS  Review of Systems   Cardiovascular:  Positive for chest pain.   Respiratory:  Positive for shortness of breath.           Objective   PHYSICAL EXAM  /56   Pulse 86   Temp 98.1 °F (36.7 °C) (Oral)   Resp 20   SpO2 90%   Physical Exam  HENT:      Head: Normocephalic and atraumatic.   Cardiovascular:      Rate and Rhythm: Normal rate and regular rhythm.      Pulses:           Radial pulses are 3+ on the right side and 3+ on the left side.      Heart sounds: Normal heart sounds.   Pulmonary:      Comments: Improved s/p IV Lasix 40 mg x 1, with no further labored breathing  Musculoskeletal:      Right lower leg: No edema.      Left lower leg: No edema.   Neurological:      Mental Status: He is alert.         CV DIAGNOSTICS  EKG:  reviewed  ETT: n/a  TTE/KANU/SE:  reviewed  MPI:  n/a  CMR:  n/a  CCTA/CCS:  n/a  RHC/LHC/PCI:  reviewed       Ana Garcia DO 02/15/25

## 2025-02-15 NOTE — ASSESSMENT & PLAN NOTE
Presented to [facility] with chest pain, sob, jaw pain. STEMI alert called, patient brought to [place] cath lab for balloon pump and transfer to [facility]. Initial troponin 1964  IABP 1:1  Consult cardiac surgery for MVCAD  Consideration for high risk PCI to LM - need LEADS  Obtain echocardiogram  Continue heparin infusion   Aspirin/atorvastatin/metoprolol  Continue renexa 1000 mg BID  Start nitro drip for goal SBP < 140

## 2025-02-16 PROBLEM — N18.30 STAGE 3 CHRONIC KIDNEY DISEASE (HCC): Status: RESOLVED | Noted: 2023-12-31 | Resolved: 2025-02-16

## 2025-02-16 LAB
ABO GROUP BLD: NORMAL
ALBUMIN SERPL BCG-MCNC: 3.3 G/DL (ref 3.5–5)
ALP SERPL-CCNC: 59 U/L (ref 34–104)
ALT SERPL W P-5'-P-CCNC: 30 U/L (ref 7–52)
ANION GAP SERPL CALCULATED.3IONS-SCNC: 8 MMOL/L (ref 4–13)
APTT PPP: 61 SECONDS (ref 23–34)
APTT PPP: 73 SECONDS (ref 23–34)
AST SERPL W P-5'-P-CCNC: 96 U/L (ref 13–39)
ATRIAL RATE: 80 BPM
ATRIAL RATE: 80 BPM
BILIRUB SERPL-MCNC: 1.63 MG/DL (ref 0.2–1)
BLD GP AB SCN SERPL QL: NEGATIVE
BUN SERPL-MCNC: 20 MG/DL (ref 5–25)
CALCIUM ALBUM COR SERPL-MCNC: 9.3 MG/DL (ref 8.3–10.1)
CALCIUM SERPL-MCNC: 8.7 MG/DL (ref 8.4–10.2)
CHLORIDE SERPL-SCNC: 98 MMOL/L (ref 96–108)
CO2 SERPL-SCNC: 28 MMOL/L (ref 21–32)
CREAT SERPL-MCNC: 1.08 MG/DL (ref 0.6–1.3)
ERYTHROCYTE [DISTWIDTH] IN BLOOD BY AUTOMATED COUNT: 14 % (ref 11.6–15.1)
GFR SERPL CREATININE-BSD FRML MDRD: 61 ML/MIN/1.73SQ M
GLUCOSE SERPL-MCNC: 139 MG/DL (ref 65–140)
HCT VFR BLD AUTO: 28.3 % (ref 36.5–49.3)
HCT VFR BLD AUTO: 29.4 % (ref 36.5–49.3)
HGB BLD-MCNC: 10 G/DL (ref 12–17)
HGB BLD-MCNC: 10.3 G/DL (ref 12–17)
MAGNESIUM SERPL-MCNC: 2 MG/DL (ref 1.9–2.7)
MCH RBC QN AUTO: 37.3 PG (ref 26.8–34.3)
MCHC RBC AUTO-ENTMCNC: 35 G/DL (ref 31.4–37.4)
MCV RBC AUTO: 107 FL (ref 82–98)
P AXIS: 64 DEGREES
P AXIS: 73 DEGREES
PHOSPHATE SERPL-MCNC: 2.8 MG/DL (ref 2.3–4.1)
PLATELET # BLD AUTO: 184 THOUSANDS/UL (ref 149–390)
PMV BLD AUTO: 10.3 FL (ref 8.9–12.7)
POTASSIUM SERPL-SCNC: 3.6 MMOL/L (ref 3.5–5.3)
PR INTERVAL: 142 MS
PR INTERVAL: 154 MS
PROT SERPL-MCNC: 4.9 G/DL (ref 6.4–8.4)
QRS AXIS: 66 DEGREES
QRS AXIS: 74 DEGREES
QRSD INTERVAL: 90 MS
QRSD INTERVAL: 96 MS
QT INTERVAL: 400 MS
QT INTERVAL: 440 MS
QTC INTERVAL: 461 MS
QTC INTERVAL: 507 MS
RBC # BLD AUTO: 2.76 MILLION/UL (ref 3.88–5.62)
RH BLD: NEGATIVE
SODIUM SERPL-SCNC: 134 MMOL/L (ref 135–147)
SPECIMEN EXPIRATION DATE: NORMAL
T WAVE AXIS: -59 DEGREES
T WAVE AXIS: 204 DEGREES
VENTRICULAR RATE: 80 BPM
VENTRICULAR RATE: 80 BPM
WBC # BLD AUTO: 7.06 THOUSAND/UL (ref 4.31–10.16)

## 2025-02-16 PROCEDURE — 85027 COMPLETE CBC AUTOMATED: CPT | Performed by: PHYSICIAN ASSISTANT

## 2025-02-16 PROCEDURE — 86900 BLOOD TYPING SEROLOGIC ABO: CPT | Performed by: INTERNAL MEDICINE

## 2025-02-16 PROCEDURE — 86850 RBC ANTIBODY SCREEN: CPT | Performed by: INTERNAL MEDICINE

## 2025-02-16 PROCEDURE — 83735 ASSAY OF MAGNESIUM: CPT | Performed by: PHYSICIAN ASSISTANT

## 2025-02-16 PROCEDURE — 93005 ELECTROCARDIOGRAM TRACING: CPT

## 2025-02-16 PROCEDURE — 84100 ASSAY OF PHOSPHORUS: CPT | Performed by: PHYSICIAN ASSISTANT

## 2025-02-16 PROCEDURE — 85730 THROMBOPLASTIN TIME PARTIAL: CPT | Performed by: ANESTHESIOLOGY

## 2025-02-16 PROCEDURE — 93010 ELECTROCARDIOGRAM REPORT: CPT | Performed by: INTERNAL MEDICINE

## 2025-02-16 PROCEDURE — NC001 PR NO CHARGE: Performed by: INTERNAL MEDICINE

## 2025-02-16 PROCEDURE — 80053 COMPREHEN METABOLIC PANEL: CPT | Performed by: PHYSICIAN ASSISTANT

## 2025-02-16 PROCEDURE — 99233 SBSQ HOSP IP/OBS HIGH 50: CPT | Performed by: INTERNAL MEDICINE

## 2025-02-16 PROCEDURE — 99291 CRITICAL CARE FIRST HOUR: CPT | Performed by: ANESTHESIOLOGY

## 2025-02-16 PROCEDURE — 86901 BLOOD TYPING SEROLOGIC RH(D): CPT | Performed by: INTERNAL MEDICINE

## 2025-02-16 PROCEDURE — 85014 HEMATOCRIT: CPT | Performed by: INTERNAL MEDICINE

## 2025-02-16 PROCEDURE — 85018 HEMOGLOBIN: CPT | Performed by: INTERNAL MEDICINE

## 2025-02-16 RX ORDER — NITROGLYCERIN 0.4 MG/1
0.4 TABLET SUBLINGUAL ONCE AS NEEDED
Status: DISCONTINUED | OUTPATIENT
Start: 2025-02-16 | End: 2025-02-17

## 2025-02-16 RX ORDER — METOPROLOL TARTRATE 50 MG
50 TABLET ORAL EVERY 12 HOURS SCHEDULED
Status: DISCONTINUED | OUTPATIENT
Start: 2025-02-16 | End: 2025-02-19

## 2025-02-16 RX ORDER — ONDANSETRON 2 MG/ML
4 INJECTION INTRAMUSCULAR; INTRAVENOUS ONCE AS NEEDED
Status: DISCONTINUED | OUTPATIENT
Start: 2025-02-16 | End: 2025-02-17

## 2025-02-16 RX ORDER — POTASSIUM CHLORIDE 1500 MG/1
40 TABLET, EXTENDED RELEASE ORAL ONCE
Status: COMPLETED | OUTPATIENT
Start: 2025-02-16 | End: 2025-02-16

## 2025-02-16 RX ADMIN — NITROGLYCERIN 90 MCG/MIN: 20 INJECTION INTRAVENOUS at 02:26

## 2025-02-16 RX ADMIN — CHLORHEXIDINE GLUCONATE 0.12% ORAL RINSE 15 ML: 1.2 LIQUID ORAL at 08:24

## 2025-02-16 RX ADMIN — LORAZEPAM 1 MG: 1 TABLET ORAL at 22:40

## 2025-02-16 RX ADMIN — SENNOSIDES AND DOCUSATE SODIUM 1 TABLET: 50; 8.6 TABLET ORAL at 17:08

## 2025-02-16 RX ADMIN — DEXTRAN 70, GLYCERIN, HYPROMELLOSE 1 DROP: 1; 2; 3 SOLUTION/ DROPS OPHTHALMIC at 08:40

## 2025-02-16 RX ADMIN — ATORVASTATIN CALCIUM 80 MG: 80 TABLET, FILM COATED ORAL at 17:08

## 2025-02-16 RX ADMIN — SENNOSIDES AND DOCUSATE SODIUM 1 TABLET: 50; 8.6 TABLET ORAL at 08:23

## 2025-02-16 RX ADMIN — RANOLAZINE 1000 MG: 500 TABLET, FILM COATED, EXTENDED RELEASE ORAL at 20:38

## 2025-02-16 RX ADMIN — ONDANSETRON 4 MG: 2 INJECTION INTRAMUSCULAR; INTRAVENOUS at 09:21

## 2025-02-16 RX ADMIN — DEXTRAN 70, GLYCERIN, HYPROMELLOSE 1 DROP: 1; 2; 3 SOLUTION/ DROPS OPHTHALMIC at 15:11

## 2025-02-16 RX ADMIN — ASPIRIN 81 MG: 81 TABLET, COATED ORAL at 08:23

## 2025-02-16 RX ADMIN — POTASSIUM CHLORIDE 40 MEQ: 1500 TABLET, EXTENDED RELEASE ORAL at 08:23

## 2025-02-16 RX ADMIN — DEXTRAN 70, GLYCERIN, HYPROMELLOSE 1 DROP: 1; 2; 3 SOLUTION/ DROPS OPHTHALMIC at 20:39

## 2025-02-16 RX ADMIN — METOPROLOL TARTRATE 25 MG: 25 TABLET, FILM COATED ORAL at 08:23

## 2025-02-16 RX ADMIN — METOPROLOL TARTRATE 50 MG: 50 TABLET, FILM COATED ORAL at 20:38

## 2025-02-16 RX ADMIN — CHLORHEXIDINE GLUCONATE 0.12% ORAL RINSE 15 ML: 1.2 LIQUID ORAL at 20:38

## 2025-02-16 RX ADMIN — NITROGLYCERIN 120 MCG/MIN: 20 INJECTION INTRAVENOUS at 19:41

## 2025-02-16 RX ADMIN — NITROGLYCERIN 120 MCG/MIN: 20 INJECTION INTRAVENOUS at 11:50

## 2025-02-16 RX ADMIN — HEPARIN SODIUM 11 UNITS/KG/HR: 10000 INJECTION, SOLUTION INTRAVENOUS at 13:21

## 2025-02-16 RX ADMIN — RANOLAZINE 1000 MG: 500 TABLET, FILM COATED, EXTENDED RELEASE ORAL at 08:23

## 2025-02-16 RX ADMIN — LORAZEPAM 1 MG: 1 TABLET ORAL at 13:19

## 2025-02-16 NOTE — ASSESSMENT & PLAN NOTE
initially presented to [facility].  Was loaded with Brilinta and underwent urgent cardiac catheterization and was noted to have further progression in his left main disease along with known RCA .  An intra-aortic balloon pump was inserted postcardiac catheterization to help maintain appropriate coronary perfusion pressure and he was subsequently transferred to [facility] critical care unit for evaluation by cardiology and cardiac surgery.  He was seen and evaluated by cardiac surgery earlier this morning and noted not to be surgery candidate based on advanced age, deconditioning, gait dysfunction.

## 2025-02-16 NOTE — ASSESSMENT & PLAN NOTE
Lab Results   Component Value Date    EGFR 61 02/16/2025    EGFR 53 02/15/2025    EGFR 55 02/15/2025    CREATININE 1.08 02/16/2025    CREATININE 1.21 02/15/2025    CREATININE 1.18 02/15/2025     Baseline Cr 1.0-1.1  Trend renal indices  Monitor UOP  Avoid nephrotoxins

## 2025-02-16 NOTE — PROGRESS NOTES
Brief Progress Note - Cardiology   Name: Gael Ferraro 84 y.o. male I MRN: [id number]  Unit/Bed#: PPHP-312-01 I Date of Admission: 2/15/2025   Date of Service: 2/16/2025 I Hospital Day: 1       Mr. Ferraro seen and examined with family at bedside.  RN with no acute events noted overnight. IABP in place augmenting to the 120s.  Access site dressed with coagulated blood and without active bleeding or oozing.    Case discussed with patient and family including spouse, sons, daughters and grandchildren.  Images reviewed along with plans for Impella-assisted High Risk PCI explained.  Risks and benefits of the procedure were discussed and questions were answered.    Given decline in H&H since admission, will repeat H&H this afternoon and plan to transfuse for Hgb < 9.  Type and Screen as well.  Plan for PCI tomorrow morning.

## 2025-02-16 NOTE — PROGRESS NOTES
Progress Note - Cardiology   Name: Gael Ferraro 84 y.o. male I MRN: [id number]  Unit/Bed#: PPHP-312-01 I Date of Admission: 2/15/2025   Date of Service: 2/16/2025 I Hospital Day: 1     Assessment & Plan  Coronary artery disease involving native coronary artery of native heart with angina pectoris (HCC)  initially presented to [facility].  Was loaded with Brilinta and underwent urgent cardiac catheterization and was noted to have further progression in his left main disease along with known RCA .  An intra-aortic balloon pump was inserted postcardiac catheterization to help maintain appropriate coronary perfusion pressure and he was subsequently transferred to [facility] critical care unit for evaluation by cardiology and cardiac surgery.  He was seen and evaluated by cardiac surgery earlier this morning and noted not to be surgery candidate based on advanced age, deconditioning, gait dysfunction.    Hypertension  Systolics in the 140-150 mmHg range, patient is currently on balloon pump support.  Will hold off on initiation of antihypertensive therapy, can use room in blood pressures for uptitrating beta-blockade   CKD (chronic kidney disease) stage 3, GFR 30-59 ml/min ([facility])  Lab Results   Component Value Date    EGFR 61 02/16/2025    EGFR 53 02/15/2025    EGFR 55 02/15/2025    CREATININE 1.08 02/16/2025    CREATININE 1.21 02/15/2025    CREATININE 1.18 02/15/2025   Appears to be at baseline renal function, will need to monitor at this since patient will likely require another cardiac catheterization early next week.   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 Plan for high risk PCI tomorrow.   -Continue with metoprolol to tartrate 25 mg every 12 hours  -On aspirin, high intensity statin, heparin drip  -Cardiology service will continue to  "follow  - Continue nitro drip and heparin drip    Subjective:   Patient seen and examined.  No significant events overnight. Patient denies chest pain. Patient feels tired.      Objective:     Vitals: Blood pressure 122/61, pulse 79, temperature 97.5 °F (36.4 °C), temperature source Oral, resp. rate (!) 28, height 5' 10\" (1.778 m), weight 68.9 kg (151 lb 14.4 oz), SpO2 94%., Body mass index is 21.79 kg/m².,   Orthostatic Blood Pressures      Flowsheet Row Most Recent Value   Blood Pressure 122/61 filed at 02/16/2025 1400   Patient Position - Orthostatic VS Lying filed at 02/16/2025 1200              Intake/Output Summary (Last 24 hours) at 2/16/2025 1523  Last data filed at 2/16/2025 1400  Gross per 24 hour   Intake 1287.32 ml   Output 2200 ml   Net -912.68 ml       Physical Exam  Constitutional:       General: He is not in acute distress.     Appearance: He is not ill-appearing.   HENT:      Mouth/Throat:      Mouth: Mucous membranes are moist.   Eyes:      Pupils: Pupils are equal, round, and reactive to light.   Cardiovascular:      Rate and Rhythm: Normal rate and regular rhythm.      Heart sounds: No murmur heard.  Pulmonary:      Effort: Pulmonary effort is normal.      Breath sounds: No rales.   Abdominal:      General: There is no distension.      Tenderness: There is no abdominal tenderness.   Musculoskeletal:      Right lower leg: No edema.      Left lower leg: No edema.   Skin:     General: Skin is warm.   Neurological:      Mental Status: He is alert and oriented to person, place, and time.   Psychiatric:         Mood and Affect: Mood normal.          Medications:      Current Facility-Administered Medications:     acetaminophen (TYLENOL) tablet 650 mg, 650 mg, Oral, Q6H PRN, Chelsi Jo PA-C, 650 mg at 02/15/25 2236    Artificial Tears Op Soln 1 drop, 1 drop, Both Eyes, TID, Che Bray PA-C, 1 drop at 02/16/25 1511    aspirin (ECOTRIN LOW STRENGTH) EC tablet 81 mg, 81 mg, Oral, Daily, " Chelsi Jo PA-C, 81 mg at 02/16/25 0823    atorvastatin (LIPITOR) tablet 80 mg, 80 mg, Oral, After Dinner, Chelsi Jo PA-C, 80 mg at 02/15/25 1722    chlorhexidine (PERIDEX) 0.12 % oral rinse 15 mL, 15 mL, Mouth/Throat, Q12H CRESENCIO, Chelsi Jo PA-C, 15 mL at 02/16/25 0824    heparin (porcine) 25,000 units in 0.45% NaCl 250 mL infusion (premix), 3-20 Units/kg/hr (Order-Specific), Intravenous, Titrated, Chelsi Jo PA-C, Last Rate: 7.2 mL/hr at 02/16/25 1321, 11 Units/kg/hr at 02/16/25 1321    heparin (porcine) injection 1,950 Units, 1,950 Units, Intravenous, Q6H PRN, Chelsi Jo PA-C, 1,950 Units at 02/15/25 1812    heparin (porcine) injection 3,900 Units, 3,900 Units, Intravenous, Q6H PRN, Chelsi Jo PA-C    LORazepam (ATIVAN) tablet 1 mg, 1 mg, Oral, TID PRN, Chelsi Jo PA-C, 1 mg at 02/16/25 1319    metoprolol tartrate (LOPRESSOR) tablet 50 mg, 50 mg, Oral, Q12H CRESENCIO, Chelsi Jo PA-C    nitroGLYcerin (TRIDIL) 50 mg in 250 ml infusion (premix), 5-200 mcg/min, Intravenous, Titrated, Chelsi Jo PA-C, Last Rate: 36 mL/hr at 02/16/25 1150, 120 mcg/min at 02/16/25 1150    ondansetron (ZOFRAN) injection 4 mg, 4 mg, Intravenous, Q6H PRN, Chelsi Jo PA-C, 4 mg at 02/16/25 0921    ranolazine (RANEXA) 12 hr tablet 1,000 mg, 1,000 mg, Oral, Q12H CRESENCIO, Chelsi Jo PA-C, 1,000 mg at 02/16/25 0823    senna-docusate sodium (SENOKOT S) 8.6-50 mg per tablet 1 tablet, 1 tablet, Oral, BID, Chelsi Jo PA-C, 1 tablet at 02/16/25 0823     Labs & Results:      Results from last 7 days   Lab Units 02/16/25  0454 02/15/25  0416   WBC Thousand/uL 7.06 10.77*   HEMOGLOBIN g/dL 10.3* 13.1   HEMATOCRIT % 29.4* 37.6   PLATELETS Thousands/uL 184 259     Results from last 7 days   Lab Units 02/15/25  0416   TRIGLYCERIDES mg/dL 312*   HDL mg/dL 35*     Results from last 7 days   Lab Units 02/16/25  0454 02/15/25  1512 02/15/25  0416   POTASSIUM mmol/L 3.6 3.8 4.8    CHLORIDE mmol/L 98 99 99   CO2 mmol/L 28 26 28   BUN mg/dL 20 22 22   CREATININE mg/dL 1.08 1.21 1.18   CALCIUM mg/dL 8.7 9.0 9.5   ALK PHOS U/L 59  --  65   ALT U/L 30  --  33   AST U/L 96*  --  56*     Results from last 7 days   Lab Units 02/16/25  0603 02/16/25  0005 02/15/25  1723 02/15/25  0947 02/15/25  0416   INR   --   --   --   --  0.88   PTT seconds 61* 73* 48*   < > 27    < > = values in this interval not displayed.     Results from last 7 days   Lab Units 02/16/25  0454 02/15/25  1512 02/15/25  0416   MAGNESIUM mg/dL 2.0 1.7* 2.0         Michaela Brannon MD  Cardiology Fellow   PGY-4

## 2025-02-16 NOTE — ASSESSMENT & PLAN NOTE
Lab Results   Component Value Date    EGFR 61 02/16/2025    EGFR 53 02/15/2025    EGFR 55 02/15/2025    CREATININE 1.08 02/16/2025    CREATININE 1.21 02/15/2025    CREATININE 1.18 02/15/2025   Appears to be at baseline renal function, will need to monitor at this since patient will likely require another cardiac catheterization early next week.

## 2025-02-16 NOTE — ASSESSMENT & PLAN NOTE
Presented to [facility] with chest pain, sob, jaw pain. STEMI alert called, patient brought to [place] cath lab for balloon pump and transfer to [facility]. Initial troponin 1964  2/15 Echo: Ef 43%, mild global hypokinesis, grade 2 diastolic dysfunction   IABP 1:1  Cardiac surgery deemed him not a great candidate for CABG  Interventional cardiology planning on impella assisted PCI on Monday  Continue heparin infusion   Aspirin/atorvastatin/metoprolol 25 mg BID   Continue renexa 1000 mg BID  Nitro drip for goal SBP < 140

## 2025-02-16 NOTE — PROGRESS NOTES
Progress Note - Critical Care/ICU   Name: Gael Ferraro 84 y.o. male I MRN: [id number]  Unit/Bed#: PPHP-312-01 I Date of Admission: 2/15/2025   Date of Service: 2/16/2025 I Hospital Day: 1      Assessment & Plan  Coronary artery disease involving native coronary artery of native heart with angina pectoris (HCC)  Presented to [facility] with chest pain, sob, jaw pain. STEMI alert called, patient brought to [place] cath lab for balloon pump and transfer to [facility]. Initial troponin 1964  2/15 Echo: Ef 43%, mild global hypokinesis, grade 2 diastolic dysfunction   IABP 1:1  Cardiac surgery deemed him not a great candidate for CABG  Interventional cardiology planning on impella assisted PCI on Monday  Continue heparin infusion   Aspirin/atorvastatin/metoprolol 25 mg BID   Continue renexa 1000 mg BID  Nitro drip for goal SBP < 140    Stage 3 chronic kidney disease (HCC) (Resolved: 2/16/2025)  Lab Results   Component Value Date    EGFR 61 02/16/2025    EGFR 53 02/15/2025    EGFR 55 02/15/2025    CREATININE 1.08 02/16/2025    CREATININE 1.21 02/15/2025    CREATININE 1.18 02/15/2025     Monitor renal function  Bray catheter in place  Avoid nephrotoxins  Hypertension  Home regimen: Toprol-XL 50 mg BID, HCTZ 12.5 mg daily, Imdur 30 mg daily, amlodipine 2.5 mg daily  Continue metoprolol 25 mg BID  Nitro drip as above  CKD (chronic kidney disease) stage 3, GFR 30-59 ml/min ([facility])  Lab Results   Component Value Date    EGFR 61 02/16/2025    EGFR 53 02/15/2025    EGFR 55 02/15/2025    CREATININE 1.08 02/16/2025    CREATININE 1.21 02/15/2025    CREATININE 1.18 02/15/2025     Baseline Cr 1.0-1.1  Trend renal indices  Monitor UOP  Avoid nephrotoxins   Pure red cell aplasia (HCC)  Follows with heme/onc outpatient  Not currently on therapy  Hgb stable  Hypercholesterolemia  High intensity atorvastatin   Disposition: Critical care    ICU Core Measures     A: Assess, Prevent, and Manage Pain Has pain been assessed?  Yes  Need for changes to pain regimen? No   B: Both SAT/SAT  N/A   C: Choice of Sedation RASS Goal: N/A patient not on sedation  Need for changes to sedation or analgesia regimen? No   D: Delirium CAM-ICU: Negative   E: Early Mobility  Plan for early mobility? No   F: Family Engagement Plan for family engagement today? Yes       Review of Invasive Devices:    [place] Plan: Continue for accurate I/O monitoring for 48 hours        Prophylaxis:  VTE VTE covered by:  heparin (porcine), Intravenous, 11 Units/kg/hr at 02/16/25 0600  heparin (porcine), Intravenous, 1,950 Units at 02/15/25 1812  heparin (porcine), Intravenous       Stress Ulcer  not ordered         24 Hour Events : IABP remains in place at 1:1. No overnight events. Plan for impella assisted PCI tomorrow.   Subjective   Review of Systems: Review of Systems   Respiratory:  Negative for shortness of breath.    Cardiovascular:  Negative for chest pain.   Gastrointestinal:  Negative for abdominal pain.       Objective :                   Vitals I/O      Most Recent Min/Max in 24hrs   Temp 97.7 °F (36.5 °C) Temp  Min: 97.2 °F (36.2 °C)  Max: 98.3 °F (36.8 °C)   Pulse 82 Pulse  Min: 72  Max: 85   Resp 20 Resp  Min: 17  Max: 37   /66 BP  Min: 97/66  Max: 157/82   O2 Sat 95 % SpO2  Min: 90 %  Max: 96 %      Intake/Output Summary (Last 24 hours) at 2/16/2025 0619  Last data filed at 2/16/2025 0600  Gross per 24 hour   Intake 942.56 ml   Output 2500 ml   Net -1557.44 ml       Diet Cardiovascular; Cardiac    Invasive Monitoring           Physical Exam   Physical Exam  Skin:     General: Skin is warm and dry.      Capillary Refill: Capillary refill takes less than 2 seconds.   HENT:      Mouth/Throat:      Mouth: Mucous membranes are dry.   Cardiovascular:      Rate and Rhythm: Normal rate and regular rhythm.      Pulses:           Dorsalis pedis pulses are 1+ on the left side.      Comments: Left femoral IABP site with old blood, no hematoma  Abdominal: General:  There is no distension.      Palpations: Abdomen is soft.      Tenderness: There is no abdominal tenderness.   Constitutional:       General: He is not in acute distress.  Pulmonary:      Effort: No respiratory distress.      Breath sounds: No wheezing, rhonchi or rales.   Neurological:      General: No focal deficit present.      Mental Status: He is alert and oriented to person, place and time.   Genitourinary/Anorectal:  Bray present.        Diagnostic Studies        Lab Results: I have reviewed the following results:     Medications:  Scheduled PRN   Artificial Tears, 1 drop, TID  aspirin, 81 mg, Daily  atorvastatin, 80 mg, After Dinner  chlorhexidine, 15 mL, Q12H CRESENCIO  metoprolol tartrate, 25 mg, Q12H CRESENCIO  ranolazine, 1,000 mg, Q12H CRESENCIO  senna-docusate sodium, 1 tablet, BID      acetaminophen, 650 mg, Q6H PRN  heparin (porcine), 1,950 Units, Q6H PRN  heparin (porcine), 3,900 Units, Q6H PRN  LORazepam, 1 mg, TID PRN  ondansetron, 4 mg, Q6H PRN       Continuous    heparin (porcine), 3-20 Units/kg/hr (Order-Specific), Last Rate: 11 Units/kg/hr (02/16/25 0600)  nitroGLYcerin, 5-200 mcg/min, Last Rate: 90 mcg/min (02/16/25 0600)         Labs:   CBC    Recent Labs     02/15/25  0416 02/16/25  0454   WBC 10.77* 7.06   HGB 13.1 10.3*   HCT 37.6 29.4*    184     BMP    Recent Labs     02/15/25  1512 02/16/25  0454   SODIUM 134* 134*   K 3.8 3.6   CL 99 98   CO2 26 28   AGAP 9 8   BUN 22 20   CREATININE 1.21 1.08   CALCIUM 9.0 8.7       Coags    Recent Labs     02/15/25  0416 02/15/25  0947 02/15/25  1723 02/16/25  0005   INR 0.88  --   --   --    PTT 27   < > 48* 73*    < > = values in this interval not displayed.        Additional Electrolytes  Recent Labs     02/15/25  1512 02/16/25  0454   MG 1.7* 2.0   PHOS  --  2.8          Blood Gas    No recent results  No recent results LFTs  Recent Labs     02/15/25  0416 02/16/25  0454   ALT 33 30   AST 56* 96*   ALKPHOS 65 59   ALB 4.1 3.3*   TBILI 0.87 1.63*        Infectious  No recent results  Glucose  Recent Labs     02/15/25  0416 02/15/25  1512 02/16/25  0454   GLUC 124 136 139        Administrative Statements

## 2025-02-16 NOTE — ASSESSMENT & PLAN NOTE
Home regimen: Toprol-XL 50 mg BID, HCTZ 12.5 mg daily, Imdur 30 mg daily, amlodipine 2.5 mg daily  Continue metoprolol 25 mg BID  Nitro drip as above

## 2025-02-16 NOTE — ASSESSMENT & PLAN NOTE
Lab Results   Component Value Date    EGFR 61 02/16/2025    EGFR 53 02/15/2025    EGFR 55 02/15/2025    CREATININE 1.08 02/16/2025    CREATININE 1.21 02/15/2025    CREATININE 1.18 02/15/2025     Monitor renal function  Bray catheter in place  Avoid nephrotoxins

## 2025-02-16 NOTE — PLAN OF CARE
Problem: PAIN - ADULT  Goal: Verbalizes/displays adequate comfort level or baseline comfort level  Description: Interventions:  - Encourage patient to monitor pain and request assistance  - Assess pain using appropriate pain scale  - Administer analgesics based on type and severity of pain and evaluate response  - Implement non-pharmacological measures as appropriate and evaluate response  - Consider cultural and social influences on pain and pain management  - Notify physician/advanced practitioner if interventions unsuccessful or patient reports new pain  Outcome: Progressing     Problem: INFECTION - ADULT  Goal: Absence or prevention of progression during hospitalization  Description: INTERVENTIONS:  - Assess and monitor for signs and symptoms of infection  - Monitor lab/diagnostic results  - Monitor all insertion sites, i.e. indwelling lines, tubes, and drains  - Monitor endotracheal if appropriate and nasal secretions for changes in amount and color  - [place] appropriate cooling/warming therapies per order  - Administer medications as ordered  - Instruct and encourage patient and family to use good hand hygiene technique  - Identify and instruct in appropriate isolation precautions for identified infection/condition  Outcome: Progressing  Goal: Absence of fever/infection during neutropenic period  Description: INTERVENTIONS:  - Monitor WBC    Outcome: Progressing     Problem: SAFETY ADULT  Goal: Patient will remain free of falls  Description: INTERVENTIONS:  - Educate patient/family on patient safety including physical limitations  - Instruct patient to call for assistance with activity   - Consult OT/PT to assist with strengthening/mobility   - Keep Call bell within reach  - Keep bed low and locked with side rails adjusted as appropriate  - Keep care items and personal belongings within reach  - Initiate and maintain comfort rounds  - Make Fall Risk Sign visible to staff  - Offer Toileting every 2 Hours,  in advance of need  - Initiate/Maintain bed alarm  - Obtain necessary fall risk management equipment: fall band, non skid socks  - Apply yellow socks and bracelet for high fall risk patients  - Consider moving patient to room near nurses station  Outcome: Progressing  Goal: Maintain or return to baseline ADL function  Description: INTERVENTIONS:  -  Assess patient's ability to carry out ADLs; assess patient's baseline for ADL function and identify physical deficits which impact ability to perform ADLs (bathing, care of mouth/teeth, toileting, grooming, dressing, etc.)  - Assess/evaluate cause of self-care deficits   - Assess range of motion  - Assess patient's mobility; develop plan if impaired  - Assess patient's need for assistive devices and provide as appropriate  - Encourage maximum independence but intervene and supervise when necessary  - Involve family in performance of ADLs  - Assess for home care needs following discharge   - Consider OT consult to assist with ADL evaluation and planning for discharge  - Provide patient education as appropriate  Outcome: Progressing  Goal: Maintains/Returns to pre admission functional level  Description: INTERVENTIONS:  - Perform AM-PAC 6 Click Basic Mobility/ Daily Activity assessment daily.  - Set and communicate daily mobility goal to care team and patient/family/caregiver.   - Collaborate with rehabilitation services on mobility goals if consulted  - Perform Range of Motion 3 times a day.  - Reposition patient every 2 hours.  - Record patient progress and toleration of activity level   Outcome: Progressing     Problem: DISCHARGE PLANNING  Goal: Discharge to home or other facility with appropriate resources  Description: INTERVENTIONS:  - Identify barriers to discharge w/patient and caregiver  - Arrange for needed discharge resources and transportation as appropriate  - Identify discharge learning needs (meds, wound care, etc.)  - Arrange for interpretive services to  assist at discharge as needed  - Refer to Case Management Department for coordinating discharge planning if the patient needs post-hospital services based on physician/advanced practitioner order or complex needs related to functional status, cognitive ability, or social support system  Outcome: Progressing     Problem: Knowledge Deficit  Goal: Patient/family/caregiver demonstrates understanding of disease process, treatment plan, medications, and discharge instructions  Description: Complete learning assessment and assess knowledge base.  Interventions:  - Provide teaching at level of understanding  - Provide teaching via preferred learning methods  Outcome: Progressing     Problem: Prexisting or High Potential for Compromised Skin Integrity  Goal: Skin integrity is maintained or improved  Description: INTERVENTIONS:  - Identify patients at risk for skin breakdown  - Assess and monitor skin integrity  - Assess and monitor nutrition and hydration status  - Monitor labs   - Assess for incontinence   - Turn and reposition patient  - Assist with mobility/ambulation  - Relieve pressure over bony prominences  - Avoid friction and shearing  - Provide appropriate hygiene as needed including keeping skin clean and dry  - Evaluate need for skin moisturizer/barrier cream  - Collaborate with interdisciplinary team   - Patient/family teaching  - Consider wound care consult   Outcome: Progressing

## 2025-02-17 ENCOUNTER — ANESTHESIA (INPATIENT)
Dept: NON INVASIVE DIAGNOSTICS | Facility: HOSPITAL | Age: 85
End: 2025-02-17
Payer: COMMERCIAL

## 2025-02-17 ENCOUNTER — ANESTHESIA EVENT (INPATIENT)
Dept: NON INVASIVE DIAGNOSTICS | Facility: HOSPITAL | Age: 85
End: 2025-02-17
Payer: COMMERCIAL

## 2025-02-17 ENCOUNTER — APPOINTMENT (INPATIENT)
Dept: RADIOLOGY | Facility: HOSPITAL | Age: 85
DRG: 220 | End: 2025-02-17
Payer: COMMERCIAL

## 2025-02-17 LAB
ALBUMIN SERPL BCG-MCNC: 3.1 G/DL (ref 3.5–5)
ALP SERPL-CCNC: 61 U/L (ref 34–104)
ALT SERPL W P-5'-P-CCNC: 27 U/L (ref 7–52)
ANION GAP SERPL CALCULATED.3IONS-SCNC: 6 MMOL/L (ref 4–13)
APTT PPP: 54 SECONDS (ref 23–34)
AST SERPL W P-5'-P-CCNC: 63 U/L (ref 13–39)
ATRIAL RATE: 74 BPM
ATRIAL RATE: 90 BPM
BACTERIA UR QL AUTO: ABNORMAL /HPF
BILIRUB SERPL-MCNC: 1.54 MG/DL (ref 0.2–1)
BILIRUB UR QL STRIP: NEGATIVE
BUN SERPL-MCNC: 18 MG/DL (ref 5–25)
CALCIUM ALBUM COR SERPL-MCNC: 9.4 MG/DL (ref 8.3–10.1)
CALCIUM SERPL-MCNC: 8.7 MG/DL (ref 8.4–10.2)
CHLORIDE SERPL-SCNC: 99 MMOL/L (ref 96–108)
CLARITY UR: CLEAR
CO2 SERPL-SCNC: 28 MMOL/L (ref 21–32)
COLOR UR: ABNORMAL
CREAT SERPL-MCNC: 1.24 MG/DL (ref 0.6–1.3)
ERYTHROCYTE [DISTWIDTH] IN BLOOD BY AUTOMATED COUNT: 14 % (ref 11.6–15.1)
GFR SERPL CREATININE-BSD FRML MDRD: 52 ML/MIN/1.73SQ M
GLUCOSE SERPL-MCNC: 123 MG/DL (ref 65–140)
GLUCOSE UR STRIP-MCNC: NEGATIVE MG/DL
HCT VFR BLD AUTO: 26.7 % (ref 36.5–49.3)
HGB BLD-MCNC: 9.5 G/DL (ref 12–17)
HGB UR QL STRIP.AUTO: ABNORMAL
KETONES UR STRIP-MCNC: ABNORMAL MG/DL
LEUKOCYTE ESTERASE UR QL STRIP: NEGATIVE
MAGNESIUM SERPL-MCNC: 1.9 MG/DL (ref 1.9–2.7)
MCH RBC QN AUTO: 38.2 PG (ref 26.8–34.3)
MCHC RBC AUTO-ENTMCNC: 35.6 G/DL (ref 31.4–37.4)
MCV RBC AUTO: 107 FL (ref 82–98)
NITRITE UR QL STRIP: NEGATIVE
NON-SQ EPI CELLS URNS QL MICRO: ABNORMAL /HPF
P AXIS: 60 DEGREES
P AXIS: 76 DEGREES
PH UR STRIP.AUTO: 5.5 [PH]
PHOSPHATE SERPL-MCNC: 2.8 MG/DL (ref 2.3–4.1)
PLATELET # BLD AUTO: 166 THOUSANDS/UL (ref 149–390)
PMV BLD AUTO: 10.4 FL (ref 8.9–12.7)
POTASSIUM SERPL-SCNC: 3.9 MMOL/L (ref 3.5–5.3)
PR INTERVAL: 134 MS
PR INTERVAL: 150 MS
PROT SERPL-MCNC: 5.1 G/DL (ref 6.4–8.4)
PROT UR STRIP-MCNC: ABNORMAL MG/DL
QRS AXIS: 67 DEGREES
QRS AXIS: 69 DEGREES
QRSD INTERVAL: 90 MS
QRSD INTERVAL: 96 MS
QT INTERVAL: 386 MS
QT INTERVAL: 394 MS
QTC INTERVAL: 437 MS
QTC INTERVAL: 472 MS
RBC # BLD AUTO: 2.49 MILLION/UL (ref 3.88–5.62)
RBC #/AREA URNS AUTO: ABNORMAL /HPF
SODIUM SERPL-SCNC: 133 MMOL/L (ref 135–147)
SP GR UR STRIP.AUTO: 1.01 (ref 1–1.03)
T WAVE AXIS: -20 DEGREES
T WAVE AXIS: 248 DEGREES
UROBILINOGEN UR STRIP-ACNC: <2 MG/DL
VENTRICULAR RATE: 74 BPM
VENTRICULAR RATE: 90 BPM
WBC # BLD AUTO: 6.19 THOUSAND/UL (ref 4.31–10.16)
WBC #/AREA URNS AUTO: ABNORMAL /HPF

## 2025-02-17 PROCEDURE — 99232 SBSQ HOSP IP/OBS MODERATE 35: CPT | Performed by: INTERNAL MEDICINE

## 2025-02-17 PROCEDURE — C1769 GUIDE WIRE: HCPCS | Performed by: INTERNAL MEDICINE

## 2025-02-17 PROCEDURE — 87154 CUL TYP ID BLD PTHGN 6+ TRGT: CPT | Performed by: STUDENT IN AN ORGANIZED HEALTH CARE EDUCATION/TRAINING PROGRAM

## 2025-02-17 PROCEDURE — 85730 THROMBOPLASTIN TIME PARTIAL: CPT | Performed by: ANESTHESIOLOGY

## 2025-02-17 PROCEDURE — C1725 CATH, TRANSLUMIN NON-LASER: HCPCS | Performed by: INTERNAL MEDICINE

## 2025-02-17 PROCEDURE — 92928 PRQ TCAT PLMT NTRAC ST 1 LES: CPT | Performed by: INTERNAL MEDICINE

## 2025-02-17 PROCEDURE — C9460 INJECTION, CANGRELOR: HCPCS | Performed by: INTERNAL MEDICINE

## 2025-02-17 PROCEDURE — C1760 CLOSURE DEV, VASC: HCPCS | Performed by: INTERNAL MEDICINE

## 2025-02-17 PROCEDURE — 02HA3RJ INSERTION OF SHORT-TERM EXTERNAL HEART ASSIST SYSTEM INTO HEART, INTRAOPERATIVE, PERCUTANEOUS APPROACH: ICD-10-PCS | Performed by: INTERNAL MEDICINE

## 2025-02-17 PROCEDURE — 71045 X-RAY EXAM CHEST 1 VIEW: CPT

## 2025-02-17 PROCEDURE — 33990 INSJ PERQ VAD L HRT ARTERIAL: CPT | Performed by: INTERNAL MEDICINE

## 2025-02-17 PROCEDURE — C1874 STENT, COATED/COV W/DEL SYS: HCPCS | Performed by: INTERNAL MEDICINE

## 2025-02-17 PROCEDURE — 027034Z DILATION OF CORONARY ARTERY, ONE ARTERY WITH DRUG-ELUTING INTRALUMINAL DEVICE, PERCUTANEOUS APPROACH: ICD-10-PCS | Performed by: INTERNAL MEDICINE

## 2025-02-17 PROCEDURE — 92972 PERQ TRLUML CORONRY LITHOTRP: CPT | Performed by: INTERNAL MEDICINE

## 2025-02-17 PROCEDURE — 93005 ELECTROCARDIOGRAM TRACING: CPT

## 2025-02-17 PROCEDURE — 93010 ELECTROCARDIOGRAM REPORT: CPT | Performed by: INTERNAL MEDICINE

## 2025-02-17 PROCEDURE — C1887 CATHETER, GUIDING: HCPCS | Performed by: INTERNAL MEDICINE

## 2025-02-17 PROCEDURE — 84100 ASSAY OF PHOSPHORUS: CPT | Performed by: PHYSICIAN ASSISTANT

## 2025-02-17 PROCEDURE — 85027 COMPLETE CBC AUTOMATED: CPT | Performed by: PHYSICIAN ASSISTANT

## 2025-02-17 PROCEDURE — C1761 CATH SHOCKWAVE C2 PLUS 3 X 12MM: HCPCS | Performed by: INTERNAL MEDICINE

## 2025-02-17 PROCEDURE — 80053 COMPREHEN METABOLIC PANEL: CPT | Performed by: PHYSICIAN ASSISTANT

## 2025-02-17 PROCEDURE — 99233 SBSQ HOSP IP/OBS HIGH 50: CPT | Performed by: ANESTHESIOLOGY

## 2025-02-17 PROCEDURE — 87040 BLOOD CULTURE FOR BACTERIA: CPT | Performed by: STUDENT IN AN ORGANIZED HEALTH CARE EDUCATION/TRAINING PROGRAM

## 2025-02-17 PROCEDURE — C9600 PERC DRUG-EL COR STENT SING: HCPCS | Performed by: INTERNAL MEDICINE

## 2025-02-17 PROCEDURE — 83735 ASSAY OF MAGNESIUM: CPT | Performed by: PHYSICIAN ASSISTANT

## 2025-02-17 PROCEDURE — 5A0221D ASSISTANCE WITH CARDIAC OUTPUT USING IMPELLER PUMP, CONTINUOUS: ICD-10-PCS | Performed by: INTERNAL MEDICINE

## 2025-02-17 PROCEDURE — 33968 REMOVE AORTIC ASSIST DEVICE: CPT | Performed by: INTERNAL MEDICINE

## 2025-02-17 PROCEDURE — C1761 CATH SHOCKWAVE C2 2.5X 12MM: HCPCS | Performed by: INTERNAL MEDICINE

## 2025-02-17 PROCEDURE — 85347 COAGULATION TIME ACTIVATED: CPT

## 2025-02-17 PROCEDURE — 81001 URINALYSIS AUTO W/SCOPE: CPT | Performed by: STUDENT IN AN ORGANIZED HEALTH CARE EDUCATION/TRAINING PROGRAM

## 2025-02-17 PROCEDURE — 02F03ZZ FRAGMENTATION IN CORONARY ARTERY, ONE ARTERY, PERCUTANEOUS APPROACH: ICD-10-PCS | Performed by: INTERNAL MEDICINE

## 2025-02-17 DEVICE — IMPLANTABLE DEVICE: Type: IMPLANTABLE DEVICE | Site: GROIN | Status: FUNCTIONAL

## 2025-02-17 DEVICE — PERCLOSE™ PROSTYLE™ SUTURE-MEDIATED CLOSURE AND REPAIR SYSTEM
Type: IMPLANTABLE DEVICE | Site: GROIN | Status: FUNCTIONAL
Brand: PERCLOSE™ PROSTYLE™

## 2025-02-17 DEVICE — STENT ONYXNG35034UX ONYX 3.50X34RX
Type: IMPLANTABLE DEVICE | Site: CORONARY | Status: FUNCTIONAL
Brand: ONYX FRONTIER™

## 2025-02-17 RX ORDER — PROPOFOL 10 MG/ML
INJECTION, EMULSION INTRAVENOUS AS NEEDED
Status: DISCONTINUED | OUTPATIENT
Start: 2025-02-17 | End: 2025-02-17

## 2025-02-17 RX ORDER — SODIUM CHLORIDE 9 MG/ML
INJECTION, SOLUTION INTRAVENOUS
Status: COMPLETED | OUTPATIENT
Start: 2025-02-17 | End: 2025-02-17

## 2025-02-17 RX ORDER — ALBUTEROL SULFATE 0.83 MG/ML
2.5 SOLUTION RESPIRATORY (INHALATION) ONCE AS NEEDED
Status: CANCELLED | OUTPATIENT
Start: 2025-02-17

## 2025-02-17 RX ORDER — ONDANSETRON 2 MG/ML
4 INJECTION INTRAMUSCULAR; INTRAVENOUS EVERY 6 HOURS PRN
Status: DISCONTINUED | OUTPATIENT
Start: 2025-02-17 | End: 2025-02-22 | Stop reason: HOSPADM

## 2025-02-17 RX ORDER — FENTANYL CITRATE/PF 50 MCG/ML
25 SYRINGE (ML) INJECTION
Refills: 0 | Status: CANCELLED | OUTPATIENT
Start: 2025-02-17

## 2025-02-17 RX ORDER — LORAZEPAM 0.5 MG/1
0.5 TABLET ORAL 3 TIMES DAILY PRN
Status: DISCONTINUED | OUTPATIENT
Start: 2025-02-17 | End: 2025-02-17

## 2025-02-17 RX ORDER — CEFAZOLIN SODIUM 2 G/50ML
SOLUTION INTRAVENOUS AS NEEDED
Status: DISCONTINUED | OUTPATIENT
Start: 2025-02-17 | End: 2025-02-17

## 2025-02-17 RX ORDER — PREDNISONE 5 MG/1
5 TABLET ORAL EVERY OTHER DAY
Status: DISCONTINUED | OUTPATIENT
Start: 2025-02-17 | End: 2025-02-22 | Stop reason: HOSPADM

## 2025-02-17 RX ORDER — PROPOFOL 10 MG/ML
INJECTION, EMULSION INTRAVENOUS CONTINUOUS PRN
Status: DISCONTINUED | OUTPATIENT
Start: 2025-02-17 | End: 2025-02-17

## 2025-02-17 RX ORDER — FUROSEMIDE 10 MG/ML
40 INJECTION INTRAMUSCULAR; INTRAVENOUS ONCE
Status: COMPLETED | OUTPATIENT
Start: 2025-02-17 | End: 2025-02-17

## 2025-02-17 RX ORDER — DEXMEDETOMIDINE HYDROCHLORIDE 4 UG/ML
.1-.7 INJECTION, SOLUTION INTRAVENOUS
Status: DISCONTINUED | OUTPATIENT
Start: 2025-02-17 | End: 2025-02-17

## 2025-02-17 RX ORDER — DIPHENHYDRAMINE HYDROCHLORIDE 50 MG/ML
12.5 INJECTION, SOLUTION INTRAMUSCULAR; INTRAVENOUS ONCE AS NEEDED
Status: CANCELLED | OUTPATIENT
Start: 2025-02-17

## 2025-02-17 RX ORDER — CLOPIDOGREL 300 MG/1
600 TABLET, FILM COATED ORAL ONCE
Status: DISCONTINUED | OUTPATIENT
Start: 2025-02-17 | End: 2025-02-17

## 2025-02-17 RX ORDER — SODIUM CHLORIDE 9 MG/ML
50 INJECTION, SOLUTION INTRAVENOUS CONTINUOUS
Status: DISCONTINUED | OUTPATIENT
Start: 2025-02-17 | End: 2025-02-17

## 2025-02-17 RX ORDER — HEPARIN SODIUM 1000 [USP'U]/ML
INJECTION, SOLUTION INTRAVENOUS; SUBCUTANEOUS CODE/TRAUMA/SEDATION MEDICATION
Status: DISCONTINUED | OUTPATIENT
Start: 2025-02-17 | End: 2025-02-17 | Stop reason: HOSPADM

## 2025-02-17 RX ORDER — LORAZEPAM 1 MG/1
1 TABLET ORAL 3 TIMES DAILY PRN
Status: DISCONTINUED | OUTPATIENT
Start: 2025-02-17 | End: 2025-02-22 | Stop reason: HOSPADM

## 2025-02-17 RX ORDER — ONDANSETRON 2 MG/ML
4 INJECTION INTRAMUSCULAR; INTRAVENOUS ONCE AS NEEDED
Status: CANCELLED | OUTPATIENT
Start: 2025-02-17

## 2025-02-17 RX ORDER — SODIUM CHLORIDE 9 MG/ML
INJECTION, SOLUTION INTRAVENOUS CONTINUOUS PRN
Status: DISCONTINUED | OUTPATIENT
Start: 2025-02-17 | End: 2025-02-17

## 2025-02-17 RX ORDER — NITROGLYCERIN 0.4 MG/1
0.4 TABLET SUBLINGUAL
Status: DISCONTINUED | OUTPATIENT
Start: 2025-02-17 | End: 2025-02-22 | Stop reason: HOSPADM

## 2025-02-17 RX ORDER — LIDOCAINE HYDROCHLORIDE 10 MG/ML
INJECTION, SOLUTION EPIDURAL; INFILTRATION; INTRACAUDAL; PERINEURAL CODE/TRAUMA/SEDATION MEDICATION
Status: DISCONTINUED | OUTPATIENT
Start: 2025-02-17 | End: 2025-02-17 | Stop reason: HOSPADM

## 2025-02-17 RX ADMIN — METOPROLOL TARTRATE 50 MG: 50 TABLET, FILM COATED ORAL at 21:52

## 2025-02-17 RX ADMIN — PROPOFOL 50 MCG/KG/MIN: 10 INJECTION, EMULSION INTRAVENOUS at 09:09

## 2025-02-17 RX ADMIN — RANOLAZINE 1000 MG: 500 TABLET, FILM COATED, EXTENDED RELEASE ORAL at 21:52

## 2025-02-17 RX ADMIN — HEPARIN SODIUM 1950 UNITS: 1000 INJECTION INTRAVENOUS; SUBCUTANEOUS at 07:24

## 2025-02-17 RX ADMIN — CEFAZOLIN SODIUM 2000 MG: 2 SOLUTION INTRAVENOUS at 09:59

## 2025-02-17 RX ADMIN — PROPOFOL 30 MG: 10 INJECTION, EMULSION INTRAVENOUS at 10:20

## 2025-02-17 RX ADMIN — SODIUM CHLORIDE: 0.9 INJECTION, SOLUTION INTRAVENOUS at 09:09

## 2025-02-17 RX ADMIN — CHLORHEXIDINE GLUCONATE 0.12% ORAL RINSE 15 ML: 1.2 LIQUID ORAL at 21:52

## 2025-02-17 RX ADMIN — NITROGLYCERIN 120 MCG/MIN: 20 INJECTION INTRAVENOUS at 02:45

## 2025-02-17 RX ADMIN — SENNOSIDES AND DOCUSATE SODIUM 1 TABLET: 50; 8.6 TABLET ORAL at 17:46

## 2025-02-17 RX ADMIN — DEXTRAN 70, GLYCERIN, HYPROMELLOSE 1 DROP: 1; 2; 3 SOLUTION/ DROPS OPHTHALMIC at 21:53

## 2025-02-17 RX ADMIN — NOREPINEPHRINE BITARTRATE 2 MCG/MIN: 1 INJECTION, SOLUTION, CONCENTRATE INTRAVENOUS at 09:51

## 2025-02-17 RX ADMIN — ASPIRIN 81 MG: 81 TABLET, COATED ORAL at 07:57

## 2025-02-17 RX ADMIN — SODIUM CHLORIDE: 0.9 INJECTION, SOLUTION INTRAVENOUS at 12:15

## 2025-02-17 RX ADMIN — NOREPINEPHRINE BITARTRATE 8 MCG: 1 INJECTION, SOLUTION, CONCENTRATE INTRAVENOUS at 09:48

## 2025-02-17 RX ADMIN — FUROSEMIDE 40 MG: 10 INJECTION, SOLUTION INTRAMUSCULAR; INTRAVENOUS at 17:59

## 2025-02-17 RX ADMIN — METOPROLOL TARTRATE 50 MG: 50 TABLET, FILM COATED ORAL at 07:56

## 2025-02-17 RX ADMIN — DEXTRAN 70, GLYCERIN, HYPROMELLOSE 1 DROP: 1; 2; 3 SOLUTION/ DROPS OPHTHALMIC at 17:45

## 2025-02-17 RX ADMIN — Medication 6 MG: at 21:52

## 2025-02-17 RX ADMIN — NOREPINEPHRINE BITARTRATE 8 MCG: 1 INJECTION, SOLUTION, CONCENTRATE INTRAVENOUS at 09:50

## 2025-02-17 RX ADMIN — ATORVASTATIN CALCIUM 80 MG: 80 TABLET, FILM COATED ORAL at 17:46

## 2025-02-17 RX ADMIN — DEXMEDETOMIDINE HYDROCHLORIDE 0.1 MCG/KG/HR: 400 INJECTION INTRAVENOUS at 01:27

## 2025-02-17 RX ADMIN — TICAGRELOR 180 MG: 90 TABLET ORAL at 13:47

## 2025-02-17 NOTE — ASSESSMENT & PLAN NOTE
Presented to [facility] with chest pain, sob, jaw pain. STEMI alert called, patient brought to [place] cath lab for balloon pump and transfer to [facility]. Initial troponin 1964  2/15 Echo: Ef 43%, mild global hypokinesis, grade 2 diastolic dysfunction   IABP 1:1  Cardiac surgery deemed him not a great candidate for CABG  Interventional cardiology planning on impella assisted PCI on Monday, 2/17  Continue heparin infusion   Aspirin/atorvastatin/metoprolol 50 mg BID   Continue renexa 1000 mg BID  Nitro drip for goal SBP < 140

## 2025-02-17 NOTE — ASSESSMENT & PLAN NOTE
initially presented to [facility].  Was loaded with Brilinta and underwent urgent cardiac catheterization and was noted to have further progression in his left main disease along with known RCA .  An intra-aortic balloon pump was inserted postcardiac catheterization to help maintain appropriate coronary perfusion pressure and he was subsequently transferred to [facility] critical care unit for evaluation by cardiology and cardiac surgery.  He was seen and evaluated by cardiac surgery earlier this morning and noted not to be surgery candidate based on advanced age, deconditioning, gait dysfunction.

## 2025-02-17 NOTE — SPEECH THERAPY NOTE
Speech Language/Pathology  Order received, chart reviewed.  Pt is unable to sit up for several hours.  Will return for full assessment tomorrow.  If meds are needed, consider putting in puree for now.

## 2025-02-17 NOTE — ANESTHESIA PREPROCEDURE EVALUATION
Procedure:  Cardiac PCI (Chest)    Relevant Problems   ANESTHESIA (within normal limits)      CARDIO   (+) Coronary artery disease involving native coronary artery of native heart with angina pectoris (HCC)   (+) Hypercholesterolemia   (+) Hypertension      GI/HEPATIC   (+) Other dysphagia      /RENAL   (+) CKD (chronic kidney disease) stage 3, GFR 30-59 ml/min (HCC)      HEMATOLOGY   (+) Anemia   (+) Pure red cell aplasia (HCC)      PULMONARY   (+) SOB (shortness of breath)      Other   (+) Myelofibrosis (HCC)   (+) Pure red cell aplasia (HCC)        Physical Exam    Airway    Mallampati score: III  TM Distance: >3 FB  Neck ROM: full     Dental   No notable dental hx     Cardiovascular  Rhythm: regular, Rate: normal, Cardiovascular exam normal    Pulmonary  Pulmonary exam normal Breath sounds clear to auscultation    Other Findings  post-pubertal.      Anesthesia Plan  ASA Score- 4 Emergent    Anesthesia Type- IV sedation with anesthesia with ASA Monitors.         Additional Monitors:     Airway Plan:     Comment: Patient originally scheduled for impella-assisted PCI under light sedation w/ cardiology. Disinhibited w/ versed so anesthesia called for assistance with MAC sedation vs. General anesthesia. Patient's family consented due to patient under influence of sedative medications. Consented for MAC sedation, GA..       Plan Factors-Exercise tolerance (METS): <4 METS.    Chart reviewed. EKG reviewed. Imaging results reviewed. Existing labs reviewed. Patient summary reviewed.    Patient is not a current smoker.  Patient instructed to abstain from smoking on day of procedure. Patient did not smoke on day of surgery.    Obstructive sleep apnea risk education given perioperatively.        Induction- intravenous.    Postoperative Plan-     Perioperative Resuscitation Plan - Level 1 - Full Code.       Informed Consent- Anesthetic plan and risks discussed with healthcare power of .  I personally reviewed this  patient with the CRNA. Discussed and agreed on the Anesthesia Plan with the CRNA..      NPO Status:  No vitals data found for the desired time range.

## 2025-02-17 NOTE — PLAN OF CARE
Problem: PAIN - ADULT  Goal: Verbalizes/displays adequate comfort level or baseline comfort level  Description: Interventions:  - Encourage patient to monitor pain and request assistance  - Assess pain using appropriate pain scale  - Administer analgesics based on type and severity of pain and evaluate response  - Implement non-pharmacological measures as appropriate and evaluate response  - Consider cultural and social influences on pain and pain management  - Notify physician/advanced practitioner if interventions unsuccessful or patient reports new pain  Outcome: Progressing     Problem: INFECTION - ADULT  Goal: Absence or prevention of progression during hospitalization  Description: INTERVENTIONS:  - Assess and monitor for signs and symptoms of infection  - Monitor lab/diagnostic results  - Monitor all insertion sites, i.e. indwelling lines, tubes, and drains  - Monitor endotracheal if appropriate and nasal secretions for changes in amount and color  - [place] appropriate cooling/warming therapies per order  - Administer medications as ordered  - Instruct and encourage patient and family to use good hand hygiene technique  - Identify and instruct in appropriate isolation precautions for identified infection/condition  Outcome: Progressing  Goal: Absence of fever/infection during neutropenic period  Description: INTERVENTIONS:  - Monitor WBC    Outcome: Progressing     Problem: SAFETY ADULT  Goal: Patient will remain free of falls  Description: INTERVENTIONS:  - Educate patient/family on patient safety including physical limitations  - Instruct patient to call for assistance with activity   - Consult OT/PT to assist with strengthening/mobility   - Keep Call bell within reach  - Keep bed low and locked with side rails adjusted as appropriate  - Keep care items and personal belongings within reach  - Initiate and maintain comfort rounds  - Make Fall Risk Sign visible to staff  - Offer Toileting every 2 Hours,  in advance of need  - Initiate/Maintain bed/chair alarm  - Obtain necessary fall risk management equipment: fall band, non skid socks  - Apply yellow socks and bracelet for high fall risk patients  - Consider moving patient to room near nurses station  Outcome: Progressing  Goal: Maintain or return to baseline ADL function  Description: INTERVENTIONS:  -  Assess patient's ability to carry out ADLs; assess patient's baseline for ADL function and identify physical deficits which impact ability to perform ADLs (bathing, care of mouth/teeth, toileting, grooming, dressing, etc.)  - Assess/evaluate cause of self-care deficits   - Assess range of motion  - Assess patient's mobility; develop plan if impaired  - Assess patient's need for assistive devices and provide as appropriate  - Encourage maximum independence but intervene and supervise when necessary  - Involve family in performance of ADLs  - Assess for home care needs following discharge   - Consider OT consult to assist with ADL evaluation and planning for discharge  - Provide patient education as appropriate  Outcome: Progressing  Goal: Maintains/Returns to pre admission functional level  Description: INTERVENTIONS:  - Perform AM-PAC 6 Click Basic Mobility/ Daily Activity assessment daily.  - Set and communicate daily mobility goal to care team and patient/family/caregiver.   - Collaborate with rehabilitation services on mobility goals if consulted  - Perform Range of Motion 1 times a day.  - Reposition patient every 2 hours.  - Record patient progress and toleration of activity level   Outcome: Progressing     Problem: DISCHARGE PLANNING  Goal: Discharge to home or other facility with appropriate resources  Description: INTERVENTIONS:  - Identify barriers to discharge w/patient and caregiver  - Arrange for needed discharge resources and transportation as appropriate  - Identify discharge learning needs (meds, wound care, etc.)  - Arrange for interpretive services  to assist at discharge as needed  - Refer to Case Management Department for coordinating discharge planning if the patient needs post-hospital services based on physician/advanced practitioner order or complex needs related to functional status, cognitive ability, or social support system  Outcome: Progressing     Problem: Knowledge Deficit  Goal: Patient/family/caregiver demonstrates understanding of disease process, treatment plan, medications, and discharge instructions  Description: Complete learning assessment and assess knowledge base.  Interventions:  - Provide teaching at level of understanding  - Provide teaching via preferred learning methods  Outcome: Progressing     Problem: Prexisting or High Potential for Compromised Skin Integrity  Goal: Skin integrity is maintained or improved  Description: INTERVENTIONS:  - Identify patients at risk for skin breakdown  - Assess and monitor skin integrity  - Assess and monitor nutrition and hydration status  - Monitor labs   - Assess for incontinence   - Turn and reposition patient  - Assist with mobility/ambulation  - Relieve pressure over bony prominences  - Avoid friction and shearing  - Provide appropriate hygiene as needed including keeping skin clean and dry  - Evaluate need for skin moisturizer/barrier cream  - Collaborate with interdisciplinary team   - Patient/family teaching  - Consider wound care consult   Outcome: Progressing

## 2025-02-17 NOTE — PROGRESS NOTES
Cardiology Team 2 - Progress Note   Gael Ferraro 84 y.o. male MRN: [id number]  Unit/Bed#: BE CATH LAB ROOM Encounter: [id number]    Assessment and Plan      84-year-old male with known history of obstructive coronary artery disease and known RCA  and ostial to proximal 50% left main disease based on prior cardiac catheterization.  Also has history of other cardiac catheterization with PCI about 20 years ago.  Patient presented with exertional angina going on for about a month with increase in symptom intensity.  As a result of worsening symptoms, he initially presented to [facility].  Was loaded with Brilinta and underwent urgent cardiac catheterization and was noted to have further progression in his left main disease along with known RCA .  An intra-aortic balloon pump was inserted postcardiac catheterization to help maintain appropriate coronary perfusion pressure and he was subsequently transferred to [facility] critical care unit for evaluation by cardiology and cardiac surgery.  He was seen and evaluated by cardiac surgery earlier this morning and noted not to be surgery candidate based on advanced age, deconditioning, gait dysfunction.  Was recommended for high risk PCI with cardiology team.  Cardiology team following from this standpoint.     Past 24 hours: Was started on Precedex overnight.  Relevant imaging from past 24 hours: Chest x-ray from earlier this morning with pulmonary venous congestion bilaterally.  EKG from this morning with normal sinus rhythm, rate around 70s, ST segment depressions in inferior leads in V5 V6.  Labs Vitals (past 24 hours) I/Os (past 24 hours) Weights   Recent Labs     02/16/25  0454 02/16/25  1703 02/17/25  0457   CREATININE 1.08  --  1.24   EGFR 61  --  52   CO2 28  --  28   K 3.6  --  3.9   MG 2.0  --  1.9   SODIUM 134*  --  133*   BUN 20  --  18   HGB 10.3*   < > 9.5*     --  166    < > = values in this interval not displayed.     Temp:  [97.5 °F (36.4 °C)-98.9 °F (37.2 °C)] 98.9 °F (37.2 °C)  HR:  [72-93] 79  BP: (105-151)/(57-96) 143/85  Resp:  [18-38] 35  SpO2:  [91 %-96 %] 95 %  O2 Device: EtCO2 mask  Nasal Cannula O2 Flow Rate (L/min):  [4 L/min-5 L/min] 5 L/min  Systolic BP readings between 120-140 over the course of the past 12 hours.  Patient in sinus rhythm on telemetry.  On 5 L nasal cannula.   Intake/Output Summary (Last 24 hours) at 2/17/2025 0950  Last data filed at 2/17/2025 0800  Gross per 24 hour   Intake 868.14 ml   Output 1720 ml   Net -851.86 ml    Weight (last 2 days)       Date/Time Weight    02/17/25 0600 68.3 (150.57)    02/16/25 0600 68.9 (151.9)    02/15/25 0827 68.9 (152)           Drips: On heparin drip    Plan  -Plan for cardiac catheterization today, keep NPO for now.  We will follow-up on LVEDP from cardiac cath.  This may help guide further diuresis.  -Continue heparin drip  -Continue on aspirin 81 mg daily, high intensity statin  -On rate control with metoprolol tartrate 50 mg twice daily  -On Ranexa 1000 mg every 12 hours for antianginal therapy  -Continue telemetry monitoring. Monitor and correct electrolyte derangements.  -Cardiology service will continue to follow.    Assessment & Plan  Coronary artery disease involving native coronary artery of native heart with angina pectoris (HCC)  initially presented to [facility].  Was loaded with Brilinta and underwent urgent cardiac catheterization and was noted to have further progression in his left main disease along with known RCA .  An intra-aortic balloon pump was inserted postcardiac catheterization to help maintain appropriate coronary perfusion pressure and he was subsequently transferred to [facility] critical care unit for evaluation by cardiology and cardiac surgery.  He was seen and evaluated by cardiac surgery earlier this morning and noted not to be surgery candidate based on advanced age, deconditioning, gait dysfunction.     Hypertension  Systolics in the 140-150 mmHg range, patient is currently on balloon pump support.  Will hold off on initiation of antihypertensive therapy, can use room in blood pressures for uptitrating beta-blockade   CKD (chronic kidney disease) stage 3, GFR 30-59 ml/min ([facility])  Lab Results   Component Value Date    EGFR 52 02/17/2025    EGFR 61 02/16/2025    EGFR 53 02/15/2025    CREATININE 1.24 02/17/2025    CREATININE 1.08 02/16/2025    CREATININE 1.21 02/15/2025   Appears to be at baseline renal function, will need to monitor at this since patient will likely require another cardiac catheterization early next week.   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 Nayak. Thank you for involving us in the care of this patient.    Subjective     Patient seen and examined.      Objective     Physical Exam  Vitals reviewed.   Cardiovascular:      Rate and Rhythm: Normal rate and regular rhythm.      Pulses: Normal pulses.      Heart sounds: Normal heart sounds.   Pulmonary:      Effort: Pulmonary effort is normal.      Breath sounds: Normal breath sounds.   Skin:     General: Skin is warm.      Capillary Refill: Capillary refill takes less than 2 seconds.         Vitals:  Temp:  [97.5 °F (36.4 °C)-98.9 °F (37.2 °C)] 98.9 °F (37.2 °C)  HR:  [72-93] 79  BP: (105-151)/(57-96) 143/85  Resp:  [18-38] 35  SpO2:  [91 %-96 %] 95 %  O2 Device: EtCO2 mask  Nasal Cannula O2 Flow Rate (L/min):  [4 L/min-5 L/min] 5 L/min  Orthostatic Blood Pressures      Flowsheet Row Most Recent Value   Blood Pressure 143/85 filed at 02/17/2025 0800   Patient Position - Orthostatic VS Lying filed at 02/17/2025 0800            Cardiac Devices, Imaging & Monitoring     Telemetry:   Personally reviewed by Vani Orr MD: Sinus rhythm    Results for orders placed  during the hospital encounter of 02/15/25    Echo complete w/ contrast if indicated    Interpretation Summary    Left Ventricle: Left ventricular cavity size is normal. Wall thickness is mildly increased. There is concentric remodeling. The left ventricular ejection fraction is 43% by biplane measurement. Systolic function is mildly reduced. There is mild global hypokinesis. Diastolic function is moderately abnormal, consistent with grade II (pseudonormal) relaxation.    The following segments are akinetic: apical anterior, apical septal and apex.    The following segments are mildly hypokinetic: basal anterior, basal anteroseptal, basal inferoseptal, basal inferior, basal inferolateral, basal anterolateral, mid anterior, mid anteroseptal, mid inferoseptal, mid inferior, mid inferolateral, mid anterolateral, apical inferior and apical lateral.    Mitral Valve: There is mild regurgitation with a posteriorly directed jet.    Prior TTE study available for comparison. Prior study date: 1/1/2024. Changes noted when compared to prior study. Changes include: LVEF now mildly reduced, with regional wall motion abnormalities. .    Vani Orr MD  Cardiovascular Disease Fellow, FY-1

## 2025-02-17 NOTE — ASSESSMENT & PLAN NOTE
Home regimen: Toprol-XL 50 mg BID, HCTZ 12.5 mg daily, Imdur 30 mg daily, amlodipine 2.5 mg daily  Continue metoprolol 50 mg BID  Nitro drip as above

## 2025-02-17 NOTE — PROGRESS NOTES
Progress Note - Critical Care/ICU   Name: Gael Ferraro 84 y.o. male I MRN: [id number]  Unit/Bed#: PPHP-312-01 I Date of Admission: 2/15/2025   Date of Service: 2/17/2025 I Hospital Day: 2      Assessment & Plan  Coronary artery disease involving native coronary artery of native heart with angina pectoris (HCC)  Presented to [facility] with chest pain, sob, jaw pain. STEMI alert called, patient brought to [place] cath lab for balloon pump and transfer to [facility]. Initial troponin 1964  2/15 Echo: Ef 43%, mild global hypokinesis, grade 2 diastolic dysfunction   IABP 1:1  Cardiac surgery deemed him not a great candidate for CABG  Interventional cardiology planning on impella assisted PCI on Monday, 2/17  Continue heparin infusion   Aspirin/atorvastatin/metoprolol 50 mg BID   Continue renexa 1000 mg BID  Nitro drip for goal SBP < 140    Hypertension  Home regimen: Toprol-XL 50 mg BID, HCTZ 12.5 mg daily, Imdur 30 mg daily, amlodipine 2.5 mg daily  Continue metoprolol 50 mg BID  Nitro drip as above  CKD (chronic kidney disease) stage 3, GFR 30-59 ml/min ([facility])  Lab Results   Component Value Date    EGFR 61 02/16/2025    EGFR 53 02/15/2025    EGFR 55 02/15/2025    CREATININE 1.08 02/16/2025    CREATININE 1.21 02/15/2025    CREATININE 1.18 02/15/2025     Baseline Cr 1.0-1.1  Trend renal indices  Monitor UOP  Avoid nephrotoxins  Pure red cell aplasia (HCC)  Follows with heme/onc outpatient  Not currently on therapy  Hgb stable   Hypercholesterolemia  High intensity atorvastatin  Disposition: Critical care    ICU Core Measures     A: Assess, Prevent, and Manage Pain Has pain been assessed? Yes  Need for changes to pain regimen? No   B: Both SAT/SAT  N/A   C: Choice of Sedation RASS Goal: 0 Alert and Calm  Need for changes to sedation or analgesia regimen? No   D: Delirium CAM-ICU: Positive   E: Early Mobility  Plan for early mobility? Yes   F: Family Engagement Plan for family engagement today? Yes        Review of Invasive Devices:    Bray Plan: Continue for accurate I/O monitoring for 48 hours        Prophylaxis:  VTE VTE covered by:  heparin (porcine), Intravenous, 11 Units/kg/hr at 02/16/25 1321  heparin (porcine), Intravenous, 1,950 Units at 02/15/25 1812  heparin (porcine), Intravenous       Stress Ulcer  not ordered         24 Hour Events : delirious overnight, started on precedex gtt.  Subjective       Objective :                   Vitals I/O      Most Recent Min/Max in 24hrs   Temp 98 °F (36.7 °C) Temp  Min: 97.5 °F (36.4 °C)  Max: 98.4 °F (36.9 °C)   Pulse 78 Pulse  Min: 74  Max: 93   Resp (!) 34 Resp  Min: 18  Max: 35   /64 BP  Min: 97/66  Max: 151/65   O2 Sat 92 % SpO2  Min: 92 %  Max: 96 %      Intake/Output Summary (Last 24 hours) at 2/17/2025 0445  Last data filed at 2/17/2025 0200  Gross per 24 hour   Intake 1009.09 ml   Output 1920 ml   Net -910.91 ml       Diet NPO    Invasive Monitoring           Physical Exam   Physical Exam  Vitals and nursing note reviewed.   Eyes:      General: No scleral icterus.     Extraocular Movements: Extraocular movements intact.      Conjunctiva/sclera: Conjunctivae normal.   Skin:     Comments: Old coagulated blood at IABP dressing site   Cardiovascular:      Rate and Rhythm: Normal rate and regular rhythm.      Comments: IABP  Musculoskeletal:      Right lower leg: No edema.      Left lower leg: No edema.   Abdominal: General: There is no distension.      Palpations: Abdomen is soft.      Tenderness: There is no abdominal tenderness. There is no guarding.   Constitutional:       General: He is not in acute distress.     Appearance: He is well-developed and well-nourished.   Pulmonary:      Effort: Pulmonary effort is normal. No accessory muscle usage, respiratory distress or accessory muscle usage.      Breath sounds: No wheezing.   Neurological:      Comments: Initially stated it was 1860, he was in a hospital, and could state his name and date of birth.  Upon re-orientation, he was able to correctly state he was in the ICU and it was feb 2025.          Diagnostic Studies        Lab Results: I have reviewed the following results:     Medications:  Scheduled PRN   Artificial Tears, 1 drop, TID  aspirin, 81 mg, Daily  atorvastatin, 80 mg, After Dinner  chlorhexidine, 15 mL, Q12H CRESENCIO  metoprolol tartrate, 50 mg, Q12H CRESENCIO  ranolazine, 1,000 mg, Q12H CRESENCIO  senna-docusate sodium, 1 tablet, BID      acetaminophen, 650 mg, Q6H PRN  heparin (porcine), 1,950 Units, Q6H PRN  heparin (porcine), 3,900 Units, Q6H PRN  LORazepam, 1 mg, TID PRN  nitroglycerin, 0.4 mg, Once PRN  ondansetron, 4 mg, Q6H PRN       Continuous    dexmedetomidine, 0.1-0.7 mcg/kg/hr, Last Rate: 0.1 mcg/kg/hr (02/17/25 0127)  heparin (porcine), 3-20 Units/kg/hr (Order-Specific), Last Rate: 11 Units/kg/hr (02/16/25 1321)  nitroGLYcerin, 5-200 mcg/min, Last Rate: 120 mcg/min (02/17/25 0245)         Labs:   CBC    Recent Labs     02/16/25  0454 02/16/25  1703   WBC 7.06  --    HGB 10.3* 10.0*   HCT 29.4* 28.3*     --      BMP    Recent Labs     02/15/25  1512 02/16/25  0454   SODIUM 134* 134*   K 3.8 3.6   CL 99 98   CO2 26 28   AGAP 9 8   BUN 22 20   CREATININE 1.21 1.08   CALCIUM 9.0 8.7       Coags    Recent Labs     02/16/25  0005 02/16/25  0603   PTT 73* 61*        Additional Electrolytes  Recent Labs     02/15/25  1512 02/16/25  0454   MG 1.7* 2.0   PHOS  --  2.8          Blood Gas    No recent results  No recent results LFTs  Recent Labs     02/16/25  0454   ALT 30   AST 96*   ALKPHOS 59   ALB 3.3*   TBILI 1.63*       Infectious  No recent results  Glucose  Recent Labs     02/15/25  1512 02/16/25  0454   GLUC 136 139        Administrative Statements

## 2025-02-17 NOTE — ASSESSMENT & PLAN NOTE
Lab Results   Component Value Date    EGFR 52 02/17/2025    EGFR 61 02/16/2025    EGFR 53 02/15/2025    CREATININE 1.24 02/17/2025    CREATININE 1.08 02/16/2025    CREATININE 1.21 02/15/2025   Appears to be at baseline renal function, will need to monitor at this since patient will likely require another cardiac catheterization early next week.

## 2025-02-17 NOTE — ANESTHESIA POSTPROCEDURE EVALUATION
Post-Op Assessment Note    CV Status:  Stable  Pain Score: 0    Pain management: adequate       Mental Status:  Alert and awake   Hydration Status:  Euvolemic   PONV Controlled:  Controlled   Airway Patency:  Patent     Post Op Vitals Reviewed: Yes    No anethesia notable event occurred.    Staff: CRNA           Last Filed PACU Vitals:  Vitals Value Taken Time   Temp 99    Pulse 86 02/17/25 1401   /57 02/17/25 1400   Resp 36 02/17/25 1401   SpO2 97 % 02/17/25 1401   Vitals shown include unfiled device data.

## 2025-02-17 NOTE — UTILIZATION REVIEW
Initial Clinical Review    Admission: Date/Time/Statement:   Admission Orders (From admission, onward)       Ordered        02/15/25 0622  INPATIENT ADMISSION  Once                          Orders Placed This Encounter   Procedures    INPATIENT ADMISSION     Standing Status:   Standing     Number of Occurrences:   1     Level of Care:   Critical Care [15]     Estimated length of stay:   More than 2 Midnights     Certification:   I certify that inpatient services are medically necessary for this patient for a duration of greater than two midnights. See H&P and MD Progress Notes for additional information about the patient's course of treatment.     Initial Presentation: 84 y.o. male with PMHx includes multivessel CAD, hyperlipidemia, pure red cell aplasia, presented on 2/15/25 initially to [facility] then transferred to [facility], admitted Inpatient status dt STEMI.  Presented due to chest and jaw pain, took nitroglycerin with improvement. He was an MI alert and underwent cardiac catheterization.  Given his known severe CAD and left main disease decision was made to place intra-aortic balloon pump and transferred to [facility] for Cardiac Sx eval.    Plan:  Admit to Critical Care :  Cardiac Sx and Heart Failure consults, order echo, continue heparin drip, ASA, statin, BB and renaxa, start nitro drip. Monitor labs including renal function.     2/15 Per Cardiac Sx: Given age and comorbidities, do not recommend surgery. Left main PCI if possible.     2/15 Per Heart Failure: Due to ischemic symptoms and an elevated troponin level along with his ECG, he was urgently taken for cardiac catheterization. Recommend continuing IV heparin, aspirin, and high intensity statin therapy. Continue anti-anginal therapy with Ranolazine and Metoprolol. Maintain IABP 1:1. Tentative plan for high risk protected PCI on Monday.     Date: 2/16   Day 2: No new complaints or concerns today, pain free, resting  comfortably during exam. Repeat H&H this afternoon and plan to transfuse for Hgb < 9. Type and Screen as well. Plan for PCI tomorrow morning. continuing IV heparin, aspirin, and high intensity statin therapy. Continue anti-anginal therapy with Ranolazine and Metoprolol. Maintain IABP 1:1.       Date: 2/17  Day 3: Has surpassed a 2nd midnight with active treatments and services. Patient delirious overnight, started on precedex gtt. Interventional cardiology planning on impella assisted PCI today. Continue heparin and nitro drip, maintain Bray.    Scheduled Medications:  [Transfer Hold] Artificial Tears, 1 drop, Both Eyes, TID  [Transfer Hold] aspirin, 81 mg, Oral, Daily  [Transfer Hold] atorvastatin, 80 mg, Oral, After Dinner  [Transfer Hold] chlorhexidine, 15 mL, Mouth/Throat, Q12H CRESENCIO  [Transfer Hold] melatonin, 6 mg, Oral, HS  [Transfer Hold] metoprolol tartrate, 50 mg, Oral, Q12H CRESENCIO  [Transfer Hold] predniSONE, 5 mg, Oral, Every Other Day  [Transfer Hold] ranolazine, 1,000 mg, Oral, Q12H CRESENCIO  [Transfer Hold] senna-docusate sodium, 1 tablet, Oral, BID      Continuous IV Infusions:  dexmedetomidine, 0.1-0.7 mcg/kg/hr, Intravenous, Titrated  heparin (porcine), 3-20 Units/kg/hr (Order-Specific), Intravenous, Titrated  nitroGLYcerin, 5-200 mcg/min, Intravenous, Titrated      PRN Meds:  [Transfer Hold] acetaminophen, 650 mg, Oral, Q6H PRN  [Transfer Hold] heparin (porcine), 1,950 Units, Intravenous, Q6H PRN  [Transfer Hold] heparin (porcine), 3,900 Units, Intravenous, Q6H PRN  [Transfer Hold] LORazepam, 1 mg, Oral, TID PRN  [Transfer Hold] nitroglycerin, 0.4 mg, Sublingual, Once PRN  [Transfer Hold] ondansetron, 4 mg, Intravenous, Q6H PRN      Triage Vitals   Temperature Pulse Respirations Blood Pressure SpO2 Pain Score   02/15/25 0700 02/15/25 0700 02/15/25 0700 02/15/25 0700 02/15/25 0700 02/15/25 0800   (!) 97.2 °F (36.2 °C) 80 (!) 23 151/68 90 % No Pain     Weight (last 2 days)       Date/Time Weight    02/17/25  0600 68.3 (150.57)    02/16/25 0600 68.9 (151.9)    02/15/25 0827 68.9 (152)            Vital Signs (last 3 days)       Date/Time Temp Pulse Resp BP MAP (mmHg) SpO2 Calculated FIO2 (%) - Nasal Cannula Nasal Cannula O2 Flow Rate (L/min) O2 Device Patient Position - Orthostatic VS [place] Coma Scale Score Pain    02/17/25 08:41:31 -- -- -- -- -- -- -- -- -- -- -- No Pain    02/17/25 08:40:37 -- -- -- -- -- -- 40 5 L/min Nasal cannula -- -- --    02/17/25 0756 -- 79 -- 137/80 -- -- -- -- -- -- -- --    02/17/25 0700 -- 72 28 134/62 89 94 % -- -- -- -- -- --    02/17/25 0600 -- 74 31 118/65 86 91 % -- -- -- -- -- --    02/17/25 0400 -- 75 38 127/84 97 93 % -- -- -- -- 15 No Pain    02/17/25 0300 -- 78 34 125/64 89 92 % -- -- -- -- -- --    02/17/25 0200 -- 76 28 136/73 97 94 % -- -- -- -- -- --    02/17/25 0100 -- 80 35 143/86 103 95 % 36 4 L/min Nasal cannula -- -- --    02/17/25 0000 -- 74 22 119/74 90 94 % -- -- -- -- 15 --    02/16/25 2300 -- 78 22 134/85 101 94 % -- -- -- -- -- --    02/16/25 2200 -- 85 31 145/76 104 93 % -- -- -- -- -- --    02/16/25 2100 98 °F (36.7 °C) 90 34 105/65 79 95 % -- -- -- -- -- --    02/16/25 2038 -- 93 -- 112/57 -- -- -- -- -- -- -- --    02/16/25 2015 -- 90 30 111/70 85 96 % -- -- -- -- -- --    02/16/25 2000 -- -- -- 111/70 -- -- -- -- -- -- 15 No Pain    02/16/25 1900 -- 84 33 139/80 102 96 % -- -- -- -- -- --    02/16/25 1800 -- 85 20 126/76 96 95 % -- -- -- -- -- --    02/16/25 1700 -- 84 20 129/61 88 95 % -- -- -- -- -- --    02/16/25 1600 97.8 °F (36.6 °C) 80 20 124/71 91 94 % 36 4 L/min Nasal cannula Lying 15 No Pain    02/16/25 1500 -- 83 18 137/96 -- 94 % -- -- -- -- -- --    02/16/25 1400 -- 79 19 122/61 85 94 % -- -- -- -- -- --    02/16/25 1300 -- 81 18 124/79 -- 92 % -- -- -- -- -- --    02/16/25 1200 97.5 °F (36.4 °C) 81 28 142/72 100 93 % 36 4 L/min Nasal cannula Lying 15 No Pain    02/16/25 1100 -- 76 31 151/65 94 93 % -- -- -- -- -- --    02/16/25 1045 98.4 °F (36.9  °C) -- 18 -- -- -- -- -- -- -- -- --    02/16/25 1000 -- 75 19 127/73 93 94 % -- -- -- -- -- --    02/16/25 0900 -- 80 27 129/84 101 94 % -- -- -- -- -- --    02/16/25 0823 -- 85 -- 137/90 -- -- -- -- -- -- -- --    02/16/25 0800 98.4 °F (36.9 °C) 81 20 133/88 105 94 % 36 4 L/min Nasal cannula Lying 15 No Pain    02/16/25 0700 -- 80 21 143/82 -- 93 % -- -- -- -- -- --    02/16/25 0600 -- 82 20 140/66 95 95 % 36 4 L/min Nasal cannula Lying -- --    02/16/25 0500 -- 83 22 97/66 77 93 % 36 4 L/min Nasal cannula Lying -- --    02/16/25 0400 97.7 °F (36.5 °C) 77 17 138/83 105 94 % 36 4 L/min Nasal cannula Lying 15 No Pain    02/16/25 0300 -- 73 20 120/68 89 93 % 36 4 L/min Nasal cannula Lying -- --    02/16/25 0200 -- 72 18 121/63 86 92 % 36 4 L/min Nasal cannula Lying -- --    02/16/25 0100 -- 72 20 112/80 90 95 % 36 4 L/min Nasal cannula Lying -- --    02/16/25 0000 98.3 °F (36.8 °C) 80 23 136/63 91 94 % 36 4 L/min Nasal cannula Lying 15 No Pain    02/15/25 2300 -- 83 22 141/70 99 93 % 36 4 L/min Nasal cannula Lying -- --    02/15/25 2236 -- -- -- -- -- -- -- -- -- -- -- 5    02/15/25 2200 -- 80 23 139/70 98 93 % 36 4 L/min Nasal cannula Lying -- --    02/15/25 2104 -- 82 -- 135/85 -- -- -- -- -- -- -- --    02/15/25 2100 -- 81 20 111/70 87 92 % 36 4 L/min Nasal cannula Lying -- --    02/15/25 2000 97.9 °F (36.6 °C) 85 22 135/77 98 93 % 36 4 L/min Nasal cannula Lying 15 No Pain    02/15/25 1900 -- 82 24 137/75 97 94 % 36 4 L/min Nasal cannula Lying -- --    02/15/25 1800 -- 84 33 126/67 92 93 % -- -- -- -- -- --    02/15/25 1700 -- 81 23 123/85 97 94 % -- -- -- -- -- --    02/15/25 1600 98.1 °F (36.7 °C) 80 20 123/69 91 96 % 36 4 L/min Nasal cannula Lying 15 No Pain    02/15/25 1500 -- 76 20 132/71 94 95 % 36 4 L/min Nasal cannula -- -- --    02/15/25 1400 -- 75 21 126/84 98 93 % -- -- -- -- -- --    02/15/25 1300 -- 76 22 132/77 -- 94 % -- -- -- -- -- --    02/15/25 1200 97.6 °F (36.4 °C) 79 20 148/91 111 94 % -- --  High flow nasal cannula Lying 15 No Pain    02/15/25 1100 -- 75 37 148/77 -- 96 % -- -- -- -- -- --    02/15/25 1005 -- 78 28 148/94 114 95 % -- -- -- -- -- --    02/15/25 1000 -- 79 18 -- -- 95 % -- -- -- -- -- --    02/15/25 0945 -- 81 29 150/71 102 95 % -- -- -- -- -- --    02/15/25 0930 -- 83 35 154/72 104 95 % -- -- -- -- -- --    02/15/25 0915 -- 80 17 151/74 104 95 % -- -- -- -- -- --    02/15/25 0900 -- 81 27 157/82 111 95 % -- -- -- -- -- --    02/15/25 0845 -- 83 30 153/77 106 95 % -- -- -- -- -- --    02/15/25 0830 -- 78 29 146/78 105 96 % -- -- -- -- -- --    02/15/25 0827 -- 81 -- 138/77 -- -- -- -- -- -- -- --    02/15/25 0815 -- 78 20 147/71 102 95 % -- -- -- -- -- --    02/15/25 0800 -- 81 32 138/77 98 93 % -- -- -- Lying 15 No Pain    02/15/25 0745 -- 85 28 152/78 105 94 % -- -- -- -- -- --    02/15/25 0730 -- 81 21 155/76 108 93 % -- -- -- -- -- --    02/15/25 0714 -- 80 24 151/83 -- 92 % -- -- -- -- -- --    02/15/25 0700 97.2 °F (36.2 °C) 80 23 151/68 98 90 % 60 10 L/min Mid flow nasal cannula Lying -- --              Pertinent Labs/Diagnostic Test Results:   Radiology:  VAS limited iliac-femoral evaluation for Impella Device   Final Interpretation by Tyree Tayolr DO (02/15 4298)      XR chest portable   Final Interpretation by Jerome Davis MD (02/15 3856)      IABP marker projects about the aortic arch.      Worsened pulmonary edema            Workstation performed: [id number]         XR chest portable ICU    (Results Pending)     Cardiology:  ECG 12 lead   Final Result by Alli Winn MD (02/17 1410)   Normal sinus rhythm   Marked ST abnormality, possible inferolateral subendocardial injury   Abnormal ECG      Confirmed by Alli Winn ([id number]) on 2/17/2025 6:32:11 AM      ECG 12 lead   Final Result by Alli Winn MD (02/17 2744)   Normal sinus rhythm   Marked ST abnormality, possible inferolateral subendocardial injury   Abnormal ECG      Confirmed by  Alli Winn ([id number]) on 2/17/2025 4:57:43 AM      ECG 12 lead   Final Result by Ashanti Stinson MD (02/16 1001)   Normal sinus rhythm   Marked ST abnormality, possible inferior subendocardial injury   Prolonged QT   Abnormal ECG   When compared with ECG of 15-Feb-2025 06:13,   ST less depressed in Inferior leads   Confirmed by Ashanti Stinson ([id number]) on 2/16/2025 10:01:26 AM      Echo complete w/ contrast if indicated   Final Result by Jered Chinchilla MD (02/15 8191)        Left Ventricle: Left ventricular cavity size is normal. Wall thickness    is mildly increased. There is concentric remodeling. The left ventricular    ejection fraction is 43% by biplane measurement. Systolic function is    mildly reduced. There is mild global hypokinesis. Diastolic function is    moderately abnormal, consistent with grade II (pseudonormal) relaxation.     The following segments are akinetic: apical anterior, apical septal and    apex.     The following segments are mildly hypokinetic: basal anterior, basal    anteroseptal, basal inferoseptal, basal inferior, basal inferolateral,    basal anterolateral, mid anterior, mid anteroseptal, mid inferoseptal, mid    inferior, mid inferolateral, mid anterolateral, apical inferior and apical    lateral.     Mitral Valve: There is mild regurgitation with a posteriorly directed    jet.     Prior TTE study available for comparison. Prior study date: 1/1/2024.    Changes noted when compared to prior study. Changes include: LVEF now    mildly reduced, with regional wall motion abnormalities. .         ECG 12 lead   Final Result by Jered Chinchilla MD (02/15 1991)   Normal sinus rhythm   Marked ST abnormality, possible inferior subendocardial injury   Marked ST abnormality, possible anterolateral subendocardial injury   Prolonged QT   Abnormal ECG   When compared with ECG of 15-Feb-2025 06:12, (unconfirmed)   No significant change was found   Confirmed by Jered Chinchilla ([id number]) on  2/15/2025 6:50:48 PM      ECG 12 lead   Final Result by Jered Chinchilla MD (02/15 1850)   Normal sinus rhythm   Marked ST abnormality, possible inferior subendocardial injury   Marked ST abnormality, possible anterolateral subendocardial injury   Abnormal ECG   When compared with ECG of 15-Feb-2025 03:32, (unconfirmed)   Nonspecific T wave abnormality now evident in Inferior leads   Confirmed by Jered Chinchilla ([id number]) on 2/15/2025 6:50:43 PM        GI:  No orders to display           Results from last 7 days   Lab Units 02/17/25 0457 02/16/25  1703 02/16/25  0454 02/15/25  0416   WBC Thousand/uL 6.19  --  7.06 10.77*   HEMOGLOBIN g/dL 9.5* 10.0* 10.3* 13.1   HEMATOCRIT % 26.7* 28.3* 29.4* 37.6   PLATELETS Thousands/uL 166  --  184 259   TOTAL NEUT ABS Thousands/µL  --   --   --  6.03         Results from last 7 days   Lab Units 02/17/25  0457 02/16/25  0454 02/15/25  1512 02/15/25  0416   SODIUM mmol/L 133* 134* 134* 135   POTASSIUM mmol/L 3.9 3.6 3.8 4.8   CHLORIDE mmol/L 99 98 99 99   CO2 mmol/L 28 28 26 28   ANION GAP mmol/L 6 8 9 8   BUN mg/dL 18 20 22 22   CREATININE mg/dL 1.24 1.08 1.21 1.18   EGFR ml/min/1.73sq m 52 61 53 55   CALCIUM mg/dL 8.7 8.7 9.0 9.5   MAGNESIUM mg/dL 1.9 2.0 1.7* 2.0   PHOSPHORUS mg/dL 2.8 2.8  --   --      Results from last 7 days   Lab Units 02/17/25  0457 02/16/25  0454 02/15/25  0416   AST U/L 63* 96* 56*   ALT U/L 27 30 33   ALK PHOS U/L 61 59 65   TOTAL PROTEIN g/dL 5.1* 4.9* 6.5   ALBUMIN g/dL 3.1* 3.3* 4.1   TOTAL BILIRUBIN mg/dL 1.54* 1.63* 0.87         Results from last 7 days   Lab Units 02/17/25  0457 02/16/25  0454 02/15/25  1512 02/15/25  0416   GLUCOSE RANDOM mg/dL 123 139 136 124     Results from last 7 days   Lab Units 02/15/25  0416   HS TNI 0HR ng/L 1,964*         Results from last 7 days   Lab Units 02/17/25  0457 02/16/25  0603 02/16/25  0005 02/15/25  0947 02/15/25  0416   PROTIME seconds  --   --   --   --  12.7   INR   --   --   --   --  0.88   PTT seconds  54* 61* 73*   < > 27    < > = values in this interval not displayed.     Results from last 7 days   Lab Units 02/15/25  0416   BNP pg/mL 539*     Past Medical History:   Diagnosis Date    Low hemoglobin      Present on Admission:   Coronary artery disease involving native coronary artery of native heart with angina pectoris (HCC)   (Resolved) Stage 3 chronic kidney disease (HCC)   Hypertension   CKD (chronic kidney disease) stage 3, GFR 30-59 ml/min (HCC)   Hypercholesterolemia   Pure red cell aplasia (HCC)      Admitting Diagnosis: STEMI (ST elevation myocardial infarction) (HCC) [I21.3]  Age/Sex: 84 y.o. male    Network Utilization Review Department  ATTENTION: Please call with any questions or concerns to [phone] and carefully listen to the prompts so that you are directed to the right person. All voicemails are confidential.   For Discharge needs, contact Care Management DC Support Team at [phone] opt. 2  Send all requests for admission clinical reviews, approved or denied determinations and any other requests to dedicated fax number below belonging to the [facility] where the patient is receiving treatment. List of dedicated fax numbers for the Facilities:  FACILITY NAME UR FAX NUMBER   ADMISSION DENIALS (Administrative/Medical Necessity) [phone]   DISCHARGE SUPPORT TEAM (NETWORK) [phone]   PARENT CHILD HEALTH (Maternity/NICU/Pediatrics) [phone]   [facility] [phone]   [facility] [phone]   [facility] [phone]   [facility] [phone]   [facility] [phone]   [facility] [phone]   [facility] [phone]   [facility] [phone]   [facility] [phone]   [facility] [phone]   [facility]  [facility] [phone]   [facility] [phone]

## 2025-02-18 PROBLEM — R26.2 AMBULATORY DYSFUNCTION: Status: ACTIVE | Noted: 2025-02-18

## 2025-02-18 PROBLEM — R44.3 HALLUCINATIONS: Status: ACTIVE | Noted: 2025-02-18

## 2025-02-18 PROBLEM — F41.9 ANXIETY: Status: ACTIVE | Noted: 2025-02-18

## 2025-02-18 PROBLEM — Z91.89 AT RISK FOR DELIRIUM: Status: ACTIVE | Noted: 2025-02-18

## 2025-02-18 PROBLEM — R54 FRAILTY: Status: ACTIVE | Noted: 2025-02-18

## 2025-02-18 LAB
ANION GAP SERPL CALCULATED.3IONS-SCNC: 10 MMOL/L (ref 4–13)
BUN SERPL-MCNC: 22 MG/DL (ref 5–25)
CALCIUM SERPL-MCNC: 8.7 MG/DL (ref 8.4–10.2)
CHLORIDE SERPL-SCNC: 102 MMOL/L (ref 96–108)
CO2 SERPL-SCNC: 24 MMOL/L (ref 21–32)
CREAT SERPL-MCNC: 1.28 MG/DL (ref 0.6–1.3)
ERYTHROCYTE [DISTWIDTH] IN BLOOD BY AUTOMATED COUNT: 14.4 % (ref 11.6–15.1)
GFR SERPL CREATININE-BSD FRML MDRD: 50 ML/MIN/1.73SQ M
GLUCOSE SERPL-MCNC: 132 MG/DL (ref 65–140)
HCT VFR BLD AUTO: 28 % (ref 36.5–49.3)
HGB BLD-MCNC: 9.6 G/DL (ref 12–17)
MCH RBC QN AUTO: 37.6 PG (ref 26.8–34.3)
MCHC RBC AUTO-ENTMCNC: 34.3 G/DL (ref 31.4–37.4)
MCV RBC AUTO: 110 FL (ref 82–98)
PLATELET # BLD AUTO: 195 THOUSANDS/UL (ref 149–390)
PMV BLD AUTO: 11.2 FL (ref 8.9–12.7)
POTASSIUM SERPL-SCNC: 4 MMOL/L (ref 3.5–5.3)
RBC # BLD AUTO: 2.55 MILLION/UL (ref 3.88–5.62)
SODIUM SERPL-SCNC: 136 MMOL/L (ref 135–147)
WBC # BLD AUTO: 7.76 THOUSAND/UL (ref 4.31–10.16)

## 2025-02-18 PROCEDURE — 94664 DEMO&/EVAL PT USE INHALER: CPT

## 2025-02-18 PROCEDURE — 92610 EVALUATE SWALLOWING FUNCTION: CPT

## 2025-02-18 PROCEDURE — 99232 SBSQ HOSP IP/OBS MODERATE 35: CPT | Performed by: PHYSICIAN ASSISTANT

## 2025-02-18 PROCEDURE — 99232 SBSQ HOSP IP/OBS MODERATE 35: CPT | Performed by: INTERNAL MEDICINE

## 2025-02-18 PROCEDURE — 80048 BASIC METABOLIC PNL TOTAL CA: CPT | Performed by: INTERNAL MEDICINE

## 2025-02-18 PROCEDURE — 94760 N-INVAS EAR/PLS OXIMETRY 1: CPT

## 2025-02-18 PROCEDURE — NC001 PR NO CHARGE: Performed by: PHYSICIAN ASSISTANT

## 2025-02-18 PROCEDURE — 99223 1ST HOSP IP/OBS HIGH 75: CPT | Performed by: INTERNAL MEDICINE

## 2025-02-18 PROCEDURE — 85027 COMPLETE CBC AUTOMATED: CPT | Performed by: INTERNAL MEDICINE

## 2025-02-18 RX ORDER — HEPARIN SODIUM 5000 [USP'U]/ML
5000 INJECTION, SOLUTION INTRAVENOUS; SUBCUTANEOUS EVERY 8 HOURS SCHEDULED
Status: DISCONTINUED | OUTPATIENT
Start: 2025-02-18 | End: 2025-02-22 | Stop reason: HOSPADM

## 2025-02-18 RX ORDER — FUROSEMIDE 10 MG/ML
40 INJECTION INTRAMUSCULAR; INTRAVENOUS ONCE
Status: COMPLETED | OUTPATIENT
Start: 2025-02-18 | End: 2025-02-18

## 2025-02-18 RX ORDER — IPRATROPIUM BROMIDE AND ALBUTEROL SULFATE 2.5; .5 MG/3ML; MG/3ML
3 SOLUTION RESPIRATORY (INHALATION) ONCE
Status: DISCONTINUED | OUTPATIENT
Start: 2025-02-18 | End: 2025-02-19

## 2025-02-18 RX ORDER — HYDROCHLOROTHIAZIDE 12.5 MG/1
12.5 TABLET ORAL DAILY
Status: DISCONTINUED | OUTPATIENT
Start: 2025-02-18 | End: 2025-02-18

## 2025-02-18 RX ORDER — LOSARTAN POTASSIUM 25 MG/1
12.5 TABLET ORAL DAILY
Status: DISCONTINUED | OUTPATIENT
Start: 2025-02-19 | End: 2025-02-21

## 2025-02-18 RX ADMIN — CHLORHEXIDINE GLUCONATE 0.12% ORAL RINSE 15 ML: 1.2 LIQUID ORAL at 09:04

## 2025-02-18 RX ADMIN — LORAZEPAM 1 MG: 1 TABLET ORAL at 12:33

## 2025-02-18 RX ADMIN — ATORVASTATIN CALCIUM 80 MG: 80 TABLET, FILM COATED ORAL at 17:53

## 2025-02-18 RX ADMIN — LORAZEPAM 1 MG: 1 TABLET ORAL at 00:10

## 2025-02-18 RX ADMIN — CHLORHEXIDINE GLUCONATE 0.12% ORAL RINSE 15 ML: 1.2 LIQUID ORAL at 22:35

## 2025-02-18 RX ADMIN — FUROSEMIDE 40 MG: 10 INJECTION, SOLUTION INTRAVENOUS at 10:20

## 2025-02-18 RX ADMIN — HEPARIN SODIUM 5000 UNITS: 5000 INJECTION INTRAVENOUS; SUBCUTANEOUS at 22:35

## 2025-02-18 RX ADMIN — METOPROLOL TARTRATE 50 MG: 50 TABLET, FILM COATED ORAL at 22:35

## 2025-02-18 RX ADMIN — Medication 6 MG: at 22:35

## 2025-02-18 RX ADMIN — SENNOSIDES AND DOCUSATE SODIUM 1 TABLET: 50; 8.6 TABLET ORAL at 09:04

## 2025-02-18 RX ADMIN — LORAZEPAM 1 MG: 1 TABLET ORAL at 22:36

## 2025-02-18 RX ADMIN — HYDROCHLOROTHIAZIDE 12.5 MG: 12.5 TABLET ORAL at 09:04

## 2025-02-18 RX ADMIN — METOPROLOL TARTRATE 50 MG: 50 TABLET, FILM COATED ORAL at 09:04

## 2025-02-18 RX ADMIN — RANOLAZINE 1000 MG: 500 TABLET, FILM COATED, EXTENDED RELEASE ORAL at 09:04

## 2025-02-18 RX ADMIN — DEXTRAN 70, GLYCERIN, HYPROMELLOSE 1 DROP: 1; 2; 3 SOLUTION/ DROPS OPHTHALMIC at 22:34

## 2025-02-18 RX ADMIN — DEXTRAN 70, GLYCERIN, HYPROMELLOSE 1 DROP: 1; 2; 3 SOLUTION/ DROPS OPHTHALMIC at 16:39

## 2025-02-18 RX ADMIN — TICAGRELOR 90 MG: 90 TABLET ORAL at 22:35

## 2025-02-18 RX ADMIN — ASPIRIN 81 MG: 81 TABLET, COATED ORAL at 09:04

## 2025-02-18 RX ADMIN — HEPARIN SODIUM 5000 UNITS: 5000 INJECTION INTRAVENOUS; SUBCUTANEOUS at 16:39

## 2025-02-18 RX ADMIN — DEXTRAN 70, GLYCERIN, HYPROMELLOSE 1 DROP: 1; 2; 3 SOLUTION/ DROPS OPHTHALMIC at 09:13

## 2025-02-18 RX ADMIN — SENNOSIDES AND DOCUSATE SODIUM 1 TABLET: 50; 8.6 TABLET ORAL at 17:54

## 2025-02-18 RX ADMIN — RANOLAZINE 1000 MG: 500 TABLET, FILM COATED, EXTENDED RELEASE ORAL at 22:36

## 2025-02-18 NOTE — PLAN OF CARE
Problem: INFECTION - ADULT  Goal: Absence or prevention of progression during hospitalization  Description: INTERVENTIONS:  - Assess and monitor for signs and symptoms of infection  - Monitor lab/diagnostic results  - Monitor all insertion sites, i.e. indwelling lines, tubes, and drains  - Monitor endotracheal if appropriate and nasal secretions for changes in amount and color  - [place] appropriate cooling/warming therapies per order  - Administer medications as ordered  - Instruct and encourage patient and family to use good hand hygiene technique  - Identify and instruct in appropriate isolation precautions for identified infection/condition  Outcome: Progressing  Goal: Absence of fever/infection during neutropenic period  Description: INTERVENTIONS:  - Monitor WBC    Outcome: Progressing     Problem: SAFETY ADULT  Goal: Patient will remain free of falls  Description: INTERVENTIONS:  - Educate patient/family on patient safety including physical limitations  - Instruct patient to call for assistance with activity   - Consult OT/PT to assist with strengthening/mobility   - Keep Call bell within reach  - Keep bed low and locked with side rails adjusted as appropriate  - Keep care items and personal belongings within reach  - Initiate and maintain comfort rounds  - Make Fall Risk Sign visible to staff  - Apply yellow socks and bracelet for high fall risk patients  - Consider moving patient to room near nurses station  Outcome: Progressing     Problem: SAFETY ADULT  Goal: Maintains/Returns to pre admission functional level  Description: INTERVENTIONS:  - Perform AM-PAC 6 Click Basic Mobility/ Daily Activity assessment daily.  - Set and communicate daily mobility goal to care team and patient/family/caregiver.   - Collaborate with rehabilitation services on mobility goals if consulted  - Out of bed for toileting  - Record patient progress and toleration of activity level   Outcome: Progressing

## 2025-02-18 NOTE — ASSESSMENT & PLAN NOTE
Reported post procedure in PACU  Now resolved  Denies prior hx of hallucinations or complications with anesthesia  At risk secondary to age, chronic use of benzodiazepines  Although benzodiazapines not recommended in the older adult, in the setting of chronic use do no recommend abrupt withdrawal, recommend continue ativan 1 mg po TID prn  Maintain delirum precautions:  Provide frequent redirection, reorientation, distraction techniques  Avoid deliriogenic medications such as tramadol, benzodiazepines, anticholinergics,  Benadryl  Treat pain, See geriatric pain protocol  Monitor for constipation and urinary retention  Encourage early and frequent moblization, OOB  Encourage Hydration/ Nutrition  Implement sleep hygiene, limit night time interuptions, group activities

## 2025-02-18 NOTE — PROGRESS NOTES
Progress Note - Critical Care/ICU   Name: Gael Ferraro 84 y.o. male I MRN: [id number]  Unit/Bed#: PPHP-312-01 I Date of Admission: 2/15/2025   Date of Service: 2/18/2025 I Hospital Day: 3      Assessment & Plan  Coronary artery disease involving native coronary artery of native heart with angina pectoris (HCC)  Presented to [facility] with chest pain, sob, jaw pain. STEMI alert called, patient brought to [place] cath lab for balloon pump and transfer to [facility]. Initial troponin 1964  2/15 Echo: Ef 43%, mild global hypokinesis, grade 2 diastolic dysfunction   Cardiac surgery deemed him not surgical candidate   2/17: Impella assisted PCI, Ost LM with BRYAN x1   Plan  Cardiology Following  Aspirin/atorvastatin/metoprolol 50 mg BID   Continue renexa 1000 mg BID  Hypertension  Home regimen: Toprol-XL 50 mg BID, HCTZ 12.5 mg daily, Imdur 30 mg daily, amlodipine 2.5 mg daily  Continue metoprolol 50 mg BID  Further GDMT as tolerated  CKD (chronic kidney disease) stage 3, GFR 30-59 ml/min ([facility])  Lab Results   Component Value Date    EGFR 52 02/17/2025    EGFR 61 02/16/2025    EGFR 53 02/15/2025    CREATININE 1.24 02/17/2025    CREATININE 1.08 02/16/2025    CREATININE 1.21 02/15/2025     Baseline Cr 1.0-1.1  Trend renal indices  Monitor UOP  Avoid nephrotoxins  Pure red cell aplasia (HCC)  Follows with heme/onc outpatient  Not currently on therapy  Hgb stable   Hypercholesterolemia  High intensity atorvastatin  Disposition: Med Surg with Telemetry    ICU Core Measures     A: Assess, Prevent, and Manage Pain Has pain been assessed? Yes  Need for changes to pain regimen? No   B: Both SAT/SAT  N/A   C: Choice of Sedation RASS Goal: 0 Alert and Calm  Need for changes to sedation or analgesia regimen? No   D: Delirium CAM-ICU: Negative   E: Early Mobility  Plan for early mobility? Yes   F: Family Engagement Plan for family engagement today? Yes       Review of Invasive Devices:    Bray Plan:  Plan for Bray  removal in AM        Prophylaxis:  VTE Heparin SQ   Stress Ulcer  not ordered         24 Hour Events : s/p protected PCI of Ost LM with BRYAN x1 with Impella ( IABP removed). Impella removed after case.   Subjective   Review of Systems: See HPI for Review of Systems    Objective :                   Vitals I/O      Most Recent Min/Max in 24hrs   Temp 98.5 °F (36.9 °C) Temp  Min: 97.8 °F (36.6 °C)  Max: 98.9 °F (37.2 °C)   Pulse 85 Pulse  Min: 72  Max: 109   Resp (!) 26 Resp  Min: 16  Max: 38   BP 98/52 BP  Min: 89/52  Max: 143/85   O2 Sat 95 % SpO2  Min: 91 %  Max: 99 %      Intake/Output Summary (Last 24 hours) at 2/18/2025 0217  Last data filed at 2/18/2025 0100  Gross per 24 hour   Intake 1941.24 ml   Output 2700 ml   Net -758.76 ml       Diet Cardiovascular; Cardiac    Invasive Monitoring           Physical Exam   Physical Exam  Vitals and nursing note reviewed.   Eyes:      Extraocular Movements: Extraocular movements intact.      Pupils: Pupils are equal, round, and reactive to light.   Skin:     General: Skin is warm.   HENT:      Head: Normocephalic.   Cardiovascular:      Rate and Rhythm: Normal rate and regular rhythm.      Pulses: Normal pulses.   Abdominal:      Palpations: Abdomen is soft.   Constitutional:       Appearance: He is well-developed.   Pulmonary:      Effort: Pulmonary effort is normal.   Neurological:      Comments: Alert to self and place          Diagnostic Studies        Lab Results: I have reviewed the following results:     Medications:  Scheduled PRN   Artificial Tears, 1 drop, TID  aspirin, 81 mg, Daily  atorvastatin, 80 mg, After Dinner  chlorhexidine, 15 mL, Q12H CRESENCIO  heparin (porcine), 5,000 Units, Q8H CRESENCIO  melatonin, 6 mg, HS  metoprolol tartrate, 50 mg, Q12H CRESENCIO  predniSONE, 5 mg, Every Other Day  ranolazine, 1,000 mg, Q12H CRESENCIO  senna-docusate sodium, 1 tablet, BID  ticagrelor, 90 mg, Q12H CRESENCIO      acetaminophen, 650 mg, Q6H PRN  LORazepam, 1 mg, TID PRN  nitroglycerin, 0.4 mg, Q5  Min PRN  ondansetron, 4 mg, Q6H PRN       Continuous          Labs:   CBC    Recent Labs     02/16/25  0454 02/16/25  1703 02/17/25  0457   WBC 7.06  --  6.19   HGB 10.3* 10.0* 9.5*   HCT 29.4* 28.3* 26.7*     --  166     BMP    Recent Labs     02/16/25  0454 02/17/25  0457   SODIUM 134* 133*   K 3.6 3.9   CL 98 99   CO2 28 28   AGAP 8 6   BUN 20 18   CREATININE 1.08 1.24   CALCIUM 8.7 8.7       Coags    Recent Labs     02/16/25  0603 02/17/25  0457   PTT 61* 54*        Additional Electrolytes  Recent Labs     02/16/25  0454 02/17/25  0457   MG 2.0 1.9   PHOS 2.8 2.8          Blood Gas    No recent results  No recent results LFTs  Recent Labs     02/16/25  0454 02/17/25  0457   ALT 30 27   AST 96* 63*   ALKPHOS 59 61   ALB 3.3* 3.1*   TBILI 1.63* 1.54*       Infectious  No recent results  Glucose  Recent Labs     02/16/25  0454 02/17/25  0457   GLUC 139 123

## 2025-02-18 NOTE — ASSESSMENT & PLAN NOTE
Lab Results   Component Value Date    EGFR 52 02/17/2025    EGFR 61 02/16/2025    EGFR 53 02/15/2025    CREATININE 1.24 02/17/2025    CREATININE 1.08 02/16/2025    CREATININE 1.21 02/15/2025     Baseline Cr 1.0-1.1  Trend renal indices  Monitor UOP  Avoid nephrotoxins

## 2025-02-18 NOTE — SPEECH THERAPY NOTE
Speech Language/Pathology  Speech-Language Pathology Bedside Swallow Evaluation      Patient Name: Gael Ferraro    Today's Date: 2/18/2025     Problem List  Principal Problem:    Coronary artery disease involving native coronary artery of native heart with angina pectoris (HCC)  Active Problems:    Hypertension    CKD (chronic kidney disease) stage 3, GFR 30-59 ml/min (HCC)    Pure red cell aplasia (HCC)    Hypercholesterolemia      Past Medical History  Past Medical History:   Diagnosis Date    Low hemoglobin        Past Surgical History  Past Surgical History:   Procedure Laterality Date    CARDIAC CATHETERIZATION Left 1/2/2024    Procedure: Cardiac Left Heart Cath;  Surgeon: Ana Garcia DO;  [facility];  Service: Cardiology    CARDIAC CATHETERIZATION N/A 1/2/2024    Procedure: Cardiac Coronary Angiogram;  Surgeon: Ana Garcia DO;  [facility];  Service: Cardiology    CARDIAC CATHETERIZATION N/A 1/2/2024    Procedure: Cardiac RHC;  Surgeon: Ana Garcia DO;  [facility];  Service: Cardiology    CARDIAC CATHETERIZATION N/A 2/15/2025    Procedure: Cardiac iabp;  Surgeon: Ana Garcia DO;  [facility];  Service: Cardiology    CARDIAC CATHETERIZATION N/A 2/15/2025    Procedure: Cardiac Coronary Angiogram;  Surgeon: Ana Garcia DO;  [facility];  Service: Cardiology    CARDIAC CATHETERIZATION N/A 2/17/2025    Procedure: Cardiac PCI;  Surgeon: Ana Garcia DO;  [facility];  Service: Cardiology    CARDIAC CATHETERIZATION N/A 2/17/2025    Procedure: Cardiac Impella Insertion;  Surgeon: Aan Garica DO;  [facility];  Service: Cardiology    CARDIAC CATHETERIZATION N/A 2/17/2025    Procedure: Cardiac Impella Removal;  Surgeon: Ana Garcia DO;  [facility];  Service: Cardiology    IMPELLA VENTRICULAR ASSIST DEVICE INSERTION  "N/A 2/17/2025    Procedure: IMPELLA VENTRICULAR ASSIST DEVICE INSERTION;  Surgeon: Ana Garcia DO;  [facility];  Service: Cardiology    IR BIOPSY BONE MARROW  1/24/2024    REMOVAL PUMP INTRA AORTIC BALLOON  (IABP) N/A 2/17/2025    Procedure: REMOVAL PUMP INTRA AORTIC BALLOON  (IABP);  Surgeon: Ana Garcia DO;  [facility];  Service: Cardiology       Summary   Pt presented with functional appearing oral and pharyngeal stage swallowing skills with materials administered today.  He has upper and lower dentures but the lower do not fit well.  He prefers the uppers in for solids.       Risk/s for Aspiration: DINO if pt w/ confusion     Recommended Diet: regular diet and thin liquids   Recommended Form of Meds: whole with liquid   Aspiration precautions and swallowing strategies: upright posture, only feed when fully alert, slow rate of feeding, small bites/sips, and alternating bites and sips  Other Recommendations: Continue frequent oral care, no f/u at this time.         Current Medical Status  Pt is a 84 y.o. male who presented to [facility] with chest pain and an EKG showing STEMI. Patient given aspirin and Brilinta load and loaded with heparin. Pain controlled with morphine. Presentation not consistent with acute thoracic aortic dissection. No evidence of acute ACS complications including cardiogenic shock (2/2 muscle loss or valvular rupture), tachydysrhythmia or electrical conduction disturbance. Interventional cardiology urgently consulted, recommendations followed. Patient taken to cath lab.   Pt transferred to [facility] for cardiac surgery consult.   Underwent PCI 2/17/25.  Following procedure was hallucinating, was delirious.  Today he is awake and alert but c/o congestion and \"getting a cold\"    Current Precautions:   Fall   Delirium    Allergies:  No known food allergies    Past medical history:  Please see H&P for details    Special Studies:  CXR-Grossly " stable appearance of pulmonary edema.     The aortic balloon pump appears to have been withdrawn about 1 cm with the tip of the device projecting just below the aortic arch level.       Social/Education/Vocational Hx:  Pt lives with family    Swallow Information   Current Risks for Dysphagia & Aspiration: AMS  Current Diet: regular diet and thin liquids   Baseline Diet: regular diet and thin liquids      Baseline Assessment   Behavior/Cognition: alert  Speech/Language Status: able to participate in conversation  Patient Positioning: upright in bed  Pain Status/Interventions/Response to Interventions:   No report of or nonverbal indications of pain.       Swallow Mechanism Exam  Facial: symmetrical  Labial: WFL  Lingual: WFL  Mandible: adequate ROM  Dentition: partial dentures, upper dentures, and lower dentures  Vocal quality:clear/adequate   Volitional Cough: strong/productive   Respiratory Status: on nc      Consistencies Assessed and Performance   Consistencies Administered: ice chips, thin liquids, puree, and hard solids    Oral Stage: WFL  Mastication was adequate with the materials administered today.  Bolus formation and transfer were functional with no significant oral residue noted.  No overt s/s reduced oral control.    Pharyngeal Stage: WFL  Swallow Mechanics:  Swallowing initiation appeared prompt.  Laryngeal rise was palpated and judged to be within functional limits.  No coughing, throat clearing, change in vocal quality or respiratory status noted today.     Esophageal Concerns: none reported    Strategies and Efficacy: -    Summary and Recommendations (see above)    Results Reviewed with: patient, RN, and family     Treatment Recommended: not at this time

## 2025-02-18 NOTE — PROGRESS NOTES
"Progress Note - Critical Care/ICU   Name: Gael Ferraro 84 y.o. male I MRN: [id number]  Unit/Bed#: PPHP-312-01 I Date of Admission: 2/15/2025   Date of Service: 2/18/2025 I Hospital Day: 3      Critical Care Interval Transfer Note:    Brief Hospital Summary: Per HPI on 2/15: \"Gael Ferraro is a 84 y.o. with past medical history significant for multivessel CAD, hyperlipidemia, pure red cell aplasia who presented to [facility] emergency department early this morning with complaints of chest and jaw pain.  He states that around 10 PM he developed the symptoms and took a nitroglycerin with improvement.  He then had recurrent symptoms around 2 AM this morning.  He was an MI alert and underwent cardiac catheterization.  Given his known severe CAD and left main disease decision was made to place intra-aortic balloon pump and transferred to [facility].  Upon arrival, patient is chest pain free and hemodynamically stable.  He denies any nausea or shortness of breath.\"    The patient did well from a cardiac stand point. He remained on 1:1 support with his IABP without issue until his impella assisted PCI on 2/17. He had one BRYAN to his LAD and both his impella and IABP were able to be removed following this. He remained hemodynamically stable and was able to tolerate a slow re-initiation of his GDMT - has been on metoprolol 50mg BID, HCTZ started on 2/18 for gentle diuresis. His primary issue has been delirium. The patient required a precedex gtt in addition to a 1:1 sitter for agitation when he had his IABP in place. However after his PCI, he was restarted on his home xanax which the patient takes at least 3 times a day. With this, he was able to sleep and is much improved this morning from a mental status stand point. However, the family does note issues with sun-downing/agitation at night and relayed that to the hospital staff.     He also does have a hx of red cell aplasia for which he follows " with heme-onc and is on a tapering dose of steroids. Prior to his hospitalization, he was started on 5mg every other day from 5mg QD. This was restarted as such.     He was noted to have 1/2 positive BC from 2/17 for staph. It is presumed to be a contamination given that he is otherwise asymptomatic, afebrile, and has a normal WBC. No need for repeat bc or abx at this time unless clinical worsening.     Barriers to discharge:   Re-initiation of GDMT per cardiology  F/u geriatrics consultation  Monitor delirium, consider alternative to benzo if further agitation control is required  Aggressive pulmonary toileting, diuresis as needed  Monitor for fevers/worsening WBC in case abx are needed for 1/2+ BC     Consults: IP CONSULT TO CARDIOLOGY  IP CONSULT TO CARDIOTHORACIC SURGERY  IP CONSULT TO CARDIOLOGY  IP CONSULT TO GERONTOLOGY    Recommended to review admission imaging for incidental findings and document in discharge navigator: Chart reviewed, no known incidental findings noted at this time.      Discharge Plan: Anticipate discharge in >72 hrs to discharge location to be determined pending rehab evaluations.  Bray Plan: Bray to be removed. Order has been placed    Patient seen and evaluated by Critical Care today and deemed to be appropriate for transfer to Med Surg with Telemetry. Spoke to Dr. Ivey from [facility] to accept transfer. Critical care can be contacted via SecureChat with any questions or concerns. Please use the Critical Care AP Role in Secure Chat for any provider inquires until the patient is transferred out of the ICU or until tomorrow at 0600.

## 2025-02-18 NOTE — ASSESSMENT & PLAN NOTE
Home regimen: Toprol-XL 50 mg BID, HCTZ 12.5 mg daily, Imdur 30 mg daily, amlodipine 2.5 mg daily  Continue metoprolol 50 mg BID  Further GDMT as tolerated

## 2025-02-18 NOTE — ASSESSMENT & PLAN NOTE
S/p cath lab with balloon pump  CT surgery consulted, not a surgical candidate  Continue medical management

## 2025-02-18 NOTE — ASSESSMENT & PLAN NOTE
initially presented to [facility].  Was loaded with Brilinta and underwent urgent cardiac catheterization and was noted to have further progression in his left main disease along with known RCA .  An intra-aortic balloon pump was inserted postcardiac catheterization to help maintain appropriate coronary perfusion pressure and he was subsequently transferred to [facility] critical care unit for evaluation by cardiology and cardiac surgery.  He was seen and evaluated by cardiac surgery earlier this morning and noted not to be surgery candidate based on advanced age, deconditioning, gait dysfunction.

## 2025-02-18 NOTE — CONSULTS
Consultation - Geriatric Medicine   Name: Gael Ferraro 84 y.o. male I MRN: [id number]  Unit/Bed#: PPHP-312-01 I Date of Admission: 2/15/2025   Date of Service: 2/18/2025 I Hospital Day: 3   Inpatient consult to Gerontology  Consult performed by: BRITTANY Delgadillo  Consult ordered by: Milad Garber PA-C        Physician Requesting Evaluation: Susie Benson MD   Reason for Evaluation / Principal Problem: hallucinations    Assessment & Plan  Coronary artery disease involving native coronary artery of native heart with angina pectoris (HCC)  S/p cath lab with balloon pump  CT surgery consulted, not a surgical candidate  Continue medical management  Hallucinations  Reported post procedure in PACU  Now resolved  Denies prior hx of hallucinations or complications with anesthesia  At risk secondary to age, chronic use of benzodiazepines  Although benzodiazapines not recommended in the older adult, in the setting of chronic use do no recommend abrupt withdrawal, recommend continue ativan 1 mg po TID prn  Maintain delirum precautions:  Provide frequent redirection, reorientation, distraction techniques  Avoid deliriogenic medications such as tramadol, benzodiazepines, anticholinergics,  Benadryl  Treat pain, See geriatric pain protocol  Monitor for constipation and urinary retention  Encourage early and frequent moblization, OOB  Encourage Hydration/ Nutrition  Implement sleep hygiene, limit night time interuptions, group activities    At risk for delirium  Per wife and patient no prior hx of delirium hallucinations  Secondary to occurrence post op yesterday, pt now at higher risk in future  Maintain delirium precautions:  Provide frequent redirection, reorientation, distraction techniques  Avoid deliriogenic medications such as tramadol, benzodiazepines, anticholinergics,  Benadryl  Treat pain, See geriatric pain protocol  Monitor for constipation and urinary retention  Encourage early and frequent moblization,  OOB  Encourage Hydration/ Nutrition  Implement sleep hygiene, limit night time interuptions, group activities   Ambulatory dysfunction  At risk for falls secondary to age, medications, frailty   Fall precautions  PT/OT  Frailty  Clinical Frail Scale: 4- Vulnerable  Has supportive wife  Albumin 3.1, maintain protein in diet  PT/OT  Encourage mobilization, OOB  Anxiety  Chronic with chronic use of ativan 1 mg po 2-3 x day, [place]   Reports attempted taper in past with out success  Do not recommend abrupt withdrawal of benzodiazepines, recommend continue home dose of ativan 1 mg po 2-3 x day  Follow up with prescribing family doc as outpatient for follow up and evaluation      History of Present Illness   Hx and PE limited by:   HPI: Gael Ferraro is a 84 y.o. year old male who presents to [facility] with pain to his chest and jaw. He was an MI alert and underwent cardiac catheterization. He had an intra aortic balloon pump placed and was transferred to [facility].     He has CAD, myelofibrosis, hyperlipidemia, HTN,     Prior to arrival he lives at home with his wife. He is independent with IADLs and ADLs. He ambulates independently. He denies prior falls.     Wife at beside to review HPI and medications    Review of Systems   Constitutional:  Negative for unexpected weight change.   HENT:  Positive for hearing loss.    Eyes:  Negative for visual disturbance.   Respiratory:  Negative for cough.    Cardiovascular:  Negative for chest pain.   Gastrointestinal:  Negative for constipation.   Genitourinary:  Negative for difficulty urinating.   Musculoskeletal:  Negative for gait problem.   Neurological:  Negative for weakness.   Psychiatric/Behavioral:  Negative for sleep disturbance.            Historical Information   Past Medical History:   Diagnosis Date    Low hemoglobin      Past Surgical History:   Procedure Laterality Date    CARDIAC CATHETERIZATION Left 1/2/2024     Procedure: Cardiac Left Heart Cath;  Surgeon: Ana Garcia DO;  [facility];  Service: Cardiology    CARDIAC CATHETERIZATION N/A 1/2/2024    Procedure: Cardiac Coronary Angiogram;  Surgeon: Ana Garcia DO;  [facility];  Service: Cardiology    CARDIAC CATHETERIZATION N/A 1/2/2024    Procedure: Cardiac RHC;  Surgeon: Ana Garcia DO;  [facility];  Service: Cardiology    CARDIAC CATHETERIZATION N/A 2/15/2025    Procedure: Cardiac iabp;  Surgeon: Ana Garcia DO;  [facility];  Service: Cardiology    CARDIAC CATHETERIZATION N/A 2/15/2025    Procedure: Cardiac Coronary Angiogram;  Surgeon: Ana Garcia DO;  [facility];  Service: Cardiology    CARDIAC CATHETERIZATION N/A 2/17/2025    Procedure: Cardiac PCI;  Surgeon: Ana Garcia DO;  [facility];  Service: Cardiology    CARDIAC CATHETERIZATION N/A 2/17/2025    Procedure: Cardiac Impella Insertion;  Surgeon: Ana Garcia DO;  [facility];  Service: Cardiology    CARDIAC CATHETERIZATION N/A 2/17/2025    Procedure: Cardiac Impella Removal;  Surgeon: Ana Garcia DO;  [facility];  Service: Cardiology    IMPELLA VENTRICULAR ASSIST DEVICE INSERTION N/A 2/17/2025    Procedure: IMPELLA VENTRICULAR ASSIST DEVICE INSERTION;  Surgeon: Ana Garcia DO;  [facility];  Service: Cardiology    IR BIOPSY BONE MARROW  1/24/2024    REMOVAL PUMP INTRA AORTIC BALLOON  (IABP) N/A 2/17/2025    Procedure: REMOVAL PUMP INTRA AORTIC BALLOON  (IABP);  Surgeon: Ana Garcia DO;  [facility];  Service: Cardiology     Social History     Tobacco Use    Smoking status: Never    Smokeless tobacco: Never   Vaping Use    Vaping status: Never Used   Substance and Sexual Activity    Alcohol use: Not Currently    Drug use: Never    Sexual activity: Not on file      E-Cigarette/Vaping    E-Cigarette Use Never User      E-Cigarette/Vaping Substances    Nicotine No     THC No     CBD No     Flavoring No     Other No     Unknown No      Family History   Problem Relation Age of Onset    No Known Problems Mother     No Known Problems Father      Social History     Tobacco Use    Smoking status: Never    Smokeless tobacco: Never   Vaping Use    Vaping status: Never Used   Substance and Sexual Activity    Alcohol use: Not Currently    Drug use: Never    Sexual activity: Not on file       Current Facility-Administered Medications:     acetaminophen (TYLENOL) tablet 650 mg, Q6H PRN    Artificial Tears Op Soln 1 drop, TID    aspirin (ECOTRIN LOW STRENGTH) EC tablet 81 mg, Daily    atorvastatin (LIPITOR) tablet 80 mg, After Dinner    chlorhexidine (PERIDEX) 0.12 % oral rinse 15 mL, Q12H CRESENCIO    heparin (porcine) subcutaneous injection 5,000 Units, Q8H CRESENCIO    hydroCHLOROthiazide tablet 12.5 mg, Daily    ipratropium-albuterol (DUO-NEB) 0.5-2.5 mg/3 mL inhalation solution 3 mL, Once    LORazepam (ATIVAN) tablet 1 mg, TID PRN    melatonin tablet 6 mg, HS    metoprolol tartrate (LOPRESSOR) tablet 50 mg, Q12H CRESENCIO    nitroglycerin (NITROSTAT) SL tablet 0.4 mg, Q5 Min PRN    ondansetron (ZOFRAN) injection 4 mg, Q6H PRN    predniSONE tablet 5 mg, Every Other Day    ranolazine (RANEXA) 12 hr tablet 1,000 mg, Q12H CRESENCIO    senna-docusate sodium (SENOKOT S) 8.6-50 mg per tablet 1 tablet, BID    ticagrelor (BRILINTA) tablet 90 mg, Q12H CRESENCIO  Hydrocodone-acetaminophen, Niacin, and Penicillins    Meds/Allergies   Home medication review  [facility] pharmacy  Imdur 30 mg po daily, last refill 2/11/25  Ranexa ER 1000 mg po Q12, last refill 2/10/25  Prednisone 10 mg po   Crestor 10 mg po daily, last refill 12/18/24 # 90 days  HTCZ 12.5 mg po daily, last refill 12/28/24  Metoprolol XL 25 mg po Q12, last refill 2/6/24  Amlodipine 2.5 mg po daily, last refill 2/10/25 # 90 days  Asa 81 mg po daily, last refill 1/3/24  # 30 days  Ativan 1 mg po TID prn, last refill 2/2/25 # 90 tablets      2/02/2025 12/05/2024 1 Lorazepam 1 Mg Tablet 90.00 30 An Pella [id number] Pen ([id number]) 0 3.00 LME Medicare [place]   01/04/2025 12/05/2024 1 Lorazepam 1 Mg Tablet 90.00 30 An Pella [id number] Pen ([id number]) 0 3.00 LME Medicare PA   12/05/2024 12/05/2024 1 Lorazepam 1 Mg Tablet 90.00 30 An Pella [id number] Pen ([id number]) 0 3.00 LME Medicare PA   11/05/2024 09/03/2024 1 Lorazepam 1 Mg Tablet 90.00 30 An Wilian [id number] Pen ([id number]) 0 3.00 LME Medicare [place]   10/03/2024 09/03/2024 1 Lorazepam 1 Mg Tablet 90.00 30 An Wilian [id number] Pen ([id number]) 0 3.00 LME Medicare PA         Objective :  Temp:  [97.8 °F (36.6 °C)-98.6 °F (37 °C)] 98.6 °F (37 °C)  HR:  [] 91  BP: ()/(52-63) 111/56  Resp:  [16-42] 30  SpO2:  [93 %-99 %] 95 %  O2 Device: Mid flow nasal cannula  Nasal Cannula O2 Flow Rate (L/min):  [8 L/min-10 L/min] 10 L/min    Physical Exam  Vitals and nursing note reviewed.   HENT:      Head: Normocephalic.      Nose: No congestion.      Mouth/Throat:      Mouth: Mucous membranes are moist.   Eyes:      General:         Right eye: No discharge.         Left eye: No discharge.   Cardiovascular:      Rate and Rhythm: Normal rate and regular rhythm.      Pulses: Normal pulses.   Pulmonary:      Effort: Pulmonary effort is normal.      Breath sounds: Normal breath sounds.   Abdominal:      General: Bowel sounds are normal.      Palpations: Abdomen is soft.   Musculoskeletal:         General: Normal range of motion.      Cervical back: Normal range of motion.   Skin:     General: Skin is warm and dry.   Neurological:      Mental Status: He is alert and oriented to person, place, and time. Mental status is at baseline.   Psychiatric:         Mood and Affect: Mood normal.           Lab Results: I have reviewed the following results:CBC/BMP:   .     02/18/25  0535   WBC 7.76   HGB 9.6*   HCT 28.0*      SODIUM 136   K 4.0      CO2 24   BUN 22   CREATININE 1.28   GLUC 132         Imaging Results Review: I reviewed radiology reports from this admission including: procedure reports.  Other Study Results Review: EKG was reviewed.     Therapies:   Basic Mobility Inpatient Raw Score: 10  -[facility] Goal: 4: Move to chair/commode  -[facility] Achieved: 2: Bed activities/Dependent transfer    VTE Prophylaxis: Sequential compression device (Venodyne)     Code Status: Level 2 - DNAR: but accepts endotracheal intubation    Family and Social Support:   No data recorded      I have spent a total time of 90 minutes in caring for this patient on the day of the visit/encounter including Diagnostic results, Prognosis, Risks and benefits of tx options, Instructions for management, Patient and family education, Importance of tx compliance, Risk factor reductions, Impressions, Counseling / Coordination of care, Documenting in the medical record, Reviewing/placing orders in the medical record (including tests, medications, and/or procedures), Obtaining or reviewing history  , and Communicating with other healthcare professionals .

## 2025-02-18 NOTE — RESPIRATORY THERAPY NOTE
RT Protocol Note  Gael Ferraro 84 y.o. male MRN: [id number]  Unit/Bed#: PPHP-312-01 Encounter: [id number]    Assessment    Principal Problem:    Coronary artery disease involving native coronary artery of native heart with angina pectoris (HCC)  Active Problems:    Hypertension    CKD (chronic kidney disease) stage 3, GFR 30-59 ml/min (HCC)    Pure red cell aplasia (HCC)    Hypercholesterolemia    Hallucinations    At risk for delirium    Ambulatory dysfunction    Frailty    Anxiety      Home Pulmonary Medications:  none       Past Medical History:   Diagnosis Date    Low hemoglobin      Social History     Socioeconomic History    Marital status: /Civil Union     Spouse name: Not on file    Number of children: Not on file    Years of education: Not on file    Highest education level: Not on file   Occupational History    Not on file   Tobacco Use    Smoking status: Never    Smokeless tobacco: Never   Vaping Use    Vaping status: Never Used   Substance and Sexual Activity    Alcohol use: Not Currently    Drug use: Never    Sexual activity: Not on file   Other Topics Concern    Not on file   Social History Narrative    Not on file     Social Drivers of Health     Financial Resource Strain: Not on file   Food Insecurity: No Food Insecurity (2/6/2024)    Nursing - Inadequate Food Risk Classification     Worried About Running Out of Food in the Last Year: Never true     Ran Out of Food in the Last Year: Never true     Ran Out of Food in the Last Year: Not on file   Transportation Needs: No Transportation Needs (2/6/2024)    PRAPARE - Transportation     Lack of Transportation (Medical): No     Lack of Transportation (Non-Medical): No   Physical Activity: Not on file   Stress: Not on file   Social Connections: Not on file   Intimate Partner Violence: Not on file   Housing Stability: Unknown (2/6/2024)    Housing Stability Vital Sign     Unable to Pay for Housing in the Last Year: No     Number of Times Moved in  "the Last Year: Not on file     Homeless in the Last Year: No       Subjective         Objective    Physical Exam:   Assessment Type: Assess only  General Appearance: Awake, Alert  Respiratory Pattern: Spontaneous, Normal  Chest Assessment: Chest expansion symmetrical  Bilateral Breath Sounds: Diminished    Vitals:  Blood pressure 118/62, pulse 83, temperature 98.4 °F (36.9 °C), resp. rate 20, height 5' 10\" (1.778 m), weight 67.2 kg (148 lb 2.4 oz), SpO2 96%.          Imaging and other studies:           Plan    Respiratory Plan: Discontinue Protocol, No distress/Pulmonary history        Resp Comments: (P) pt assessed at this time for respiratory protocol. pt c/o congestion.  pt has no pulmonary hx and does not take pulmonary medications at home. will dc duoneb and respiratory protocol at this time.   "

## 2025-02-18 NOTE — ASSESSMENT & PLAN NOTE
Lab Results   Component Value Date    EGFR 50 02/18/2025    EGFR 52 02/17/2025    EGFR 61 02/16/2025    CREATININE 1.28 02/18/2025    CREATININE 1.24 02/17/2025    CREATININE 1.08 02/16/2025

## 2025-02-18 NOTE — ASSESSMENT & PLAN NOTE
Clinical Frail Scale: 4- Vulnerable  Has supportive wife  Albumin 3.1, maintain protein in diet  PT/OT  Encourage mobilization, OOB

## 2025-02-18 NOTE — SEPSIS NOTE
Sepsis Note   Gael Ferraro 84 y.o. male MRN: [id number]  Unit/Bed#: [facility]-312-01 Encounter: [id number]       Initial Sepsis Screening       Row Name 02/18/25 0033                Is the patient's history suggestive of a new or worsening infection? No  -RM                  User Key  (r) = Recorded By, (t) = Taken By, (c) = Cosigned By      Initials Name Provider Type    RM Toni Brooke PA-C Physician Assistant                        Body mass index is 21.61 kg/m².  Wt Readings from Last 1 Encounters:   02/17/25 68.3 kg (150 lb 9.2 oz)        Ideal body weight: 73 kg (160 lb 15 oz)

## 2025-02-18 NOTE — ASSESSMENT & PLAN NOTE
Presented to [facility] with chest pain, sob, jaw pain. STEMI alert called, patient brought to [facility] cath lab for balloon pump and transfer to [facility]. Initial troponin 1964  2/15 Echo: Ef 43%, mild global hypokinesis, grade 2 diastolic dysfunction   Cardiac surgery deemed him not surgical candidate   2/17: Impella assisted PCI, Ost LM with BRYAN x1   Plan  Cardiology Following  Aspirin/atorvastatin/metoprolol 50 mg BID   Continue renexa 1000 mg BID

## 2025-02-18 NOTE — ASSESSMENT & PLAN NOTE
Lab Results   Component Value Date    EGFR 50 02/18/2025    EGFR 52 02/17/2025    EGFR 61 02/16/2025    CREATININE 1.28 02/18/2025    CREATININE 1.24 02/17/2025    CREATININE 1.08 02/16/2025   Appears to be at baseline renal function, will need to monitor at this since patient will likely require another cardiac catheterization early next week.

## 2025-02-18 NOTE — ASSESSMENT & PLAN NOTE
Chronic with chronic use of ativan 1 mg po 2-3 x day, consistent refills in  [place]   Reports attempted taper in past with out success  Do not recommend abrupt withdrawal of benzodiazepines, recommend continue home dose of ativan 1 mg po 2-3 x day  Follow up with prescribing family doc as outpatient for follow up and evaluation

## 2025-02-18 NOTE — ASSESSMENT & PLAN NOTE
Per wife and patient no prior hx of delirium hallucinations  Secondary to occurrence post op yesterday, pt now at higher risk in future  Maintain delirium precautions:  Provide frequent redirection, reorientation, distraction techniques  Avoid deliriogenic medications such as tramadol, benzodiazepines, anticholinergics,  Benadryl  Treat pain, See geriatric pain protocol  Monitor for constipation and urinary retention  Encourage early and frequent moblization, OOB  Encourage Hydration/ Nutrition  Implement sleep hygiene, limit night time interuptions, group activities

## 2025-02-18 NOTE — PROGRESS NOTES
Cardiology Team 2 - Progress Note   Gael Ferraro 84 y.o. male MRN: [id number]  Unit/Bed#: [facility]PHP-312-01 Encounter: [id number]    Assessment and Plan      84-year-old male with known history of obstructive coronary artery disease and known RCA  and ostial to proximal 50% left main disease based on prior cardiac catheterization.  Also has history of other cardiac catheterization with PCI about 20 years ago.  Patient presented with exertional angina going on for about a month with increase in symptom intensity.  As a result of worsening symptoms, he initially presented to [facility].  Was loaded with Brilinta and underwent urgent cardiac catheterization and was noted to have further progression in his left main disease along with known RCA .  An intra-aortic balloon pump was inserted postcardiac catheterization to help maintain appropriate coronary perfusion pressure and he was subsequently transferred to [facility] critical care unit for evaluation by cardiology and cardiac surgery.  He was seen and evaluated by cardiac surgery earlier this morning and noted not to be surgery candidate based on advanced age, deconditioning, gait dysfunction.  Was recommended for high risk PCI with cardiology team.  Cardiology team following from this standpoint.     Past 24 hours: Given one time dose of IV lasix 40 mg yesterday around 5pm.  Relevant imaging from past 24 hours: No new CXR or EKG from this morning.  Labs Vitals (past 24 hours) I/Os (past 24 hours) Weights   Recent Labs     02/17/25  0457 02/18/25  0535   CREATININE 1.24 1.28   EGFR 52 50   CO2 28 24   K 3.9 4.0   MG 1.9  --    SODIUM 133* 136   BUN 18 22   HGB 9.5* 9.6*    195    Temp:  [97.8 °F (36.6 °C)-98.6 °F (37 °C)] 98.6 °F (37 °C)  HR:  [] 91  BP: ()/(52-63) 111/56  Resp:  [16-42] 30  SpO2:  [93 %-99 %] 95 %  O2 Device: Mid flow nasal cannula  Nasal Cannula O2 Flow Rate (L/min):  [8 L/min-10 L/min] 10 L/min    Intake/Output Summary (Last 24 hours) at 2/18/2025 0859  Last data filed at 2/18/2025 0500  Gross per 24 hour   Intake 1941.24 ml   Output 2600 ml   Net -658.76 ml    Weight (last 2 days)       Date/Time Weight    02/18/25 0600 67.2 (148.15)    02/17/25 0600 68.3 (150.57)    02/16/25 0600 68.9 (151.9)             Plan  -Continue on DAPT with aspirin and brilinta, high intensity statin  -Will price check brilinta today  -On rate control with metoprolol tartrate 50 mg twice daily  -On Ranexa 1000 mg every 12 hours for antianginal therapy  -For antihypertensive therapy, on HCTZ 12.5 mg daily  -Systolics in , too low to be able to initiate GDMT. Would discontinue HCTZ and see if low dose losartan can be started.  -Continue telemetry monitoring. Monitor and correct electrolyte derangements.  -Cardiology service will continue to follow.     Assessment & Plan  Coronary artery disease involving native coronary artery of native heart with angina pectoris (HCC)  initially presented to [facility].  Was loaded with Brilinta and underwent urgent cardiac catheterization and was noted to have further progression in his left main disease along with known RCA .  An intra-aortic balloon pump was inserted postcardiac catheterization to help maintain appropriate coronary perfusion pressure and he was subsequently transferred to [facility] critical care unit for evaluation by cardiology and cardiac surgery.  He was seen and evaluated by cardiac surgery earlier this morning and noted not to be surgery candidate based on advanced age, deconditioning, gait dysfunction.    Hypertension  Systolics in the 140-150 mmHg range, patient is currently on balloon pump support.  Will hold off on initiation of antihypertensive therapy, can use room in blood pressures for uptitrating beta-blockade   CKD (chronic kidney disease) stage 3, GFR 30-59 ml/min ([facility])  Lab Results   Component Value Date    EGFR 50 02/18/2025     EGFR 52 02/17/2025    EGFR 61 02/16/2025    CREATININE 1.28 02/18/2025    CREATININE 1.24 02/17/2025    CREATININE 1.08 02/16/2025   Appears to be at baseline renal function, will need to monitor at this since patient will likely require another cardiac catheterization early next week.   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 Nayak. Thank you for involving us in the care of this patient.    Subjective     Patient seen and examined.      Objective     Physical Exam  Vitals reviewed.   Cardiovascular:      Rate and Rhythm: Normal rate and regular rhythm.      Pulses: Normal pulses.      Heart sounds: Normal heart sounds.      Comments: No edema in lower extremities.  Pulmonary:      Effort: Pulmonary effort is normal.      Breath sounds: Normal breath sounds.   Skin:     General: Skin is warm.      Capillary Refill: Capillary refill takes less than 2 seconds.   Neurological:      Mental Status: He is alert.         Vitals:  Temp:  [97.8 °F (36.6 °C)-98.6 °F (37 °C)] 98.6 °F (37 °C)  HR:  [] 91  BP: ()/(52-63) 111/56  Resp:  [16-42] 30  SpO2:  [93 %-99 %] 95 %  O2 Device: Mid flow nasal cannula  Nasal Cannula O2 Flow Rate (L/min):  [8 L/min-10 L/min] 10 L/min  Orthostatic Blood Pressures      Flowsheet Row Most Recent Value   Blood Pressure 111/56 filed at 02/18/2025 0700   Patient Position - Orthostatic VS Lying filed at 02/17/2025 2100            Cardiac Devices, Imaging & Monitoring     Telemetry:   Personally reviewed by Vani Orr MD: Sinus rhythm on telemetry    Results for orders placed during the hospital encounter of 02/15/25    Echo complete w/ contrast if indicated    Interpretation Summary    Left Ventricle: Left ventricular cavity size is normal. Wall thickness is mildly increased. There is concentric  remodeling. The left ventricular ejection fraction is 43% by biplane measurement. Systolic function is mildly reduced. There is mild global hypokinesis. Diastolic function is moderately abnormal, consistent with grade II (pseudonormal) relaxation.    The following segments are akinetic: apical anterior, apical septal and apex.    The following segments are mildly hypokinetic: basal anterior, basal anteroseptal, basal inferoseptal, basal inferior, basal inferolateral, basal anterolateral, mid anterior, mid anteroseptal, mid inferoseptal, mid inferior, mid inferolateral, mid anterolateral, apical inferior and apical lateral.    Mitral Valve: There is mild regurgitation with a posteriorly directed jet.    Prior TTE study available for comparison. Prior study date: 1/1/2024. Changes noted when compared to prior study. Changes include: LVEF now mildly reduced, with regional wall motion abnormalities. .    Vain Orr MD  Cardiovascular Disease Fellow, FY-1

## 2025-02-19 ENCOUNTER — APPOINTMENT (INPATIENT)
Dept: RADIOLOGY | Facility: HOSPITAL | Age: 85
DRG: 220 | End: 2025-02-19
Payer: COMMERCIAL

## 2025-02-19 DIAGNOSIS — I21.3 ST ELEVATION MYOCARDIAL INFARCTION (STEMI), UNSPECIFIED ARTERY (HCC): Primary | ICD-10-CM

## 2025-02-19 DIAGNOSIS — I21.3 ST ELEVATION MYOCARDIAL INFARCTION (STEMI), UNSPECIFIED ARTERY (HCC): ICD-10-CM

## 2025-02-19 LAB
ALBUMIN SERPL BCG-MCNC: 3.4 G/DL (ref 3.5–5)
ALP SERPL-CCNC: 123 U/L (ref 34–104)
ALT SERPL W P-5'-P-CCNC: 47 U/L (ref 7–52)
ANION GAP SERPL CALCULATED.3IONS-SCNC: 11 MMOL/L (ref 4–13)
AST SERPL W P-5'-P-CCNC: 80 U/L (ref 13–39)
BASOPHILS # BLD AUTO: 0.03 THOUSANDS/ÂΜL (ref 0–0.1)
BASOPHILS NFR BLD AUTO: 0 % (ref 0–1)
BILIRUB SERPL-MCNC: 1.29 MG/DL (ref 0.2–1)
BUN SERPL-MCNC: 27 MG/DL (ref 5–25)
CALCIUM ALBUM COR SERPL-MCNC: 9.4 MG/DL (ref 8.3–10.1)
CALCIUM SERPL-MCNC: 8.9 MG/DL (ref 8.4–10.2)
CHLORIDE SERPL-SCNC: 97 MMOL/L (ref 96–108)
CO2 SERPL-SCNC: 27 MMOL/L (ref 21–32)
CREAT SERPL-MCNC: 1.37 MG/DL (ref 0.6–1.3)
EOSINOPHIL # BLD AUTO: 0.21 THOUSAND/ÂΜL (ref 0–0.61)
EOSINOPHIL NFR BLD AUTO: 2 % (ref 0–6)
ERYTHROCYTE [DISTWIDTH] IN BLOOD BY AUTOMATED COUNT: 14.2 % (ref 11.6–15.1)
GFR SERPL CREATININE-BSD FRML MDRD: 46 ML/MIN/1.73SQ M
GLUCOSE SERPL-MCNC: 144 MG/DL (ref 65–140)
HCT VFR BLD AUTO: 29.3 % (ref 36.5–49.3)
HGB BLD-MCNC: 10.1 G/DL (ref 12–17)
IMM GRANULOCYTES # BLD AUTO: 0.03 THOUSAND/UL (ref 0–0.2)
IMM GRANULOCYTES NFR BLD AUTO: 0 % (ref 0–2)
KCT BLD-ACNC: 262 SEC (ref 89–137)
KCT BLD-ACNC: 268 SEC (ref 89–137)
KCT BLD-ACNC: 274 SEC (ref 89–137)
KCT BLD-ACNC: 331 SEC (ref 89–137)
KCT BLD-ACNC: 331 SEC (ref 89–137)
KCT BLD-ACNC: 520 SEC (ref 89–137)
LYMPHOCYTES # BLD AUTO: 2.73 THOUSANDS/ÂΜL (ref 0.6–4.47)
LYMPHOCYTES NFR BLD AUTO: 31 % (ref 14–44)
MAGNESIUM SERPL-MCNC: 2 MG/DL (ref 1.9–2.7)
MCH RBC QN AUTO: 37.5 PG (ref 26.8–34.3)
MCHC RBC AUTO-ENTMCNC: 34.5 G/DL (ref 31.4–37.4)
MCV RBC AUTO: 109 FL (ref 82–98)
MONOCYTES # BLD AUTO: 0.94 THOUSAND/ÂΜL (ref 0.17–1.22)
MONOCYTES NFR BLD AUTO: 11 % (ref 4–12)
NEUTROPHILS # BLD AUTO: 5 THOUSANDS/ÂΜL (ref 1.85–7.62)
NEUTS SEG NFR BLD AUTO: 56 % (ref 43–75)
NRBC BLD AUTO-RTO: 0 /100 WBCS
PLATELET # BLD AUTO: 259 THOUSANDS/UL (ref 149–390)
PMV BLD AUTO: 10.8 FL (ref 8.9–12.7)
POTASSIUM SERPL-SCNC: 3.4 MMOL/L (ref 3.5–5.3)
PROT SERPL-MCNC: 5.8 G/DL (ref 6.4–8.4)
RBC # BLD AUTO: 2.69 MILLION/UL (ref 3.88–5.62)
SODIUM SERPL-SCNC: 135 MMOL/L (ref 135–147)
SPECIMEN SOURCE: ABNORMAL
WBC # BLD AUTO: 8.94 THOUSAND/UL (ref 4.31–10.16)

## 2025-02-19 PROCEDURE — 85025 COMPLETE CBC W/AUTO DIFF WBC: CPT | Performed by: NURSE PRACTITIONER

## 2025-02-19 PROCEDURE — 71046 X-RAY EXAM CHEST 2 VIEWS: CPT

## 2025-02-19 PROCEDURE — 99232 SBSQ HOSP IP/OBS MODERATE 35: CPT | Performed by: INTERNAL MEDICINE

## 2025-02-19 PROCEDURE — 83735 ASSAY OF MAGNESIUM: CPT | Performed by: NURSE PRACTITIONER

## 2025-02-19 PROCEDURE — 97167 OT EVAL HIGH COMPLEX 60 MIN: CPT

## 2025-02-19 PROCEDURE — 94760 N-INVAS EAR/PLS OXIMETRY 1: CPT

## 2025-02-19 PROCEDURE — 97163 PT EVAL HIGH COMPLEX 45 MIN: CPT

## 2025-02-19 PROCEDURE — 99232 SBSQ HOSP IP/OBS MODERATE 35: CPT | Performed by: STUDENT IN AN ORGANIZED HEALTH CARE EDUCATION/TRAINING PROGRAM

## 2025-02-19 PROCEDURE — 80053 COMPREHEN METABOLIC PANEL: CPT | Performed by: NURSE PRACTITIONER

## 2025-02-19 RX ORDER — POTASSIUM CHLORIDE 1500 MG/1
40 TABLET, EXTENDED RELEASE ORAL ONCE
Status: COMPLETED | OUTPATIENT
Start: 2025-02-19 | End: 2025-02-19

## 2025-02-19 RX ORDER — FUROSEMIDE 10 MG/ML
20 INJECTION INTRAMUSCULAR; INTRAVENOUS ONCE
Status: COMPLETED | OUTPATIENT
Start: 2025-02-19 | End: 2025-02-19

## 2025-02-19 RX ORDER — BISACODYL 10 MG
10 SUPPOSITORY, RECTAL RECTAL ONCE
Status: COMPLETED | OUTPATIENT
Start: 2025-02-19 | End: 2025-02-19

## 2025-02-19 RX ORDER — METOPROLOL TARTRATE 50 MG
50 TABLET ORAL EVERY 12 HOURS SCHEDULED
Status: DISCONTINUED | OUTPATIENT
Start: 2025-02-19 | End: 2025-02-22

## 2025-02-19 RX ADMIN — ASPIRIN 81 MG: 81 TABLET, COATED ORAL at 09:21

## 2025-02-19 RX ADMIN — BISACODYL 10 MG: 10 SUPPOSITORY RECTAL at 09:25

## 2025-02-19 RX ADMIN — DEXTRAN 70, GLYCERIN, HYPROMELLOSE 1 DROP: 1; 2; 3 SOLUTION/ DROPS OPHTHALMIC at 22:21

## 2025-02-19 RX ADMIN — SENNOSIDES AND DOCUSATE SODIUM 1 TABLET: 50; 8.6 TABLET ORAL at 17:18

## 2025-02-19 RX ADMIN — PREDNISONE 5 MG: 5 TABLET ORAL at 09:21

## 2025-02-19 RX ADMIN — FUROSEMIDE 20 MG: 10 INJECTION, SOLUTION INTRAMUSCULAR; INTRAVENOUS at 04:12

## 2025-02-19 RX ADMIN — SENNOSIDES AND DOCUSATE SODIUM 1 TABLET: 50; 8.6 TABLET ORAL at 09:21

## 2025-02-19 RX ADMIN — TICAGRELOR 90 MG: 90 TABLET ORAL at 22:27

## 2025-02-19 RX ADMIN — HEPARIN SODIUM 5000 UNITS: 5000 INJECTION INTRAVENOUS; SUBCUTANEOUS at 22:49

## 2025-02-19 RX ADMIN — ATORVASTATIN CALCIUM 80 MG: 80 TABLET, FILM COATED ORAL at 17:18

## 2025-02-19 RX ADMIN — TICAGRELOR 90 MG: 90 TABLET ORAL at 09:21

## 2025-02-19 RX ADMIN — POTASSIUM CHLORIDE 40 MEQ: 1500 TABLET, EXTENDED RELEASE ORAL at 18:18

## 2025-02-19 RX ADMIN — Medication 6 MG: at 22:22

## 2025-02-19 RX ADMIN — METOPROLOL TARTRATE 50 MG: 50 TABLET, FILM COATED ORAL at 09:25

## 2025-02-19 RX ADMIN — RANOLAZINE 1000 MG: 500 TABLET, FILM COATED, EXTENDED RELEASE ORAL at 09:21

## 2025-02-19 RX ADMIN — METOPROLOL TARTRATE 50 MG: 50 TABLET, FILM COATED ORAL at 22:22

## 2025-02-19 RX ADMIN — CHLORHEXIDINE GLUCONATE 0.12% ORAL RINSE 15 ML: 1.2 LIQUID ORAL at 09:21

## 2025-02-19 RX ADMIN — CHLORHEXIDINE GLUCONATE 0.12% ORAL RINSE 15 ML: 1.2 LIQUID ORAL at 22:21

## 2025-02-19 RX ADMIN — HEPARIN SODIUM 5000 UNITS: 5000 INJECTION INTRAVENOUS; SUBCUTANEOUS at 05:40

## 2025-02-19 RX ADMIN — DEXTRAN 70, GLYCERIN, HYPROMELLOSE 1 DROP: 1; 2; 3 SOLUTION/ DROPS OPHTHALMIC at 09:25

## 2025-02-19 RX ADMIN — RANOLAZINE 1000 MG: 500 TABLET, FILM COATED, EXTENDED RELEASE ORAL at 22:22

## 2025-02-19 RX ADMIN — HEPARIN SODIUM 5000 UNITS: 5000 INJECTION INTRAVENOUS; SUBCUTANEOUS at 13:30

## 2025-02-19 RX ADMIN — DEXTRAN 70, GLYCERIN, HYPROMELLOSE 1 DROP: 1; 2; 3 SOLUTION/ DROPS OPHTHALMIC at 17:18

## 2025-02-19 NOTE — QUICK NOTE
Pt new to [facility]. Reviewed chart. Presenting to [facility] /am with chest pain having started night prior to admission, for which he took a nitro with improvement.   Pt then had recurrent chest pain 2 am in the am 2/17. Pt was admitted underwent cardiac cath for MI alert.   Intra-aortic balloon was placed and pt was transferred to [facility].   Underwent PCI on 2/17  Slow initiation of GDMT medications   Primary issue as been delirium  Pt required Precedex gtt in addition to 1:1 due to agitation  Post PCI pt was resumed on his home medications Ativan which pt takes at least TID.   Pt discharged from  midmorning with documented improved mental status.   Family does note pt with hx of sundowning and agitation at night recently reported to staff.   Although geriatrics report wife states not prior hx of delirium hallucinations   He is also noted to be on tapering dose of steroid per heme onc for red cell aplasia now on 5 mg every other day   Bray was removed will chk bladder scan   Labs ordered for am   Up to see the pt . He was arousable, noting in the hospital. A little annoyed woken up , vitals stable with pt on 5 liters satting 95% denying any shortness of breath. He does have moist cough.   Discussed with RN

## 2025-02-19 NOTE — UTILIZATION REVIEW
Continued Stay Review    Date: 02/19                          Current Patient Class: Inpatient  Current Level of Care: MS    HPI:84 y.o. male initially admitted on  02/15  Current Diagnosis: CAD w/ angina pectoris    Assessment/Plan: Pt was given a one-time dose of IV Lasix 40 mg yesterday. He was doing well out of bed ambulating in the halls. He c/o lower abd pain d/t no BM in over a week- agreeable to try suppository. Creatinine slightly elevated this morning losartan on hold. SBP readings around 100-110 so can hold off initiating further antihypertensive therapy at this time. Continue current dose of metoprolol will transition to succinate prior to discharge. Continue dual antiplatelet therapy, high intensity statin. Check chest x-ray for vascular congestion. Cont tele. Mon and correct electrolytes. Cont prednisone taper. Bray was removed will chk bladder scan. Check labs in am.         Medications:   Scheduled Medications:  Artificial Tears, 1 drop, Both Eyes, TID  aspirin, 81 mg, Oral, Daily  atorvastatin, 80 mg, Oral, After Dinner  chlorhexidine, 15 mL, Mouth/Throat, Q12H CRESENCIO  heparin (porcine), 5,000 Units, Subcutaneous, Q8H CRESENCIO  [Held by provider] losartan, 12.5 mg, Oral, Daily  melatonin, 6 mg, Oral, HS  metoprolol tartrate, 50 mg, Oral, Q12H CRESENCIO  predniSONE, 5 mg, Oral, Every Other Day  ranolazine, 1,000 mg, Oral, Q12H CRESENCIO  senna-docusate sodium, 1 tablet, Oral, BID  ticagrelor, 90 mg, Oral, Q12H CRESENCOI      Continuous IV Infusions: none     PRN Meds:  acetaminophen, 650 mg, Oral, Q6H PRN  LORazepam, 1 mg, Oral, TID PRN  nitroglycerin, 0.4 mg, Sublingual, Q5 Min PRN  ondansetron, 4 mg, Intravenous, Q6H PRN      Discharge Plan: TBD    Vital Signs (last 3 days)       Date/Time Temp Pulse Resp BP MAP (mmHg) SpO2 Calculated FIO2 (%) - Nasal Cannula O2 Flow Rate (L/min) Nasal Cannula O2 Flow Rate (L/min) O2 Device Patient Position - Orthostatic VS Ni Coma Scale Score Pain    02/19/25 1508 -- -- -- -- -- --  -- -- -- -- Lying -- --    02/19/25 15:05:59 98.4 °F (36.9 °C) 87 16 114/45 68 93 % 32 -- 3 L/min Nasal cannula -- -- --    02/19/25 10:45:31 98 °F (36.7 °C) 70 16 111/63 80 95 % -- -- -- Nasal cannula Sitting -- --    02/19/25 09:24:16 -- 86 -- 108/61 77 94 % -- -- -- -- -- -- --    02/19/25 0913 -- -- -- -- -- -- -- -- -- -- -- -- No Pain    02/19/25 0912 -- -- -- -- -- -- -- -- -- -- -- -- 6    02/19/25 08:28:18 98.2 °F (36.8 °C) 89 17 102/60 74 93 % -- -- -- -- Lying -- --    02/19/25 0800 -- -- -- -- -- -- -- -- -- -- -- 15 No Pain    02/19/25 07:10:54 98.2 °F (36.8 °C) 87 16 -- -- 92 % -- -- -- -- Lying -- --    02/19/25 0410 -- -- -- -- -- 94 % 44 -- 6 L/min Nasal cannula -- -- --    02/19/25 04:08:51 -- 84 -- 107/54 72 91 % -- -- -- -- -- -- --    02/19/25 02:36:59 98.7 °F (37.1 °C) 83 20 107/53 71 95 % -- -- -- -- Lying -- --    02/19/25 02:35:56 98.7 °F (37.1 °C) -- -- 107/53 71 -- -- -- -- -- -- -- --    02/19/25 0100 -- -- 44 -- -- -- -- -- -- -- -- -- --    02/19/25 0053 98.5 °F (36.9 °C) 81 -- 104/55 -- 95 % -- -- -- -- -- -- --    02/18/25 2235 -- 100 -- 144/63 90 96 % -- -- -- -- -- -- --    02/18/25 2000 98.4 °F (36.9 °C) 94 35 -- -- 94 % 44 -- 6 L/min Nasal cannula -- 15 No Pain    02/18/25 1922 -- -- -- -- -- -- 44 6 L/min 6 L/min Nasal cannula -- -- --    02/18/25 1850 99.8 °F (37.7 °C) -- 18 -- -- -- -- -- -- -- -- -- --    02/18/25 1600 98.6 °F (37 °C) -- -- 112/72 76 -- -- 6 L/min -- Nasal cannula Lying -- --    02/18/25 1359 -- -- -- -- -- -- -- 6 L/min -- Mid flow nasal cannula -- -- --    02/18/25 1300 -- 80 28 118/56 80 95 % -- -- -- -- -- -- --    02/18/25 1200 -- 80 27 113/56 80 95 % -- -- -- -- -- -- --    02/18/25 1106 98.4 °F (36.9 °C) -- 20 -- -- -- -- -- -- -- -- -- --    02/18/25 1100 -- 80 23 117/58 84 95 % -- -- -- -- -- -- --    02/18/25 1000 -- 83 25 118/62 83 96 % -- -- -- -- -- -- --    02/18/25 0904 -- 86 -- 124/58 -- -- -- -- -- -- -- -- --    02/18/25 0900 -- 86 29 124/55  84 95 % -- -- -- -- -- -- --    02/18/25 0810 -- -- -- -- -- -- -- 9 L/min -- Mid flow nasal cannula -- -- --    02/18/25 0800 -- -- -- -- -- -- -- -- -- -- -- 15 No Pain    02/18/25 0700 -- 91 30 111/56 80 95 % -- -- -- -- -- -- --    02/18/25 0500 -- 89 30 109/53 74 95 % -- -- -- -- -- -- --    02/18/25 0400 98.6 °F (37 °C) 88 42 110/57 80 93 % -- -- -- -- -- 15 --    02/18/25 0300 -- 87 36 96/52 73 93 % -- -- -- -- -- -- --    02/18/25 0200 -- 86 40 102/52 74 93 % -- -- -- -- -- -- --    02/18/25 0125 -- -- -- -- -- -- 60 10 L/min 10 L/min Mid flow nasal cannula -- -- --    02/18/25 0100 -- 85 26 98/52 71 95 % -- -- -- -- -- -- --    02/18/25 0000 98.5 °F (36.9 °C) 85 25 89/52 67 99 % -- 12 L/min -- Mid flow nasal cannula -- 15 --    02/17/25 2300 -- 87 25 91/54 71 98 % -- 10 L/min -- Mid flow nasal cannula -- -- --    02/17/25 2200 -- 100 21 108/54 77 98 % -- -- -- Mid flow nasal cannula -- -- --    02/17/25 2100 -- 109 24 123/57 82 98 % 52 -- 8 L/min Mid flow nasal cannula Lying -- --    02/17/25 2010 -- -- -- -- -- -- 52 -- 8 L/min Mid flow nasal cannula -- -- --    02/17/25 2000 98.3 °F (36.8 °C) 106 28 123/60 86 96 % 60 -- 10 L/min Mid flow nasal cannula -- 15 No Pain    02/17/25 1900 -- 109 34 141/63 91 97 % -- -- -- -- -- -- --    02/17/25 1800 -- 102 20 139/60 87 96 % -- -- -- -- -- -- --    02/17/25 1700 -- 97 16 121/58 83 96 % -- -- -- -- -- -- --    02/17/25 1600 97.8 °F (36.6 °C) 94 18 117/58 84 94 % 60 -- 10 L/min Mid flow nasal cannula -- 15 No Pain    02/17/25 1500 -- 91 19 116/57 82 95 % -- -- -- -- -- -- No Pain    02/17/25 1400 -- 83 20 118/57 82 97 % 60 -- 10 L/min Mid flow nasal cannula -- -- No Pain    02/17/25 1330 -- 83 20 112/59 80 95 % -- -- -- -- -- 14 --    02/17/25 08:41:31 -- -- -- -- -- -- -- -- -- -- -- -- No Pain    02/17/25 08:40:37 -- -- -- -- -- -- 40 -- 5 L/min Nasal cannula -- -- --    02/17/25 0800 98.9 °F (37.2 °C) 79 35 143/85 106 95 % 36 -- 4 L/min Nasal cannula Lying 14  --    02/17/25 0756 -- 79 -- 137/80 -- -- -- -- -- -- -- -- --    02/17/25 0700 -- 72 28 134/62 89 94 % -- -- -- -- -- -- --    02/17/25 0600 -- 74 31 118/65 86 91 % -- -- -- -- -- -- --    02/17/25 0400 -- 75 38 127/84 97 93 % -- -- -- -- -- 15 No Pain    02/17/25 0300 -- 78 34 125/64 89 92 % -- -- -- -- -- -- --    02/17/25 0200 -- 76 28 136/73 97 94 % -- -- -- -- -- -- --    02/17/25 0100 -- 80 35 143/86 103 95 % 36 -- 4 L/min Nasal cannula -- -- --    02/17/25 0000 -- 74 22 119/74 90 94 % -- -- -- -- -- 15 --    02/16/25 2300 -- 78 22 134/85 101 94 % -- -- -- -- -- -- --    02/16/25 2200 -- 85 31 145/76 104 93 % -- -- -- -- -- -- --    02/16/25 2100 98 °F (36.7 °C) 90 34 105/65 79 95 % -- -- -- -- -- -- --    02/16/25 2038 -- 93 -- 112/57 -- -- -- -- -- -- -- -- --    02/16/25 2015 -- 90 30 111/70 85 96 % -- -- -- -- -- -- --    02/16/25 2000 -- -- -- 111/70 -- -- -- -- -- -- -- 15 No Pain    02/16/25 1900 -- 84 33 139/80 102 96 % -- -- -- -- -- -- --    02/16/25 1800 -- 85 20 126/76 96 95 % -- -- -- -- -- -- --    02/16/25 1700 -- 84 20 129/61 88 95 % -- -- -- -- -- -- --    02/16/25 1600 97.8 °F (36.6 °C) 80 20 124/71 91 94 % 36 -- 4 L/min Nasal cannula Lying 15 No Pain    02/16/25 1500 -- 83 18 137/96 -- 94 % -- -- -- -- -- -- --    02/16/25 1400 -- 79 19 122/61 85 94 % -- -- -- -- -- -- --    02/16/25 1300 -- 81 18 124/79 -- 92 % -- -- -- -- -- -- --    02/16/25 1200 97.5 °F (36.4 °C) 81 28 142/72 100 93 % 36 -- 4 L/min Nasal cannula Lying 15 No Pain    02/16/25 1100 -- 76 31 151/65 94 93 % -- -- -- -- -- -- --    02/16/25 1045 98.4 °F (36.9 °C) -- 18 -- -- -- -- -- -- -- -- -- --    02/16/25 1000 -- 75 19 127/73 93 94 % -- -- -- -- -- -- --    02/16/25 0900 -- 80 27 129/84 101 94 % -- -- -- -- -- -- --    02/16/25 0823 -- 85 -- 137/90 -- -- -- -- -- -- -- -- --    02/16/25 0800 98.4 °F (36.9 °C) 81 20 133/88 105 94 % 36 -- 4 L/min Nasal cannula Lying 15 No Pain    02/16/25 0700 -- 80 21 143/82 -- 93 % -- --  -- -- -- -- --    02/16/25 0600 -- 82 20 140/66 95 95 % 36 -- 4 L/min Nasal cannula Lying -- --    02/16/25 0500 -- 83 22 97/66 77 93 % 36 -- 4 L/min Nasal cannula Lying -- --    02/16/25 0400 97.7 °F (36.5 °C) 77 17 138/83 105 94 % 36 -- 4 L/min Nasal cannula Lying 15 No Pain    02/16/25 0300 -- 73 20 120/68 89 93 % 36 -- 4 L/min Nasal cannula Lying -- --    02/16/25 0200 -- 72 18 121/63 86 92 % 36 -- 4 L/min Nasal cannula Lying -- --    02/16/25 0100 -- 72 20 112/80 90 95 % 36 -- 4 L/min Nasal cannula Lying -- --    02/16/25 0000 98.3 °F (36.8 °C) 80 23 136/63 91 94 % 36 -- 4 L/min Nasal cannula Lying 15 No Pain          Weight (last 2 days)       Date/Time Weight    02/19/25 0542 68.8 (151.68)    02/18/25 0600 67.2 (148.15)    02/17/25 0600 68.3 (150.57)            Pertinent Labs/Diagnostic Results:   Radiology:  XR chest portable ICU   Final Interpretation by Aiden Worrell MD (02/17 1509)      Grossly stable appearance of pulmonary edema.      The aortic balloon pump appears to have been withdrawn about 1 cm with the tip of the device projecting just below the aortic arch level.            Workstation performed: [id number]         VAS limited iliac-femoral evaluation for Impella Device   Final Interpretation by Tyree Taylor DO (02/15 1249)      XR chest portable   Final Interpretation by Jerome Davis MD (02/15 1056)      IABP marker projects about the aortic arch.      Worsened pulmonary edema            Workstation performed: [id number]         XR chest pa and lateral    (Results Pending)     Cardiology:  Cardiac catheterization   Preliminary Result by Ana Garcia DO (02/17 1310)            ECG 12 lead   Final Result by Alli Winn MD (02/17 0632)   Normal sinus rhythm   Marked ST abnormality, possible inferolateral subendocardial injury   Abnormal ECG      Confirmed by Alli Winn ([id number]) on 2/17/2025 6:32:11 AM      ECG 12 lead   Final Result by Alli Hernandez  MD [place] (02/17 4637)   Normal sinus rhythm   Marked ST abnormality, possible inferolateral subendocardial injury   Abnormal ECG      Confirmed by Alli Winn ([id number]) on 2/17/2025 4:57:43 AM      ECG 12 lead   Final Result by Ashanti Stinson MD (02/16 1001)   Normal sinus rhythm   Marked ST abnormality, possible inferior subendocardial injury   Prolonged QT   Abnormal ECG   When compared with ECG of 15-Feb-2025 06:13,   ST less depressed in Inferior leads   Confirmed by Ashanti Stinson ([id number]) on 2/16/2025 10:01:26 AM      Echo complete w/ contrast if indicated   Final Result by Jered Chinchilla MD (02/15 7054)        Left Ventricle: Left ventricular cavity size is normal. Wall thickness    is mildly increased. There is concentric remodeling. The left ventricular    ejection fraction is 43% by biplane measurement. Systolic function is    mildly reduced. There is mild global hypokinesis. Diastolic function is    moderately abnormal, consistent with grade II (pseudonormal) relaxation.     The following segments are akinetic: apical anterior, apical septal and    apex.     The following segments are mildly hypokinetic: basal anterior, basal    anteroseptal, basal inferoseptal, basal inferior, basal inferolateral,    basal anterolateral, mid anterior, mid anteroseptal, mid inferoseptal, mid    inferior, mid inferolateral, mid anterolateral, apical inferior and apical    lateral.     Mitral Valve: There is mild regurgitation with a posteriorly directed    jet.     Prior TTE study available for comparison. Prior study date: 1/1/2024.    Changes noted when compared to prior study. Changes include: LVEF now    mildly reduced, with regional wall motion abnormalities. .         ECG 12 lead   Final Result by Jered Chinchilla MD (02/15 3338)   Normal sinus rhythm   Marked ST abnormality, possible inferior subendocardial injury   Marked ST abnormality, possible anterolateral subendocardial injury   Prolonged  QT   Abnormal ECG   When compared with ECG of 15-Feb-2025 06:12, (unconfirmed)   No significant change was found   Confirmed by Jered Chinchilla ([id number]) on 2/15/2025 6:50:48 PM      ECG 12 lead   Final Result by Jered Chinchilla MD (02/15 1850)   Normal sinus rhythm   Marked ST abnormality, possible inferior subendocardial injury   Marked ST abnormality, possible anterolateral subendocardial injury   Abnormal ECG   When compared with ECG of 15-Feb-2025 03:32, (unconfirmed)   Nonspecific T wave abnormality now evident in Inferior leads   Confirmed by Jered Chinchilla ([id number]) on 2/15/2025 6:50:43 PM        GI:  No orders to display           Results from last 7 days   Lab Units 02/19/25  0450 02/18/25  0535 02/17/25  0457 02/16/25  1703 02/16/25  0454 02/15/25  0416   WBC Thousand/uL 8.94 7.76 6.19  --  7.06 10.77*   HEMOGLOBIN g/dL 10.1* 9.6* 9.5* 10.0* 10.3* 13.1   HEMATOCRIT % 29.3* 28.0* 26.7* 28.3* 29.4* 37.6   PLATELETS Thousands/uL 259 195 166  --  184 259   TOTAL NEUT ABS Thousands/µL 5.00  --   --   --   --  6.03         Results from last 7 days   Lab Units 02/19/25  0450 02/18/25  0535 02/17/25  0457 02/16/25  0454 02/15/25  1512 02/15/25  0416   SODIUM mmol/L 135 136 133* 134* 134* 135   POTASSIUM mmol/L 3.4* 4.0 3.9 3.6 3.8 4.8   CHLORIDE mmol/L 97 102 99 98 99 99   CO2 mmol/L 27 24 28 28 26 28   ANION GAP mmol/L 11 10 6 8 9 8   BUN mg/dL 27* 22 18 20 22 22   CREATININE mg/dL 1.37* 1.28 1.24 1.08 1.21 1.18   EGFR ml/min/1.73sq m 46 50 52 61 53 55   CALCIUM mg/dL 8.9 8.7 8.7 8.7 9.0 9.5   MAGNESIUM mg/dL 2.0  --  1.9 2.0 1.7* 2.0   PHOSPHORUS mg/dL  --   --  2.8 2.8  --   --      Results from last 7 days   Lab Units 02/19/25  0450 02/17/25  0457 02/16/25  0454 02/15/25  0416   AST U/L 80* 63* 96* 56*   ALT U/L 47 27 30 33   ALK PHOS U/L 123* 61 59 65   TOTAL PROTEIN g/dL 5.8* 5.1* 4.9* 6.5   ALBUMIN g/dL 3.4* 3.1* 3.3* 4.1   TOTAL BILIRUBIN mg/dL 1.29* 1.54* 1.63* 0.87         Results from last 7 days   Lab  "Units 02/19/25  0450 02/18/25  0535 02/17/25  0457 02/16/25  0454 02/15/25  1512 02/15/25  0416   GLUCOSE RANDOM mg/dL 144* 132 123 139 136 124             No results found for: \"BETA-HYDROXYBUTYRATE\"                   Results from last 7 days   Lab Units 02/15/25  0416   HS TNI 0HR ng/L 1,964*         Results from last 7 days   Lab Units 02/17/25  0457 02/16/25  0603 02/16/25  0005 02/15/25  0947 02/15/25  0416   PROTIME seconds  --   --   --   --  12.7   INR   --   --   --   --  0.88   PTT seconds 54* 61* 73*   < > 27    < > = values in this interval not displayed.                         Results from last 7 days   Lab Units 02/15/25  0416   BNP pg/mL 539*                                     Results from last 7 days   Lab Units 02/17/25  0955   CLARITY UA  Clear   COLOR UA  Light Yellow   SPEC GRAV UA  1.008   PH UA  5.5   GLUCOSE UA mg/dl Negative   KETONES UA mg/dl 10 (1+)*   BLOOD UA  Moderate*   PROTEIN UA mg/dl Trace*   NITRITE UA  Negative   BILIRUBIN UA  Negative   UROBILINOGEN UA (BE) mg/dl <2.0   LEUKOCYTES UA  Negative   WBC UA /hpf 1-2   RBC UA /hpf 4-10*   BACTERIA UA /hpf None Seen   EPITHELIAL CELLS WET PREP /hpf None Seen                                 Results from last 7 days   Lab Units 02/17/25  1005 02/17/25  0927   BLOOD CULTURE  Received in Microbiology Lab. Culture in Progress. Received in Microbiology Lab. Culture in Progress.  Staphylococcus coagulase negative*   GRAM STAIN RESULT   --  Gram positive cocci in clusters*                   Network Utilization Review Department  ATTENTION: Please call with any questions or concerns to [phone] and carefully listen to the prompts so that you are directed to the right person. All voicemails are confidential.   For Discharge needs, contact Care Management DC Support Team at [phone] opt. 2  Send all requests for admission clinical reviews, approved or denied determinations and any other requests to dedicated fax number below belonging to " the [place] where the patient is receiving treatment. List of dedicated fax numbers for the Facilities:  FACILITY NAME UR FAX NUMBER   ADMISSION DENIALS (Administrative/Medical Necessity) [phone]   DISCHARGE SUPPORT TEAM (NETWORK) [phone]   PARENT CHILD HEALTH (Maternity/NICU/Pediatrics) [phone]   [facility] [phone]   [facility] [phone]   [facility] [phone]   [facility] [phone]   Pending sale to [facility] [phone]   [facility] [phone]   [facility] [phone]   [facility] [phone]   [facility] [phone]   [facility] [phone]   [facility] [phone]   [facility] [phone]

## 2025-02-19 NOTE — PROGRESS NOTES
Progress Note - Hospitalist   Name: Gael Ferraro 84 y.o. male I MRN: [id number]  Unit/Bed#: [facility] 526-01 I Date of Admission: 2/15/2025   Date of Service: 2/19/2025 I Hospital Day: 4    Assessment & Plan  Coronary artery disease involving native coronary artery of native heart with angina pectoris (HCC)  Patient transferred from [facility] for exertional angina that seems ischemic  Had IABP after catheterization and [facility] for cardiac surgery evaluation  Cardiac surgery does not feel that this patient will be a suitable candidate for surgery, as such the current recommendation is patient undergo repeat PCI with cardiology.  This appears to tentatively planned for early next week  In the meantime continue medical management with aspirin, atorvastatin, Brilinta, lopressor and Ranexa  Hypertension  Continue  Lopressor 50 mg every 12h. Losartan currently on hold  CKD (chronic kidney disease) stage 3, GFR 30-59 ml/min ([facility])  Lab Results   Component Value Date    EGFR 46 02/19/2025    EGFR 50 02/18/2025    EGFR 52 02/17/2025    CREATININE 1.37 (H) 02/19/2025    CREATININE 1.28 02/18/2025    CREATININE 1.24 02/17/2025   Renal function appears within baseline  Continue close monitoring, cardiology likely will plan for repeat catheterization on this patient early next week  Hypercholesterolemia  Resume atorvastatin 80  Ambulatory dysfunction  PT/OT ordered  Anxiety  Resumed on home Ativan dosing PRN, reportedly with some improvement in his mental status    VTE Pharmacologic Prophylaxis:   Moderate Risk (Score 3-4) - Pharmacological DVT Prophylaxis Ordered: heparin.    Mobility:   Basic Mobility Inpatient Raw Score: 17  JH-HLM Goal: 5: Stand one or more mins  JH-HLM Achieved: 4: Move to chair/commode  JH-HLM Goal NOT achieved. Continue with multidisciplinary rounding and encourage appropriate mobility to improve upon JH-HLM goals.    Patient Centered Rounds: I performed bedside rounds with nursing staff today.    Discussions with Specialists or Other Care Team Provider: N/A    Education and Discussions with Family / Patient: Updated  (wife) at bedside.    Current Length of Stay: 4 day(s)  Current Patient Status: Inpatient   Certification Statement: The patient will continue to require additional inpatient hospital stay due to CAD  Discharge Plan: Anticipate discharge in >72 hrs to discharge location to be determined pending rehab evaluations.    Code Status: Level 2 - DNAR: but accepts endotracheal intubation    Subjective   Patient seen and examined at bedside, he is resting comfortably.  His only complaint today is that he has some lower abdominal pain because he has not had a bowel movement in over a week.  He would like to try a suppository.  No other concerns at present    Objective :  Temp:  [98 °F (36.7 °C)-99.8 °F (37.7 °C)] 98.4 °F (36.9 °C)  HR:  [] 87  BP: (102-144)/(45-63) 114/45  Resp:  [16-44] 16  SpO2:  [91 %-96 %] 93 %  O2 Device: Nasal cannula  Nasal Cannula O2 Flow Rate (L/min):  [3 L/min-6 L/min] 3 L/min    Body mass index is 21.76 kg/m².     Input and Output Summary (last 24 hours):     Intake/Output Summary (Last 24 hours) at 2/19/2025 1735  Last data filed at 2/19/2025 1201  Gross per 24 hour   Intake 740 ml   Output 925 ml   Net -185 ml       Physical Exam  Vitals and nursing note reviewed.   Constitutional:       General: He is not in acute distress.     Appearance: He is well-developed.   HENT:      Head: Normocephalic and atraumatic.      Mouth/Throat:      Mouth: Mucous membranes are moist.   Eyes:      Extraocular Movements: Extraocular movements intact.      Conjunctiva/sclera: Conjunctivae normal.   Cardiovascular:      Rate and Rhythm: Normal rate and regular rhythm.      Pulses: Normal pulses.      Heart sounds: No murmur heard.  Pulmonary:      Effort: Pulmonary effort is normal. No respiratory distress.      Breath sounds: Normal breath sounds. No wheezing or rhonchi.    Abdominal:      General: Abdomen is flat. There is no distension.      Tenderness: There is no abdominal tenderness. There is no guarding or rebound.   Musculoskeletal:         General: No swelling.      Right lower leg: No edema.      Left lower leg: No edema.   Skin:     General: Skin is warm and dry.      Capillary Refill: Capillary refill takes less than 2 seconds.      Coloration: Skin is not jaundiced.      Findings: No rash.   Neurological:      General: No focal deficit present.      Mental Status: He is alert and oriented to person, place, and time.      Sensory: No sensory deficit.      Motor: No weakness.   Psychiatric:         Mood and Affect: Mood normal.         Telemetry:  Telemetry Orders (From admission, onward)               24 Hour Telemetry Monitoring  Continuous x 24 Hours (Telem)        Expiring   Question:  Reason for 24 Hour Telemetry  Answer:  PCI/EP study (including pacer and ICD implementation), Cardiac surgery, MI, abnormal cardiac cath, and chest pain- rule out MI                     Telemetry Reviewed: Normal Sinus Rhythm  Indication for Continued Telemetry Use: Acute MI/Unstable Angina/Rule out ACS               Lab Results: I have reviewed the following results:   Results from last 7 days   Lab Units 02/19/25  0450   WBC Thousand/uL 8.94   HEMOGLOBIN g/dL 10.1*   HEMATOCRIT % 29.3*   PLATELETS Thousands/uL 259   SEGS PCT % 56   LYMPHO PCT % 31   MONO PCT % 11   EOS PCT % 2     Results from last 7 days   Lab Units 02/19/25  0450   SODIUM mmol/L 135   POTASSIUM mmol/L 3.4*   CHLORIDE mmol/L 97   CO2 mmol/L 27   BUN mg/dL 27*   CREATININE mg/dL 1.37*   ANION GAP mmol/L 11   CALCIUM mg/dL 8.9   ALBUMIN g/dL 3.4*   TOTAL BILIRUBIN mg/dL 1.29*   ALK PHOS U/L 123*   ALT U/L 47   AST U/L 80*   GLUCOSE RANDOM mg/dL 144*     Results from last 7 days   Lab Units 02/15/25  0416   INR  0.88                   Recent Cultures (last 7 days):   Results from last 7 days   Lab Units 02/17/25  1005  02/17/25  0927   BLOOD CULTURE  Received in Microbiology Lab. Culture in Progress. Received in Microbiology Lab. Culture in Progress.  Staphylococcus coagulase negative*   GRAM STAIN RESULT   --  Gram positive cocci in clusters*             Last 24 Hours Medication List:     Current Facility-Administered Medications:     acetaminophen (TYLENOL) tablet 650 mg, Q6H PRN    Artificial Tears Op Soln 1 drop, TID    aspirin (ECOTRIN LOW STRENGTH) EC tablet 81 mg, Daily    atorvastatin (LIPITOR) tablet 80 mg, After Dinner    chlorhexidine (PERIDEX) 0.12 % oral rinse 15 mL, Q12H CRESENCIO    heparin (porcine) subcutaneous injection 5,000 Units, Q8H CRESENCIO    LORazepam (ATIVAN) tablet 1 mg, TID PRN    [Held by provider] losartan (COZAAR) tablet 12.5 mg, Daily    melatonin tablet 6 mg, HS    metoprolol tartrate (LOPRESSOR) tablet 50 mg, Q12H CRESENCIO    nitroglycerin (NITROSTAT) SL tablet 0.4 mg, Q5 Min PRN    ondansetron (ZOFRAN) injection 4 mg, Q6H PRN    predniSONE tablet 5 mg, Every Other Day    ranolazine (RANEXA) 12 hr tablet 1,000 mg, Q12H CRESENCIO    senna-docusate sodium (SENOKOT S) 8.6-50 mg per tablet 1 tablet, BID    ticagrelor (BRILINTA) tablet 90 mg, Q12H CRESENCIO    Administrative Statements   Today, Patient Was Seen By: Luther Cooper      **Please Note: This note may have been constructed using a voice recognition system.**

## 2025-02-19 NOTE — PLAN OF CARE
Problem: PAIN - ADULT  Goal: Verbalizes/displays adequate comfort level or baseline comfort level  Description: Interventions:  - Encourage patient to monitor pain and request assistance  - Assess pain using appropriate pain scale  - Administer analgesics based on type and severity of pain and evaluate response  - Implement non-pharmacological measures as appropriate and evaluate response  - Consider cultural and social influences on pain and pain management  - Notify physician/advanced practitioner if interventions unsuccessful or patient reports new pain  Outcome: Progressing     Problem: INFECTION - ADULT  Goal: Absence or prevention of progression during hospitalization  Description: INTERVENTIONS:  - Assess and monitor for signs and symptoms of infection  - Monitor lab/diagnostic results  - Monitor all insertion sites, i.e. indwelling lines, tubes, and drains  - Monitor endotracheal if appropriate and nasal secretions for changes in amount and color  - [place] appropriate cooling/warming therapies per order  - Administer medications as ordered  - Instruct and encourage patient and family to use good hand hygiene technique  - Identify and instruct in appropriate isolation precautions for identified infection/condition  Outcome: Progressing  Goal: Absence of fever/infection during neutropenic period  Description: INTERVENTIONS:  - Monitor WBC    Outcome: Progressing     Problem: SAFETY ADULT  Goal: Patient will remain free of falls  Description: INTERVENTIONS:  - Educate patient/family on patient safety including physical limitations  - Instruct patient to call for assistance with activity   - Consult OT/PT to assist with strengthening/mobility   - Keep Call bell within reach  - Keep bed low and locked with side rails adjusted as appropriate  - Keep care items and personal belongings within reach  - Initiate and maintain comfort rounds  - Make Fall Risk Sign visible to staff  - Offer Toileting every  Hours,  in advance of need  - Initiate/Maintain alarm  - Obtain necessary fall risk management equipment:   - Apply yellow socks and bracelet for high fall risk patients  - Consider moving patient to room near nurses station  Outcome: Progressing  Goal: Maintain or return to baseline ADL function  Description: INTERVENTIONS:  -  Assess patient's ability to carry out ADLs; assess patient's baseline for ADL function and identify physical deficits which impact ability to perform ADLs (bathing, care of mouth/teeth, toileting, grooming, dressing, etc.)  - Assess/evaluate cause of self-care deficits   - Assess range of motion  - Assess patient's mobility; develop plan if impaired  - Assess patient's need for assistive devices and provide as appropriate  - Encourage maximum independence but intervene and supervise when necessary  - Involve family in performance of ADLs  - Assess for home care needs following discharge   - Consider OT consult to assist with ADL evaluation and planning for discharge  - Provide patient education as appropriate  Outcome: Progressing  Goal: Maintains/Returns to pre admission functional level  Description: INTERVENTIONS:  - Perform AM-PAC 6 Click Basic Mobility/ Daily Activity assessment daily.  - Set and communicate daily mobility goal to care team and patient/family/caregiver.   - Collaborate with rehabilitation services on mobility goals if consulted  - Perform Range of Motion  times a day.  - Reposition patient every  hours.  - Dangle patient  times a day  - Stand patient  times a day  - Ambulate patient  times a day  - Out of bed to chair  times a day   - Out of bed for meal times a day  - Out of bed for toileting  - Record patient progress and toleration of activity level   Outcome: Progressing     Problem: DISCHARGE PLANNING  Goal: Discharge to home or other facility with appropriate resources  Description: INTERVENTIONS:  - Identify barriers to discharge w/patient and caregiver  - Arrange for  needed discharge resources and transportation as appropriate  - Identify discharge learning needs (meds, wound care, etc.)  - Arrange for interpretive services to assist at discharge as needed  - Refer to Case Management Department for coordinating discharge planning if the patient needs post-hospital services based on physician/advanced practitioner order or complex needs related to functional status, cognitive ability, or social support system  Outcome: Progressing     Problem: Knowledge Deficit  Goal: Patient/family/caregiver demonstrates understanding of disease process, treatment plan, medications, and discharge instructions  Description: Complete learning assessment and assess knowledge base.  Interventions:  - Provide teaching at level of understanding  - Provide teaching via preferred learning methods  Outcome: Progressing     Problem: Prexisting or High Potential for Compromised Skin Integrity  Goal: Skin integrity is maintained or improved  Description: INTERVENTIONS:  - Identify patients at risk for skin breakdown  - Assess and monitor skin integrity  - Assess and monitor nutrition and hydration status  - Monitor labs   - Assess for incontinence   - Turn and reposition patient  - Assist with mobility/ambulation  - Relieve pressure over bony prominences  - Avoid friction and shearing  - Provide appropriate hygiene as needed including keeping skin clean and dry  - Evaluate need for skin moisturizer/barrier cream  - Collaborate with interdisciplinary team   - Patient/family teaching  - Consider wound care consult   Outcome: Progressing     Problem: SAFETY,RESTRAINT: NV/NON-SELF DESTRUCTIVE BEHAVIOR  Goal: Remains free of harm/injury (restraint for non violent/non self-detsructive behavior)  Description: INTERVENTIONS:  - Instruct patient/family regarding restraint use   - Assess and monitor physiologic and psychological status   - Provide interventions and comfort measures to meet assessed patient needs   -  Identify and implement measures to help patient regain control  - Assess readiness for release of restraint   Outcome: Progressing  Goal: Returns to optimal restraint-free functioning  Description: INTERVENTIONS:  - Assess the patient's behavior and symptoms that indicate continued need for restraint  - Identify and implement measures to help patient regain control  - Assess readiness for release of restraint   Outcome: Progressing     Problem: CARDIOVASCULAR - ADULT  Goal: Maintains optimal cardiac output and hemodynamic stability  Description: INTERVENTIONS:  - Monitor I/O, vital signs and rhythm  - Monitor for S/S and trends of decreased cardiac output  - Administer and titrate ordered vasoactive medications to optimize hemodynamic stability  - Assess quality of pulses, skin color and temperature  - Assess for signs of decreased coronary artery perfusion  - Instruct patient to report change in severity of symptoms  Outcome: Progressing  Goal: Absence of cardiac dysrhythmias or at baseline rhythm  Description: INTERVENTIONS:  - Continuous cardiac monitoring, vital signs, obtain 12 lead EKG if ordered  - Administer antiarrhythmic and heart rate control medications as ordered  - Monitor electrolytes and administer replacement therapy as ordered  Outcome: Progressing

## 2025-02-19 NOTE — PHYSICAL THERAPY NOTE
Physical Therapy Evaluation     Patient's Name: Gael Ferraro    Admitting Diagnosis  STEMI (ST elevation myocardial infarction) ([facility]) [I21.3]    Problem List  Patient Active Problem List   Diagnosis    Anemia    Coronary artery disease involving native coronary artery of native heart with angina pectoris ([facility])    Hypertension    Other dysphagia    Constipation    CKD (chronic kidney disease) stage 3, GFR 30-59 ml/min ([facility])    SOB (shortness of breath)    Abnormal CT of the chest    Pure red cell aplasia (HCC)    Myelofibrosis ([facility])    Hypercholesterolemia    Hallucinations    At risk for delirium    Ambulatory dysfunction    Frailty    Anxiety       Past Medical History  Past Medical History:   Diagnosis Date    Low hemoglobin        Past Surgical History  Past Surgical History:   Procedure Laterality Date    CARDIAC CATHETERIZATION Left 1/2/2024    Procedure: Cardiac Left Heart Cath;  Surgeon: Ana Garcia DO;  [facility];  Service: Cardiology    CARDIAC CATHETERIZATION N/A 1/2/2024    Procedure: Cardiac Coronary Angiogram;  Surgeon: Ana Garcia DO;  [facility];  Service: Cardiology    CARDIAC CATHETERIZATION N/A 1/2/2024    Procedure: Cardiac RHC;  Surgeon: Ana Garcia DO;  [facility];  Service: Cardiology    CARDIAC CATHETERIZATION N/A 2/15/2025    Procedure: Cardiac iabp;  Surgeon: Ana Garcia DO;  [facility];  Service: Cardiology    CARDIAC CATHETERIZATION N/A 2/15/2025    Procedure: Cardiac Coronary Angiogram;  Surgeon: Ana Garcia DO;  [facility];  Service: Cardiology    CARDIAC CATHETERIZATION N/A 2/17/2025    Procedure: Cardiac PCI;  Surgeon: Ana Garcia DO;  [facility];  Service: Cardiology    CARDIAC CATHETERIZATION N/A 2/17/2025    Procedure: Cardiac Impella Insertion;  Surgeon: Ana Garcia DO;  [facility];  Service:  Cardiology    CARDIAC CATHETERIZATION N/A 2/17/2025    Procedure: Cardiac Impella Removal;  Surgeon: Ana Garcia DO;  [facility];  Service: Cardiology    IMPELLA VENTRICULAR ASSIST DEVICE INSERTION N/A 2/17/2025    Procedure: IMPELLA VENTRICULAR ASSIST DEVICE INSERTION;  Surgeon: Ana Garcia DO;  [facility];  Service: Cardiology    IR BIOPSY BONE MARROW  1/24/2024    REMOVAL PUMP INTRA AORTIC BALLOON  ([facility]) N/A 2/17/2025    Procedure: REMOVAL PUMP INTRA AORTIC BALLOON  (IABP);  Surgeon: Ana Garcia DO;  [facility];  Service: Cardiology          02/19/25 0913   PT Last Visit   PT Visit Date 02/19/25   Note Type   Note type Evaluation   Pain Assessment   Pain Assessment Tool 0-10   Pain Score No Pain   Restrictions/Precautions   Other Precautions Fall Risk;Bed Alarm;Chair Alarm;O2;Multiple lines;Telemetry   Home Living   Type of Home House   Home Layout Two level;Access;Stairs to enter with rails  (3STE)   Bathroom Shower/Tub Tub/shower unit   Bathroom Toilet Standard   Bathroom Equipment Grab bars in shower   Bathroom Accessibility Accessible   Home Equipment Walker;Cane   Prior Function   Level of [place] Independent with ADLs;Independent with functional mobility;Needs assistance with IADLS   Lives With Spouse   Receives Help From Family   IADLs Independent with driving;Family/Friend/Other provides meals;Family/Friend/Other provides medication management   Falls in the last 6 months (S)  5 to 10   Vocational Retired   General   Family/Caregiver Present No   Cognition   Overall Cognitive Status Impaired  (hx of sundowning and delirium)   Arousal/Participation Alert   Orientation Level Oriented X4   Memory Decreased recall of precautions   Following Commands Follows one step commands without difficulty   Comments Patient pleasant and cooperative   Subjective   Subjective Patient agreeable to PT eval   RUE Assessment   RUE Assessment  WFL   LUE Assessment   LUE Assessment WFL   RLE Assessment   RLE Assessment X   Strength RLE   RLE Overall Strength 4-/5   LLE Assessment   LLE Assessment X   Strength LLE   LLE Overall Strength 4-/5   Bed Mobility   Rolling L 5  Supervision   Additional items Verbal cues   Supine to Sit 5  Supervision   Additional items Increased time required;Verbal cues;HOB elevated;Bedrails   Transfers   Sit to Stand 5  Supervision   Additional items Increased time required;Verbal cues   Stand to Sit 5  Supervision   Additional items Increased time required;Verbal cues   Ambulation/Elevation   Gait pattern Decreased foot clearance;Short stride;Excessively slow;Knees flexed   Gait Assistance 4  Minimal assist   Additional items Assist x 1;Verbal cues   Assistive Device Rolling walker   Distance 90'   Balance   Static Sitting Fair +   Dynamic Sitting Fair   Static Standing Fair   Dynamic Standing Fair -   Ambulatory Fair -   Endurance Deficit   Endurance Deficit Yes   Endurance Deficit Description fatigue, weakness   Activity Tolerance   Activity Tolerance Patient limited by fatigue;Patient limited by pain   Medical Staff Made Aware OT, OTS   Nurse Made Aware RN cleared   Assessment   Prognosis Good   Problem List Decreased strength;Decreased endurance;Impaired balance;Decreased mobility;Pain   Assessment Pt is a 84 y.o. male seen for a high complexity PT evaluation due to Ongoing medical management for primary dx, Increased reliance on more restrictive AD compared to baseline, Decreased activity tolerance compared to baseline, Fall risk, Increased assistance needed from caregiver at current time, Increased O2 via NC from pts baseline. Patient is s/p admit to [facility] on 2/15/2025 for STEMI (ST elevation myocardial infarction) (HCC) (I21.3). Patient  has a past medical history of Low hemoglobin..     PT now consulted to assess functional mobility and needs for safe d/c planning. Prior to admission, pt independent with  functional mobility, independent ADLs, and needs assistance IADLs. Personal factors affecting status include hx of falls, fall risk, steps to enter home, steps to negotiate within home, decreased insight / safety awareness, decreased recall of precautions, cognitive deficits, and medical status     Currently pt requires supervision for bed mobility, supervision for functional transfers with rolling walker ; minimal assistance x1 for ambulation with rolling walker. Pt presents functioning below baseline and w/ overall mobility deficits 2* to: decreased strength, decreased endurance, decreased mobility, impaired balance. These impairments place pt at risk for falls.     Pt will continue to benefit from skilled PT interventions to address stated impairments; to maximize functional potential; for ongoing pt/family education; and DME needs. The patient's AM-PAC Basic Mobility Inpatient Short Form Raw Score Is 17. PT is currently recommending Level 3 - Minimum Resource Intensity on d/c from hospital. Will continue to follow as able.   Goals   Patient Goals to go home   STG Expiration Date 03/05/25   Short Term Goal #1 In 14 days, patient will 1) increase strength in BUE/BLE by 1/2 to 1 full grade for increased strength and stability needed for functional mobility 2) improve bed mobility to MI for improved mobility and decreased need for assist 3) sit EOB x30' with MI to facilitate trunk stability and safety for completion of ADL tasks 4) increase functional transfers to MI for improved safety and functional mobility 5) ambulate 250ft with MI using rolling walker for increased endurance and safety ambulating home and community environments 6) improve balance by 1 grade for improved safety and stability and decreased risk for falls. 7) ascend/descend at least 3 stairs using HR with MI in order to safely access home environment   PT Treatment Day 0   Plan   Treatment/Interventions ADL retraining;Functional transfer  training;LE strengthening/ROM;Therapeutic exercise;Endurance training;Patient/family training;Equipment eval/education;Bed mobility;Gait training;Spoke to nursing;Spoke to case management;OT;Elevations   PT Frequency 2-3x/wk   Discharge Recommendation   Rehab Resource Intensity Level, PT III (Minimum Resource Intensity)  (OPPT vs cardiopulm OPPT)   AM-PAC Basic Mobility Inpatient   Turning in Flat Bed Without Bedrails 3   Lying on Back to Sitting on Edge of Flat Bed Without Bedrails 3   Moving Bed to Chair 3   Standing Up From Chair Using Arms 3   Walk in Room 3   Climb 3-5 Stairs With Railing 2   Basic Mobility Inpatient Raw Score 17   Basic Mobility Standardized Score 39.67   [facility] Highest Level Of Mobility   -HLM Goal 5: Stand one or more mins   -HLM Achieved 7: Walk 25 feet or more   Modified [place] Scale   Modified [place] Scale 4   End of Consult   Patient Position at End of Consult All needs within reach;Bed/Chair alarm activated;Bedside chair         Marzena Li, PT, DPT

## 2025-02-19 NOTE — PLAN OF CARE
Problem: PHYSICAL THERAPY ADULT  Goal: Performs mobility at highest level of function for planned discharge setting.  See evaluation for individualized goals.  Description: Treatment/Interventions: ADL retraining, Functional transfer training, LE strengthening/ROM, Therapeutic exercise, Endurance training, Patient/family training, Equipment eval/education, Bed mobility, Gait training, Spoke to nursing, Spoke to case management, OT, Elevations          See flowsheet documentation for full assessment, interventions and recommendations.  Outcome: Progressing  Note: Prognosis: Good  Problem List: Decreased strength, Decreased endurance, Impaired balance, Decreased mobility, Pain  Assessment: Pt is a 84 y.o. male seen for a high complexity PT evaluation due to Ongoing medical management for primary dx, Increased reliance on more restrictive AD compared to baseline, Decreased activity tolerance compared to baseline, Fall risk, Increased assistance needed from caregiver at current time, Increased O2 via NC from pts baseline. Patient is s/p admit to [facility] on 2/15/2025 for STEMI (ST elevation myocardial infarction) (HCC) (I21.3). Patient  has a past medical history of Low hemoglobin..     PT now consulted to assess functional mobility and needs for safe d/c planning. Prior to admission, pt independent with functional mobility, independent ADLs, and needs assistance IADLs. Personal factors affecting status include hx of falls, fall risk, steps to enter home, steps to negotiate within home, decreased insight / safety awareness, decreased recall of precautions, cognitive deficits, and medical status     Currently pt requires supervision for bed mobility, supervision for functional transfers with rolling walker ; minimal assistance x1 for ambulation with rolling walker. Pt presents functioning below baseline and w/ overall mobility deficits 2* to: decreased strength, decreased endurance, decreased mobility,  impaired balance. These impairments place pt at risk for falls.     Pt will continue to benefit from skilled PT interventions to address stated impairments; to maximize functional potential; for ongoing pt/family education; and DME needs. The patient's AM-PAC Basic Mobility Inpatient Short Form Raw Score Is 17. PT is currently recommending Level 3 - Minimum Resource Intensity on d/c from hospital. Will continue to follow as able.        Rehab Resource Intensity Level, PT: III (Minimum Resource Intensity) (OPPT vs cardiopulm OPPT)    See flowsheet documentation for full assessment.

## 2025-02-19 NOTE — OCCUPATIONAL THERAPY NOTE
Occupational Therapy Evaluation     Patient Name: Gael Ferraro  Today's Date: 2/19/2025  Problem List  Principal Problem:    Coronary artery disease involving native coronary artery of native heart with angina pectoris (HCC)  Active Problems:    Hypertension    CKD (chronic kidney disease) stage 3, GFR 30-59 ml/min (HCC)    Pure red cell aplasia (HCC)    Hypercholesterolemia    Hallucinations    At risk for delirium    Ambulatory dysfunction    Frailty    Anxiety    Past Medical History  Past Medical History:   Diagnosis Date    Low hemoglobin      Past Surgical History  Past Surgical History:   Procedure Laterality Date    CARDIAC CATHETERIZATION Left 1/2/2024    Procedure: Cardiac Left Heart Cath;  Surgeon: Ana Garcia DO;  [facility];  Service: Cardiology    CARDIAC CATHETERIZATION N/A 1/2/2024    Procedure: Cardiac Coronary Angiogram;  Surgeon: Ana Garcia DO;  [facility];  Service: Cardiology    CARDIAC CATHETERIZATION N/A 1/2/2024    Procedure: Cardiac RHC;  Surgeon: Ana Garcia DO;  [facility];  Service: Cardiology    CARDIAC CATHETERIZATION N/A 2/15/2025    Procedure: Cardiac iabp;  Surgeon: Ana Garcia DO;  [facility];  Service: Cardiology    CARDIAC CATHETERIZATION N/A 2/15/2025    Procedure: Cardiac Coronary Angiogram;  Surgeon: Ana Garcia DO;  [facility];  Service: Cardiology    CARDIAC CATHETERIZATION N/A 2/17/2025    Procedure: Cardiac PCI;  Surgeon: Ana Garcia DO;  [facility];  Service: Cardiology    CARDIAC CATHETERIZATION N/A 2/17/2025    Procedure: Cardiac Impella Insertion;  Surgeon: Ana Garcia DO;  [facility];  Service: Cardiology    CARDIAC CATHETERIZATION N/A 2/17/2025    Procedure: Cardiac Impella Removal;  Surgeon: Ana Garcia DO;  [facility];  Service: Cardiology    IMPELLA  VENTRICULAR ASSIST DEVICE INSERTION N/A 2/17/2025    Procedure: IMPELLA VENTRICULAR ASSIST DEVICE INSERTION;  Surgeon: Ana Garcia DO;  [facility];  Service: Cardiology    IR BIOPSY BONE MARROW  1/24/2024    REMOVAL PUMP INTRA AORTIC BALLOON  (IABP) N/A 2/17/2025    Procedure: REMOVAL PUMP INTRA AORTIC BALLOON  (IABP);  Surgeon: Ana Garcia DO;  [facility];  Service: Cardiology         02/19/25 0912   OT Last Visit   OT Visit Date 02/19/25   Note Type   Note type Evaluation   Pain Assessment   Pain Assessment Tool 0-10   Pain Score 6   Pain Location/Orientation Location: Abdomen   Hospital Pain Intervention(s) Repositioned;Ambulation/increased activity   Multiple Pain Sites No   Restrictions/Precautions   Weight Bearing Precautions Per Order No   Other Precautions Chair Alarm;Bed Alarm;Telemetry;O2;Fall Risk;Pain  (6L O2, no O2 at home)   Home Living   Type of Home House   Home Layout Two level;Able to live on main level with bedroom/bathroom;Stairs to enter with rails  (3STE)   Bathroom Shower/Tub Tub/shower unit   Bathroom Toilet Standard   Bathroom Equipment Grab bars in shower;Shower chair;Tub transfer bench  (pt reports not owning SC and bench chair but does not use. 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 +    Reciprocal Relationships supportive spouse   Service to Others Retired   Intrinsic  "Gratification likes to garden   General   Family/Caregiver Present Yes   Subjective   Subjective \"I can't poop.\"   ADL   Where Assessed Edge of bed   Eating Assistance 5  Supervision/Setup   Grooming Assistance 5  Supervision/Setup   UB Bathing Assistance 5  Supervision/Setup   LB Bathing Assistance 4  Minimal Assistance   UB Dressing Assistance 5  Supervision/Setup   LB Dressing Assistance 4  Minimal Assistance   Toileting Assistance  4  Minimal Assistance   Functional Assistance 5  Supervision/Setup   Additional Comments Improvements in ADL status as med status improves and abdominal pain decreases   Bed Mobility   Supine to Sit 5  Supervision   Additional items HOB elevated;Increased time required;Verbal cues;Bedrails   Sit to Supine Unable to assess   Additional Comments pt found supine in bed and was left in bedside chair with alarm on and all needs within reach.   Transfers   Sit to Stand 5  Supervision   Additional items Increased time required;Verbal cues   Stand to Sit 5  Supervision   Additional items Increased time required;Verbal cues   Additional Comments RW   Functional Mobility   Functional Mobility 5  Supervision   Additional Comments fluctuating to CGAx1 w RW for long hallway distances   Additional items Rolling walker   Balance   Static Sitting Fair +   Dynamic Sitting Fair   Static Standing Fair   Dynamic Standing Fair -   Ambulatory Fair -   Activity Tolerance   Activity Tolerance Patient limited by fatigue;Patient limited by pain   Medical Staff Made Aware DPt 2' pt's med complexity, comorbidities, and regression from baseline.   Nurse Made Aware RN aware   RUE Assessment   RUE Assessment WFL   LUE Assessment   LUE Assessment WFL   Hand Function   Gross Motor Coordination Functional   Fine Motor Coordination Functional   Psychosocial   Psychosocial (WDL) WDL   Cognition   Overall Cognitive Status Impaired  (sundowning and delirium noted in pt's chart since being admitted)   Arousal/Participation " Alert;Responsive;Cooperative   Attention Within functional limits   Orientation Level Oriented X4   Memory Decreased recall of precautions   Following Commands Follows one step commands without difficulty   Comments Pt pleasant and cooperative. Pt has fair insight into condition and G safety awareness t/o session.   Assessment   Limitation Decreased ADL status;Decreased Safe judgement during ADL;Decreased endurance;Decreased cognition;Decreased self-care trans;Decreased high-level ADLs   Prognosis Fair   Assessment Patient is a 84 y.o. male admitted 2/15/2025 with STEMI (ST elevation myocardial infarction) (HCC) (I21.3). Pt underwent impella assisted PCI with DESx1 on 2/17/2025. Pt has active OT eval and treat orders. Pt has a past medical history of Low hemoglobin. Patient lives in a 2STH with supportive spouse, 3 steps to enter home w/ railing. Patient has a FFSU, w a tub/shower unit and standard toilet. Prior to hospitalization, patient utilized no AD for short household distances, however uses cane when ambulating in the community. Pt was IND with ADL's/IADL's.  Currently, patient requires SUP with UB ADL, min A w LB ADL, and completed transfers with SUP, FM w SUP>CGAx1 w RW. Currently, patient is limited due to decreased ADL status, decreased self-care transfers, decreased safe judgement during ADL's, decreased cognition, and decreased high-level ADL's, impacting functional mobility and completing self-care ADL's independently. Patient presents with impaired cognition, per chart review pt is experiencing sundowning and delirium upon admission, becoming agitated at night. See flowsheet for details. Pt will need OT 10-14 days/2-3X/wk to meet OT goals. The patient's raw score on the AM-PAC Daily Activity Inpatient Short Form is 21. A raw score of greater than or equal to 19 suggests the patient may benefit from discharge to home, however OT r/c Level 3 upon d/c with cardiopulmonary rehab. Please refer to the  recommendation of the Occupational Therapist for safe discharge planning.   Goals   Patient Goals to have a BM and feel better   LTG Time Frame 10-14   Plan   Treatment Interventions ADL retraining;Functional transfer training;Endurance training;Cognitive reorientation;Patient/family training;Equipment evaluation/education;Compensatory technique education;Continued evaluation;Energy conservation;Activityengagement   Goal Expiration Date 03/05/25   OT Frequency 2-3x/wk   Discharge Recommendation   Rehab Resource Intensity Level, OT III (Minimum Resource Intensity)  (w cardiopulmonary rehab)   AM-PAC Daily Activity Inpatient   Lower Body Dressing 3   Bathing 3   Toileting 3   Upper Body Dressing 4   Grooming 4   Eating 4   Daily Activity Raw Score 21   Daily Activity Standardized Score (Calc for Raw Score >=11) 44.27   AM-PAC Applied Cognition Inpatient   Following a Speech/Presentation 4   Understanding Ordinary Conversation 4   Taking Medications 3   Remembering Where Things Are Placed or Put Away 3   Remembering List of 4-5 Errands 2   Taking Care of Complicated Tasks 2   Applied Cognition Raw Score 18   Applied Cognition Standardized Score 38.07   Modified [place] Scale   Modified [place] Scale 4   End of Consult   Education Provided Yes;Family or social support of family present for education by provider   Patient Position at End of Consult Bedside chair;Bed/Chair alarm activated;All needs within reach   Nurse Communication Nurse aware of consult       UB ADL Goal:  Patient will be able to successfully complete UB ADL's w/ Mod I, using appropriate AD as needed.     LB ADL Goal:  Patient will be able to complete LB ADL's w/ mod I sitting EOB upon discharge.    IADL Goal:  Patient will complete home maintenance tasks with Mod I upon discharge.    Activity Tolerance Goal:  Patient will successfully complete ADL/IADL tasks using energy conservation techniques including pacing and short rest break periods upon  discharge.    Functional Mobility Goal:  Patient will ambulate w/ mod I using appropriate AD as needed.    Transfers:  Patient will complete all transfers w/ mod I using appropriate AD as needed upon discharge.    Bed mobility:  Patient will be able to complete bed mobility w/ Mod I to complete ADL tasks upon discharge.    Cognition Goals:    Pt will engage in cognitive assessment as needed to assist with safe d/c plan.

## 2025-02-19 NOTE — ASSESSMENT & PLAN NOTE
Lab Results   Component Value Date    EGFR 46 02/19/2025    EGFR 50 02/18/2025    EGFR 52 02/17/2025    CREATININE 1.37 (H) 02/19/2025    CREATININE 1.28 02/18/2025    CREATININE 1.24 02/17/2025   Appears to be at baseline renal function, will need to monitor at this since patient will likely require another cardiac catheterization early next week.

## 2025-02-19 NOTE — ASSESSMENT & PLAN NOTE
Systolics in the ranges of 100-1 10, would hold off on addition of other antihypertensives or GDMT at this point.

## 2025-02-19 NOTE — RESPIRATORY THERAPY NOTE
RT Protocol Note  Gael Ferraro 84 y.o. male MRN: [id number]  Unit/Bed#: [facility] 526-01 Encounter: [id number]    Assessment    Principal Problem:    Coronary artery disease involving native coronary artery of native heart with angina pectoris (HCC)  Active Problems:    Hypertension    CKD (chronic kidney disease) stage 3, GFR 30-59 ml/min (HCC)    Pure red cell aplasia (HCC)    Hypercholesterolemia    Hallucinations    At risk for delirium    Ambulatory dysfunction    Frailty    Anxiety      Home Pulmonary Medications:  N/A       Past Medical History:   Diagnosis Date    Low hemoglobin      Social History     Socioeconomic History    Marital status: /Civil Union     Spouse name: Not on file    Number of children: Not on file    Years of education: Not on file    Highest education level: Not on file   Occupational History    Not on file   Tobacco Use    Smoking status: Never    Smokeless tobacco: Never   Vaping Use    Vaping status: Never Used   Substance and Sexual Activity    Alcohol use: Not Currently    Drug use: Never    Sexual activity: Not on file   Other Topics Concern    Not on file   Social History Narrative    Not on file     Social Drivers of Health     Financial Resource Strain: Not on file   Food Insecurity: No Food Insecurity (2/6/2024)    Nursing - Inadequate Food Risk Classification     Worried About Running Out of Food in the Last Year: Never true     Ran Out of Food in the Last Year: Never true     Ran Out of Food in the Last Year: Not on file   Transportation Needs: No Transportation Needs (2/6/2024)    PRAPARE - Transportation     Lack of Transportation (Medical): No     Lack of Transportation (Non-Medical): No   Physical Activity: Not on file   Stress: Not on file   Social Connections: Not on file   Intimate Partner Violence: Not on file   Housing Stability: Unknown (2/6/2024)    Housing Stability Vital Sign     Unable to Pay for Housing in the Last Year: No     Number of Times Moved in  "the Last Year: Not on file     Homeless in the Last Year: No       Subjective         Objective    Physical Exam:   Assessment Type: Assess only  General Appearance: Awake  Respiratory Pattern: Tachypneic  Chest Assessment: Chest expansion symmetrical  Bilateral Breath Sounds: Diminished, Clear  Cough: None  O2 Device: nasal cannula    Vitals:  Blood pressure 107/54, pulse 84, temperature 98.7 °F (37.1 °C), temperature source Oral, resp. rate 20, height 5' 10\" (1.778 m), weight 67.2 kg (148 lb 2.4 oz), SpO2 94%.          Imaging and other studies: Results Review Statement: No pertinent imaging studies reviewed.    O2 Device: nasal cannula     Plan    Respiratory Plan: Discontinue Protocol, No distress/Pulmonary history        Resp Comments: (P) Pt. evaluated at bedside per Respiratory protocol.  Pt. admitted S/P MI Alert/PCI.  Pt. presents laying on left side on 6lpm nasal cannula and with RR25-30 at this time.  Pt.'s breath sounds are diminished but fairly clear at this time and he is in no distress.  CXR from 2/17 showed grossly stable appearance of Pulmonary edema per radiologist interpretation.  Pt's SpO2 dilcia from 89% to 94% with arousal.  RN to give Lasik at this time.  Pt. has no prior Pulmonary Hx. and bronchodilators are not indicated at this time.  Will D/C Respiratory protocol at this time.   "

## 2025-02-19 NOTE — QUICK NOTE
I/O may not be accurate as pt's wife admitted to filling up empty cups with sink water for pt without informing staff. She was educated on importance of accurate measurements for I/O and agreed to always notify staff when water is needed.

## 2025-02-19 NOTE — PLAN OF CARE
Problem: OCCUPATIONAL THERAPY ADULT  Goal: Performs self-care activities at highest level of function for planned discharge setting.  See evaluation for individualized goals.  Description: Treatment Interventions: ADL retraining, Functional transfer training, Endurance training, Cognitive reorientation, Patient/family training, Equipment evaluation/education, Compensatory technique education, Continued evaluation, Energy conservation, Activityengagement          See flowsheet documentation for full assessment, interventions and recommendations.   Note: Limitation: Decreased ADL status, Decreased Safe judgement during ADL, Decreased endurance, Decreased cognition, Decreased self-care trans, Decreased high-level ADLs  Prognosis: Fair  Assessment: Patient is a 84 y.o. male admitted 2/15/2025 with STEMI (ST elevation myocardial infarction) (HCC) (I21.3). Pt underwent impella assisted PCI with DESx1 on 2/17/2025. Pt has active OT eval and treat orders. Pt has a past medical history of Low hemoglobin. Patient lives in a 2S with supportive spouse, 3 steps to enter home w/ railing. Patient has a FFSU, w a tub/shower unit and standard toilet. Prior to hospitalization, patient utilized no AD for short household distances, however uses cane when ambulating in the community. Pt was IND with ADL's/IADL's.  Currently, patient requires SUP with UB ADL, min A w LB ADL, and completed transfers with SUP, FM w SUP>CGAx1 w RW. Currently, patient is limited due to decreased ADL status, decreased self-care transfers, decreased safe judgement during ADL's, decreased cognition, and decreased high-level ADL's, impacting functional mobility and completing self-care ADL's independently. Patient presents with impaired cognition, per chart review pt is experiencing sundowning and delirium upon admission, becoming agitated at night. See flowsheet for details. Pt will need OT 10-14 days/2-3X/wk to meet OT goals. The patient's raw score on the  AM-PAC Daily Activity Inpatient Short Form is 21. A raw score of greater than or equal to 19 suggests the patient may benefit from discharge to home, however OT r/c Level 3 upon d/c with cardiopulmonary rehab. Please refer to the recommendation of the Occupational Therapist for safe discharge planning.     Rehab Resource Intensity Level, OT: III (Minimum Resource Intensity) (w cardiopulmonary rehab)

## 2025-02-19 NOTE — ASSESSMENT & PLAN NOTE
Patient transferred from [facility] for exertional angina that seems ischemic  Had IABP after catheterization and SLB for cardiac surgery evaluation  Cardiac surgery does not feel that this patient will be a suitable candidate for surgery, as such the current recommendation is patient undergo repeat PCI with cardiology.  This appears to tentatively planned for early next week  In the meantime continue medical management with aspirin, atorvastatin, Brilinta, lopressor and Ranexa

## 2025-02-19 NOTE — ASSESSMENT & PLAN NOTE
Lab Results   Component Value Date    EGFR 46 02/19/2025    EGFR 50 02/18/2025    EGFR 52 02/17/2025    CREATININE 1.37 (H) 02/19/2025    CREATININE 1.28 02/18/2025    CREATININE 1.24 02/17/2025   Renal function appears within baseline  Continue close monitoring, cardiology likely will plan for repeat catheterization on this patient early next week

## 2025-02-19 NOTE — ASSESSMENT & PLAN NOTE
initially presented to [facility].  Was loaded with Brilinta and underwent urgent cardiac catheterization and was noted to have further progression in his left main disease along with known RCA .  An intra-aortic balloon pump was inserted postcardiac catheterization to help maintain appropriate coronary perfusion pressure and he was subsequently transferred to [facility] critical care unit for evaluation by cardiology and cardiac surgery.  He was seen and evaluated by cardiac surgery earlier this morning and noted not to be surgery candidate based on advanced age, deconditioning, gait dysfunction.

## 2025-02-19 NOTE — CASE MANAGEMENT
Case Management Assessment & Discharge Planning Note    Patient name Gael Ferraro  Location [facility] 526/[facility] 526-01 MRN [id number]  : 1940 Date 2025       Current Admission Date: 2/15/2025  Current Admission Diagnosis:Coronary artery disease involving native coronary artery of native heart with angina pectoris (HCC)   Patient Active Problem List    Diagnosis Date Noted Date Diagnosed    Hallucinations 2025     At risk for delirium 2025     Ambulatory dysfunction 2025     Frailty 2025     Anxiety 2025     Hypercholesterolemia      Pure red cell aplasia (HCC) 2024     Myelofibrosis (HCC) 2024     Abnormal CT of the chest 2024     SOB (shortness of breath) 2024     Constipation 2024     CKD (chronic kidney disease) stage 3, GFR 30-59 ml/min (HCC) 2024     Other dysphagia 2024     Anemia 2023     Coronary artery disease involving native coronary artery of native heart with angina pectoris (HCC) 2023     Hypertension 2023       LOS (days): 4  Geometric Mean LOS (GMLOS) (days): 11.6  Days to GMLOS:7.2     OBJECTIVE:    Risk of Unplanned Readmission Score: 18.82         Current admission status: Inpatient       Preferred Pharmacy:   [facility]/pharmacy #[id number] - [place], [place] - [facility] [address]  Phone: [phone] Fax: [phone]    Primary Care Provider: Mike Lyman MD    Primary Insurance: [facility]  Secondary Insurance:     ASSESSMENT:    Patient Information  Admitted from:: Home  Mental Status: Other (Comment) (asleep)  Assessment information provided by::  (chart)  Support Systems: Self, Spouse/significant other, Children  County of Residence: [place]  What city do you live in?: [place]  Home entry access options. Select all that apply.: Stairs  Number of steps to enter home.: 3  Type of Current Residence: 2 story home  Upon entering residence, is there a bedroom on the  main floor (no further steps)?: Yes  Upon entering residence, is there a bathroom on the main floor (no further steps)?: Yes  Living Arrangements: Lives w/ Spouse/significant other  Is patient a ?: Yes    Activities of Daily Living Prior to Admission  Functional Status: Independent  Ambulates independently?: Yes  Does patient currently own DME?: Yes  What DME does the patient currently own?: Straight Cane, Walker  Does patient have a history of HHC?: Yes ([facility])         Patient Information Continued  Income Source: Pension/long term  Does patient have prescription coverage?: Yes  Does patient receive dialysis treatments?: No  Does patient have a history of Mental Health Diagnosis?: Yes (Depression, anxiety)  Is patient receiving treatment for mental health?: Yes (Rx)         Means of Transportation  Means of Transport to Appts:: Drives Self          DISCHARGE DETAILS:     CM contacted family/caregiver?: Yes (called and left VM for spouse Christi)     Additional Comments: CM went to bedside and found patient heavily asleep, CM placed phone call to patient spouse Christi ([phone]) requesting callback. Above information from chart review. PT/OT currently recommending level III resources-outpatient PT- will need cardiac rehab. CM following and will be available

## 2025-02-20 LAB
ALBUMIN SERPL BCG-MCNC: 3.3 G/DL (ref 3.5–5)
ALP SERPL-CCNC: 102 U/L (ref 34–104)
ALT SERPL W P-5'-P-CCNC: 45 U/L (ref 7–52)
ANION GAP SERPL CALCULATED.3IONS-SCNC: 10 MMOL/L (ref 4–13)
AST SERPL W P-5'-P-CCNC: 64 U/L (ref 13–39)
BACTERIA BLD CULT: ABNORMAL
BILIRUB DIRECT SERPL-MCNC: 0.29 MG/DL (ref 0–0.2)
BILIRUB SERPL-MCNC: 1.11 MG/DL (ref 0.2–1)
BUN SERPL-MCNC: 32 MG/DL (ref 5–25)
CALCIUM SERPL-MCNC: 8.5 MG/DL (ref 8.4–10.2)
CHLORIDE SERPL-SCNC: 98 MMOL/L (ref 96–108)
CO2 SERPL-SCNC: 26 MMOL/L (ref 21–32)
CREAT SERPL-MCNC: 1.33 MG/DL (ref 0.6–1.3)
ERYTHROCYTE [DISTWIDTH] IN BLOOD BY AUTOMATED COUNT: 13.9 % (ref 11.6–15.1)
GFR SERPL CREATININE-BSD FRML MDRD: 48 ML/MIN/1.73SQ M
GLUCOSE SERPL-MCNC: 123 MG/DL (ref 65–140)
GRAM STN SPEC: ABNORMAL
HCT VFR BLD AUTO: 26.2 % (ref 36.5–49.3)
HGB BLD-MCNC: 9.2 G/DL (ref 12–17)
MAGNESIUM SERPL-MCNC: 2 MG/DL (ref 1.9–2.7)
MCH RBC QN AUTO: 38.2 PG (ref 26.8–34.3)
MCHC RBC AUTO-ENTMCNC: 35.1 G/DL (ref 31.4–37.4)
MCV RBC AUTO: 109 FL (ref 82–98)
PLATELET # BLD AUTO: 252 THOUSANDS/UL (ref 149–390)
PMV BLD AUTO: 11.2 FL (ref 8.9–12.7)
POTASSIUM SERPL-SCNC: 3.3 MMOL/L (ref 3.5–5.3)
PROT SERPL-MCNC: 5.7 G/DL (ref 6.4–8.4)
RBC # BLD AUTO: 2.41 MILLION/UL (ref 3.88–5.62)
S AUREUS+CONS DNA BLD POS NAA+NON-PROBE: DETECTED
SODIUM SERPL-SCNC: 134 MMOL/L (ref 135–147)
WBC # BLD AUTO: 5.91 THOUSAND/UL (ref 4.31–10.16)

## 2025-02-20 PROCEDURE — 80048 BASIC METABOLIC PNL TOTAL CA: CPT | Performed by: STUDENT IN AN ORGANIZED HEALTH CARE EDUCATION/TRAINING PROGRAM

## 2025-02-20 PROCEDURE — 85027 COMPLETE CBC AUTOMATED: CPT | Performed by: STUDENT IN AN ORGANIZED HEALTH CARE EDUCATION/TRAINING PROGRAM

## 2025-02-20 PROCEDURE — 83735 ASSAY OF MAGNESIUM: CPT | Performed by: STUDENT IN AN ORGANIZED HEALTH CARE EDUCATION/TRAINING PROGRAM

## 2025-02-20 PROCEDURE — 99232 SBSQ HOSP IP/OBS MODERATE 35: CPT | Performed by: INTERNAL MEDICINE

## 2025-02-20 PROCEDURE — 80076 HEPATIC FUNCTION PANEL: CPT | Performed by: STUDENT IN AN ORGANIZED HEALTH CARE EDUCATION/TRAINING PROGRAM

## 2025-02-20 PROCEDURE — 99232 SBSQ HOSP IP/OBS MODERATE 35: CPT | Performed by: STUDENT IN AN ORGANIZED HEALTH CARE EDUCATION/TRAINING PROGRAM

## 2025-02-20 RX ORDER — POTASSIUM CHLORIDE 1500 MG/1
40 TABLET, EXTENDED RELEASE ORAL ONCE
Status: COMPLETED | OUTPATIENT
Start: 2025-02-20 | End: 2025-02-20

## 2025-02-20 RX ORDER — FUROSEMIDE 10 MG/ML
40 INJECTION INTRAMUSCULAR; INTRAVENOUS ONCE
Status: DISCONTINUED | OUTPATIENT
Start: 2025-02-20 | End: 2025-02-20

## 2025-02-20 RX ORDER — FUROSEMIDE 10 MG/ML
20 INJECTION INTRAMUSCULAR; INTRAVENOUS ONCE
Status: COMPLETED | OUTPATIENT
Start: 2025-02-20 | End: 2025-02-20

## 2025-02-20 RX ADMIN — METOPROLOL TARTRATE 50 MG: 50 TABLET, FILM COATED ORAL at 21:38

## 2025-02-20 RX ADMIN — TICAGRELOR 90 MG: 90 TABLET ORAL at 21:38

## 2025-02-20 RX ADMIN — TICAGRELOR 90 MG: 90 TABLET ORAL at 09:07

## 2025-02-20 RX ADMIN — LORAZEPAM 1 MG: 1 TABLET ORAL at 14:40

## 2025-02-20 RX ADMIN — RANOLAZINE 1000 MG: 500 TABLET, FILM COATED, EXTENDED RELEASE ORAL at 21:38

## 2025-02-20 RX ADMIN — FUROSEMIDE 20 MG: 10 INJECTION, SOLUTION INTRAMUSCULAR; INTRAVENOUS at 09:04

## 2025-02-20 RX ADMIN — LORAZEPAM 1 MG: 1 TABLET ORAL at 05:28

## 2025-02-20 RX ADMIN — METOPROLOL TARTRATE 50 MG: 50 TABLET, FILM COATED ORAL at 09:07

## 2025-02-20 RX ADMIN — ATORVASTATIN CALCIUM 80 MG: 80 TABLET, FILM COATED ORAL at 16:53

## 2025-02-20 RX ADMIN — RANOLAZINE 1000 MG: 500 TABLET, FILM COATED, EXTENDED RELEASE ORAL at 09:07

## 2025-02-20 RX ADMIN — DEXTRAN 70, GLYCERIN, HYPROMELLOSE 1 DROP: 1; 2; 3 SOLUTION/ DROPS OPHTHALMIC at 16:55

## 2025-02-20 RX ADMIN — HEPARIN SODIUM 5000 UNITS: 5000 INJECTION INTRAVENOUS; SUBCUTANEOUS at 21:40

## 2025-02-20 RX ADMIN — HEPARIN SODIUM 5000 UNITS: 5000 INJECTION INTRAVENOUS; SUBCUTANEOUS at 05:32

## 2025-02-20 RX ADMIN — POTASSIUM CHLORIDE 40 MEQ: 1500 TABLET, EXTENDED RELEASE ORAL at 07:28

## 2025-02-20 RX ADMIN — HEPARIN SODIUM 5000 UNITS: 5000 INJECTION INTRAVENOUS; SUBCUTANEOUS at 14:36

## 2025-02-20 RX ADMIN — DEXTRAN 70, GLYCERIN, HYPROMELLOSE 1 DROP: 1; 2; 3 SOLUTION/ DROPS OPHTHALMIC at 09:16

## 2025-02-20 RX ADMIN — ASPIRIN 81 MG: 81 TABLET, COATED ORAL at 09:07

## 2025-02-20 RX ADMIN — CHLORHEXIDINE GLUCONATE 0.12% ORAL RINSE 15 ML: 1.2 LIQUID ORAL at 21:40

## 2025-02-20 RX ADMIN — POTASSIUM CHLORIDE 40 MEQ: 1500 TABLET, EXTENDED RELEASE ORAL at 09:07

## 2025-02-20 RX ADMIN — DEXTRAN 70, GLYCERIN, HYPROMELLOSE 1 DROP: 1; 2; 3 SOLUTION/ DROPS OPHTHALMIC at 21:42

## 2025-02-20 RX ADMIN — LORAZEPAM 1 MG: 1 TABLET ORAL at 21:39

## 2025-02-20 RX ADMIN — CHLORHEXIDINE GLUCONATE 0.12% ORAL RINSE 15 ML: 1.2 LIQUID ORAL at 09:11

## 2025-02-20 RX ADMIN — Medication 6 MG: at 21:39

## 2025-02-20 NOTE — ASSESSMENT & PLAN NOTE
initially presented to [facility].  Was loaded with Brilinta and underwent urgent cardiac catheterization and was noted to have further progression in his left main disease along with known RCA .  An intra-aortic balloon pump was inserted postcardiac catheterization to help maintain appropriate coronary perfusion pressure and he was subsequently transferred to [facility] critical care unit for evaluation by cardiology and cardiac surgery.  He was seen and evaluated by cardiac surgery earlier this morning and noted not to be surgery candidate based on advanced age, deconditioning, gait dysfunction.

## 2025-02-20 NOTE — ASSESSMENT & PLAN NOTE
Lab Results   Component Value Date    EGFR 48 02/20/2025    EGFR 46 02/19/2025    EGFR 50 02/18/2025    CREATININE 1.33 (H) 02/20/2025    CREATININE 1.37 (H) 02/19/2025    CREATININE 1.28 02/18/2025   Renal function appears within baseline  Continue close monitoring, cardiology likely will plan for repeat catheterization on this patient early next week, likely as an outpatient

## 2025-02-20 NOTE — ASSESSMENT & PLAN NOTE
Patient transferred from [facility] for exertional angina that seems ischemic  Had IABP after catheterization and SLB for cardiac surgery evaluation  Cardiac surgery does not feel that this patient will be a suitable candidate for surgery, as such the current recommendation is patient undergo repeat PCI with cardiology.  This appears to tentatively planned for early next week  In the meantime continue medical management with aspirin, atorvastatin, Brilinta, lopressor and Ranexa

## 2025-02-20 NOTE — RESTORATIVE TECHNICIAN NOTE
Restorative Technician Note      Patient Name: Gael Ferraro     Restorative Tech Visit Date: 02/20/25  Note Type: Mobility  Patient Position Upon Consult: Bedside chair  Activity Performed: Ambulated  Assistive Device: Roller walker  Patient Position at End of Consult: Bedside chair; All needs within reach; Bed/Chair alarm activated

## 2025-02-20 NOTE — PROGRESS NOTES
Cardiology Team 2 - Progress Note   Gael Ferraro 84 y.o. male MRN: [id number]  Unit/Bed#: [facility] 526-01 Encounter: [id number]    Assessment and Plan      84-year-old male with known history of obstructive coronary artery disease and known RCA  and ostial to proximal 50% left main presented with worsening exertional angina and noted to have progression in left main disease.  Surgical turndown and subsequently underwent Impella assisted high risk PCI to ostial left main.  Noted to have midrange EF this hospitalization.  Cardiology following from this standpoint.    Past 24 hours: Weaned down from nasal cannula 6 L to 3 L yesterday.  Relevant imaging from past 24 hours: Chest x-ray from yesterday with formal read pending but on my read, bilateral pulmonary venous congestion with small pleural effusions in the lower lung fields.  Labs Vitals (past 24 hours) I/Os (past 24 hours) Weights   Recent Labs     02/19/25  0450 02/20/25  0514   CREATININE 1.37* 1.33*   EGFR 46 48   CO2 27 26   K 3.4* 3.3*   MG 2.0 2.0   SODIUM 135 134*   BUN 27* 32*   HGB 10.1* 9.2*    252   Potassium repleted with 80 mEq Temp:  [97.7 °F (36.5 °C)-99.1 °F (37.3 °C)] 97.7 °F (36.5 °C)  HR:  [70-91] 91  BP: (102-127)/(45-63) 127/54  Resp:  [15-18] 18  SpO2:  [92 %-95 %] 92 %  O2 Device: None (Room air)  Nasal Cannula O2 Flow Rate (L/min):  [3 L/min] 3 L/min  Oxygen weaned down to 3 L from yesterday.   Intake/Output Summary (Last 24 hours) at 2/20/2025 0700  Last data filed at 2/20/2025 0528  Gross per 24 hour   Intake 1020 ml   Output 450 ml   Net 570 ml    Weight (last 2 days)       Date/Time Weight    02/20/25 0500 67.1 (147.93)    02/19/25 0542 68.8 (151.68)    02/18/25 0600 67.2 (148.15)               Plan  -Continue on DAPT with aspirin and brilinta, high intensity statin  -On rate control with metoprolol tartrate 50 mg twice daily  -Based on his CXR, would recommend diuresis with IV Lasix 20 mg for today. Would aim for net  negative of 0.5-1 L in next 24 hours.  -Plan to hold off on initiation of GDMT such as Jardiance or spironolactone with increasing serum creatinine.  Continue holding losartan at this time.  -Brilinta price checked, at $21 per month.  -Continue telemetry monitoring. Monitor and correct electrolyte derangements.  -Will arrange for outpatient follow-up.  -Cardiology service will continue to follow.    Assessment & Plan  Coronary artery disease involving native coronary artery of native heart with angina pectoris (HCC)  initially presented to [facility].  Was loaded with Brilinta and underwent urgent cardiac catheterization and was noted to have further progression in his left main disease along with known RCA .  An intra-aortic balloon pump was inserted postcardiac catheterization to help maintain appropriate coronary perfusion pressure and he was subsequently transferred to [facility] critical care unit for evaluation by cardiology and cardiac surgery.  He was seen and evaluated by cardiac surgery earlier this morning and noted not to be surgery candidate based on advanced age, deconditioning, gait dysfunction.    Hypertension  Systolics in the ranges of 100-1 10, would hold off on addition of other antihypertensives or GDMT at this point.  CKD (chronic kidney disease) stage 3, GFR 30-59 ml/min ([facility])  Lab Results   Component Value Date    EGFR 48 02/20/2025    EGFR 46 02/19/2025    EGFR 50 02/18/2025    CREATININE 1.33 (H) 02/20/2025    CREATININE 1.37 (H) 02/19/2025    CREATININE 1.28 02/18/2025   Appears to be at baseline renal function, will need to monitor at this since patient will likely require another cardiac catheterization early next week.   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 Nayak. Thank you for involving us in the care of this patient.    Subjective     Patient seen and examined.      Objective     Physical Exam  Vitals reviewed.   Cardiovascular:      Rate and Rhythm: Normal rate and regular rhythm.      Pulses: Normal pulses.      Heart sounds: Normal heart sounds.      Comments: No edema in lower extremities, JVP slightly up  Pulmonary:      Effort: Pulmonary effort is normal.      Comments: Mild crackles in lower lung fields  Skin:     General: Skin is warm.      Capillary Refill: Capillary refill takes less than 2 seconds.         Vitals:  Temp:  [97.7 °F (36.5 °C)-99.1 °F (37.3 °C)] 97.7 °F (36.5 °C)  HR:  [70-91] 91  BP: (102-127)/(45-63) 127/54  Resp:  [15-18] 18  SpO2:  [92 %-95 %] 92 %  O2 Device: None (Room air)  Nasal Cannula O2 Flow Rate (L/min):  [3 L/min] 3 L/min  Orthostatic Blood Pressures      Flowsheet Row Most Recent Value   Blood Pressure 127/54 filed at 02/20/2025 0246   Patient Position - Orthostatic VS Lying filed at 02/20/2025 0246            Cardiac Devices, Imaging & Monitoring     Telemetry:   Personally reviewed by Vani Orr MD: Sinus rhythm on telemetry with rates around 70s    Results for orders placed during the hospital encounter of 02/15/25    Echo complete w/ contrast if indicated    Interpretation Summary    Left Ventricle: Left ventricular cavity size is normal. Wall thickness is mildly increased. There is concentric remodeling. The left ventricular ejection fraction is 43% by biplane measurement. Systolic function is mildly reduced. There is mild global hypokinesis. Diastolic function is moderately abnormal, consistent with grade II (pseudonormal) relaxation.    The following segments are akinetic: apical anterior, apical septal and apex.    The following segments are mildly hypokinetic: basal anterior, basal anteroseptal, basal inferoseptal, basal  inferior, basal inferolateral, basal anterolateral, mid anterior, mid anteroseptal, mid inferoseptal, mid inferior, mid inferolateral, mid anterolateral, apical inferior and apical lateral.    Mitral Valve: There is mild regurgitation with a posteriorly directed jet.    Prior TTE study available for comparison. Prior study date: 1/1/2024. Changes noted when compared to prior study. Changes include: LVEF now mildly reduced, with regional wall motion abnormalities. .    Vani Orr MD  Cardiovascular Disease Fellow, FY-1

## 2025-02-20 NOTE — PROGRESS NOTES
Progress Note - Hospitalist   Name: Gael Ferraro 84 y.o. male I MRN: [id number]  Unit/Bed#: [facility] 526-01 I Date of Admission: 2/15/2025   Date of Service: 2/20/2025 I Hospital Day: 5    Assessment & Plan  Coronary artery disease involving native coronary artery of native heart with angina pectoris (HCC)  Patient transferred from [facility] for exertional angina that seems ischemic  Had IABP after catheterization and SLB for cardiac surgery evaluation  Cardiac surgery does not feel that this patient will be a suitable candidate for surgery, as such the current recommendation is patient undergo repeat PCI with cardiology.  This appears to tentatively planned for early next week  In the meantime continue medical management with aspirin, atorvastatin, Brilinta, lopressor and Ranexa  Hypertension  Continue  Lopressor 50 mg every 12h. Losartan currently on hold  CKD (chronic kidney disease) stage 3, GFR 30-59 ml/min ([facility])  Lab Results   Component Value Date    EGFR 48 02/20/2025    EGFR 46 02/19/2025    EGFR 50 02/18/2025    CREATININE 1.33 (H) 02/20/2025    CREATININE 1.37 (H) 02/19/2025    CREATININE 1.28 02/18/2025   Renal function appears within baseline  Continue close monitoring, cardiology likely will plan for repeat catheterization on this patient early next week, likely as an outpatient  Hypercholesterolemia  Resume atorvastatin 80  Ambulatory dysfunction  PT/OT ordered  Anxiety  Resumed on home Ativan dosing PRN, reportedly with some improvement in his mental status    VTE Pharmacologic Prophylaxis:   Moderate Risk (Score 3-4) - Pharmacological DVT Prophylaxis Ordered: heparin.    Mobility:   Basic Mobility Inpatient Raw Score: 17  JH-HLM Goal: 5: Stand one or more mins  JH-HLM Achieved: 8: Walk 250 feet ot more  JH-HLM Goal achieved. Continue to encourage appropriate mobility.    Patient Centered Rounds: I performed bedside rounds with nursing staff today.   Discussions with Specialists or Other Care  Team Provider: N/A    Education and Discussions with Family / Patient: Updated  (wife) at bedside.    Current Length of Stay: 5 day(s)  Current Patient Status: Inpatient   Certification Statement: The patient will continue to require additional inpatient hospital stay due to Delirium  Discharge Plan: Anticipate discharge in 24-48 hrs to home.    Code Status: Level 2 - DNAR: but accepts endotracheal intubation    Subjective   Seen and examined at bedside this morning. Patient resting comfortably in bed. States he moved his bowels last night. No new complaints    Objective :  Temp:  [97.1 °F (36.2 °C)-99.1 °F (37.3 °C)] 98.3 °F (36.8 °C)  HR:  [73-91] 80  BP: (108-135)/(51-56) 116/51  Resp:  [14-18] 17  SpO2:  [85 %-94 %] 91 %  O2 Device: None (Room air)  Nasal Cannula O2 Flow Rate (L/min):  [3 L/min] 3 L/min    Body mass index is 21.23 kg/m².     Input and Output Summary (last 24 hours):     Intake/Output Summary (Last 24 hours) at 2/20/2025 1811  Last data filed at 2/20/2025 1737  Gross per 24 hour   Intake 1140 ml   Output 825 ml   Net 315 ml       Physical Exam  Vitals and nursing note reviewed.   Constitutional:       General: He is not in acute distress.     Appearance: He is well-developed.   HENT:      Head: Normocephalic and atraumatic.      Mouth/Throat:      Mouth: Mucous membranes are moist.   Eyes:      Extraocular Movements: Extraocular movements intact.      Conjunctiva/sclera: Conjunctivae normal.   Cardiovascular:      Rate and Rhythm: Normal rate and regular rhythm.      Pulses: Normal pulses.      Heart sounds: No murmur heard.  Pulmonary:      Effort: Pulmonary effort is normal. No respiratory distress.      Breath sounds: Normal breath sounds. No wheezing or rhonchi.   Abdominal:      General: Abdomen is flat. There is no distension.      Tenderness: There is no abdominal tenderness. There is no guarding or rebound.   Musculoskeletal:         General: No swelling.      Right lower  leg: No edema.      Left lower leg: No edema.   Skin:     General: Skin is warm and dry.      Capillary Refill: Capillary refill takes less than 2 seconds.      Coloration: Skin is not jaundiced.      Findings: No rash.   Neurological:      General: No focal deficit present.      Mental Status: He is alert and oriented to person, place, and time.      Sensory: No sensory deficit.      Motor: No weakness.   Psychiatric:         Mood and Affect: Mood normal.           Lab Results: I have reviewed the following results:   Results from last 7 days   Lab Units 02/20/25  0514 02/19/25  0450   WBC Thousand/uL 5.91 8.94   HEMOGLOBIN g/dL 9.2* 10.1*   HEMATOCRIT % 26.2* 29.3*   PLATELETS Thousands/uL 252 259   SEGS PCT %  --  56   LYMPHO PCT %  --  31   MONO PCT %  --  11   EOS PCT %  --  2     Results from last 7 days   Lab Units 02/20/25  0514   SODIUM mmol/L 134*   POTASSIUM mmol/L 3.3*   CHLORIDE mmol/L 98   CO2 mmol/L 26   BUN mg/dL 32*   CREATININE mg/dL 1.33*   ANION GAP mmol/L 10   CALCIUM mg/dL 8.5   ALBUMIN g/dL 3.3*   TOTAL BILIRUBIN mg/dL 1.11*   ALK PHOS U/L 102   ALT U/L 45   AST U/L 64*   GLUCOSE RANDOM mg/dL 123     Results from last 7 days   Lab Units 02/15/25  0416   INR  0.88                   Recent Cultures (last 7 days):   Results from last 7 days   Lab Units 02/17/25  1005 02/17/25  0927   BLOOD CULTURE  No Growth at 72 hrs. Staphylococcus coagulase negative*   GRAM STAIN RESULT   --  Gram positive cocci in clusters*       Imaging Results Review: I reviewed radiology reports from this admission including: chest xray.      Last 24 Hours Medication List:     Current Facility-Administered Medications:     acetaminophen (TYLENOL) tablet 650 mg, Q6H PRN    Artificial Tears Op Soln 1 drop, TID    aspirin (ECOTRIN LOW STRENGTH) EC tablet 81 mg, Daily    atorvastatin (LIPITOR) tablet 80 mg, After Dinner    chlorhexidine (PERIDEX) 0.12 % oral rinse 15 mL, Q12H CRESENCIO    heparin (porcine) subcutaneous injection  5,000 Units, Q8H CRESENCIO    LORazepam (ATIVAN) tablet 1 mg, TID PRN    [Held by provider] losartan (COZAAR) tablet 12.5 mg, Daily    melatonin tablet 6 mg, HS    metoprolol tartrate (LOPRESSOR) tablet 50 mg, Q12H CRESENCIO    nitroglycerin (NITROSTAT) SL tablet 0.4 mg, Q5 Min PRN    ondansetron (ZOFRAN) injection 4 mg, Q6H PRN    predniSONE tablet 5 mg, Every Other Day    ranolazine (RANEXA) 12 hr tablet 1,000 mg, Q12H CRESENCIO    senna-docusate sodium (SENOKOT S) 8.6-50 mg per tablet 1 tablet, BID    ticagrelor (BRILINTA) tablet 90 mg, Q12H CRESENCIO    Administrative Statements   Today, Patient Was Seen By: Luther Cooper      **Please Note: This note may have been constructed using a voice recognition system.**

## 2025-02-20 NOTE — ASSESSMENT & PLAN NOTE
Lab Results   Component Value Date    EGFR 48 02/20/2025    EGFR 46 02/19/2025    EGFR 50 02/18/2025    CREATININE 1.33 (H) 02/20/2025    CREATININE 1.37 (H) 02/19/2025    CREATININE 1.28 02/18/2025   Appears to be at baseline renal function, will need to monitor at this since patient will likely require another cardiac catheterization early next week.

## 2025-02-21 LAB
ALBUMIN SERPL BCG-MCNC: 3.2 G/DL (ref 3.5–5)
ALP SERPL-CCNC: 93 U/L (ref 34–104)
ALT SERPL W P-5'-P-CCNC: 47 U/L (ref 7–52)
ANION GAP SERPL CALCULATED.3IONS-SCNC: 8 MMOL/L (ref 4–13)
AST SERPL W P-5'-P-CCNC: 59 U/L (ref 13–39)
BILIRUB DIRECT SERPL-MCNC: 0.23 MG/DL (ref 0–0.2)
BILIRUB SERPL-MCNC: 0.97 MG/DL (ref 0.2–1)
BUN SERPL-MCNC: 30 MG/DL (ref 5–25)
CALCIUM SERPL-MCNC: 8.4 MG/DL (ref 8.4–10.2)
CHLORIDE SERPL-SCNC: 102 MMOL/L (ref 96–108)
CO2 SERPL-SCNC: 26 MMOL/L (ref 21–32)
CREAT SERPL-MCNC: 1.25 MG/DL (ref 0.6–1.3)
ERYTHROCYTE [DISTWIDTH] IN BLOOD BY AUTOMATED COUNT: 14.5 % (ref 11.6–15.1)
GFR SERPL CREATININE-BSD FRML MDRD: 51 ML/MIN/1.73SQ M
GLUCOSE SERPL-MCNC: 110 MG/DL (ref 65–140)
HCT VFR BLD AUTO: 26.7 % (ref 36.5–49.3)
HGB BLD-MCNC: 8.9 G/DL (ref 12–17)
MAGNESIUM SERPL-MCNC: 2 MG/DL (ref 1.9–2.7)
MCH RBC QN AUTO: 36.9 PG (ref 26.8–34.3)
MCHC RBC AUTO-ENTMCNC: 33.3 G/DL (ref 31.4–37.4)
MCV RBC AUTO: 111 FL (ref 82–98)
PLATELET # BLD AUTO: 303 THOUSANDS/UL (ref 149–390)
PMV BLD AUTO: 10.9 FL (ref 8.9–12.7)
POTASSIUM SERPL-SCNC: 3.7 MMOL/L (ref 3.5–5.3)
PROT SERPL-MCNC: 5.6 G/DL (ref 6.4–8.4)
RBC # BLD AUTO: 2.41 MILLION/UL (ref 3.88–5.62)
SODIUM SERPL-SCNC: 136 MMOL/L (ref 135–147)
WBC # BLD AUTO: 5.66 THOUSAND/UL (ref 4.31–10.16)

## 2025-02-21 PROCEDURE — 85027 COMPLETE CBC AUTOMATED: CPT | Performed by: STUDENT IN AN ORGANIZED HEALTH CARE EDUCATION/TRAINING PROGRAM

## 2025-02-21 PROCEDURE — 83735 ASSAY OF MAGNESIUM: CPT | Performed by: STUDENT IN AN ORGANIZED HEALTH CARE EDUCATION/TRAINING PROGRAM

## 2025-02-21 PROCEDURE — 80048 BASIC METABOLIC PNL TOTAL CA: CPT | Performed by: STUDENT IN AN ORGANIZED HEALTH CARE EDUCATION/TRAINING PROGRAM

## 2025-02-21 PROCEDURE — 80076 HEPATIC FUNCTION PANEL: CPT | Performed by: STUDENT IN AN ORGANIZED HEALTH CARE EDUCATION/TRAINING PROGRAM

## 2025-02-21 PROCEDURE — 99232 SBSQ HOSP IP/OBS MODERATE 35: CPT | Performed by: INTERNAL MEDICINE

## 2025-02-21 PROCEDURE — 99232 SBSQ HOSP IP/OBS MODERATE 35: CPT | Performed by: STUDENT IN AN ORGANIZED HEALTH CARE EDUCATION/TRAINING PROGRAM

## 2025-02-21 RX ORDER — LOSARTAN POTASSIUM 25 MG/1
12.5 TABLET ORAL DAILY
Status: DISCONTINUED | OUTPATIENT
Start: 2025-02-21 | End: 2025-02-22 | Stop reason: HOSPADM

## 2025-02-21 RX ADMIN — DEXTRAN 70, GLYCERIN, HYPROMELLOSE 1 DROP: 1; 2; 3 SOLUTION/ DROPS OPHTHALMIC at 17:37

## 2025-02-21 RX ADMIN — RANOLAZINE 1000 MG: 500 TABLET, FILM COATED, EXTENDED RELEASE ORAL at 21:59

## 2025-02-21 RX ADMIN — ATORVASTATIN CALCIUM 80 MG: 80 TABLET, FILM COATED ORAL at 17:37

## 2025-02-21 RX ADMIN — LORAZEPAM 1 MG: 1 TABLET ORAL at 13:39

## 2025-02-21 RX ADMIN — TICAGRELOR 90 MG: 90 TABLET ORAL at 21:59

## 2025-02-21 RX ADMIN — TICAGRELOR 90 MG: 90 TABLET ORAL at 09:22

## 2025-02-21 RX ADMIN — ASPIRIN 81 MG: 81 TABLET, COATED ORAL at 09:22

## 2025-02-21 RX ADMIN — PREDNISONE 5 MG: 5 TABLET ORAL at 09:22

## 2025-02-21 RX ADMIN — METOPROLOL TARTRATE 50 MG: 50 TABLET, FILM COATED ORAL at 09:22

## 2025-02-21 RX ADMIN — DEXTRAN 70, GLYCERIN, HYPROMELLOSE 1 DROP: 1; 2; 3 SOLUTION/ DROPS OPHTHALMIC at 09:25

## 2025-02-21 RX ADMIN — Medication 6 MG: at 21:59

## 2025-02-21 RX ADMIN — HEPARIN SODIUM 5000 UNITS: 5000 INJECTION INTRAVENOUS; SUBCUTANEOUS at 21:59

## 2025-02-21 RX ADMIN — METOPROLOL TARTRATE 50 MG: 50 TABLET, FILM COATED ORAL at 21:59

## 2025-02-21 RX ADMIN — LOSARTAN POTASSIUM 12.5 MG: 25 TABLET, FILM COATED ORAL at 13:39

## 2025-02-21 RX ADMIN — HEPARIN SODIUM 5000 UNITS: 5000 INJECTION INTRAVENOUS; SUBCUTANEOUS at 13:40

## 2025-02-21 RX ADMIN — RANOLAZINE 1000 MG: 500 TABLET, FILM COATED, EXTENDED RELEASE ORAL at 09:22

## 2025-02-21 RX ADMIN — CHLORHEXIDINE GLUCONATE 0.12% ORAL RINSE 15 ML: 1.2 LIQUID ORAL at 21:59

## 2025-02-21 RX ADMIN — LORAZEPAM 1 MG: 1 TABLET ORAL at 22:58

## 2025-02-21 RX ADMIN — CHLORHEXIDINE GLUCONATE 0.12% ORAL RINSE 15 ML: 1.2 LIQUID ORAL at 09:23

## 2025-02-21 RX ADMIN — DEXTRAN 70, GLYCERIN, HYPROMELLOSE 1 DROP: 1; 2; 3 SOLUTION/ DROPS OPHTHALMIC at 22:00

## 2025-02-21 NOTE — ASSESSMENT & PLAN NOTE
initially presented to [facility].  Was loaded with Brilinta and underwent urgent cardiac catheterization and was noted to have further progression in his left main disease along with known RCA .  An intra-aortic balloon pump was inserted postcardiac catheterization to help maintain appropriate coronary perfusion pressure and he was subsequently transferred to [facility] critical care unit for evaluation by cardiology and cardiac surgery.  He was seen and evaluated by cardiac surgery earlier this morning and noted not to be surgery candidate based on advanced age, deconditioning, gait dysfunction.

## 2025-02-21 NOTE — ASSESSMENT & PLAN NOTE
Patient transferred from [facility] for exertional angina that seems ischemic  Had IABP after catheterization and SLB for cardiac surgery evaluation  Cardiac surgery does not feel that this patient will be a suitable candidate for surgery, as such the current recommendation is patient undergo repeat PCI with cardiology.  This appears to tentatively planned for early next week in the outpatient setting  In the meantime continue medical management with aspirin, atorvastatin, Brilinta, lopressor and Ranexa.  ARB restarted

## 2025-02-21 NOTE — PROGRESS NOTES
Progress Note - Hospitalist   Name: Gael Ferraro 84 y.o. male I MRN: [id number]  Unit/Bed#: [facility] 526-01 I Date of Admission: 2/15/2025   Date of Service: 2/21/2025 I Hospital Day: 6    Assessment & Plan  Coronary artery disease involving native coronary artery of native heart with angina pectoris (HCC)  Patient transferred from [facility] for exertional angina that seems ischemic  Had IABP after catheterization and SLB for cardiac surgery evaluation  Cardiac surgery does not feel that this patient will be a suitable candidate for surgery, as such the current recommendation is patient undergo repeat PCI with cardiology.  This appears to tentatively planned for early next week in the outpatient setting  In the meantime continue medical management with aspirin, atorvastatin, Brilinta, lopressor and Ranexa.  ARB restarted  Hypertension  Continue  Lopressor 50 mg every 12h. Losartan restarted today  CKD (chronic kidney disease) stage 3, GFR 30-59 ml/min ([facility])  Lab Results   Component Value Date    EGFR 51 02/21/2025    EGFR 48 02/20/2025    EGFR 46 02/19/2025    CREATININE 1.25 02/21/2025    CREATININE 1.33 (H) 02/20/2025    CREATININE 1.37 (H) 02/19/2025   Renal function appears within baseline  Continue close monitoring, cardiology likely will plan for repeat catheterization on this patient early next week, likely as an outpatient  Hypercholesterolemia  Resume atorvastatin 80  Ambulatory dysfunction  PT/OT ordered  Anxiety  Resumed on home Ativan dosing PRN, reportedly with some improvement in his mental status    VTE Pharmacologic Prophylaxis:   Moderate Risk (Score 3-4) - Pharmacological DVT Prophylaxis Ordered: heparin.    Mobility:   Basic Mobility Inpatient Raw Score: 17  JH-HLM Goal: 5: Stand one or more mins  JH-HLM Achieved: 8: Walk 250 feet ot more  JH-HLM Goal achieved. Continue to encourage appropriate mobility.    Patient Centered Rounds: I performed bedside rounds with nursing staff today.    Discussions with Specialists or Other Care Team Provider: N/A  Current Length of Stay: 6 day(s)  Current Patient Status: Inpatient   Certification Statement: The patient will continue to require additional inpatient hospital stay due to CAD  Discharge Plan: Anticipate discharge tomorrow to home.    Code Status: Level 2 - DNAR: but accepts endotracheal intubation    Subjective   Patient seen and examined at bedside.  He states he feels well and is getting closer to his baseline.  He has no new complaints this morning    Objective :  Temp:  [97.5 °F (36.4 °C)-98.6 °F (37 °C)] 98.6 °F (37 °C)  HR:  [76-91] 76  BP: ()/(48-89) 103/89  Resp:  [17-22] 17  SpO2:  [88 %-95 %] 93 %  O2 Device: None (Room air)  Nasal Cannula O2 Flow Rate (L/min):  [2 L/min] 2 L/min    Body mass index is 21.04 kg/m².     Input and Output Summary (last 24 hours):     Intake/Output Summary (Last 24 hours) at 2/21/2025 1744  Last data filed at 2/21/2025 0901  Gross per 24 hour   Intake 240 ml   Output 3 ml   Net 237 ml       Physical Exam  Vitals and nursing note reviewed.   Constitutional:       General: He is not in acute distress.     Appearance: He is well-developed.   HENT:      Head: Normocephalic and atraumatic.      Mouth/Throat:      Mouth: Mucous membranes are moist.   Eyes:      Extraocular Movements: Extraocular movements intact.      Conjunctiva/sclera: Conjunctivae normal.   Cardiovascular:      Rate and Rhythm: Normal rate and regular rhythm.      Pulses: Normal pulses.      Heart sounds: No murmur heard.  Pulmonary:      Effort: Pulmonary effort is normal. No respiratory distress.      Breath sounds: Normal breath sounds. No wheezing or rhonchi.   Abdominal:      General: Abdomen is flat. There is no distension.      Tenderness: There is no abdominal tenderness. There is no guarding or rebound.   Musculoskeletal:         General: No swelling.      Right lower leg: No edema.      Left lower leg: No edema.   Skin:      General: Skin is warm and dry.      Capillary Refill: Capillary refill takes less than 2 seconds.      Coloration: Skin is not jaundiced.      Findings: No rash.   Neurological:      General: No focal deficit present.      Mental Status: He is alert and oriented to person, place, and time.      Sensory: No sensory deficit.      Motor: No weakness.   Psychiatric:         Mood and Affect: Mood normal.         Telemetry:  Telemetry Orders (From admission, onward)               24 Hour Telemetry Monitoring  Continuous x 24 Hours (Telem)        Expiring   Question:  Reason for 24 Hour Telemetry  Answer:  PCI/EP study (including pacer and ICD implementation), Cardiac surgery, MI, abnormal cardiac cath, and chest pain- rule out MI                     Telemetry Reviewed: Normal Sinus Rhythm  Indication for Continued Telemetry Use: Acute MI/Unstable Angina/Rule out ACS               Lab Results: I have reviewed the following results:   Results from last 7 days   Lab Units 02/21/25  0510 02/20/25  0514 02/19/25  0450   WBC Thousand/uL 5.66   < > 8.94   HEMOGLOBIN g/dL 8.9*   < > 10.1*   HEMATOCRIT % 26.7*   < > 29.3*   PLATELETS Thousands/uL 303   < > 259   SEGS PCT %  --   --  56   LYMPHO PCT %  --   --  31   MONO PCT %  --   --  11   EOS PCT %  --   --  2    < > = values in this interval not displayed.     Results from last 7 days   Lab Units 02/21/25  0510   SODIUM mmol/L 136   POTASSIUM mmol/L 3.7   CHLORIDE mmol/L 102   CO2 mmol/L 26   BUN mg/dL 30*   CREATININE mg/dL 1.25   ANION GAP mmol/L 8   CALCIUM mg/dL 8.4   ALBUMIN g/dL 3.2*   TOTAL BILIRUBIN mg/dL 0.97   ALK PHOS U/L 93   ALT U/L 47   AST U/L 59*   GLUCOSE RANDOM mg/dL 110     Results from last 7 days   Lab Units 02/15/25  0416   INR  0.88                   Recent Cultures (last 7 days):   Results from last 7 days   Lab Units 02/17/25  1005 02/17/25  0927   BLOOD CULTURE  No Growth After 4 Days. Staphylococcus coagulase negative*   GRAM STAIN RESULT   --  Gram  positive cocci in clusters*             Last 24 Hours Medication List:     Current Facility-Administered Medications:     acetaminophen (TYLENOL) tablet 650 mg, Q6H PRN    Artificial Tears Op Soln 1 drop, TID    aspirin (ECOTRIN LOW STRENGTH) EC tablet 81 mg, Daily    atorvastatin (LIPITOR) tablet 80 mg, After Dinner    chlorhexidine (PERIDEX) 0.12 % oral rinse 15 mL, Q12H CRESENCIO    heparin (porcine) subcutaneous injection 5,000 Units, Q8H CRESENCIO    LORazepam (ATIVAN) tablet 1 mg, TID PRN    losartan (COZAAR) tablet 12.5 mg, Daily    melatonin tablet 6 mg, HS    metoprolol tartrate (LOPRESSOR) tablet 50 mg, Q12H CRESENCIO    nitroglycerin (NITROSTAT) SL tablet 0.4 mg, Q5 Min PRN    ondansetron (ZOFRAN) injection 4 mg, Q6H PRN    predniSONE tablet 5 mg, Every Other Day    ranolazine (RANEXA) 12 hr tablet 1,000 mg, Q12H CRESENCIO    senna-docusate sodium (SENOKOT S) 8.6-50 mg per tablet 1 tablet, BID    ticagrelor (BRILINTA) tablet 90 mg, Q12H CRESENCIO    Administrative Statements   Today, Patient Was Seen By: Luther Cooper      **Please Note: This note may have been constructed using a voice recognition system.**

## 2025-02-21 NOTE — ASSESSMENT & PLAN NOTE
Lab Results   Component Value Date    EGFR 51 02/21/2025    EGFR 48 02/20/2025    EGFR 46 02/19/2025    CREATININE 1.25 02/21/2025    CREATININE 1.33 (H) 02/20/2025    CREATININE 1.37 (H) 02/19/2025   Renal function appears within baseline  Continue close monitoring, cardiology likely will plan for repeat catheterization on this patient early next week, likely as an outpatient

## 2025-02-21 NOTE — ASSESSMENT & PLAN NOTE
Lab Results   Component Value Date    EGFR 51 02/21/2025    EGFR 48 02/20/2025    EGFR 46 02/19/2025    CREATININE 1.25 02/21/2025    CREATININE 1.33 (H) 02/20/2025    CREATININE 1.37 (H) 02/19/2025   Has been improving over the course of past 2 days.  His baseline is likely 1-1.3.

## 2025-02-21 NOTE — PROGRESS NOTES
Cardiology Team 2 - Progress Note   Gael Ferraro 84 y.o. male MRN: [id number]  Unit/Bed#: [facility] 526-01 Encounter: [id number]    Assessment and Plan      84-year-old male with known history of obstructive coronary artery disease and known RCA  and ostial to proximal 50% left main presented with worsening exertional angina and noted to have progression in left main disease.  Surgical turndown and subsequently underwent Impella assisted high risk PCI to ostial left main.  Noted to have midrange EF this hospitalization.  Cardiology following from this standpoint.     Past 24 hours: On 2 L nasal cannula.  Weaned to room air earlier this morning.  Relevant imaging from past 24 hours: No new imaging from this morning, no new EKGs from this morning.  Labs Vitals (past 24 hours) I/Os (past 24 hours) Weights   Recent Labs     02/20/25  0514 02/21/25  0510   CREATININE 1.33* 1.25   EGFR 48 51   CO2 26 26   K 3.3* 3.7   MG 2.0 2.0   SODIUM 134* 136   BUN 32* 30*   HGB 9.2* 8.9*    303    Temp:  [97.1 °F (36.2 °C)-98.3 °F (36.8 °C)] 97.9 °F (36.6 °C)  HR:  [73-91] 81  BP: (116-135)/(48-53) 124/53  Resp:  [17-22] 22  SpO2:  [88 %-95 %] 89 %  O2 Device: Nasal cannula  Nasal Cannula O2 Flow Rate (L/min):  [2 L/min] 2 L/min     Intake/Output Summary (Last 24 hours) at 2/21/2025 0844  Last data filed at 2/21/2025 0501  Gross per 24 hour   Intake 900 ml   Output 627 ml   Net 273 ml    Weight (last 2 days)       Date/Time Weight    02/21/25 0514 66.5 (146.61)    02/20/25 0500 67.1 (147.93)            Plan  -Continue on DAPT with aspirin and brilinta, high intensity statin  -On rate control with metoprolol tartrate 50 mg twice daily  -Will hold off on further diuresis at this time.  -Plan to hold off on initiation of GDMT such as Jardiance or spironolactone for now.  Plan to restart losartan 12.5 mg daily today.  Monitor serum creatinine.   -Brilinta price checked, at $21 per month.  Fernandez discussed with patient, can  continue on this.  -Continue telemetry monitoring. Monitor and correct electrolyte derangements.  -Will arrange for outpatient follow-up. Cardiology service will continue to follow.    Assessment & Plan  Coronary artery disease involving native coronary artery of native heart with angina pectoris (HCC)  initially presented to [facility].  Was loaded with Brilinta and underwent urgent cardiac catheterization and was noted to have further progression in his left main disease along with known RCA .  An intra-aortic balloon pump was inserted postcardiac catheterization to help maintain appropriate coronary perfusion pressure and he was subsequently transferred to [facility] critical care unit for evaluation by cardiology and cardiac surgery.  He was seen and evaluated by cardiac surgery earlier this morning and noted not to be surgery candidate based on advanced age, deconditioning, gait dysfunction.    Hypertension  Systolics in the ranges of 100-1 10, would hold off on addition of other antihypertensives or GDMT at this point.  CKD (chronic kidney disease) stage 3, GFR 30-59 ml/min ([facility])  Lab Results   Component Value Date    EGFR 51 02/21/2025    EGFR 48 02/20/2025    EGFR 46 02/19/2025    CREATININE 1.25 02/21/2025    CREATININE 1.33 (H) 02/20/2025    CREATININE 1.37 (H) 02/19/2025   Has been improving over the course of past 2 days.  His baseline is likely 1-1.3.  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 Nayak. Thank you for involving us in the care of this patient.    Subjective     Patient seen and examined.      Objective     Physical Exam  Vitals reviewed.   Constitutional:       Comments: Seen  sitting up in chair, on room air.   Cardiovascular:      Rate and Rhythm: Normal rate and regular rhythm.      Pulses: Normal pulses.      Heart sounds: Normal heart sounds.      Comments: No edema lower extremities, JVP just above clavicle  Pulmonary:      Effort: Pulmonary effort is normal.      Breath sounds: Normal breath sounds.   Skin:     General: Skin is warm and dry.      Capillary Refill: Capillary refill takes less than 2 seconds.   Neurological:      Mental Status: He is alert.         Vitals:  Temp:  [97.1 °F (36.2 °C)-98.3 °F (36.8 °C)] 97.9 °F (36.6 °C)  HR:  [73-91] 81  BP: (116-135)/(48-53) 124/53  Resp:  [17-22] 22  SpO2:  [88 %-95 %] 89 %  O2 Device: Nasal cannula  Nasal Cannula O2 Flow Rate (L/min):  [2 L/min] 2 L/min  Orthostatic Blood Pressures      Flowsheet Row Most Recent Value   Blood Pressure 124/53 filed at 02/21/2025 0714   Patient Position - Orthostatic VS Lying filed at 02/21/2025 0714            Cardiac Devices, Imaging & Monitoring     Telemetry:   Personally reviewed by Vani Orr MD: Sinus rhythm on telemetry    Results for orders placed during the hospital encounter of 02/15/25    Echo complete w/ contrast if indicated    Interpretation Summary    Left Ventricle: Left ventricular cavity size is normal. Wall thickness is mildly increased. There is concentric remodeling. The left ventricular ejection fraction is 43% by biplane measurement. Systolic function is mildly reduced. There is mild global hypokinesis. Diastolic function is moderately abnormal, consistent with grade II (pseudonormal) relaxation.    The following segments are akinetic: apical anterior, apical septal and apex.    The following segments are mildly hypokinetic: basal anterior, basal anteroseptal, basal inferoseptal, basal inferior, basal inferolateral, basal anterolateral, mid anterior, mid anteroseptal, mid inferoseptal, mid inferior, mid inferolateral, mid anterolateral, apical inferior and apical  lateral.    Mitral Valve: There is mild regurgitation with a posteriorly directed jet.    Prior TTE study available for comparison. Prior study date: 1/1/2024. Changes noted when compared to prior study. Changes include: LVEF now mildly reduced, with regional wall motion abnormalities. .    Vani Orr MD  Cardiovascular Disease Fellow, FY-1

## 2025-02-22 VITALS
TEMPERATURE: 98 F | HEART RATE: 81 BPM | WEIGHT: 144.84 LBS | SYSTOLIC BLOOD PRESSURE: 110 MMHG | DIASTOLIC BLOOD PRESSURE: 56 MMHG | HEIGHT: 70 IN | BODY MASS INDEX: 20.74 KG/M2 | RESPIRATION RATE: 22 BRPM | OXYGEN SATURATION: 92 %

## 2025-02-22 LAB
ANION GAP SERPL CALCULATED.3IONS-SCNC: 12 MMOL/L (ref 4–13)
BACTERIA BLD CULT: NORMAL
BUN SERPL-MCNC: 26 MG/DL (ref 5–25)
CALCIUM SERPL-MCNC: 8.8 MG/DL (ref 8.4–10.2)
CHLORIDE SERPL-SCNC: 101 MMOL/L (ref 96–108)
CO2 SERPL-SCNC: 26 MMOL/L (ref 21–32)
CREAT SERPL-MCNC: 1.2 MG/DL (ref 0.6–1.3)
GFR SERPL CREATININE-BSD FRML MDRD: 54 ML/MIN/1.73SQ M
GLUCOSE SERPL-MCNC: 111 MG/DL (ref 65–140)
POTASSIUM SERPL-SCNC: 3.5 MMOL/L (ref 3.5–5.3)
SODIUM SERPL-SCNC: 139 MMOL/L (ref 135–147)

## 2025-02-22 PROCEDURE — 99239 HOSP IP/OBS DSCHRG MGMT >30: CPT | Performed by: STUDENT IN AN ORGANIZED HEALTH CARE EDUCATION/TRAINING PROGRAM

## 2025-02-22 PROCEDURE — 94761 N-INVAS EAR/PLS OXIMETRY MLT: CPT

## 2025-02-22 PROCEDURE — 80048 BASIC METABOLIC PNL TOTAL CA: CPT | Performed by: STUDENT IN AN ORGANIZED HEALTH CARE EDUCATION/TRAINING PROGRAM

## 2025-02-22 PROCEDURE — 99232 SBSQ HOSP IP/OBS MODERATE 35: CPT | Performed by: INTERNAL MEDICINE

## 2025-02-22 PROCEDURE — NC001 PR NO CHARGE: Performed by: INTERNAL MEDICINE

## 2025-02-22 RX ORDER — METOPROLOL SUCCINATE 50 MG/1
50 TABLET, EXTENDED RELEASE ORAL 2 TIMES DAILY
Status: DISCONTINUED | OUTPATIENT
Start: 2025-02-22 | End: 2025-02-22 | Stop reason: HOSPADM

## 2025-02-22 RX ORDER — SPIRONOLACTONE 25 MG/1
12.5 TABLET ORAL DAILY
Qty: 15 TABLET | Refills: 0 | Status: SHIPPED | OUTPATIENT
Start: 2025-02-23 | End: 2025-03-19 | Stop reason: SDUPTHER

## 2025-02-22 RX ORDER — SPIRONOLACTONE 25 MG/1
12.5 TABLET ORAL DAILY
Status: DISCONTINUED | OUTPATIENT
Start: 2025-02-22 | End: 2025-02-22 | Stop reason: HOSPADM

## 2025-02-22 RX ORDER — LOSARTAN POTASSIUM 25 MG/1
12.5 TABLET ORAL DAILY
Qty: 15 TABLET | Refills: 0 | Status: SHIPPED | OUTPATIENT
Start: 2025-02-23 | End: 2025-03-19 | Stop reason: SDUPTHER

## 2025-02-22 RX ADMIN — ASPIRIN 81 MG: 81 TABLET, COATED ORAL at 09:54

## 2025-02-22 RX ADMIN — LOSARTAN POTASSIUM 12.5 MG: 25 TABLET, FILM COATED ORAL at 09:54

## 2025-02-22 RX ADMIN — DEXTRAN 70, GLYCERIN, HYPROMELLOSE 1 DROP: 1; 2; 3 SOLUTION/ DROPS OPHTHALMIC at 09:55

## 2025-02-22 RX ADMIN — LORAZEPAM 1 MG: 1 TABLET ORAL at 09:54

## 2025-02-22 RX ADMIN — METOPROLOL SUCCINATE 50 MG: 50 TABLET, EXTENDED RELEASE ORAL at 09:54

## 2025-02-22 RX ADMIN — CHLORHEXIDINE GLUCONATE 0.12% ORAL RINSE 15 ML: 1.2 LIQUID ORAL at 09:58

## 2025-02-22 RX ADMIN — TICAGRELOR 90 MG: 90 TABLET ORAL at 09:54

## 2025-02-22 RX ADMIN — RANOLAZINE 1000 MG: 500 TABLET, FILM COATED, EXTENDED RELEASE ORAL at 09:54

## 2025-02-22 RX ADMIN — HEPARIN SODIUM 5000 UNITS: 5000 INJECTION INTRAVENOUS; SUBCUTANEOUS at 05:00

## 2025-02-22 RX ADMIN — SPIRONOLACTONE 12.5 MG: 25 TABLET, FILM COATED ORAL at 09:54

## 2025-02-22 NOTE — DISCHARGE SUMMARY
Discharge Summary - Hospitalist   Name: Gael Ferraro 84 y.o. male I MRN: [id number]  Unit[facility]Bed[facility] 526-[facility] I Date of Admission: 2/15/2025   Date of Service: 2/22/2025 I Hospital Day: 7     Assessment & Plan  Coronary artery disease involving native coronary artery of native heart with angina pectoris (HCC)  Patient transferred from [facility] for exertional angina that seems ischemic  Had IABP after catheterization and SLB for cardiac surgery evaluation  Cardiac surgery does not feel that this patient will be a suitable candidate for surgery, as such the current recommendation is patient undergo repeat PCI with cardiology.  This appears to tentatively planned for early next week in the outpatient setting  In the meantime continue medical management with aspirin, atorvastatin, Brilinta, lopressor and Ranexa.  ARB restarted  Hypertension  Continue  Lopressor 50 mg every 12h. Losartan restarted today  CKD (chronic kidney disease) stage 3, GFR 30-59 ml/min ([facility])  Lab Results   Component Value Date    EGFR 54 02/22/2025    EGFR 51 02/21/2025    EGFR 48 02/20/2025    CREATININE 1.20 02/22/2025    CREATININE 1.25 02/21/2025    CREATININE 1.33 (H) 02/20/2025   Renal function appears within baseline  Continue close monitoring, cardiology likely will plan for repeat catheterization on this patient early next week, likely as an outpatient  Hypercholesterolemia  Resume atorvastatin 80  Ambulatory dysfunction  PT/OT ordered  Anxiety  Resumed on home Ativan dosing PRN, reportedly with some improvement in his mental status     Medical Problems       Resolved Problems  Date Reviewed: 2/17/2025          Resolved    Stage 3 chronic kidney disease (HCC) 2/16/2025     Resolved by  Chelsi Jo PA-C        Discharging Physician / Practitioner: Luther Cooper  PCP: Mike Lyman MD  Admission Date:   Admission Orders (From admission, onward)       Ordered        02/15/25 0622  INPATIENT ADMISSION  Once                           Discharge Date: 02/22/25    Consultations During Hospital Stay:  Cardiac Surgery  Heart Failure  Geriatric Medicine    Procedures Performed:   Cardiac angiogram 2/15/25 with IABP  Cardiac PCI 2/17/25 with IABP removal    Significant Findings / Test Results:   Echo LVEF 43%, grade II diastolic dysfunction     Incidental Findings:   N/A     Test Results Pending at Discharge (will require follow up):   None     Outpatient Tests Requested:  Outpatient followup with cardiology    Complications:  None    Reason for Admission: CAD; Transfer to [facility] for Cardiac Surgery evaluation    Hospital Course:   Gael Ferraro is a 84 y.o. male patient who originally presented to the hospital on 2/15/2025 due to CAD.  He presented to [facility] for chest pain which improved with nitroglycerin.  EKG was concerning for ST elevation MI he was taken to Cath Lab and had intra-aortic balloon pump placed.  Cath 2/15 showed severe left main disease.  Patient was transferred to [facility] for cardiac surgery evaluation.  Cardiac surgery evaluated patient were concerned about multiple comorbidities including advanced age.  Ultimately patient was deemed not to be a good candidate for intervention.  ICU course at [facility] complicated by some delirium.  Patient had improvement in his mentation with geriatrics consult and restart of chronic benzodiazepine medications.  Cardiology continue to follow patient for PCI and that was performed 2/17.  Cardiology likely to follow-up with patient in the outpatient setting for discussions of subsequent outpatient PCI.  Cardiology adjusted patient's GDMT for his worsened heart failure and he was discharged home in good health.    Please see above list of diagnoses and related plan for additional information.     Condition at Discharge: good    Discharge Day Visit / Exam:   Subjective:  Resting comfortably in bed, no acute distress  Vitals: Blood Pressure: 110/56 (02/22/25 1036)  Pulse: 81  "(02/22/25 1036)  Temperature: 98 °F (36.7 °C) (02/22/25 1036)  Temp Source: Oral (02/22/25 1036)  Respirations: 22 (02/22/25 1036)  Height: 5' 10\" (177.8 cm) (02/15/25 0827)  Weight - Scale: 65.7 kg (144 lb 13.5 oz) (02/22/25 0500)  SpO2: 92 % (02/22/25 1036)  Physical Exam  Vitals and nursing note reviewed.   Constitutional:       General: He is not in acute distress.     Appearance: He is well-developed.   HENT:      Head: Normocephalic and atraumatic.      Mouth/Throat:      Mouth: Mucous membranes are moist.   Eyes:      Extraocular Movements: Extraocular movements intact.      Conjunctiva/sclera: Conjunctivae normal.   Cardiovascular:      Rate and Rhythm: Normal rate and regular rhythm.      Pulses: Normal pulses.      Heart sounds: No murmur heard.  Pulmonary:      Effort: Pulmonary effort is normal. No respiratory distress.      Breath sounds: Normal breath sounds. No wheezing or rhonchi.   Abdominal:      General: Abdomen is flat. There is no distension.      Tenderness: There is no abdominal tenderness. There is no guarding or rebound.   Musculoskeletal:         General: No swelling.      Right lower leg: No edema.      Left lower leg: No edema.   Skin:     General: Skin is warm and dry.      Capillary Refill: Capillary refill takes less than 2 seconds.      Coloration: Skin is not jaundiced.      Findings: No rash.   Neurological:      General: No focal deficit present.      Mental Status: He is alert and oriented to person, place, and time.      Sensory: No sensory deficit.      Motor: No weakness.   Psychiatric:         Mood and Affect: Mood normal.         Discussion with Family: Updated  (wife) at bedside.    Discharge instructions/Information to patient and family:   See after visit summary for information provided to patient and family.      Provisions for Follow-Up Care:  See after visit summary for information related to follow-up care and any pertinent home health orders.  "     Mobility at time of Discharge:   Basic Mobility Inpatient Raw Score: 17  JH-HLM Goal: 5: Stand one or more mins  JH-HLM Achieved: 8: Walk 250 feet ot more  HLM Goal achieved. Continue to encourage appropriate mobility.     Disposition:   Home    Planned Readmission: No    Discharge Medications:  See after visit summary for reconciled discharge medications provided to patient and/or family.      Administrative Statements   Discharge Statement:  I have spent a total time of 45 minutes in caring for this patient on the day of the visit/encounter. >30 minutes of time was spent on: Documenting in the medical record.    **Please Note: This note may have been constructed using a voice recognition system**

## 2025-02-22 NOTE — PROGRESS NOTES
"Cardiology Progress Note - Gael Ferraro 84 y.o. male MRN: [id number]    Unit/Bed#: [facility] 526-01 Encounter: [id number]  Assessment and plan  #1 severe multivessel coronary disease  #2 status post Impella assisted left anterior descending PCI residual high-grade circumflex  #3 acute kidney injury improved  #4 marginal blood pressures  #5 CKD stage III  #6 ischemic cardiomyopathy ejection fraction 43%    Recommendations from my standpoint he is doing much better.  Renal function has improved.  Continue aspirin, Brilinta.  For goal-directed medical therapy changed to Toprol XL, continue losartan at low-dose.  Blood pressures are marginal.  He was on hydrochlorothiazide at home before, will add spironolactone 12.5 mg daily for gentle diuresis as well as GDMT.  From my standpoint he would be stable for discharge later today with close outpatient follow-up.  Will need basic metabolic profile on Monday.  Will have the office contact him for a quick follow-up with one of the advanced practitioners hopefully at the [place] office.      Subjective:    No significant events overnight.  Feels well denies any chest pain or shortness of breath.  Groin access sites healing well.  Denies any chest pain, palpitations, lower extremity edema.    ROS    Objective:   Vitals: Blood pressure 122/60, pulse 84, temperature 98.2 °F (36.8 °C), resp. rate 16, height 5' 10\" (1.778 m), weight 65.7 kg (144 lb 13.5 oz), SpO2 94%., Body mass index is 20.78 kg/m².,   Orthostatic Blood Pressures      Flowsheet Row Most Recent Value   Blood Pressure 122/60 filed at 2025 0727   Patient Position - Orthostatic VS Lying filed at 2025 2312           Systolic (24hrs), Av , Min:98 , Max:122     Diastolic (24hrs), Av, Min:48, Max:89      Intake/Output Summary (Last 24 hours) at 2025 0851  Last data filed at 2025 0505  Gross per 24 hour   Intake --   Output 226 ml   Net -226 ml     Weight (last 2 days)       Date/Time Weight "    02/22/25 0500 65.7 (144.84)    02/21/25 0514 66.5 (146.61)    02/20/25 0500 67.1 (147.93)              Telemetry Review: No significant arrhythmias seen on telemetry review.   EKG personally reviewed by Aiden Nayak DO.     Physical Exam  Vitals and nursing note reviewed.   Constitutional:       General: He is not in acute distress.     Appearance: He is well-developed.   HENT:      Head: Normocephalic and atraumatic.   Eyes:      Conjunctiva/sclera: Conjunctivae normal.      Pupils: Pupils are equal, round, and reactive to light.   Cardiovascular:      Rate and Rhythm: Normal rate and regular rhythm.      Pulses: Normal pulses.      Heart sounds: Normal heart sounds. No murmur heard.     No friction rub.   Pulmonary:      Effort: Pulmonary effort is normal. No respiratory distress.      Breath sounds: Normal breath sounds. No wheezing or rales.   Abdominal:      General: Bowel sounds are normal. There is no distension.      Palpations: Abdomen is soft.      Tenderness: There is no abdominal tenderness. There is no rebound.   Musculoskeletal:         General: No tenderness or deformity. Normal range of motion.      Cervical back: Neck supple.      Right lower leg: No edema.      Left lower leg: No edema.   Skin:     General: Skin is warm and dry.      Findings: No erythema.   Neurological:      Mental Status: He is alert and oriented to person, place, and time.      Cranial Nerves: No cranial nerve deficit.           Laboratory Results:        CBC with diff:   Results from last 7 days   Lab Units 02/21/25  0510 02/20/25  0514 02/19/25  0450 02/18/25  0535 02/17/25  0457 02/16/25  1703 02/16/25  0454   WBC Thousand/uL 5.66 5.91 8.94 7.76 6.19  --  7.06   HEMOGLOBIN g/dL 8.9* 9.2* 10.1* 9.6* 9.5* 10.0* 10.3*   HEMATOCRIT % 26.7* 26.2* 29.3* 28.0* 26.7* 28.3* 29.4*   MCV fL 111* 109* 109* 110* 107*  --  107*   PLATELETS Thousands/uL 303 252 259 195 166  --  184   RBC Million/uL 2.41* 2.41* 2.69* 2.55* 2.49*   "--  2.76*   MCH pg 36.9* 38.2* 37.5* 37.6* 38.2*  --  37.3*   MCHC g/dL 33.3 35.1 34.5 34.3 35.6  --  35.0   RDW % 14.5 13.9 14.2 14.4 14.0  --  14.0   MPV fL 10.9 11.2 10.8 11.2 10.4  --  10.3   NRBC AUTO /100 WBCs  --   --  0  --   --   --   --          CMP:  Results from last 7 days   Lab Units 02/22/25  0456 02/21/25  0510 02/20/25  0514 02/19/25  0450 02/18/25  0535 02/17/25  0457 02/16/25  0454   POTASSIUM mmol/L 3.5 3.7 3.3* 3.4* 4.0 3.9 3.6   CHLORIDE mmol/L 101 102 98 97 102 99 98   CO2 mmol/L 26 26 26 27 24 28 28   BUN mg/dL 26* 30* 32* 27* 22 18 20   CREATININE mg/dL 1.20 1.25 1.33* 1.37* 1.28 1.24 1.08   CALCIUM mg/dL 8.8 8.4 8.5 8.9 8.7 8.7 8.7   AST U/L  --  59* 64* 80*  --  63* 96*   ALT U/L  --  47 45 47  --  27 30   ALK PHOS U/L  --  93 102 123*  --  61 59   EGFR ml/min/1.73sq m 54 51 48 46 50 52 61         BMP:  Results from last 7 days   Lab Units 02/22/25  0456 02/21/25  0510 02/20/25  0514 02/19/25  0450 02/18/25  0535 02/17/25  0457 02/16/25  0454   POTASSIUM mmol/L 3.5 3.7 3.3* 3.4* 4.0 3.9 3.6   CHLORIDE mmol/L 101 102 98 97 102 99 98   CO2 mmol/L 26 26 26 27 24 28 28   BUN mg/dL 26* 30* 32* 27* 22 18 20   CREATININE mg/dL 1.20 1.25 1.33* 1.37* 1.28 1.24 1.08   CALCIUM mg/dL 8.8 8.4 8.5 8.9 8.7 8.7 8.7       BNP: No results for input(s): \"BNP\" in the last 72 hours.    Magnesium:   Results from last 7 days   Lab Units 02/21/25  0510 02/20/25  0514 02/19/25  0450 02/17/25  0457 02/16/25  0454 02/15/25  1512   MAGNESIUM mg/dL 2.0 2.0 2.0 1.9 2.0 1.7*       Coags:   Results from last 7 days   Lab Units 02/17/25  0457 02/16/25  0603 02/16/25  0005 02/15/25  1723 02/15/25  0947   PTT seconds 54* 61* 73* 48* 129*       TSH:        Hemoglobin A1C       Lipid Profile:       Cardiac testing:   No results found for this or any previous visit.    No results found for this or any previous visit.    No results found for this or any previous visit.    No results found for this or any previous " visit.      Meds/Allergies   all current active meds have been reviewed    Medications Prior to Admission:     acetaminophen (TYLENOL) 325 mg tablet    amLODIPine (NORVASC) 2.5 mg tablet    aspirin (ECOTRIN LOW STRENGTH) 81 mg EC tablet    Cholecalciferol 50 MCG (2000 UT) CAPS    cyanocobalamin (VITAMIN B-12) 1000 MCG tablet    hydroCHLOROthiazide 12.5 mg tablet    isosorbide mononitrate (IMDUR) 30 mg 24 hr tablet    LORazepam (ATIVAN) 1 mg tablet    metoprolol succinate (TOPROL-XL) 100 mg 24 hr tablet    nitroglycerin (NITROSTAT) 0.4 mg SL tablet    predniSONE 5 mg tablet    ranolazine (RANEXA) 1000 MG SR tablet    rosuvastatin (CRESTOR) 10 MG tablet       Assessment:  Principal Problem:    Coronary artery disease involving native coronary artery of native heart with angina pectoris (HCC)  Active Problems:    Hypertension    CKD (chronic kidney disease) stage 3, GFR 30-59 ml/min (HCC)    Pure red cell aplasia (HCC)    Hypercholesterolemia    Hallucinations    At risk for delirium    Ambulatory dysfunction    Frailty    Anxiety            Counseling / Coordination of Care  Total floor / unit time spent today 25 minutes.  Greater than 50% of total time was spent with the patient and / or family counseling and / or coordination of care.  A description of the counseling / coordination of care: .  Today Patient is receptive to this

## 2025-02-22 NOTE — PROGRESS NOTES
Home Oxygen Qualifying Test     Patient name: Gael Ferraro        : 1940   Date of Test:  2025  Diagnosis:    Home Oxygen Test:    **Medicare Guidelines require item(s) 1-5 on all ambulatory patients or 1 and 2 on non-ambulatory patients.    1. Baseline SPO2 on Room Air at rest 95 %   If <= 88% on Room Air add O2 via NC to obtain SpO2 >=88%. If LPM needed, document LPM  needed to reach =>88%    SPO2 during exertion on Room Air 94  %  During exertion monitor SPO2. If SPO2 increases >=89%, do not add supplemental oxygen    SPO2 on Oxygen at Rest 97 % at  LPM    SPO2 during exertion on Oxygen % at  LPM    Test performed during exertion activity.      []  Supplemental Home Oxygen is indicated.      Client does not qualify for home oxygen.    Respiratory Additional Notes- Home O2 evaluation done.pt doesn't qualify for home o2.family and Primary Rn made aware.    Edgar Bone, RT

## 2025-02-22 NOTE — ASSESSMENT & PLAN NOTE
Patient transferred from [facility] for exertional angina that seems ischemic  Had IABP after catheterization and SLB for cardiac surgery evaluation  Cardiac surgery does not feel that this patient will be a suitable candidate for surgery, as such the current recommendation is patient undergo repeat PCI with cardiology.  This appears to tentatively planned for early next week in the outpatient setting  In the meantime continue medical management with aspirin, atorvastatin, Brilinta, lopressor and Ranexa.  ARB restarted

## 2025-02-22 NOTE — ASSESSMENT & PLAN NOTE
Lab Results   Component Value Date    EGFR 54 02/22/2025    EGFR 51 02/21/2025    EGFR 48 02/20/2025    CREATININE 1.20 02/22/2025    CREATININE 1.25 02/21/2025    CREATININE 1.33 (H) 02/20/2025   Renal function appears within baseline  Continue close monitoring, cardiology likely will plan for repeat catheterization on this patient early next week, likely as an outpatient

## 2025-02-22 NOTE — PLAN OF CARE
Problem: PAIN - ADULT  Goal: Verbalizes/displays adequate comfort level or baseline comfort level  Description: Interventions:  - Encourage patient to monitor pain and request assistance  - Assess pain using appropriate pain scale  - Administer analgesics based on type and severity of pain and evaluate response  - Implement non-pharmacological measures as appropriate and evaluate response  - Consider cultural and social influences on pain and pain management  - Notify physician/advanced practitioner if interventions unsuccessful or patient reports new pain  Outcome: Progressing     Problem: INFECTION - ADULT  Goal: Absence or prevention of progression during hospitalization  Description: INTERVENTIONS:  - Assess and monitor for signs and symptoms of infection  - Monitor lab/diagnostic results  - Monitor all insertion sites, i.e. indwelling lines, tubes, and drains  - Monitor endotracheal if appropriate and nasal secretions for changes in amount and color  - [place] appropriate cooling/warming therapies per order  - Administer medications as ordered  - Instruct and encourage patient and family to use good hand hygiene technique  - Identify and instruct in appropriate isolation precautions for identified infection/condition  Outcome: Progressing  Goal: Absence of fever/infection during neutropenic period  Description: INTERVENTIONS:  - Monitor WBC    Outcome: Progressing     Problem: SAFETY ADULT  Goal: Patient will remain free of falls  Description: INTERVENTIONS:  - Educate patient/family on patient safety including physical limitations  - Instruct patient to call for assistance with activity   - Consult OT/PT to assist with strengthening/mobility   - Keep Call bell within reach  - Keep bed low and locked with side rails adjusted as appropriate  - Keep care items and personal belongings within reach  - Initiate and maintain comfort rounds  - Make Fall Risk Sign visible to staff  - Apply yellow socks and bracelet  for high fall risk patients  - Consider moving patient to room near nurses station  Outcome: Progressing  Goal: Maintain or return to baseline ADL function  Description: INTERVENTIONS:  -  Assess patient's ability to carry out ADLs; assess patient's baseline for ADL function and identify physical deficits which impact ability to perform ADLs (bathing, care of mouth/teeth, toileting, grooming, dressing, etc.)  - Assess/evaluate cause of self-care deficits   - Assess range of motion  - Assess patient's mobility; develop plan if impaired  - Assess patient's need for assistive devices and provide as appropriate  - Encourage maximum independence but intervene and supervise when necessary  - Involve family in performance of ADLs  - Assess for home care needs following discharge   - Consider OT consult to assist with ADL evaluation and planning for discharge  - Provide patient education as appropriate  Outcome: Progressing  Goal: Maintains/Returns to pre admission functional level  Description: INTERVENTIONS:  - Perform AM-PAC 6 Click Basic Mobility/ Daily Activity assessment daily.  - Set and communicate daily mobility goal to care team and patient/family/caregiver.   - Collaborate with rehabilitation services on mobility goals if consulted  - Perform Range of Motion 2 times a day.  - Reposition patient every 2 hours.  - Dangle patient 2 times a day  - Stand patient 2 times a day  - Ambulate patient 2 times a day  - Out of bed to chair 2 times a day   - Out of bed for meals 2 times a day  - Out of bed for toileting  - Record patient progress and toleration of activity level   Outcome: Progressing     Problem: DISCHARGE PLANNING  Goal: Discharge to home or other facility with appropriate resources  Description: INTERVENTIONS:  - Identify barriers to discharge w/patient and caregiver  - Arrange for needed discharge resources and transportation as appropriate  - Identify discharge learning needs (meds, wound care, etc.)  -  Arrange for interpretive services to assist at discharge as needed  - Refer to Case Management Department for coordinating discharge planning if the patient needs post-hospital services based on physician/advanced practitioner order or complex needs related to functional status, cognitive ability, or social support system  Outcome: Progressing     Problem: Knowledge Deficit  Goal: Patient/family/caregiver demonstrates understanding of disease process, treatment plan, medications, and discharge instructions  Description: Complete learning assessment and assess knowledge base.  Interventions:  - Provide teaching at level of understanding  - Provide teaching via preferred learning methods  Outcome: Progressing     Problem: Prexisting or High Potential for Compromised Skin Integrity  Goal: Skin integrity is maintained or improved  Description: INTERVENTIONS:  - Identify patients at risk for skin breakdown  - Assess and monitor skin integrity  - Assess and monitor nutrition and hydration status  - Monitor labs   - Assess for incontinence   - Turn and reposition patient  - Assist with mobility/ambulation  - Relieve pressure over bony prominences  - Avoid friction and shearing  - Provide appropriate hygiene as needed including keeping skin clean and dry  - Evaluate need for skin moisturizer/barrier cream  - Collaborate with interdisciplinary team   - Patient/family teaching  - Consider wound care consult   Outcome: Progressing     Problem: SAFETY,RESTRAINT: NV/NON-SELF DESTRUCTIVE BEHAVIOR  Goal: Remains free of harm/injury (restraint for non violent/non self-detsructive behavior)  Description: INTERVENTIONS:  - Instruct patient/family regarding restraint use   - Assess and monitor physiologic and psychological status   - Provide interventions and comfort measures to meet assessed patient needs   - Identify and implement measures to help patient regain control  - Assess readiness for release of restraint   Outcome:  Progressing  Goal: Returns to optimal restraint-free functioning  Description: INTERVENTIONS:  - Assess the patient's behavior and symptoms that indicate continued need for restraint  - Identify and implement measures to help patient regain control  - Assess readiness for release of restraint   Outcome: Progressing     Problem: CARDIOVASCULAR - ADULT  Goal: Maintains optimal cardiac output and hemodynamic stability  Description: INTERVENTIONS:  - Monitor I/O, vital signs and rhythm  - Monitor for S/S and trends of decreased cardiac output  - Administer and titrate ordered vasoactive medications to optimize hemodynamic stability  - Assess quality of pulses, skin color and temperature  - Assess for signs of decreased coronary artery perfusion  - Instruct patient to report change in severity of symptoms  Outcome: Progressing  Goal: Absence of cardiac dysrhythmias or at baseline rhythm  Description: INTERVENTIONS:  - Continuous cardiac monitoring, vital signs, obtain 12 lead EKG if ordered  - Administer antiarrhythmic and heart rate control medications as ordered  - Monitor electrolytes and administer replacement therapy as ordered  Outcome: Progressing

## 2025-02-22 NOTE — DISCHARGE INSTR - AVS FIRST PAGE
BMP ordered Monday to evaluate your electrolytes and kidney function  You have had some adjustments to your medication regimen, please see discharge med rec.  Scripts for your new meds have been sent to your pharmacy [facility] in [place]. If you have any issue obtaining your medications, please call back to the hospital.

## 2025-02-24 ENCOUNTER — APPOINTMENT (OUTPATIENT)
Dept: LAB | Facility: CLINIC | Age: 85
End: 2025-02-24
Payer: COMMERCIAL

## 2025-02-24 DIAGNOSIS — D61.01 PURE RED CELL APLASIA (HCC): ICD-10-CM

## 2025-02-24 DIAGNOSIS — I25.119 CORONARY ARTERY DISEASE INVOLVING NATIVE CORONARY ARTERY OF NATIVE HEART WITH ANGINA PECTORIS (HCC): ICD-10-CM

## 2025-02-24 LAB
ANION GAP SERPL CALCULATED.3IONS-SCNC: 9 MMOL/L (ref 4–13)
BASOPHILS # BLD AUTO: 0.03 THOUSANDS/ÂΜL (ref 0–0.1)
BASOPHILS NFR BLD AUTO: 1 % (ref 0–1)
BUN SERPL-MCNC: 17 MG/DL (ref 5–25)
CALCIUM SERPL-MCNC: 9.2 MG/DL (ref 8.4–10.2)
CHLORIDE SERPL-SCNC: 106 MMOL/L (ref 96–108)
CO2 SERPL-SCNC: 27 MMOL/L (ref 21–32)
CREAT SERPL-MCNC: 1.21 MG/DL (ref 0.6–1.3)
EOSINOPHIL # BLD AUTO: 0.18 THOUSAND/ÂΜL (ref 0–0.61)
EOSINOPHIL NFR BLD AUTO: 3 % (ref 0–6)
ERYTHROCYTE [DISTWIDTH] IN BLOOD BY AUTOMATED COUNT: 14.8 % (ref 11.6–15.1)
GFR SERPL CREATININE-BSD FRML MDRD: 53 ML/MIN/1.73SQ M
GLUCOSE P FAST SERPL-MCNC: 88 MG/DL (ref 65–99)
HCT VFR BLD AUTO: 31.1 % (ref 36.5–49.3)
HGB BLD-MCNC: 10.3 G/DL (ref 12–17)
IMM GRANULOCYTES # BLD AUTO: 0.02 THOUSAND/UL (ref 0–0.2)
IMM GRANULOCYTES NFR BLD AUTO: 0 % (ref 0–2)
LYMPHOCYTES # BLD AUTO: 1.95 THOUSANDS/ÂΜL (ref 0.6–4.47)
LYMPHOCYTES NFR BLD AUTO: 33 % (ref 14–44)
MCH RBC QN AUTO: 37.3 PG (ref 26.8–34.3)
MCHC RBC AUTO-ENTMCNC: 33.1 G/DL (ref 31.4–37.4)
MCV RBC AUTO: 113 FL (ref 82–98)
MONOCYTES # BLD AUTO: 0.62 THOUSAND/ÂΜL (ref 0.17–1.22)
MONOCYTES NFR BLD AUTO: 10 % (ref 4–12)
NEUTROPHILS # BLD AUTO: 3.17 THOUSANDS/ÂΜL (ref 1.85–7.62)
NEUTS SEG NFR BLD AUTO: 53 % (ref 43–75)
NRBC BLD AUTO-RTO: 0 /100 WBCS
PLATELET # BLD AUTO: 520 THOUSANDS/UL (ref 149–390)
PMV BLD AUTO: 10.5 FL (ref 8.9–12.7)
POTASSIUM SERPL-SCNC: 4.1 MMOL/L (ref 3.5–5.3)
RBC # BLD AUTO: 2.76 MILLION/UL (ref 3.88–5.62)
SODIUM SERPL-SCNC: 142 MMOL/L (ref 135–147)
WBC # BLD AUTO: 5.97 THOUSAND/UL (ref 4.31–10.16)

## 2025-02-24 PROCEDURE — 85025 COMPLETE CBC W/AUTO DIFF WBC: CPT

## 2025-02-24 PROCEDURE — 80048 BASIC METABOLIC PNL TOTAL CA: CPT

## 2025-02-24 PROCEDURE — 36415 COLL VENOUS BLD VENIPUNCTURE: CPT

## 2025-02-28 ENCOUNTER — OFFICE VISIT (OUTPATIENT)
Dept: CARDIOLOGY CLINIC | Facility: CLINIC | Age: 85
End: 2025-02-28
Payer: COMMERCIAL

## 2025-02-28 VITALS
DIASTOLIC BLOOD PRESSURE: 72 MMHG | OXYGEN SATURATION: 98 % | WEIGHT: 137 LBS | SYSTOLIC BLOOD PRESSURE: 138 MMHG | BODY MASS INDEX: 19.66 KG/M2 | HEART RATE: 78 BPM

## 2025-02-28 DIAGNOSIS — I25.119 CORONARY ARTERY DISEASE INVOLVING NATIVE CORONARY ARTERY OF NATIVE HEART WITH ANGINA PECTORIS (HCC): Chronic | ICD-10-CM

## 2025-02-28 DIAGNOSIS — I25.5 ISCHEMIC CARDIOMYOPATHY: ICD-10-CM

## 2025-02-28 DIAGNOSIS — E78.00 HYPERCHOLESTEROLEMIA: ICD-10-CM

## 2025-02-28 DIAGNOSIS — I10 PRIMARY HYPERTENSION: ICD-10-CM

## 2025-02-28 PROCEDURE — 99214 OFFICE O/P EST MOD 30 MIN: CPT

## 2025-02-28 NOTE — PROGRESS NOTES
Gael Ferraro  1940  [id number]  [facility] [place]  [address]  [address]  [place] [address] [phone]    1. Ischemic cardiomyopathy  Echo follow up/limited w/ contrast if indicated      2. Primary hypertension        3. Coronary artery disease involving native coronary artery of native heart with angina pectoris (HCC)        4. Hypercholesterolemia            Summary/Discussion:  Coronary artery disease s/p PCI  - remote history per [facility] documentation- 20 years ago   - [facility] (1/2/24): severe chronic multivessel CAD  interventional cardiology recommendations at that time were to:   continue ongoing anemia workup with goal Hqb >9-10 (given the severity of his MVD)  continue GDMT optimization on statin/bb and if able to tolerate  lifelong DAPT vs P2Y1 monotherapy  would only consider high risk PCI to LM if symptoms were refractory to above  - reported symptoms of angina during our prior office visit. Recommendations at that time were to increasing his ranexa to 1000 mg twice daily and start imdur 30 mg daily. Discussion had with his primary cardiologist at that time who recommended optimal medical management at this time. If symptoms do not improve with medical management then can consider high risk PCI  - unfortunately he presented to the ED on 2/15/2025 for chest pain with evidence of STEMI on EKG  he underwent urgent cardiac catheterization and was noted to have further progression in his left main disease along with known RCA . An intra-aortic balloon pump was inserted postcardiac catheterization to help maintain appropriate coronary perfusion pressure and he was subsequently transferred to [facility] critical care unit for evaluation by cardiology and cardiac surgery. He was seen and evaluated by cardiac surgery earlier this morning and noted not to be surgery candidate based on advanced age, deconditioning, gait dysfunction. Subsequently  underwent Impella assisted high risk PCI to ostial left main  echo with drop in EF to 43% previously 60%   - continue on aspirin, brilinta, statin, imdur, ranexa and beta blocker. PRN SL nitro prescribed  - denies any current angina symptoms     Ischemic cardiomyopathy:  - LVEF 43%  - volume status appears stable on exam. Continue spirolactone 25 mg daily  - renal function/electrolytes stable on recent labs   - continue present GDMT  plan for maximal medical management with GDMT if able   - follow up on limited echo in 3 months to reassess LVEF     Hypertension:  - 138/72  - continue present medication regimen   - lifestyle modification   - close blood pressure monitoring     Mixed hyperlipidemia:  - Lipid Profile:   - continue Crestor 10 mg daily  - encouraged lifestyle modifications and annual lipid follow up     Carotid stenosis:  - continue aspirin and statin therapy     Macrocytic anemia:  - hemoglobin 10.3  - follows with hematology oncology outpatient    Dizziness/fatigue:  - suspect likely related to ativan use which he reports taking 3 times daily   - vital stable  - advised him to take his ativan PRN as instructed and if he does not notice improvement of his symptoms to contact us     Interval History: Gael Ferraro is a 84 y.o. year old male with history of CAD s/p PCI, HTN, hyperlipidemia, carotid stenosis, and CKD who presents to the office today for a hospital follow up.    Since his hospital admission he reports intermittent symptoms of dizziness and fatigue. He also reports increased anxiety in which he has been taking his PRN ativan 3 times daily. His function capacity has been limited since his hospitalization due to ambulatory dysfunction with use of a walker. He denies any recurrent chest pain/pressure/discomfort or shortness of breath. He denies lower extremity edema, orthopnea, and PND. He denies near syncope and syncope. He denies palpitations.  He has been adherent to his medication  regimen without missing any doses.       He will RTO on 10/15/2025 with Dr. Velasco or sooner if necessary. He will call with any concerns.     Medical Problems       Problem List       Elevated troponin    Anemia    Coronary artery disease involving native coronary artery of native heart with angina pectoris (HCC) (Chronic)    Stage 3 chronic kidney disease (HCC)    Lab Results   Component Value Date    EGFR 53 02/24/2025    EGFR 54 02/22/2025    EGFR 51 02/21/2025    CREATININE 1.21 02/24/2025    CREATININE 1.20 02/22/2025    CREATININE 1.25 02/21/2025         Hypertension    Leukopenia    Other dysphagia    Constipation    CKD (chronic kidney disease) stage 3, GFR 30-59 ml/min (HCC)    Lab Results   Component Value Date    EGFR 53 02/24/2025    EGFR 54 02/22/2025    EGFR 51 02/21/2025    CREATININE 1.21 02/24/2025    CREATININE 1.20 02/22/2025    CREATININE 1.25 02/21/2025         Abnormal LFTs    SOB (shortness of breath)    Nose congestion    Abnormal chest x-ray    Pure red cell aplasia (HCC)    Mild protein-calorie malnutrition (HCC)    Malnutrition Findings:                                 BMI Findings:           Body mass index is 19.66 kg/m².         Bone marrow fibrosis (HCC)        Past Medical History:   Diagnosis Date    Low hemoglobin      Social History     Socioeconomic History    Marital status: /Civil Union     Spouse name: Not on file    Number of children: Not on file    Years of education: Not on file    Highest education level: Not on file   Occupational History    Not on file   Tobacco Use    Smoking status: Never    Smokeless tobacco: Never   Vaping Use    Vaping status: Never Used   Substance and Sexual Activity    Alcohol use: Not Currently    Drug use: Never    Sexual activity: Not on file   Other Topics Concern    Not on file   Social History Narrative    Not on file     Social Drivers of Health     Financial Resource Strain: Not on file   Food Insecurity: No Food Insecurity  (2/6/2024)    Nursing - Inadequate Food Risk Classification     Worried About Running Out of Food in the Last Year: Never true     Ran Out of Food in the Last Year: Never true     Ran Out of Food in the Last Year: Not on file   Transportation Needs: No Transportation Needs (2/6/2024)    PRAPARE - Transportation     Lack of Transportation (Medical): No     Lack of Transportation (Non-Medical): No   Physical Activity: Not on file   Stress: Not on file   Social Connections: Not on file   Intimate Partner Violence: Not on file   Housing Stability: Unknown (2/6/2024)    Housing Stability Vital Sign     Unable to Pay for Housing in the Last Year: No     Number of Times Moved in the Last Year: Not on file     Homeless in the Last Year: No      Family History   Problem Relation Age of Onset    No Known Problems Mother     No Known Problems Father      Past Surgical History:   Procedure Laterality Date    CARDIAC CATHETERIZATION Left 1/2/2024    Procedure: Cardiac Left Heart Cath;  Surgeon: Ana Garcia DO;  [facility];  Service: Cardiology    CARDIAC CATHETERIZATION N/A 1/2/2024    Procedure: Cardiac Coronary Angiogram;  Surgeon: Ana Garcia DO;  [facility];  Service: Cardiology    CARDIAC CATHETERIZATION N/A 1/2/2024    Procedure: Cardiac RHC;  Surgeon: Ana Garcia DO;  [facility];  Service: Cardiology    CARDIAC CATHETERIZATION N/A 2/15/2025    Procedure: Cardiac iabp;  Surgeon: Ana Garcia DO;  [facility];  Service: Cardiology    CARDIAC CATHETERIZATION N/A 2/15/2025    Procedure: Cardiac Coronary Angiogram;  Surgeon: Ana Garcia DO;  [facility];  Service: Cardiology    CARDIAC CATHETERIZATION N/A 2/17/2025    Procedure: Cardiac PCI;  Surgeon: Ana Garcia DO;  [facility];  Service: Cardiology    CARDIAC CATHETERIZATION N/A 2/17/2025    Procedure: Cardiac Impella  Insertion;  Surgeon: Ana Garcia DO;  [facility];  Service: Cardiology    CARDIAC CATHETERIZATION N/A 2/17/2025    Procedure: Cardiac Impella Removal;  Surgeon: Ana Garcia DO;  [facility];  Service: Cardiology    IMPELLA VENTRICULAR ASSIST DEVICE INSERTION N/A 2/17/2025    Procedure: IMPELLA VENTRICULAR ASSIST DEVICE INSERTION;  Surgeon: Ana Garcia DO;  [facility];  Service: Cardiology    IR BIOPSY BONE MARROW  1/24/2024    REMOVAL PUMP INTRA AORTIC BALLOON  (IABP) N/A 2/17/2025    Procedure: REMOVAL PUMP INTRA AORTIC BALLOON  (IABP);  Surgeon: Ana Garcia DO;  [facility];  Service: Cardiology       Current Outpatient Medications:     acetaminophen (TYLENOL) 325 mg tablet, Take 2 tablets (650 mg total) by mouth every 6 (six) hours as needed for mild pain, headaches or fever, Disp: , Rfl:     aspirin (ECOTRIN LOW STRENGTH) 81 mg EC tablet, Take 1 tablet (81 mg total) by mouth daily, Disp: , Rfl:     cyanocobalamin (VITAMIN B-12) 1000 MCG tablet, Take 1 tablet (1,000 mcg total) by mouth daily, Disp: 30 tablet, Rfl: 0    LORazepam (ATIVAN) 1 mg tablet, Take 1 mg by mouth 3 (three) times a day as needed for anxiety, Disp: , Rfl:     losartan (COZAAR) 25 mg tablet, Take 0.5 tablets (12.5 mg total) by mouth daily, Disp: 15 tablet, Rfl: 0    metoprolol succinate (TOPROL-XL) 100 mg 24 hr tablet, Take 0.5 tablets (50 mg total) by mouth 2 (two) times a day, Disp: 30 tablet, Rfl: 0    nitroglycerin (NITROSTAT) 0.4 mg SL tablet, Place 1 tablet (0.4 mg total) under the tongue every 5 (five) minutes as needed for chest pain, Disp: 30 tablet, Rfl: 0    predniSONE 5 mg tablet, Take 10mg daily (Patient taking differently: Take 10mg daily (5 mg every other day)), Disp: 90 tablet, Rfl: 0    ranolazine (RANEXA) 1000 MG SR tablet, Take 1 tablet (1,000 mg total) by mouth every 12 (twelve) hours, Disp: 180 tablet, Rfl: 1     "rosuvastatin (CRESTOR) 10 MG tablet, Take 1 tablet (10 mg total) by mouth daily, Disp: 90 tablet, Rfl: 1    spironolactone (ALDACTONE) 25 mg tablet, Take 0.5 tablets (12.5 mg total) by mouth daily, Disp: 15 tablet, Rfl: 0    ticagrelor (Brilinta) 90 MG, Take 1 tablet (90 mg total) by mouth every 12 (twelve) hours, Disp: 60 tablet, Rfl: 0  Allergies   Allergen Reactions    Hydrocodone-Acetaminophen GI Intolerance    Niacin GI Intolerance    Penicillins Hives       Labs:     Chemistry        Component Value Date/Time    K 4.1 02/24/2025 0921    K 4.2 11/18/2023 1059     02/24/2025 0921     11/18/2023 1059    CO2 27 02/24/2025 0921    CO2 25 01/21/2024 0200    CO2 28 11/18/2023 1059    BUN 17 02/24/2025 0921    BUN 24 11/18/2023 1059    CREATININE 1.21 02/24/2025 0921    CREATININE 1.24 11/18/2023 1059        Component Value Date/Time    CALCIUM 9.2 02/24/2025 0921    CALCIUM 9.7 11/18/2023 1059    ALKPHOS 93 02/21/2025 0510    ALKPHOS 33 (L) 11/18/2023 1059    AST 59 (H) 02/21/2025 0510    AST 25 11/18/2023 1059    ALT 47 02/21/2025 0510    ALT 24 11/18/2023 1059            No results found for: \"CHOL\"  Lab Results   Component Value Date    HDL 35 (L) 02/15/2025    HDL 34 (L) 01/21/2024    HDL 42 01/02/2024     Lab Results   Component Value Date    LDLCALC 101 (H) 02/15/2025    LDLCALC 63 01/21/2024    LDLCALC 56 01/02/2024     Lab Results   Component Value Date    TRIG 312 (H) 02/15/2025    TRIG 219 (H) 01/21/2024    TRIG 166 (H) 01/02/2024     No results found for: \"CHOLHDL\"    Imaging: Colonoscopy    Result Date: 2/9/2024  Narrative: Table formatting from the original result was not included. Formerly [facility] [address] [facility] [place] [address] [phone] DATE OF SERVICE: 2/09/24 PHYSICIAN(S): Attending: Harvey Lyons MD Fellow: No Staff Documented INDICATION: Anemia, unspecified type POST-OP DIAGNOSIS: See the impression below. HISTORY: Prior colonoscopy: More than 10 " years ago. BOWEL PREPARATION: Golytely/Colyte/Trilyte PREPROCEDURE: Informed consent was obtained for the procedure, including sedation. Risks including but not limited to bleeding, infection, perforation, adverse drug reaction and aspiration were explained in detail. Also explained about less than 100% sensitivity with the exam and other alternatives. The patient was placed in the left lateral decubitus position. Procedure: Colonoscopy DETAILS OF PROCEDURE: Patient was taken to the procedure room where a time out was performed to confirm correct patient and correct procedure. The patient underwent monitored anesthesia care, which was administered by an anesthesia professional. The patient's blood pressure, heart rate, level of consciousness, oxygen, respirations and ECG were monitored throughout the procedure. A digital rectal exam was performed. The scope was introduced through the anus and advanced to the terminal ileum. Retroflexion was performed in the rectum. The quality of bowel preparation was evaluated using the Stevens Bowel Preparation Scale with scores of: right colon = 2, transverse colon = 2, left colon = 2. The total BBPS score was 6. Bowel prep was adequate. The patient experienced no blood loss. The procedure was not difficult. The patient tolerated the procedure well. There were no apparent adverse events. ANESTHESIA INFORMATION: ASA: IV Anesthesia Type: IV Sedation with Anesthesia MEDICATIONS: No administrations occurring from 1700 to 1733 on 02/09/24 FINDINGS: Internal small hemorrhoids EVENTS: Procedure Events Event Event Time ENDO CECUM REACHED 2/9/2024  5:26 PM ENDO SCOPE OUT TIME 2/9/2024  5:33 PM SPECIMENS: * No specimens in log * EQUIPMENT: Colonoscope -[id number]     Impression: Small hemorrhoids No evidence of fresh or old blood seen throughout the colon. RECOMMENDATION:  No further screening colonoscopies necessary  Age greater than 65  No evidence of blood loss noted in the EGD or the  colonoscopy. May resume diet. May resume Brilinta.  Harvey Lyons MD     EGD    Addendum Date: 2/8/2024 Addendum:   formerly [facility] [address] [place] [address] [phone] DATE OF SERVICE: 2/08/24 PHYSICIAN(S): Attending: Harvey Lyons MD Fellow: No Staff Documented INDICATION: Symptomatic anemia POST-OP DIAGNOSIS: See the impression below. PREPROCEDURE: Informed consent was obtained for the procedure, including sedation.  Risks of perforation, hemorrhage, adverse drug reaction and aspiration were discussed. The patient was placed in the left lateral decubitus position. Patient was explained about the risks and benefits of the procedure. Risks including but not limited to bleeding, infection, and perforation were explained in detail. Also explained about less than 100% sensitivity with the exam and other alternatives. PROCEDURE: EGD DETAILS OF PROCEDURE: Patient was taken to the procedure room where a time out was performed to confirm correct patient and correct procedure. The patient underwent monitored anesthesia care, which was administered by an anesthesia professional. The patient's blood pressure, heart rate, level of consciousness, respirations and oxygen were monitored throughout the procedure. The scope was advanced to the second part of the duodenum. Retroflexion was performed in the fundus. The patient experienced no blood loss. The procedure was not difficult. The patient tolerated the procedure well. There were no apparent adverse events. ANESTHESIA INFORMATION: ASA: IV Anesthesia Type: Anesthesia type not filed in the log. MEDICATIONS: No administrations occurring from 1759 to 1809 on 02/08/24 FINDINGS: Regular Z-line 40 cm from the incisors 3 cm sliding hiatal hernia (type I hiatal hernia) without Shaggy lesions present - GE junction 40 cm from the incisors, diaphragmatic impression 43 cm from the incisors, confirmed by retroflexion:  Hill classification:  Grade II Mild, localized erythematous mucosa in the cardia. Retroflexed view was notable for sliding hiatal hernia, pylorus was patent.  No evidence of peptic ulcer disease, AVMs seen. The duodenal bulb, 1st part of the duodenum and 2nd part of the duodenum appeared normal. Non-obstructing Schatzki ring in the GE junction. Not dilated given patient's recent use of Brilinta. SPECIMENS: * No specimens in log * IMPRESSION: 3 cm type I hiatal hernia Mild erythematous mucosa in the cardia The duodenal bulb, 1st part of the duodenum and 2nd part of the duodenum appeared normal. Schatzki ring in the GE junction RECOMMENDATION:  There is no recommended follow-up for this procedure. No etiology of anemia identified on EGD. Recommend colonoscopy for complete workup of anemia if patient agreeable.  Addendum: Spoke to the patient postprocedure in the recovery area regarding proceeding with colonoscopy to complete the workup of anemia and also discussed endoscopy findings from today however patient adamantly refusing colonoscopy and wishes to go home.  In the absence of overt bleeding, will resume his diet, will defer discharge planning to primary team.  If hemoglobin remained stable, may resume Brilinta. Harvey Lyons MD     Result Date: 2/8/2024  Narrative: Table formatting from the original result was not included. [facility] [address] [facility] [address] [phone] DATE OF SERVICE: 2/08/24 PHYSICIAN(S): Attending: Harvey Lyons MD Fellow: No Staff Documented INDICATION: Symptomatic anemia POST-OP DIAGNOSIS: See the impression below. PREPROCEDURE: Informed consent was obtained for the procedure, including sedation.  Risks of perforation, hemorrhage, adverse drug reaction and aspiration were discussed. The patient was placed in the left lateral decubitus position. Patient was explained about the risks and benefits of the procedure. Risks including but not limited to bleeding,  infection, and perforation were explained in detail. Also explained about less than 100% sensitivity with the exam and other alternatives. PROCEDURE: EGD DETAILS OF PROCEDURE: Patient was taken to the procedure room where a time out was performed to confirm correct patient and correct procedure. The patient underwent monitored anesthesia care, which was administered by an anesthesia professional. The patient's blood pressure, heart rate, level of consciousness, respirations and oxygen were monitored throughout the procedure. The scope was advanced to the second part of the duodenum. Retroflexion was performed in the fundus. The patient experienced no blood loss. The procedure was not difficult. The patient tolerated the procedure well. There were no apparent adverse events. ANESTHESIA INFORMATION: ASA: IV Anesthesia Type: Anesthesia type not filed in the log. MEDICATIONS: No administrations occurring from 1759 to 1809 on 02/08/24 FINDINGS: Regular Z-line 40 cm from the incisors 3 cm sliding hiatal hernia (type I hiatal hernia) without Shaggy lesions present - GE junction 40 cm from the incisors, diaphragmatic impression 43 cm from the incisors, confirmed by retroflexion:  Hill classification: Grade II Mild, localized erythematous mucosa in the cardia. Retroflexed view was notable for sliding hiatal hernia, pylorus was patent.  No evidence of peptic ulcer disease, AVMs seen. The duodenal bulb, 1st part of the duodenum and 2nd part of the duodenum appeared normal. Non-obstructing Schatzki ring in the GE junction. Not dilated given patient's recent use of Brilinta. SPECIMENS: * No specimens in log *     Impression: 3 cm type I hiatal hernia Mild erythematous mucosa in the cardia The duodenal bulb, 1st part of the duodenum and 2nd part of the duodenum appeared normal. Schatzki ring in the GE junction RECOMMENDATION:  There is no recommended follow-up for this procedure. No etiology of anemia identified on EGD.  Recommend colonoscopy for complete workup of anemia if patient agreeable.   Harvey Lyons MD     XR chest 2 views    Result Date: 2/5/2024  Narrative: XR CHEST PA & LATERAL DUAL ENERGY SUBTRACTION. INDICATION: Shortness of breath. COMPARISON: CXR 1/21/2024. FINDINGS: Slightly increased opacity in the right costophrenic sulcus. Persistent opacity in the left base. Nodules over both lower lungs are the nipples. No pneumothorax or pleural effusion. Normal heart size. Cardiac stents. Bones are unremarkable for age. Normal upper abdomen.     Impression: New mild opacity in the right costophrenic sulcus which could be due to atelectasis or could be infectious/inflammatory. Persistent opacity in the left base, likely atelectasis or scar. Workstation performed: [id number]       ECG:  n/a    Review of Systems   Constitutional: Positive for malaise/fatigue. Negative for fever and weight gain.   HENT: Negative.     Eyes:  Positive for blurred vision (chronic) and vision loss in right eye.   Cardiovascular:  Negative for chest pain, dyspnea on exertion, irregular heartbeat, leg swelling, near-syncope, orthopnea, palpitations, paroxysmal nocturnal dyspnea and syncope.   Respiratory:  Negative for cough and sleep disturbances due to breathing.    Endocrine: Negative.    Hematologic/Lymphatic: Negative for bleeding problem.   Skin: Negative.    Musculoskeletal:  Positive for muscle weakness.   Gastrointestinal: Negative.    Genitourinary: Negative.    Neurological:  Positive for dizziness.   Psychiatric/Behavioral:  The patient is nervous/anxious.        Vitals:    02/28/25 1310   BP: 138/72   Pulse: 78   SpO2: 98%     Vitals:    02/28/25 1310   Weight: 62.1 kg (137 lb)         Body mass index is 19.66 kg/m².    Physical Exam  Vitals and nursing note reviewed.   Constitutional:       General: He is not in acute distress.  HENT:      Head: Normocephalic.      Nose: Nose normal.      Mouth/Throat:      Mouth: Mucous membranes  are moist.      Pharynx: Oropharynx is clear.   Neck:      Vascular: No carotid bruit or JVD.   Cardiovascular:      Rate and Rhythm: Normal rate and regular rhythm.      Pulses: Normal pulses.      Heart sounds: Normal heart sounds. No murmur heard.  Pulmonary:      Effort: Pulmonary effort is normal. No respiratory distress.      Breath sounds: Normal breath sounds. No wheezing, rhonchi or rales.   Musculoskeletal:         General: Normal range of motion.      Cervical back: Normal range of motion.      Right lower leg: No edema.      Left lower leg: No edema.   Skin:     General: Skin is warm and dry.      Coloration: Skin is pale.   Neurological:      Mental Status: He is alert and oriented to person, place, and time.      Motor: Weakness present.      Gait: Gait abnormal.   Psychiatric:         Mood and Affect: Mood normal.         Behavior: Behavior normal.

## 2025-03-11 ENCOUNTER — APPOINTMENT (OUTPATIENT)
Dept: LAB | Facility: CLINIC | Age: 85
End: 2025-03-11
Payer: COMMERCIAL

## 2025-03-11 DIAGNOSIS — D61.01 PURE RED CELL APLASIA (HCC): ICD-10-CM

## 2025-03-11 LAB
BASOPHILS # BLD AUTO: 0.02 THOUSANDS/ÂΜL (ref 0–0.1)
BASOPHILS NFR BLD AUTO: 1 % (ref 0–1)
EOSINOPHIL # BLD AUTO: 0.04 THOUSAND/ÂΜL (ref 0–0.61)
EOSINOPHIL NFR BLD AUTO: 1 % (ref 0–6)
ERYTHROCYTE [DISTWIDTH] IN BLOOD BY AUTOMATED COUNT: 15.8 % (ref 11.6–15.1)
HCT VFR BLD AUTO: 35.7 % (ref 36.5–49.3)
HGB BLD-MCNC: 11.9 G/DL (ref 12–17)
IMM GRANULOCYTES # BLD AUTO: 0.01 THOUSAND/UL (ref 0–0.2)
IMM GRANULOCYTES NFR BLD AUTO: 0 % (ref 0–2)
LYMPHOCYTES # BLD AUTO: 1.46 THOUSANDS/ÂΜL (ref 0.6–4.47)
LYMPHOCYTES NFR BLD AUTO: 38 % (ref 14–44)
MCH RBC QN AUTO: 37.5 PG (ref 26.8–34.3)
MCHC RBC AUTO-ENTMCNC: 33.3 G/DL (ref 31.4–37.4)
MCV RBC AUTO: 113 FL (ref 82–98)
MONOCYTES # BLD AUTO: 0.31 THOUSAND/ÂΜL (ref 0.17–1.22)
MONOCYTES NFR BLD AUTO: 8 % (ref 4–12)
NEUTROPHILS # BLD AUTO: 2.03 THOUSANDS/ÂΜL (ref 1.85–7.62)
NEUTS SEG NFR BLD AUTO: 52 % (ref 43–75)
NRBC BLD AUTO-RTO: 0 /100 WBCS
PLATELET # BLD AUTO: 346 THOUSANDS/UL (ref 149–390)
PMV BLD AUTO: 9.6 FL (ref 8.9–12.7)
RBC # BLD AUTO: 3.17 MILLION/UL (ref 3.88–5.62)
WBC # BLD AUTO: 3.87 THOUSAND/UL (ref 4.31–10.16)

## 2025-03-11 PROCEDURE — 85025 COMPLETE CBC W/AUTO DIFF WBC: CPT

## 2025-03-11 PROCEDURE — 36415 COLL VENOUS BLD VENIPUNCTURE: CPT

## 2025-03-17 ENCOUNTER — CLINICAL SUPPORT (OUTPATIENT)
Dept: CARDIAC REHAB | Facility: CLINIC | Age: 85
End: 2025-03-17
Payer: COMMERCIAL

## 2025-03-17 DIAGNOSIS — I25.119 CORONARY ARTERY DISEASE INVOLVING NATIVE CORONARY ARTERY OF NATIVE HEART WITH ANGINA PECTORIS (HCC): Primary | ICD-10-CM

## 2025-03-17 PROCEDURE — 93798 PHYS/QHP OP CAR RHAB W/ECG: CPT

## 2025-03-17 NOTE — PROGRESS NOTES
"CARDIAC REHABILITATION   ASSESSMENT AND INDIVIDUALIZED TREATMENT PLAN  INITIAL           Today's date: 3/17/2025   # of Exercise Sessions Completed: 1- initial eval  Patient name: Gael Ferraro      : 1940  Age: 84 y.o.       MRN: [id number]  Referring Physician: Luther Cooper MD  Cardiologist: Flavio Velasco MD  Provider: [facility]  Clinician: Judie Moon MS, CEP, EPC        Treatment is tailored to this patient's individual needs.  The ITP was reviewed with the patient and all questions were answered to their satisfaction.  Additional ITP documentation can be found electronically including daily and monthly exercise summaries, daily session notes with ECG summaries, education notes, daily medication reconciliation, and daily physician supervision.      INITIAL EVALUATION SUMMARY 2025    Patient's subjective report of progress/symptoms: reports leg fatigue as his biggest limiting factor. No longer having chest pain post stent  Home exercise/ADLs: no home exercise. Able to complete ADLs with taking his time and resting  Clinical Comments: reports falling yesterday, \"legs just gave out\" did not hurt anything.     Initial Fitness Assessment: 6MWT:  Resting:    Resting:  BP: 118/52  HR: 85  Exercise:  BP: 130/50  HR: 91  ECG Summary: NSR with rare PVC  Symptoms: leg fatigue. Used walker during test.        Dx:   Encounter Diagnosis   Name Primary?    Coronary artery disease involving native coronary artery of native heart with angina pectoris (HCC)        Description of Diagnosis: S/P Successful Protected PCI of the Ost LM in to the Prox LCX ISR with BRYAN x 1   Date of onset: 25  Other Cardiac History: ischemic cardiomyopathy, hx of stents        ASSESSMENT    Medical History:   Past Medical History:   Diagnosis Date    Low hemoglobin        Family History:  Family History   Problem Relation Age of Onset    No Known Problems Mother     No Known Problems Father        Allergies: " "  Hydrocodone-acetaminophen, Niacin, and Penicillins    Current Medications:   Current Outpatient Medications   Medication Sig Dispense Refill    acetaminophen (TYLENOL) 325 mg tablet Take 2 tablets (650 mg total) by mouth every 6 (six) hours as needed for mild pain, headaches or fever      aspirin (ECOTRIN LOW STRENGTH) 81 mg EC tablet Take 1 tablet (81 mg total) by mouth daily      cyanocobalamin (VITAMIN B-12) 1000 MCG tablet Take 1 tablet (1,000 mcg total) by mouth daily 30 tablet 0    LORazepam (ATIVAN) 1 mg tablet Take 1 mg by mouth 3 (three) times a day as needed for anxiety      losartan (COZAAR) 25 mg tablet Take 0.5 tablets (12.5 mg total) by mouth daily 15 tablet 0    metoprolol succinate (TOPROL-XL) 100 mg 24 hr tablet Take 0.5 tablets (50 mg total) by mouth 2 (two) times a day 30 tablet 0    nitroglycerin (NITROSTAT) 0.4 mg SL tablet Place 1 tablet (0.4 mg total) under the tongue every 5 (five) minutes as needed for chest pain 30 tablet 0    predniSONE 5 mg tablet Take 10mg daily (Patient taking differently: Take 10mg daily (5 mg every other day)) 90 tablet 0    ranolazine (RANEXA) 1000 MG SR tablet Take 1 tablet (1,000 mg total) by mouth every 12 (twelve) hours 180 tablet 1    rosuvastatin (CRESTOR) 10 MG tablet Take 1 tablet (10 mg total) by mouth daily 90 tablet 1    spironolactone (ALDACTONE) 25 mg tablet Take 0.5 tablets (12.5 mg total) by mouth daily 15 tablet 0    ticagrelor (Brilinta) 90 MG Take 1 tablet (90 mg total) by mouth every 12 (twelve) hours 60 tablet 0     No current facility-administered medications for this visit.       Medication compliance: Yes   Comments: Pt reports to be compliant with medications    Physical Limitations: none     Fall Risk: High   Comments: Patient uses walking assist device (walker/cane/rollator) uses walker outside and cane in the house. Reports falling yesterday- \"legs just gave out\"    Cultural needs: none      CAD Risk Factors:  Cholesterol: Yes  HTN: " "Yes  DM: No  Obesity: No   Inactivity: Yes      EXERCISE ASSESSMENT:      Current Functional Status:  Occupation: retired  Recreation/Physical Activity: gardening   ADL’s:Capable of performing light to moderate ADLs limited by fatigue  [place]: able to perform self-care  Home exercise: none  Other Comments: has TM at home       SMART Exercise Goals:   improved DASI score by 10%  increased exercise capacity by 40% based on peak METs tolerated in cardiac rehab exercise session  maintain > 150 minutes per week of moderate intensity exercise  improved 6MWT distance by 10%    Patient Specific EXERCISE GOALS:       Increase strength in legs   Improve ability to go up stairs   Return to gardening    Functional Capacity Screening Tool:  [facility] Activity Status Index:  4.06 METs    NUTRITION ASSESSMENT:    Initial Weight:  140.8  Current Weight:     Height:   Ht Readings from Last 1 Encounters:   02/15/25 5' 10\" (1.778 m)       Rate Your Plate Score: 58/81    Diabetes: N/A  A1c:     last measured:     Lipid management: Discussed diet and lipid management and Last lipid profile 2/15/25  Chol 198    HDL 35      Current Dietary Habits:  Chicken and fish  Minimal red eat       SMART Nutrition Goals:   Improved Rate Your Plate score  >64, eat 6 or more servings of grain products per day, eat whole grain breads, brown rice and whole grain cereals, eat 5 or more servings of fruits and vegetables a day, eat 2 or more servings of low fat milk or yogurt a day, eat no more than 6oz of meat per day, dine out less than once per week or choose low fat restaurant meals, eat red meat once a week or less, rarely eat processed meats or eat low fat processed meats, eat poultry without the skin, eat chicken and fish that is not fried, eat meatless meals twice a week or more, Do not cook with oil, butter or margarine, Do not eat fried foods, use \"light\" tub margarine on bread, potatoes and vegetables, choose light or fat-free " salad dressings or tilley, choose healthy snacks such as fruit, pretzels, and low fat crackers, choose healthy desserts and sweets such as eri food cake or  fruit, choose low sodium canned, frozen/packaged foods or rarely/never eat, rarely/never eat salty snacks, and choose low sugar desserts and sweets    Patient Specific NUTRITION GOALS:     1. Work on drinking more water   2. Maintain healthy weight   3. Decrease fried foods    Drug/Alcohol Use:   Very little alcohol       PSYCHOSOCIAL ASSESSMENT:    Date of last Assessment:  3/17/25  Depression screening:  PHQ-9 = 15    Interpretation:  15-19 = Moderately Severe Depression  Anxiety screening:  RAMSEY-7 = 16    Interpretation: 15-21 = Severe anxiety    Pt self-report of depression and anxiety   Patient has a history of anxiety     Self-reported stress level:  9   Stressors:  health   Stress Management Tools: practice Relaxation Techniques, exercise, and keep a positive mindset    SMART Psychosocial Goals:     Reduce perceived stress to 1-3/10, improved [facility] QOL < 27, Feelings in [facility] Score < 3, Physical Fitness in [facility] Score < 3, Daily Activity in [facility] Score < 3, Social Activities in [facility] Score < 3, Pain in [facility] Score < 3, Overall Health in [facility] Score < 3, Quality of Life in [facility] Score < 3 , Change in Health in [facility] Score < 3 ,  Increased interest and pleasure in doing things,  reduced time feeling down, depressed or hopeless, improved sleeping habits, feel less tired with more energy, reduced time feeling like a failure and having negative self thoughts, reduced time feeling nervous or on edge, control or stop worrying, reduced feelings of restlessness, reduced irritability, and avoid thoughts of feeling as though something bad may happen    Patient Specific PSYCHOCOSOCIAL GOALS:    Continue to take lorazapam   Manage stress  Improve PHQ-9 and RAMSEY-7 scores    Quality of Life Screen:  (Higher score indicates disease impact  on QOL)  [place] COOP score: 31/45     Social Support:   spouse and children  Community/Social Activities: none     Psychosocial Assessment as it relates to rehabilitation:   Patient denies issues with his/her family or home life that may affect their rehabilitation efforts.       OTHER CORE COMPONENT ASSESSMENT:    Tobacco Use:     N/A:  Patient is a non-smoker     Anginal Symptoms:   chest pain   NTG use: Reviewed Proper use and Pt has not used NTG since eventtakes Ranexa    SMART Goals:   consistent, controlled resting BP < 130/80, medication compliance, and reduced angina    Patient Specific CORE COMPONENT GOALS:    Monitor BP  Medication compliance      INDIVIDUALIZED TREATMENT PLAN      EXERCISE GOALS and PLAN      Progress toward Exercise goals:   Reviewed Pt goals and determined plan of care    Exercise Plan:    education on home exercise guidelines, home exercise 30+ mins 2 days opposite CR, Group class: Risk Factors for Heart Disease, Patient education:   Exercise After Cardiac Rehabilitation, and After discharge patient will continue to follow a regular exercise regimen with the goal of a minimum of 150 minutes per week of moderate intensity exercise.      The patient was counseled on exercise guidelines to achieve a minimum of 150 mins/wk of moderate intensity (RPE 4-6)   exercise and encouraged to add 1-2 days of exercise on opposite days of cardiac rehab as tolerated.       PHYSICIAN PRESCRIBED EXERCISE:    Current Aerobic Exercise Prescription:      Frequency: 3 days/week   Supplement with home exercise 2+ days/wk as tolerated       Minutes: 20 - 40         METS: 1.5-2.5            HR: RHR +30-40bpm   RPE: 4-6         Modalities: UBE, NuStep, Recumbent bike, and Room walking     Exercise workloads will be progressed gradually as tolerated, within limits of patient's ability, and according to the patient's   response to the exercise program.      Aerobic Exercise Prescription Plan for  "Progression   Frequency: 3 days/week of cardiac rehab       Supplement with home exercise 2+ days/wk as tolerated    Minutes: 40       >150 mins/wk of moderate intensity exercise   METS: 2-3   HR: RHR +30-40bpm     RPE: 4-6   Modalities: Treadmill, UBE, NuStep, and Recumbent bike    Strength trainin-3 days / week  12-15 repetitions  1-2 sets per modality   Will be added following 2-3 weeks of monitored exercise sessions   Modalities: Arm Curl, Sit to Stands, and Upright Rows    Home Exercise: none    Exercise Education: benefit of exercise for CAD risk factors, home exercise guidelines, AHA guidelines to achieve >150 mins/wk of moderate exercise, and RPE scale     Readiness to change: Preparation:  (Getting ready to change)       NUTRITION GOALS AND PLAN      Nutritional   Reviewed patient's Rate your Plate. Discussed key elements of heart healthy eating. Reviewed patient goals for dietary modifications and their clinical implications.  Reviewed most recent lipid profile.     Patient's progress toward Nutrition goals:    Reviewed Pt goals and determined plan of care      Nutrition Plan:   group class: Reading Food Labels, group Class: Heart Healthy Eating, increase intake of whole grains, replace refined flours with whole grains, increase daily intake of fruits and vegetables, increase daily intake of low fat dairy, limit meat intake to less than 6oz per day, choose healthy meals while dining out, choose lean red meat, reduce intake and /or  rarely eat processed meats , eat poultry without the skin, eat chicken and fish that is baked, broiled or roasted, eat more meatless meals, cook without fats or oils, never/rarely eat fried foods, use \"light\" tub margarine, use light or fat-free salad dressings and mayonnaise, eat healthy snacks like fruit, pretzels, and low fat crackers, choose desserts such as fruit, eri food cake, low-fat or fat-free sweets, choose low sodium canned, frozen, packaged foods or rarely " eat these foods, rarely/never eat salty snacks, and choose low sugar desserts and sweets    Measurable goals were based Rate Your Plate Dietary Self-Assessment. These are the areas in which the patient could score higher on the assessment.  Goals include recommendations for a heart healthy diet based on American Heart Association.    Nutrition Education:   heart healthy eating principles  maintaining hydration  nutrition for  lipid management  healthy choices while dining out  hydration    Readiness to change: Preparation:  (Getting ready to change)       PSYCHOSOCIAL GOALS AND PLAN    Psychosocial Assessment as it relates to rehabilitation:   Patient denies issues with his/her family or home life that may affect their rehabilitation efforts.     Patient's progress toward Psychosocial goals:    Reviewed Pt goals and determined plan of care    Psychosocial Intervention/plan:   Class: Stress and Your Health, Class: Relaxation, PHQ-9 >5 will refer to MD, Exercise, Keep a positive mindset, Enjoy family, and Repeat PHQ-9 every 30 days if score >5    Psychosocial Education: signs/sxs of depression, benefits of a positive support system, stress management techniques, depression and CAD, and benefits of mental health counseling    Information to utilize [facility] was provided as well as contact information for counseling through  Behavioral Health and group psychotherapy groups available.    Readiness to change: Preparation:  (Getting ready to change)       OTHER CORE COMPONENTS GOALS and PLAN      Blood Pressure will be monitored throughout the program and cardiologist will be notified of elevated trends.    Pt will be encouraged to monitor home BP if advised by cardiologist.    Tobacco Plan:   N/A:  Pt is a non-smoker    Progress toward Core Component goals:   Reviewed Pt goals and determined plan of care    Other Core Components Intervention:   group class: Understanding Heart Disease, group class: Common Heart  Medications, medication compliance, avoid processed foods, engage in regular exercise, eliminate salt shaker at the table, check labels for sodium content, and monitor home BP    Group and Individual Education:  understanding high blood pressure and it's relationship to CAD, components of blood pressure management, and proper use of sublingual NTG    Readiness to change: Preparation:  (Getting ready to change)

## 2025-03-17 NOTE — PROGRESS NOTES
[facility] Cardiopulmonary Rehab  [facility]    Emotional well-being and depression is addressed in the Cardiac  rehab evaluation. To assess the severity of depression and anxiety, patients are given the PHQ-9 Depression Questionnaire and the RAMSEY-7 Anxiety Questionnaire.    This is to inform you that your patient Gael scored a 15 which is interpreted as 15-19 = Moderately Severe Depression and a 16 which is interpreted as 15-21 = Severe anxiety.    The patient was provided with contact information for [facility] and has been encouraged to enroll in [facility].   A repeat PHQ -9 and RAMSEY-7 will be administered in 30 days to assess improvement.

## 2025-03-19 ENCOUNTER — TELEPHONE (OUTPATIENT)
Age: 85
End: 2025-03-19

## 2025-03-19 DIAGNOSIS — I25.119 CORONARY ARTERY DISEASE INVOLVING NATIVE CORONARY ARTERY OF NATIVE HEART WITH ANGINA PECTORIS (HCC): ICD-10-CM

## 2025-03-19 NOTE — TELEPHONE ENCOUNTER
Pt needs new scripts for Losartan 12.5 mg daily and Spironolactone 12.5 mg daily sent into his [facility] pharmacy listed in his chart.  Last scripts written by hospital provider

## 2025-03-20 ENCOUNTER — CLINICAL SUPPORT (OUTPATIENT)
Dept: CARDIAC REHAB | Facility: CLINIC | Age: 85
End: 2025-03-20
Payer: COMMERCIAL

## 2025-03-20 DIAGNOSIS — I25.119 CORONARY ARTERY DISEASE INVOLVING NATIVE CORONARY ARTERY OF NATIVE HEART WITH ANGINA PECTORIS (HCC): Primary | ICD-10-CM

## 2025-03-20 PROCEDURE — 93798 PHYS/QHP OP CAR RHAB W/ECG: CPT

## 2025-03-21 ENCOUNTER — CLINICAL SUPPORT (OUTPATIENT)
Dept: CARDIAC REHAB | Facility: CLINIC | Age: 85
End: 2025-03-21
Payer: COMMERCIAL

## 2025-03-21 DIAGNOSIS — I25.119 CORONARY ARTERY DISEASE INVOLVING NATIVE CORONARY ARTERY OF NATIVE HEART WITH ANGINA PECTORIS (HCC): Primary | ICD-10-CM

## 2025-03-21 PROCEDURE — 93798 PHYS/QHP OP CAR RHAB W/ECG: CPT

## 2025-03-24 RX ORDER — SPIRONOLACTONE 25 MG/1
12.5 TABLET ORAL DAILY
Qty: 15 TABLET | Refills: 11 | Status: SHIPPED | OUTPATIENT
Start: 2025-03-24 | End: 2025-04-23

## 2025-03-24 RX ORDER — LOSARTAN POTASSIUM 25 MG/1
12.5 TABLET ORAL DAILY
Qty: 15 TABLET | Refills: 11 | Status: SHIPPED | OUTPATIENT
Start: 2025-03-24 | End: 2025-04-23

## 2025-03-25 ENCOUNTER — CLINICAL SUPPORT (OUTPATIENT)
Dept: CARDIAC REHAB | Facility: CLINIC | Age: 85
End: 2025-03-25
Payer: COMMERCIAL

## 2025-03-25 DIAGNOSIS — I25.119 CORONARY ARTERY DISEASE INVOLVING NATIVE CORONARY ARTERY OF NATIVE HEART WITH ANGINA PECTORIS (HCC): Primary | ICD-10-CM

## 2025-03-25 PROCEDURE — 93798 PHYS/QHP OP CAR RHAB W/ECG: CPT

## 2025-03-26 DIAGNOSIS — I21.3 ST ELEVATION MYOCARDIAL INFARCTION (STEMI), UNSPECIFIED ARTERY (HCC): ICD-10-CM

## 2025-03-26 DIAGNOSIS — D75.81 MYELOFIBROSIS (HCC): ICD-10-CM

## 2025-03-26 RX ORDER — PREDNISONE 5 MG/1
TABLET ORAL
Qty: 30 TABLET | Refills: 1 | Status: SHIPPED | OUTPATIENT
Start: 2025-03-26

## 2025-03-26 NOTE — TELEPHONE ENCOUNTER
Patient called requesting refill for (Brilinta) 90 MG . Patient made aware medication was refilled on 3/26/2025 for 60 with 11 refills to [facility] pharmacy. Patient instructed to contact the pharmacy to obtain refills of medication. Patient verbalized understanding.     Per Pt spouse pharmacy told them they had requested the medication with no response back from us, advised Christi to call pharmacy again their request was received today and approved today. Christi verbalized understanding.

## 2025-03-27 ENCOUNTER — CLINICAL SUPPORT (OUTPATIENT)
Dept: CARDIAC REHAB | Facility: CLINIC | Age: 85
End: 2025-03-27
Payer: COMMERCIAL

## 2025-03-27 DIAGNOSIS — I25.119 CORONARY ARTERY DISEASE INVOLVING NATIVE CORONARY ARTERY OF NATIVE HEART WITH ANGINA PECTORIS (HCC): Primary | ICD-10-CM

## 2025-03-27 PROCEDURE — 93798 PHYS/QHP OP CAR RHAB W/ECG: CPT

## 2025-03-28 ENCOUNTER — CLINICAL SUPPORT (OUTPATIENT)
Dept: CARDIAC REHAB | Facility: CLINIC | Age: 85
End: 2025-03-28
Payer: COMMERCIAL

## 2025-03-28 DIAGNOSIS — I25.119 CORONARY ARTERY DISEASE INVOLVING NATIVE CORONARY ARTERY OF NATIVE HEART WITH ANGINA PECTORIS (HCC): Primary | ICD-10-CM

## 2025-03-28 PROCEDURE — 93798 PHYS/QHP OP CAR RHAB W/ECG: CPT

## 2025-04-01 ENCOUNTER — CLINICAL SUPPORT (OUTPATIENT)
Dept: CARDIAC REHAB | Facility: CLINIC | Age: 85
End: 2025-04-01
Payer: COMMERCIAL

## 2025-04-01 DIAGNOSIS — I25.119 CORONARY ARTERY DISEASE INVOLVING NATIVE CORONARY ARTERY OF NATIVE HEART WITH ANGINA PECTORIS (HCC): Primary | ICD-10-CM

## 2025-04-01 PROCEDURE — 93798 PHYS/QHP OP CAR RHAB W/ECG: CPT

## 2025-04-03 ENCOUNTER — CLINICAL SUPPORT (OUTPATIENT)
Dept: CARDIAC REHAB | Facility: CLINIC | Age: 85
End: 2025-04-03
Payer: COMMERCIAL

## 2025-04-03 ENCOUNTER — APPOINTMENT (OUTPATIENT)
Dept: LAB | Facility: CLINIC | Age: 85
End: 2025-04-03
Payer: COMMERCIAL

## 2025-04-03 DIAGNOSIS — I25.119 CORONARY ARTERY DISEASE INVOLVING NATIVE CORONARY ARTERY OF NATIVE HEART WITH ANGINA PECTORIS (HCC): Primary | ICD-10-CM

## 2025-04-03 DIAGNOSIS — D61.01 PURE RED CELL APLASIA (HCC): ICD-10-CM

## 2025-04-03 LAB
BASOPHILS # BLD AUTO: 0.02 THOUSANDS/ÂΜL (ref 0–0.1)
BASOPHILS NFR BLD AUTO: 1 % (ref 0–1)
EOSINOPHIL # BLD AUTO: 0.07 THOUSAND/ÂΜL (ref 0–0.61)
EOSINOPHIL NFR BLD AUTO: 2 % (ref 0–6)
ERYTHROCYTE [DISTWIDTH] IN BLOOD BY AUTOMATED COUNT: 15.2 % (ref 11.6–15.1)
HCT VFR BLD AUTO: 33.8 % (ref 36.5–49.3)
HGB BLD-MCNC: 11.4 G/DL (ref 12–17)
IMM GRANULOCYTES # BLD AUTO: 0 THOUSAND/UL (ref 0–0.2)
IMM GRANULOCYTES NFR BLD AUTO: 0 % (ref 0–2)
LYMPHOCYTES # BLD AUTO: 2.08 THOUSANDS/ÂΜL (ref 0.6–4.47)
LYMPHOCYTES NFR BLD AUTO: 46 % (ref 14–44)
MCH RBC QN AUTO: 38.1 PG (ref 26.8–34.3)
MCHC RBC AUTO-ENTMCNC: 33.7 G/DL (ref 31.4–37.4)
MCV RBC AUTO: 113 FL (ref 82–98)
MONOCYTES # BLD AUTO: 0.57 THOUSAND/ÂΜL (ref 0.17–1.22)
MONOCYTES NFR BLD AUTO: 13 % (ref 4–12)
NEUTROPHILS # BLD AUTO: 1.7 THOUSANDS/ÂΜL (ref 1.85–7.62)
NEUTS SEG NFR BLD AUTO: 38 % (ref 43–75)
NRBC BLD AUTO-RTO: 0 /100 WBCS
PLATELET # BLD AUTO: 331 THOUSANDS/UL (ref 149–390)
PMV BLD AUTO: 9.6 FL (ref 8.9–12.7)
RBC # BLD AUTO: 2.99 MILLION/UL (ref 3.88–5.62)
WBC # BLD AUTO: 4.44 THOUSAND/UL (ref 4.31–10.16)

## 2025-04-03 PROCEDURE — 85025 COMPLETE CBC W/AUTO DIFF WBC: CPT

## 2025-04-03 PROCEDURE — 93798 PHYS/QHP OP CAR RHAB W/ECG: CPT

## 2025-04-03 PROCEDURE — 36415 COLL VENOUS BLD VENIPUNCTURE: CPT

## 2025-04-04 ENCOUNTER — CLINICAL SUPPORT (OUTPATIENT)
Dept: CARDIAC REHAB | Facility: CLINIC | Age: 85
End: 2025-04-04
Payer: COMMERCIAL

## 2025-04-04 DIAGNOSIS — I25.119 CORONARY ARTERY DISEASE INVOLVING NATIVE CORONARY ARTERY OF NATIVE HEART WITH ANGINA PECTORIS (HCC): Primary | ICD-10-CM

## 2025-04-04 PROCEDURE — 93798 PHYS/QHP OP CAR RHAB W/ECG: CPT

## 2025-04-04 RX ORDER — TICAGRELOR 90 MG/1
90 TABLET ORAL EVERY 12 HOURS
Qty: 60 TABLET | Refills: 0 | OUTPATIENT
Start: 2025-04-04

## 2025-04-07 ENCOUNTER — OFFICE VISIT (OUTPATIENT)
Dept: HEMATOLOGY ONCOLOGY | Facility: CLINIC | Age: 85
End: 2025-04-07
Payer: COMMERCIAL

## 2025-04-07 VITALS
DIASTOLIC BLOOD PRESSURE: 52 MMHG | BODY MASS INDEX: 21.47 KG/M2 | SYSTOLIC BLOOD PRESSURE: 130 MMHG | HEART RATE: 70 BPM | HEIGHT: 70 IN | WEIGHT: 150 LBS | RESPIRATION RATE: 20 BRPM | TEMPERATURE: 97.4 F | OXYGEN SATURATION: 99 %

## 2025-04-07 DIAGNOSIS — D52.1 DRUG-INDUCED FOLATE DEFICIENCY ANEMIA: ICD-10-CM

## 2025-04-07 DIAGNOSIS — D75.81 MYELOFIBROSIS (HCC): ICD-10-CM

## 2025-04-07 DIAGNOSIS — E43 UNSPECIFIED SEVERE PROTEIN-CALORIE MALNUTRITION (HCC): ICD-10-CM

## 2025-04-07 DIAGNOSIS — I25.119 CORONARY ARTERY DISEASE INVOLVING NATIVE CORONARY ARTERY OF NATIVE HEART WITH ANGINA PECTORIS (HCC): Chronic | ICD-10-CM

## 2025-04-07 DIAGNOSIS — D61.01 PURE RED CELL APLASIA (HCC): Primary | ICD-10-CM

## 2025-04-07 PROCEDURE — 99214 OFFICE O/P EST MOD 30 MIN: CPT | Performed by: PHYSICIAN ASSISTANT

## 2025-04-07 PROCEDURE — G2211 COMPLEX E/M VISIT ADD ON: HCPCS | Performed by: PHYSICIAN ASSISTANT

## 2025-04-07 RX ORDER — ESCITALOPRAM OXALATE 10 MG/1
10 TABLET ORAL DAILY
COMMUNITY
Start: 2025-03-04

## 2025-04-07 NOTE — ASSESSMENT & PLAN NOTE
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        On BMBX 1/2024:  The overall findings are consistent with pure red cell aplasia (PRCA) with background clonal T-cells, moderately increased fibrosis (MF-2 of 3), and negative for a myeloid neoplasm.      Workup revealed Parvovirus B19 antibody, IgG Antibody POSITIVE. IgM normal.   TCR gamma gene rearrangement detected.   Negative rheumatologic workup. Normal hepatitis testing, normal flow cytometry.         Initiated on prednisone 20 mg QD with significant response of hemoglobin 11-12, and Aranesp 100mcg  4/22/24 prednisone reduced to 5 mg every other day from 5 mg prednisone every day  5/29/24 hgb 9.7, , 32% segs, 56% lymphocytes   5/31/24 Aranesp 100mcg q 2 weeks resumed         6/5/24 hgb decreased further to 8.4  Prednisone increased to 20mg/ day, prednisone tapered very slowly.    6/18/24 Aranesp 200 mcg every 2 weeks,  Last dose was July 2, 2024     since 9/9/24- 2/2025, hgb has been 12.6 or higher  2/4/25 prednisone was reduced from 5mg po daily to 5mg every other day.      2/15/25  hospitalization 2nd to MI.  S/P stent  Undergoing cardiac rehab.    4/3/25 hemoglobin 11.4.  4/7/25 continue with prednisone 5mg every other day.    Monthly CBCD.  Will check additional labs with next blood draw.    Follow-up in 2 to 3 months.      Orders:  •  Erythropoietin  •  Iron Panel (Includes Ferritin, Iron Sat%, Iron, and TIBC); Future  •  Folate; Future  •  Vitamin B12; Future  •  CBC and differential; Standing  •  Comprehensive metabolic panel; Future  •  Reticulocytes

## 2025-04-07 NOTE — PROGRESS NOTES
Name: Gael Ferraro      : 1940      MRN: [id number]  Encounter Provider: Arabella Freeman PA-C  Encounter Date: 2025   Encounter department: 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      Workup revealed Parvovirus B19 antibody, IgG Antibody POSITIVE. IgM normal.   TCR gamma gene rearrangement detected.   Negative rheumatologic workup. Normal hepatitis testing, normal flow cytometry.         Initiated on prednisone 20 mg QD with significant response of hemoglobin 11-12, and Aranesp 100mcg  24 prednisone reduced to 5 mg every other day from 5 mg prednisone every day  24 hgb 9.7, , 32% segs, 56% lymphocytes   24 Aranesp 100mcg q 2 weeks resumed         24 hgb decreased further to 8.4  Prednisone increased to 20mg/ day, prednisone tapered very slowly.    24 Aranesp 200 mcg every 2 weeks,  Last dose was 2024     since 24- 2025, hgb has been 12.6 or higher  25 prednisone was reduced from 5mg po daily to 5mg every other day.      2/15/25  hospitalization 2nd to MI.  S/P stent  Undergoing cardiac rehab.    4/3/25 hemoglobin 11.4.  25 continue with prednisone 5mg every other day.    Monthly CBCD.  Will check additional labs with next blood draw.    Follow-up in 2 to 3 months.      Orders:  •  Erythropoietin  •  Iron Panel (Includes Ferritin, Iron Sat%, Iron, and TIBC); Future  •  Folate; Future  •  Vitamin B12; Future  •  CBC and differential; Standing  •  Comprehensive metabolic panel; Future  •  Reticulocytes    Myelofibrosis (HCC)  Myelofibrosis  grade 2 of 3 identified on On BMBX 1/2024  Orders:  •  Erythropoietin  •  Iron Panel (Includes Ferritin, Iron Sat%, Iron, and TIBC); Future  •  Folate; Future  •  Vitamin B12; Future  •  CBC and differential; Standing  •  Comprehensive metabolic panel; Future  •  Reticulocytes    Coronary artery disease involving native coronary artery of native heart with angina pectoris (HCC)  dmitted 2/15 - 2/22/25 2nd to chest pain improved with nitroglycerin.  EKG was concerning for ST elevation MI.  Cardiac catheterization identified severe left main disease.  He was not deemed to be a surgical candidate given his multiple comorbidities and advanced age.  He status post PCI 2/17/2025    2/15/25 admitting hgb 13.1  2/17/25 AST 63, total bili 1.54  2/24/25 hgb 10.3  3/11/24 hgb 11.9  Orders:  •  Erythropoietin  •  Iron Panel (Includes Ferritin, Iron Sat%, Iron, and TIBC); Future  •  Folate; Future  •  Vitamin B12; Future  •  CBC and differential; Standing  •  Comprehensive metabolic panel; Future  •  Reticulocytes    Drug-induced folate deficiency anemia    Orders:  •  Iron Panel (Includes Ferritin, Iron Sat%, Iron, and TIBC); Future    Unspecified severe protein-calorie malnutrition (HCC)  Malnutrition Findings:                                 BMI Findings:           Body mass index is 21.52 kg/m².     Orders:  •  Folate; Future  •  Vitamin B12; Future        Return for 2-3 months .    History of Present Illness   Chief Complaint   Patient presents with   • Follow-up     Pertinent Medical History     04/07/25: Gael Nettles is an 83 y/o gentleman 1st seen inpatient 1/21/24 regarding refractory anemia.       PMH: CAD, HTN, CKD, dysphagia. Never smoker. No family history of liver disease.       November 18, 2023 hemoglobin 10.2, , white blood cell count 3.7, 48% neutrophils, 39% lymphocytes, 11% monocytes, platelet count 82, iron saturation 58%. Normal total bilirubin, total protein, albumin.  BUN 24,  creatinine 1.24, ESR 12. SPEP: no monoclonal protein.  Alpha 2 region is decreased and may be suggestive of a decreased haptoglobin. folate WNL and FOBT in ED (12/2023) negative.      B12 was 292-  PCP Dr. Lyman started him on oral B12.  folate WNL.      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 Troponin elevated. Cardiac cath shows CAD but no stent indicated.      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        Ref 02/26/24    PARVOVIRUS B19 IGG Ab 0.0 - 0.8 index 1.3 (H)   PARVOVIRUS B19 IGM Ab 0.0 - 0.8 index 0.2      • Vitamin B2 -  normal   • Parvovirus B19 antibody, IgG and IgM - B19 IgG Antibody POSITIVE   • IgG, IgA, IgM -normal   • Leukemia/Lymphoma flow cytometry-flow cytometric analysis does not show significant numbers of circulating blasts or abnormal lymphoid or myeloid population.   • Immunoglobulin free LT chains blood- normal   • TAY w/Reflex if Positive - negative   • Sjogren's Antibodies -normal   • Anti-Neelima 1 Antibody -normal   • Anti-scleroderma antibody -normal   • DNA (DS) ANTIBODY -normal   • C-reactive protein - -normal   • Cyclic citrul peptide antibody, IgG -normal   • Sedimentation rate, automated -normal   • Lupus anticoagulant- not detected   • Cardiolipin antibody- -normal   • Beta-2 glycoprotein antibodies - normal   • Rheumatoid Factor -normal   • Chronic Hepatitis Panel - non reactive   • Reticulocytes - 3.11%   • Anti-neutrophilic cytoplasmic antibody- -normal   • T Cell Receptor (TCR) Beta Gene Rearrang, PCR- negative   • T Cell Receptor (Tcr) Gamma Gene Rearrang,PCR- clonal T-cell receptor gamma population was detected       Initiated on prednisone 20 mg QD with significant response of hemoglobin 11-12, and Aranesp 100mcg  4/22/24  prednisone reduced to 5 mg every other day from 5 mg prednisone every day  5/29/24 hgb 9.7, , 32% segs, 56% lymphocytes   5/31/24 Aranesp 100mcg q 2 weeks resumed         6/5/24 hgb decreased further to 8.4  Prednisone increased to 20mg/ day, prednisone tapered very slowly.    6/18/24 Aranesp 200 mcg every 2 weeks,  Last dose was July 2, 2024     since 9/9/24- 2/2025, hgb has been 12.6 or higher    Admitted 2/15 - 2/22/25 2nd to chest pain improved with nitroglycerin.  EKG was concerning for ST elevation MI.  Cardiac catheterization identified severe left main disease.  He was not deemed to be a surgical candidate given his multiple comorbidities and advanced age.  He status post PCI 2/17/2025    2/15/25 admitting hgb 13.1  2/24/25 hgb 10.3  3/11/24 hgb 11.9    4/3/25 hemoglobin 11.4, , white blood cell count 4.4, 38% segs, 46% lymphocytes, 13% monocytes, platelet count 331         Review of Systems   Constitutional:  Positive for fatigue. Negative for activity change, appetite change, chills, diaphoresis, fever and unexpected weight change.   HENT:  Negative for congestion, dental problem, facial swelling, hearing loss, mouth sores, nosebleeds, postnasal drip, rhinorrhea, sore throat, trouble swallowing and voice change.    Eyes:  Negative for photophobia, pain, discharge, redness, itching and visual disturbance.   Respiratory:  Negative for cough, choking, chest tightness, shortness of breath and wheezing.    Cardiovascular:  Negative for chest pain, palpitations and leg swelling.   Gastrointestinal:  Negative for abdominal distention, abdominal pain, anal bleeding, blood in stool, constipation, diarrhea, nausea, rectal pain and vomiting.   Endocrine: Negative for cold intolerance and heat intolerance.   Genitourinary:  Negative for decreased urine volume, difficulty urinating, dysuria, flank pain, frequency, hematuria and urgency.   Musculoskeletal:  Negative for arthralgias, back pain, gait  "problem, joint swelling, myalgias, neck pain and neck stiffness.   Skin:  Negative for color change, pallor, rash and wound.   Allergic/Immunologic: Negative for immunocompromised state.   Neurological:  Negative for dizziness, tremors, seizures, syncope, facial asymmetry, speech difficulty, weakness, light-headedness, numbness and headaches.   Hematological:  Negative for adenopathy. Does not bruise/bleed easily.   Psychiatric/Behavioral:  Negative for agitation, confusion, decreased concentration, dysphoric mood and sleep disturbance. The patient is not nervous/anxious.    All other systems reviewed and are negative.          Objective   /52 (Patient Position: Sitting, Cuff Size: Large)   Pulse 70   Temp (!) 97.4 °F (36.3 °C) (Temporal)   Resp 20   Ht 5' 10\" (1.778 m)   Wt 68 kg (150 lb)   SpO2 99%   BMI 21.52 kg/m²     Physical Exam  Vitals reviewed.   Constitutional:       General: He is not in acute distress.     Appearance: He is well-developed. He is not diaphoretic.   HENT:      Head: Normocephalic and atraumatic.   Eyes:      Conjunctiva/sclera: Conjunctivae normal.   Neck:      Trachea: No tracheal deviation.   Cardiovascular:      Rate and Rhythm: Normal rate and regular rhythm.      Heart sounds: No murmur heard.     No friction rub. No gallop.   Pulmonary:      Effort: Pulmonary effort is normal. No respiratory distress.      Breath sounds: Normal breath sounds. No wheezing or rales.   Chest:      Chest wall: No tenderness.   Abdominal:      General: There is no distension.      Palpations: Abdomen is soft.      Tenderness: There is no abdominal tenderness.   Musculoskeletal:      Cervical back: Normal range of motion and neck supple.   Lymphadenopathy:      Cervical: No cervical adenopathy.   Skin:     General: Skin is warm and dry.      Coloration: Skin is not pale.      Findings: No erythema.   Neurological:      General: No focal deficit present.      Mental Status: He is alert. "   Psychiatric:         Behavior: Behavior normal.         Thought Content: Thought content normal.         Labs: I have reviewed the following labs:  Results for orders placed or performed in visit on 04/03/25   CBC and differential   Result Value Ref Range    WBC 4.44 4.31 - 10.16 Thousand/uL    RBC 2.99 (L) 3.88 - 5.62 Million/uL    Hemoglobin 11.4 (L) 12.0 - 17.0 g/dL    Hematocrit 33.8 (L) 36.5 - 49.3 %     (H) 82 - 98 fL    MCH 38.1 (H) 26.8 - 34.3 pg    MCHC 33.7 31.4 - 37.4 g/dL    RDW 15.2 (H) 11.6 - 15.1 %    MPV 9.6 8.9 - 12.7 fL    Platelets 331 149 - 390 Thousands/uL    nRBC 0 /100 WBCs    Segmented % 38 (L) 43 - 75 %    Immature Grans % 0 0 - 2 %    Lymphocytes % 46 (H) 14 - 44 %    Monocytes % 13 (H) 4 - 12 %    Eosinophils Relative 2 0 - 6 %    Basophils Relative 1 0 - 1 %    Absolute Neutrophils 1.70 (L) 1.85 - 7.62 Thousands/µL    Absolute Immature Grans 0.00 0.00 - 0.20 Thousand/uL    Absolute Lymphocytes 2.08 0.60 - 4.47 Thousands/µL    Absolute Monocytes 0.57 0.17 - 1.22 Thousand/µL    Eosinophils Absolute 0.07 0.00 - 0.61 Thousand/µL    Basophils Absolute 0.02 0.00 - 0.10 Thousands/µL

## 2025-04-07 NOTE — ASSESSMENT & PLAN NOTE
dmitted 2/15 - 2/22/25 2nd to chest pain improved with nitroglycerin.  EKG was concerning for ST elevation MI.  Cardiac catheterization identified severe left main disease.  He was not deemed to be a surgical candidate given his multiple comorbidities and advanced age.  He status post PCI 2/17/2025    2/15/25 admitting hgb 13.1  2/17/25 AST 63, total bili 1.54  2/24/25 hgb 10.3  3/11/24 hgb 11.9  Orders:  •  Erythropoietin  •  Iron Panel (Includes Ferritin, Iron Sat%, Iron, and TIBC); Future  •  Folate; Future  •  Vitamin B12; Future  •  CBC and differential; Standing  •  Comprehensive metabolic panel; Future  •  Reticulocytes

## 2025-04-08 ENCOUNTER — CLINICAL SUPPORT (OUTPATIENT)
Dept: CARDIAC REHAB | Facility: CLINIC | Age: 85
End: 2025-04-08
Payer: COMMERCIAL

## 2025-04-08 DIAGNOSIS — I25.119 CORONARY ARTERY DISEASE INVOLVING NATIVE CORONARY ARTERY OF NATIVE HEART WITH ANGINA PECTORIS (HCC): Primary | ICD-10-CM

## 2025-04-08 PROCEDURE — 93798 PHYS/QHP OP CAR RHAB W/ECG: CPT

## 2025-04-10 ENCOUNTER — CLINICAL SUPPORT (OUTPATIENT)
Dept: CARDIAC REHAB | Facility: CLINIC | Age: 85
End: 2025-04-10
Payer: COMMERCIAL

## 2025-04-10 DIAGNOSIS — I25.119 CORONARY ARTERY DISEASE INVOLVING NATIVE CORONARY ARTERY OF NATIVE HEART WITH ANGINA PECTORIS (HCC): Primary | ICD-10-CM

## 2025-04-10 PROCEDURE — 93798 PHYS/QHP OP CAR RHAB W/ECG: CPT

## 2025-04-11 ENCOUNTER — APPOINTMENT (OUTPATIENT)
Dept: CARDIAC REHAB | Facility: CLINIC | Age: 85
End: 2025-04-11
Payer: COMMERCIAL

## 2025-04-15 ENCOUNTER — CLINICAL SUPPORT (OUTPATIENT)
Dept: CARDIAC REHAB | Facility: CLINIC | Age: 85
End: 2025-04-15
Payer: COMMERCIAL

## 2025-04-15 DIAGNOSIS — I25.119 CORONARY ARTERY DISEASE INVOLVING NATIVE CORONARY ARTERY OF NATIVE HEART WITH ANGINA PECTORIS (HCC): ICD-10-CM

## 2025-04-15 DIAGNOSIS — I25.119 CORONARY ARTERY DISEASE INVOLVING NATIVE CORONARY ARTERY OF NATIVE HEART WITH ANGINA PECTORIS (HCC): Primary | ICD-10-CM

## 2025-04-15 PROCEDURE — 93798 PHYS/QHP OP CAR RHAB W/ECG: CPT

## 2025-04-15 NOTE — PROGRESS NOTES
"CARDIAC REHABILITATION   ASSESSMENT AND INDIVIDUALIZED TREATMENT PLAN  30 DAY          Today's date: 4/15/2025   # of Exercise Sessions Completed: 12  Patient name: Gael Ferraro      : 1940  Age: 84 y.o.       MRN: [id number]  Referring Physician: Luther Cooper MD  Cardiologist: Flavio Velasco MD  Provider: Esau  Clinician: Judie Moon MS, CEP, EPC        Treatment is tailored to this patient's individual needs.  The ITP was reviewed with the patient and all questions were answered to their satisfaction.  Additional ITP documentation can be found electronically including daily and monthly exercise summaries, daily session notes with ECG summaries, education notes, daily medication reconciliation, and daily physician supervision.    SUMMARY DATE:  4/15/25    Resting BP  98/60 - 148/62,  HR 63 - 83  Exercise /58- 154/60.  HR 78 - 91  Exercise session details:  35-40 minutes,  2.05-2.97 METs  Telemetry:  NSR  Symptoms: no cardiac symptoms  Home exercise/ADLs: not completing home exercise, light ADLs  Patient's subjective report of progress: reports that his legs are feeling stronger   Clinical Comments: patient is able to walk and transfer between machines much easier. He has not reported any falls in the last few weeks      INITIAL EVALUATION SUMMARY 3/17/25    Patient's subjective report of progress/symptoms: reports leg fatigue as his biggest limiting factor. No longer having chest pain post stent  Home exercise/ADLs: no home exercise. Able to complete ADLs with taking his time and resting  Clinical Comments: reports falling yesterday, \"legs just gave out\" did not hurt anything.     Initial Fitness Assessment: 6MWT:  Resting:    Resting:  BP: 118/52  HR: 85  Exercise:  BP: 130/50  HR: 91  ECG Summary: NSR with rare PVC  Symptoms: leg fatigue. Used walker during test.        Dx:   Encounter Diagnosis   Name Primary?    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 (HCC) Yes       Description of Diagnosis: S/P Successful Protected PCI of the Ost LM in to the Prox LCX ISR with BRYAN x 1   Date of onset: 2/17/25  Other Cardiac History: ischemic cardiomyopathy, hx of stents        ASSESSMENT    Medical History:   Past Medical History:   Diagnosis Date    Low hemoglobin        Family History:  Family History   Problem Relation Age of Onset    No Known Problems Mother     No Known Problems Father        Allergies:   Hydrocodone-acetaminophen, Niacin, and Penicillins    Current Medications:   Current Outpatient Medications   Medication Sig Dispense Refill    acetaminophen (TYLENOL) 325 mg tablet Take 2 tablets (650 mg total) by mouth every 6 (six) hours as needed for mild pain, headaches or fever      aspirin (ECOTRIN LOW STRENGTH) 81 mg EC tablet Take 1 tablet (81 mg total) by mouth daily      cyanocobalamin (VITAMIN B-12) 1000 MCG tablet Take 1 tablet (1,000 mcg total) by mouth daily 30 tablet 0    escitalopram (LEXAPRO) 10 mg tablet Take 10 mg by mouth daily      LORazepam (ATIVAN) 1 mg tablet Take 1 mg by mouth 3 (three) times a day as needed for anxiety      losartan (COZAAR) 25 mg tablet Take 0.5 tablets (12.5 mg total) by mouth daily 15 tablet 11    metoprolol succinate (TOPROL-XL) 100 mg 24 hr tablet Take 0.5 tablets (50 mg total) by mouth 2 (two) times a day 30 tablet 0    nitroglycerin (NITROSTAT) 0.4 mg SL tablet Place 1 tablet (0.4 mg total) under the tongue every 5 (five) minutes as needed for chest pain 30 tablet 0    predniSONE 5 mg tablet 5mg every other day or as directed 30 tablet 1    ranolazine (RANEXA) 1000 MG SR tablet Take 1 tablet (1,000 mg total) by mouth every 12 (twelve) hours 180 tablet 1    rosuvastatin (CRESTOR) 10 MG tablet Take 1 tablet (10 mg total) by mouth daily 90 tablet 1    spironolactone (ALDACTONE) 25 mg tablet Take 0.5 tablets (12.5 mg total) by mouth daily 15 tablet 11    ticagrelor (Brilinta) 90 MG Take 1 tablet (90 mg total)  "by mouth every 12 (twelve) hours 60 tablet 11     No current facility-administered medications for this visit.       Medication compliance: Yes   Comments: Pt reports to be compliant with medications    Physical Limitations: none     Fall Risk: High   Comments: Patient uses walking assist device (walker/cane/rollator) uses walker outside and cane in the house. Reports falling yesterday- \"legs just gave out\"    Cultural needs: none      CAD Risk Factors:  Cholesterol: Yes  HTN: Yes  DM: No  Obesity: No   Inactivity: no- attending CR      EXERCISE ASSESSMENT:      Current Functional Status:  Occupation: retired  Recreation/Physical Activity: gardening   ADL’s:Capable of performing light to moderate ADLs limited by fatigue  [place]: able to perform self-care  Home exercise: none  Other Comments: has TM at home       SMART Exercise Goals:   improved DASI score by 10%  increased exercise capacity by 40% based on peak METs tolerated in cardiac rehab exercise session  maintain > 150 minutes per week of moderate intensity exercise  improved 6MWT distance by 10%    Patient Specific EXERCISE GOALS:       Increase strength in legs- goal met  Improve ability to go up stairs   Return to gardening    Functional Capacity Screening Tool:  [facility] Activity Status Index:  4.06 METs    NUTRITION ASSESSMENT:    Initial Weight:  140.8  Current Weight: 142.6    Height:   Ht Readings from Last 1 Encounters:   04/07/25 5' 10\" (1.778 m)       Rate Your Plate Score: 58/81    Diabetes: N/A  A1c:     last measured:     Lipid management: Discussed diet and lipid management and Last lipid profile 2/15/25  Chol 198    HDL 35      Current Dietary Habits:  Chicken and fish  Minimal red eat   Wife cooks meals   Occ eating out      SMART Nutrition Goals:   Improved Rate Your Plate score  >64, eat 6 or more servings of grain products per day, eat whole grain breads, brown rice and whole grain cereals, eat 5 or more servings of fruits " "and vegetables a day, eat 2 or more servings of low fat milk or yogurt a day, eat no more than 6oz of meat per day, dine out less than once per week or choose low fat restaurant meals, eat red meat once a week or less, rarely eat processed meats or eat low fat processed meats, eat poultry without the skin, eat chicken and fish that is not fried, eat meatless meals twice a week or more, Do not cook with oil, butter or margarine, Do not eat fried foods, use \"light\" tub margarine on bread, potatoes and vegetables, choose light or fat-free salad dressings or tilley, choose healthy snacks such as fruit, pretzels, and low fat crackers, choose healthy desserts and sweets such as eri food cake or  fruit, choose low sodium canned, frozen/packaged foods or rarely/never eat, rarely/never eat salty snacks, and choose low sugar desserts and sweets    Patient Specific NUTRITION GOALS:     1. Work on drinking more water   2. Maintain healthy weight   3. Decrease fried foods    Drug/Alcohol Use:   Very little alcohol       PSYCHOSOCIAL ASSESSMENT:    Date of last Assessment:  4/15/25  Depression screening:  PHQ-9 = 6    Interpretation:  5-9 = Mild Depression  Anxiety screening:  RAMSEY-7 = 12  Interpretation: 10-14 = Moderate anxiety    Pt self-report of depression and anxiety   Patient has a history of anxiety     Self-reported stress level:  4   Stressors:  health   Stress Management Tools: practice Relaxation Techniques, exercise, and keep a positive mindset    SMART Psychosocial Goals:     Reduce perceived stress to 1-3/10, improved [facility] QOL < 27, Feelings in [place] Score < 3, Physical Fitness in [facility] Score < 3, Daily Activity in [facility] Score < 3, Social Activities in [place] Score < 3, Pain in [place] Score < 3, Overall Health in [facility] Score < 3, Quality of Life in [facility] Score < 3 , Change in Health in [facility] Score < 3 ,  Increased interest and pleasure in doing things,  reduced time feeling down, " depressed or hopeless, improved sleeping habits, feel less tired with more energy, reduced time feeling like a failure and having negative self thoughts, reduced time feeling nervous or on edge, control or stop worrying, reduced feelings of restlessness, reduced irritability, and avoid thoughts of feeling as though something bad may happen    Patient Specific PSYCHOCOSOCIAL GOALS:    Continue to take lorazapam   Manage stress  Improve PHQ-9 and RAMSEY-7 scores    Quality of Life Screen:  (Higher score indicates disease impact on QOL)  [facility] 31/45     Social Support:   spouse and children  Community/Social Activities: none     Psychosocial Assessment as it relates to rehabilitation:   Patient denies issues with his/her family or home life that may affect their rehabilitation efforts.       OTHER CORE COMPONENT ASSESSMENT:    Tobacco Use:     N/A:  Patient is a non-smoker     Anginal Symptoms:   chest pain   NTG use: Reviewed Proper use and Pt has not used NTG since [facility] [employer]    SMART Goals:   consistent, controlled resting BP < 130/80, medication compliance, and reduced angina    Patient Specific CORE COMPONENT GOALS:    Monitor BP  Medication compliance      INDIVIDUALIZED TREATMENT PLAN      EXERCISE GOALS and PLAN      Progress toward Exercise goals:   Pt is progressing and showing improvement  toward the following goals:  tolerating exercise for 35-40 mins at 2.05-2.97 METs, increasing leg strength, able to walk and transfer more easily.   Will work on increasing ADLs    Exercise Plan:    education on home exercise guidelines, home exercise 30+ mins 2 days opposite CR, Group class: Risk Factors for Heart Disease, Patient education:   Exercise After Cardiac Rehabilitation, and After discharge patient will continue to follow a regular exercise regimen with the goal of a minimum of 150 minutes per week of moderate intensity exercise.      The patient was counseled on exercise guidelines to  achieve a minimum of 150 mins/wk of moderate intensity (RPE 4-6)   exercise and encouraged to add 1-2 days of exercise on opposite days of cardiac rehab as tolerated.       PHYSICIAN PRESCRIBED EXERCISE:    Current Aerobic Exercise Prescription:      Frequency: 3 days/week   Supplement with home exercise 2+ days/wk as tolerated       Minutes: 35-40         METS: 2.05-2.97           HR: 78-91   RPE: 4-6         Modalities: UBE, NuStep, Recumbent bike, and Room walking     Exercise workloads will be progressed gradually as tolerated, within limits of patient's ability, and according to the patient's   response to the exercise program.      Aerobic Exercise Prescription Plan for Progression   Frequency: 3 days/week of cardiac rehab       Supplement with home exercise 2+ days/wk as tolerated    Minutes: 40       >150 mins/wk of moderate intensity exercise   METS: 2.3-3.3   HR: RHR +30-40bpm     RPE: 4-6   Modalities: Treadmill, UBE, NuStep, and Recumbent bike    Strength trainin-3 days / week  12-15 repetitions  1-2 sets per modality   Will be added following 2-3 weeks of monitored exercise sessions   Modalities: Arm Curl, Sit to Stands, and Upright Rows    Home Exercise: none    Exercise Education: benefit of exercise for CAD risk factors, home exercise guidelines, AHA guidelines to achieve >150 mins/wk of moderate exercise, RPE scale, and Group class: Risk Factors for Heart Disease     Readiness to change: Action:  (Changing behavior)      NUTRITION GOALS AND PLAN      Nutritional   Reviewed patient's Rate your Plate. Discussed key elements of heart healthy eating. Reviewed patient goals for dietary modifications and their clinical implications.  Reviewed most recent lipid profile.     Patient's progress toward Nutrition goals:    Pt is progressing and showing improvement  toward the following goals:  maintaining a healthy weight- weight gain of 2 lbs, reports eating mainly chicken and fish and minimal red meat.   ", Pt has not made progress toward the following goals: drinking enough water. , Will continue to educate and progress as tolerated. Will continue to make heart healthy choices       Nutrition Plan:   group class: Reading Food Labels, group Class: Heart Healthy Eating, increase intake of whole grains, replace refined flours with whole grains, increase daily intake of fruits and vegetables, increase daily intake of low fat dairy, limit meat intake to less than 6oz per day, choose healthy meals while dining out, choose lean red meat, reduce intake and /or  rarely eat processed meats , eat poultry without the skin, eat chicken and fish that is baked, broiled or roasted, eat more meatless meals, cook without fats or oils, never/rarely eat fried foods, use \"light\" tub margarine, use light or fat-free salad dressings and mayonnaise, eat healthy snacks like fruit, pretzels, and low fat crackers, choose desserts such as fruit, eri food cake, low-fat or fat-free sweets, choose low sodium canned, frozen, packaged foods or rarely eat these foods, rarely/never eat salty snacks, and choose low sugar desserts and sweets    Measurable goals were based Rate Your Plate Dietary Self-Assessment. These are the areas in which the patient could score higher on the assessment.  Goals include recommendations for a heart healthy diet based on American Heart Association.    Nutrition Education:   heart healthy eating principles  maintaining hydration  nutrition for  lipid management  healthy choices while dining out  hydration    Readiness to change: Action:  (Changing behavior)      PSYCHOSOCIAL GOALS AND PLAN    Psychosocial Assessment as it relates to rehabilitation:   Patient denies issues with his/her family or home life that may affect their rehabilitation efforts.     Patient's progress toward Psychosocial goals:    Pt is progressing and showing improvement  toward the following goals:  PHQ-9 and RAMSEY-7 scores both improved.  , Will " continue to educate and progress as tolerated. Will continue to take his medications to help with anxiety    Psychosocial Intervention/plan:   Class: Stress and Your Health, Class: Relaxation, PHQ-9 >5 will refer to MD, Exercise, Keep a positive mindset, Enjoy family, and Repeat PHQ-9 every 30 days if score >5    Psychosocial Education: signs/sxs of depression, benefits of a positive support system, stress management techniques, depression and CAD, and benefits of mental health counseling    Information to utilize [employer] was provided as well as contact information for counseling through  Behavioral [facility] and group psychotherapy groups available.    Readiness to change: Action:  (Changing behavior)      OTHER CORE COMPONENTS GOALS and PLAN      Blood Pressure will be monitored throughout the program and cardiologist will be notified of elevated trends.    Pt will be encouraged to monitor home BP if advised by cardiologist.    Tobacco Plan:   N/A:  Pt is a non-smoker    Progress toward Core Component goals:   Pt is progressing and showing improvement  toward the following goals:  medication compliance.  , Pt has not made progress toward the following goals: BP occ elevated. , Will continue to educate and progress as tolerated.    Other Core Components Intervention:   medication compliance, avoid processed foods, engage in regular exercise, eliminate salt shaker at the table, check labels for sodium content, and monitor home BP    Group and Individual Education:  understanding high blood pressure and it's relationship to CAD, components of blood pressure management, proper use of sublingual NTG, group class: Understanding Heart Disease, and group class:  Common Heart Medications    Readiness to change: Action:  (Changing behavior)

## 2025-04-17 ENCOUNTER — CLINICAL SUPPORT (OUTPATIENT)
Dept: CARDIAC REHAB | Facility: CLINIC | Age: 85
End: 2025-04-17
Payer: COMMERCIAL

## 2025-04-17 DIAGNOSIS — I25.119 CORONARY ARTERY DISEASE INVOLVING NATIVE CORONARY ARTERY OF NATIVE HEART WITH ANGINA PECTORIS (HCC): Primary | ICD-10-CM

## 2025-04-17 PROCEDURE — 93798 PHYS/QHP OP CAR RHAB W/ECG: CPT

## 2025-04-17 RX ORDER — LOSARTAN POTASSIUM 25 MG/1
12.5 TABLET ORAL DAILY
Qty: 45 TABLET | Refills: 1 | Status: SHIPPED | OUTPATIENT
Start: 2025-04-17

## 2025-04-17 RX ORDER — SPIRONOLACTONE 25 MG/1
12.5 TABLET ORAL DAILY
Qty: 45 TABLET | Refills: 1 | Status: SHIPPED | OUTPATIENT
Start: 2025-04-17

## 2025-04-18 ENCOUNTER — APPOINTMENT (OUTPATIENT)
Dept: CARDIAC REHAB | Facility: CLINIC | Age: 85
End: 2025-04-18
Payer: COMMERCIAL

## 2025-04-21 ENCOUNTER — TELEPHONE (OUTPATIENT)
Age: 85
End: 2025-04-21

## 2025-04-21 NOTE — TELEPHONE ENCOUNTER
Pt wife Christi called in stating that she is having difficulty cutting Losartan and Spironolactone in half. She states that the pharmacy will not cut the medications for her and when she is cutting them the pills are falling on the floor and she is losing them. She is wondering if the pt can take Losartan 25mg and Spironolactone 25mg- full tablets every other day?    Please advise

## 2025-04-22 ENCOUNTER — CLINICAL SUPPORT (OUTPATIENT)
Dept: CARDIAC REHAB | Facility: CLINIC | Age: 85
End: 2025-04-22
Payer: COMMERCIAL

## 2025-04-22 DIAGNOSIS — I25.119 CORONARY ARTERY DISEASE INVOLVING NATIVE CORONARY ARTERY OF NATIVE HEART WITH ANGINA PECTORIS (HCC): Primary | ICD-10-CM

## 2025-04-22 DIAGNOSIS — E78.2 MIXED HYPERLIPIDEMIA: ICD-10-CM

## 2025-04-22 PROCEDURE — 93798 PHYS/QHP OP CAR RHAB W/ECG: CPT

## 2025-04-22 RX ORDER — ROSUVASTATIN CALCIUM 10 MG/1
10 TABLET, COATED ORAL DAILY
Qty: 90 TABLET | Refills: 1 | Status: SHIPPED | OUTPATIENT
Start: 2025-04-22

## 2025-04-24 ENCOUNTER — CLINICAL SUPPORT (OUTPATIENT)
Dept: CARDIAC REHAB | Facility: CLINIC | Age: 85
End: 2025-04-24
Payer: COMMERCIAL

## 2025-04-24 DIAGNOSIS — I25.119 CORONARY ARTERY DISEASE INVOLVING NATIVE CORONARY ARTERY OF NATIVE HEART WITH ANGINA PECTORIS (HCC): Primary | ICD-10-CM

## 2025-04-24 PROCEDURE — 93798 PHYS/QHP OP CAR RHAB W/ECG: CPT

## 2025-04-24 NOTE — TELEPHONE ENCOUNTER
I last saw him in [place], never was on those meds.  I have no BP data or anything else to go by, this is not something I can answer.  Needs an office visiti with [facility] available please

## 2025-04-25 ENCOUNTER — CLINICAL SUPPORT (OUTPATIENT)
Dept: CARDIAC REHAB | Facility: CLINIC | Age: 85
End: 2025-04-25
Payer: COMMERCIAL

## 2025-04-25 DIAGNOSIS — I25.119 CORONARY ARTERY DISEASE INVOLVING NATIVE CORONARY ARTERY OF NATIVE HEART WITH ANGINA PECTORIS (HCC): Primary | ICD-10-CM

## 2025-04-25 PROCEDURE — 93798 PHYS/QHP OP CAR RHAB W/ECG: CPT

## 2025-04-28 NOTE — TELEPHONE ENCOUNTER
Called pt's spouse and left message letting her know that those medications unfortunately do not come in half tablets and that it is not recommended for pt to take a full pill every other day since it could cause fluctuating blood pressures. Suggested pill cutter if she is not already using one. Reminded her that pt has appt with Wallace on 5/9 so issues can be discussed at that appt. Left office call back number if she has any questions.

## 2025-04-29 ENCOUNTER — CLINICAL SUPPORT (OUTPATIENT)
Dept: CARDIAC REHAB | Facility: CLINIC | Age: 85
End: 2025-04-29
Payer: COMMERCIAL

## 2025-04-29 DIAGNOSIS — I25.119 CORONARY ARTERY DISEASE INVOLVING NATIVE CORONARY ARTERY OF NATIVE HEART WITH ANGINA PECTORIS (HCC): Primary | ICD-10-CM

## 2025-04-29 PROCEDURE — 93798 PHYS/QHP OP CAR RHAB W/ECG: CPT

## 2025-05-01 ENCOUNTER — CLINICAL SUPPORT (OUTPATIENT)
Dept: CARDIAC REHAB | Facility: CLINIC | Age: 85
End: 2025-05-01
Payer: COMMERCIAL

## 2025-05-01 ENCOUNTER — APPOINTMENT (OUTPATIENT)
Dept: LAB | Facility: CLINIC | Age: 85
End: 2025-05-01
Attending: PHYSICIAN ASSISTANT
Payer: COMMERCIAL

## 2025-05-01 DIAGNOSIS — E43 UNSPECIFIED SEVERE PROTEIN-CALORIE MALNUTRITION (HCC): ICD-10-CM

## 2025-05-01 DIAGNOSIS — I25.119 CORONARY ARTERY DISEASE INVOLVING NATIVE CORONARY ARTERY OF NATIVE HEART WITH ANGINA PECTORIS (HCC): ICD-10-CM

## 2025-05-01 DIAGNOSIS — I25.119 CORONARY ARTERY DISEASE INVOLVING NATIVE CORONARY ARTERY OF NATIVE HEART WITH ANGINA PECTORIS (HCC): Primary | ICD-10-CM

## 2025-05-01 DIAGNOSIS — D52.1 DRUG-INDUCED FOLATE DEFICIENCY ANEMIA: ICD-10-CM

## 2025-05-01 DIAGNOSIS — D61.01 PURE RED CELL APLASIA (HCC): ICD-10-CM

## 2025-05-01 DIAGNOSIS — D75.81 MYELOFIBROSIS (HCC): ICD-10-CM

## 2025-05-01 LAB
ALBUMIN SERPL BCG-MCNC: 4.4 G/DL (ref 3.5–5)
ALP SERPL-CCNC: 77 U/L (ref 34–104)
ALT SERPL W P-5'-P-CCNC: 28 U/L (ref 7–52)
ANION GAP SERPL CALCULATED.3IONS-SCNC: 10 MMOL/L (ref 4–13)
AST SERPL W P-5'-P-CCNC: 17 U/L (ref 13–39)
BASOPHILS # BLD AUTO: 0.02 THOUSANDS/ÂΜL (ref 0–0.1)
BASOPHILS NFR BLD AUTO: 0 % (ref 0–1)
BILIRUB SERPL-MCNC: 0.72 MG/DL (ref 0.2–1)
BUN SERPL-MCNC: 22 MG/DL (ref 5–25)
CALCIUM SERPL-MCNC: 9.4 MG/DL (ref 8.4–10.2)
CHLORIDE SERPL-SCNC: 104 MMOL/L (ref 96–108)
CO2 SERPL-SCNC: 25 MMOL/L (ref 21–32)
CREAT SERPL-MCNC: 1.1 MG/DL (ref 0.6–1.3)
EOSINOPHIL # BLD AUTO: 0.14 THOUSAND/ÂΜL (ref 0–0.61)
EOSINOPHIL NFR BLD AUTO: 3 % (ref 0–6)
ERYTHROCYTE [DISTWIDTH] IN BLOOD BY AUTOMATED COUNT: 15.7 % (ref 11.6–15.1)
FERRITIN SERPL-MCNC: 892 NG/ML (ref 30–336)
FOLATE SERPL-MCNC: 16 NG/ML
GFR SERPL CREATININE-BSD FRML MDRD: 60 ML/MIN/1.73SQ M
GLUCOSE SERPL-MCNC: 101 MG/DL (ref 65–140)
HCT VFR BLD AUTO: 29.4 % (ref 36.5–49.3)
HGB BLD-MCNC: 10.1 G/DL (ref 12–17)
IMM GRANULOCYTES # BLD AUTO: 0.02 THOUSAND/UL (ref 0–0.2)
IMM GRANULOCYTES NFR BLD AUTO: 0 % (ref 0–2)
IRON SATN MFR SERPL: 69 % (ref 15–50)
IRON SERPL-MCNC: 191 UG/DL (ref 50–212)
LYMPHOCYTES # BLD AUTO: 2.02 THOUSANDS/ÂΜL (ref 0.6–4.47)
LYMPHOCYTES NFR BLD AUTO: 39 % (ref 14–44)
MCH RBC QN AUTO: 39.5 PG (ref 26.8–34.3)
MCHC RBC AUTO-ENTMCNC: 34.4 G/DL (ref 31.4–37.4)
MCV RBC AUTO: 115 FL (ref 82–98)
MONOCYTES # BLD AUTO: 0.62 THOUSAND/ÂΜL (ref 0.17–1.22)
MONOCYTES NFR BLD AUTO: 12 % (ref 4–12)
NEUTROPHILS # BLD AUTO: 2.32 THOUSANDS/ÂΜL (ref 1.85–7.62)
NEUTS SEG NFR BLD AUTO: 46 % (ref 43–75)
NRBC BLD AUTO-RTO: 0 /100 WBCS
PLATELET # BLD AUTO: 388 THOUSANDS/UL (ref 149–390)
PMV BLD AUTO: 10.3 FL (ref 8.9–12.7)
POTASSIUM SERPL-SCNC: 4.5 MMOL/L (ref 3.5–5.3)
PROT SERPL-MCNC: 6.6 G/DL (ref 6.4–8.4)
RBC # BLD AUTO: 2.56 MILLION/UL (ref 3.88–5.62)
RETICS # AUTO: ABNORMAL 10*3/UL (ref 14356–105094)
RETICS # CALC: 2.51 % (ref 0.37–1.87)
SODIUM SERPL-SCNC: 139 MMOL/L (ref 135–147)
TIBC SERPL-MCNC: 277.2 UG/DL (ref 250–450)
TRANSFERRIN SERPL-MCNC: 198 MG/DL (ref 203–362)
UIBC SERPL-MCNC: 86 UG/DL (ref 155–355)
VIT B12 SERPL-MCNC: 529 PG/ML (ref 180–914)
WBC # BLD AUTO: 5.14 THOUSAND/UL (ref 4.31–10.16)

## 2025-05-01 PROCEDURE — 93798 PHYS/QHP OP CAR RHAB W/ECG: CPT

## 2025-05-01 PROCEDURE — 85025 COMPLETE CBC W/AUTO DIFF WBC: CPT

## 2025-05-01 PROCEDURE — 80053 COMPREHEN METABOLIC PANEL: CPT

## 2025-05-01 PROCEDURE — 83550 IRON BINDING TEST: CPT

## 2025-05-01 PROCEDURE — 82746 ASSAY OF FOLIC ACID SERUM: CPT

## 2025-05-01 PROCEDURE — 82728 ASSAY OF FERRITIN: CPT

## 2025-05-01 PROCEDURE — 82607 VITAMIN B-12: CPT

## 2025-05-01 PROCEDURE — 83540 ASSAY OF IRON: CPT

## 2025-05-02 ENCOUNTER — CLINICAL SUPPORT (OUTPATIENT)
Dept: CARDIAC REHAB | Facility: CLINIC | Age: 85
End: 2025-05-02
Payer: COMMERCIAL

## 2025-05-02 DIAGNOSIS — I25.119 CORONARY ARTERY DISEASE INVOLVING NATIVE CORONARY ARTERY OF NATIVE HEART WITH ANGINA PECTORIS (HCC): Primary | ICD-10-CM

## 2025-05-02 LAB — EPO SERPL-ACNC: 737.9 MIU/ML (ref 2.6–18.5)

## 2025-05-02 PROCEDURE — 93798 PHYS/QHP OP CAR RHAB W/ECG: CPT

## 2025-05-06 ENCOUNTER — CLINICAL SUPPORT (OUTPATIENT)
Dept: CARDIAC REHAB | Facility: CLINIC | Age: 85
End: 2025-05-06
Payer: COMMERCIAL

## 2025-05-06 DIAGNOSIS — I25.119 CORONARY ARTERY DISEASE INVOLVING NATIVE CORONARY ARTERY OF NATIVE HEART WITH ANGINA PECTORIS (HCC): Primary | ICD-10-CM

## 2025-05-06 PROCEDURE — 93798 PHYS/QHP OP CAR RHAB W/ECG: CPT

## 2025-05-08 ENCOUNTER — CLINICAL SUPPORT (OUTPATIENT)
Dept: CARDIAC REHAB | Facility: CLINIC | Age: 85
End: 2025-05-08
Payer: COMMERCIAL

## 2025-05-08 DIAGNOSIS — I25.119 CORONARY ARTERY DISEASE INVOLVING NATIVE CORONARY ARTERY OF NATIVE HEART WITH ANGINA PECTORIS (HCC): Primary | ICD-10-CM

## 2025-05-08 PROCEDURE — 93798 PHYS/QHP OP CAR RHAB W/ECG: CPT

## 2025-05-09 ENCOUNTER — OFFICE VISIT (OUTPATIENT)
Dept: CARDIOLOGY CLINIC | Facility: CLINIC | Age: 85
End: 2025-05-09
Payer: COMMERCIAL

## 2025-05-09 ENCOUNTER — CLINICAL SUPPORT (OUTPATIENT)
Dept: CARDIAC REHAB | Facility: CLINIC | Age: 85
End: 2025-05-09
Payer: COMMERCIAL

## 2025-05-09 VITALS
BODY MASS INDEX: 21.09 KG/M2 | OXYGEN SATURATION: 98 % | SYSTOLIC BLOOD PRESSURE: 110 MMHG | HEART RATE: 66 BPM | WEIGHT: 147 LBS | DIASTOLIC BLOOD PRESSURE: 60 MMHG

## 2025-05-09 DIAGNOSIS — N18.30 STAGE 3 CHRONIC KIDNEY DISEASE, UNSPECIFIED WHETHER STAGE 3A OR 3B CKD (HCC): ICD-10-CM

## 2025-05-09 DIAGNOSIS — I25.119 CORONARY ARTERY DISEASE INVOLVING NATIVE CORONARY ARTERY OF NATIVE HEART WITH ANGINA PECTORIS (HCC): Primary | ICD-10-CM

## 2025-05-09 DIAGNOSIS — I25.119 CORONARY ARTERY DISEASE INVOLVING NATIVE CORONARY ARTERY OF NATIVE HEART WITH ANGINA PECTORIS (HCC): ICD-10-CM

## 2025-05-09 DIAGNOSIS — I10 PRIMARY HYPERTENSION: ICD-10-CM

## 2025-05-09 DIAGNOSIS — E78.2 MIXED HYPERLIPIDEMIA: ICD-10-CM

## 2025-05-09 DIAGNOSIS — I25.5 ISCHEMIC CARDIOMYOPATHY: ICD-10-CM

## 2025-05-09 PROCEDURE — 99214 OFFICE O/P EST MOD 30 MIN: CPT

## 2025-05-09 PROCEDURE — 93798 PHYS/QHP OP CAR RHAB W/ECG: CPT

## 2025-05-09 RX ORDER — SPIRONOLACTONE 25 MG/1
25 TABLET ORAL DAILY
Start: 2025-05-09 | End: 2025-05-09

## 2025-05-09 RX ORDER — SPIRONOLACTONE 25 MG/1
25 TABLET ORAL DAILY
Qty: 90 TABLET | Refills: 1 | Status: SHIPPED | OUTPATIENT
Start: 2025-05-09

## 2025-05-09 RX ORDER — AMLODIPINE BESYLATE 2.5 MG/1
2.5 TABLET ORAL DAILY
COMMUNITY
Start: 2025-04-29 | End: 2025-05-09

## 2025-05-09 NOTE — PROGRESS NOTES
Gael Ferraro  1940  [id number]  [facility] [place]  [address] [facility] [address] [address]  [address]  [place] [address] [phone]    1. Stage 3 chronic kidney disease, unspecified whether stage 3a or 3b CKD (HCC)  Basic metabolic panel      2. Coronary artery disease involving native coronary artery of native heart with angina pectoris (HCC)  spironolactone (ALDACTONE) 25 mg tablet    DISCONTINUED: spironolactone (ALDACTONE) 25 mg tablet      3. Mixed hyperlipidemia        4. Primary hypertension        5. Ischemic cardiomyopathy              Summary/Discussion:  Coronary artery disease s/p PCI  - remote history per [facility] documentation- 20 years ago   - [facility] (1/2/24): severe chronic multivessel CAD  interventional cardiology recommendations at that time were to:   continue ongoing anemia workup with goal Hqb >9-10 (given the severity of his MVD)  continue GDMT optimization on statin/bb and if able to tolerate  lifelong DAPT vs P2Y1 monotherapy  would only consider high risk PCI to LM if symptoms were refractory to above  - reported symptoms of angina during our prior office visit. Recommendations at that time were to increasing his ranexa to 1000 mg twice daily and start imdur 30 mg daily. Discussion had with his primary cardiologist at that time who recommended optimal medical management at this time. If symptoms do not improve with medical management then can consider high risk PCI  - unfortunately he presented to the ED on 2/15/2025 for chest pain with evidence of STEMI on EKG  he underwent urgent cardiac catheterization and was noted to have further progression in his left main disease along with known RCA . An intra-aortic balloon pump was inserted postcardiac catheterization to help maintain appropriate coronary perfusion pressure and he was subsequently transferred to [facility] critical care unit for evaluation by cardiology and cardiac surgery. He was seen and  evaluated by cardiac surgery earlier this morning and noted not to be surgery candidate based on advanced age, deconditioning, gait dysfunction. Subsequently underwent Impella assisted high risk PCI to ostial left main  echo with drop in EF to 43% previously 60%   - continue on aspirin, brilinta, statin, imdur, ranexa and beta blocker. PRN SL nitro prescribed  - denies any current angina symptoms     Ischemic cardiomyopathy:  - LVEF 43%  - volume status appears stable on exam. Continue spirolactone 25 mg daily  - renal function/electrolytes stable on recent labs   - continue present GDMT  plan for maximal medical management with GDMT if able   - follow up on limited echo to reassess LVEF pending     Hypertension:  - 110/60  - stop losartan and increase spirolactone to 25 mg daily   wife reports difficult cutting pills in half in which the patient has been taking both his losartan and spirolactone every other day   - lifestyle modification   - close blood pressure monitoring     Mixed hyperlipidemia:  - Lipid Profile:    Latest Reference Range & Units 02/15/25 04:16   Cholesterol See Comment mg/dL 198   Triglycerides See Comment mg/dL 312 (H)   HDL >=40 mg/dL 35 (L)   Non-HDL Cholesterol mg/dl 163   LDL Calculated 0 - 100 mg/dL 101 (H)   - continue Crestor 10 mg daily with LDL goal < 55  - encouraged lifestyle modifications and annual lipid follow up     Carotid stenosis:  - continue aspirin and statin therapy     CKD:    Interval History: Gael Ferraro is a 85 y.o. year old male with history of CAD s/p PCI, HTN, hyperlipidemia, carotid stenosis, and CKD who presents to the office today for a follow up.    Since his last office visit he has been overall feeling well from a cardiac standpoint. He denies any chest pain/pressure/discomfort or shortness of breath. He denies lower extremity edema, orthopnea, and PND. He denies lightheadedness, dizziness, and syncope. He denies palpitations.        He will RTO on  10/15/2025 with Dr. Velasco or sooner if necessary. He will call with any concerns.     Medical Problems       Problem List       Elevated troponin    Anemia    Coronary artery disease involving native coronary artery of native heart with angina pectoris (HCC) (Chronic)    Stage 3 chronic kidney disease (HCC)    Lab Results   Component Value Date    EGFR 60 05/01/2025    EGFR 53 02/24/2025    EGFR 54 02/22/2025    CREATININE 1.10 05/01/2025    CREATININE 1.21 02/24/2025    CREATININE 1.20 02/22/2025         Hypertension    Leukopenia    Other dysphagia    Constipation    CKD (chronic kidney disease) stage 3, GFR 30-59 ml/min (HCC)    Lab Results   Component Value Date    EGFR 60 05/01/2025    EGFR 53 02/24/2025    EGFR 54 02/22/2025    CREATININE 1.10 05/01/2025    CREATININE 1.21 02/24/2025    CREATININE 1.20 02/22/2025         Abnormal LFTs    SOB (shortness of breath)    Nose congestion    Abnormal chest x-ray    Pure red cell aplasia (HCC)    Mild protein-calorie malnutrition (HCC)    Malnutrition Findings:                                 BMI Findings:           Body mass index is 21.09 kg/m².         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    Sexual activity: Not on file   Other Topics Concern    Not on file   Social History Narrative    Not on file     Social Drivers of Health     Financial  Resource Strain: Not on file (3/3/2025)   Food Insecurity: No Food Insecurity (3/3/2025)    Received from [facility]    Food Insecurity     In the last 12 months did you ever eat less than you felt you should because there wasn't enough money for food?: No   Transportation Needs: No Transportation Needs (3/3/2025)    Received from [facility]    Transportation Needs     In the last 12 months have you ever had to go without healthcare because you didn't have a way to get there?: No   Physical Activity: Not on file   Stress: Not on file   Social Connections: Socially Integrated (3/3/2025)    Received from [facility]    Social Connection     Do you often feel lonely?: No   Intimate Partner Violence: Not on file   Housing Stability: Not At Risk (3/3/2025)    Received from [facility]    Housing Stability     Are you worried that in the next 2 months you may not have stable housing?: No      Family History   Problem Relation Age of Onset    No Known Problems Mother     No Known Problems Father      Past Surgical History:   Procedure Laterality Date    CARDIAC CATHETERIZATION Left 1/2/2024    Procedure: Cardiac Left Heart Cath;  Surgeon: Ana Garcia DO;  [facility];  Service: Cardiology    CARDIAC CATHETERIZATION N/A 1/2/2024    Procedure: Cardiac Coronary Angiogram;  Surgeon: Ana Garcia DO;  [facility];  Service: Cardiology    CARDIAC CATHETERIZATION N/A 1/2/2024    Procedure: Cardiac RHC;  Surgeon: Ana Garcia DO;  [facility];  Service: Cardiology    CARDIAC CATHETERIZATION N/A 2/17/2025    Procedure: Cardiac PCI;  Surgeon: Ana Garcia DO;  [facility];  Service: Cardiology    CARDIAC CATHETERIZATION N/A 2/17/2025    Procedure: Cardiac Impella Insertion;  Surgeon: Ana Garcia DO;  [facility];  Service: Cardiology    CARDIAC  CATHETERIZATION N/A 2/17/2025    Procedure: Cardiac Impella Removal;  Surgeon: Ana Garcia DO;  [facility];  Service: Cardiology    CARDIAC CATHETERIZATION N/A 2/15/2025    Procedure: Cardiac iabp;  Surgeon: Ana Garcia DO;  [facility];  Service: Cardiology    CARDIAC CATHETERIZATION N/A 2/15/2025    Procedure: Cardiac Coronary Angiogram;  Surgeon: Ana Garcia DO;  [facility];  Service: Cardiology    IMPELLA VENTRICULAR ASSIST DEVICE INSERTION N/A 2/17/2025    Procedure: IMPELLA VENTRICULAR ASSIST DEVICE INSERTION;  Surgeon: Ana Garcia DO;  [facility];  Service: Cardiology    IR BIOPSY BONE MARROW  1/24/2024    REMOVAL PUMP INTRA AORTIC BALLOON  (IABP) N/A 2/17/2025    Procedure: REMOVAL PUMP INTRA AORTIC BALLOON  (IABP);  Surgeon: Ana Garcia DO;  [facility];  Service: Cardiology       Current Outpatient Medications:     acetaminophen (TYLENOL) 325 mg tablet, Take 2 tablets (650 mg total) by mouth every 6 (six) hours as needed for mild pain, headaches or fever, Disp: , Rfl:     aspirin (ECOTRIN LOW STRENGTH) 81 mg EC tablet, Take 1 tablet (81 mg total) by mouth daily, Disp: , Rfl:     cyanocobalamin (VITAMIN B-12) 1000 MCG tablet, Take 1 tablet (1,000 mcg total) by mouth daily, Disp: 30 tablet, Rfl: 0    escitalopram (LEXAPRO) 10 mg tablet, Take 10 mg by mouth daily, Disp: , Rfl:     LORazepam (ATIVAN) 1 mg tablet, Take 1 mg by mouth 3 (three) times a day as needed for anxiety, Disp: , Rfl:     metoprolol succinate (TOPROL-XL) 100 mg 24 hr tablet, Take 0.5 tablets (50 mg total) by mouth 2 (two) times a day, Disp: 30 tablet, Rfl: 0    nitroglycerin (NITROSTAT) 0.4 mg SL tablet, Place 1 tablet (0.4 mg total) under the tongue every 5 (five) minutes as needed for chest pain, Disp: 30 tablet, Rfl: 0    predniSONE 5 mg tablet, 5mg every other day or as directed, Disp: 30 tablet, Rfl: 1     "ranolazine (RANEXA) 1000 MG SR tablet, Take 1 tablet (1,000 mg total) by mouth every 12 (twelve) hours, Disp: 180 tablet, Rfl: 1    rosuvastatin (CRESTOR) 10 MG tablet, Take 1 tablet (10 mg total) by mouth daily, Disp: 90 tablet, Rfl: 1    spironolactone (ALDACTONE) 25 mg tablet, Take 1 tablet (25 mg total) by mouth daily, Disp: 90 tablet, Rfl: 1    ticagrelor (Brilinta) 90 MG, Take 1 tablet (90 mg total) by mouth every 12 (twelve) hours, Disp: 60 tablet, Rfl: 11  Allergies   Allergen Reactions    Hydrocodone-Acetaminophen GI Intolerance    Niacin GI Intolerance    Penicillins Hives       Labs:     Chemistry        Component Value Date/Time    K 4.5 05/01/2025 1442    K 4.2 11/18/2023 1059     05/01/2025 1442     11/18/2023 1059    CO2 25 05/01/2025 1442    CO2 25 01/21/2024 0200    CO2 28 11/18/2023 1059    BUN 22 05/01/2025 1442    BUN 24 11/18/2023 1059    CREATININE 1.10 05/01/2025 1442    CREATININE 1.24 11/18/2023 1059        Component Value Date/Time    CALCIUM 9.4 05/01/2025 1442    CALCIUM 9.7 11/18/2023 1059    ALKPHOS 77 05/01/2025 1442    ALKPHOS 33 (L) 11/18/2023 1059    AST 17 05/01/2025 1442    AST 25 11/18/2023 1059    ALT 28 05/01/2025 1442    ALT 24 11/18/2023 1059            No results found for: \"CHOL\"  Lab Results   Component Value Date    HDL 35 (L) 02/15/2025    HDL 34 (L) 01/21/2024    HDL 42 01/02/2024     Lab Results   Component Value Date    LDLCALC 101 (H) 02/15/2025    LDLCALC 63 01/21/2024    LDLCALC 56 01/02/2024     Lab Results   Component Value Date    TRIG 312 (H) 02/15/2025    TRIG 219 (H) 01/21/2024    TRIG 166 (H) 01/02/2024     No results found for: \"CHOLHDL\"    Imaging: Colonoscopy    Result Date: 2/9/2024  Narrative: Table formatting from the original result was not included. [facility] [address] [place] [address] [phone] DATE OF SERVICE: 2/09/24 PHYSICIAN(S): Attending: Harvey Lyons MD Fellow: No Staff Documented " INDICATION: Anemia, unspecified type POST-OP DIAGNOSIS: See the impression below. HISTORY: Prior colonoscopy: More than 10 years ago. BOWEL PREPARATION: Golytely/Colyte/Trilyte PREPROCEDURE: Informed consent was obtained for the procedure, including sedation. Risks including but not limited to bleeding, infection, perforation, adverse drug reaction and aspiration were explained in detail. Also explained about less than 100% sensitivity with the exam and other alternatives. The patient was placed in the left lateral decubitus position. Procedure: Colonoscopy DETAILS OF PROCEDURE: Patient was taken to the procedure room where a time out was performed to confirm correct patient and correct procedure. The patient underwent monitored anesthesia care, which was administered by an anesthesia professional. The patient's blood pressure, heart rate, level of consciousness, oxygen, respirations and ECG were monitored throughout the procedure. A digital rectal exam was performed. The scope was introduced through the anus and advanced to the terminal ileum. Retroflexion was performed in the rectum. The quality of bowel preparation was evaluated using the [place] Bowel Preparation Scale with scores of: right colon = 2, transverse colon = 2, left colon = 2. The total BBPS score was 6. Bowel prep was adequate. The patient experienced no blood loss. The procedure was not difficult. The patient tolerated the procedure well. There were no apparent adverse events. ANESTHESIA INFORMATION: ASA: IV Anesthesia Type: IV Sedation with Anesthesia MEDICATIONS: No administrations occurring from 1700 to 1733 on 02/09/24 FINDINGS: Internal small hemorrhoids EVENTS: Procedure Events Event Event Time ENDO CECUM REACHED 2/9/2024  5:26 PM ENDO SCOPE OUT TIME 2/9/2024  5:33 PM SPECIMENS: * No specimens in log * EQUIPMENT: Colonoscope -[id number]     Impression: Small hemorrhoids No evidence of fresh or old blood seen throughout the colon.  RECOMMENDATION:  No further screening colonoscopies necessary  Age greater than 65  No evidence of blood loss noted in the EGD or the colonoscopy. May resume diet. May resume Brilinta.  Harvey Lyons MD     EGD    Addendum Date: 2/8/2024 Addendum:   [facility] [address] [place] [address] [phone] DATE OF SERVICE: 2/08/24 PHYSICIAN(S): Attending: Harvey Lyons MD Fellow: No Staff Documented INDICATION: Symptomatic anemia POST-OP DIAGNOSIS: See the impression below. PREPROCEDURE: Informed consent was obtained for the procedure, including sedation.  Risks of perforation, hemorrhage, adverse drug reaction and aspiration were discussed. The patient was placed in the left lateral decubitus position. Patient was explained about the risks and benefits of the procedure. Risks including but not limited to bleeding, infection, and perforation were explained in detail. Also explained about less than 100% sensitivity with the exam and other alternatives. PROCEDURE: EGD DETAILS OF PROCEDURE: Patient was taken to the procedure room where a time out was performed to confirm correct patient and correct procedure. The patient underwent monitored anesthesia care, which was administered by an anesthesia professional. The patient's blood pressure, heart rate, level of consciousness, respirations and oxygen were monitored throughout the procedure. The scope was advanced to the second part of the duodenum. Retroflexion was performed in the fundus. The patient experienced no blood loss. The procedure was not difficult. The patient tolerated the procedure well. There were no apparent adverse events. ANESTHESIA INFORMATION: ASA: IV Anesthesia Type: Anesthesia type not filed in the log. MEDICATIONS: No administrations occurring from 1759 to 1809 on 02/08/24 FINDINGS: Regular Z-line 40 cm from the incisors 3 cm sliding hiatal hernia (type I hiatal hernia) without Shaggy lesions present -  GE junction 40 cm from the incisors, diaphragmatic impression 43 cm from the incisors, confirmed by retroflexion:  Hill classification: Grade II Mild, localized erythematous mucosa in the cardia. Retroflexed view was notable for sliding hiatal hernia, pylorus was patent.  No evidence of peptic ulcer disease, AVMs seen. The duodenal bulb, 1st part of the duodenum and 2nd part of the duodenum appeared normal. Non-obstructing Schatzki ring in the GE junction. Not dilated given patient's recent use of Brilinta. SPECIMENS: * No specimens in log * IMPRESSION: 3 cm type I hiatal hernia Mild erythematous mucosa in the cardia The duodenal bulb, 1st part of the duodenum and 2nd part of the duodenum appeared normal. Schatzki ring in the GE junction RECOMMENDATION:  There is no recommended follow-up for this procedure. No etiology of anemia identified on EGD. Recommend colonoscopy for complete workup of anemia if patient agreeable.  Addendum: Spoke to the patient postprocedure in the recovery area regarding proceeding with colonoscopy to complete the workup of anemia and also discussed endoscopy findings from today however patient adamantly refusing colonoscopy and wishes to go home.  In the absence of overt bleeding, will resume his diet, will defer discharge planning to primary team.  If hemoglobin remained stable, may resume Brilinta. Harvey Lyons MD     Result Date: 2/8/2024  Narrative: Table formatting from the original result was not included. [facility] [address] [id number] [phone] DATE OF SERVICE: 2/08/24 PHYSICIAN(S): Attending: Harvey Lyons MD Fellow: No Staff Documented INDICATION: Symptomatic anemia POST-OP DIAGNOSIS: See the impression below. PREPROCEDURE: Informed consent was obtained for the procedure, including sedation.  Risks of perforation, hemorrhage, adverse drug reaction and aspiration were discussed. The patient was placed in the left lateral  decubitus position. Patient was explained about the risks and benefits of the procedure. Risks including but not limited to bleeding, infection, and perforation were explained in detail. Also explained about less than 100% sensitivity with the exam and other alternatives. PROCEDURE: EGD DETAILS OF PROCEDURE: Patient was taken to the procedure room where a time out was performed to confirm correct patient and correct procedure. The patient underwent monitored anesthesia care, which was administered by an anesthesia professional. The patient's blood pressure, heart rate, level of consciousness, respirations and oxygen were monitored throughout the procedure. The scope was advanced to the second part of the duodenum. Retroflexion was performed in the fundus. The patient experienced no blood loss. The procedure was not difficult. The patient tolerated the procedure well. There were no apparent adverse events. ANESTHESIA INFORMATION: ASA: IV Anesthesia Type: Anesthesia type not filed in the log. MEDICATIONS: No administrations occurring from 1759 to 1809 on 02/08/24 FINDINGS: Regular Z-line 40 cm from the incisors 3 cm sliding hiatal hernia (type I hiatal hernia) without Shaggy lesions present - GE junction 40 cm from the incisors, diaphragmatic impression 43 cm from the incisors, confirmed by retroflexion:  Hill classification: Grade II Mild, localized erythematous mucosa in the cardia. Retroflexed view was notable for sliding hiatal hernia, pylorus was patent.  No evidence of peptic ulcer disease, AVMs seen. The duodenal bulb, 1st part of the duodenum and 2nd part of the duodenum appeared normal. Non-obstructing Schatzki ring in the GE junction. Not dilated given patient's recent use of Brilinta. SPECIMENS: * No specimens in log *     Impression: 3 cm type I hiatal hernia Mild erythematous mucosa in the cardia The duodenal bulb, 1st part of the duodenum and 2nd part of the duodenum appeared normal. Schatzki ring in  the GE junction RECOMMENDATION:  There is no recommended follow-up for this procedure. No etiology of anemia identified on EGD. Recommend colonoscopy for complete workup of anemia if patient agreeable.   Harvey Lyons MD     XR chest 2 views    Result Date: 2/5/2024  Narrative: XR CHEST PA & LATERAL DUAL ENERGY SUBTRACTION. INDICATION: Shortness of breath. COMPARISON: CXR 1/21/2024. FINDINGS: Slightly increased opacity in the right costophrenic sulcus. Persistent opacity in the left base. Nodules over both lower lungs are the nipples. No pneumothorax or pleural effusion. Normal heart size. Cardiac stents. Bones are unremarkable for age. Normal upper abdomen.     Impression: New mild opacity in the right costophrenic sulcus which could be due to atelectasis or could be infectious/inflammatory. Persistent opacity in the left base, likely atelectasis or scar. Workstation performed: [id number]       ECG:  n/a    Review of Systems   Constitutional: Negative for fever and weight gain.   HENT: Negative.     Eyes:  Positive for blurred vision (chronic) and vision loss in right eye.   Cardiovascular:  Negative for chest pain, dyspnea on exertion, irregular heartbeat, leg swelling, near-syncope, orthopnea, palpitations, paroxysmal nocturnal dyspnea and syncope.   Respiratory:  Negative for cough and sleep disturbances due to breathing.    Endocrine: Negative.    Hematologic/Lymphatic: Negative for bleeding problem.   Skin: Negative.    Musculoskeletal:  Positive for muscle weakness.   Gastrointestinal: Negative.    Genitourinary: Negative.        Vitals:    05/09/25 1527   BP: 110/60   Pulse: 66   SpO2: 98%     Vitals:    05/09/25 1527   Weight: 66.7 kg (147 lb)         Body mass index is 21.09 kg/m².    Physical Exam  Vitals and nursing note reviewed.   Constitutional:       General: He is not in acute distress.  HENT:      Head: Normocephalic.      Nose: Nose normal.      Mouth/Throat:      Mouth: Mucous membranes are  moist.      Pharynx: Oropharynx is clear.   Neck:      Vascular: No carotid bruit or JVD.   Cardiovascular:      Rate and Rhythm: Normal rate and regular rhythm.      Pulses: Normal pulses.      Heart sounds: Normal heart sounds. No murmur heard.  Pulmonary:      Effort: Pulmonary effort is normal. No respiratory distress.      Breath sounds: Normal breath sounds. No wheezing, rhonchi or rales.   Musculoskeletal:         General: Normal range of motion.      Cervical back: Normal range of motion.      Right lower leg: No edema.      Left lower leg: No edema.   Skin:     General: Skin is warm and dry.      Coloration: Skin is pale.   Neurological:      Mental Status: He is alert and oriented to person, place, and time.      Motor: Weakness present.      Gait: Gait abnormal.   Psychiatric:         Mood and Affect: Mood normal.         Behavior: Behavior normal.

## 2025-05-13 ENCOUNTER — CLINICAL SUPPORT (OUTPATIENT)
Dept: CARDIAC REHAB | Facility: CLINIC | Age: 85
End: 2025-05-13
Payer: COMMERCIAL

## 2025-05-13 DIAGNOSIS — I25.119 CORONARY ARTERY DISEASE INVOLVING NATIVE CORONARY ARTERY OF NATIVE HEART WITH ANGINA PECTORIS (HCC): Primary | ICD-10-CM

## 2025-05-13 PROCEDURE — 93798 PHYS/QHP OP CAR RHAB W/ECG: CPT

## 2025-05-15 ENCOUNTER — CLINICAL SUPPORT (OUTPATIENT)
Dept: CARDIAC REHAB | Facility: CLINIC | Age: 85
End: 2025-05-15
Payer: COMMERCIAL

## 2025-05-15 DIAGNOSIS — I25.119 CORONARY ARTERY DISEASE INVOLVING NATIVE CORONARY ARTERY OF NATIVE HEART WITH ANGINA PECTORIS (HCC): Primary | ICD-10-CM

## 2025-05-15 PROCEDURE — 93798 PHYS/QHP OP CAR RHAB W/ECG: CPT

## 2025-05-15 NOTE — PROGRESS NOTES
CARDIAC REHABILITATION   ASSESSMENT AND INDIVIDUALIZED TREATMENT PLAN  60 DAY          Today's date: 5/15/2025   # of Exercise Sessions Completed: 24  Patient name: Gael Ferraro      : 1940  Age: 85 y.o.       MRN: [id number]  Referring Physician: Luther Cooper MD  Cardiologist: Flavio Velasco MD  Provider: Esau  Clinician: Channing Grayson MS, CEP         Treatment is tailored to this patient's individual needs.  The ITP was reviewed with the patient and all questions were answered to their satisfaction.  Additional ITP documentation can be found electronically including daily and monthly exercise summaries, daily session notes with ECG summaries, education notes, daily medication reconciliation, and daily physician supervision.    60 DAY SUMMARY DATE:  5/15/2025    Resting BP  102/52 - 134/64,  HR 66 - 83  Exercise /56- 138/64.  HR 80 - 93  Exercise session details:  40 minutes,  2.2 - 3.0 METs  Telemetry:  NSR   Symptoms: No cardiac symptoms   Home exercise/ADLs: No formal home exercise with rehab. Continues to do light exercises.   Patient's subjective report of progress: He reports doing great with the heart. When it comes to his legs, he continues to fall frequently which he is concerned about. He wants to talk to his PCP about what else he can do to increase his lower body strength/possible PT.   Clinical Comments: Patient is doing well walking around the exercise room which staff has noticed improved since initial evaluation. No cardiac concerns. He is doing well increase durations/intensities as tolerated.     30 DAY SUMMARY DATE:  4/15/25    Resting BP  98/60 - 148/62,  HR 63 - 83  Exercise /58- 154/60.  HR 78 - 91  Exercise session details:  35-40 minutes,  2.05-2.97 METs  Telemetry:  NSR  Symptoms: no cardiac symptoms  Home exercise/ADLs: not completing home exercise, light ADLs  Patient's subjective report of progress: reports that his legs are feeling stronger   Clinical Comments:  "patient is able to walk and transfer between machines much easier. He has not reported any falls in the last few weeks      INITIAL EVALUATION SUMMARY 3/17/25    Patient's subjective report of progress/symptoms: reports leg fatigue as his biggest limiting factor. No longer having chest pain post stent  Home exercise/ADLs: no home exercise. Able to complete ADLs with taking his time and resting  Clinical Comments: reports falling yesterday, \"legs just gave out\" did not hurt anything.     Initial Fitness Assessment: 6MWT:  Resting:    Resting:  BP: 118/52  HR: 85  Exercise:  BP: 130/50  HR: 91  ECG Summary: NSR with rare PVC  Symptoms: leg fatigue. Used walker during test.        Dx:   Encounter Diagnosis   Name Primary?    Coronary artery disease involving native coronary artery of native heart with angina pectoris (HCC) Yes       Description of Diagnosis: S/P Successful Protected PCI of the Ost LM in to the Prox LCX ISR with BRYAN x 1   Date of onset: 2/17/25  Other Cardiac History: ischemic cardiomyopathy, hx of stents        ASSESSMENT    Medical History:   Past Medical History:   Diagnosis Date    Low hemoglobin        Family History:  Family History   Problem Relation Age of Onset    No Known Problems Mother     No Known Problems Father        Allergies:   Hydrocodone-acetaminophen, Niacin, and Penicillins    Current Medications:   Current Outpatient Medications   Medication Sig Dispense Refill    acetaminophen (TYLENOL) 325 mg tablet Take 2 tablets (650 mg total) by mouth every 6 (six) hours as needed for mild pain, headaches or fever      aspirin (ECOTRIN LOW STRENGTH) 81 mg EC tablet Take 1 tablet (81 mg total) by mouth daily      cyanocobalamin (VITAMIN B-12) 1000 MCG tablet Take 1 tablet (1,000 mcg total) by mouth daily 30 tablet 0    escitalopram (LEXAPRO) 10 mg tablet Take 10 mg by mouth daily      LORazepam (ATIVAN) 1 mg tablet Take 1 mg by mouth 3 (three) times a day as needed for anxiety      metoprolol " "succinate (TOPROL-XL) 100 mg 24 hr tablet Take 0.5 tablets (50 mg total) by mouth 2 (two) times a day 30 tablet 0    nitroglycerin (NITROSTAT) 0.4 mg SL tablet Place 1 tablet (0.4 mg total) under the tongue every 5 (five) minutes as needed for chest pain 30 tablet 0    predniSONE 5 mg tablet 5mg every other day or as directed 30 tablet 1    ranolazine (RANEXA) 1000 MG SR tablet Take 1 tablet (1,000 mg total) by mouth every 12 (twelve) hours 180 tablet 1    rosuvastatin (CRESTOR) 10 MG tablet Take 1 tablet (10 mg total) by mouth daily 90 tablet 1    spironolactone (ALDACTONE) 25 mg tablet Take 1 tablet (25 mg total) by mouth daily 90 tablet 1    ticagrelor (Brilinta) 90 MG Take 1 tablet (90 mg total) by mouth every 12 (twelve) hours 60 tablet 11     No current facility-administered medications for this visit.       Medication compliance: Yes   Comments: Pt reports to be compliant with medications    Physical Limitations: none     Fall Risk: High   Comments: Patient uses walking assist device (walker/cane/rollator) uses walker outside and cane in the house. Reports falling yesterday- \"legs just gave out\"  5/15: continues to report falling at home.     Cultural needs: none      CAD Risk Factors:  Cholesterol: Yes  HTN: Yes  DM: No  Obesity: No   Inactivity: no- attending CR      EXERCISE ASSESSMENT:      Current Functional Status:  Occupation: retired  Recreation/Physical Activity: gardening   ADL’s:Capable of performing light to moderate ADLs limited by fatigue  Saline: able to perform self-care  Home exercise: none  Other Comments: has TM at home       SMART Exercise Goals:   improved DASI score by 10%  increased exercise capacity by 40% based on peak METs tolerated in cardiac rehab exercise session  maintain > 150 minutes per week of moderate intensity exercise  improved 6MWT distance by 10%    Patient Specific EXERCISE GOALS:       Increase strength in legs- goal met  Improve ability to go up stairs   Return " "to gardening    Functional Capacity Screening Tool:  [facility] Activity Status Index:  4.06 METs    NUTRITION ASSESSMENT:    Initial Weight:  140.8  Current Weight: 145.8 lbs     Height:   Ht Readings from Last 1 Encounters:   04/07/25 5' 10\" (1.778 m)       Rate Your Plate Score: 58/81    Diabetes: N/A  A1c:     last measured:     Lipid management: Discussed diet and lipid management and Last lipid profile 2/15/25  Chol 198    HDL 35      Current Dietary Habits:  Chicken and fish  Minimal red eat   Wife cooks meals   Occ eating out      SMART Nutrition Goals:   Improved Rate Your Plate score  >64, eat 6 or more servings of grain products per day, eat whole grain breads, brown rice and whole grain cereals, eat 5 or more servings of fruits and vegetables a day, eat 2 or more servings of low fat milk or yogurt a day, eat no more than 6oz of meat per day, dine out less than once per week or choose low fat restaurant meals, eat red meat once a week or less, rarely eat processed meats or eat low fat processed meats, eat poultry without the skin, eat chicken and fish that is not fried, eat meatless meals twice a week or more, Do not cook with oil, butter or margarine, Do not eat fried foods, use \"light\" tub margarine on bread, potatoes and vegetables, choose light or fat-free salad dressings or tilley, choose healthy snacks such as fruit, pretzels, and low fat crackers, choose healthy desserts and sweets such as eri food cake or  fruit, choose low sodium canned, frozen/packaged foods or rarely/never eat, rarely/never eat salty snacks, and choose low sugar desserts and sweets    Patient Specific NUTRITION GOALS:     1. Work on drinking more water   2. Maintain healthy weight   3. Decrease fried foods    Drug/Alcohol Use:   Very little alcohol       PSYCHOSOCIAL ASSESSMENT:    Date of last Assessment:  4/15/25  Depression screening:  PHQ-9 = 6    Interpretation:  5-9 = Mild Depression  Anxiety screening:  RAMSEY-7 = " 12  Interpretation: 10-14 = Moderate anxiety    Pt self-report of depression and anxiety   Patient has a history of anxiety     Self-reported stress level:  4   Stressors:  health   Stress Management Tools: practice Relaxation Techniques, exercise, and keep a positive mindset    SMART Psychosocial Goals:     Reduce perceived stress to 1-3/10, improved [facility] QOL < 27, Feelings in [facility] Score < 3, Physical Fitness in [facility] Score < 3, Daily Activity in [facility] Score < 3, Social Activities in [facility] Score < 3, Pain in [facility] Score < 3, Overall Health in [facility] Score < 3, Quality of Life in [facility] Score < 3 , Change in Health in [facility] Score < 3 ,  Increased interest and pleasure in doing things,  reduced time feeling down, depressed or hopeless, improved sleeping habits, feel less tired with more energy, reduced time feeling like a failure and having negative self thoughts, reduced time feeling nervous or on edge, control or stop worrying, reduced feelings of restlessness, reduced irritability, and avoid thoughts of feeling as though something bad may happen    Patient Specific PSYCHOCOSOCIAL GOALS:    Continue to take lorazapam   Manage stress  Improve PHQ-9 and RAMSEY-7 scores    Quality of Life Screen:  (Higher score indicates disease impact on QOL)  [facility] COOP score: 31/45     Social Support:   spouse and children  Community/Social Activities: none     Psychosocial Assessment as it relates to rehabilitation:   Patient denies issues with his/her family or home life that may affect their rehabilitation efforts.       OTHER CORE COMPONENT ASSESSMENT:    Tobacco Use:     N/A:  Patient is a non-smoker     Anginal Symptoms:  chest pain   NTG use: Reviewed Proper use and Pt has not used NTG since eventtakes Ranexa    SMART Goals:   consistent, controlled resting BP < 130/80, medication compliance, and reduced angina    Patient Specific CORE COMPONENT GOALS:    Monitor BP  Medication  compliance      INDIVIDUALIZED TREATMENT PLAN      EXERCISE GOALS and PLAN      Progress toward Exercise goals:   Pt is progressing and showing improvement  toward the following goals:  tolerating exercise for 40 mins at 2.2-3.0 METs, able to walk and transfer more easily around exercise room, and increasing duration and workloads in rehab.  , Pt has not made progress toward the following goals: occ falling in the past 30 days reported at home . , Will continue to educate and progress as tolerated.     Exercise Plan:    education on home exercise guidelines, home exercise 30+ mins 2 days opposite CR, Group class: Risk Factors for Heart Disease, Patient education:   Exercise After Cardiac Rehabilitation, and After discharge patient will continue to follow a regular exercise regimen with the goal of a minimum of 150 minutes per week of moderate intensity exercise.      The patient was counseled on exercise guidelines to achieve a minimum of 150 mins/wk of moderate intensity (RPE 4-6)   exercise and encouraged to add 1-2 days of exercise on opposite days of cardiac rehab as tolerated.       PHYSICIAN PRESCRIBED EXERCISE:    Current Aerobic Exercise Prescription:      Frequency: 3 days/week   Supplement with home exercise 2+ days/wk as tolerated       Minutes: 40         METS: 2.2-3.0           HR: 80-93   RPE: 4-6         Modalities: UBE, NuStep, Recumbent bike, and Room walking     Exercise workloads will be progressed gradually as tolerated, within limits of patient's ability, and according to the patient's   response to the exercise program.      Aerobic Exercise Prescription Plan for Progression   Frequency: 3 days/week of cardiac rehab       Supplement with home exercise 2+ days/wk as tolerated    Minutes: 40-45       >150 mins/wk of moderate intensity exercise   METS: 3.1-3.4   HR: RHR +30-40bpm     RPE: 4-6   Modalities: Treadmill, UBE, NuStep, and Recumbent bike    Strength trainin-3 days / week  12-15  "repetitions  1-2 sets per modality   Will be added following 2-3 weeks of monitored exercise sessions   Modalities: Arm Curl, Sit to Stands, and Upright Rows    Home Exercise: none    Exercise Education: benefit of exercise for CAD risk factors, home exercise guidelines, AHA guidelines to achieve >150 mins/wk of moderate exercise, RPE scale, and Group class: Risk Factors for Heart Disease     Readiness to change: Action:  (Changing behavior)      NUTRITION GOALS AND PLAN      Nutritional   Reviewed patient's Rate your Plate. Discussed key elements of heart healthy eating. Reviewed patient goals for dietary modifications and their clinical implications.  Reviewed most recent lipid profile.     Patient's progress toward Nutrition goals:    Pt is progressing and showing improvement  toward the following goals:  maintaining a healthy weight, reports eating mainly chicken and fish and minimal red meat.  , Pt has not made progress toward the following goals: drinking enough water. , Will continue to educate and progress as tolerated. Will continue to make heart healthy choices/maintain weight      Nutrition Plan:   group class: Reading Food Labels, increase intake of whole grains, replace refined flours with whole grains, increase daily intake of fruits and vegetables, increase daily intake of low fat dairy, limit meat intake to less than 6oz per day, choose healthy meals while dining out, choose lean red meat, reduce intake and /or  rarely eat processed meats , eat poultry without the skin, eat chicken and fish that is baked, broiled or roasted, eat more meatless meals, cook without fats or oils, never/rarely eat fried foods, use \"light\" tub margarine, use light or fat-free salad dressings and mayonnaise, eat healthy snacks like fruit, pretzels, and low fat crackers, choose desserts such as fruit, eri food cake, low-fat or fat-free sweets, choose low sodium canned, frozen, packaged foods or rarely eat these foods, " rarely/never eat salty snacks, and choose low sugar desserts and sweets    Measurable goals were based Rate Your Plate Dietary Self-Assessment. These are the areas in which the patient could score higher on the assessment.  Goals include recommendations for a heart healthy diet based on American Heart Association.    Nutrition Education:   heart healthy eating principles  maintaining hydration  nutrition for  lipid management  healthy choices while dining out  group class: Heart Healthy Eating  hydration    Readiness to change: Action:  (Changing behavior)      PSYCHOSOCIAL GOALS AND PLAN    Psychosocial Assessment as it relates to rehabilitation:   Patient denies issues with his/her family or home life that may affect their rehabilitation efforts.     Patient's progress toward Psychosocial goals:    Pt is progressing and showing improvement  toward the following goals:  PHQ-9 and RAMSEY-7 scores both improved at 30 days.  , Will continue to educate and progress as tolerated. Will continue to take his medications to help with anxiety.     Psychosocial Intervention/plan:   PHQ-9 >5 will refer to MD, Exercise, Keep a positive mindset, Enjoy family, and Repeat PHQ-9 every 30 days if score >5    Psychosocial Education: signs/sxs of depression, benefits of a positive support system, stress management techniques, depression and CAD, benefits of mental health counseling, class:  Relaxation, and class:  Stress and Your Health     Information to utilize [employer] was provided as well as contact information for counseling through  [facility] and group psychotherapy groups available.    Readiness to change: Action:  (Changing behavior)      OTHER CORE COMPONENTS GOALS and PLAN      Blood Pressure will be monitored throughout the program and cardiologist will be notified of elevated trends.    Pt will be encouraged to monitor home BP if advised by cardiologist.    Tobacco Plan:   N/A:  Pt is a non-smoker    Progress  toward Core Component goals:   Pt is progressing and showing improvement  toward the following goals:  medication compliance.  , Pt has not made progress toward the following goals: BP occ elevated. , Will continue to educate and progress as tolerated.    Other Core Components Intervention:   medication compliance, avoid processed foods, engage in regular exercise, eliminate salt shaker at the table, check labels for sodium content, and monitor home BP    Group and Individual Education:  understanding high blood pressure and it's relationship to CAD, components of blood pressure management, proper use of sublingual NTG, group class: Understanding Heart Disease, and group class:  Common Heart Medications    Readiness to change: Action:  (Changing behavior)

## 2025-05-16 ENCOUNTER — APPOINTMENT (OUTPATIENT)
Dept: CARDIAC REHAB | Facility: CLINIC | Age: 85
End: 2025-05-16
Payer: COMMERCIAL

## 2025-05-20 ENCOUNTER — CLINICAL SUPPORT (OUTPATIENT)
Dept: CARDIAC REHAB | Facility: CLINIC | Age: 85
End: 2025-05-20
Payer: COMMERCIAL

## 2025-05-20 DIAGNOSIS — I25.119 CORONARY ARTERY DISEASE INVOLVING NATIVE CORONARY ARTERY OF NATIVE HEART WITH ANGINA PECTORIS (HCC): Primary | ICD-10-CM

## 2025-05-20 PROCEDURE — 93798 PHYS/QHP OP CAR RHAB W/ECG: CPT

## 2025-05-22 ENCOUNTER — CLINICAL SUPPORT (OUTPATIENT)
Dept: CARDIAC REHAB | Facility: CLINIC | Age: 85
End: 2025-05-22
Payer: COMMERCIAL

## 2025-05-22 DIAGNOSIS — I25.119 CORONARY ARTERY DISEASE INVOLVING NATIVE CORONARY ARTERY OF NATIVE HEART WITH ANGINA PECTORIS (HCC): Primary | ICD-10-CM

## 2025-05-22 PROCEDURE — 93798 PHYS/QHP OP CAR RHAB W/ECG: CPT

## 2025-05-23 ENCOUNTER — CLINICAL SUPPORT (OUTPATIENT)
Dept: CARDIAC REHAB | Facility: CLINIC | Age: 85
End: 2025-05-23
Payer: COMMERCIAL

## 2025-05-23 DIAGNOSIS — I25.119 CORONARY ARTERY DISEASE INVOLVING NATIVE CORONARY ARTERY OF NATIVE HEART WITH ANGINA PECTORIS (HCC): Primary | ICD-10-CM

## 2025-05-23 PROCEDURE — 93798 PHYS/QHP OP CAR RHAB W/ECG: CPT

## 2025-05-27 ENCOUNTER — CLINICAL SUPPORT (OUTPATIENT)
Dept: CARDIAC REHAB | Facility: CLINIC | Age: 85
End: 2025-05-27
Payer: COMMERCIAL

## 2025-05-27 DIAGNOSIS — I25.119 CORONARY ARTERY DISEASE INVOLVING NATIVE CORONARY ARTERY OF NATIVE HEART WITH ANGINA PECTORIS (HCC): Primary | ICD-10-CM

## 2025-05-27 PROCEDURE — 93798 PHYS/QHP OP CAR RHAB W/ECG: CPT

## 2025-05-29 ENCOUNTER — CLINICAL SUPPORT (OUTPATIENT)
Dept: CARDIAC REHAB | Facility: CLINIC | Age: 85
End: 2025-05-29
Payer: COMMERCIAL

## 2025-05-29 DIAGNOSIS — I25.119 CORONARY ARTERY DISEASE INVOLVING NATIVE CORONARY ARTERY OF NATIVE HEART WITH ANGINA PECTORIS (HCC): Primary | ICD-10-CM

## 2025-05-29 PROCEDURE — 93798 PHYS/QHP OP CAR RHAB W/ECG: CPT

## 2025-05-29 NOTE — PROGRESS NOTES
Pt fully vaccinated 2/25/2021 [facility] See scanned exercise session detailed report

## 2025-05-30 ENCOUNTER — CLINICAL SUPPORT (OUTPATIENT)
Dept: CARDIAC REHAB | Facility: CLINIC | Age: 85
End: 2025-05-30
Payer: COMMERCIAL

## 2025-05-30 DIAGNOSIS — I25.119 CORONARY ARTERY DISEASE INVOLVING NATIVE CORONARY ARTERY OF NATIVE HEART WITH ANGINA PECTORIS (HCC): Primary | ICD-10-CM

## 2025-05-30 PROCEDURE — 93798 PHYS/QHP OP CAR RHAB W/ECG: CPT

## 2025-06-03 ENCOUNTER — APPOINTMENT (OUTPATIENT)
Dept: CARDIAC REHAB | Facility: CLINIC | Age: 85
End: 2025-06-03
Payer: COMMERCIAL

## 2025-06-05 ENCOUNTER — APPOINTMENT (OUTPATIENT)
Dept: CARDIAC REHAB | Facility: CLINIC | Age: 85
End: 2025-06-05
Payer: COMMERCIAL

## 2025-06-06 ENCOUNTER — APPOINTMENT (EMERGENCY)
Dept: RADIOLOGY | Facility: HOSPITAL | Age: 85
DRG: 809 | End: 2025-06-06
Payer: COMMERCIAL

## 2025-06-06 ENCOUNTER — HOSPITAL ENCOUNTER (INPATIENT)
Facility: HOSPITAL | Age: 85
LOS: 2 days | Discharge: HOME | DRG: 809 | End: 2025-06-08
Attending: EMERGENCY MEDICINE | Admitting: INTERNAL MEDICINE
Payer: COMMERCIAL

## 2025-06-06 ENCOUNTER — TELEPHONE (OUTPATIENT)
Age: 85
End: 2025-06-06

## 2025-06-06 ENCOUNTER — APPOINTMENT (OUTPATIENT)
Dept: CARDIAC REHAB | Facility: CLINIC | Age: 85
End: 2025-06-06
Payer: COMMERCIAL

## 2025-06-06 ENCOUNTER — APPOINTMENT (OUTPATIENT)
Dept: LAB | Facility: CLINIC | Age: 85
DRG: 809 | End: 2025-06-06
Attending: PHYSICIAN ASSISTANT
Payer: COMMERCIAL

## 2025-06-06 DIAGNOSIS — D64.9 SYMPTOMATIC ANEMIA: Primary | ICD-10-CM

## 2025-06-06 DIAGNOSIS — D75.81 MYELOFIBROSIS (HCC): ICD-10-CM

## 2025-06-06 DIAGNOSIS — N18.30 STAGE 3 CHRONIC KIDNEY DISEASE, UNSPECIFIED WHETHER STAGE 3A OR 3B CKD (HCC): ICD-10-CM

## 2025-06-06 DIAGNOSIS — I25.119 CORONARY ARTERY DISEASE INVOLVING NATIVE CORONARY ARTERY OF NATIVE HEART WITH ANGINA PECTORIS (HCC): Chronic | ICD-10-CM

## 2025-06-06 DIAGNOSIS — D61.01 PURE RED CELL APLASIA (HCC): ICD-10-CM

## 2025-06-06 LAB
ABO GROUP BLD: NORMAL
ALBUMIN SERPL BCG-MCNC: 4.1 G/DL (ref 3.5–5)
ALP SERPL-CCNC: 69 U/L (ref 34–104)
ALT SERPL W P-5'-P-CCNC: 23 U/L (ref 7–52)
ANION GAP SERPL CALCULATED.3IONS-SCNC: 10 MMOL/L (ref 4–13)
ANION GAP SERPL CALCULATED.3IONS-SCNC: 7 MMOL/L (ref 4–13)
AST SERPL W P-5'-P-CCNC: 15 U/L (ref 13–39)
BASOPHILS # BLD AUTO: 0.01 THOUSANDS/ÂΜL (ref 0–0.1)
BASOPHILS # BLD AUTO: 0.01 THOUSANDS/ÂΜL (ref 0–0.1)
BASOPHILS NFR BLD AUTO: 0 % (ref 0–1)
BASOPHILS NFR BLD AUTO: 0 % (ref 0–1)
BILIRUB SERPL-MCNC: 0.69 MG/DL (ref 0.2–1)
BLD GP AB SCN SERPL QL: NEGATIVE
BUN SERPL-MCNC: 25 MG/DL (ref 5–25)
BUN SERPL-MCNC: 26 MG/DL (ref 5–25)
CALCIUM SERPL-MCNC: 8.9 MG/DL (ref 8.4–10.2)
CALCIUM SERPL-MCNC: 9.1 MG/DL (ref 8.4–10.2)
CHLORIDE SERPL-SCNC: 103 MMOL/L (ref 96–108)
CHLORIDE SERPL-SCNC: 104 MMOL/L (ref 96–108)
CO2 SERPL-SCNC: 22 MMOL/L (ref 21–32)
CO2 SERPL-SCNC: 24 MMOL/L (ref 21–32)
CREAT SERPL-MCNC: 1.08 MG/DL (ref 0.6–1.3)
CREAT SERPL-MCNC: 1.18 MG/DL (ref 0.6–1.3)
EOSINOPHIL # BLD AUTO: 0.1 THOUSAND/ÂΜL (ref 0–0.61)
EOSINOPHIL # BLD AUTO: 0.1 THOUSAND/ÂΜL (ref 0–0.61)
EOSINOPHIL NFR BLD AUTO: 2 % (ref 0–6)
EOSINOPHIL NFR BLD AUTO: 3 % (ref 0–6)
ERYTHROCYTE [DISTWIDTH] IN BLOOD BY AUTOMATED COUNT: 15 % (ref 11.6–15.1)
ERYTHROCYTE [DISTWIDTH] IN BLOOD BY AUTOMATED COUNT: 15.1 % (ref 11.6–15.1)
FERRITIN SERPL-MCNC: 1055 NG/ML (ref 30–336)
GFR SERPL CREATININE-BSD FRML MDRD: 55 ML/MIN/1.73SQ M
GFR SERPL CREATININE-BSD FRML MDRD: 61 ML/MIN/1.73SQ M
GLUCOSE P FAST SERPL-MCNC: 128 MG/DL (ref 65–99)
GLUCOSE SERPL-MCNC: 94 MG/DL (ref 65–140)
HCT VFR BLD AUTO: 14.9 % (ref 36.5–49.3)
HCT VFR BLD AUTO: 16 % (ref 36.5–49.3)
HGB BLD-MCNC: 5.1 G/DL (ref 12–17)
HGB BLD-MCNC: 5.7 G/DL (ref 12–17)
HGB RETIC QN AUTO: 38.8 PG (ref 30–38.3)
IMM GRANULOCYTES # BLD AUTO: 0.01 THOUSAND/UL (ref 0–0.2)
IMM GRANULOCYTES # BLD AUTO: 0.01 THOUSAND/UL (ref 0–0.2)
IMM GRANULOCYTES NFR BLD AUTO: 0 % (ref 0–2)
IMM GRANULOCYTES NFR BLD AUTO: 0 % (ref 0–2)
IMM RETICS NFR: 21.7 % (ref 0–14)
IRON SATN MFR SERPL: 91 % (ref 15–50)
IRON SERPL-MCNC: 275 UG/DL (ref 50–212)
LDH SERPL-CCNC: 152 U/L (ref 140–271)
LYMPHOCYTES # BLD AUTO: 1.86 THOUSANDS/ÂΜL (ref 0.6–4.47)
LYMPHOCYTES # BLD AUTO: 2.09 THOUSANDS/ÂΜL (ref 0.6–4.47)
LYMPHOCYTES NFR BLD AUTO: 46 % (ref 14–44)
LYMPHOCYTES NFR BLD AUTO: 47 % (ref 14–44)
MCH RBC QN AUTO: 39.3 PG (ref 26.8–34.3)
MCH RBC QN AUTO: 39.5 PG (ref 26.8–34.3)
MCHC RBC AUTO-ENTMCNC: 34 G/DL (ref 31.4–37.4)
MCHC RBC AUTO-ENTMCNC: 34.2 G/DL (ref 31.4–37.4)
MCV RBC AUTO: 116 FL (ref 82–98)
MCV RBC AUTO: 116 FL (ref 82–98)
MONOCYTES # BLD AUTO: 0.42 THOUSAND/ÂΜL (ref 0.17–1.22)
MONOCYTES # BLD AUTO: 0.61 THOUSAND/ÂΜL (ref 0.17–1.22)
MONOCYTES NFR BLD AUTO: 11 % (ref 4–12)
MONOCYTES NFR BLD AUTO: 14 % (ref 4–12)
NEUTROPHILS # BLD AUTO: 1.6 THOUSANDS/ÂΜL (ref 1.85–7.62)
NEUTROPHILS # BLD AUTO: 1.63 THOUSANDS/ÂΜL (ref 1.85–7.62)
NEUTS SEG NFR BLD AUTO: 37 % (ref 43–75)
NEUTS SEG NFR BLD AUTO: 40 % (ref 43–75)
NRBC BLD AUTO-RTO: 0 /100 WBCS
NRBC BLD AUTO-RTO: 0 /100 WBCS
PLATELET # BLD AUTO: 299 THOUSANDS/UL (ref 149–390)
PLATELET # BLD AUTO: 308 THOUSANDS/UL (ref 149–390)
PMV BLD AUTO: 10.9 FL (ref 8.9–12.7)
PMV BLD AUTO: 10.9 FL (ref 8.9–12.7)
POTASSIUM SERPL-SCNC: 4.6 MMOL/L (ref 3.5–5.3)
POTASSIUM SERPL-SCNC: 4.8 MMOL/L (ref 3.5–5.3)
PROT SERPL-MCNC: 6.3 G/DL (ref 6.4–8.4)
RBC # BLD AUTO: 1.29 MILLION/UL (ref 3.88–5.62)
RBC # BLD AUTO: 1.4 MILLION/UL (ref 3.88–5.62)
RETICS # AUTO: ABNORMAL 10*3/UL (ref 14356–105094)
RETICS # CALC: 1.16 % (ref 0.37–1.87)
RH BLD: NEGATIVE
SODIUM SERPL-SCNC: 134 MMOL/L (ref 135–147)
SODIUM SERPL-SCNC: 136 MMOL/L (ref 135–147)
SPECIMEN EXPIRATION DATE: NORMAL
TIBC SERPL-MCNC: 303.8 UG/DL (ref 250–450)
TRANSFERRIN SERPL-MCNC: 217 MG/DL (ref 203–362)
UIBC SERPL-MCNC: 29 UG/DL (ref 155–355)
WBC # BLD AUTO: 4 THOUSAND/UL (ref 4.31–10.16)
WBC # BLD AUTO: 4.45 THOUSAND/UL (ref 4.31–10.16)

## 2025-06-06 PROCEDURE — 82728 ASSAY OF FERRITIN: CPT | Performed by: INTERNAL MEDICINE

## 2025-06-06 PROCEDURE — 30233N1 TRANSFUSION OF NONAUTOLOGOUS RED BLOOD CELLS INTO PERIPHERAL VEIN, PERCUTANEOUS APPROACH: ICD-10-PCS | Performed by: HOSPITALIST

## 2025-06-06 PROCEDURE — 85025 COMPLETE CBC W/AUTO DIFF WBC: CPT

## 2025-06-06 PROCEDURE — 86923 COMPATIBILITY TEST ELECTRIC: CPT

## 2025-06-06 PROCEDURE — 99222 1ST HOSP IP/OBS MODERATE 55: CPT | Performed by: INTERNAL MEDICINE

## 2025-06-06 PROCEDURE — 93005 ELECTROCARDIOGRAM TRACING: CPT

## 2025-06-06 PROCEDURE — 99285 EMERGENCY DEPT VISIT HI MDM: CPT

## 2025-06-06 PROCEDURE — 83540 ASSAY OF IRON: CPT | Performed by: INTERNAL MEDICINE

## 2025-06-06 PROCEDURE — 80048 BASIC METABOLIC PNL TOTAL CA: CPT

## 2025-06-06 PROCEDURE — 80053 COMPREHEN METABOLIC PANEL: CPT

## 2025-06-06 PROCEDURE — 85046 RETICYTE/HGB CONCENTRATE: CPT | Performed by: INTERNAL MEDICINE

## 2025-06-06 PROCEDURE — 36430 TRANSFUSION BLD/BLD COMPNT: CPT

## 2025-06-06 PROCEDURE — P9016 RBC LEUKOCYTES REDUCED: HCPCS

## 2025-06-06 PROCEDURE — 36415 COLL VENOUS BLD VENIPUNCTURE: CPT

## 2025-06-06 PROCEDURE — 83550 IRON BINDING TEST: CPT | Performed by: INTERNAL MEDICINE

## 2025-06-06 PROCEDURE — 99285 EMERGENCY DEPT VISIT HI MDM: CPT | Performed by: EMERGENCY MEDICINE

## 2025-06-06 PROCEDURE — 86850 RBC ANTIBODY SCREEN: CPT

## 2025-06-06 PROCEDURE — 86901 BLOOD TYPING SEROLOGIC RH(D): CPT

## 2025-06-06 PROCEDURE — 71046 X-RAY EXAM CHEST 2 VIEWS: CPT

## 2025-06-06 PROCEDURE — 86900 BLOOD TYPING SEROLOGIC ABO: CPT

## 2025-06-06 PROCEDURE — 83615 LACTATE (LD) (LDH) ENZYME: CPT | Performed by: INTERNAL MEDICINE

## 2025-06-06 RX ORDER — PREDNISONE 10 MG/1
5 TABLET ORAL EVERY OTHER DAY
Status: DISCONTINUED | OUTPATIENT
Start: 2025-06-07 | End: 2025-06-07

## 2025-06-06 RX ORDER — LORAZEPAM 1 MG/1
1 TABLET ORAL 3 TIMES DAILY PRN
Status: DISCONTINUED | OUTPATIENT
Start: 2025-06-06 | End: 2025-06-08 | Stop reason: HOSPADM

## 2025-06-06 RX ORDER — ESCITALOPRAM OXALATE 10 MG/1
10 TABLET ORAL DAILY
Status: DISCONTINUED | OUTPATIENT
Start: 2025-06-07 | End: 2025-06-08 | Stop reason: HOSPADM

## 2025-06-06 RX ORDER — METOPROLOL SUCCINATE 50 MG/1
50 TABLET, EXTENDED RELEASE ORAL 2 TIMES DAILY
Status: DISCONTINUED | OUTPATIENT
Start: 2025-06-07 | End: 2025-06-08 | Stop reason: HOSPADM

## 2025-06-06 RX ORDER — RANOLAZINE 500 MG/1
1000 TABLET, EXTENDED RELEASE ORAL EVERY 12 HOURS SCHEDULED
Status: DISCONTINUED | OUTPATIENT
Start: 2025-06-06 | End: 2025-06-08 | Stop reason: HOSPADM

## 2025-06-06 RX ORDER — PANTOPRAZOLE SODIUM 40 MG/10ML
40 INJECTION, POWDER, LYOPHILIZED, FOR SOLUTION INTRAVENOUS EVERY 12 HOURS SCHEDULED
Status: DISCONTINUED | OUTPATIENT
Start: 2025-06-06 | End: 2025-06-08

## 2025-06-06 RX ORDER — PRAVASTATIN SODIUM 80 MG/1
80 TABLET ORAL
Status: DISCONTINUED | OUTPATIENT
Start: 2025-06-06 | End: 2025-06-08 | Stop reason: HOSPADM

## 2025-06-06 RX ADMIN — PANTOPRAZOLE SODIUM 40 MG: 40 INJECTION, POWDER, FOR SOLUTION INTRAVENOUS at 19:45

## 2025-06-06 RX ADMIN — RANOLAZINE 1000 MG: 500 TABLET, FILM COATED, EXTENDED RELEASE ORAL at 22:40

## 2025-06-06 RX ADMIN — PRAVASTATIN SODIUM 80 MG: 80 TABLET ORAL at 19:38

## 2025-06-06 RX ADMIN — LORAZEPAM 1 MG: 1 TABLET ORAL at 22:47

## 2025-06-06 NOTE — ASSESSMENT & PLAN NOTE
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   Hold dual antiplatelet therapy, transfuse 3 units of packed red blood cells for target hemoglobin of 8

## 2025-06-06 NOTE — ASSESSMENT & PLAN NOTE
Patient with significant history of CAD, will hold his dual antiplatelets in the setting of severe anemia, target hemoglobin above 8

## 2025-06-06 NOTE — H&P
H&P - Hospitalist   Name: Gael Ferraro 85 y.o. 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   Hold dual antiplatelet therapy, transfuse 3 units of packed red blood cells for target hemoglobin of 8      VTE Pharmacologic Prophylaxis:   Low Risk (Score 0-2) - Encourage Ambulation.  Code Status: Level 1 - Full Code   Discussion with family: Updated  (significant other) at bedside.    Anticipated Length of Stay: Patient will be admitted on an inpatient basis with an anticipated length of stay of greater than 2 midnights secondary to anemia.    History of Present Illness   Chief Complaint: short of breath    Gael Ferraro is a 85 y.o. male with a PMH of pure red cell aplasia, CAD, heart failure who presents with shortness of breath and dizziness when standing.  Patient denies noting any change in his bowel habits including hematochezia melena constipation or diarrhea, he denies any recent fevers chills nausea vomiting, sick contacts.  He has been compliant with his dual antiplatelet therapy for his history of coronary artery disease.  On evaluation in the er he was found to have a hemoglobin of 5, he he will be admitted for further evaluation by GI and hematology, will  transfuse 2 units packed red blood cells    Review of Systems   Constitutional:  Positive for fatigue. Negative for chills, diaphoresis and fever.   HENT:  Negative for ear pain and sore throat.    Eyes:  Negative for pain and visual disturbance.   Respiratory:  Positive for shortness of breath. Negative for cough.    Cardiovascular:  Negative for chest pain and palpitations.   Gastrointestinal:  Negative for abdominal pain and vomiting.   Genitourinary:  Negative for dysuria and hematuria.   Musculoskeletal:  Negative for arthralgias and back pain.   Skin:  Negative for color change and rash.   Neurological:  Positive for dizziness. Negative for seizures and syncope.   All other systems reviewed and are negative.      Historical Information   Past Medical History[1]  Past Surgical History[2]  Social History[3]  E-Cigarette/Vaping    E-Cigarette Use Never User      E-Cigarette/Vaping Substances    Nicotine No     THC No     CBD No     Flavoring No     Other No     Unknown No      Family History[4]  Social History:  Marital Status: /Civil Union   Occupation:   Patient Pre-hospital Living Situation: Home  Patient Pre-hospital Level of Mobility: walks  Patient Pre-hospital Diet Restrictions:     Meds/Allergies   I have reviewed home medications with patient personally.  Prior to Admission medications    Medication Sig Start Date End Date Taking? Authorizing Provider   acetaminophen (TYLENOL) 325 mg tablet Take 2 tablets (650 mg total) by mouth every 6 (six) hours as needed for mild pain, headaches or fever 1/2/24   Alize Sylvester MD   aspirin (ECOTRIN LOW STRENGTH) 81 mg EC tablet Take 1 tablet (81 mg total) by mouth daily 1/15/24   BRITTANY Mcwilliams   cyanocobalamin (VITAMIN B-12) 1000 MCG tablet Take 1 tablet (1,000 mcg total) by mouth daily 1/3/24   Alize Sylvester MD   escitalopram (LEXAPRO) 10 mg tablet Take 10 mg by mouth daily 3/4/25   Historical Provider, MD   LORazepam (ATIVAN) 1 mg tablet  Take 1 mg by mouth 3 (three) times a day as needed for anxiety    Historical Provider, MD   metoprolol succinate (TOPROL-XL) 100 mg 24 hr tablet Take 0.5 tablets (50 mg total) by mouth 2 (two) times a day 2/6/24 5/9/25  Shannan Leon MD   nitroglycerin (NITROSTAT) 0.4 mg SL tablet Place 1 tablet (0.4 mg total) under the tongue every 5 (five) minutes as needed for chest pain 1/24/24   Fanny Eisenberg PA-C   predniSONE 5 mg tablet 5mg every other day or as directed 3/26/25   Arabella Freeman PA-C   ranolazine (RANEXA) 1000 MG SR tablet Take 1 tablet (1,000 mg total) by mouth every 12 (twelve) hours 2/7/25   BRITTANY Villanueva   rosuvastatin (CRESTOR) 10 MG tablet Take 1 tablet (10 mg total) by mouth daily 4/22/25   BRITTANY Mcwilliams   spironolactone (ALDACTONE) 25 mg tablet Take 1 tablet (25 mg total) by mouth daily 5/9/25   BRITTANY Villanueva   ticagrelor (Brilinta) 90 MG Take 1 tablet (90 mg total) by mouth every 12 (twelve) hours 3/26/25   Flavio Velasco MD     Allergies   Allergen Reactions    Hydrocodone-Acetaminophen GI Intolerance    Niacin GI Intolerance    Penicillins Hives       Objective :  Temp:  [97.7 °F (36.5 °C)-98.2 °F (36.8 °C)] 98 °F (36.7 °C)  HR:  [64-71] 66  BP: (124-158)/(57-82) 158/65  Resp:  [16-18] 16  SpO2:  [96 %-99 %] 99 %  O2 Device: None (Room air)    Physical Exam  Vitals and nursing note reviewed.   Constitutional:       General: He is not in acute distress.     Appearance: He is well-developed. He is not toxic-appearing or diaphoretic.   HENT:      Head: Normocephalic and atraumatic.     Eyes:      General: No scleral icterus.     Conjunctiva/sclera: Conjunctivae normal.       Cardiovascular:      Rate and Rhythm: Normal rate and regular rhythm.      Heart sounds: No murmur heard.     No friction rub. No gallop.   Pulmonary:      Effort: Pulmonary effort is normal. No respiratory distress.      Breath sounds: Normal breath sounds. No stridor. No wheezing,  rhonchi or rales.   Chest:      Chest wall: No tenderness.   Abdominal:      General: There is no distension.      Palpations: Abdomen is soft. There is no mass.      Tenderness: There is no abdominal tenderness. There is no guarding or rebound.      Hernia: No hernia is present.     Musculoskeletal:         General: No swelling or tenderness.      Cervical back: Neck supple.     Skin:     General: Skin is warm and dry.      Capillary Refill: Capillary refill takes less than 2 seconds.     Neurological:      Mental Status: He is alert and oriented to person, place, and time.     Psychiatric:         Mood and Affect: Mood normal.          Lines/Drains:            Lab Results: I have reviewed the following results:  Results from last 7 days   Lab Units 06/06/25  1404   WBC Thousand/uL 4.45   HEMOGLOBIN g/dL 5.1*   HEMATOCRIT % 14.9*   PLATELETS Thousands/uL 299   SEGS PCT % 37*   LYMPHO PCT % 47*   MONO PCT % 14*   EOS PCT % 2     Results from last 7 days   Lab Units 06/06/25  1404   SODIUM mmol/L 134*   POTASSIUM mmol/L 4.6   CHLORIDE mmol/L 103   CO2 mmol/L 24   BUN mg/dL 26*   CREATININE mg/dL 1.18   ANION GAP mmol/L 7   CALCIUM mg/dL 9.1   ALBUMIN g/dL 4.1   TOTAL BILIRUBIN mg/dL 0.69   ALK PHOS U/L 69   ALT U/L 23   AST U/L 15   GLUCOSE RANDOM mg/dL 94             Lab Results   Component Value Date    HGBA1C 5.1 08/30/2024    HGBA1C 7.5 (H) 01/21/2024           Imaging Results Review: I personally reviewed the following image studies in PACS and associated radiology reports: chest xray. My interpretation of the radiology images/reports is:  .      Administrative Statements       ** Please Note: This note has been constructed using a voice recognition system. **         [1]   Past Medical History:  Diagnosis Date    Low hemoglobin    [2]   Past Surgical History:  Procedure Laterality Date    CARDIAC CATHETERIZATION Left 1/2/2024    Procedure: Cardiac Left Heart Cath;  Surgeon: Ana Garcia DO;  [facility];  Service: Cardiology    CARDIAC CATHETERIZATION N/A 1/2/2024    Procedure: Cardiac Coronary Angiogram;  Surgeon: Ana Garcia DO;  [facility];  Service: Cardiology    CARDIAC CATHETERIZATION N/A 1/2/2024    Procedure: Cardiac RHC;  Surgeon: Ana Garcia DO;  [facility];  Service: Cardiology    CARDIAC CATHETERIZATION N/A 2/17/2025    Procedure: Cardiac PCI;  Surgeon: Ana Garcia DO;  [facility];  Service: Cardiology    CARDIAC CATHETERIZATION N/A 2/17/2025    Procedure: Cardiac Impella Insertion;  Surgeon: Ana Garcia DO;  [facility];  Service: Cardiology    CARDIAC CATHETERIZATION N/A 2/17/2025    Procedure: Cardiac Impella Removal;  Surgeon: Ana Garcia DO;  [facility];  Service: Cardiology    CARDIAC CATHETERIZATION N/A 2/15/2025    Procedure: Cardiac iabp;  Surgeon: Ana Garcia DO;  [facility];  Service: Cardiology    CARDIAC CATHETERIZATION N/A 2/15/2025    Procedure: Cardiac Coronary Angiogram;  Surgeon: Ana Garcia DO;  [facility];  Service: Cardiology    IMPELLA VENTRICULAR ASSIST DEVICE INSERTION N/A 2/17/2025    Procedure: IMPELLA VENTRICULAR ASSIST DEVICE INSERTION;  Surgeon: Ana Garcia DO;  [facility];  Service: Cardiology    IR BIOPSY BONE MARROW  1/24/2024    REMOVAL PUMP INTRA AORTIC BALLOON  (IABP) N/A 2/17/2025    Procedure: REMOVAL PUMP INTRA AORTIC BALLOON  (IABP);  Surgeon: Ana Garcia DO;  [facility];  Service: Cardiology   [3]   Social History  Tobacco Use    Smoking status: Former     Types: Cigarettes, Cigars    Smokeless tobacco: Never    Tobacco comments:     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

## 2025-06-06 NOTE — TELEPHONE ENCOUNTER
jonnie Villatoro from [facility] lab, to give Arabella Freeman PA-C. Critical labs hemoglobin 5.7  6/6/25

## 2025-06-06 NOTE — TELEPHONE ENCOUNTER
Spoke with spouse, who reports she already called 911 to get him taken to the ER.  She could not provide additional information on symptoms as the police were showing up as I was speaking with her. Notified her taking him to the ED for evaluation and transfusion is what is recommended at this time.

## 2025-06-06 NOTE — PLAN OF CARE
Problem: PAIN - ADULT  Goal: Verbalizes/displays adequate comfort level or baseline comfort level  Description: Interventions:  - Encourage patient to monitor pain and request assistance  - Assess pain using appropriate pain scale  - Administer analgesics as ordered based on type and severity of pain and evaluate response  - Implement non-pharmacological measures as appropriate and evaluate response  - Consider cultural and social influences on pain and pain management  - Notify physician/advanced practitioner if interventions unsuccessful or patient reports new pain  - Educate patient/family on pain management process including their role and importance of  reporting pain   - Provide non-pharmacologic/complimentary pain relief interventions  Outcome: Progressing     Problem: INFECTION - ADULT  Goal: Absence or prevention of progression during hospitalization  Description: INTERVENTIONS:  - Assess and monitor for signs and symptoms of infection  - Monitor lab/diagnostic results  - Monitor all insertion sites, i.e. indwelling lines, tubes, and drains  - Monitor endotracheal if appropriate and nasal secretions for changes in amount and color  - Somers appropriate cooling/warming therapies per order  - Administer medications as ordered  - Instruct and encourage patient and family to use good hand hygiene technique  - Identify and instruct in appropriate isolation precautions for identified infection/condition  Outcome: Progressing  Goal: Absence of fever/infection during neutropenic period  Description: INTERVENTIONS:  - Monitor WBC  - Perform strict hand hygiene  - Limit to healthy visitors only  - No plants, dried, fresh or silk flowers with mueller in patient room  Outcome: Progressing     Problem: SAFETY ADULT  Goal: Patient will remain free of falls  Description: INTERVENTIONS:  - Educate patient/family on patient safety including physical limitations  - Instruct patient to call for assistance with activity   -  Consider consulting OT/PT to assist with strengthening/mobility based on AM PAC & JH-HLM score  - Consult OT/PT to assist with strengthening/mobility   - Keep Call bell within reach  - Keep bed low and locked with side rails adjusted as appropriate  - Keep care items and personal belongings within reach  - Initiate and maintain comfort rounds  - Make Fall Risk Sign visible to staff  - Apply yellow socks and bracelet for high fall risk patients  - Consider moving patient to room near nurses station  Outcome: Progressing  Goal: Maintain or return to baseline ADL function  Description: INTERVENTIONS:  -  Assess patient's ability to carry out ADLs; assess patient's baseline for ADL function and identify physical deficits which impact ability to perform ADLs (bathing, care of mouth/teeth, toileting, grooming, dressing, etc.)  - Assess/evaluate cause of self-care deficits   - Assess range of motion  - Assess patient's mobility; develop plan if impaired  - Assess patient's need for assistive devices and provide as appropriate  - Encourage maximum independence but intervene and supervise when necessary  - Involve family in performance of ADLs  - Assess for home care needs following discharge   - Consider OT consult to assist with ADL evaluation and planning for discharge  - Provide patient education as appropriate  - Monitor functional capacity and physical performance, use of AM PAC & JH-HLM   - Monitor gait, balance and fatigue with ambulation    Outcome: Progressing  Goal: Maintains/Returns to pre admission functional level  Description: INTERVENTIONS:  - Perform AM-PAC 6 Click Basic Mobility/ Daily Activity assessment daily.  - Set and communicate daily mobility goal to care team and patient/family/caregiver.   - Collaborate with rehabilitation services on mobility goals if consulted  - Out of bed for toileting  - Record patient progress and toleration of activity level   Outcome: Progressing     Problem: DISCHARGE  PLANNING  Goal: Discharge to home or other facility with appropriate resources  Description: INTERVENTIONS:  - Identify barriers to discharge w/patient and caregiver  - Arrange for needed discharge resources and transportation as appropriate  - Identify discharge learning needs (meds, wound care, etc.)  - Arrange for interpretive services to assist at discharge as needed  - Refer to Case Management Department for coordinating discharge planning if the patient needs post-hospital services based on physician/advanced practitioner order or complex needs related to functional status, cognitive ability, or social support system  Outcome: Progressing     Problem: Knowledge Deficit  Goal: Patient/family/caregiver demonstrates understanding of disease process, treatment plan, medications, and discharge instructions  Description: Complete learning assessment and assess knowledge base.  Interventions:  - Provide teaching at level of understanding  - Provide teaching via preferred learning methods  Outcome: Progressing     Problem: HEMATOLOGIC - ADULT  Goal: Maintains hematologic stability  Description: INTERVENTIONS  - Assess for signs and symptoms of bleeding or hemorrhage  - Monitor labs  - Administer supportive blood products/factors as ordered and appropriate  Outcome: Progressing     Problem: MUSCULOSKELETAL - ADULT  Goal: Maintain or return mobility to safest level of function  Description: INTERVENTIONS:  - Assess patient's ability to carry out ADLs; assess patient's baseline for ADL function and identify physical deficits which impact ability to perform ADLs (bathing, care of mouth/teeth, toileting, grooming, dressing, etc.)  - Assess/evaluate cause of self-care deficits   - Assess range of motion  - Assess patient's mobility  - Assess patient's need for assistive devices and provide as appropriate  - Encourage maximum independence but intervene and supervise when necessary  - Involve family in performance of ADLs  -  Assess for home care needs following discharge   - Consider OT consult to assist with ADL evaluation and planning for discharge  - Provide patient education as appropriate  Outcome: Progressing  Goal: Maintain proper alignment of affected body part  Description: INTERVENTIONS:  - Support, maintain and protect limb and body alignment  - Provide patient/ family with appropriate education  Outcome: Progressing

## 2025-06-07 VITALS
OXYGEN SATURATION: 95 % | SYSTOLIC BLOOD PRESSURE: 121 MMHG | TEMPERATURE: 98.4 F | DIASTOLIC BLOOD PRESSURE: 47 MMHG | RESPIRATION RATE: 18 BRPM | HEART RATE: 70 BPM

## 2025-06-07 PROBLEM — D62 ABLA (ACUTE BLOOD LOSS ANEMIA): Status: ACTIVE | Noted: 2023-12-31

## 2025-06-07 PROBLEM — I25.5 ISCHEMIC CARDIOMYOPATHY: Status: ACTIVE | Noted: 2025-06-07

## 2025-06-07 LAB
ABO GROUP BLD BPU: NORMAL
ALBUMIN SERPL BCG-MCNC: 3.9 G/DL (ref 3.5–5)
ALP SERPL-CCNC: 71 U/L (ref 34–104)
ALT SERPL W P-5'-P-CCNC: 20 U/L (ref 7–52)
ANION GAP SERPL CALCULATED.3IONS-SCNC: 5 MMOL/L (ref 4–13)
AST SERPL W P-5'-P-CCNC: 15 U/L (ref 13–39)
BASOPHILS # BLD AUTO: 0.01 THOUSANDS/ÂΜL (ref 0–0.1)
BASOPHILS NFR BLD AUTO: 0 % (ref 0–1)
BILIRUB SERPL-MCNC: 0.83 MG/DL (ref 0.2–1)
BPU ID: NORMAL
BUN SERPL-MCNC: 24 MG/DL (ref 5–25)
CALCIUM SERPL-MCNC: 9.1 MG/DL (ref 8.4–10.2)
CHLORIDE SERPL-SCNC: 108 MMOL/L (ref 96–108)
CO2 SERPL-SCNC: 26 MMOL/L (ref 21–32)
CREAT SERPL-MCNC: 1.1 MG/DL (ref 0.6–1.3)
CROSSMATCH: NORMAL
EOSINOPHIL # BLD AUTO: 0.14 THOUSAND/ÂΜL (ref 0–0.61)
EOSINOPHIL NFR BLD AUTO: 4 % (ref 0–6)
ERYTHROCYTE [DISTWIDTH] IN BLOOD BY AUTOMATED COUNT: 20.4 % (ref 11.6–15.1)
GFR SERPL CREATININE-BSD FRML MDRD: 60 ML/MIN/1.73SQ M
GLUCOSE SERPL-MCNC: 97 MG/DL (ref 65–140)
HCT VFR BLD AUTO: 22.7 % (ref 36.5–49.3)
HGB BLD-MCNC: 8 G/DL (ref 12–17)
IMM GRANULOCYTES # BLD AUTO: 0.01 THOUSAND/UL (ref 0–0.2)
IMM GRANULOCYTES NFR BLD AUTO: 0 % (ref 0–2)
LYMPHOCYTES # BLD AUTO: 1.74 THOUSANDS/ÂΜL (ref 0.6–4.47)
LYMPHOCYTES NFR BLD AUTO: 44 % (ref 14–44)
MCH RBC QN AUTO: 36.7 PG (ref 26.8–34.3)
MCHC RBC AUTO-ENTMCNC: 35.2 G/DL (ref 31.4–37.4)
MCV RBC AUTO: 103 FL (ref 82–98)
MONOCYTES # BLD AUTO: 0.52 THOUSAND/ÂΜL (ref 0.17–1.22)
MONOCYTES NFR BLD AUTO: 13 % (ref 4–12)
NEUTROPHILS # BLD AUTO: 1.58 THOUSANDS/ÂΜL (ref 1.85–7.62)
NEUTS SEG NFR BLD AUTO: 39 % (ref 43–75)
NRBC BLD AUTO-RTO: 0 /100 WBCS
PLATELET # BLD AUTO: 270 THOUSANDS/UL (ref 149–390)
PMV BLD AUTO: 10.8 FL (ref 8.9–12.7)
POTASSIUM SERPL-SCNC: 4.4 MMOL/L (ref 3.5–5.3)
PROT SERPL-MCNC: 5.9 G/DL (ref 6.4–8.4)
RBC # BLD AUTO: 2.18 MILLION/UL (ref 3.88–5.62)
SODIUM SERPL-SCNC: 139 MMOL/L (ref 135–147)
UNIT DISPENSE STATUS: NORMAL
UNIT PRODUCT CODE: NORMAL
UNIT PRODUCT VOLUME: 350 ML
UNIT RH: NORMAL
WBC # BLD AUTO: 4 THOUSAND/UL (ref 4.31–10.16)

## 2025-06-07 PROCEDURE — 99222 1ST HOSP IP/OBS MODERATE 55: CPT | Performed by: STUDENT IN AN ORGANIZED HEALTH CARE EDUCATION/TRAINING PROGRAM

## 2025-06-07 PROCEDURE — 80053 COMPREHEN METABOLIC PANEL: CPT | Performed by: INTERNAL MEDICINE

## 2025-06-07 PROCEDURE — 85025 COMPLETE CBC W/AUTO DIFF WBC: CPT | Performed by: INTERNAL MEDICINE

## 2025-06-07 PROCEDURE — 99222 1ST HOSP IP/OBS MODERATE 55: CPT | Performed by: INTERNAL MEDICINE

## 2025-06-07 PROCEDURE — 99232 SBSQ HOSP IP/OBS MODERATE 35: CPT | Performed by: NURSE PRACTITIONER

## 2025-06-07 PROCEDURE — P9016 RBC LEUKOCYTES REDUCED: HCPCS

## 2025-06-07 RX ORDER — PREDNISONE 20 MG/1
20 TABLET ORAL DAILY
Status: DISCONTINUED | OUTPATIENT
Start: 2025-06-08 | End: 2025-06-07

## 2025-06-07 RX ORDER — PREDNISONE 20 MG/1
20 TABLET ORAL DAILY
Status: DISCONTINUED | OUTPATIENT
Start: 2025-06-07 | End: 2025-06-08 | Stop reason: HOSPADM

## 2025-06-07 RX ORDER — PREDNISONE 20 MG/1
20 TABLET ORAL EVERY OTHER DAY
Status: DISCONTINUED | OUTPATIENT
Start: 2025-06-09 | End: 2025-06-07

## 2025-06-07 RX ADMIN — PANTOPRAZOLE SODIUM 40 MG: 40 INJECTION, POWDER, FOR SOLUTION INTRAVENOUS at 10:42

## 2025-06-07 RX ADMIN — PRAVASTATIN SODIUM 80 MG: 80 TABLET ORAL at 16:30

## 2025-06-07 RX ADMIN — PREDNISONE 20 MG: 20 TABLET ORAL at 16:30

## 2025-06-07 RX ADMIN — RANOLAZINE 1000 MG: 500 TABLET, FILM COATED, EXTENDED RELEASE ORAL at 09:35

## 2025-06-07 RX ADMIN — CYANOCOBALAMIN TAB 500 MCG 1000 MCG: 500 TAB at 09:35

## 2025-06-07 RX ADMIN — RANOLAZINE 1000 MG: 500 TABLET, FILM COATED, EXTENDED RELEASE ORAL at 22:09

## 2025-06-07 RX ADMIN — METOPROLOL SUCCINATE 50 MG: 50 TABLET, EXTENDED RELEASE ORAL at 22:09

## 2025-06-07 RX ADMIN — PANTOPRAZOLE SODIUM 40 MG: 40 INJECTION, POWDER, FOR SOLUTION INTRAVENOUS at 17:40

## 2025-06-07 RX ADMIN — LORAZEPAM 1 MG: 1 TABLET ORAL at 16:30

## 2025-06-07 RX ADMIN — ESCITALOPRAM OXALATE 10 MG: 10 TABLET ORAL at 09:35

## 2025-06-07 RX ADMIN — METOPROLOL SUCCINATE 50 MG: 50 TABLET, EXTENDED RELEASE ORAL at 09:35

## 2025-06-07 RX ADMIN — LORAZEPAM 1 MG: 1 TABLET ORAL at 10:43

## 2025-06-07 RX ADMIN — LORAZEPAM 1 MG: 1 TABLET ORAL at 22:09

## 2025-06-07 NOTE — ASSESSMENT & PLAN NOTE
Pt presenting with fatigue, hemoglobin of 5 on admission down from 10 about 1 month ago. Dx with acquired pure red cell aplasia on BMBX 1/24/24  Currently on prednisone 5 mg every other day   Consult hematology- previously prednisone was increased to 20mg daily with improvement in hgb   Given precipitous drop of hgb in such a short time period, will also consult GI for consideration of a GI bleed, although patient denies any melena or hematochezia.   Hold dual antiplatelet therapy  S/p 2 units of packed red blood cells for target hemoglobin of 9-10  Trend CBC daily

## 2025-06-07 NOTE — ASSESSMENT & PLAN NOTE
85 M with known pure red cell aplasia comes in with significant drop in Hb from 10 in early May 2025 to now 5. He received PRBC transfusion and repeat labs from this morning is still pending.     Vit B12, folate and iron levels are within range. No suspcision for  a bleed.   Other pertinent labs include a normal T. Bilirubin, LDH making hemolysis less likely.   Reticulocyte count is low.     More recently, his prednisone was being tapered and 4 weeks ago, he went from 5mg daily to 5mg every other day.     The new drop in Hb is most likely secondary to decreasing dose of the prednisone.   Since patient is anxious to go home today, I would recommend we discharge him on a slightly higher dose of prednisone - 20mg daily. He will need repeat labs later next week and I will let the outpatient oncology provider know to follow up.   Please also add a PPI upon discharge. From my standpoint, he may be discharged if Hb is greater than 8. Clinically, he already feels better and there has been resolution of all his presenting symptoms.

## 2025-06-07 NOTE — ASSESSMENT & PLAN NOTE
Patient with significant history of MVD  2/15/25 pt was admitted for STEMI, required a balloon pump at that time, s/p impella assisted high risk PCI to ostial left main   PTA on asa, brilinta, statin, ranexa, metoprolol  Currently holding DAPT in the setting of severe anemia  Per cardiology notes target hemoglobin 9-10 given severe MVD history   Last ECHO 2/25 with EF of 43%, G2DD  C/w GDMT

## 2025-06-07 NOTE — ASSESSMENT & PLAN NOTE
5 y.o. male with past medical history of pure red cell aplasia, CAD, heart failure who presents with shortness of breath and dizziness when standing.  Hemoglobin on admission 5.1 . Patient received 1 unit of packed red blood cells and responded appropriately.  Hemoglobin 8.0 today.  Patient reports that he follows with hematology and was diagnosed with bone marrow suppression which was being treated with prednisone.  Patient's prednisone was being tapered down and 4 weeks ago he went from 5 mg daily to 5 mg every other day.    Monitor hemoglobin  Transfuse blood products as needed  Monitor for signs of GI bleed  Hematology following  Patient refuses EGD or colonoscopy for further evaluation.  Patient reports that he had an EGD and colonoscopy done in 2024 due to anemia and then was referred to hematology who found bone marrow suppression.  GI will follow as needed, call GI if needed

## 2025-06-07 NOTE — ASSESSMENT & PLAN NOTE
Patient with known history of significant MVD  Noted to have a STEMI on 2/15/2025-status post Impella assisted high risk PCI to the ostial left main  PTA on ASA, Brilinta, statin, Ranexa  Given significant MVD history cardiology recommends a hemoglobin of 9-10  DAPT held on admission- resume at discharg e  2/25 echocardiogram with EF of 43% and grade 2 diastolic dysfunction  Continue with GDMT-Per wife PCP recently stopped Aldactone outpatient a few days prior to admission

## 2025-06-07 NOTE — ED ATTENDING ATTESTATION
6/6/2025  ISandee MD, saw and evaluated the patient. I have discussed the patient with the resident/non-physician practitioner and agree with the resident's/non-physician practitioner's findings, Plan of Care, and MDM as documented in the resident's/non-physician practitioner's note, except where noted. All available labs and Radiology studies were reviewed.  I was present for key portions of any procedure(s) performed by the resident/non-physician practitioner and I was immediately available to provide assistance.       At this point I agree with the current assessment done in the Emergency Department.  I have conducted an independent evaluation of this patient a history and physical is as follows:    85-year-old male presenting to the ER with anemia.  History of anemia needing transfusions previously.  History of CAD.  No chest pain.  Has some dyspnea on exertion.  Feeling lightheaded.  Has not noticed any bleeding.  He is on multiple antiplatelet agents due to CAD.    Hemoglobin is around 5, will need transfusion to bring above 8.  Admission for observation.      ED Course         Critical Care Time  Procedures

## 2025-06-07 NOTE — ASSESSMENT & PLAN NOTE
Patient presented with fatigue and dizziness and was noted to have a hemoglobin of 5 which was down from 10 one month ago.  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   Hold dual antiplatelet therapy, transfused 2 units of packed red blood cells for target hemoglobin of 9-10 given severe MVD    Iron panel: iron 275; ferritin 1055; iron sat 91; transferrin 217; tibc 303.8; retic count 38.8

## 2025-06-07 NOTE — CONSULTS
Consultation - Gastroenterology   Name: Gael Ferraro 85 y.o. male I MRN: [id number]  Unit/Bed#: S -01 I Date of Admission: 6/6/2025   Date of Service: 6/7/2025 I Hospital Day: 1   Inpatient consult to gastroenterology  Consult performed by: BRITTANY Kohli  Consult ordered by: Sergio Murcia DO        Physician Requesting Evaluation: Tom Wheeler MD   Reason for Evaluation / Principal Problem: Acute blood loss anemia    Assessment & Plan  ABLA (acute blood loss anemia)  5 y.o. male with past medical history of pure red cell aplasia, CAD, heart failure who presents with shortness of breath and dizziness when standing.  Hemoglobin on admission 5.1 . Patient received 1 unit of packed red blood cells and responded appropriately.  Hemoglobin 8.0 today.  Patient reports that he follows with hematology and was diagnosed with bone marrow suppression which was being treated with prednisone.  Patient's prednisone was being tapered down and 4 weeks ago he went from 5 mg daily to 5 mg every other day.    Monitor hemoglobin  Transfuse blood products as needed  Monitor for signs of GI bleed  Hematology following  Patient refuses EGD or colonoscopy for further evaluation.  Patient reports that he had an EGD and colonoscopy done in 2024 due to anemia and then was referred to hematology who found bone marrow suppression.  GI will follow as needed, call GI if needed      History of Present Illness   HPI:  Gael Ferraro is a 85 y.o. male with past medical history of pure red cell aplasia, CAD, heart failure who presents with shortness of breath and dizziness when standing.  Patient denies nausea, vomiting, acid reflux, heartburn, dysphagia, epigastric or abdominal pain.  Patient denies any blood in stool, blood from rectal area, or black tarry stool.  Patient does report intermittent episodes of constipation but he takes Metamucil as needed which does relieve his symptoms.  Hemoglobin on admission 5.1.   Patient received 1 unit of packed red blood cells and responded appropriately.  Hemoglobin 8.0 today.  Patient reports that he follows with hematology and was diagnosed with bone marrow suppression which was being treated with prednisone.  Patient's prednisone was being tapered down and 4 weeks ago he went from 5 mg daily to 5 mg every other day.  Chest x-ray showed no acute cardiopulmonary disease.    EGD done 2/8/2024 showed 3 cm type I hiatal hernia.  Mild erythematous mucosa in cardia.  Schatzki's ring and GE junction.  Duodenum normal.  No etiology of anemia identified.  Colonoscopy done 2/9/2024 showed internal hemorrhoids.  No evidence of fresh or old blood seen.    Review of Systems   Constitutional:  Negative for chills and fever.   HENT:  Negative for ear pain and sore throat.    Eyes:  Negative for pain and visual disturbance.   Respiratory:  Negative for cough and shortness of breath.    Cardiovascular:  Negative for chest pain and palpitations.   Gastrointestinal:  Negative for abdominal pain and vomiting.   Genitourinary:  Negative for dysuria and hematuria.   Musculoskeletal:  Negative for arthralgias and back pain.   Skin:  Negative for color change and rash.   Neurological:  Negative for seizures and syncope.   All other systems reviewed and are negative.    Historical Information   Past Medical History[1]  Past Surgical History[2]  Social History[3]  E-Cigarette/Vaping    E-Cigarette Use Never User      E-Cigarette/Vaping Substances    Nicotine No     THC No     CBD No     Flavoring No     Other No     Unknown No      Family History[4]  Social History[5]    Current Facility-Administered Medications:     cyanocobalamin (VITAMIN B-12) tablet 1,000 mcg, Daily    escitalopram (LEXAPRO) tablet 10 mg, Daily    LORazepam (ATIVAN) tablet 1 mg, TID PRN    metoprolol succinate (TOPROL-XL) 24 hr tablet 50 mg, BID    pantoprazole (PROTONIX) injection 40 mg, Q12H CRESENCIO    pravastatin (PRAVACHOL) tablet 80 mg,  Daily With Dinner    predniSONE tablet 20 mg, Daily    ranolazine (RANEXA) 12 hr tablet 1,000 mg, Q12H CRESENCIO    Prior to Admission Medications   Prescriptions Last Dose Informant Patient Reported? Taking?   LORazepam (ATIVAN) 1 mg tablet  Self, Spouse/Significant Other Yes No   Sig: Take 1 mg by mouth 3 (three) times a day as needed for anxiety   acetaminophen (TYLENOL) 325 mg tablet  Self, Spouse/Significant Other No No   Sig: Take 2 tablets (650 mg total) by mouth every 6 (six) hours as needed for mild pain, headaches or fever   aspirin (ECOTRIN LOW STRENGTH) 81 mg EC tablet  Self, Spouse/Significant Other No No   Sig: Take 1 tablet (81 mg total) by mouth daily   cyanocobalamin (VITAMIN B-12) 1000 MCG tablet  Self, Spouse/Significant Other No No   Sig: Take 1 tablet (1,000 mcg total) by mouth daily   escitalopram (LEXAPRO) 10 mg tablet  Self, Spouse/Significant Other Yes No   Sig: Take 10 mg by mouth daily   metoprolol succinate (TOPROL-XL) 100 mg 24 hr tablet  Self, Spouse/Significant Other No No   Sig: Take 0.5 tablets (50 mg total) by mouth 2 (two) times a day   nitroglycerin (NITROSTAT) 0.4 mg SL tablet  Self, Spouse/Significant Other No No   Sig: Place 1 tablet (0.4 mg total) under the tongue every 5 (five) minutes as needed for chest pain   predniSONE 5 mg tablet  Self, Spouse/Significant Other No No   Simg every other day or as directed   ranolazine (RANEXA) 1000 MG SR tablet  Self, Spouse/Significant Other No No   Sig: Take 1 tablet (1,000 mg total) by mouth every 12 (twelve) hours   rosuvastatin (CRESTOR) 10 MG tablet   No No   Sig: Take 1 tablet (10 mg total) by mouth daily   spironolactone (ALDACTONE) 25 mg tablet   No No   Sig: Take 1 tablet (25 mg total) by mouth daily   ticagrelor (Brilinta) 90 MG  Self, Spouse/Significant Other No No   Sig: Take 1 tablet (90 mg total) by mouth every 12 (twelve) hours      Facility-Administered Medications: None     Hydrocodone-acetaminophen, Niacin, and  Penicillins    Objective :  Temp:  [97.4 °F (36.3 °C)-98.3 °F (36.8 °C)] 98.3 °F (36.8 °C)  HR:  [65-75] 69  BP: (112-158)/(49-99) 117/55  Resp:  [16-20] 16  SpO2:  [96 %-99 %] 96 %  O2 Device: None (Room air)    Physical Exam  Vitals and nursing note reviewed.   Constitutional:       General: He is not in acute distress.     Appearance: He is well-developed.   HENT:      Head: Normocephalic and atraumatic.     Eyes:      Conjunctiva/sclera: Conjunctivae normal.       Cardiovascular:      Rate and Rhythm: Normal rate and regular rhythm.      Pulses: Normal pulses.      Heart sounds: Normal heart sounds. No murmur heard.  Pulmonary:      Effort: Pulmonary effort is normal. No respiratory distress.      Breath sounds: Normal breath sounds. No stridor. No wheezing, rhonchi or rales.   Abdominal:      General: Bowel sounds are normal. There is no distension.      Palpations: Abdomen is soft. There is no mass.      Tenderness: There is no abdominal tenderness. There is no guarding or rebound.     Musculoskeletal:         General: No swelling.      Cervical back: Neck supple.      Right lower leg: No edema.      Left lower leg: No edema.     Skin:     General: Skin is warm and dry.      Capillary Refill: Capillary refill takes less than 2 seconds.      Coloration: Skin is not jaundiced or pale.     Neurological:      Mental Status: He is alert and oriented to person, place, and time.     Psychiatric:         Mood and Affect: Mood normal.           Lab Results: I have reviewed the following results:CBC/BMP:   .     06/07/25  0557   WBC 4.00*   HGB 8.0*   HCT 22.7*      SODIUM 139   K 4.4      CO2 26   BUN 24   CREATININE 1.10   GLUC 97    , LFTs:   .     06/07/25  0557   AST 15   ALT 20   ALB 3.9   TBILI 0.83   ALKPHOS 71        Imaging Results Review: I reviewed radiology reports from this admission including: chest xray.             [1]   Past Medical History:  Diagnosis Date    Low hemoglobin    [2]   Past  Surgical History:  Procedure Laterality Date    CARDIAC CATHETERIZATION Left 1/2/2024    Procedure: Cardiac Left Heart Cath;  Surgeon: Ana Garcia DO;  [facility];  Service: Cardiology    CARDIAC CATHETERIZATION N/A 1/2/2024    Procedure: Cardiac Coronary Angiogram;  Surgeon: Ana Garcia DO;  [facility];  Service: Cardiology    CARDIAC CATHETERIZATION N/A 1/2/2024    Procedure: Cardiac RHC;  Surgeon: Ana Garcia DO;  [facility];  Service: Cardiology    CARDIAC CATHETERIZATION N/A 2/17/2025    Procedure: Cardiac PCI;  Surgeon: Ana Garcia DO;  [facility]: [facility];  Service: Cardiology    CARDIAC CATHETERIZATION N/A 2/17/2025    Procedure: Cardiac Impella Insertion;  Surgeon: Ana Garcia DO;  [facility];  Service: Cardiology    CARDIAC CATHETERIZATION N/A 2/17/2025    Procedure: Cardiac Impella Removal;  Surgeon: Ana Garcia DO;  [facility];  Service: Cardiology    CARDIAC CATHETERIZATION N/A 2/15/2025    Procedure: Cardiac iabp;  Surgeon: Ana Garcia DO;  [facility];  Service: Cardiology    CARDIAC CATHETERIZATION N/A 2/15/2025    Procedure: Cardiac Coronary Angiogram;  Surgeon: Ana Garcia DO;  [facility];  Service: Cardiology    IMPELLA VENTRICULAR ASSIST DEVICE INSERTION N/A 2/17/2025    Procedure: IMPELLA VENTRICULAR ASSIST DEVICE INSERTION;  Surgeon: Ana Garcia DO;  [facility];  Service: Cardiology    IR BIOPSY BONE MARROW  1/24/2024    REMOVAL PUMP INTRA AORTIC BALLOON  (IABP) N/A 2/17/2025    Procedure: REMOVAL PUMP INTRA AORTIC BALLOON  (IABP);  Surgeon: Ana Garcia DO;  [facility]: [facility];  Service: Cardiology   [3]   Social History  Tobacco Use    Smoking status: Former     Types: Cigarettes, Cigars    Smokeless tobacco: Never    Tobacco comments:     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

## 2025-06-07 NOTE — ASSESSMENT & PLAN NOTE
Lab Results   Component Value Date    EGFR 60 06/07/2025    EGFR 55 06/06/2025    EGFR 61 06/06/2025    CREATININE 1.10 06/07/2025    CREATININE 1.18 06/06/2025    CREATININE 1.08 06/06/2025   Currently stable and at baseline   Monitor BMP   Avoid nephrotoxic drugs and hypotension

## 2025-06-07 NOTE — PROGRESS NOTES
Progress Note - Hospitalist   Name: Gael Ferraro 85 y.o. male I MRN: [id number]  Unit/Bed#: S -01 I Date of Admission: 6/6/2025   Date of Service: 6/7/2025 I Hospital Day: 1    Assessment & Plan  Pure red cell aplasia (HCC)  Patient presented with fatigue and dizziness and was noted to have a hemoglobin of 5 which was down from 10 one month ago.  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   Hold dual antiplatelet therapy, transfused 2 units of packed red blood cells for target hemoglobin of 9-10 given severe MVD    Iron panel: iron 275; ferritin 1055; iron sat 91; transferrin 217; tibc 303.8; retic count 38.8  ABLA (acute blood loss anemia)  Lab Results   Component Value Date    HGB 8.0 (L) 06/07/2025    HGB 5.1 (LL) 06/06/2025    HGB 5.7 (LL) 06/06/2025   POA with hgb of 5.1 down from 10.1 a month ago   Etiology likely secondary to acquired pure red cell aplasia  S/p 2 units of PRBCs  Pending hematology and GI consults   Trend CBC daily  Goal hgb 9-10 given history of severe MVD  Coronary artery disease involving native coronary artery of native heart with angina pectoris (HCC)  Patient with known history of significant MVD  Noted to have a STEMI on 2/15/2025-status post Impella assisted high risk PCI to the ostial left main  Currently on ASA, Brilinta, statin, Ranexa  Given significant MVD history cardiology recommends a hemoglobin of 9-10  DAPT currently on hold  2/25 echocardiogram with EF of 43% and grade 2 diastolic dysfunction  Continue with GDMT-Per wife PCP recently stopped Aldactone outpatient a few days prior to  admission  Hypertension  BP currently stable   C/w metoprolol 50mg BID   CKD (chronic kidney disease) stage 3, GFR 30-59 ml/min ([facility])  Lab Results   Component Value Date    EGFR 60 06/07/2025    EGFR 55 06/06/2025    EGFR 61 06/06/2025    CREATININE 1.10 06/07/2025    CREATININE 1.18 06/06/2025    CREATININE 1.08 06/06/2025   Currently stable and at baseline   Monitor BMP   Avoid nephrotoxic drugs and hypotension   Myelofibrosis (HCC)  Diagnosed BMBX 1/24  Grade 2/3  Follows with hematology/oncology outpatient   Mixed hyperlipidemia  Pravastatin 80mg dialy substituted for crestor 10mg daily   Ischemic cardiomyopathy  See plan under CAD    VTE Pharmacologic Prophylaxis:   High Risk (Score >/= 5) - Pharmacological DVT Prophylaxis Contraindicated. Sequential Compression Devices Ordered.    Mobility:   Basic Mobility Inpatient Raw Score: 18  JH-HLM Goal: 6: Walk 10 steps or more  JH-HLM Achieved: 6: Walk 10 steps or more  JH-HLM Goal NOT achieved. Continue with multidisciplinary rounding and encourage appropriate mobility to improve upon JH-HLM goals.    Patient Centered Rounds: I performed bedside rounds with nursing staff today.   Discussions with Specialists or Other Care Team Provider: d/w RN     Education and Discussions with Family / Patient: Updated  (wife) at bedside.    Current Length of Stay: 1 day(s)  Current Patient Status: Inpatient   Certification Statement: The patient will continue to require additional inpatient hospital stay due to anemia   Discharge Plan: Anticipate discharge in 48-72 hrs to discharge location to be determined pending rehab evaluations.    Code Status: Level 1 - Full Code    Subjective   Pt reports he is feeling better today. No longer sob and denies any further fatigue or dizziness.     Objective :  Temp:  [97.4 °F (36.3 °C)-98.2 °F (36.8 °C)] 98 °F (36.7 °C)  HR:  [64-75] 67  BP: (112-158)/(49-99) 123/49  Resp:  [16-18] 16  SpO2:  [96 %-99 %] 98 %  O2 Device: None  (Room air)    There is no height or weight on file to calculate BMI.     Input and Output Summary (last 24 hours):     Intake/Output Summary (Last 24 hours) at 6/7/2025 0939  Last data filed at 6/7/2025 0600  Gross per 24 hour   Intake 844.17 ml   Output 200 ml   Net 644.17 ml       Physical Exam  Constitutional:       General: He is not in acute distress.     Appearance: He is not ill-appearing.     Cardiovascular:      Rate and Rhythm: Normal rate and regular rhythm.      Pulses: Normal pulses.      Heart sounds: Normal heart sounds. No murmur heard.  Pulmonary:      Effort: No respiratory distress.      Breath sounds: Normal breath sounds. No wheezing or rales.   Abdominal:      General: Bowel sounds are normal. There is no distension.      Palpations: Abdomen is soft.      Tenderness: There is no abdominal tenderness. There is no guarding.     Musculoskeletal:         General: No swelling or tenderness.     Skin:     General: Skin is warm and dry.      Findings: No erythema or rash.     Neurological:      General: No focal deficit present.      Mental Status: He is alert. Mental status is at baseline.     Psychiatric:         Attention and Perception: Attention normal.         Mood and Affect: Mood normal.         Lines/Drains:              Lab Results: I have reviewed the following results:   Results from last 7 days   Lab Units 06/07/25  0557   WBC Thousand/uL 4.00*   HEMOGLOBIN g/dL 8.0*   HEMATOCRIT % 22.7*   PLATELETS Thousands/uL 270   SEGS PCT % 39*   LYMPHO PCT % 44   MONO PCT % 13*   EOS PCT % 4     Results from last 7 days   Lab Units 06/07/25  0557   SODIUM mmol/L 139   POTASSIUM mmol/L 4.4   CHLORIDE mmol/L 108   CO2 mmol/L 26   BUN mg/dL 24   CREATININE mg/dL 1.10   ANION GAP mmol/L 5   CALCIUM mg/dL 9.1   ALBUMIN g/dL 3.9   TOTAL BILIRUBIN mg/dL 0.83   ALK PHOS U/L 71   ALT U/L 20   AST U/L 15   GLUCOSE RANDOM mg/dL 97                       Recent Cultures (last 7 days):         Imaging Results  Review: I reviewed radiology reports from this admission including: Echocardiogram.  Other Study Results Review: No additional pertinent studies reviewed.    Last 24 Hours Medication List:     Current Facility-Administered Medications:     cyanocobalamin (VITAMIN B-12) tablet 1,000 mcg, Daily    escitalopram (LEXAPRO) tablet 10 mg, Daily    LORazepam (ATIVAN) tablet 1 mg, TID PRN    metoprolol succinate (TOPROL-XL) 24 hr tablet 50 mg, BID    pantoprazole (PROTONIX) injection 40 mg, Q12H CRESENCIO    pravastatin (PRAVACHOL) tablet 80 mg, Daily With Dinner    [START ON 6/9/2025] predniSONE tablet 20 mg, Every Other Day    ranolazine (RANEXA) 12 hr tablet 1,000 mg, Q12H CRESENCIO    Administrative Statements   Today, Patient Was Seen By: BRITTANY Ba      **Please Note: This note may have been constructed using a voice recognition system.**

## 2025-06-07 NOTE — ASSESSMENT & PLAN NOTE
Lab Results   Component Value Date    HGB 8.0 (L) 06/07/2025    HGB 5.1 (LL) 06/06/2025    HGB 5.7 (LL) 06/06/2025   POA with hgb of 5.1 down from 10.1 a month ago   Etiology likely secondary to acquired pure red cell aplasia  S/p 2 units of PRBCs  Pending hematology and GI consults   Trend CBC daily  Goal hgb 9-10 given history of severe MVD

## 2025-06-07 NOTE — CONSULTS
Consultation - Oncology-Medical   Name: Gael Ferraro 85 y.o. male I MRN: [id number]  Unit/Bed#: S -01 I Date of Admission: 6/6/2025   Date of Service: 6/7/2025 I Hospital Day: 1  Inpatient consult to Hematology  Consult performed by: Asha Bernardo MD  Consult ordered by: Sergio Murcia DO        Physician Requesting Evaluation: Tom Wheeler MD   Reason for Evaluation / Principal Problem: anemia     Assessment & Plan  Pure red cell aplasia (HCC)  85 M with known pure red cell aplasia comes in with significant drop in Hb from 10 in early May 2025 to now 5. He received PRBC transfusion and repeat labs from this morning is still pending.     Vit B12, folate and iron levels are within range. No suspcision for  a bleed.   Other pertinent labs include a normal T. Bilirubin, LDH making hemolysis less likely.   Reticulocyte count is low.     More recently, his prednisone was being tapered and 4 weeks ago, he went from 5mg daily to 5mg every other day.     The new drop in Hb is most likely secondary to decreasing dose of the prednisone.   Since patient is anxious to go home today, I would recommend we discharge him on a slightly higher dose of prednisone - 20mg daily. He will need repeat labs later next week and I will let the outpatient oncology provider know to follow up.   Please also add a PPI upon discharge. From my standpoint, he may be discharged if Hb is greater than 8. Clinically, he already feels better and there has been resolution of all his presenting symptoms.        I have discussed the above management plan in detail with the primary service.   Oncology-Medical service signing off.  Please contact the SecureChat role for the Oncology-Medical service with any questions/concerns.    History of Present Illness   Gael Ferraro is a 85 y.o. male who presents with worsening anemia in the absence of bleed. He has known pure red cell aplasia and was in the process of tapering his steroid.    Review  "of Systems   Respiratory:  Positive for shortness of breath.    Neurological:  Positive for dizziness and light-headedness.   All other systems reviewed and are negative.    All of the above symptoms have now resolved      Current Facility-Administered Medications:     cyanocobalamin (VITAMIN B-12) tablet 1,000 mcg, Daily    escitalopram (LEXAPRO) tablet 10 mg, Daily    LORazepam (ATIVAN) tablet 1 mg, TID PRN    metoprolol succinate (TOPROL-XL) 24 hr tablet 50 mg, BID    pantoprazole (PROTONIX) injection 40 mg, Q12H CRESENCIO    pravastatin (PRAVACHOL) tablet 80 mg, Daily With Dinner    predniSONE tablet 5 mg, Every Other Day    ranolazine (RANEXA) 12 hr tablet 1,000 mg, Q12H CRESENCIO    Objective :  Temp:  [97.4 °F (36.3 °C)-98.2 °F (36.8 °C)] 98 °F (36.7 °C)  HR:  [64-75] 67  BP: (112-158)/(49-99) 123/49  Resp:  [16-18] 16  SpO2:  [96 %-99 %] 98 %  O2 Device: None (Room air)    Physical Exam  Vitals reviewed.   Constitutional:       General: He is not in acute distress.     Appearance: Normal appearance. He is not ill-appearing.   HENT:      Mouth/Throat:      Mouth: Mucous membranes are dry.     Cardiovascular:      Rate and Rhythm: Normal rate and regular rhythm.      Heart sounds: No murmur heard.  Pulmonary:      Effort: Pulmonary effort is normal. No respiratory distress.      Breath sounds: Normal breath sounds.     Musculoskeletal:         General: No swelling.     Neurological:      Mental Status: He is alert.            Lab Results: I have reviewed the following results:CBC/BMP:   .     06/07/25  0557   WBC 4.00*   HGB 8.0*   HCT 22.7*      SODIUM 139   K 4.4      CO2 26   BUN 24   CREATININE 1.10   GLUC 97    , LFTs:   .     06/07/25  0557   AST 15   ALT 20   ALB 3.9   TBILI 0.83   ALKPHOS 71    , Vitamins B1, B6, B12, A, and D: No results found for: \"B1\", \"THYROGLB\", \"VXLMFNAO56\", \"[id number]\", Iron:   Lab Results   Component Value Date    IRON 275 (H) 06/06/2025       Imaging Results Review: No " pertinent imaging studies reviewed.  Other Study Results Review: No additional pertinent studies reviewed.    Administrative Statements   I have spent a total time of 20 minutes in caring for this patient on the day of the visit/encounter including Diagnostic results, Prognosis, Risks and benefits of tx options, Patient and family education, Importance of tx compliance, Impressions, Counseling / Coordination of care, Documenting in the medical record, Reviewing/placing orders in the medical record (including tests, medications, and/or procedures), Obtaining or reviewing history  , and Communicating with other healthcare professionals .

## 2025-06-07 NOTE — ASSESSMENT & PLAN NOTE
See plan under CAD   Consent 3/Introductory Paragraph: I gave the patient a chance to ask questions they had about the procedure.  Following this I explained the Mohs procedure and consent was obtained. The risks, benefits and alternatives to therapy were discussed in detail. Specifically, the risks of infection, scarring, bleeding, prolonged wound healing, incomplete removal, allergy to anesthesia, nerve injury and recurrence were addressed. Prior to the procedure, the treatment site was clearly identified and confirmed by the patient. All components of Universal Protocol/PAUSE Rule completed.

## 2025-06-07 NOTE — ASSESSMENT & PLAN NOTE
Patient with known history of significant MVD  Noted to have a STEMI on 2/15/2025-status post Impella assisted high risk PCI to the ostial left main  Currently on ASA, Brilinta, statin, Ranexa  Given significant MVD history cardiology recommends a hemoglobin of 9-10  DAPT currently on hold  2/25 echocardiogram with EF of 43% and grade 2 diastolic dysfunction  Continue with GDMT-Per wife PCP recently stopped Aldactone outpatient a few days prior to admission

## 2025-06-08 LAB
ABO GROUP BLD BPU: NORMAL
ANION GAP SERPL CALCULATED.3IONS-SCNC: 3 MMOL/L (ref 4–13)
ATRIAL RATE: 65 BPM
BPU ID: NORMAL
BUN SERPL-MCNC: 27 MG/DL (ref 5–25)
CALCIUM SERPL-MCNC: 8.8 MG/DL (ref 8.4–10.2)
CHLORIDE SERPL-SCNC: 107 MMOL/L (ref 96–108)
CO2 SERPL-SCNC: 25 MMOL/L (ref 21–32)
CREAT SERPL-MCNC: 1.01 MG/DL (ref 0.6–1.3)
CROSSMATCH: NORMAL
ERYTHROCYTE [DISTWIDTH] IN BLOOD BY AUTOMATED COUNT: 19.9 % (ref 11.6–15.1)
GFR SERPL CREATININE-BSD FRML MDRD: 66 ML/MIN/1.73SQ M
GLUCOSE SERPL-MCNC: 124 MG/DL (ref 65–140)
HCT VFR BLD AUTO: 25.7 % (ref 36.5–49.3)
HGB BLD-MCNC: 9 G/DL (ref 12–17)
MCH RBC QN AUTO: 34.4 PG (ref 26.8–34.3)
MCHC RBC AUTO-ENTMCNC: 35 G/DL (ref 31.4–37.4)
MCV RBC AUTO: 98 FL (ref 82–98)
P AXIS: 80 DEGREES
PLATELET # BLD AUTO: 248 THOUSANDS/UL (ref 149–390)
PMV BLD AUTO: 10.8 FL (ref 8.9–12.7)
POTASSIUM SERPL-SCNC: 4.2 MMOL/L (ref 3.5–5.3)
PR INTERVAL: 144 MS
QRS AXIS: 57 DEGREES
QRSD INTERVAL: 86 MS
QT INTERVAL: 448 MS
QTC INTERVAL: 465 MS
RBC # BLD AUTO: 2.62 MILLION/UL (ref 3.88–5.62)
SODIUM SERPL-SCNC: 135 MMOL/L (ref 135–147)
T WAVE AXIS: 29 DEGREES
UNIT DISPENSE STATUS: NORMAL
UNIT PRODUCT CODE: NORMAL
UNIT PRODUCT VOLUME: 350 ML
UNIT RH: NORMAL
VENTRICULAR RATE: 65 BPM
WBC # BLD AUTO: 4.05 THOUSAND/UL (ref 4.31–10.16)

## 2025-06-08 PROCEDURE — 85027 COMPLETE CBC AUTOMATED: CPT | Performed by: NURSE PRACTITIONER

## 2025-06-08 PROCEDURE — 99239 HOSP IP/OBS DSCHRG MGMT >30: CPT | Performed by: NURSE PRACTITIONER

## 2025-06-08 PROCEDURE — 93010 ELECTROCARDIOGRAM REPORT: CPT | Performed by: INTERNAL MEDICINE

## 2025-06-08 PROCEDURE — 80048 BASIC METABOLIC PNL TOTAL CA: CPT | Performed by: NURSE PRACTITIONER

## 2025-06-08 RX ORDER — PANTOPRAZOLE SODIUM 40 MG/1
40 TABLET, DELAYED RELEASE ORAL
Status: DISCONTINUED | OUTPATIENT
Start: 2025-06-08 | End: 2025-06-08 | Stop reason: HOSPADM

## 2025-06-08 RX ORDER — PREDNISONE 20 MG/1
20 TABLET ORAL DAILY
Qty: 30 TABLET | Refills: 0 | Status: SHIPPED | OUTPATIENT
Start: 2025-06-09

## 2025-06-08 RX ORDER — PANTOPRAZOLE SODIUM 40 MG/1
40 TABLET, DELAYED RELEASE ORAL DAILY
Qty: 30 TABLET | Refills: 0 | Status: SHIPPED | OUTPATIENT
Start: 2025-06-09

## 2025-06-08 RX ADMIN — PANTOPRAZOLE SODIUM 40 MG: 40 INJECTION, POWDER, FOR SOLUTION INTRAVENOUS at 08:24

## 2025-06-08 RX ADMIN — RANOLAZINE 1000 MG: 500 TABLET, FILM COATED, EXTENDED RELEASE ORAL at 08:19

## 2025-06-08 RX ADMIN — LORAZEPAM 1 MG: 1 TABLET ORAL at 08:47

## 2025-06-08 RX ADMIN — PREDNISONE 20 MG: 20 TABLET ORAL at 08:20

## 2025-06-08 RX ADMIN — CYANOCOBALAMIN TAB 500 MCG 1000 MCG: 500 TAB at 08:20

## 2025-06-08 RX ADMIN — ESCITALOPRAM OXALATE 10 MG: 10 TABLET ORAL at 08:20

## 2025-06-08 NOTE — CASE MANAGEMENT
Case Management Assessment & Discharge Planning Note    Patient name Gael Ferraro  Location S /S -01 MRN [id number]  : 1940 Date 2025       Current Admission Date: 2025  Current Admission Diagnosis:Pure red cell aplasia (HCC)   Patient Active Problem List    Diagnosis Date Noted    Ischemic cardiomyopathy 2025    Hallucinations 2025    At risk for delirium 2025    Ambulatory dysfunction 2025    Frailty 2025    Anxiety 2025    Mixed hyperlipidemia     Pure red cell aplasia (HCC) 2024    Myelofibrosis (HCC) 2024    Abnormal CT of the chest 2024    SOB (shortness of breath) 2024    Constipation 2024    CKD (chronic kidney disease) stage 3, GFR 30-59 ml/min (HCC) 2024    Other dysphagia 2024    ABLA (acute blood loss anemia) 2023    Coronary artery disease involving native coronary artery of native heart with angina pectoris (HCC) 2023    Hypertension 2023      LOS (days): 2  Geometric Mean LOS (GMLOS) (days):   Days to GMLOS:     OBJECTIVE:    Risk of Unplanned Readmission Score: 16.62         Current admission status: Inpatient       Preferred Pharmacy:   [facility]/pharmacy #[id number] - [place] - [facility] [address] [address]  Phone: [phone] Fax: [phone]    Primary Care Provider: Mike Lyman MD    Primary Insurance: [facility]  Secondary Insurance:     ASSESSMENT:  Readmission Root Cause  30 Day Readmission: No    Patient Information  Admitted from:: Home  Mental Status: Alert  During Assessment patient was accompanied by: Not accompanied during assessment  Assessment information provided by:: Patient  Primary Caregiver: Self  Support Systems: Self, Spouse/significant other, Children  County of Residence: [place]  What city do you live in?: [place]  Home entry access options. Select all that apply.: Stairs  Number of steps to enter home.: 3  Type of  Current Residence: 2 story home  Upon entering residence, is there a bedroom on the main floor (no further steps)?: Yes  Upon entering residence, is there a bathroom on the main floor (no further steps)?: Yes  Living Arrangements: Lives w/ Spouse/significant other  Is patient a ?: No    Activities of Daily Living Prior to Admission  Functional Status: Independent  Completes ADLs independently?: Yes  Ambulates independently?: Yes  Does patient use assisted devices?: No  Does patient currently own DME?: Yes  What DME does the patient currently own?: Straight Cane, Walker  Does patient have a history of Outpatient Therapy (PT/OT)?: No (currently scheduled to attend cardiac rehab, no OP PT/OT)  Does the patient have a history of Short-Term Rehab?: No  Does patient have a history of HHC?: Yes (w/ [facility])  Does patient currently have HHC?: No    Patient Information Continued  Income Source: Pension/halfway  Does patient have prescription coverage?: Yes  Can the patient afford their medications and any related supplies (such as glucometers or test strips)?: Yes  Does patient receive dialysis treatments?: No  Does patient have a history of substance abuse?: No  Does patient have a history of Mental Health Diagnosis?: No    Means of Transportation  Means of Transport to Appts:: Drives Self    DISCHARGE DETAILS:    Discharge planning discussed with:: Patient  Freedom of Choice: Yes     CM contacted family/caregiver?: No- see comments (declined call)  Were Treatment Team discharge recommendations reviewed with patient/caregiver?: Yes  Did patient/caregiver verbalize understanding of patient care needs?: Yes  Were patient/caregiver advised of the risks associated with not following Treatment Team discharge recommendations?: Yes    Requested Home Health Care         Is the patient interested in HHC at discharge?: No    DME Referral Provided  Referral made for DME?: No    Other Referral/Resources/Interventions  Provided:  Interventions: None Indicated    Would you like to participate in our [facility] service program?  : No - Declined    Treatment Team Recommendation: Home  Discharge Destination Plan:: Home  Transport at Discharge : Family     Additional Comments: Patient cleared for D/C. No DCP needs identified at this time. CM department available for any DCP needs.

## 2025-06-08 NOTE — ASSESSMENT & PLAN NOTE
Patient presented with fatigue and dizziness and was noted to have a hemoglobin of 5 which was down from 10 one month ago.  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 Vuong outpatient  Hematology consult appreciated- recommended increasing prednisone to 20mg daily and add PPI for GI prophylaxis   S/p 3 units of PRBCs- -target hemoglobin of 9-10 given severe MVD  F/u with hematology after discharge  Check cbc in 1 week   Given precipitous drop in such a short time period, GI was consulted- pt refused cscope or EGD, per GI they believe this was secondary to decrease in prednisone, no inpatient GI workup necessary at this time   Restart DAPT at discharge

## 2025-06-08 NOTE — ASSESSMENT & PLAN NOTE
Lab Results   Component Value Date    HGB 9.0 (L) 06/08/2025    HGB 8.0 (L) 06/07/2025    HGB 5.1 (LL) 06/06/2025   POA with hgb of 5.1 down from 10.1 a month ago   Etiology likely secondary to acquired pure red cell aplasia  S/p 3 units of PRBCs  hematology and GI consults appreciated   Check CBC in one week   Prednisone increased to 20mg daily   Goal hgb 9-10 given history of severe MVD

## 2025-06-08 NOTE — PLAN OF CARE
Problem: PAIN - ADULT  Goal: Verbalizes/displays adequate comfort level or baseline comfort level  Description: Interventions:  - Encourage patient to monitor pain and request assistance  - Assess pain using appropriate pain scale  - Administer analgesics as ordered based on type and severity of pain and evaluate response  - Implement non-pharmacological measures as appropriate and evaluate response  - Consider cultural and social influences on pain and pain management  - Notify physician/advanced practitioner if interventions unsuccessful or patient reports new pain  - Educate patient/family on pain management process including their role and importance of  reporting pain   - Provide non-pharmacologic/complimentary pain relief interventions  Outcome: Progressing     Problem: INFECTION - ADULT  Goal: Absence or prevention of progression during hospitalization  Description: INTERVENTIONS:  - Assess and monitor for signs and symptoms of infection  - Monitor lab/diagnostic results  - Monitor all insertion sites, i.e. indwelling lines, tubes, and drains  - Monitor endotracheal if appropriate and nasal secretions for changes in amount and color  - [place] appropriate cooling/warming therapies per order  - Administer medications as ordered  - Instruct and encourage patient and family to use good hand hygiene technique  - Identify and instruct in appropriate isolation precautions for identified infection/condition  Outcome: Progressing  Goal: Absence of fever/infection during neutropenic period  Description: INTERVENTIONS:  - Monitor WBC  - Perform strict hand hygiene  - Limit to healthy visitors only  - No plants, dried, fresh or silk flowers with mueller in patient room  Outcome: Progressing     Problem: SAFETY ADULT  Goal: Patient will remain free of falls  Description: INTERVENTIONS:  - Educate patient/family on patient safety including physical limitations  - Instruct patient to call for assistance with activity   -  Consider consulting OT/PT to assist with strengthening/mobility based on AM PAC & JH-HLM score  - Consult OT/PT to assist with strengthening/mobility   - Keep Call bell within reach  - Keep bed low and locked with side rails adjusted as appropriate  - Keep care items and personal belongings within reach  - Initiate and maintain comfort rounds  - Make Fall Risk Sign visible to staff  - Offer Toileting every  Hours, in advance of need  - Initiate/Maintain alarm  - Obtain necessary fall risk management equipment:   - Apply yellow socks and bracelet for high fall risk patients  - Consider moving patient to room near nurses station  Outcome: Progressing  Goal: Maintain or return to baseline ADL function  Description: INTERVENTIONS:  -  Assess patient's ability to carry out ADLs; assess patient's baseline for ADL function and identify physical deficits which impact ability to perform ADLs (bathing, care of mouth/teeth, toileting, grooming, dressing, etc.)  - Assess/evaluate cause of self-care deficits   - Assess range of motion  - Assess patient's mobility; develop plan if impaired  - Assess patient's need for assistive devices and provide as appropriate  - Encourage maximum independence but intervene and supervise when necessary  - Involve family in performance of ADLs  - Assess for home care needs following discharge   - Consider OT consult to assist with ADL evaluation and planning for discharge  - Provide patient education as appropriate  - Monitor functional capacity and physical performance, use of AM PAC & JH-HLM   - Monitor gait, balance and fatigue with ambulation    Outcome: Progressing  Goal: Maintains/Returns to pre admission functional level  Description: INTERVENTIONS:  - Perform AM-PAC 6 Click Basic Mobility/ Daily Activity assessment daily.  - Set and communicate daily mobility goal to care team and patient/family/caregiver.   - Collaborate with rehabilitation services on mobility goals if consulted  -  Perform Range of Motion  times a day.  - Reposition patient every  hours.  - Dangle patient  times a day  - Stand patient  times a day  - Ambulate patient  times a day  - Out of bed to chair  times a day   - Out of bed for meals  times a day  - Out of bed for toileting  - Record patient progress and toleration of activity level   Outcome: Progressing     Problem: DISCHARGE PLANNING  Goal: Discharge to home or other facility with appropriate resources  Description: INTERVENTIONS:  - Identify barriers to discharge w/patient and caregiver  - Arrange for needed discharge resources and transportation as appropriate  - Identify discharge learning needs (meds, wound care, etc.)  - Arrange for interpretive services to assist at discharge as needed  - Refer to Case Management Department for coordinating discharge planning if the patient needs post-hospital services based on physician/advanced practitioner order or complex needs related to functional status, cognitive ability, or social support system  Outcome: Progressing     Problem: Knowledge Deficit  Goal: Patient/family/caregiver demonstrates understanding of disease process, treatment plan, medications, and discharge instructions  Description: Complete learning assessment and assess knowledge base.  Interventions:  - Provide teaching at level of understanding  - Provide teaching via preferred learning methods  Outcome: Progressing     Problem: HEMATOLOGIC - ADULT  Goal: Maintains hematologic stability  Description: INTERVENTIONS  - Assess for signs and symptoms of bleeding or hemorrhage  - Monitor labs  - Administer supportive blood products/factors as ordered and appropriate  Outcome: Progressing     Problem: MUSCULOSKELETAL - ADULT  Goal: Maintain or return mobility to safest level of function  Description: INTERVENTIONS:  - Assess patient's ability to carry out ADLs; assess patient's baseline for ADL function and identify physical deficits which impact ability to  perform ADLs (bathing, care of mouth/teeth, toileting, grooming, dressing, etc.)  - Assess/evaluate cause of self-care deficits   - Assess range of motion  - Assess patient's mobility  - Assess patient's need for assistive devices and provide as appropriate  - Encourage maximum independence but intervene and supervise when necessary  - Involve family in performance of ADLs  - Assess for home care needs following discharge   - Consider OT consult to assist with ADL evaluation and planning for discharge  - Provide patient education as appropriate  Outcome: Progressing  Goal: Maintain proper alignment of affected body part  Description: INTERVENTIONS:  - Support, maintain and protect limb and body alignment  - Provide patient/ family with appropriate education  Outcome: Progressing      Expiration Date (Optional): 9/1/2021

## 2025-06-08 NOTE — PROGRESS NOTES
The pantoprazole has / have been converted to Oral per [facility] IV-to-PO Auto-Conversion Protocol for Adults as approved by the Pharmacy and Therapeutics Committee. The patient met all eligible criteria:  1) Age = 18 years old   2) Received at least one dose of the IV form   3) Receiving at least one other scheduled oral/enteral medication   4) Tolerating an oral/enteral diet   and did not have any exclusions:   1) Critical care patient   2) Active GI bleed (IF assessing H2RAs or PPIs)   3) Continuous tube feeding (IF assessing cipro, doxycycline, levofloxacin, minocycline, rifampin, or voriconazole)   4) Receiving PO vancomycin (IF assessing metronidazole)   5) Persistent nausea and/or vomiting   6) Ileus or gastrointestinal obstruction   7) Raphael/nasogastric tube set for continuous suction   8) Specific order not to automatically convert to PO (in the order's comments or if discussed in the most recent Infectious Disease or primary team's progress notes).

## 2025-06-08 NOTE — DISCHARGE SUMMARY
Discharge Summary - Hospitalist   Name: Gael Ferraro 85 y.o. male I MRN: [id number]  Unit/Bed#: S -01 I Date of Admission: 6/6/2025   Date of Service: 6/8/2025 I Hospital Day: 2     Assessment & Plan  Pure red cell aplasia (HCC)  Patient presented with fatigue and dizziness and was noted to have a hemoglobin of 5 which was down from 10 one month ago.  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 Vuong outpatient  Hematology consult appreciated- recommended increasing prednisone to 20mg daily and add PPI for GI prophylaxis   S/p 3 units of PRBCs- -target hemoglobin of 9-10 given severe MVD  F/u with hematology after discharge  Check cbc in 1 week   Given precipitous drop in such a short time period, GI was consulted- pt refused cscope or EGD, per GI they believe this was secondary to decrease in prednisone, no inpatient GI workup necessary at this time   Restart DAPT at discharge   ABLA (acute blood loss anemia)  Lab Results   Component Value Date    HGB 9.0 (L) 06/08/2025    HGB 8.0 (L) 06/07/2025    HGB 5.1 (LL) 06/06/2025   POA with hgb of 5.1 down from 10.1 a month ago   Etiology likely secondary to acquired pure red cell aplasia  S/p 3 units of PRBCs  hematology and GI consults appreciated   Check CBC in one week   Prednisone increased to 20mg daily   Goal hgb 9-10 given history of severe MVD  Coronary artery disease involving native coronary artery of native heart with angina pectoris (HCC)  Patient with known history of significant MVD  Noted to have a STEMI on 2/15/2025-status post Impella assisted high risk PCI to the ostial left main  PTA on ASA, Brilinta, statin, Ranexa  Given significant MVD history cardiology recommends a hemoglobin of  9-10  DAPT held on admission- resume at discharg e  2/25 echocardiogram with EF of 43% and grade 2 diastolic dysfunction  Continue with GDMT-Per wife PCP recently stopped Aldactone outpatient a few days prior to admission  Hypertension  BP currently stable   C/w metoprolol 50mg BID   CKD (chronic kidney disease) stage 3, GFR 30-59 ml/min ([facility])  Lab Results   Component Value Date    EGFR 66 06/08/2025    EGFR 60 06/07/2025    EGFR 55 06/06/2025    CREATININE 1.01 06/08/2025    CREATININE 1.10 06/07/2025    CREATININE 1.18 06/06/2025   Currently stable and at baseline   Monitor BMP outpatient   Myelofibrosis (HCC)  Diagnosed BMBX 1/24  Grade 2/3  Follows with hematology/oncology outpatient   Mixed hyperlipidemia  Pravastatin 80mg dialy substituted for crestor 10mg daily- resume crestor at discharge   Ischemic cardiomyopathy  See plan under CAD     Medical Problems       Resolved Problems  Date Reviewed: 6/8/2025   None       Discharging Physician / Practitioner: BRITTANY Ba  PCP: Mike Lyman MD  Admission Date:   Admission Orders (From admission, onward)       Ordered        06/06/25 1602  INPATIENT ADMISSION  Once                          Discharge Date: 06/08/25    Next Steps for Physician/AP Assuming Care:  Check cbc in 5-7 days outpatient   F/u with hematology after discharge     Test Results Pending at Discharge (will require follow up):  None     Medication Changes for Discharge & Rationale:   Increased prednisone to 20mg daily   Protonix daily   See after visit summary for reconciled discharge medications provided to patient and/or family.     Consultations During Hospital Stay:  Hematology  GI     Procedures Performed:   3 units of PRBCs transfused     Significant Findings / Test Results:   Acute blood loss anemia in the setting of pure red cell aplasia after recent tapering of prednisone outpatient     Incidental Findings:   None      Hospital Course:   Gael Ferraro is a 85 y.o. male  patient who originally presented to the hospital on 6/6/2025 due to fatigue and dizziness. He was noted to have a hgb of 5 and transfused with 3 units of PRBCs with improvement of hgb to 9.0. patient was seen by GI and hematology while hospitalized.  Per GI did not suspect GI source of bleeding and patient refused C-scope and EGD while inpatient can follow-up outpatient no concern for bleeding.  Per hematology hemoglobin drop is likely secondary to pure red cell aplasia and recent reduction in prednisone to 5 mg every other day.  Prednisone was increased to 20 mg daily he was also started on a PPI for prophylaxis and will be discharged with a CBC to be obtained in 1 week.  He should follow-up with Dr. Vuong outpatient for ongoing monitoring and adjustment of his prednisone.  Per cardiology's notes his hemoglobin goal is 9-10 given his history of severe MVD.          Please see above list of diagnoses and related plan for additional information.     Discharge Day Visit / Exam:   Subjective: Patient is sitting up in bed already dressed reporting that he is ready to be discharged.  He denies any complaints reports overall he is feeling better.  No longer feeling fatigued or dizzy no shortness of breath or chest pain.  Denies any blood loss in stool or hematemesis.  Vitals: Blood Pressure: (!) 121/47 (06/07/25 2342)  Pulse: 70 (06/07/25 2342)  Temperature: 98.4 °F (36.9 °C) (06/07/25 2342)  Temp Source: Oral (06/07/25 1800)  Respirations: 18 (06/07/25 2342)  SpO2: 95 % (06/07/25 2342)  Physical Exam  Constitutional:       General: He is not in acute distress.     Appearance: He is not ill-appearing.     Cardiovascular:      Rate and Rhythm: Normal rate and regular rhythm.      Pulses: Normal pulses.      Heart sounds: Normal heart sounds. No murmur heard.  Pulmonary:      Effort: No respiratory distress.      Breath sounds: Normal breath sounds. No wheezing or rales.   Abdominal:      General: Bowel sounds are normal.  There is no distension.      Palpations: Abdomen is soft.      Tenderness: There is no abdominal tenderness.     Musculoskeletal:         General: No swelling or tenderness.     Skin:     General: Skin is warm and dry.      Findings: No erythema or rash.     Neurological:      General: No focal deficit present.      Mental Status: He is alert. Mental status is at baseline.     Psychiatric:         Attention and Perception: Attention normal.          Discussion with Family: Updated  (wife) at bedside.    Discharge instructions/Information to patient and family:   See after visit summary for information provided to patient and family.      Provisions for Follow-Up Care:  See after visit summary for information related to follow-up care and any pertinent home health orders.      Mobility at time of Discharge:   Basic Mobility Inpatient Raw Score: 23  JH-HLM Goal: 7: Walk 25 feet or more  JH-HLM Achieved: 2: Bed activities/Dependent transfer  HLM Goal achieved. Continue to encourage appropriate mobility.     Disposition:   Home    Planned Readmission: no    Administrative Statements   Discharge Statement:  I have spent a total time of 60 minutes in caring for this patient on the day of the visit/encounter. >30 minutes of time was spent on: Diagnostic results, Risks and benefits of tx options, Instructions for management, Patient and family education, Importance of tx compliance, Risk factor reductions, Impressions, Counseling / Coordination of care, Documenting in the medical record, Reviewing / ordering tests, medicine, procedures  , and Communicating with other healthcare professionals .    **Please Note: This note may have been constructed using a voice recognition system**

## 2025-06-08 NOTE — UTILIZATION REVIEW
Initial Clinical Review    Admission: Date/Time/Statement:   Admission Orders (From admission, onward)       Ordered        06/06/25 1602  INPATIENT ADMISSION  Once                          Orders Placed This Encounter   Procedures    INPATIENT ADMISSION     Standing Status:   Standing     Number of Occurrences:   1     Level of Care:   Med Surg [16]     Estimated length of stay:   More than 2 Midnights     Certification:   I certify that inpatient services are medically necessary for this patient for a duration of greater than two midnights. See H&P and MD Progress Notes for additional information about the patient's course of treatment.     ED Arrival Information       Expected   -    Arrival   6/6/2025 14:01    Acuity   Urgent              Means of arrival   Ambulance    Escorted by   [facility]    Service   Hospitalist    Admission type   Emergency              Arrival complaint   EMS             Chief Complaint   Patient presents with    Abnormal Lab     Hgb 5. 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 Denies noting any change in his bowel habits including hematochezia melena constipation or diarrhea, denies any recent fevers chills nausea vomiting, sick contacts. Reports compliant with dual antiplatelet therapy for history of coronary artery disease.    EXAM  Noted for SCOTT  Labs:  HGB= 5 & down from 10.1 a month ago, he he will be admitted for further evaluation by GI and hematology, transfuse 2 units packed red blood cells, hold DAPT therapy. Inpatient admission due to pure red cell aplasia, anemia. TX until HGB above 8, trend H&H, obtain iron panel, ferritin, iron sat, transferrin, retic count GI consult  Anticipated Length of Stay/Certification Statement: Patient will be admitted on an inpatient basis with an anticipated length of stay of greater than 2 midnights secondary to anemia.    Date: 6/7/2025   Day 2:   No dizziness, SOB, fatigue. S/p 2 units of PRBCs  Pending hematology and GI consults   Trend CBC daily  Goal hgb 9-10 given history of severe MVD    Iron panel: iron 275; ferritin 1055; iron sat 91; transferrin 217; tibc 303.8; retic count 38.8. DAPT currently on hold, cont Metoprolol 50 mg BID, consult ONCO   ONCO  new drop in Hb is most likely secondary to decreasing dose of the prednisone.   Since patient is anxious to go home today, Recommend  discharge  on a slightly higher dose of prednisone - 20mg daily.   Repeat labs later next week and I will let the outpatient oncology provider know to follow up.   Add a PPI upon discharge. From my standpoint,  discharge if Hb is greater than 8  GI  underwent EGD and COY as part of anemia work up two years ago. If e/o GI bleed will consider endoscopic evaluation.   Date: 6/8/2025  Day 3: Has surpassed a 2nd midnight with active treatments and services.  Dressed & ready for DC. Reports feeling improved, wo dizziness, SOB, CP  Pure red cell aplasia/ ABLA  S/p 3 units of PRBCs- -target hemoglobin of 9-10 given severe MVD. pt refused cscope or EGD, per GI they believe this was secondary to decrease in prednisone, no inpatient GI workup necessary at this time   F/u with hematology after discharge  Check cbc in 1 week   Restart DAPT at discharge   ED Treatment-Medication Administration from 06/06/2025 1357 to 06/06/2025 1804       None          Scheduled Medications:  cyanocobalamin, 1,000 mcg, Oral, Daily  escitalopram, 10 mg, Oral, Daily  metoprolol succinate, 50 mg, Oral, BID  pantoprazole, 40 mg, Intravenous, Q12H CRESENCIO  pravastatin, 80 mg, Oral, Daily With Dinner  predniSONE, 20 mg, Oral, Daily  ranolazine, 1,000 mg, Oral, Q12H CRESENCIO      Continuous IV Infusions:     PRN Meds:  LORazepam, 1 mg, Oral, TID PRN      ED Triage Vitals   Temperature Pulse Respirations Blood Pressure SpO2 Pain Score   06/06/25 1404 06/06/25 1421 06/06/25 1421  06/06/25 1421 06/06/25 1421 06/06/25 1829   98.2 °F (36.8 °C) 65 18 124/82 98 % No Pain     Weight (last 2 days)       None            Vital Signs (last 3 days)       Date/Time Temp Pulse Resp BP MAP (mmHg) SpO2 O2 Device Pain    06/07/25 23:42:20 98.4 °F (36.9 °C) 70 18 121/47 72 95 % -- --    06/07/25 2258 -- -- -- -- -- 96 % None (Room air) --    06/07/25 2257 -- -- -- -- -- 98 % -- No Pain    06/07/25 2209 -- 72 -- 127/45 -- -- -- --    06/07/25 1800 98.3 °F (36.8 °C) 72 16 127/45 -- 96 % -- --    06/07/25 16:06:14 98.3 °F (36.8 °C) 69 16 117/55 76 96 % -- --    06/07/25 1500 98.3 °F (36.8 °C) 69 16 117/55 -- 96 % -- --    06/07/25 14:37:17 98.1 °F (36.7 °C) 70 20 128/54 79 97 % -- --    06/07/25 1437 98.1 °F (36.7 °C) 67 20 128/54 -- -- -- --    06/07/25 1200 97.6 °F (36.4 °C) 65 20 116/50 -- 97 % -- --    06/07/25 11:45:32 97.6 °F (36.4 °C) 65 20 116/50 72 97 % -- --    06/07/25 1121 -- -- -- -- -- 96 % None (Room air) --    06/07/25 11:14:49 98.1 °F (36.7 °C) 69 20 131/55 80 99 % -- --    06/07/25 0935 -- -- -- -- -- 97 % None (Room air) No Pain    06/07/25 07:13:29 98 °F (36.7 °C) 67 16 123/49 74 98 % -- --    06/06/25 2231 -- -- 16 129/99 -- -- -- --    06/06/25 22:30:50 98 °F (36.7 °C) 75 -- 129/99 109 98 % -- --    06/06/25 22:30:33 98 °F (36.7 °C) 75 -- 129/99 109 98 % None (Room air) --    06/06/25 2140 -- -- -- -- -- -- -- No Pain    06/06/25 2100 98 °F (36.7 °C) 75 16 129/99 -- 98 % -- --    06/06/25 19:00:58 97.4 °F (36.3 °C) 70 16 148/60 89 98 % -- --    06/06/25 18:45:11 -- 67 -- 123/99 107 96 % -- --    06/06/25 1829 -- -- -- -- -- -- None (Room air) No Pain    06/06/25 18:14:28 98.1 °F (36.7 °C) 68 17 112/63 79 97 % -- --    06/06/25 1809 97.4 °F (36.3 °C) 70 16 140/60 -- 98 % -- --    06/06/25 1740 98 °F (36.7 °C) 66 -- 158/65 -- 99 % None (Room air) --    06/06/25 1730 -- 66 16 153/64 92 97 % None (Room air) --    06/06/25 1640 -- 67 -- 135/57 -- 96 % None (Room air) --    06/06/25 1610 -- 64  -- 141/61 -- 98 % None (Room air) --    06/06/25 1555 -- 64 -- 139/63 -- 98 % None (Room air) --    06/06/25 1545 97.7 °F (36.5 °C) 65 18 130/57 -- 99 % None (Room air) --    06/06/25 1542 -- -- -- -- -- -- None (Room air) --    06/06/25 1536 -- 71 -- 136/64 -- -- -- --    06/06/25 1524 98 °F (36.7 °C) 64 16 127/60 -- 99 % None (Room air) --    06/06/25 1421 -- 65 18 124/82 99 98 % -- --    06/06/25 1404 98.2 °F (36.8 °C) -- -- -- -- -- -- --              Pertinent Labs/Diagnostic Test Results:   Radiology:  XR chest 2 views   Final Interpretation by Bryson Espitia MD (06/07 1401)      No acute cardiopulmonary disease.            Resident: Danie Ayala I, the attending radiologist, have reviewed the images and agree with the final report above.      Workstation performed: [id number]           Cardiology:  No orders to display     GI:  No orders to display           Results from last 7 days   Lab Units 06/08/25 0516 06/07/25 0557 06/06/25  1404 06/06/25  1156   WBC Thousand/uL 4.05* 4.00* 4.45 4.00*   HEMOGLOBIN g/dL 9.0* 8.0* 5.1* 5.7*   HEMATOCRIT % 25.7* 22.7* 14.9* 16.0*   PLATELETS Thousands/uL 248 270 299 308   TOTAL NEUT ABS Thousands/µL  --  1.58* 1.63* 1.60*     Results from last 7 days   Lab Units 06/06/25  1404   RETIC CT ABS  15,300   RETIC CT PCT % 1.16     Results from last 7 days   Lab Units 06/08/25  0516 06/07/25  0557 06/06/25  1404 06/06/25  1156   SODIUM mmol/L 135 139 134* 136   POTASSIUM mmol/L 4.2 4.4 4.6 4.8   CHLORIDE mmol/L 107 108 103 104   CO2 mmol/L 25 26 24 22   ANION GAP mmol/L 3* 5 7 10   BUN mg/dL 27* 24 26* 25   CREATININE mg/dL 1.01 1.10 1.18 1.08   EGFR ml/min/1.73sq m 66 60 55 61   CALCIUM mg/dL 8.8 9.1 9.1 8.9     Results from last 7 days   Lab Units 06/07/25  0557 06/06/25  1404   AST U/L 15 15   ALT U/L 20 23   ALK PHOS U/L 71 69   TOTAL PROTEIN g/dL 5.9* 6.3*   ALBUMIN g/dL 3.9 4.1   TOTAL BILIRUBIN mg/dL 0.83 0.69         Results from last 7 days   Lab  "Units 06/08/25  0516 06/07/25  0557 06/06/25  1404   GLUCOSE RANDOM mg/dL 124 97 94             No results found for: \"BETA-HYDROXYBUTYRATE\"         Results from last 7 days   Lab Units 06/06/25  1404   FERRITIN ng/mL 1,055*   IRON SATURATION % 91*   IRON ug/dL 275*   TIBC ug/dL 303.8     Results from last 7 days   Lab Units 06/06/25  1404   TRANSFERRIN mg/dL 217     Results from last 7 days   Lab Units 06/08/25  0547 06/07/25  1002 06/07/25  0547   UNIT PRODUCT CODE  B4444U65 [id number]  L5149Z06   UNIT NUMBER  [id number]  [id number]   UNITABO  A A A  A   UNITRH  NEG NEG NEG  NEG   CROSSMATCH  Compatible Compatible Compatible  Compatible   UNIT DISPENSE STATUS  Presumed Trans Return to Inv Presumed Trans  Presumed Trans   UNIT PRODUCT VOL ml 350 350 350  350             Past Medical History[1]  Present on Admission:   Pure red cell aplasia (HCC)   Coronary artery disease involving native coronary artery of native heart with angina pectoris (HCC)   CKD (chronic kidney disease) stage 3, GFR 30-59 ml/min (HCC)   Myelofibrosis (HCC)   Mixed hyperlipidemia   Hypertension   ABLA (acute blood loss anemia)      Admitting Diagnosis: Pure red cell aplasia (HCC) [D61.01]  Low hemoglobin [D64.9]  Symptomatic anemia [D64.9]  Age/Sex: 85 y.o. male    Network Utilization Review Department  ATTENTION: Please call with any questions or concerns to [phone] and carefully listen to the prompts so that you are directed to the right person. All voicemails are confidential.   For Discharge needs, contact Care Management DC Support Team at [phone] opt. 2  Send all requests for admission clinical reviews, approved or denied determinations and any other requests to dedicated fax number below belonging to the campus where the patient is receiving treatment. List of dedicated fax numbers for the Facilities:  FACILITY NAME UR FAX NUMBER   ADMISSION DENIALS " (Administrative/Medical Necessity) [phone]   DISCHARGE SUPPORT TEAM (NETWORK) [phone]   PARENT CHILD HEALTH (Maternity/NICU/Pediatrics) [phone]   [facility] [phone]   [facility] [phone]   [facility] [phone]   [facility] [phone]   [facility] [phone]   [facility] [phone]   [facility] [phone]   [facility] [phone]   [facility] [phone]   [facility] [phone]   [facility] [phone]   [facility] [phone]              [1]   Past Medical History:  Diagnosis Date    Low hemoglobin

## 2025-06-08 NOTE — ASSESSMENT & PLAN NOTE
Lab Results   Component Value Date    EGFR 66 06/08/2025    EGFR 60 06/07/2025    EGFR 55 06/06/2025    CREATININE 1.01 06/08/2025    CREATININE 1.10 06/07/2025    CREATININE 1.18 06/06/2025   Currently stable and at baseline   Monitor BMP   Avoid nephrotoxic drugs and hypotension

## 2025-06-08 NOTE — ASSESSMENT & PLAN NOTE
Lab Results   Component Value Date    EGFR 66 06/08/2025    EGFR 60 06/07/2025    EGFR 55 06/06/2025    CREATININE 1.01 06/08/2025    CREATININE 1.10 06/07/2025    CREATININE 1.18 06/06/2025   Currently stable and at baseline   Monitor BMP outpatient

## 2025-06-08 NOTE — DISCHARGE INSTR - AVS FIRST PAGE
Dear Gael Ferraro,     It was our pleasure to care for you here at [facility].  It is our hope that we were always able to meet and exceed the expected standards for your care during your stay.  You were hospitalized due to symptomatic anemia.  You were cared for on the 4th floor under the service of BRITTANY Ba with the [facility] who covers for your primary care physician (PCP), Mike Lyman MD, while you were hospitalized.  If you have any questions or concerns related to this hospitalization, you may contact us at .  For follow up, we recommend that you follow up with your primary care physician.  Please review this entire discharge summary as additional general instructions may be provided later as well.  However, at this time we provide for you here, the most important instructions / recommendations at discharge:     Your prednisone will be increased to 20mg daily and you will need to take protonix daily with the prednisone. I would recommend taking the protonix 30 min prior to eating or taking other pills on an empty stomach.   Check CBC within 5-7 days of discharge  Followup with PCP and Dr. Vuong after discharge       Sincerely,     BRITTANY Ba

## 2025-06-08 NOTE — ASSESSMENT & PLAN NOTE
Lab Results   Component Value Date    HGB 9.0 (L) 06/08/2025    HGB 8.0 (L) 06/07/2025    HGB 5.1 (LL) 06/06/2025   POA with hgb of 5.1 down from 10.1 a month ago   Etiology likely secondary to acquired pure red cell aplasia  S/p 2 units of PRBCs  Pending hematology and GI consults   Trend CBC daily  Goal hgb 9-10 given history of severe MVD

## 2025-06-09 ENCOUNTER — TELEPHONE (OUTPATIENT)
Age: 85
End: 2025-06-09

## 2025-06-09 ENCOUNTER — TELEPHONE (OUTPATIENT)
Dept: HEMATOLOGY ONCOLOGY | Facility: CLINIC | Age: 85
End: 2025-06-09

## 2025-06-09 DIAGNOSIS — D64.9 ANEMIA, UNSPECIFIED TYPE: Primary | ICD-10-CM

## 2025-06-09 NOTE — TELEPHONE ENCOUNTER
Spoke with patient to remind him to have weekly CBCD check.  Standing order for CBCD and blood bank hold tube ordered  Patients wife has questions about patient resuming Aranesp injections  He was recently discharged from the hospital- hgb dropped to 5  I scheduled the patient for hospital follow up to see Dr. Vuong on 6/12/25    Patient verbalized understanding

## 2025-06-09 NOTE — TELEPHONE ENCOUNTER
Gael and wife Christi calling in stating Gael was just d/c from the hospital yesterday with hgb of 9 and are concerned because he is still feeling fatigue/weak. Please call him to discuss symptoms [phone].     Thank you!    Attempt made to CTS with no available agent at this time.

## 2025-06-09 NOTE — UTILIZATION REVIEW
NOTIFICATION OF ADMISSION DISCHARGE   This is a Notification of Discharge from [facility]. Please be advised that this patient has been discharge from our facility. Below you will find the admission and discharge date and time including the patient’s disposition.   UTILIZATION REVIEW CONTACT:  Utilization Review Assistants  Network Utilization Review Department  Phone: [phone] x carefully listen to the prompts. All voicemails are confidential.  Email: [email or web address]     ADMISSION INFORMATION  PRESENTATION DATE: 6/6/2025  2:02 PM  OBERVATION ADMISSION DATE: N/A  INPATIENT ADMISSION DATE: 6/6/25  4:02 PM   DISCHARGE DATE: 6/8/2025 11:22 AM   DISPOSITION:Home/Self Care    Network Utilization Review Department  ATTENTION: Please call with any questions or concerns to [phone] and carefully listen to the prompts so that you are directed to the right person. All voicemails are confidential.   For Discharge needs, contact Care Management DC Support Team at [phone] opt. 2  Send all requests for admission clinical reviews, approved or denied determinations and any other requests to dedicated fax number below belonging to the campus where the patient is receiving treatment. List of dedicated fax numbers for the Facilities:  FACILITY NAME UR FAX NUMBER   ADMISSION DENIALS (Administrative/Medical Necessity) [phone]   DISCHARGE SUPPORT TEAM ([facility]) [phone]   PARENT CHILD HEALTH (Maternity/NICU/Pediatrics) [phone]   [facility] [phone]   [facility] [phone]   [facility] [phone]   [facility] [phone]   [facility] [phone]   [facility] [phone]   [facility] [phone]   [facility] [phone]   [facility]  [facility] [phone]   [facility] [phone]   [facility] [phone]   [facility] [phone]

## 2025-06-10 ENCOUNTER — APPOINTMENT (OUTPATIENT)
Dept: CARDIAC REHAB | Facility: CLINIC | Age: 85
End: 2025-06-10
Payer: COMMERCIAL

## 2025-06-12 ENCOUNTER — APPOINTMENT (OUTPATIENT)
Dept: LAB | Facility: CLINIC | Age: 85
End: 2025-06-12
Attending: NURSE PRACTITIONER
Payer: COMMERCIAL

## 2025-06-12 ENCOUNTER — APPOINTMENT (OUTPATIENT)
Dept: CARDIAC REHAB | Facility: CLINIC | Age: 85
End: 2025-06-12
Payer: COMMERCIAL

## 2025-06-12 ENCOUNTER — OFFICE VISIT (OUTPATIENT)
Dept: HEMATOLOGY ONCOLOGY | Facility: CLINIC | Age: 85
End: 2025-06-12
Payer: COMMERCIAL

## 2025-06-12 VITALS
RESPIRATION RATE: 18 BRPM | HEIGHT: 70 IN | DIASTOLIC BLOOD PRESSURE: 60 MMHG | OXYGEN SATURATION: 99 % | SYSTOLIC BLOOD PRESSURE: 142 MMHG | HEART RATE: 78 BPM | WEIGHT: 143 LBS | TEMPERATURE: 97.3 F | BODY MASS INDEX: 20.47 KG/M2

## 2025-06-12 DIAGNOSIS — D61.01 PURE RED CELL APLASIA (HCC): Primary | ICD-10-CM

## 2025-06-12 DIAGNOSIS — D61.01 PURE RED CELL APLASIA (HCC): ICD-10-CM

## 2025-06-12 LAB
ERYTHROCYTE [DISTWIDTH] IN BLOOD BY AUTOMATED COUNT: 16.8 % (ref 11.6–15.1)
HCT VFR BLD AUTO: 29.6 % (ref 36.5–49.3)
HGB BLD-MCNC: 10.2 G/DL (ref 12–17)
MCH RBC QN AUTO: 34.9 PG (ref 26.8–34.3)
MCHC RBC AUTO-ENTMCNC: 34.5 G/DL (ref 31.4–37.4)
MCV RBC AUTO: 101 FL (ref 82–98)
PLATELET # BLD AUTO: 379 THOUSANDS/UL (ref 149–390)
PMV BLD AUTO: 10.4 FL (ref 8.9–12.7)
RBC # BLD AUTO: 2.92 MILLION/UL (ref 3.88–5.62)
WBC # BLD AUTO: 4.12 THOUSAND/UL (ref 4.31–10.16)

## 2025-06-12 PROCEDURE — 85027 COMPLETE CBC AUTOMATED: CPT

## 2025-06-12 PROCEDURE — 99214 OFFICE O/P EST MOD 30 MIN: CPT | Performed by: INTERNAL MEDICINE

## 2025-06-12 PROCEDURE — 36415 COLL VENOUS BLD VENIPUNCTURE: CPT

## 2025-06-12 NOTE — ASSESSMENT & PLAN NOTE
diagnosed via BMBX 1/24/24 (resulted 2/5/24) . 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         On BMBX 1/2024:  The overall findings are consistent with pure red cell aplasia (PRCA) with background clonal T-cells, moderately increased fibrosis (MF-2 of 3), and negative for a myeloid neoplasm.      Workup revealed Parvovirus B19 antibody, IgG Antibody POSITIVE. IgM normal.   TCR gamma gene rearrangement detected.   Negative rheumatologic workup. Normal hepatitis testing, normal flow cytometry.         Initiated on prednisone 20 mg QD with significant response of hemoglobin 11-12, and Aranesp 100mcg  4/22/24 prednisone reduced to 5 mg every other day from 5 mg prednisone every day  5/29/24 hgb 9.7, , 32% segs, 56% lymphocytes   5/31/24 Aranesp 100mcg q 2 weeks resumed         6/5/24 hgb decreased further to 8.4  Prednisone increased to 20mg/ day, prednisone tapered very slowly.    6/18/24 Aranesp 200 mcg every 2 weeks,  Last dose was July 2, 2024    Admitted to the hospital in June 2025 with hemoglobin of 5, he was taking prednisone 5 mg every other day    In the hospital he was given 3 units of packed RBC currently on prednisone 20 mg daily, reduce very slowly by 5 mg every 2 weeks depends on the hemoglobin most likely he will stay on the prednisone 5 mg daily indefinitely    Will follow-up between 4 to 6 weeks

## 2025-06-12 NOTE — PROGRESS NOTES
Name: Gael Ferraro      : 1940      MRN: [id number]  Encounter Provider: Margi Vuong MD  Encounter Date: 2025   Encounter department: 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      Workup revealed Parvovirus B19 antibody, IgG Antibody POSITIVE. IgM normal.   TCR gamma gene rearrangement detected.   Negative rheumatologic workup. Normal hepatitis testing, normal flow cytometry.         Initiated on prednisone 20 mg QD with significant response of hemoglobin 11-12, and Aranesp 100mcg  24 prednisone reduced to 5 mg every other day from 5 mg prednisone every day  24 hgb 9.7, , 32% segs, 56% lymphocytes   24 Aranesp 100mcg q 2 weeks resumed         24 hgb decreased further to 8.4  Prednisone increased to 20mg/ day, prednisone tapered very slowly.    24 Aranesp 200 mcg every 2 weeks,  Last dose was 2024    Admitted to the hospital in 2025 with hemoglobin of 5, he was taking prednisone 5 mg every other day    In the hospital he was given 3 units of packed RBC currently on prednisone 20 mg daily, reduce very slowly by 5 mg every 2 weeks depends on the hemoglobin most likely he will stay on the prednisone 5 mg daily indefinitely    Will follow-up between 4 to 6 weeks         Assessment & Plan        Return in about 6 weeks (around 2025).    History of Present Illness   Chief Complaint   Patient presents with    Follow-up     History of Present Illness  25: Gael Moore' is an 85 y/o gentleman 1st seen inpatient 24 regarding refractory anemia.       PMH: CAD, HTN, CKD,  dysphagia. Never smoker. No family history of liver disease.       November 18, 2023 hemoglobin 10.2, , white blood cell count 3.7, 48% neutrophils, 39% lymphocytes, 11% monocytes, platelet count 82, iron saturation 58%. Normal total bilirubin, total protein, albumin.  BUN 24, creatinine 1.24, ESR 12. SPEP: no monoclonal protein.  Alpha 2 region is decreased and may be suggestive of a decreased haptoglobin. folate WNL and FOBT in ED (12/2023) negative.      B12 was 292-  PCP Dr. Lyman started him on oral B12.  folate WNL.      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 Troponin elevated. Cardiac cath shows CAD but no stent indicated.      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        Ref 02/26/24    PARVOVIRUS B19 IGG Ab 0.0 - 0.8 index 1.3 (H)   PARVOVIRUS B19 IGM Ab 0.0 - 0.8 index 0.2       Vitamin B2 -  normal    Parvovirus B19 antibody, IgG and IgM - B19 IgG Antibody POSITIVE    IgG, IgA, IgM -normal    Leukemia/Lymphoma flow cytometry-flow cytometric analysis does not show significant numbers of circulating blasts or abnormal lymphoid or myeloid population.    Immunoglobulin free LT chains blood- normal    TAY w/Reflex if Positive - negative    Sjogren's Antibodies -normal    Anti-Neelima 1 Antibody -normal    Anti-scleroderma antibody -normal    DNA (DS) ANTIBODY -normal    C-reactive protein - -normal    Cyclic citrul peptide antibody, IgG -normal    Sedimentation rate, automated -normal    Lupus anticoagulant- not detected    Cardiolipin antibody- -normal    Beta-2 glycoprotein antibodies - normal    Rheumatoid Factor -normal    Chronic Hepatitis Panel - non reactive    Reticulocytes - 3.11%    Anti-neutrophilic cytoplasmic antibody- -normal    T Cell  Receptor (TCR) Beta Gene Rearrang, PCR- negative    T Cell Receptor (Tcr) Gamma Gene Rearrang,PCR- clonal T-cell receptor gamma population was detected         Initiated on prednisone 20 mg QD with significant response of hemoglobin 11-12, and Aranesp 100mcg  4/22/24 prednisone reduced to 5 mg every other day from 5 mg prednisone every day  5/29/24 hgb 9.7, , 32% segs, 56% lymphocytes   5/31/24 Aranesp 100mcg q 2 weeks resumed         6/5/24 hgb decreased further to 8.4  Prednisone increased to 20mg/ day, prednisone tapered very slowly.    6/18/24 Aranesp 200 mcg every 2 weeks,  Last dose was July 2, 2024      since 9/9/24- 2/2025, hgb has been 12.6 or higher  Pertinent Medical History   Intermittent chest pain  Review of Systems   Constitutional:  Negative for chills and fever.   HENT:  Negative for ear pain and sore throat.    Eyes:  Negative for pain and visual disturbance.   Respiratory:  Negative for cough and shortness of breath.    Cardiovascular:  Negative for chest pain and palpitations.   Gastrointestinal:  Negative for abdominal pain and vomiting.   Genitourinary:  Negative for dysuria and hematuria.   Musculoskeletal:  Negative for arthralgias and back pain.   Skin:  Negative for color change and rash.   Neurological:  Negative for seizures and syncope.   Hematological:  Negative for adenopathy.   All other systems reviewed and are negative.                 Review of Systems   Constitutional:  Negative for chills and fever.   HENT:  Negative for ear pain and sore throat.    Eyes:  Negative for pain and visual disturbance.   Respiratory:  Negative for cough and shortness of breath.    Cardiovascular:  Negative for chest pain and palpitations.   Gastrointestinal:  Negative for abdominal pain and vomiting.   Genitourinary:  Negative for dysuria and hematuria.   Musculoskeletal:  Negative for arthralgias and back pain.   Skin:  Negative for color change and rash.   Neurological:  Negative for seizures  "and syncope.   All other systems reviewed and are negative.          Objective   /60 (Patient Position: Sitting, Cuff Size: Large)   Pulse 78   Temp (!) 97.3 °F (36.3 °C) (Temporal)   Resp 18   Ht 5' 10\" (1.778 m)   Wt 64.9 kg (143 lb)   SpO2 99%   BMI 20.52 kg/m²     Physical Exam  Vitals reviewed.   Constitutional:       General: He is not in acute distress.     Appearance: He is well-developed. He is not diaphoretic.   HENT:      Head: Normocephalic and atraumatic.     Eyes:      Conjunctiva/sclera: Conjunctivae normal.     Neck:      Trachea: No tracheal deviation.     Cardiovascular:      Rate and Rhythm: Normal rate and regular rhythm.      Heart sounds: No murmur heard.     No friction rub. No gallop.   Pulmonary:      Effort: Pulmonary effort is normal. No respiratory distress.      Breath sounds: No decreased air movement. No decreased breath sounds, wheezing or rales.   Chest:      Chest wall: No tenderness.   Abdominal:      General: There is no distension.      Palpations: Abdomen is soft. There is no hepatomegaly or splenomegaly.      Tenderness: There is no abdominal tenderness.     Musculoskeletal:      Cervical back: Normal range of motion and neck supple.      Right lower leg: No edema.      Left lower leg: No edema.   Lymphadenopathy:      Head:      Right side of head: No submental or submandibular adenopathy.      Left side of head: No submental or submandibular adenopathy.      Cervical: No cervical adenopathy.      Upper Body:      Right upper body: No supraclavicular or axillary adenopathy.      Left upper body: No supraclavicular or axillary adenopathy.      Lower Body: No right inguinal adenopathy. No left inguinal adenopathy.     Skin:     General: Skin is warm and dry.      Coloration: Skin is not pale.      Findings: No erythema or rash.     Neurological:      General: No focal deficit present.      Mental Status: He is alert and oriented to person, place, and time. "     Psychiatric:         Behavior: Behavior normal.         Thought Content: Thought content normal.         Judgment: Judgment normal.       Physical Exam      Results    Labs: I have reviewed the following labs:  Results for orders placed or performed during the hospital encounter of 06/06/25   CBC and differential   Result Value Ref Range    WBC 4.45 4.31 - 10.16 Thousand/uL    RBC 1.29 (L) 3.88 - 5.62 Million/uL    Hemoglobin 5.1 (LL) 12.0 - 17.0 g/dL    Hematocrit 14.9 (L) 36.5 - 49.3 %     (H) 82 - 98 fL    MCH 39.5 (H) 26.8 - 34.3 pg    MCHC 34.2 31.4 - 37.4 g/dL    RDW 15.1 11.6 - 15.1 %    MPV 10.9 8.9 - 12.7 fL    Platelets 299 149 - 390 Thousands/uL    nRBC 0 /100 WBCs    Segmented % 37 (L) 43 - 75 %    Immature Grans % 0 0 - 2 %    Lymphocytes % 47 (H) 14 - 44 %    Monocytes % 14 (H) 4 - 12 %    Eosinophils Relative 2 0 - 6 %    Basophils Relative 0 0 - 1 %    Absolute Neutrophils 1.63 (L) 1.85 - 7.62 Thousands/µL    Absolute Immature Grans 0.01 0.00 - 0.20 Thousand/uL    Absolute Lymphocytes 2.09 0.60 - 4.47 Thousands/µL    Absolute Monocytes 0.61 0.17 - 1.22 Thousand/µL    Eosinophils Absolute 0.10 0.00 - 0.61 Thousand/µL    Basophils Absolute 0.01 0.00 - 0.10 Thousands/µL   Comprehensive metabolic panel   Result Value Ref Range    Sodium 134 (L) 135 - 147 mmol/L    Potassium 4.6 3.5 - 5.3 mmol/L    Chloride 103 96 - 108 mmol/L    CO2 24 21 - 32 mmol/L    ANION GAP 7 4 - 13 mmol/L    BUN 26 (H) 5 - 25 mg/dL    Creatinine 1.18 0.60 - 1.30 mg/dL    Glucose 94 65 - 140 mg/dL    Calcium 9.1 8.4 - 10.2 mg/dL    AST 15 13 - 39 U/L    ALT 23 7 - 52 U/L    Alkaline Phosphatase 69 34 - 104 U/L    Total Protein 6.3 (L) 6.4 - 8.4 g/dL    Albumin 4.1 3.5 - 5.0 g/dL    Total Bilirubin 0.69 0.20 - 1.00 mg/dL    eGFR 55 ml/min/1.73sq m   Retic Count with Reticulocyte HGB   Result Value Ref Range    Immature Retic Fract 21.7 (H) 0.0 - 14.0 %    Retic Ct Pct 1.16 0.37 - 1.87 %    Retic Ct Abs 15,300 14,356 -  105,094    RETIC HGB 38.8 (H) 30.0 - 38.3 pg   Lactate dehydrogenase   Result Value Ref Range     140 - 271 U/L   TIBC Panel (incl. Iron, TIBC, % Iron Saturation)   Result Value Ref Range    Iron Saturation 91 (H) 15 - 50 %    TIBC 303.8 250 - 450 ug/dL    Iron 275 (H) 50 - 212 ug/dL    Transferrin 217 203 - 362 mg/dL    UIBC 29 (L) 155 - 355 ug/dL   Result Value Ref Range    Ferritin 1,055 (H) 30 - 336 ng/mL   CBC and differential   Result Value Ref Range    WBC 4.00 (L) 4.31 - 10.16 Thousand/uL    RBC 2.18 (L) 3.88 - 5.62 Million/uL    Hemoglobin 8.0 (L) 12.0 - 17.0 g/dL    Hematocrit 22.7 (L) 36.5 - 49.3 %     (H) 82 - 98 fL    MCH 36.7 (H) 26.8 - 34.3 pg    MCHC 35.2 31.4 - 37.4 g/dL    RDW 20.4 (H) 11.6 - 15.1 %    MPV 10.8 8.9 - 12.7 fL    Platelets 270 149 - 390 Thousands/uL    nRBC 0 /100 WBCs    Segmented % 39 (L) 43 - 75 %    Immature Grans % 0 0 - 2 %    Lymphocytes % 44 14 - 44 %    Monocytes % 13 (H) 4 - 12 %    Eosinophils Relative 4 0 - 6 %    Basophils Relative 0 0 - 1 %    Absolute Neutrophils 1.58 (L) 1.85 - 7.62 Thousands/µL    Absolute Immature Grans 0.01 0.00 - 0.20 Thousand/uL    Absolute Lymphocytes 1.74 0.60 - 4.47 Thousands/µL    Absolute Monocytes 0.52 0.17 - 1.22 Thousand/µL    Eosinophils Absolute 0.14 0.00 - 0.61 Thousand/µL    Basophils Absolute 0.01 0.00 - 0.10 Thousands/µL   Comprehensive metabolic panel   Result Value Ref Range    Sodium 139 135 - 147 mmol/L    Potassium 4.4 3.5 - 5.3 mmol/L    Chloride 108 96 - 108 mmol/L    CO2 26 21 - 32 mmol/L    ANION GAP 5 4 - 13 mmol/L    BUN 24 5 - 25 mg/dL    Creatinine 1.10 0.60 - 1.30 mg/dL    Glucose 97 65 - 140 mg/dL    Calcium 9.1 8.4 - 10.2 mg/dL    AST 15 13 - 39 U/L    ALT 20 7 - 52 U/L    Alkaline Phosphatase 71 34 - 104 U/L    Total Protein 5.9 (L) 6.4 - 8.4 g/dL    Albumin 3.9 3.5 - 5.0 g/dL    Total Bilirubin 0.83 0.20 - 1.00 mg/dL    eGFR 60 ml/min/1.73sq m   CBC   Result Value Ref Range    WBC 4.05 (L) 4.31 - 10.16  Thousand/uL    RBC 2.62 (L) 3.88 - 5.62 Million/uL    Hemoglobin 9.0 (L) 12.0 - 17.0 g/dL    Hematocrit 25.7 (L) 36.5 - 49.3 %    MCV 98 82 - 98 fL    MCH 34.4 (H) 26.8 - 34.3 pg    MCHC 35.0 31.4 - 37.4 g/dL    RDW 19.9 (H) 11.6 - 15.1 %    Platelets 248 149 - 390 Thousands/uL    MPV 10.8 8.9 - 12.7 fL   Basic metabolic panel   Result Value Ref Range    Sodium 135 135 - 147 mmol/L    Potassium 4.2 3.5 - 5.3 mmol/L    Chloride 107 96 - 108 mmol/L    CO2 25 21 - 32 mmol/L    ANION GAP 3 (L) 4 - 13 mmol/L    BUN 27 (H) 5 - 25 mg/dL    Creatinine 1.01 0.60 - 1.30 mg/dL    Glucose 124 65 - 140 mg/dL    Calcium 8.8 8.4 - 10.2 mg/dL    eGFR 66 ml/min/1.73sq m   Type and screen   Result Value Ref Range    ABO Grouping A     Rh Factor Negative     Antibody Screen Negative     Specimen Expiration Date [id number]    Prepare Leukoreduced RBC: 2 Units, Leukoreduced   Result Value Ref Range    Unit Product Code A1594R93     Unit Number [id number]     Unit ABO A     Unit RH NEG     Crossmatch Compatible     Unit Dispense Status Presumed Trans     Unit Product Volume 350 ml    Unit Product Code F9680B95     Unit Number [id number]     Unit ABO A     Unit RH NEG     Crossmatch Compatible     Unit Dispense Status Presumed Trans     Unit Product Volume 350 ml   Prepare Leukoreduced RBC: 1 Units, Leukoreduced   Result Value Ref Range    Unit Product Code S4337T79     Unit Number [id number]     Unit ABO A     Unit RH NEG     Crossmatch Compatible     Unit Dispense Status Return to Inv     Unit Product Volume 350 ml   Prepare Leukoreduced RBC: 1 Units   Result Value Ref Range    Unit Product Code C1824Q23     Unit Number [id number]     Unit ABO A     Unit RH NEG     Crossmatch Compatible     Unit Dispense Status Presumed Trans     Unit Product Volume 350 ml

## 2025-06-13 ENCOUNTER — APPOINTMENT (OUTPATIENT)
Dept: CARDIAC REHAB | Facility: CLINIC | Age: 85
End: 2025-06-13
Payer: COMMERCIAL

## 2025-06-16 DIAGNOSIS — I25.119 CORONARY ARTERY DISEASE INVOLVING NATIVE CORONARY ARTERY OF NATIVE HEART WITH ANGINA PECTORIS (HCC): Chronic | ICD-10-CM

## 2025-06-17 ENCOUNTER — APPOINTMENT (OUTPATIENT)
Dept: CARDIAC REHAB | Facility: CLINIC | Age: 85
End: 2025-06-17
Payer: COMMERCIAL

## 2025-06-17 RX ORDER — RANOLAZINE 1000 MG/1
1 TABLET, EXTENDED RELEASE ORAL 2 TIMES DAILY
Qty: 180 TABLET | Refills: 1 | Status: SHIPPED | OUTPATIENT
Start: 2025-06-17

## 2025-06-19 ENCOUNTER — APPOINTMENT (OUTPATIENT)
Dept: CARDIAC REHAB | Facility: CLINIC | Age: 85
End: 2025-06-19
Payer: COMMERCIAL

## 2025-06-19 ENCOUNTER — APPOINTMENT (OUTPATIENT)
Dept: LAB | Facility: CLINIC | Age: 85
End: 2025-06-19
Attending: INTERNAL MEDICINE
Payer: COMMERCIAL

## 2025-06-19 ENCOUNTER — TELEPHONE (OUTPATIENT)
Age: 85
End: 2025-06-19

## 2025-06-19 DIAGNOSIS — D64.9 ANEMIA, UNSPECIFIED TYPE: ICD-10-CM

## 2025-06-19 DIAGNOSIS — D62 ABLA (ACUTE BLOOD LOSS ANEMIA): ICD-10-CM

## 2025-06-19 DIAGNOSIS — D61.01 PURE RED CELL APLASIA (HCC): Primary | ICD-10-CM

## 2025-06-19 LAB
BASOPHILS # BLD AUTO: 0.01 THOUSANDS/ÂΜL (ref 0–0.1)
BASOPHILS NFR BLD AUTO: 0 % (ref 0–1)
EOSINOPHIL # BLD AUTO: 0.08 THOUSAND/ÂΜL (ref 0–0.61)
EOSINOPHIL NFR BLD AUTO: 2 % (ref 0–6)
ERYTHROCYTE [DISTWIDTH] IN BLOOD BY AUTOMATED COUNT: 17.2 % (ref 11.6–15.1)
HCT VFR BLD AUTO: 28.9 % (ref 36.5–49.3)
HGB BLD-MCNC: 9.5 G/DL (ref 12–17)
IMM GRANULOCYTES # BLD AUTO: 0.03 THOUSAND/UL (ref 0–0.2)
IMM GRANULOCYTES NFR BLD AUTO: 1 % (ref 0–2)
LYMPHOCYTES # BLD AUTO: 1.82 THOUSANDS/ÂΜL (ref 0.6–4.47)
LYMPHOCYTES NFR BLD AUTO: 39 % (ref 14–44)
MCH RBC QN AUTO: 34.4 PG (ref 26.8–34.3)
MCHC RBC AUTO-ENTMCNC: 32.9 G/DL (ref 31.4–37.4)
MCV RBC AUTO: 105 FL (ref 82–98)
MONOCYTES # BLD AUTO: 0.38 THOUSAND/ÂΜL (ref 0.17–1.22)
MONOCYTES NFR BLD AUTO: 8 % (ref 4–12)
NEUTROPHILS # BLD AUTO: 2.35 THOUSANDS/ÂΜL (ref 1.85–7.62)
NEUTS SEG NFR BLD AUTO: 50 % (ref 43–75)
NRBC BLD AUTO-RTO: 0 /100 WBCS
PLATELET # BLD AUTO: 324 THOUSANDS/UL (ref 149–390)
PMV BLD AUTO: 10.1 FL (ref 8.9–12.7)
RBC # BLD AUTO: 2.76 MILLION/UL (ref 3.88–5.62)
WBC # BLD AUTO: 4.67 THOUSAND/UL (ref 4.31–10.16)

## 2025-06-19 PROCEDURE — 36415 COLL VENOUS BLD VENIPUNCTURE: CPT

## 2025-06-19 PROCEDURE — 85025 COMPLETE CBC W/AUTO DIFF WBC: CPT

## 2025-06-19 NOTE — TELEPHONE ENCOUNTER
Spoke with patient and spouse, I went over scheduled dates and times. Patient is aware and understanding of his upcoming appointments    no

## 2025-06-19 NOTE — TELEPHONE ENCOUNTER
"PROVIDER: Dr Vuong    Pt wife calling concerned recent Hgb 9.5 today down from 10.2 on 6/12.  She stated \"I dont want to have to take him to the hospital on the weekend cause its dropping\"  Discussed with her this number and that we will share this with Dr Vuong and will get back to her with his advise.  She stated pt is not having any bleeding she's aware of.No symptoms noted.    Pt stated pt had been getting injections in the past but she was unsure of the name ?Procrit.  She would  like a call back today.    Pls advise  "

## 2025-06-19 NOTE — TELEPHONE ENCOUNTER
Per Dr. Vuong, okay to resume Aranesp 200 mcg every 14 days for hemoglobin less than 9.9    Pt called and notified, he is agreeable to this. He will continue with weekly labs.     INFUSION: can you please schedule Aranesp.

## 2025-06-20 ENCOUNTER — APPOINTMENT (OUTPATIENT)
Dept: CARDIAC REHAB | Facility: CLINIC | Age: 85
End: 2025-06-20
Payer: COMMERCIAL

## 2025-06-25 ENCOUNTER — APPOINTMENT (OUTPATIENT)
Dept: LAB | Facility: CLINIC | Age: 85
End: 2025-06-25
Attending: INTERNAL MEDICINE
Payer: COMMERCIAL

## 2025-06-25 ENCOUNTER — HOSPITAL ENCOUNTER (OUTPATIENT)
Dept: INFUSION CENTER | Facility: CLINIC | Age: 85
Discharge: HOME | End: 2025-06-25
Attending: INTERNAL MEDICINE
Payer: COMMERCIAL

## 2025-06-25 VITALS — DIASTOLIC BLOOD PRESSURE: 56 MMHG | SYSTOLIC BLOOD PRESSURE: 120 MMHG

## 2025-06-25 DIAGNOSIS — D62 ABLA (ACUTE BLOOD LOSS ANEMIA): ICD-10-CM

## 2025-06-25 DIAGNOSIS — D61.01 PURE RED CELL APLASIA (HCC): Primary | ICD-10-CM

## 2025-06-25 DIAGNOSIS — D61.01 PURE RED CELL APLASIA (HCC): ICD-10-CM

## 2025-06-25 LAB
BASOPHILS # BLD AUTO: 0.01 THOUSANDS/ÂΜL (ref 0–0.1)
BASOPHILS NFR BLD AUTO: 0 % (ref 0–1)
EOSINOPHIL # BLD AUTO: 0.1 THOUSAND/ÂΜL (ref 0–0.61)
EOSINOPHIL NFR BLD AUTO: 2 % (ref 0–6)
ERYTHROCYTE [DISTWIDTH] IN BLOOD BY AUTOMATED COUNT: 19.1 % (ref 11.6–15.1)
HCT VFR BLD AUTO: 30.1 % (ref 36.5–49.3)
HGB BLD-MCNC: 9.9 G/DL (ref 12–17)
IMM GRANULOCYTES # BLD AUTO: 0.04 THOUSAND/UL (ref 0–0.2)
IMM GRANULOCYTES NFR BLD AUTO: 1 % (ref 0–2)
LYMPHOCYTES # BLD AUTO: 1.89 THOUSANDS/ÂΜL (ref 0.6–4.47)
LYMPHOCYTES NFR BLD AUTO: 37 % (ref 14–44)
MCH RBC QN AUTO: 35.4 PG (ref 26.8–34.3)
MCHC RBC AUTO-ENTMCNC: 32.9 G/DL (ref 31.4–37.4)
MCV RBC AUTO: 108 FL (ref 82–98)
MONOCYTES # BLD AUTO: 0.43 THOUSAND/ÂΜL (ref 0.17–1.22)
MONOCYTES NFR BLD AUTO: 9 % (ref 4–12)
NEUTROPHILS # BLD AUTO: 2.6 THOUSANDS/ÂΜL (ref 1.85–7.62)
NEUTS SEG NFR BLD AUTO: 51 % (ref 43–75)
NRBC BLD AUTO-RTO: 0 /100 WBCS
PLATELET # BLD AUTO: 325 THOUSANDS/UL (ref 149–390)
PMV BLD AUTO: 10 FL (ref 8.9–12.7)
RBC # BLD AUTO: 2.8 MILLION/UL (ref 3.88–5.62)
WBC # BLD AUTO: 5.07 THOUSAND/UL (ref 4.31–10.16)

## 2025-06-25 PROCEDURE — 85025 COMPLETE CBC W/AUTO DIFF WBC: CPT

## 2025-06-25 PROCEDURE — 36415 COLL VENOUS BLD VENIPUNCTURE: CPT

## 2025-06-25 PROCEDURE — 96372 THER/PROPH/DIAG INJ SC/IM: CPT

## 2025-06-25 RX ADMIN — DARBEPOETIN ALFA 200 MCG: 200 INJECTION, SOLUTION INTRAVENOUS; SUBCUTANEOUS at 13:55

## 2025-06-25 NOTE — PLAN OF CARE
Problem: Potential for Falls  Goal: Patient will remain free of falls  Description: INTERVENTIONS:  - Educate patient/family on patient safety including physical limitations  - Instruct patient to call for assistance with activity   - Consider consulting OT/PT to assist with strengthening/mobility based on AM PAC & JH-HLM score  - Consult OT/PT to assist with strengthening/mobility   - Keep Call bell within reach  - Keep bed low and locked with side rails adjusted as appropriate  - Keep care items and personal belongings within reach  - Initiate and maintain comfort rounds  - Make Fall Risk Sign visible to staff  -- Apply yellow socks and bracelet for high fall risk patients  - Consider moving patient to room near nurses station  Outcome: Progressing     Problem: Knowledge Deficit  Goal: Patient/family/caregiver demonstrates understanding of disease process, treatment plan, medications, and discharge instructions  Description: Complete learning assessment and assess knowledge base.  Interventions:  - Provide teaching at level of understanding  - Provide teaching via preferred learning methods  Outcome: Progressing

## 2025-06-25 NOTE — PROGRESS NOTES
Pt offers no complaints, labs within parameters for aranesp today, tolerated in left arm, pt will return as scheduled, utilizes [facility] for appts.

## 2025-07-01 ENCOUNTER — APPOINTMENT (OUTPATIENT)
Dept: CARDIAC REHAB | Facility: CLINIC | Age: 85
End: 2025-07-01
Payer: COMMERCIAL

## 2025-07-03 ENCOUNTER — APPOINTMENT (OUTPATIENT)
Dept: CARDIAC REHAB | Facility: CLINIC | Age: 85
End: 2025-07-03
Payer: COMMERCIAL

## 2025-07-08 ENCOUNTER — TELEPHONE (OUTPATIENT)
Dept: INFUSION CENTER | Facility: CLINIC | Age: 85
End: 2025-07-08

## 2025-07-08 ENCOUNTER — APPOINTMENT (OUTPATIENT)
Dept: LAB | Facility: CLINIC | Age: 85
End: 2025-07-08
Attending: INTERNAL MEDICINE
Payer: COMMERCIAL

## 2025-07-08 ENCOUNTER — CLINICAL SUPPORT (OUTPATIENT)
Dept: CARDIAC REHAB | Facility: CLINIC | Age: 85
End: 2025-07-08
Payer: COMMERCIAL

## 2025-07-08 DIAGNOSIS — D61.01 PURE RED CELL APLASIA (HCC): ICD-10-CM

## 2025-07-08 DIAGNOSIS — D62 ABLA (ACUTE BLOOD LOSS ANEMIA): ICD-10-CM

## 2025-07-08 DIAGNOSIS — I25.119 CORONARY ARTERY DISEASE INVOLVING NATIVE CORONARY ARTERY OF NATIVE HEART WITH ANGINA PECTORIS (HCC): Primary | ICD-10-CM

## 2025-07-08 LAB
BASOPHILS # BLD AUTO: 0.01 THOUSANDS/ÂΜL (ref 0–0.1)
BASOPHILS NFR BLD AUTO: 0 % (ref 0–1)
EOSINOPHIL # BLD AUTO: 0 THOUSAND/ÂΜL (ref 0–0.61)
EOSINOPHIL NFR BLD AUTO: 0 % (ref 0–6)
ERYTHROCYTE [DISTWIDTH] IN BLOOD BY AUTOMATED COUNT: 23.2 % (ref 11.6–15.1)
HCT VFR BLD AUTO: 31.7 % (ref 36.5–49.3)
HGB BLD-MCNC: 10.6 G/DL (ref 12–17)
IMM GRANULOCYTES # BLD AUTO: 0.04 THOUSAND/UL (ref 0–0.2)
IMM GRANULOCYTES NFR BLD AUTO: 1 % (ref 0–2)
LYMPHOCYTES # BLD AUTO: 0.32 THOUSANDS/ÂΜL (ref 0.6–4.47)
LYMPHOCYTES NFR BLD AUTO: 6 % (ref 14–44)
MCH RBC QN AUTO: 36.7 PG (ref 26.8–34.3)
MCHC RBC AUTO-ENTMCNC: 33.4 G/DL (ref 31.4–37.4)
MCV RBC AUTO: 110 FL (ref 82–98)
MONOCYTES # BLD AUTO: 0.2 THOUSAND/ÂΜL (ref 0.17–1.22)
MONOCYTES NFR BLD AUTO: 4 % (ref 4–12)
NEUTROPHILS # BLD AUTO: 5.2 THOUSANDS/ÂΜL (ref 1.85–7.62)
NEUTS SEG NFR BLD AUTO: 89 % (ref 43–75)
NRBC BLD AUTO-RTO: 0 /100 WBCS
PLATELET # BLD AUTO: 342 THOUSANDS/UL (ref 149–390)
PMV BLD AUTO: 10.2 FL (ref 8.9–12.7)
RBC # BLD AUTO: 2.89 MILLION/UL (ref 3.88–5.62)
WBC # BLD AUTO: 5.77 THOUSAND/UL (ref 4.31–10.16)

## 2025-07-08 PROCEDURE — 85025 COMPLETE CBC W/AUTO DIFF WBC: CPT

## 2025-07-08 PROCEDURE — 36415 COLL VENOUS BLD VENIPUNCTURE: CPT

## 2025-07-08 PROCEDURE — 93798 PHYS/QHP OP CAR RHAB W/ECG: CPT

## 2025-07-08 NOTE — PROGRESS NOTES
CARDIAC REHABILITATION   ASSESSMENT AND INDIVIDUALIZED TREATMENT PLAN  120 DAY REASSESSMENT          Today's date: 2025   # of Exercise Sessions Completed: 31  Patient name: Gael Ferraro      : 1940  Age: 85 y.o.       MRN: [id number]  Referring Physician: Luther Cooper MD  Cardiologist: Flavio Velasco MD  Provider: Esau  Clinician: Judie Moon MS, CEP, EPC         Treatment is tailored to this patient's individual needs.  The ITP was reviewed with the patient and all questions were answered to their satisfaction.  Additional ITP documentation can be found electronically including daily and monthly exercise summaries, daily session notes with ECG summaries, education notes, daily medication reconciliation, and daily physician supervision.    REASSESSMENT SUMMARY   DATE:  2025    See ITP below for further details including patient goals and plan of care for progression.    Exercise session details:  40 minutes,  2.3 - 3.0 METs  Telemetry:  NSR, rare PVC  Symptoms: none  Clinical Comments: patient returned to cardiac rehab today following medical hold for pure red cell anemia. He has received multiple transfusions and infusions and his hemoglobin has increased from 5.1 to 9.9. Exercise intensities were decreased for today to ease back into exercise. Patient reports feeling good, tolerated exercise well, no cardiac complaints. Patient has 5 exercise sessions remaining     REASSESSMENT SUMMARY DATE:  2025   See ITP below for further details including patient goals and plan of care for progression.    Resting BP  110/62 - 130/60,  HR 74 - 84  Exercise //62- 158/72.  HR 91 - 97  Exercise session details:  40 minutes,  2.3 - 3.0 METs  Telemetry:  NSR  Symptoms: none  Clinical Comments: Patient is being placed on a medical hold at this time d/t symptomatic anemia. He had been experiencing fatigue and dizziness at home and was noted to have a hemoglobin of 5 which was down from 10 a month  "ago. Unable to assess current functional status d/t being out of cardiac rehab.     60 DAY SUMMARY DATE:  5/15/2025    Resting BP  102/52 - 134/64,  HR 66 - 83  Exercise /56- 138/64.  HR 80 - 93  Exercise session details:  40 minutes,  2.2 - 3.0 METs  Telemetry:  NSR   Symptoms: No cardiac symptoms   Home exercise/ADLs: No formal home exercise with rehab. Continues to do light exercises.   Patient's subjective report of progress: He reports doing great with the heart. When it comes to his legs, he continues to fall frequently which he is concerned about. He wants to talk to his PCP about what else he can do to increase his lower body strength/possible PT.   Clinical Comments: Patient is doing well walking around the exercise room which staff has noticed improved since initial evaluation. No cardiac concerns. He is doing well increase durations/intensities as tolerated.     30 DAY SUMMARY DATE:  4/15/25    Resting BP  98/60 - 148/62,  HR 63 - 83  Exercise /58- 154/60.  HR 78 - 91  Exercise session details:  35-40 minutes,  2.05-2.97 METs  Telemetry:  NSR  Symptoms: no cardiac symptoms  Home exercise/ADLs: not completing home exercise, light ADLs  Patient's subjective report of progress: reports that his legs are feeling stronger   Clinical Comments: patient is able to walk and transfer between machines much easier. He has not reported any falls in the last few weeks      INITIAL EVALUATION SUMMARY 3/17/25    Patient's subjective report of progress/symptoms: reports leg fatigue as his biggest limiting factor. No longer having chest pain post stent  Home exercise/ADLs: no home exercise. Able to complete ADLs with taking his time and resting  Clinical Comments: reports falling yesterday, \"legs just gave out\" did not hurt anything.     Initial Fitness Assessment: 6MWT:  Resting:    Resting:  BP: 118/52  HR: 85  Exercise:  BP: 130/50  HR: 91  ECG Summary: NSR with rare PVC  Symptoms: leg fatigue. Used walker " during test.        Dx:   Encounter Diagnosis   Name Primary?    Coronary artery disease involving native coronary artery of native heart with angina pectoris (HCC) Yes       Description of Diagnosis: S/P Successful Protected PCI of the Ost LM in to the Prox LCX ISR with BRYAN x 1   Date of onset: 2/17/25  Other Cardiac History: ischemic cardiomyopathy, hx of stents        ASSESSMENT    Medical History:   Past Medical History:   Diagnosis Date    Low hemoglobin        Family History:  Family History   Problem Relation Name Age of Onset    No Known Problems Mother      No Known Problems Father         Allergies:   Hydrocodone-acetaminophen, Niacin, and Penicillins    Current Medications:   Current Outpatient Medications   Medication Sig Dispense Refill    acetaminophen (TYLENOL) 325 mg tablet Take 2 tablets (650 mg total) by mouth every 6 (six) hours as needed for mild pain, headaches or fever      aspirin (ECOTRIN LOW STRENGTH) 81 mg EC tablet Take 1 tablet (81 mg total) by mouth daily      cyanocobalamin (VITAMIN B-12) 1000 MCG tablet Take 1 tablet (1,000 mcg total) by mouth daily 30 tablet 0    escitalopram (LEXAPRO) 10 mg tablet Take 10 mg by mouth in the morning.      LORazepam (ATIVAN) 1 mg tablet Take 1 mg by mouth as needed in the morning and 1 mg as needed at noon and 1 mg as needed in the evening for anxiety.      metoprolol succinate (TOPROL-XL) 100 mg 24 hr tablet Take 0.5 tablets (50 mg total) by mouth 2 (two) times a day 30 tablet 0    nitroglycerin (NITROSTAT) 0.4 mg SL tablet Place 1 tablet (0.4 mg total) under the tongue every 5 (five) minutes as needed for chest pain 30 tablet 0    pantoprazole (PROTONIX) 40 mg tablet Take 1 tablet (40 mg total) by mouth daily Do not start before June 9, 2025. 30 tablet 0    predniSONE 20 mg tablet Take 1 tablet (20 mg total) by mouth daily Do not start before June 9, 2025. 30 tablet 0    ranolazine (RANEXA) 1000 MG SR tablet TAKE 1 TABLET (1,000 MG TOTAL) BY MOUTH  "EVERY 12 HOURS 180 tablet 1    rosuvastatin (CRESTOR) 10 MG tablet Take 1 tablet (10 mg total) by mouth daily 90 tablet 1    ticagrelor (Brilinta) 90 MG Take 1 tablet (90 mg total) by mouth every 12 (twelve) hours 60 tablet 11     No current facility-administered medications for this visit.       Medication compliance: Yes   Comments: Pt reports to be compliant with medications    Physical Limitations: none     Fall Risk: High   Comments: Patient uses walking assist device (walker/cane/rollator) uses walker outside and cane in the house. Reports falling yesterday- \"legs just gave out\"  5/15: continues to report falling at home.     Cultural needs: none      CAD Risk Factors:  Cholesterol: Yes  HTN: Yes  DM: No  Obesity: No   Inactivity: no- attending CR      EXERCISE ASSESSMENT:      Current Functional Status:  Occupation: retired  Recreation/Physical Activity: gardening   ADL’s:Capable of performing light to moderate ADLs limited by fatigue  [place]: able to perform self-care  Home exercise: none  Other Comments: has TM at home       SMART Exercise Goals:   improved DASI score by 10%  increased exercise capacity by 40% based on peak METs tolerated in cardiac rehab exercise session  maintain > 150 minutes per week of moderate intensity exercise  improved 6MWT distance by 10%    Patient Specific EXERCISE GOALS:       Increase strength in legs- goal met  Improve ability to go up stairs   Return to gardening    Functional Capacity Screening Tool:  [facility] Activity Status Index:  4.06 METs    NUTRITION ASSESSMENT:    Initial Weight:  140.8  Current Weight: 144.2 lbs     Height:   Ht Readings from Last 1 Encounters:   06/12/25 5' 10\" (1.778 m)       Rate Your Plate Score: 58/81    Diabetes: N/A  A1c:     last measured:     Lipid management: Discussed diet and lipid management and Last lipid profile 2/15/25  Chol 198    HDL 35      Current Dietary Habits:  Chicken and fish  Minimal red eat   Wife cooks " "meals   Occ eating out      SMART Nutrition Goals:   Improved Rate Your Plate score  >64, eat 6 or more servings of grain products per day, eat whole grain breads, brown rice and whole grain cereals, eat 5 or more servings of fruits and vegetables a day, eat 2 or more servings of low fat milk or yogurt a day, eat no more than 6oz of meat per day, dine out less than once per week or choose low fat restaurant meals, eat red meat once a week or less, rarely eat processed meats or eat low fat processed meats, eat poultry without the skin, eat chicken and fish that is not fried, eat meatless meals twice a week or more, Do not cook with oil, butter or margarine, Do not eat fried foods, use \"light\" tub margarine on bread, potatoes and vegetables, choose light or fat-free salad dressings or tilley, choose healthy snacks such as fruit, pretzels, and low fat crackers, choose healthy desserts and sweets such as eri food cake or  fruit, choose low sodium canned, frozen/packaged foods or rarely/never eat, rarely/never eat salty snacks, and choose low sugar desserts and sweets    Patient Specific NUTRITION GOALS:     1. Work on drinking more water   2. Maintain healthy weight   3. Decrease fried foods    Drug/Alcohol Use:   Very little alcohol       PSYCHOSOCIAL ASSESSMENT:    Date of last Assessment:  4/15/25  Depression screening:  PHQ-9 = 6    Interpretation:  5-9 = Mild Depression  Anxiety screening:  RAMSEY-7 = 12  Interpretation: 10-14 = Moderate anxiety    Pt self-report of depression and anxiety   Patient has a history of anxiety     Self-reported stress level:  4   Stressors:  health   Stress Management Tools: practice Relaxation Techniques, exercise, and keep a positive mindset    SMART Psychosocial Goals:     Reduce perceived stress to 1-3/10, improved [facility] QOL < 27, Feelings in [place] Score < 3, Physical Fitness in [place] Score < 3, Daily Activity in [place] Score < 3, Social Activities in [facility] Score < " 3, Pain in [facility] Score < 3, Overall Health in [facility] Score < 3, Quality of Life in [facility] Score < 3 , Change in Health in [facility] Score < 3 ,  Increased interest and pleasure in doing things,  reduced time feeling down, depressed or hopeless, improved sleeping habits, feel less tired with more energy, reduced time feeling like a failure and having negative self thoughts, reduced time feeling nervous or on edge, control or stop worrying, reduced feelings of restlessness, reduced irritability, and avoid thoughts of feeling as though something bad may happen    Patient Specific PSYCHOCOSOCIAL GOALS:    Continue to take lorazapam- goal met  Manage stress  Improve PHQ-9 and RAMSEY-7 scores    Quality of Life Screen:  (Higher score indicates disease impact on QOL)  [facility] COOP score: 31/45     Social Support:   spouse and children  Community/Social Activities: none     Psychosocial Assessment as it relates to rehabilitation:   Patient denies issues with his/her family or home life that may affect their rehabilitation efforts.       OTHER CORE COMPONENT ASSESSMENT:    Tobacco Use:     N/A:  Patient is a non-smoker     Anginal Symptoms:  chest pain   NTG use: Reviewed Proper use and Pt has not used NTG since [facility]    SMART Goals:   consistent, controlled resting BP < 130/80, medication compliance, and reduced angina    Patient Specific CORE COMPONENT GOALS:    Monitor BP  Medication compliance      INDIVIDUALIZED TREATMENT PLAN      EXERCISE GOALS and PLAN      Progress toward Exercise goals:   Pt is progressing and showing improvement  toward the following goals:  tolerating exercise for 40 mins at 2.3-3.0 METs, able to walk and transfer more easily around exercise room, and increasing duration and workloads in rehab.  , Pt has not made progress toward the following goals: occ falling in the past 30 days reported at home . , Will continue to educate and progress as tolerated.     Exercise Plan:     education on home exercise guidelines, home exercise 30+ mins 2 days opposite CR, Group class: Risk Factors for Heart Disease, Patient education:   Exercise After Cardiac Rehabilitation, and After discharge patient will continue to follow a regular exercise regimen with the goal of a minimum of 150 minutes per week of moderate intensity exercise.      The patient was counseled on exercise guidelines to achieve a minimum of 150 mins/wk of moderate intensity (RPE 4-6)   exercise and encouraged to add 1-2 days of exercise on opposite days of cardiac rehab as tolerated.       PHYSICIAN PRESCRIBED EXERCISE:    Current Aerobic Exercise Prescription:      Frequency: 3 days/week   Supplement with home exercise 2+ days/wk as tolerated       Minutes: 40         METS: 2.3-3.0           HR: 91-97   RPE: 4-6         Modalities: UBE, NuStep, and Recumbent bike     Exercise workloads will be progressed gradually as tolerated, within limits of patient's ability, and according to the patient's   response to the exercise program.      Aerobic Exercise Prescription Plan for Progression   Frequency: 3 days/week of cardiac rehab       Supplement with home exercise 2+ days/wk as tolerated    Minutes: 40-45       >150 mins/wk of moderate intensity exercise   METS: 2.5-3.3   HR: RHR +30-40bpm     RPE: 4-6   Modalities: UBE, NuStep, and Recumbent bike    Strength trainin-3 days / week  12-15 repetitions  1-2 sets per modality   Will be added following 2-3 weeks of monitored exercise sessions   Modalities: Arm Curl, Sit to Stands, and Upright Rows    Home Exercise: none    Exercise Education: benefit of exercise for CAD risk factors, home exercise guidelines, AHA guidelines to achieve >150 mins/wk of moderate exercise, RPE scale, Group class: Risk Factors for Heart Disease, and Group Class: Physical activity/exercise in extreme weather conditions     Readiness to change: Action:  (Changing behavior)      NUTRITION GOALS AND  "PLAN      Nutritional   Reviewed patient's Rate your Plate. Discussed key elements of heart healthy eating. Reviewed patient goals for dietary modifications and their clinical implications.  Reviewed most recent lipid profile.     Patient's progress toward Nutrition goals:    Pt is progressing and showing improvement  toward the following goals:  maintaining a healthy weight, reports eating mainly chicken and fish and minimal red meat.  , Pt has not made progress toward the following goals: drinking enough water. , Will continue to educate and progress as tolerated. Will continue to make heart healthy choices/maintain weight      Nutrition Plan:   group class: Reading Food Labels, increase intake of whole grains, replace refined flours with whole grains, increase daily intake of fruits and vegetables, increase daily intake of low fat dairy, limit meat intake to less than 6oz per day, choose healthy meals while dining out, choose lean red meat, reduce intake and /or  rarely eat processed meats , eat poultry without the skin, eat chicken and fish that is baked, broiled or roasted, eat more meatless meals, cook without fats or oils, never/rarely eat fried foods, use \"light\" tub margarine, use light or fat-free salad dressings and mayonnaise, eat healthy snacks like fruit, pretzels, and low fat crackers, choose desserts such as fruit, eri food cake, low-fat or fat-free sweets, choose low sodium canned, frozen, packaged foods or rarely eat these foods, rarely/never eat salty snacks, and choose low sugar desserts and sweets    Measurable goals were based Rate Your Plate Dietary Self-Assessment. These are the areas in which the patient could score higher on the assessment.  Goals include recommendations for a heart healthy diet based on American Heart Association.    Nutrition Education:   heart healthy eating principles  maintaining hydration  nutrition for  lipid management  healthy choices while dining out  group " class: Heart Healthy Eating  hydration    Readiness to change: Action:  (Changing behavior)      PSYCHOSOCIAL GOALS AND PLAN    Psychosocial Assessment as it relates to rehabilitation:   Patient denies issues with his/her family or home life that may affect their rehabilitation efforts.     Patient's progress toward Psychosocial goals:    Pt is progressing and showing improvement  toward the following goals:  PHQ-9 and RAMSEY-7 scores both improved at 30 days.  , Will continue to educate and progress as tolerated. Will continue to take his medications to help with anxiety.     Psychosocial Intervention/plan:   PHQ-9 >5 will refer to MD, Exercise, Keep a positive mindset, Enjoy family, and Repeat PHQ-9 every 30 days if score >5    Psychosocial Education: signs/sxs of depression, benefits of a positive support system, stress management techniques, depression and CAD, benefits of mental health counseling, class:  Relaxation, and class:  Stress and Your Health     Information to utilize [employer] was provided as well as contact information for counseling through  Behavioral [facility] and group psychotherapy groups available.    Readiness to change: Action:  (Changing behavior)      OTHER CORE COMPONENTS GOALS and PLAN      Blood Pressure will be monitored throughout the program and cardiologist will be notified of elevated trends.    Pt will be encouraged to monitor home BP if advised by cardiologist.    Tobacco Plan:   N/A:  Pt is a non-smoker    Progress toward Core Component goals:   Pt is progressing and showing improvement  toward the following goals:  medication compliance, BP has been stable.  , Will continue to educate and progress as tolerated.    Other Core Components Intervention:   medication compliance, avoid processed foods, engage in regular exercise, eliminate salt shaker at the table, check labels for sodium content, and monitor home BP    Group and Individual Education:  understanding high blood  pressure and it's relationship to CAD, components of blood pressure management, proper use of sublingual NTG, group class: Understanding Heart Disease, and group class:  Common Heart Medications    Readiness to change: Action:  (Changing behavior)

## 2025-07-09 ENCOUNTER — HOSPITAL ENCOUNTER (OUTPATIENT)
Dept: INFUSION CENTER | Facility: CLINIC | Age: 85
End: 2025-07-09
Attending: INTERNAL MEDICINE

## 2025-07-10 ENCOUNTER — CLINICAL SUPPORT (OUTPATIENT)
Dept: CARDIAC REHAB | Facility: CLINIC | Age: 85
End: 2025-07-10
Payer: COMMERCIAL

## 2025-07-10 DIAGNOSIS — I25.119 CORONARY ARTERY DISEASE INVOLVING NATIVE CORONARY ARTERY OF NATIVE HEART WITH ANGINA PECTORIS (HCC): Primary | ICD-10-CM

## 2025-07-10 PROCEDURE — 93798 PHYS/QHP OP CAR RHAB W/ECG: CPT

## 2025-07-11 ENCOUNTER — CLINICAL SUPPORT (OUTPATIENT)
Dept: CARDIAC REHAB | Facility: CLINIC | Age: 85
End: 2025-07-11
Payer: COMMERCIAL

## 2025-07-11 DIAGNOSIS — D61.01 PURE RED CELL APLASIA (HCC): ICD-10-CM

## 2025-07-11 DIAGNOSIS — I25.119 CORONARY ARTERY DISEASE INVOLVING NATIVE CORONARY ARTERY OF NATIVE HEART WITH ANGINA PECTORIS (HCC): Primary | ICD-10-CM

## 2025-07-11 PROCEDURE — 93798 PHYS/QHP OP CAR RHAB W/ECG: CPT

## 2025-07-11 RX ORDER — PREDNISONE 20 MG/1
20 TABLET ORAL DAILY
Qty: 10 TABLET | Refills: 0 | Status: SHIPPED | OUTPATIENT
Start: 2025-07-11

## 2025-07-11 NOTE — TELEPHONE ENCOUNTER
Reason for call:   [x] Refill   [] Prior Auth  [] Other:     Office:   [] PCP/Provider -   [x] Specialty/Provider - Hem Onc Tristen     Medication: predniSONE 20 mg tablet     Dose/Frequency: 20 mg, Daily     Quantity: 30    Pharmacy: [id number]    Local Pharmacy   Does the patient have enough for 3 days?   [] Yes   [x] No - Send as HP to POD    Mail Away Pharmacy   Does the patient have enough for 10 days?   [] Yes   [] No - Send as HP to POD

## 2025-07-14 DIAGNOSIS — D64.9 ANEMIA, UNSPECIFIED TYPE: Primary | ICD-10-CM

## 2025-07-14 DIAGNOSIS — D60.0 CHRONIC ACQUIRED PURE RED CELL APLASIA (HCC): ICD-10-CM

## 2025-07-14 RX ORDER — PREDNISONE 5 MG/1
TABLET ORAL
Qty: 42 TABLET | Refills: 0 | Status: SHIPPED | OUTPATIENT
Start: 2025-07-14 | End: 2025-07-30

## 2025-07-14 NOTE — TELEPHONE ENCOUNTER
Per Dr. Vuong, pt should start tapering Prednisone dose by 5 mg every 7 days.     Pt called and notified, he verbalized understanding. Pt made aware new prescription will be sent today. Continue with weekly labs.

## 2025-07-15 ENCOUNTER — CLINICAL SUPPORT (OUTPATIENT)
Dept: CARDIAC REHAB | Facility: CLINIC | Age: 85
End: 2025-07-15
Payer: COMMERCIAL

## 2025-07-15 DIAGNOSIS — I25.119 CORONARY ARTERY DISEASE INVOLVING NATIVE CORONARY ARTERY OF NATIVE HEART WITH ANGINA PECTORIS (HCC): Primary | ICD-10-CM

## 2025-07-15 PROCEDURE — 93798 PHYS/QHP OP CAR RHAB W/ECG: CPT

## 2025-07-17 ENCOUNTER — CLINICAL SUPPORT (OUTPATIENT)
Dept: CARDIAC REHAB | Facility: CLINIC | Age: 85
End: 2025-07-17
Payer: COMMERCIAL

## 2025-07-17 DIAGNOSIS — I25.119 CORONARY ARTERY DISEASE INVOLVING NATIVE CORONARY ARTERY OF NATIVE HEART WITH ANGINA PECTORIS (HCC): Primary | ICD-10-CM

## 2025-07-17 PROCEDURE — 93798 PHYS/QHP OP CAR RHAB W/ECG: CPT

## 2025-07-18 ENCOUNTER — CLINICAL SUPPORT (OUTPATIENT)
Dept: CARDIAC REHAB | Facility: CLINIC | Age: 85
End: 2025-07-18
Payer: COMMERCIAL

## 2025-07-18 ENCOUNTER — APPOINTMENT (OUTPATIENT)
Dept: LAB | Facility: CLINIC | Age: 85
End: 2025-07-18
Attending: INTERNAL MEDICINE
Payer: COMMERCIAL

## 2025-07-18 DIAGNOSIS — D62 ABLA (ACUTE BLOOD LOSS ANEMIA): ICD-10-CM

## 2025-07-18 DIAGNOSIS — D61.01 PURE RED CELL APLASIA (HCC): ICD-10-CM

## 2025-07-18 DIAGNOSIS — I25.119 CORONARY ARTERY DISEASE INVOLVING NATIVE CORONARY ARTERY OF NATIVE HEART WITH ANGINA PECTORIS (HCC): Primary | ICD-10-CM

## 2025-07-18 LAB
BASOPHILS # BLD AUTO: 0.01 THOUSANDS/ÂΜL (ref 0–0.1)
BASOPHILS NFR BLD AUTO: 0 % (ref 0–1)
EOSINOPHIL # BLD AUTO: 0 THOUSAND/ÂΜL (ref 0–0.61)
EOSINOPHIL NFR BLD AUTO: 0 % (ref 0–6)
ERYTHROCYTE [DISTWIDTH] IN BLOOD BY AUTOMATED COUNT: 22.2 % (ref 11.6–15.1)
HCT VFR BLD AUTO: 34.5 % (ref 36.5–49.3)
HGB BLD-MCNC: 11.3 G/DL (ref 12–17)
IMM GRANULOCYTES # BLD AUTO: 0.04 THOUSAND/UL (ref 0–0.2)
IMM GRANULOCYTES NFR BLD AUTO: 1 % (ref 0–2)
LYMPHOCYTES # BLD AUTO: 0.52 THOUSANDS/ÂΜL (ref 0.6–4.47)
LYMPHOCYTES NFR BLD AUTO: 8 % (ref 14–44)
MCH RBC QN AUTO: 37.5 PG (ref 26.8–34.3)
MCHC RBC AUTO-ENTMCNC: 32.8 G/DL (ref 31.4–37.4)
MCV RBC AUTO: 115 FL (ref 82–98)
MONOCYTES # BLD AUTO: 0.2 THOUSAND/ÂΜL (ref 0.17–1.22)
MONOCYTES NFR BLD AUTO: 3 % (ref 4–12)
NEUTROPHILS # BLD AUTO: 5.78 THOUSANDS/ÂΜL (ref 1.85–7.62)
NEUTS SEG NFR BLD AUTO: 88 % (ref 43–75)
NRBC BLD AUTO-RTO: 0 /100 WBCS
PLATELET # BLD AUTO: 392 THOUSANDS/UL (ref 149–390)
PMV BLD AUTO: 9.9 FL (ref 8.9–12.7)
RBC # BLD AUTO: 3.01 MILLION/UL (ref 3.88–5.62)
WBC # BLD AUTO: 6.55 THOUSAND/UL (ref 4.31–10.16)

## 2025-07-18 PROCEDURE — 85025 COMPLETE CBC W/AUTO DIFF WBC: CPT

## 2025-07-18 PROCEDURE — 93798 PHYS/QHP OP CAR RHAB W/ECG: CPT

## 2025-07-18 PROCEDURE — 36415 COLL VENOUS BLD VENIPUNCTURE: CPT

## 2025-07-18 NOTE — PROGRESS NOTES
CARDIAC REHABILITATION   ASSESSMENT AND INDIVIDUALIZED TREATMENT PLAN  DISCHARGE            Today's date: 2025   # of Exercise Sessions Completed: 36  Patient name: aGel Ferraro      : 1940  Age: 85 y.o.       MRN: [id number]  Referring Physician: Luther Cooper MD  Cardiologist: Flavio Velasco MD  Provider: Esau  Clinician: Channing Grayson MS, CEP          Treatment is tailored to this patient's individual needs.  The ITP was reviewed with the patient and all questions were answered to their satisfaction.  Additional ITP documentation can be found electronically including daily and monthly exercise summaries, daily session notes with ECG summaries, education notes, daily medication reconciliation, and daily physician supervision.    DISCHARGE SUMMARY  2025    Completed 36/36 exercise sessions  Functional capacity:   Pre: 1.96 METs, Post: 2.05 METs  Max METs tolerated in cardiac rehab:  Pre: 1.5,  Post: 3.0  [facility] QOL:  Pre: 31, Post: 28  PHQ-9:  Pre: 15, Post: 4  Weight:  Pre: 140.8 lbs ,  Post: 143.0 lbs   Exercise session details:  40 minutes,  2.2 - 3.0 METs  Resting BP  98/60 - 150/74,  HR 63 - 84  Exercise /58- 158/72.  HR 79 - 94  Telemetry:  NSR with rare to occ PVCs noted. On -6 beat VTACH noted on recumbent bike -attached.   Symptoms: No cardiac symptoms reported   Discharge Plans: Will continue to walk at home either on home treadmill or outside with his walker.   Patient's subjective report of progress: He reports doing well. He did have a medical situation occur during his program with his hemoglobin but now he is feeling better and monitored closely. He increased his overall lower body strength and endurance. He does report ongoing falls at home still.   Clinical Comments: Improved overall since the start of the program. Tolerated exercise well and increased his strength and endurance. See outcomes report attached       REASSESSMENT SUMMARY   DATE:  2025    See  ITP below for further details including patient goals and plan of care for progression.    Exercise session details:  40 minutes,  2.3 - 3.0 METs  Telemetry:  NSR, rare PVC  Symptoms: none  Clinical Comments: patient returned to cardiac rehab today following medical hold for pure red cell anemia. He has received multiple transfusions and infusions and his hemoglobin has increased from 5.1 to 9.9. Exercise intensities were decreased for today to ease back into exercise. Patient reports feeling good, tolerated exercise well, no cardiac complaints. Patient has 5 exercise sessions remaining     REASSESSMENT SUMMARY DATE:  6/11/2025   See ITP below for further details including patient goals and plan of care for progression.    Resting BP  110/62 - 130/60,  HR 74 - 84  Exercise //62- 158/72.  HR 91 - 97  Exercise session details:  40 minutes,  2.3 - 3.0 METs  Telemetry:  NSR  Symptoms: none  Clinical Comments: Patient is being placed on a medical hold at this time d/t symptomatic anemia. He had been experiencing fatigue and dizziness at home and was noted to have a hemoglobin of 5 which was down from 10 a month ago. Unable to assess current functional status d/t being out of cardiac rehab.     60 DAY SUMMARY DATE:  5/15/2025    Resting BP  102/52 - 134/64,  HR 66 - 83  Exercise /56- 138/64.  HR 80 - 93  Exercise session details:  40 minutes,  2.2 - 3.0 METs  Telemetry:  NSR   Symptoms: No cardiac symptoms   Home exercise/ADLs: No formal home exercise with rehab. Continues to do light exercises.   Patient's subjective report of progress: He reports doing great with the heart. When it comes to his legs, he continues to fall frequently which he is concerned about. He wants to talk to his PCP about what else he can do to increase his lower body strength/possible PT.   Clinical Comments: Patient is doing well walking around the exercise room which staff has noticed improved since initial evaluation. No cardiac  "concerns. He is doing well increase durations/intensities as tolerated.     30 DAY SUMMARY DATE:  4/15/25    Resting BP  98/60 - 148/62,  HR 63 - 83  Exercise /58- 154/60.  HR 78 - 91  Exercise session details:  35-40 minutes,  2.05-2.97 METs  Telemetry:  NSR  Symptoms: no cardiac symptoms  Home exercise/ADLs: not completing home exercise, light ADLs  Patient's subjective report of progress: reports that his legs are feeling stronger   Clinical Comments: patient is able to walk and transfer between machines much easier. He has not reported any falls in the last few weeks      INITIAL EVALUATION SUMMARY 3/17/25    Patient's subjective report of progress/symptoms: reports leg fatigue as his biggest limiting factor. No longer having chest pain post stent  Home exercise/ADLs: no home exercise. Able to complete ADLs with taking his time and resting  Clinical Comments: reports falling yesterday, \"legs just gave out\" did not hurt anything.     Initial Fitness Assessment: 6MWT:  Resting:    Resting:  BP: 118/52  HR: 85  Exercise:  BP: 130/50  HR: 91  ECG Summary: NSR with rare PVC  Symptoms: leg fatigue. Used walker during test.        Dx:   Encounter Diagnosis   Name Primary?    Coronary artery disease involving native coronary artery of native heart with angina pectoris (HCC) Yes       Description of Diagnosis: S/P Successful Protected PCI of the Ost LM in to the Prox LCX ISR with BRYAN x 1   Date of onset: 2/17/25  Other Cardiac History: ischemic cardiomyopathy, hx of stents        ASSESSMENT    Medical History:   Past Medical History:   Diagnosis Date    Low hemoglobin        Family History:  Family History   Problem Relation Name Age of Onset    No Known Problems Mother      No Known Problems Father         Allergies:   Hydrocodone-acetaminophen, Niacin, and Penicillins    Current Medications:   Current Outpatient Medications   Medication Sig Dispense Refill    acetaminophen (TYLENOL) 325 mg tablet Take 2 tablets " "(650 mg total) by mouth every 6 (six) hours as needed for mild pain, headaches or fever      aspirin (ECOTRIN LOW STRENGTH) 81 mg EC tablet Take 1 tablet (81 mg total) by mouth daily      cyanocobalamin (VITAMIN B-12) 1000 MCG tablet Take 1 tablet (1,000 mcg total) by mouth daily 30 tablet 0    escitalopram (LEXAPRO) 10 mg tablet Take 10 mg by mouth in the morning.      LORazepam (ATIVAN) 1 mg tablet Take 1 mg by mouth as needed in the morning and 1 mg as needed at noon and 1 mg as needed in the evening for anxiety.      metoprolol succinate (TOPROL-XL) 100 mg 24 hr tablet Take 0.5 tablets (50 mg total) by mouth 2 (two) times a day 30 tablet 0    nitroglycerin (NITROSTAT) 0.4 mg SL tablet Place 1 tablet (0.4 mg total) under the tongue every 5 (five) minutes as needed for chest pain 30 tablet 0    pantoprazole (PROTONIX) 40 mg tablet Take 1 tablet (40 mg total) by mouth daily Do not start before June 9, 2025. 30 tablet 0    predniSONE 20 mg tablet Take 1 tablet (20 mg total) by mouth daily 10 tablet 0    predniSONE 5 mg tablet Take 3 tablets (15 mg total) by mouth daily for 7 days, THEN 2 tablets (10 mg total) daily for 7 days, THEN 1 tablet (5 mg total) daily for 7 days. 42 tablet 0    ranolazine (RANEXA) 1000 MG SR tablet TAKE 1 TABLET (1,000 MG TOTAL) BY MOUTH EVERY 12 HOURS 180 tablet 1    rosuvastatin (CRESTOR) 10 MG tablet Take 1 tablet (10 mg total) by mouth daily 90 tablet 1    ticagrelor (Brilinta) 90 MG Take 1 tablet (90 mg total) by mouth every 12 (twelve) hours 60 tablet 11     No current facility-administered medications for this visit.       Medication compliance: Yes   Comments: Pt reports to be compliant with medications    Physical Limitations: none     Fall Risk: High   Comments: Patient uses walking assist device (walker/cane/rollator) uses walker outside and cane in the house. Reports falling yesterday- \"legs just gave out\"  5/15: continues to report falling at home.   7/18: ongoing " "falls    Cultural needs: none      CAD Risk Factors:  Cholesterol: Yes  HTN: Yes  DM: No  Obesity: No   Inactivity: No       EXERCISE ASSESSMENT:      Current Functional Status:  Occupation: retired  Recreation/Physical Activity: gardening   ADL’s:Capable of performing light to moderate ADLs limited by fatigue  [place]: able to perform self-care  Home exercise: Type: walking with walker outside or home treadmill  Other Comments: has TM at home       SMART Exercise Goals:   improved DASI score by 10%  increased exercise capacity by 40% based on peak METs tolerated in cardiac rehab exercise session  maintain > 150 minutes per week of moderate intensity exercise  improved 6MWT distance by 10%    Patient Specific EXERCISE GOALS:       Increase strength in legs- goal met  Improve ability to go up stairs - goal met   Return to gardening - goal not met     Functional Capacity Screening Tool:  [employer] Activity Status Index: 5.04  METs    NUTRITION ASSESSMENT:    Initial Weight:  140.8  Current Weight: 143.0 lbs     Height:   Ht Readings from Last 1 Encounters:   06/12/25 5' 10\" (1.778 m)       Rate Your Plate Score: 68/81    Diabetes: N/A  A1c:  5.1   last measured: 8/30/2024    Lipid management: Discussed diet and lipid management and Last lipid profile 2/15/25  Chol 198    HDL 35      Current Dietary Habits:  Chicken and fish  Minimal red eat   Wife cooks meals   Occ eating out      SMART Nutrition Goals:   Improved Rate Your Plate score  >64, eat 6 or more servings of grain products per day, eat whole grain breads, brown rice and whole grain cereals, eat 5 or more servings of fruits and vegetables a day, eat 2 or more servings of low fat milk or yogurt a day, eat no more than 6oz of meat per day, dine out less than once per week or choose low fat restaurant meals, eat red meat once a week or less, rarely eat processed meats or eat low fat processed meats, eat poultry without the skin, eat chicken and fish " "that is not fried, eat meatless meals twice a week or more, Do not cook with oil, butter or margarine, Do not eat fried foods, use \"light\" tub margarine on bread, potatoes and vegetables, choose light or fat-free salad dressings or tilley, choose healthy snacks such as fruit, pretzels, and low fat crackers, choose healthy desserts and sweets such as eri food cake or  fruit, choose low sodium canned, frozen/packaged foods or rarely/never eat, rarely/never eat salty snacks, and choose low sugar desserts and sweets    Patient Specific NUTRITION GOALS:     1. Work on drinking more water - goal not met    2. Maintain healthy weight - goal met    3. Decrease fried foods - goal met     Drug/Alcohol Use:   Very little alcohol       PSYCHOSOCIAL ASSESSMENT:    Date of last Assessment:  07/18/2025  Depression screening:  PHQ-9 = 4  Interpretation:  1-4 = Minimal Depression  Anxiety screening:  RAMSEY-7 = 7  Interpretation: 5-9 = Mild anxiety    Pt self-report of depression and anxiety   Patient has a history of anxiety     Self-reported stress level:  4   Stressors:  health   Stress Management Tools: practice Relaxation Techniques, exercise, and keep a positive mindset    SMART Psychosocial Goals:     Reduce perceived stress to 1-3/10, improved [facility] QOL < 27, Feelings in [facility] Score < 3, Physical Fitness in [facility] Score < 3, Daily Activity in [facility] Score < 3, Social Activities in [facility] Score < 3, Pain in [facility] Score < 3, Overall Health in [facility] Score < 3, Quality of Life in [facility] Score < 3 , Change in Health in [facility] Score < 3 ,  Increased interest and pleasure in doing things,  reduced time feeling down, depressed or hopeless, improved sleeping habits, feel less tired with more energy, reduced time feeling like a failure and having negative self thoughts, reduced time feeling nervous or on edge, control or stop worrying, reduced feelings of restlessness, reduced irritability, and avoid " thoughts of feeling as though something bad may happen    Patient Specific PSYCHOCOSOCIAL GOALS:    Continue to take lorazapam- goal met  Manage stress - goal met   Improve PHQ-9 and RAMSEY-7 scores - goal met     Quality of Life Screen:  (Higher score indicates disease impact on QOL)  [facility] score: 28/45     Social Support:   spouse and children  Community/Social Activities: none     Psychosocial Assessment as it relates to rehabilitation:   Patient denies issues with his/her family or home life that may affect their rehabilitation efforts.       OTHER CORE COMPONENT ASSESSMENT:    Tobacco Use:     N/A:  Patient is a non-smoker     Anginal Symptoms:  chest pain   NTG use: Reviewed Proper use and Pt has not used NTG since eventtakes Ranexa    SMART Goals:   consistent, controlled resting BP < 130/80, medication compliance, and reduced angina    Patient Specific CORE COMPONENT GOALS:    Monitor BP - goal met   Medication compliance- goal met       INDIVIDUALIZED TREATMENT PLAN      EXERCISE GOALS and PLAN      Progress toward Exercise goals:   Goals not met: ongoing falling at home reported and last session saw 6 beat VTACH on EKG while on recumbent bike - asymptomatic and hydrated.  , Goals met: attended full 36 visits, increased overall functional METs 100%, increased 6MWT 9.1%, increased lower body strength and endurance, plan to continue exercise: home treadmill or outside walking, and increased DASI score., Patient will be encouraged to focus on lifestyle modifications following discharge.     Exercise Plan:    After discharge patient will continue to follow a regular exercise regimen with the goal of a minimum of 150 minutes per week of moderate intensity exercise and progress as tolerated.     The patient was counseled on exercise guidelines to achieve a minimum of 150 mins/wk of moderate intensity (RPE 4-6)   exercise and encouraged to add 1-2 days of exercise on opposite days of cardiac rehab as  tolerated.       PHYSICIAN PRESCRIBED EXERCISE:    Current Aerobic Exercise Prescription:      Frequency: 3 days/week   Supplement with home exercise 2+ days/wk as tolerated       Minutes: 40         METS: 2.2-3.0          HR: 91-97   RPE: 4-6         Modalities: UBE, NuStep, and Recumbent bike     Exercise workloads will be progressed gradually as tolerated, within limits of patient's ability, and according to the patient's   response to the exercise program.    Strength training:  focused on aerobic exercise    Modalities: Arm Curl, Sit to Stands, and Upright Rows    Home Exercise: Type: walking outside or home treadmill    Exercise Education: benefit of exercise for CAD risk factors  home exercise guidelines  AHA guidelines to achieve >150 mins/wk of moderate exercise  RPE scale  class: Risk Factors for Heart Disease  Class: Physical activity/exercise in extreme weather conditions  Education Handout: Exercise After Cardiac Rehab     Readiness to change: Action:  (Changing behavior)      NUTRITION GOALS AND PLAN      Nutritional   Reviewed patient's Rate your Plate. Discussed key elements of heart healthy eating. Reviewed patient goals for dietary modifications and their clinical implications.  Reviewed most recent lipid profile.     Patient's progress toward Nutrition goals:    Goals not met: not hydrating enough with water.  , Goals met: improved overall RYP score, reduced red meat, increased chicken and fish, and increased servings of fruits and veggies. Maintained healthy weight., Patient will be encouraged to focus on lifestyle modifications following discharge.       Nutrition Plan:   After discharge patient will continue to maintain healthy lifestyle habits and focus on risk factor modification.    Measurable goals were based Rate Your Plate Dietary Self-Assessment. These are the areas in which the patient could score higher on the assessment.  Goals include recommendations for a heart healthy diet based on  American Heart Association.    Nutrition Education:   heart healthy eating principles  maintaining hydration  nutrition for  lipid management  healthy choices while dining out  group class: Heart Healthy Eating  hydration    Readiness to change: Action:  (Changing behavior)      PSYCHOSOCIAL GOALS AND PLAN    Psychosocial Assessment as it relates to rehabilitation:   Patient denies issues with his/her family or home life that may affect their rehabilitation efforts.     Patient's progress toward Psychosocial goals:    Goals met: improved overall PHQ-9 and RAMSEY-7 scores, reduced stress, medication compliance with anxiety medications, and decreased score on [employer]., Patient will be encouraged to focus on lifestyle modifications following discharge.     Psychosocial Intervention/plan:   After discharge patient will continue to maintain healthy lifestyle habits and focus on risk factor modification.    Psychosocial Education: signs/sxs of depression, benefits of a positive support system, stress management techniques, depression and CAD, benefits of mental health counseling, class:  Relaxation, and class:  Stress and Your Health     Information to utilize [employer] was provided as well as contact information for counseling through  Behavioral [facility] and group psychotherapy groups available.    Readiness to change: Action:  (Changing behavior)      OTHER CORE COMPONENTS GOALS and PLAN      Blood Pressure will be monitored throughout the program and cardiologist will be notified of elevated trends.    Pt will be encouraged to monitor home BP if advised by cardiologist.    Tobacco Plan:   N/A:  Pt is a non-smoker    Progress toward Core Component goals:   Goals not met: needs to work on increasing water intake.  , Goals met: medication compliance, blood pressure has been stable, and improved dietary choices., Patient will be encouraged to focus on lifestyle modifications following discharge.    Other Core Components  Intervention:   after discharge patient will continue to maintain healthy lifestyle habits and focus on risk factor modification    Group and Individual Education:  understanding high blood pressure and it's relationship to CAD, components of blood pressure management, proper use of sublingual NTG, group class: Understanding Heart Disease, and group class:  Common Heart Medications    Readiness to change: Action:  (Changing behavior)

## 2025-07-21 DIAGNOSIS — D61.01 PURE RED CELL APLASIA (HCC): Primary | ICD-10-CM

## 2025-07-21 DIAGNOSIS — D62 ABLA (ACUTE BLOOD LOSS ANEMIA): ICD-10-CM

## 2025-07-23 ENCOUNTER — HOSPITAL ENCOUNTER (OUTPATIENT)
Dept: INFUSION CENTER | Facility: CLINIC | Age: 85
End: 2025-07-23
Attending: INTERNAL MEDICINE

## 2025-07-29 ENCOUNTER — APPOINTMENT (OUTPATIENT)
Dept: LAB | Facility: CLINIC | Age: 85
End: 2025-07-29
Attending: INTERNAL MEDICINE
Payer: COMMERCIAL

## 2025-07-29 DIAGNOSIS — D61.01 PURE RED CELL APLASIA (HCC): ICD-10-CM

## 2025-07-29 DIAGNOSIS — D62 ABLA (ACUTE BLOOD LOSS ANEMIA): ICD-10-CM

## 2025-07-29 LAB
BASOPHILS # BLD AUTO: 0.02 THOUSANDS/ÂΜL (ref 0–0.1)
BASOPHILS NFR BLD AUTO: 0 % (ref 0–1)
EOSINOPHIL # BLD AUTO: 0.13 THOUSAND/ÂΜL (ref 0–0.61)
EOSINOPHIL NFR BLD AUTO: 2 % (ref 0–6)
ERYTHROCYTE [DISTWIDTH] IN BLOOD BY AUTOMATED COUNT: 20.1 % (ref 11.6–15.1)
HCT VFR BLD AUTO: 34.9 % (ref 36.5–49.3)
HGB BLD-MCNC: 11.2 G/DL (ref 12–17)
IMM GRANULOCYTES # BLD AUTO: 0.04 THOUSAND/UL (ref 0–0.2)
IMM GRANULOCYTES NFR BLD AUTO: 1 % (ref 0–2)
LYMPHOCYTES # BLD AUTO: 0.98 THOUSANDS/ÂΜL (ref 0.6–4.47)
LYMPHOCYTES NFR BLD AUTO: 14 % (ref 14–44)
MCH RBC QN AUTO: 37 PG (ref 26.8–34.3)
MCHC RBC AUTO-ENTMCNC: 32.1 G/DL (ref 31.4–37.4)
MCV RBC AUTO: 115 FL (ref 82–98)
MONOCYTES # BLD AUTO: 0.29 THOUSAND/ÂΜL (ref 0.17–1.22)
MONOCYTES NFR BLD AUTO: 4 % (ref 4–12)
NEUTROPHILS # BLD AUTO: 5.72 THOUSANDS/ÂΜL (ref 1.85–7.62)
NEUTS SEG NFR BLD AUTO: 79 % (ref 43–75)
NRBC BLD AUTO-RTO: 0 /100 WBCS
PLATELET # BLD AUTO: 272 THOUSANDS/UL (ref 149–390)
PMV BLD AUTO: 9.5 FL (ref 8.9–12.7)
RBC # BLD AUTO: 3.03 MILLION/UL (ref 3.88–5.62)
WBC # BLD AUTO: 7.18 THOUSAND/UL (ref 4.31–10.16)

## 2025-07-29 PROCEDURE — 36415 COLL VENOUS BLD VENIPUNCTURE: CPT

## 2025-07-29 PROCEDURE — 85025 COMPLETE CBC W/AUTO DIFF WBC: CPT

## 2025-07-30 ENCOUNTER — OFFICE VISIT (OUTPATIENT)
Dept: HEMATOLOGY ONCOLOGY | Facility: CLINIC | Age: 85
End: 2025-07-30
Payer: COMMERCIAL

## 2025-07-30 VITALS
TEMPERATURE: 97.9 F | HEIGHT: 70 IN | HEART RATE: 78 BPM | SYSTOLIC BLOOD PRESSURE: 144 MMHG | OXYGEN SATURATION: 97 % | RESPIRATION RATE: 18 BRPM | WEIGHT: 145.5 LBS | BODY MASS INDEX: 20.83 KG/M2 | DIASTOLIC BLOOD PRESSURE: 72 MMHG

## 2025-07-30 DIAGNOSIS — D60.9 ACQUIRED PURE RED CELL APLASIA (HCC): Primary | ICD-10-CM

## 2025-07-30 DIAGNOSIS — D60.0 CHRONIC ACQUIRED PURE RED CELL APLASIA (HCC): ICD-10-CM

## 2025-07-30 DIAGNOSIS — D64.9 ANEMIA, UNSPECIFIED TYPE: ICD-10-CM

## 2025-07-30 PROCEDURE — 99214 OFFICE O/P EST MOD 30 MIN: CPT | Performed by: INTERNAL MEDICINE

## 2025-07-30 RX ORDER — PREDNISONE 5 MG/1
5 TABLET ORAL DAILY
Qty: 90 TABLET | Refills: 0 | Status: SHIPPED | OUTPATIENT
Start: 2025-07-30

## 2025-07-31 RX ORDER — PREDNISONE 5 MG/1
TABLET ORAL
Qty: 42 TABLET | Refills: 0 | OUTPATIENT
Start: 2025-07-31

## 2025-08-04 DIAGNOSIS — D61.01 PURE RED CELL APLASIA (HCC): Primary | ICD-10-CM

## 2025-08-04 DIAGNOSIS — D62 ABLA (ACUTE BLOOD LOSS ANEMIA): ICD-10-CM

## 2025-08-05 ENCOUNTER — TELEPHONE (OUTPATIENT)
Dept: INFUSION CENTER | Facility: CLINIC | Age: 85
End: 2025-08-05

## 2025-08-06 ENCOUNTER — HOSPITAL ENCOUNTER (OUTPATIENT)
Dept: INFUSION CENTER | Facility: CLINIC | Age: 85
End: 2025-08-06
Attending: INTERNAL MEDICINE

## 2025-08-11 ENCOUNTER — APPOINTMENT (OUTPATIENT)
Dept: LAB | Facility: CLINIC | Age: 85
End: 2025-08-11
Attending: INTERNAL MEDICINE
Payer: COMMERCIAL

## 2025-08-18 DIAGNOSIS — D61.01 PURE RED CELL APLASIA (HCC): Primary | ICD-10-CM

## 2025-08-18 DIAGNOSIS — D62 ABLA (ACUTE BLOOD LOSS ANEMIA): ICD-10-CM

## 2025-08-19 ENCOUNTER — TELEPHONE (OUTPATIENT)
Dept: INFUSION CENTER | Facility: CLINIC | Age: 85
End: 2025-08-19

## 2025-08-19 ENCOUNTER — APPOINTMENT (OUTPATIENT)
Dept: LAB | Facility: CLINIC | Age: 85
End: 2025-08-19
Payer: COMMERCIAL

## 2025-08-19 DIAGNOSIS — D62 ABLA (ACUTE BLOOD LOSS ANEMIA): ICD-10-CM

## 2025-08-19 DIAGNOSIS — D61.01 PURE RED CELL APLASIA (HCC): ICD-10-CM

## 2025-08-19 DIAGNOSIS — D64.9 ANEMIA, UNSPECIFIED TYPE: ICD-10-CM

## 2025-08-19 LAB
BASOPHILS # BLD AUTO: 0.02 THOUSANDS/ÂΜL (ref 0–0.1)
BASOPHILS NFR BLD AUTO: 1 % (ref 0–1)
EOSINOPHIL # BLD AUTO: 0.04 THOUSAND/ÂΜL (ref 0–0.61)
EOSINOPHIL NFR BLD AUTO: 1 % (ref 0–6)
ERYTHROCYTE [DISTWIDTH] IN BLOOD BY AUTOMATED COUNT: 17.2 % (ref 11.6–15.1)
HCT VFR BLD AUTO: 36.7 % (ref 36.5–49.3)
HGB BLD-MCNC: 12.1 G/DL (ref 12–17)
IMM GRANULOCYTES # BLD AUTO: 0.02 THOUSAND/UL (ref 0–0.2)
IMM GRANULOCYTES NFR BLD AUTO: 1 % (ref 0–2)
LYMPHOCYTES # BLD AUTO: 1.45 THOUSANDS/ÂΜL (ref 0.6–4.47)
LYMPHOCYTES NFR BLD AUTO: 37 % (ref 14–44)
MCH RBC QN AUTO: 38.3 PG (ref 26.8–34.3)
MCHC RBC AUTO-ENTMCNC: 33 G/DL (ref 31.4–37.4)
MCV RBC AUTO: 116 FL (ref 82–98)
MONOCYTES # BLD AUTO: 0.39 THOUSAND/ÂΜL (ref 0.17–1.22)
MONOCYTES NFR BLD AUTO: 10 % (ref 4–12)
NEUTROPHILS # BLD AUTO: 2.01 THOUSANDS/ÂΜL (ref 1.85–7.62)
NEUTS SEG NFR BLD AUTO: 50 % (ref 43–75)
NRBC BLD AUTO-RTO: 0 /100 WBCS
PLATELET # BLD AUTO: 300 THOUSANDS/UL (ref 149–390)
PMV BLD AUTO: 9.7 FL (ref 8.9–12.7)
RBC # BLD AUTO: 3.16 MILLION/UL (ref 3.88–5.62)
WBC # BLD AUTO: 3.93 THOUSAND/UL (ref 4.31–10.16)

## 2025-08-19 PROCEDURE — 85025 COMPLETE CBC W/AUTO DIFF WBC: CPT

## 2025-08-19 PROCEDURE — 36415 COLL VENOUS BLD VENIPUNCTURE: CPT

## (undated) DEVICE — RUNTHROUGH NS EXTRA FLOPPY PTCA GUIDEWIRE: Brand: RUNTHROUGH

## (undated) DEVICE — TR BAND RADIAL ARTERY COMPRESSION DEVICE: Brand: TR BAND

## (undated) DEVICE — CATH SHOCKWAVE C2 2.5X 12MM

## (undated) DEVICE — HI-TORQUE BALANCE MIDDLEWEIGHT GUIDE WIRE .014 J TIP 3.0 CM X 190 CM: Brand: HI-TORQUE BALANCE MIDDLEWEIGHT

## (undated) DEVICE — Device: Brand: ASAHI SILVERWAY

## (undated) DEVICE — MINI TREK CORONARY DILATATION CATHETER 1.20 MM X 12 MM / RAPID-EXCHANGE: Brand: MINI TREK

## (undated) DEVICE — MINI TREK CORONARY DILATATION CATHETER 2.0 MM X 15 MM / RAPID-EXCHANGE: Brand: MINI TREK

## (undated) DEVICE — GUIDEWIRE .035 260CM 3MM J TIP

## (undated) DEVICE — GUIDEWIRE WHOLEY .035 145 CM FLOP STR TIP

## (undated) DEVICE — MICROPUNCTURE INTRODUCER SET SILHOUETTE TRANSITIONLESS PUSH-PLUS DESIGN - STIFFENED CANNULA WITH NITINOL WIRE GUIDE: Brand: MICROPUNCTURE

## (undated) DEVICE — CATH F5 INF PIG145 110CM 6SH: Brand: INFINITI

## (undated) DEVICE — BALLOON EUPHORA RX 2 X 15MM

## (undated) DEVICE — BALLOON EUPHORA RX 2.5 X 15MM

## (undated) DEVICE — CATH GUIDE LAUNCHER 5FR EBU 3.5

## (undated) DEVICE — Device

## (undated) DEVICE — DGW .035 FC J3MM 150CM TEF: Brand: EMERALD

## (undated) DEVICE — MINI TREK CORONARY DILATATION CATHETER 1.50 MM X 15 MM / RAPID-EXCHANGE: Brand: MINI TREK

## (undated) DEVICE — TREK CORONARY DILATATION CATHETER 2.25 MM X 15 MM / RAPID-EXCHANGE: Brand: TREK

## (undated) DEVICE — CATH DIAG 6FR IMPULSE 100CM FL4

## (undated) DEVICE — BALLOON NC EMERGE 2.5 X 15MM

## (undated) DEVICE — BALLOON NC EUPHORA 3.75 X 15MM

## (undated) DEVICE — BALLOON NC EUPHORA 3 X 15MM

## (undated) DEVICE — CATH SHOCKWAVE C2 PLUS 3 X 12MM

## (undated) DEVICE — BALLOON IABP 8FR 50ML

## (undated) DEVICE — BALLOON NC EMERGE 3.5 X 15MM

## (undated) DEVICE — SWAN-GANZ DOUBLE LUMEN MONITORING CATHETER: Brand: SWAN-GANZ

## (undated) DEVICE — BALLOON EUPHORA RX 1.5 X 15MM

## (undated) DEVICE — CATH BAL SAPPHIRE II PRO 1 X 15MM

## (undated) DEVICE — PINNACLE INTRODUCER SHEATH: Brand: PINNACLE

## (undated) DEVICE — GLIDESHEATH SLENDER STAINLESS STEEL KIT: Brand: GLIDESHEATH SLENDER

## (undated) DEVICE — GLIDESHEATH BASIC HYDROPHILIC COATED INTRODUCER SHEATH: Brand: GLIDESHEATH

## (undated) DEVICE — Device: Brand: PROWATER

## (undated) DEVICE — RADIFOCUS OPTITORQUE ANGIOGRAPHIC CATHETER: Brand: OPTITORQUE

## (undated) DEVICE — CATH GUIDE EXT GUIDELINER COAST 6FR